# Patient Record
Sex: FEMALE | Race: WHITE | NOT HISPANIC OR LATINO | Employment: STUDENT | URBAN - METROPOLITAN AREA
[De-identification: names, ages, dates, MRNs, and addresses within clinical notes are randomized per-mention and may not be internally consistent; named-entity substitution may affect disease eponyms.]

---

## 2021-08-25 ENCOUNTER — OFFICE VISIT (OUTPATIENT)
Dept: FAMILY MEDICINE | Facility: CLINIC | Age: 26
End: 2021-08-25
Payer: COMMERCIAL

## 2021-08-25 VITALS
BODY MASS INDEX: 24.32 KG/M2 | TEMPERATURE: 98.4 F | OXYGEN SATURATION: 98 % | HEART RATE: 71 BPM | HEIGHT: 65 IN | SYSTOLIC BLOOD PRESSURE: 122 MMHG | DIASTOLIC BLOOD PRESSURE: 83 MMHG | RESPIRATION RATE: 16 BRPM | WEIGHT: 146 LBS

## 2021-08-25 DIAGNOSIS — G47.11 IDIOPATHIC HYPERSOMNIA: Primary | ICD-10-CM

## 2021-08-25 PROCEDURE — 99203 OFFICE O/P NEW LOW 30 MIN: CPT | Performed by: FAMILY MEDICINE

## 2021-08-25 RX ORDER — MODAFINIL 200 MG/1
200 TABLET ORAL DAILY
COMMUNITY
End: 2021-08-25

## 2021-08-25 RX ORDER — MODAFINIL 200 MG/1
200 TABLET ORAL DAILY
Qty: 90 TABLET | Refills: 1 | Status: ON HOLD | OUTPATIENT
Start: 2021-08-25 | End: 2024-02-27

## 2021-08-25 ASSESSMENT — MIFFLIN-ST. JEOR: SCORE: 1396.25

## 2021-08-25 NOTE — PROGRESS NOTES
"    Assessment & Plan     Idiopathic hypersomnia  Refilled modafinil  - modafinil (PROVIGIL) 200 MG tablet; Take 1 tablet (200 mg) by mouth daily    F/u for pap smear and possible IUD removal/reinsertion    Chela Peter DO  [facility] DESMOND Spring is a 25 year old who presents for the following health issues:    HPI     Presenting to [facility]  Maribel IUD in 2018/2019  On modafinil for idiopathic hypersomnia x4 years  Tried and failed ritalin, sleep study completed. Modafinil working effectively.    Review of Systems   Constitutional, HEENT, cardiovascular, pulmonary, gi and gu systems are negative, except as otherwise noted.      Objective    /83   Pulse 71   Temp 98.4  F (36.9  C) (Oral)   Resp 16   Ht 1.64 m (5' 4.57\")   Wt 66.2 kg (146 lb)   LMP 07/29/2021 (Approximate)   SpO2 98%   BMI 24.62 kg/m    Body mass index is 24.62 kg/m .  Physical Exam   GENERAL: healthy, alert and no distress  RESP: lungs clear to auscultation - no rales, rhonchi or wheezes  CV: regular rate and rhythm, normal S1 S2, no S3 or S4, no murmur, click or rub, no peripheral edema and peripheral pulses strong  MS: no gross musculoskeletal defects noted, no edema          "

## 2021-09-16 ENCOUNTER — OFFICE VISIT (OUTPATIENT)
Dept: FAMILY MEDICINE | Facility: CLINIC | Age: 26
End: 2021-09-16
Payer: COMMERCIAL

## 2021-09-16 VITALS
HEART RATE: 82 BPM | SYSTOLIC BLOOD PRESSURE: 124 MMHG | WEIGHT: 145 LBS | HEIGHT: 66 IN | RESPIRATION RATE: 16 BRPM | DIASTOLIC BLOOD PRESSURE: 59 MMHG | TEMPERATURE: 98.1 F | BODY MASS INDEX: 23.3 KG/M2 | OXYGEN SATURATION: 100 %

## 2021-09-16 DIAGNOSIS — Z11.3 ROUTINE SCREENING FOR STI (SEXUALLY TRANSMITTED INFECTION): ICD-10-CM

## 2021-09-16 DIAGNOSIS — Z12.4 CERVICAL CANCER SCREENING: Primary | ICD-10-CM

## 2021-09-16 PROCEDURE — 87491 CHLMYD TRACH DNA AMP PROBE: CPT | Performed by: FAMILY MEDICINE

## 2021-09-16 PROCEDURE — 99213 OFFICE O/P EST LOW 20 MIN: CPT | Performed by: FAMILY MEDICINE

## 2021-09-16 PROCEDURE — G0145 SCR C/V CYTO,THINLAYER,RESCR: HCPCS | Performed by: FAMILY MEDICINE

## 2021-09-16 PROCEDURE — 87591 N.GONORRHOEAE DNA AMP PROB: CPT | Performed by: FAMILY MEDICINE

## 2021-09-16 ASSESSMENT — MIFFLIN-ST. JEOR: SCORE: 1416.72

## 2021-09-16 NOTE — PROGRESS NOTES
"    Assessment & Plan     Cervical cancer screening  - Pap screen reflex to HPV if ASCUS - recommend age 25 - 29  - HPV Hold (Lab Only)    Routine screening for STI (sexually transmitted infection)  - Chlamydia trachomatis/Neisseria gonorrhoeae by PCR - Clinic Collect      No follow-ups on file.    DO ANNIKA Jasso [facility] DESMOND Spring is a 25 year old who presents for the following health issues:    HPI     P/f pap smear  Has kyleena - placed 1/11/2019  LMP ~1mo ago, irregular bleeding on IUD  Last pap 7/2019 and 10/2020 with ASCUS, neg HPV    Review of Systems   Constitutional, HEENT, cardiovascular, pulmonary, gi and gu systems are negative, except as otherwise noted.      Objective    /59   Pulse 82   Temp 98.1  F (36.7  C) (Oral)   Resp 16   Ht 1.68 m (5' 6.14\")   Wt 65.8 kg (145 lb)   SpO2 100%   Breastfeeding No   BMI 23.30 kg/m    Body mass index is 23.3 kg/m .  Physical Exam   GENERAL: healthy, alert and no distress  RESP: lungs clear to auscultation - no rales, rhonchi or wheezes  CV: regular rate and rhythm, normal S1 S2, no S3 or S4, no murmur, click or rub, no peripheral edema and peripheral pulses strong   (female): normal female external genitalia, normal urethral meatus, vaginal mucosa pink, moist, well rugated, and normal cervix/adnexa/uterus without masses or discharge. IUD strings visualized - short, in cervical os  MS: no gross musculoskeletal defects noted, no edema  SKIN: no suspicious lesions or rashes  NEURO: Normal strength and tone, mentation intact and speech normal  PSYCH: mentation appears normal, affect normal/bright          "

## 2021-09-17 LAB
C TRACH DNA SPEC QL PROBE+SIG AMP: NEGATIVE
N GONORRHOEA DNA SPEC QL NAA+PROBE: NEGATIVE

## 2021-09-20 LAB
BKR LAB AP GYN ADEQUACY: NORMAL
BKR LAB AP GYN INTERPRETATION: NORMAL
BKR LAB AP HPV REFLEX: NORMAL
BKR LAB AP PREVIOUS ABNL DX: NORMAL
BKR LAB AP PREVIOUS ABNORMAL: NORMAL
PATH REPORT.COMMENTS IMP SPEC: NORMAL
PATH REPORT.RELEVANT HX SPEC: NORMAL

## 2021-10-17 ENCOUNTER — HEALTH MAINTENANCE LETTER (OUTPATIENT)
Age: 26
End: 2021-10-17

## 2022-10-03 ENCOUNTER — HEALTH MAINTENANCE LETTER (OUTPATIENT)
Age: 27
End: 2022-10-03

## 2022-11-09 ENCOUNTER — LAB REQUISITION (OUTPATIENT)
Dept: LAB | Facility: CLINIC | Age: 27
End: 2022-11-09

## 2022-11-09 DIAGNOSIS — Z11.3 ENCOUNTER FOR SCREENING FOR INFECTIONS WITH A PREDOMINANTLY SEXUAL MODE OF TRANSMISSION: ICD-10-CM

## 2022-11-09 PROCEDURE — 87491 CHLMYD TRACH DNA AMP PROBE: CPT | Performed by: NURSE PRACTITIONER

## 2022-11-09 PROCEDURE — 86803 HEPATITIS C AB TEST: CPT | Performed by: NURSE PRACTITIONER

## 2022-11-09 PROCEDURE — 87389 HIV-1 AG W/HIV-1&-2 AB AG IA: CPT | Performed by: NURSE PRACTITIONER

## 2022-11-09 PROCEDURE — 87591 N.GONORRHOEAE DNA AMP PROB: CPT | Performed by: NURSE PRACTITIONER

## 2022-11-09 PROCEDURE — 86780 TREPONEMA PALLIDUM: CPT | Performed by: NURSE PRACTITIONER

## 2022-11-10 LAB
C TRACH DNA SPEC QL PROBE+SIG AMP: NEGATIVE
HCV AB SERPL QL IA: NONREACTIVE
HIV 1+2 AB+HIV1 P24 AG SERPL QL IA: NONREACTIVE
N GONORRHOEA DNA SPEC QL NAA+PROBE: NEGATIVE
T PALLIDUM AB SER QL: NONREACTIVE

## 2023-02-11 ENCOUNTER — HEALTH MAINTENANCE LETTER (OUTPATIENT)
Age: 28
End: 2023-02-11

## 2023-04-25 ENCOUNTER — LAB REQUISITION (OUTPATIENT)
Dept: LAB | Facility: CLINIC | Age: 28
End: 2023-04-25

## 2023-04-25 DIAGNOSIS — R39.15 URGENCY OF URINATION: ICD-10-CM

## 2023-04-25 PROCEDURE — 87086 URINE CULTURE/COLONY COUNT: CPT | Performed by: NURSE PRACTITIONER

## 2023-04-27 LAB — BACTERIA UR CULT: NO GROWTH

## 2023-05-01 ENCOUNTER — LAB REQUISITION (OUTPATIENT)
Dept: LAB | Facility: CLINIC | Age: 28
End: 2023-05-01

## 2023-05-01 DIAGNOSIS — R30.0 DYSURIA: ICD-10-CM

## 2023-05-01 DIAGNOSIS — Z11.3 ENCOUNTER FOR SCREENING FOR INFECTIONS WITH A PREDOMINANTLY SEXUAL MODE OF TRANSMISSION: ICD-10-CM

## 2023-05-01 PROCEDURE — 87563 M. GENITALIUM AMP PROBE: CPT | Performed by: NURSE PRACTITIONER

## 2023-05-01 PROCEDURE — 87491 CHLMYD TRACH DNA AMP PROBE: CPT | Performed by: NURSE PRACTITIONER

## 2023-05-01 PROCEDURE — 87086 URINE CULTURE/COLONY COUNT: CPT | Performed by: NURSE PRACTITIONER

## 2023-05-02 LAB
C TRACH DNA SPEC QL PROBE+SIG AMP: NEGATIVE
N GONORRHOEA DNA SPEC QL NAA+PROBE: NEGATIVE

## 2023-05-03 LAB — BACTERIA UR CULT: NORMAL

## 2023-05-04 LAB
M GENITALIUM DNA SPEC QL NAA+PROBE: NOT DETECTED
M HOMINIS DNA SPEC QL NAA+PROBE: NOT DETECTED
U PARVUM DNA SPEC QL NAA+PROBE: DETECTED
U UREALYTICUM DNA SPEC QL NAA+PROBE: NOT DETECTED

## 2023-05-25 ENCOUNTER — LAB REQUISITION (OUTPATIENT)
Dept: LAB | Facility: CLINIC | Age: 28
End: 2023-05-25

## 2023-05-25 DIAGNOSIS — R39.15 URGENCY OF URINATION: ICD-10-CM

## 2023-05-25 PROCEDURE — 87086 URINE CULTURE/COLONY COUNT: CPT | Performed by: NURSE PRACTITIONER

## 2023-05-25 PROCEDURE — 87798 DETECT AGENT NOS DNA AMP: CPT | Performed by: NURSE PRACTITIONER

## 2023-05-27 LAB — BACTERIA UR CULT: NO GROWTH

## 2023-05-30 LAB
M GENITALIUM DNA SPEC QL NAA+PROBE: NOT DETECTED
M HOMINIS DNA SPEC QL NAA+PROBE: NOT DETECTED
U PARVUM DNA SPEC QL NAA+PROBE: NOT DETECTED
U UREALYTICUM DNA SPEC QL NAA+PROBE: NOT DETECTED

## 2024-01-29 ENCOUNTER — OFFICE VISIT (OUTPATIENT)
Dept: OTOLARYNGOLOGY | Facility: CLINIC | Age: 29
End: 2024-01-29
Payer: COMMERCIAL

## 2024-01-29 ENCOUNTER — PRE VISIT (OUTPATIENT)
Dept: OTOLARYNGOLOGY | Facility: CLINIC | Age: 29
End: 2024-01-29

## 2024-01-29 VITALS
HEIGHT: 66 IN | OXYGEN SATURATION: 94 % | HEART RATE: 87 BPM | BODY MASS INDEX: 22.45 KG/M2 | WEIGHT: 139.7 LBS | SYSTOLIC BLOOD PRESSURE: 122 MMHG | DIASTOLIC BLOOD PRESSURE: 80 MMHG

## 2024-01-29 DIAGNOSIS — R59.0 CERVICAL LYMPHADENOPATHY: Primary | ICD-10-CM

## 2024-01-29 PROCEDURE — 31575 DIAGNOSTIC LARYNGOSCOPY: CPT | Performed by: STUDENT IN AN ORGANIZED HEALTH CARE EDUCATION/TRAINING PROGRAM

## 2024-01-29 PROCEDURE — 88189 FLOWCYTOMETRY/READ 16 & >: CPT | Performed by: STUDENT IN AN ORGANIZED HEALTH CARE EDUCATION/TRAINING PROGRAM

## 2024-01-29 PROCEDURE — 88172 CYTP DX EVAL FNA 1ST EA SITE: CPT | Mod: 26 | Performed by: STUDENT IN AN ORGANIZED HEALTH CARE EDUCATION/TRAINING PROGRAM

## 2024-01-29 PROCEDURE — 88185 FLOWCYTOMETRY/TC ADD-ON: CPT | Performed by: STUDENT IN AN ORGANIZED HEALTH CARE EDUCATION/TRAINING PROGRAM

## 2024-01-29 PROCEDURE — 87102 FUNGUS ISOLATION CULTURE: CPT | Performed by: STUDENT IN AN ORGANIZED HEALTH CARE EDUCATION/TRAINING PROGRAM

## 2024-01-29 PROCEDURE — 88305 TISSUE EXAM BY PATHOLOGIST: CPT | Mod: TC | Performed by: STUDENT IN AN ORGANIZED HEALTH CARE EDUCATION/TRAINING PROGRAM

## 2024-01-29 PROCEDURE — 88341 IMHCHEM/IMCYTCHM EA ADD ANTB: CPT | Mod: 26 | Performed by: STUDENT IN AN ORGANIZED HEALTH CARE EDUCATION/TRAINING PROGRAM

## 2024-01-29 PROCEDURE — 99203 OFFICE O/P NEW LOW 30 MIN: CPT | Mod: 25 | Performed by: STUDENT IN AN ORGANIZED HEALTH CARE EDUCATION/TRAINING PROGRAM

## 2024-01-29 PROCEDURE — 87116 MYCOBACTERIA CULTURE: CPT | Mod: 90 | Performed by: PATHOLOGY

## 2024-01-29 PROCEDURE — 88173 CYTOPATH EVAL FNA REPORT: CPT | Mod: 26 | Performed by: STUDENT IN AN ORGANIZED HEALTH CARE EDUCATION/TRAINING PROGRAM

## 2024-01-29 PROCEDURE — 88342 IMHCHEM/IMCYTCHM 1ST ANTB: CPT | Mod: 26 | Performed by: STUDENT IN AN ORGANIZED HEALTH CARE EDUCATION/TRAINING PROGRAM

## 2024-01-29 PROCEDURE — 99000 SPECIMEN HANDLING OFFICE-LAB: CPT | Performed by: PATHOLOGY

## 2024-01-29 PROCEDURE — 88305 TISSUE EXAM BY PATHOLOGIST: CPT | Mod: 26 | Performed by: STUDENT IN AN ORGANIZED HEALTH CARE EDUCATION/TRAINING PROGRAM

## 2024-01-29 PROCEDURE — 87206 SMEAR FLUORESCENT/ACID STAI: CPT | Mod: 90 | Performed by: PATHOLOGY

## 2024-01-29 PROCEDURE — 88342 IMHCHEM/IMCYTCHM 1ST ANTB: CPT | Mod: TC,XU | Performed by: STUDENT IN AN ORGANIZED HEALTH CARE EDUCATION/TRAINING PROGRAM

## 2024-01-29 PROCEDURE — 88184 FLOWCYTOMETRY/ TC 1 MARKER: CPT | Performed by: STUDENT IN AN ORGANIZED HEALTH CARE EDUCATION/TRAINING PROGRAM

## 2024-01-29 ASSESSMENT — PAIN SCALES - GENERAL: PAINLEVEL: NO PAIN (0)

## 2024-01-29 NOTE — LETTER
1/29/2024       RE: Clementine Kathleen  [address]  [facility] [address]     Dear Colleague,    Thank you for referring your patient, Clementine Kathleen, to the [facility] [place] [facility]. Please see a copy of my visit note below.    Head and Neck Surgery  1/29/24    I had the pleasure of meeting Ms Kathleen today in clinic.    She is a pleasant 28-year-old female referred for evaluation of bilateral cervical adenopathy.    She first noted this on December 24.  The lymph nodes have not stable in size since then.  She has no constitutional or head and neck symptoms.  She has had no recent travel or animal exposures.    Past medical history:  None    Past surgical history:  None    Medications:  Modafinil    Allergies:  No known drug allergies    Social history:  No tobacco use  Occasional alcohol use    Family history:  None    Review of systems:  12 point review of systems was performed and is negative aside from that in HPI    Physical examination:  Alert and in no acute distress  Palpable left level 3/4 lymphadenopathy that is firm.  The overlying skin is not involved  No lesions seen in the oral cavity or oropharynx  No cutaneous lesions seen    Procedure:  Directed laryngoscopy was performed showing no lesions in the pharynx or larynx    Ultrasound-guided fine-needle aspirates performed of the left level 4 lymph node.  Under ultrasound guidance 1% lidocaine with 1: 100,000 epinephrine was used for local anesthesia.  23-gauge needle was used to obtain multiple specimens for cytology, flow cytometry, acid-fast bacilli stain and culture, fungal culture.    Assessment and plan:  28-year-old woman with persistent cervical adenopathy.  Ultrasound shows bilateral abnormal appearing lymph nodes.     We are able to obtain fine-needle aspirates for microbiology and pathologic evaluation.    I will keep her updated on  workup.    Asher Damon MD    30 minutes spent on the date of the encounter in chart review, patient visit, review of tests, documentation and/or discussion with other providers about the issues documented above aside from time spent doing flexible laryngoscopy and ultrasound-guided fine-needle aspirate.         Again, thank you for allowing me to participate in the care of your patient.      Sincerely,    Asher Damon MD

## 2024-01-29 NOTE — PATIENT INSTRUCTIONS
You were seen in the ENT Clinic today by Dr. Damon. If you have any questions or concerns after your appointment, please contact us (see below)    The following has been recommended for you based upon your appointment today:  In clinic FNA (we will call with results)     How to Contact Us:  Send a [employer] message to your provider. Our team will respond to you via [employer]. Occasionally, we will need to call you to get further information.  For urgent matters (Monday-Friday), call the ENT Clinic: [phone] and speak with a call center team member - they will route your call appropriately.   If you'd like to speak directly with a nurse, please find our contact information below. We do our best to check voicemail frequently throughout the day, and will work to call you back within 1-2 days. For urgent matters, please use the general clinic phone numbers listed above.    Martha HINDS RN, BSN  RN Care Coordinator, ENT Clinic  [facility]  Direct: [phone]

## 2024-01-29 NOTE — TELEPHONE ENCOUNTER
FUTURE VISIT INFORMATION      FUTURE VISIT INFORMATION:  Date: 1/29/24  Time: 12 pm  Location: [facility]  REFERRAL INFORMATION:  Referring provider:    Referring providers clinic:    Reason for visit/diagnosis:  FNA    RECORDS REQUESTED FROM      Clinic name Comments Records Status Imaging Status                 *No records

## 2024-01-30 LAB
PATH REPORT.COMMENTS IMP SPEC: NORMAL
PATH REPORT.FINAL DX SPEC: NORMAL
PATH REPORT.MICROSCOPIC SPEC OTHER STN: NORMAL
PATH REPORT.RELEVANT HX SPEC: NORMAL

## 2024-01-31 NOTE — PROGRESS NOTES
Head and Neck Surgery  1/29/24    I had the pleasure of meeting Ms Kathleen today in clinic.    She is a pleasant 28-year-old female referred for evaluation of bilateral cervical adenopathy.    She first noted this on December 24.  The lymph nodes have not stable in size since then.  She has no constitutional or head and neck symptoms.  She has had no recent travel or animal exposures.    Past medical history:  None    Past surgical history:  None    Medications:  Modafinil    Allergies:  No known drug allergies    Social history:  No tobacco use  Occasional alcohol use    Family history:  None    Review of systems:  12 point review of systems was performed and is negative aside from that in HPI    Physical examination:  Alert and in no acute distress  Palpable left level 3/4 lymphadenopathy that is firm.  The overlying skin is not involved  No lesions seen in the oral cavity or oropharynx  No cutaneous lesions seen    Procedure:  Directed laryngoscopy was performed showing no lesions in the pharynx or larynx    Ultrasound-guided fine-needle aspirates performed of the left level 4 lymph node.  Under ultrasound guidance 1% lidocaine with 1: 100,000 epinephrine was used for local anesthesia.  23-gauge needle was used to obtain multiple specimens for cytology, flow cytometry, acid-fast bacilli stain and culture, fungal culture.    Assessment and plan:  28-year-old woman with persistent cervical adenopathy.  Ultrasound shows bilateral abnormal appearing lymph nodes.     We are able to obtain fine-needle aspirates for microbiology and pathologic evaluation.    I will keep her updated on workup.    Asher Damon MD    30 minutes spent on the date of the encounter in chart review, patient visit, review of tests, documentation and/or discussion with other providers about the issues documented above aside from time spent doing flexible laryngoscopy and ultrasound-guided fine-needle aspirate.

## 2024-02-02 ENCOUNTER — PATIENT OUTREACH (OUTPATIENT)
Dept: OTOLARYNGOLOGY | Facility: CLINIC | Age: 29
End: 2024-02-02
Payer: COMMERCIAL

## 2024-02-02 ENCOUNTER — TELEPHONE (OUTPATIENT)
Dept: OTOLARYNGOLOGY | Facility: CLINIC | Age: 29
End: 2024-02-02
Payer: COMMERCIAL

## 2024-02-02 ENCOUNTER — PREP FOR PROCEDURE (OUTPATIENT)
Dept: OTOLARYNGOLOGY | Facility: CLINIC | Age: 29
End: 2024-02-02
Payer: COMMERCIAL

## 2024-02-02 DIAGNOSIS — R59.0 CERVICAL LYMPHADENOPATHY: Primary | ICD-10-CM

## 2024-02-02 DIAGNOSIS — C85.90 LYMPHOMA (H): Primary | ICD-10-CM

## 2024-02-02 LAB
PATH REPORT.COMMENTS IMP SPEC: ABNORMAL
PATH REPORT.COMMENTS IMP SPEC: YES
PATH REPORT.FINAL DX SPEC: ABNORMAL
PATH REPORT.GROSS SPEC: ABNORMAL
PATH REPORT.MICROSCOPIC SPEC OTHER STN: ABNORMAL
PATH REPORT.RELEVANT HX SPEC: ABNORMAL

## 2024-02-02 NOTE — PROGRESS NOTES
Called and spoke to patient regarding PET CT. Scheduled for 2/7 at 7 am. Also spoke to patient regarding excisional node biopsy tentatively scheduled for Friday at 10:45 am at [place]. Direct call back number given to patient for questions or concerns.     Martha Newman, RN, BSN  RN Care Coordinator, ENT Clinic

## 2024-02-02 NOTE — TELEPHONE ENCOUNTER
Patient is scheduled for surgery with Dr. Damon.     Spoke with: Patient     Date of Surgery: 2/09/24    Location: [facility] OR     Pre op with Provider: NICOLE     H&P: Patient is scheduled for an in clinic visit with PAC on 2/06/2024 at 7:30 AM.     Additional imaging/appointments: Per scheduling instructions within case, no post op appointment is required.     Surgery packet: Per patients preference, will send packet through [employer]. Informed patient arrival time for surgery will not be listed within packet.      Additional comments: Informed patient arrival time for surgery will be given at PAC appointment.         Rafaela Cardenas on 2/2/2024 at 2:19 PM

## 2024-02-05 NOTE — TELEPHONE ENCOUNTER
FUTURE VISIT INFORMATION      SURGERY INFORMATION:  Date: 2/9/24  Location[facility] or  Surgeon:  Asher Damon MD   Anesthesia Type:  general  Procedure: Exciv 1/29/24    RECORDS REQUESTED FROM:       Primary Care Provider: Chela Peter DO

## 2024-02-06 ENCOUNTER — LAB (OUTPATIENT)
Dept: LAB | Facility: CLINIC | Age: 29
End: 2024-02-06
Payer: COMMERCIAL

## 2024-02-06 ENCOUNTER — PATIENT OUTREACH (OUTPATIENT)
Dept: ONCOLOGY | Facility: CLINIC | Age: 29
End: 2024-02-06

## 2024-02-06 ENCOUNTER — OFFICE VISIT (OUTPATIENT)
Dept: SURGERY | Facility: CLINIC | Age: 29
End: 2024-02-06
Payer: COMMERCIAL

## 2024-02-06 ENCOUNTER — PRE VISIT (OUTPATIENT)
Dept: SURGERY | Facility: CLINIC | Age: 29
End: 2024-02-06

## 2024-02-06 ENCOUNTER — ANESTHESIA EVENT (OUTPATIENT)
Dept: SURGERY | Facility: CLINIC | Age: 29
End: 2024-02-06
Payer: COMMERCIAL

## 2024-02-06 VITALS
TEMPERATURE: 97.6 F | BODY MASS INDEX: 21.79 KG/M2 | DIASTOLIC BLOOD PRESSURE: 71 MMHG | SYSTOLIC BLOOD PRESSURE: 104 MMHG | RESPIRATION RATE: 16 BRPM | WEIGHT: 135.6 LBS | HEART RATE: 80 BPM | OXYGEN SATURATION: 98 % | HEIGHT: 66 IN

## 2024-02-06 DIAGNOSIS — Z01.818 PREOP EXAMINATION: Primary | ICD-10-CM

## 2024-02-06 DIAGNOSIS — R59.0 CERVICAL LYMPHADENOPATHY: ICD-10-CM

## 2024-02-06 DIAGNOSIS — Z01.818 PREOP EXAMINATION: ICD-10-CM

## 2024-02-06 LAB
CREAT SERPL-MCNC: 0.69 MG/DL (ref 0.51–0.95)
EGFRCR SERPLBLD CKD-EPI 2021: >90 ML/MIN/1.73M2
ERYTHROCYTE [DISTWIDTH] IN BLOOD BY AUTOMATED COUNT: 13.2 % (ref 10–15)
HCT VFR BLD AUTO: 41.8 % (ref 35–47)
HGB BLD-MCNC: 14 G/DL (ref 11.7–15.7)
MCH RBC QN AUTO: 29 PG (ref 26.5–33)
MCHC RBC AUTO-ENTMCNC: 33.5 G/DL (ref 31.5–36.5)
MCV RBC AUTO: 87 FL (ref 78–100)
PLATELET # BLD AUTO: 250 10E3/UL (ref 150–450)
RBC # BLD AUTO: 4.82 10E6/UL (ref 3.8–5.2)
WBC # BLD AUTO: 5.1 10E3/UL (ref 4–11)

## 2024-02-06 PROCEDURE — 82565 ASSAY OF CREATININE: CPT | Performed by: PATHOLOGY

## 2024-02-06 PROCEDURE — 36415 COLL VENOUS BLD VENIPUNCTURE: CPT | Performed by: PATHOLOGY

## 2024-02-06 PROCEDURE — 85027 COMPLETE CBC AUTOMATED: CPT | Performed by: PATHOLOGY

## 2024-02-06 PROCEDURE — 99203 OFFICE O/P NEW LOW 30 MIN: CPT | Performed by: CLINICAL NURSE SPECIALIST

## 2024-02-06 RX ORDER — NICOTINE POLACRILEX 4 MG/1
20 GUM, CHEWING ORAL EVERY MORNING
Status: ON HOLD | COMMUNITY
End: 2024-02-27

## 2024-02-06 ASSESSMENT — ENCOUNTER SYMPTOMS
SEIZURES: 0
DYSRHYTHMIAS: 0

## 2024-02-06 ASSESSMENT — PAIN SCALES - GENERAL: PAINLEVEL: NO PAIN (0)

## 2024-02-06 ASSESSMENT — LIFESTYLE VARIABLES: TOBACCO_USE: 0

## 2024-02-06 NOTE — PATIENT INSTRUCTIONS
Preparing for Your Surgery      Name:  Clementine Kathleen   MRN:  [id number]   :  1995   Today's Date:  2024       Arriving for surgery:  Surgery date:  24  Arrival time:  8:45 am  Surgery time: 10:45 am    Please come to:     Please come to:      [facility] [address]  [address] [place]  [address]      The [facility] Patient /Visitor Ramp is located at [address]. Patients and visitors who self-park will receive the reduced hospital parking rate. If the Patient /Visitor Ramp is full, please follow the signs to the  parking located at the main hospital entrance.     parking is available ( 24 hours/ 7 days a week)    Discounted parking pass options are available for patients and visitors. They can be purchased at the [employer] desk at the main hospital entrance.    -    Stop at the security desk and they will direct surgery patients to the 3rd floor Surgery Waiting Room. [phone]     -  If you are in need of directions, wheelchair or escort please stop at the Information/security desk in the lobby.       What can I eat or drink?  -  You may eat and drink normally up to 8 hours prior to arrival time. (Until 12:45 am)  -  You may have clear liquids until 2 hours prior to arrival time. (Until 6:45 am)    Examples of clear liquids:  Water  Clear broth  Juices (apple, white grape, white cranberry  and cider) without pulp  Noncarbonated, powder based beverages  (lemonade and Jovany-Aid)  Sodas (Sprite, 7-Up, ginger ale and seltzer)  Coffee or tea (without milk or cream)  Gatorade    -  No Alcohol or cannabis products for at least 24 hours before surgery.     Which medicines can I take?    Hold Aspirin for 7 days before surgery.   Hold Multivitamins for 7 days before surgery.  Hold Supplements for 7 days before surgery.  Hold Ibuprofen (Advil, Motrin) for 1 day(s) before surgery--unless otherwise directed  by surgeon.  Hold Naproxen (Aleve) for 4 days before surgery.    -  DO NOT take these medications the day of surgery:  Not applicable    -  PLEASE TAKE these medications per your usual routine:  Cimetidine (Tagamet)  Omeprazole (Prilosec)    How do I prepare myself?  - Please take 2 showers (one the night prior to surgery and one the morning of surgery) using Scrubcare or Hibiclens soap.    Use this soap only from the neck to your toes.     Leave the soap on your skin for one minute--then rinse thoroughly.      You may use your own shampoo and conditioner. No other hair products.   - Please remove all jewelry and body piercings.  - No lotions, deodorants or fragrance.  - No makeup or fingernail polish.   - Bring your ID and insurance card.    -If you use a CPAP machine, please bring the CPAP machine, tubing, and mask to hospital.    -If you have a Deep Brain Stimulator, Spinal Cord Stimulator, or any Neuro Stimulator device---you must bring the remote control to the hospital.      ALL PATIENTS GOING HOME THE SAME DAY OF SURGERY ARE REQUIRED TO HAVE A RESPONSIBLE ADULT TO DRIVE AND BE IN ATTENDANCE WITH THEM FOR 24 HOURS FOLLOWING SURGERY.    Covid testing policy as of 12/06/2022  Your surgeon will notify and schedule you for a COVID test if one is needed before surgery--please direct any questions or COVID symptoms to your surgeon      Questions or Concerns:    - For any questions regarding the day of surgery or your hospital stay, please contact the Pre Admission Nursing Office at [phone].       - If you have health changes between today and your surgery, please call your surgeon.       - For questions after surgery, please call your surgeons office.           Current Visitor Guidelines    You may have 2 visitors in the pre op area.    Visiting hours: 8 a.m. to 8:30 p.m.    Patients confirmed or suspected to have symptoms of COVID 19 or flu:     No visitors allowed for adult patients.   Children (under age 18)  can have 1 named visitor.     People who are sick or showing symptoms of COVID 19 or flu:    Are not allowed to visit patients--we can only make exceptions in special situations.       Please follow these guidelines for your visit:          Please maintain social distance          Masking is optional--however at times you may be asked to wear a mask for the safety of yourself and others     Clean your hands with alcohol hand . Do this when you arrive at and leave the building and patient room,    And again after you touch your mask or anything in the room.     Go directly to and from the room you are visiting.     Stay in the patient s room during your visit. Limit going to other places in the hospital as much as possible     Leave bags and jackets at home or in the car.     For everyone s health, please don t come and go during your visit. That includes for smoking   during your visit.

## 2024-02-06 NOTE — PROGRESS NOTES
[facility]: Cancer Care                                                                   Hem/Onc  Referral reviewed  Adult Oncology/Hematology  Referral [[id number]]  Electronically signed by: Asher Damon MD on 02/02/24 1356 Status: Active   Mode: Ordering in Verbal with readback mode Communicated by: Martha Newman RN     Referral Details  Referred By  Referred To   Asher Damon MD   [address] [address]   Fax: [phone]    Diagnoses: Lymphoma (H)   Order: Adult Oncology/Hematology  Referral    [facility]   Lymphoma service     Order Questions  Question Answer   My Clinical Question Is: Lymphoma   Reason for Referral: Medical Oncology        ASSESSMENT      Clinical History (per Nurse review of records provided):    28 year old female patient with need for further work up for possibly lymhoma based on FNA of Left neck LN  Excisional bx planned for 2/9/24  PET on 2/7/24  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 Pt confirms they are aware of the referral and ready to schedule  Explained to pt that she will receive a call from me after further dept review for clinic consult date options, possibly on Monday 2/12  We discussed fertility preservation consult as a good idea. She will check with her insurance after I send her a [employer] message with information / resources.  Pt voiced understanding of  above instructions and information and denied further questions , was provided my direct callback number for interval questions/concerns    -[employer] message to pt    PLAN                                                      Hem/Onc consult next week after PET and excisional bx on Friday 2/9/24    Future Appointments   Date Time Provider Department Center   2/7/2024  7:00 AM [address]       See records team's Pre-Visit encounter documentation for additional records retrieval information.    Sonja Christopher, RN, BSN, OCN  Hematology/Oncology New Patient Nurse Navigator   [facility] [place]  [phone]

## 2024-02-06 NOTE — H&P
Pre-Operative H & P     CC:  Preoperative exam to assess for increased cardiopulmonary risk while undergoing surgery and anesthesia.    Date of Encounter: 2/6/2024  Primary Care Physician:  Chela Peter     Reason for visit:   Encounter Diagnoses   Name Primary?    Preop examination Yes    Cervical lymphadenopathy        HPI  Clementine Kathleen is a 28 year old female who presents for pre-operative H & P in preparation for  Procedure Information       Case: [id number] Date/Time: 02/09/24 1045    Procedure: Excisional Node Biopsy Left Neck (Left: Neck)    Anesthesia type: General    Diagnosis: Cervical lymphadenopathy [R59.0]    Pre-op diagnosis: Cervical lymphadenopathy [R59.0]    Location: [facility]  /  OR    Providers: Asher Damon MD          History is obtained from the patient and chart review    Patient who was recently evaluated by Dr. Damon for bilateral cervical adenopathy, first noted on 12/24/23, and stable. She has had no head or neck symptoms. An US showed bilateral abnormal appearing lymph nodes. She has been counseled for above procedure.    Her history is otherwise significant for GERD and interstitial cystitis    Hx of abnormal bleeding or anti-platelet use: Denies    Menstrual history: Patient's last menstrual period was 01/31/2024 (exact date).     Past Medical History  Past Medical History:   Diagnosis Date    Anxiety     Cervical lymphadenopathy     GERD     Interstitial cystitis        Past Surgical History  Past Surgical History:   Procedure Laterality Date    CL AFF SURGICAL PATHOLOGY      Pearl neuroma    CLOSED REDUCTION PROXIMAL FIBULAR FRACTURE Left        Prior to Admission Medications  Current Outpatient Medications   Medication Sig Dispense Refill    cimetidine (TAGAMET) 200 MG tablet Take 200 mg by mouth 2 times daily      Levonorgestrel (KYLEENA IU) 1 Device by Intrauterine route once      omeprazole 20 MG tablet Take 20 mg by mouth every morning      modafinil  (PROVIGIL) 200 MG tablet Take 1 tablet (200 mg) by mouth daily (Patient not taking: Reported on 2/6/2024) 90 tablet 1       Allergies  No Known Allergies    Social History  Social History     Socioeconomic History    Marital status: Single     Spouse name: Not on file    Number of children: Not on file    Years of education: Not on file    Highest education level: Not on file   Occupational History    Not on file   Tobacco Use    Smoking status: Never    Smokeless tobacco: Never   Vaping Use    Vaping Use: Never used   Substance and Sexual Activity    Alcohol use: Not Currently    Drug use: Not Currently    Sexual activity: Not on file   Other Topics Concern    Not on file   Social History Narrative    Not on file     Social Determinants of Health     Financial Resource Strain: Not on file   Food Insecurity: Not on file   Transportation Needs: Not on file   Physical Activity: Not on file   Stress: Not on file   Social Connections: Not on file   Interpersonal Safety: Not on file   Housing Stability: Not on file       Family History  Family History   Problem Relation Age of Onset    Anesthesia Reaction No family hx of     Clotting Disorder No family hx of        Review of Systems  The complete review of systems is negative other than noted in the HPI or here.   Anesthesia Evaluation   Pt has had prior anesthetic. Type: General.    No history of anesthetic complications       ROS/MED HX  ENT/Pulmonary:    (-) tobacco use and recent URI   Neurologic:    (-) no seizures   Cardiovascular:     (+)  - -   -  - -                                 No previous cardiac testing  (-) taking anticoagulants/antiplatelets, JACKSON and arrhythmias   METS/Exercise Tolerance: >4 METS    Hematologic:  - neg hematologic  ROS  (-) history of blood clots and history of blood transfusion   Musculoskeletal:  - neg musculoskeletal ROS     GI/Hepatic:  - neg GI/hepatic ROS   (+) GERD, Asymptomatic on medication,                  Renal/Genitourinary:  "Comment: Interstitial cystitis      Endo:  - neg endo ROS     Psychiatric/Substance Use:     (+) psychiatric history anxiety       Infectious Disease:  - neg infectious disease ROS     Malignancy:  - neg malignancy ROS     Other:  - neg other ROS          /71 (BP Location: Right arm, Patient Position: Sitting, Cuff Size: Adult Regular)   Pulse 80   Temp 97.6  F (36.4  C) (Oral)   Resp 16   Ht 1.676 m (5' 6\")   Wt 61.5 kg (135 lb 9.6 oz)   LMP 01/31/2024 (Exact Date)   SpO2 98%   Breastfeeding No   BMI 21.89 kg/m      Physical Exam   Constitutional: Awake, alert, cooperative, no apparent distress, and appears stated age.  Eyes: Pupils equal, round and reactive to light, extra ocular muscles intact, sclera clear, conjunctiva normal.  HENT: Normocephalic, oral pharynx with moist mucus membranes, good dentition. No goiter appreciated.   Respiratory: Clear to auscultation bilaterally, no crackles or wheezing. No cough or obvious dyspnea.  Cardiovascular: Regular rate and rhythm, normal S1 and S2, and no murmur noted.  Carotids +2, no bruits. No edema. Palpable pulses to radial  DP and PT arteries.   GI: Normal bowel sounds, soft, non-distended, non-tender, no masses palpated, no hepatosplenomegaly.   Genitourinary:  Deferred.   Skin: Warm and dry.  No rashes at anticipated surgical site.   Musculoskeletal: Full ROM of neck. There is no redness, warmth, or swelling of the joints. Gross motor strength is normal.    Neurologic: Awake, alert, oriented to name, place and time. Cranial nerves II-XII are grossly intact. Gait is normal.   Neuropsychiatric: Calm, cooperative. Normal affect.     Prior Labs/Diagnostic Studies   All labs and imaging personally reviewed     EKG: Not indicated    The patient's records and results personally reviewed by this provider.     Outside records reviewed from: Care Everywhere    LAB/DIAGNOSTIC STUDIES TODAY:    Lab Results   Component Value Date    WBC 5.1 02/06/2024     Lab " "Results   Component Value Date    RBC 4.82 02/06/2024     Lab Results   Component Value Date    HGB 14.0 02/06/2024     Lab Results   Component Value Date    HCT 41.8 02/06/2024     Lab Results   Component Value Date    MCV 87 02/06/2024     Lab Results   Component Value Date    MCH 29.0 02/06/2024     Lab Results   Component Value Date    MCHC 33.5 02/06/2024     Lab Results   Component Value Date    RDW 13.2 02/06/2024     Lab Results   Component Value Date     02/06/2024     Creatinine 0.69      Assessment    Clementine Kathleen is a 28 year old female seen as a PAC referral for risk assessment and optimization for anesthesia.    Plan/Recommendations  Pt will be optimized for the proposed procedure.  See below for details on the assessment, risk, and preoperative recommendations    NEUROLOGY  - No history of TIA, CVA or seizure    -Post Op delirium risk factors:  No risk identified    ENT  - No current airway concerns.  Will need to be reassessed day of surgery.  Mallampati: I  TM: > 3  Upper right front tooth filled in at bottom    CARDIAC  No cardiac history, symptoms or meds. Good exercise tolerance.  - METS (Metabolic Equivalents)>4    RCRI: 0.4% risk of serious cardiac events    PULMONARY  Denies asthma, cough or shortness of breath  Low risk for AILYN  - Tobacco History    History   Smoking Status    Never   Smokeless Tobacco    Never       GI: GERD, controlled with omeprazole and will take on DOS.  PONV Medium Risk  Total Score: 2           1 AN PONV: Pt is Female    1 AN PONV: Patient is not a current smoker        /RENAL  - Baseline Creatinine  0.69  - Interstitial cystitis. Will take Tagamet on DOS.    ENDOCRINE    - BMI: Estimated body mass index is 21.89 kg/m  as calculated from the following:    Height as of this encounter: 1.676 m (5' 6\").    Weight as of this encounter: 61.5 kg (135 lb 9.6 oz).  Healthy Weight (BMI 18.5-24.9)  - No history of Diabetes Mellitus  Last A1C  5.2 on 12/1/23 "     HEME  VTE Low Risk 0.26%            Total Score: 0      Denies personal or family history of blood clots  Denies history of blood transfusion     PSYCH  - History of anxiety. No meds    Different anesthesia methods/types have been discussed with the patient, but they are aware that the final plan will be decided by the assigned anesthesia provider on the date of service.    The patient is optimized for their procedure. AVS with information on surgery time/arrival time, meds and NPO status given by nursing staff. No further diagnostic testing indicated.      On the day of service:     Prep time: 10 minutes  Visit time: 14 minutes  Documentation time: 13 minutes  ------------------------------------------  Total time: 37 minutes      CALOS Ellison CNS  Preoperative Assessment Center  [facility]  [facility]  Phone: [phone]  Fax: [phone]

## 2024-02-07 ENCOUNTER — PATIENT OUTREACH (OUTPATIENT)
Dept: ONCOLOGY | Facility: CLINIC | Age: 29
End: 2024-02-07

## 2024-02-07 ENCOUNTER — HOSPITAL ENCOUNTER (OUTPATIENT)
Dept: PET IMAGING | Facility: CLINIC | Age: 29
Discharge: HOME | End: 2024-02-07
Attending: STUDENT IN AN ORGANIZED HEALTH CARE EDUCATION/TRAINING PROGRAM
Payer: COMMERCIAL

## 2024-02-07 DIAGNOSIS — C85.98 LYMPHOMA OF LYMPH NODES OF MULTIPLE REGIONS, UNSPECIFIED LYMPHOMA TYPE (H): Primary | ICD-10-CM

## 2024-02-07 DIAGNOSIS — C85.90 LYMPHOMA (H): ICD-10-CM

## 2024-02-07 DIAGNOSIS — Z11.59 SCREENING FOR VIRAL DISEASE: ICD-10-CM

## 2024-02-07 PROCEDURE — A9552 F18 FDG: HCPCS | Performed by: STUDENT IN AN ORGANIZED HEALTH CARE EDUCATION/TRAINING PROGRAM

## 2024-02-07 PROCEDURE — 250N000011 HC RX IP 250 OP 636: Performed by: STUDENT IN AN ORGANIZED HEALTH CARE EDUCATION/TRAINING PROGRAM

## 2024-02-07 PROCEDURE — 70491 CT SOFT TISSUE NECK W/DYE: CPT

## 2024-02-07 PROCEDURE — 343N000001 HC RX 343: Performed by: STUDENT IN AN ORGANIZED HEALTH CARE EDUCATION/TRAINING PROGRAM

## 2024-02-07 PROCEDURE — 74177 CT ABD & PELVIS W/CONTRAST: CPT | Mod: 26 | Performed by: RADIOLOGY

## 2024-02-07 PROCEDURE — 71260 CT THORAX DX C+: CPT

## 2024-02-07 PROCEDURE — 71260 CT THORAX DX C+: CPT | Mod: 26 | Performed by: RADIOLOGY

## 2024-02-07 PROCEDURE — 70491 CT SOFT TISSUE NECK W/DYE: CPT | Mod: 26 | Performed by: RADIOLOGY

## 2024-02-07 PROCEDURE — 78816 PET IMAGE W/CT FULL BODY: CPT | Mod: 26 | Performed by: RADIOLOGY

## 2024-02-07 PROCEDURE — 78816 PET IMAGE W/CT FULL BODY: CPT | Mod: PI

## 2024-02-07 RX ORDER — IOPAMIDOL 755 MG/ML
20-135 INJECTION, SOLUTION INTRAVASCULAR ONCE
Status: COMPLETED | OUTPATIENT
Start: 2024-02-07 | End: 2024-02-07

## 2024-02-07 RX ADMIN — IOPAMIDOL 82 ML: 755 INJECTION, SOLUTION INTRAVENOUS at 07:40

## 2024-02-07 RX ADMIN — FLUDEOXYGLUCOSE F-18 10.09 MILLICURIE: 500 INJECTION, SOLUTION INTRAVENOUS at 07:38

## 2024-02-07 NOTE — PROGRESS NOTES
Discussed today's PET and plan for consult next week with Dr Vanessa Oakes. Warm-transferred Clementine to schedule.  Discussed [employer] for resources for fertility funding, does not have coverage, saw specialist today with US planned for tomorrow

## 2024-02-07 NOTE — TELEPHONE ENCOUNTER
RECORDS STATUS - ALL OTHER DIAGNOSIS      RECORDS RECEIVED FROM: Epic   DATE RECEIVED:    NOTES STATUS DETAILS   OFFICE NOTE from referring provider Epic 1/29/24: Dr. Asher Damon   OPERATIVE REPORT [facility] 2/9/24: Left Neck Bx  1/29/24: laryngoscopy    MEDICATION LIST [facility]    LABS     PATHOLOGY REPORTS Report in [facility] Surg Path:  2/9/24:     Cytometry:  1/29/24: [id number]     Cytology:  1/29/24: [id number]   ANYTHING RELATED TO DIAGNOSIS Epic Most recent 2/6/24   IMAGING (NEED IMAGES & REPORT)     CT SCANS PACS 2/6/24: CT Soft Tissue Neck  2/6/24: CT Chest Abd Pel   PET PACS 2/6/24: PET Whole Body

## 2024-02-08 ENCOUNTER — LAB (OUTPATIENT)
Dept: LAB | Facility: CLINIC | Age: 29
End: 2024-02-08
Payer: COMMERCIAL

## 2024-02-08 DIAGNOSIS — C85.98 LYMPHOMA OF LYMPH NODES OF MULTIPLE REGIONS, UNSPECIFIED LYMPHOMA TYPE (H): ICD-10-CM

## 2024-02-08 DIAGNOSIS — Z11.59 SCREENING FOR VIRAL DISEASE: ICD-10-CM

## 2024-02-08 LAB
ALBUMIN SERPL BCG-MCNC: 4.2 G/DL (ref 3.5–5.2)
ALP SERPL-CCNC: 101 U/L (ref 40–150)
ALT SERPL W P-5'-P-CCNC: 7 U/L (ref 0–50)
ANION GAP SERPL CALCULATED.3IONS-SCNC: 9 MMOL/L (ref 7–15)
AST SERPL W P-5'-P-CCNC: 15 U/L (ref 0–45)
BASOPHILS # BLD AUTO: 0.1 10E3/UL (ref 0–0.2)
BASOPHILS NFR BLD AUTO: 1 %
BILIRUB SERPL-MCNC: 0.3 MG/DL
BUN SERPL-MCNC: 14.7 MG/DL (ref 6–20)
CALCIUM SERPL-MCNC: 9.4 MG/DL (ref 8.6–10)
CHLORIDE SERPL-SCNC: 102 MMOL/L (ref 98–107)
CREAT SERPL-MCNC: 0.79 MG/DL (ref 0.51–0.95)
DEPRECATED HCO3 PLAS-SCNC: 27 MMOL/L (ref 22–29)
EGFRCR SERPLBLD CKD-EPI 2021: >90 ML/MIN/1.73M2
EOSINOPHIL # BLD AUTO: 0.1 10E3/UL (ref 0–0.7)
EOSINOPHIL NFR BLD AUTO: 2 %
ERYTHROCYTE [DISTWIDTH] IN BLOOD BY AUTOMATED COUNT: 13.1 % (ref 10–15)
ERYTHROCYTE [SEDIMENTATION RATE] IN BLOOD BY WESTERGREN METHOD: 25 MM/HR (ref 0–20)
GLUCOSE SERPL-MCNC: 116 MG/DL (ref 70–99)
HCT VFR BLD AUTO: 40.7 % (ref 35–47)
HCV AB SERPL QL IA: NONREACTIVE
HGB BLD-MCNC: 13.4 G/DL (ref 11.7–15.7)
IMM GRANULOCYTES # BLD: 0 10E3/UL
IMM GRANULOCYTES NFR BLD: 0 %
LDH SERPL L TO P-CCNC: 191 U/L (ref 0–250)
LYMPHOCYTES # BLD AUTO: 1.1 10E3/UL (ref 0.8–5.3)
LYMPHOCYTES NFR BLD AUTO: 19 %
MCH RBC QN AUTO: 28.8 PG (ref 26.5–33)
MCHC RBC AUTO-ENTMCNC: 32.9 G/DL (ref 31.5–36.5)
MCV RBC AUTO: 87 FL (ref 78–100)
MONOCYTES # BLD AUTO: 0.6 10E3/UL (ref 0–1.3)
MONOCYTES NFR BLD AUTO: 9 %
NEUTROPHILS # BLD AUTO: 4.2 10E3/UL (ref 1.6–8.3)
NEUTROPHILS NFR BLD AUTO: 69 %
NRBC # BLD AUTO: 0 10E3/UL
NRBC BLD AUTO-RTO: 0 /100
PHOSPHATE SERPL-MCNC: 3.1 MG/DL (ref 2.5–4.5)
PLATELET # BLD AUTO: 255 10E3/UL (ref 150–450)
POTASSIUM SERPL-SCNC: 3.9 MMOL/L (ref 3.4–5.3)
PROT SERPL-MCNC: 7.8 G/DL (ref 6.4–8.3)
RBC # BLD AUTO: 4.66 10E6/UL (ref 3.8–5.2)
SODIUM SERPL-SCNC: 138 MMOL/L (ref 135–145)
URATE SERPL-MCNC: 2.7 MG/DL (ref 2.4–5.7)
WBC # BLD AUTO: 6.1 10E3/UL (ref 4–11)

## 2024-02-08 PROCEDURE — 86803 HEPATITIS C AB TEST: CPT | Performed by: INTERNAL MEDICINE

## 2024-02-08 PROCEDURE — 85652 RBC SED RATE AUTOMATED: CPT | Performed by: PATHOLOGY

## 2024-02-08 PROCEDURE — 87340 HEPATITIS B SURFACE AG IA: CPT | Performed by: INTERNAL MEDICINE

## 2024-02-08 PROCEDURE — 84550 ASSAY OF BLOOD/URIC ACID: CPT | Performed by: PATHOLOGY

## 2024-02-08 PROCEDURE — 86704 HEP B CORE ANTIBODY TOTAL: CPT | Performed by: INTERNAL MEDICINE

## 2024-02-08 PROCEDURE — 86706 HEP B SURFACE ANTIBODY: CPT | Performed by: INTERNAL MEDICINE

## 2024-02-08 PROCEDURE — 87389 HIV-1 AG W/HIV-1&-2 AB AG IA: CPT | Performed by: INTERNAL MEDICINE

## 2024-02-08 PROCEDURE — 84100 ASSAY OF PHOSPHORUS: CPT | Performed by: PATHOLOGY

## 2024-02-08 PROCEDURE — 85025 COMPLETE CBC W/AUTO DIFF WBC: CPT | Performed by: PATHOLOGY

## 2024-02-08 PROCEDURE — 99000 SPECIMEN HANDLING OFFICE-LAB: CPT | Performed by: PATHOLOGY

## 2024-02-08 PROCEDURE — 83615 LACTATE (LD) (LDH) ENZYME: CPT | Performed by: PATHOLOGY

## 2024-02-08 PROCEDURE — 36415 COLL VENOUS BLD VENIPUNCTURE: CPT | Performed by: PATHOLOGY

## 2024-02-08 PROCEDURE — 80053 COMPREHEN METABOLIC PANEL: CPT | Performed by: PATHOLOGY

## 2024-02-08 NOTE — ANESTHESIA PREPROCEDURE EVALUATION
"Anesthesia Pre-Procedure Evaluation    Patient: Clementine Kathleen   MRN: [id number] : 1995        Procedure : Procedure(s):  Excisional Node Biopsy Left Neck          Past Medical History:   Diagnosis Date     Anxiety      Cervical lymphadenopathy      GERD      Interstitial cystitis       Past Surgical History:   Procedure Laterality Date     CL AFF SURGICAL PATHOLOGY      Pearl neuroma     CLOSED REDUCTION PROXIMAL FIBULAR FRACTURE Left       No Known Allergies   Social History     Tobacco Use     Smoking status: Never     Smokeless tobacco: Never   Substance Use Topics     Alcohol use: Not Currently      Wt Readings from Last 1 Encounters:   24 61.5 kg (135 lb 9.6 oz)        Anesthesia Evaluation   Pt has had prior anesthetic. Type: General.    No history of anesthetic complications       ROS/MED HX  ENT/Pulmonary:       Neurologic:       Cardiovascular:       METS/Exercise Tolerance: >4 METS    Hematologic:       Musculoskeletal:       GI/Hepatic:     (+) GERD, Asymptomatic on medication,                  Renal/Genitourinary:       Endo:       Psychiatric/Substance Use:       Infectious Disease:       Malignancy:       Other:             OUTSIDE LABS:  CBC:   Lab Results   Component Value Date    WBC 5.1 2024    HGB 14.0 2024    HCT 41.8 2024     2024     BMP:   Lab Results   Component Value Date    CR 0.69 2024     COAGS: No results found for: \"PTT\", \"INR\", \"FIBR\"  POC: No results found for: \"BGM\", \"HCG\", \"HCGS\"  HEPATIC: No results found for: \"ALBUMIN\", \"PROTTOTAL\", \"ALT\", \"AST\", \"GGT\", \"ALKPHOS\", \"BILITOTAL\", \"BILIDIRECT\", \"DENISSE\"  OTHER: No results found for: \"PH\", \"LACT\", \"A1C\", \"DEE\", \"PHOS\", \"MAG\", \"LIPASE\", \"AMYLASE\", \"TSH\", \"T4\", \"T3\", \"CRP\", \"SED\"    Anesthesia Plan    ASA Status:  2       Anesthesia Type: General.     - Airway: ETT   Induction: Intravenous.   Maintenance: Balanced.        Consents            Postoperative Care       PONV prophylaxis: " Ondansetron (or other 5HT-3), Dexamethasone or Solumedrol, Background Propofol Infusion     Comments:             Gabriela De La Rosa MD    I have reviewed the pertinent notes and labs in the chart from the past 30 days and (re)examined the patient.  Any updates or changes from those notes are reflected in this note.

## 2024-02-09 ENCOUNTER — ANESTHESIA (OUTPATIENT)
Dept: SURGERY | Facility: CLINIC | Age: 29
End: 2024-02-09
Payer: COMMERCIAL

## 2024-02-09 ENCOUNTER — HOSPITAL ENCOUNTER (OUTPATIENT)
Facility: CLINIC | Age: 29
Discharge: HOME | End: 2024-02-09
Attending: STUDENT IN AN ORGANIZED HEALTH CARE EDUCATION/TRAINING PROGRAM | Admitting: STUDENT IN AN ORGANIZED HEALTH CARE EDUCATION/TRAINING PROGRAM
Payer: COMMERCIAL

## 2024-02-09 VITALS
HEART RATE: 64 BPM | OXYGEN SATURATION: 97 % | WEIGHT: 135.8 LBS | HEIGHT: 66 IN | RESPIRATION RATE: 14 BRPM | SYSTOLIC BLOOD PRESSURE: 110 MMHG | BODY MASS INDEX: 21.83 KG/M2 | TEMPERATURE: 97.5 F | DIASTOLIC BLOOD PRESSURE: 66 MMHG

## 2024-02-09 DIAGNOSIS — R59.1 LYMPHADENOPATHY: Primary | ICD-10-CM

## 2024-02-09 DIAGNOSIS — R59.1 LYMPHADENOPATHY: ICD-10-CM

## 2024-02-09 LAB
GRAM STAIN RESULT: NORMAL
GRAM STAIN RESULT: NORMAL
HBV CORE AB SERPL QL IA: NONREACTIVE
HBV SURFACE AB SERPL IA-ACNC: 26.8 M[IU]/ML
HBV SURFACE AB SERPL IA-ACNC: REACTIVE M[IU]/ML
HBV SURFACE AG SERPL QL IA: NONREACTIVE
HIV 1+2 AB+HIV1 P24 AG SERPL QL IA: NONREACTIVE

## 2024-02-09 PROCEDURE — 88365 INSITU HYBRIDIZATION (FISH): CPT | Mod: 26 | Performed by: PATHOLOGY

## 2024-02-09 PROCEDURE — 88360 TUMOR IMMUNOHISTOCHEM/MANUAL: CPT | Mod: TC | Performed by: STUDENT IN AN ORGANIZED HEALTH CARE EDUCATION/TRAINING PROGRAM

## 2024-02-09 PROCEDURE — 38510 BIOPSY/REMOVAL LYMPH NODES: CPT | Mod: LT | Performed by: STUDENT IN AN ORGANIZED HEALTH CARE EDUCATION/TRAINING PROGRAM

## 2024-02-09 PROCEDURE — 88342 IMHCHEM/IMCYTCHM 1ST ANTB: CPT | Mod: 26 | Performed by: PATHOLOGY

## 2024-02-09 PROCEDURE — 88360 TUMOR IMMUNOHISTOCHEM/MANUAL: CPT | Mod: 26 | Performed by: PATHOLOGY

## 2024-02-09 PROCEDURE — 250N000009 HC RX 250: Performed by: STUDENT IN AN ORGANIZED HEALTH CARE EDUCATION/TRAINING PROGRAM

## 2024-02-09 PROCEDURE — 250N000009 HC RX 250: Performed by: NURSE ANESTHETIST, CERTIFIED REGISTERED

## 2024-02-09 PROCEDURE — 88280 CHROMOSOME KARYOTYPE STUDY: CPT | Performed by: STUDENT IN AN ORGANIZED HEALTH CARE EDUCATION/TRAINING PROGRAM

## 2024-02-09 PROCEDURE — 88271 CYTOGENETICS DNA PROBE: CPT | Mod: XU | Performed by: STUDENT IN AN ORGANIZED HEALTH CARE EDUCATION/TRAINING PROGRAM

## 2024-02-09 PROCEDURE — 87206 SMEAR FLUORESCENT/ACID STAI: CPT | Performed by: STUDENT IN AN ORGANIZED HEALTH CARE EDUCATION/TRAINING PROGRAM

## 2024-02-09 PROCEDURE — 710N000010 HC RECOVERY PHASE 1, LEVEL 2, PER MIN: Performed by: STUDENT IN AN ORGANIZED HEALTH CARE EDUCATION/TRAINING PROGRAM

## 2024-02-09 PROCEDURE — 88189 FLOWCYTOMETRY/READ 16 & >: CPT | Performed by: STUDENT IN AN ORGANIZED HEALTH CARE EDUCATION/TRAINING PROGRAM

## 2024-02-09 PROCEDURE — 87076 CULTURE ANAEROBE IDENT EACH: CPT | Performed by: STUDENT IN AN ORGANIZED HEALTH CARE EDUCATION/TRAINING PROGRAM

## 2024-02-09 PROCEDURE — 258N000003 HC RX IP 258 OP 636: Performed by: NURSE ANESTHETIST, CERTIFIED REGISTERED

## 2024-02-09 PROCEDURE — 88369 M/PHMTRC ALYSISHQUANT/SEMIQ: CPT | Mod: 26 | Performed by: MEDICAL GENETICS

## 2024-02-09 PROCEDURE — 250N000011 HC RX IP 250 OP 636: Performed by: NURSE ANESTHETIST, CERTIFIED REGISTERED

## 2024-02-09 PROCEDURE — 88184 FLOWCYTOMETRY/ TC 1 MARKER: CPT | Performed by: STUDENT IN AN ORGANIZED HEALTH CARE EDUCATION/TRAINING PROGRAM

## 2024-02-09 PROCEDURE — 250N000025 HC SEVOFLURANE, PER MIN: Performed by: STUDENT IN AN ORGANIZED HEALTH CARE EDUCATION/TRAINING PROGRAM

## 2024-02-09 PROCEDURE — 999N000141 HC STATISTIC PRE-PROCEDURE NURSING ASSESSMENT: Performed by: STUDENT IN AN ORGANIZED HEALTH CARE EDUCATION/TRAINING PROGRAM

## 2024-02-09 PROCEDURE — 88264 CHROMOSOME ANALYSIS 20-25: CPT | Performed by: STUDENT IN AN ORGANIZED HEALTH CARE EDUCATION/TRAINING PROGRAM

## 2024-02-09 PROCEDURE — 88368 INSITU HYBRIDIZATION MANUAL: CPT | Mod: 26 | Performed by: MEDICAL GENETICS

## 2024-02-09 PROCEDURE — 272N000001 HC OR GENERAL SUPPLY STERILE: Performed by: STUDENT IN AN ORGANIZED HEALTH CARE EDUCATION/TRAINING PROGRAM

## 2024-02-09 PROCEDURE — 88185 FLOWCYTOMETRY/TC ADD-ON: CPT | Performed by: STUDENT IN AN ORGANIZED HEALTH CARE EDUCATION/TRAINING PROGRAM

## 2024-02-09 PROCEDURE — 88341 IMHCHEM/IMCYTCHM EA ADD ANTB: CPT | Mod: 26 | Performed by: PATHOLOGY

## 2024-02-09 PROCEDURE — 710N000012 HC RECOVERY PHASE 2, PER MINUTE: Performed by: STUDENT IN AN ORGANIZED HEALTH CARE EDUCATION/TRAINING PROGRAM

## 2024-02-09 PROCEDURE — 370N000017 HC ANESTHESIA TECHNICAL FEE, PER MIN: Performed by: STUDENT IN AN ORGANIZED HEALTH CARE EDUCATION/TRAINING PROGRAM

## 2024-02-09 PROCEDURE — 87205 SMEAR GRAM STAIN: CPT | Performed by: STUDENT IN AN ORGANIZED HEALTH CARE EDUCATION/TRAINING PROGRAM

## 2024-02-09 PROCEDURE — 87102 FUNGUS ISOLATION CULTURE: CPT | Performed by: STUDENT IN AN ORGANIZED HEALTH CARE EDUCATION/TRAINING PROGRAM

## 2024-02-09 PROCEDURE — 360N000077 HC SURGERY LEVEL 4, PER MIN: Performed by: STUDENT IN AN ORGANIZED HEALTH CARE EDUCATION/TRAINING PROGRAM

## 2024-02-09 PROCEDURE — 88307 TISSUE EXAM BY PATHOLOGIST: CPT | Mod: 26 | Performed by: PATHOLOGY

## 2024-02-09 PROCEDURE — 87075 CULTR BACTERIA EXCEPT BLOOD: CPT | Performed by: STUDENT IN AN ORGANIZED HEALTH CARE EDUCATION/TRAINING PROGRAM

## 2024-02-09 RX ORDER — PROPOFOL 10 MG/ML
INJECTION, EMULSION INTRAVENOUS PRN
Status: DISCONTINUED | OUTPATIENT
Start: 2024-02-09 | End: 2024-02-09

## 2024-02-09 RX ORDER — OXYCODONE HYDROCHLORIDE 5 MG/1
5 TABLET ORAL
Status: CANCELLED | OUTPATIENT
Start: 2024-02-09

## 2024-02-09 RX ORDER — DEXAMETHASONE 0.5 MG/1
0.5 TABLET ORAL 2 TIMES DAILY WITH MEALS
Status: ON HOLD | COMMUNITY
End: 2024-02-22

## 2024-02-09 RX ORDER — ONDANSETRON 4 MG/1
4 TABLET, ORALLY DISINTEGRATING ORAL
Status: DISCONTINUED | OUTPATIENT
Start: 2024-02-09 | End: 2024-02-14 | Stop reason: HOSPADM

## 2024-02-09 RX ORDER — SODIUM CHLORIDE, SODIUM LACTATE, POTASSIUM CHLORIDE, CALCIUM CHLORIDE 600; 310; 30; 20 MG/100ML; MG/100ML; MG/100ML; MG/100ML
INJECTION, SOLUTION INTRAVENOUS CONTINUOUS PRN
Status: DISCONTINUED | OUTPATIENT
Start: 2024-02-09 | End: 2024-02-09

## 2024-02-09 RX ORDER — PROPOFOL 10 MG/ML
INJECTION, EMULSION INTRAVENOUS CONTINUOUS PRN
Status: DISCONTINUED | OUTPATIENT
Start: 2024-02-09 | End: 2024-02-09

## 2024-02-09 RX ORDER — ONDANSETRON 4 MG/1
4 TABLET, ORALLY DISINTEGRATING ORAL EVERY 30 MIN PRN
Status: CANCELLED | OUTPATIENT
Start: 2024-02-09

## 2024-02-09 RX ORDER — OXYCODONE HYDROCHLORIDE 5 MG/1
5 TABLET ORAL
Status: DISCONTINUED | OUTPATIENT
Start: 2024-02-09 | End: 2024-02-14 | Stop reason: HOSPADM

## 2024-02-09 RX ORDER — ONDANSETRON 2 MG/ML
INJECTION INTRAMUSCULAR; INTRAVENOUS PRN
Status: DISCONTINUED | OUTPATIENT
Start: 2024-02-09 | End: 2024-02-09

## 2024-02-09 RX ORDER — DEXAMETHASONE SODIUM PHOSPHATE 10 MG/ML
10 INJECTION, SOLUTION INTRAMUSCULAR; INTRAVENOUS ONCE
Status: DISCONTINUED | OUTPATIENT
Start: 2024-02-09 | End: 2024-02-09 | Stop reason: HOSPADM

## 2024-02-09 RX ORDER — ONDANSETRON 2 MG/ML
4 INJECTION INTRAMUSCULAR; INTRAVENOUS EVERY 30 MIN PRN
Status: CANCELLED | OUTPATIENT
Start: 2024-02-09

## 2024-02-09 RX ORDER — LIDOCAINE HYDROCHLORIDE AND EPINEPHRINE 10; 10 MG/ML; UG/ML
INJECTION, SOLUTION INFILTRATION; PERINEURAL PRN
Status: DISCONTINUED | OUTPATIENT
Start: 2024-02-09 | End: 2024-02-09 | Stop reason: HOSPADM

## 2024-02-09 RX ORDER — OXYCODONE HYDROCHLORIDE 5 MG/1
5-10 TABLET ORAL EVERY 4 HOURS PRN
Qty: 10 TABLET | Refills: 0 | Status: SHIPPED | OUTPATIENT
Start: 2024-02-09 | End: 2024-02-09

## 2024-02-09 RX ORDER — FENTANYL CITRATE 50 UG/ML
INJECTION, SOLUTION INTRAMUSCULAR; INTRAVENOUS PRN
Status: DISCONTINUED | OUTPATIENT
Start: 2024-02-09 | End: 2024-02-09

## 2024-02-09 RX ORDER — OXYCODONE HYDROCHLORIDE 10 MG/1
10 TABLET ORAL
Status: CANCELLED | OUTPATIENT
Start: 2024-02-09

## 2024-02-09 RX ORDER — DEXAMETHASONE SODIUM PHOSPHATE 4 MG/ML
INJECTION, SOLUTION INTRA-ARTICULAR; INTRALESIONAL; INTRAMUSCULAR; INTRAVENOUS; SOFT TISSUE PRN
Status: DISCONTINUED | OUTPATIENT
Start: 2024-02-09 | End: 2024-02-09

## 2024-02-09 RX ORDER — ACETAMINOPHEN 325 MG/1
650 TABLET ORAL
Status: DISCONTINUED | OUTPATIENT
Start: 2024-02-09 | End: 2024-02-14 | Stop reason: HOSPADM

## 2024-02-09 RX ORDER — OXYCODONE HYDROCHLORIDE 5 MG/1
5 TABLET ORAL EVERY 6 HOURS PRN
Qty: 10 TABLET | Refills: 0 | Status: SHIPPED | OUTPATIENT
Start: 2024-02-09 | End: 2024-02-12

## 2024-02-09 RX ORDER — LIDOCAINE HYDROCHLORIDE 20 MG/ML
INJECTION, SOLUTION INFILTRATION; PERINEURAL PRN
Status: DISCONTINUED | OUTPATIENT
Start: 2024-02-09 | End: 2024-02-09

## 2024-02-09 RX ADMIN — MIDAZOLAM 2 MG: 1 INJECTION INTRAMUSCULAR; INTRAVENOUS at 10:08

## 2024-02-09 RX ADMIN — SODIUM CHLORIDE, POTASSIUM CHLORIDE, SODIUM LACTATE AND CALCIUM CHLORIDE: 600; 310; 30; 20 INJECTION, SOLUTION INTRAVENOUS at 10:12

## 2024-02-09 RX ADMIN — PROPOFOL 200 MG: 10 INJECTION, EMULSION INTRAVENOUS at 10:16

## 2024-02-09 RX ADMIN — DEXMEDETOMIDINE HYDROCHLORIDE 4 MCG: 100 INJECTION, SOLUTION INTRAVENOUS at 10:42

## 2024-02-09 RX ADMIN — LIDOCAINE HYDROCHLORIDE 100 MG: 20 INJECTION, SOLUTION INFILTRATION; PERINEURAL at 10:16

## 2024-02-09 RX ADMIN — PROPOFOL 50 MG: 10 INJECTION, EMULSION INTRAVENOUS at 10:41

## 2024-02-09 RX ADMIN — HYDROMORPHONE HYDROCHLORIDE 0.5 MG: 1 INJECTION, SOLUTION INTRAMUSCULAR; INTRAVENOUS; SUBCUTANEOUS at 11:09

## 2024-02-09 RX ADMIN — ONDANSETRON 4 MG: 2 INJECTION INTRAMUSCULAR; INTRAVENOUS at 11:13

## 2024-02-09 RX ADMIN — PROPOFOL 150 MCG/KG/MIN: 10 INJECTION, EMULSION INTRAVENOUS at 10:18

## 2024-02-09 RX ADMIN — FENTANYL CITRATE 50 MCG: 50 INJECTION INTRAMUSCULAR; INTRAVENOUS at 10:16

## 2024-02-09 RX ADMIN — DEXMEDETOMIDINE HYDROCHLORIDE 4 MCG: 100 INJECTION, SOLUTION INTRAVENOUS at 10:39

## 2024-02-09 RX ADMIN — DEXAMETHASONE SODIUM PHOSPHATE 8 MG: 4 INJECTION, SOLUTION INTRA-ARTICULAR; INTRALESIONAL; INTRAMUSCULAR; INTRAVENOUS; SOFT TISSUE at 10:16

## 2024-02-09 RX ADMIN — DEXMEDETOMIDINE HYDROCHLORIDE 4 MCG: 100 INJECTION, SOLUTION INTRAVENOUS at 10:50

## 2024-02-09 RX ADMIN — PROPOFOL 50 MG: 10 INJECTION, EMULSION INTRAVENOUS at 11:09

## 2024-02-09 RX ADMIN — Medication 30 MG: at 10:17

## 2024-02-09 RX ADMIN — FENTANYL CITRATE 50 MCG: 50 INJECTION INTRAMUSCULAR; INTRAVENOUS at 10:30

## 2024-02-09 RX ADMIN — DEXMEDETOMIDINE HYDROCHLORIDE 4 MCG: 100 INJECTION, SOLUTION INTRAVENOUS at 10:48

## 2024-02-09 ASSESSMENT — ACTIVITIES OF DAILY LIVING (ADL)
ADLS_ACUITY_SCORE: 30
ADLS_ACUITY_SCORE: 29
ADLS_ACUITY_SCORE: 30
ADLS_ACUITY_SCORE: 29
ADLS_ACUITY_SCORE: 29

## 2024-02-09 NOTE — ANESTHESIA PROCEDURE NOTES
Airway       Patient location during procedure: OR       Procedure Start/Stop Times: 2/9/2024 10:20 AM  Staff -        Anesthesiologist:  Gabriela De La Rosa MD       CRNA: Rosa Maria Maynard APRN CRNA       Other Anesthesia Staff: Maria Esther Kuhn       Performed By: CONNIE  Consent for Airway        Urgency: elective  Indications and Patient Condition       Indications for airway management: carroll-procedural       Induction type:intravenous       Mask difficulty assessment: 1 - vent by mask    Final Airway Details       Final airway type: endotracheal airway       Successful airway: ETT - single and Oral  Endotracheal Airway Details        ETT size (mm): 6.5       Cuffed: yes       Successful intubation technique: video laryngoscopy       VL Blade Size: MAC 3       Grade View of Cords: 1       Adjucts: stylet       Position: Right       Measured from: gums/teeth       Secured at (cm): 21       Bite block used: None    Post intubation assessment        Placement verified by: capnometry, equal breath sounds and chest rise        Number of attempts at approach: 1       Number of other approaches attempted: 0       Secured with: tape       Ease of procedure: easy       Dentition: Unchanged    Medication(s) Administered   Medication Administration Time: 2/9/2024 10:20 AM

## 2024-02-09 NOTE — ANESTHESIA POSTPROCEDURE EVALUATION
Patient: Clementine Kathleen    Procedure: Procedure(s):  Excisional Node Biopsy Left Neck       Anesthesia Type:  General    Note:  Disposition: Outpatient   Postop Pain Control: Uneventful            Sign Out: Well controlled pain   PONV: No   Neuro/Psych: Uneventful            Sign Out: Acceptable/Baseline neuro status   Airway/Respiratory: Uneventful            Sign Out: Acceptable/Baseline resp. status   CV/Hemodynamics: Uneventful            Sign Out: Acceptable CV status; No obvious hypovolemia; No obvious fluid overload   Other NRE: NONE   DID A NON-ROUTINE EVENT OCCUR? No       Last vitals:  Vitals Value Taken Time   /63 02/09/24 1200   Temp 36.4  C (97.6  F) 02/09/24 1135   Pulse 70 02/09/24 1214   Resp 11 02/09/24 1214   SpO2 97 % 02/09/24 1214   Vitals shown include unfiled device data.    Electronically Signed By: Gabriela De La Rosa MD  February 9, 2024  12:15 PM

## 2024-02-09 NOTE — OP NOTE
Head and Neck Surgery  February 9, 2024      Surgeon: Asher Damon MD     Assistant: None     Preoperative Diagnosis:   1) Bilateral cervical adenopathy  2) Suspicion for hodgkins lymphoma     Postoperative Diagnosis:  Same     Procedure:  1) Excisional node biopsy left neck     Anesthesia:  General     Estimated blood loss:  5 ml     Specimens:  Left lvl 3 lymph node     Complications:  None     Operative indications:  28 year old woman I met with bilateral cervical adenopathy that she noticed on 12/24/23. After a short period of observation, the nodes had not resolved. I performed a FNA and hakan mika cells were seen concerning for lymphoma. We recommended proceeding with excisional node biopsy     Operative findings:  Two left lvl 3 nodes excised and sent fresh for lymphoma workup and microbiology workup.      Operative course:  Patient was brought to the operating room and placed on the operating table supine. General endotracheal anesthesia was induced. Patient was prepped and draped in the standard fashion. US was used to identify the left 3 nodes and an incision was designed in a natural skin crease allowing exposure of the nodes. 15 blade was used to make the skin incision. Platysma was divided. Standard subplatysmal flaps elevated superiorly and inferiorly. Fascia of the SCM opened and dissection taken to the nodes. Two nodes were removed in a pericapsular plane and sent fresh for lymphoma and microbiology workup.     Wound was irrigated and hemostasis obtained.     Platysma was closed with 3-0 vicryl and skin with 4-0 monocryl. Wound was dressed with steristrips.     Patient tolerated the procedure well. She was subsequently turned over to anesthesia where she was awakened, extubated, and transferred to the recovery room in stable condition.      Asher Damon MD

## 2024-02-09 NOTE — DISCHARGE INSTRUCTIONS
Contacting your Doctor -   To contact a doctor, call Dr Damon's clinic at [phone]   or:  [phone] and ask for the resident on call for ENT-Otolaryngology (answered 24 hours a day)   Emergency Department:  [facility]: [phone]  [facility]: [phone] 911 if you are in need of immediate or emergent help

## 2024-02-10 ASSESSMENT — ACTIVITIES OF DAILY LIVING (ADL)
ADLS_ACUITY_SCORE: 30

## 2024-02-11 ASSESSMENT — ACTIVITIES OF DAILY LIVING (ADL)
ADLS_ACUITY_SCORE: 30

## 2024-02-12 ENCOUNTER — OFFICE VISIT (OUTPATIENT)
Dept: PULMONOLOGY | Facility: CLINIC | Age: 29
End: 2024-02-12
Attending: INTERNAL MEDICINE
Payer: COMMERCIAL

## 2024-02-12 DIAGNOSIS — C85.98 LYMPHOMA OF LYMPH NODES OF MULTIPLE REGIONS, UNSPECIFIED LYMPHOMA TYPE (H): ICD-10-CM

## 2024-02-12 PROCEDURE — 94726 PLETHYSMOGRAPHY LUNG VOLUMES: CPT | Performed by: INTERNAL MEDICINE

## 2024-02-12 PROCEDURE — 94375 RESPIRATORY FLOW VOLUME LOOP: CPT | Performed by: INTERNAL MEDICINE

## 2024-02-12 PROCEDURE — 94729 DIFFUSING CAPACITY: CPT | Performed by: INTERNAL MEDICINE

## 2024-02-12 ASSESSMENT — ACTIVITIES OF DAILY LIVING (ADL)
ADLS_ACUITY_SCORE: 30

## 2024-02-13 LAB
DLCOCOR-%PRED-PRE: 113 %
DLCOCOR-PRE: 25.7 ML/MIN/MMHG
DLCOUNC-%PRED-PRE: 113 %
DLCOUNC-PRE: 25.7 ML/MIN/MMHG
DLCOUNC-PRED: 22.66 ML/MIN/MMHG
ERV-%PRED-PRE: 112 %
ERV-PRE: 1.45 L
ERV-PRED: 1.29 L
EXPTIME-PRE: 5.54 SEC
FEF2575-%PRED-PRE: 149 %
FEF2575-PRE: 5.23 L/SEC
FEF2575-PRED: 3.5 L/SEC
FEFMAX-%PRED-PRE: 112 %
FEFMAX-PRE: 8.05 L/SEC
FEFMAX-PRED: 7.15 L/SEC
FEV1-%PRED-PRE: 133 %
FEV1-PRE: 4.15 L
FEV1FEV6-PRE: 88 %
FEV1FEV6-PRED: 86 %
FEV1FVC-PRE: 88 %
FEV1FVC-PRED: 86 %
FEV1SVC-PRE: 91 %
FEV1SVC-PRED: 82 %
FIFMAX-PRE: 6.94 L/SEC
FRCPLETH-%PRED-PRE: 97 %
FRCPLETH-PRE: 2.67 L
FRCPLETH-PRED: 2.73 L
FVC-%PRED-PRE: 130 %
FVC-PRE: 4.72 L
FVC-PRED: 3.62 L
IC-%PRED-PRE: 121 %
IC-PRE: 3.12 L
IC-PRED: 2.58 L
PATH REPORT.COMMENTS IMP SPEC: ABNORMAL
PATH REPORT.COMMENTS IMP SPEC: YES
PATH REPORT.FINAL DX SPEC: ABNORMAL
PATH REPORT.MICROSCOPIC SPEC OTHER STN: ABNORMAL
PATH REPORT.RELEVANT HX SPEC: ABNORMAL
RVPLETH-%PRED-PRE: 83 %
RVPLETH-PRE: 1.22 L
RVPLETH-PRED: 1.46 L
TLCPLETH-%PRED-PRE: 113 %
TLCPLETH-PRE: 5.79 L
TLCPLETH-PRED: 5.11 L
VA-%PRED-PRE: 108 %
VA-PRE: 5.4 L
VC-%PRED-PRE: 120 %
VC-PRE: 4.56 L
VC-PRED: 3.78 L

## 2024-02-13 ASSESSMENT — ACTIVITIES OF DAILY LIVING (ADL)
ADLS_ACUITY_SCORE: 30

## 2024-02-15 ENCOUNTER — ONCOLOGY VISIT (OUTPATIENT)
Dept: ONCOLOGY | Facility: CLINIC | Age: 29
End: 2024-02-15
Attending: STUDENT IN AN ORGANIZED HEALTH CARE EDUCATION/TRAINING PROGRAM
Payer: COMMERCIAL

## 2024-02-15 ENCOUNTER — PATIENT OUTREACH (OUTPATIENT)
Dept: ONCOLOGY | Facility: CLINIC | Age: 29
End: 2024-02-15
Payer: COMMERCIAL

## 2024-02-15 VITALS
TEMPERATURE: 97.6 F | RESPIRATION RATE: 16 BRPM | WEIGHT: 136.6 LBS | OXYGEN SATURATION: 98 % | HEART RATE: 90 BPM | DIASTOLIC BLOOD PRESSURE: 77 MMHG | BODY MASS INDEX: 22.05 KG/M2 | SYSTOLIC BLOOD PRESSURE: 121 MMHG

## 2024-02-15 DIAGNOSIS — C85.90 LYMPHOMA (H): ICD-10-CM

## 2024-02-15 DIAGNOSIS — Z31.62 ENCOUNTER FOR FERTILITY PRESERVATION COUNSELING: ICD-10-CM

## 2024-02-15 DIAGNOSIS — C83.32 DIFFUSE LARGE B-CELL LYMPHOMA OF INTRATHORACIC LYMPH NODES (H): Primary | ICD-10-CM

## 2024-02-15 DIAGNOSIS — Z76.89 PREVENTION OF CHEMOTHERAPY-INDUCED NEUTROPENIA: ICD-10-CM

## 2024-02-15 PROCEDURE — G2211 COMPLEX E/M VISIT ADD ON: HCPCS | Performed by: INTERNAL MEDICINE

## 2024-02-15 PROCEDURE — 99213 OFFICE O/P EST LOW 20 MIN: CPT | Performed by: INTERNAL MEDICINE

## 2024-02-15 PROCEDURE — 99205 OFFICE O/P NEW HI 60 MIN: CPT | Performed by: INTERNAL MEDICINE

## 2024-02-15 ASSESSMENT — PAIN SCALES - GENERAL: PAINLEVEL: NO PAIN (0)

## 2024-02-15 NOTE — PROGRESS NOTES
[facility]: Cancer Care Initial Note                                    Discussion with Patient:                                                      Met with [place] after office visit/consult with Dr. Oakes. Introduced self as RN Care Coordinator. Went over contact information for [facility]. Discussed roles of RNCC, MD, AMEENA, nurse triage line, and clinic coordinators. Discussed how to get a hold of different team members via [employer] and at [phone]. Authorization to discuss information form signed, gave permission to speak with significant other Tobias, mom Angle and Father Marvin. .   Patient will start [facility] chemotherapy inpatient next week      Assessment:                                                      Initial  Current living arrangement:: I live in a private home with family  Type of residence:: Apartment  Informal Support system:: Family;Friends  Equipment Currently Used at Home: none  Bed or wheelchair confined:: No  Mobility Status: Independent  Transportation means:: Regular car  Medication adherence problem (GOAL):: No  Knowledgeable about how to use meds:: Yes  Medication side effects suspected:: No    Plan of Care Education Review:   Assessment completed with:: Patient;Family    Plan of Care Education   Yearly learning assessment completed?: Yes (see Education tab)  Diagnosis:: primary mediastinal large B-cell lymphoma  Does patient understand diagnosis?: Yes  Tx plan/regimen:: R-EPOCH  Does patient understand treatment plan/regimen?: Yes  Preparing for treatment:: Reviewed treatment preparation information with patient (vascular access, day of chemo, visitor policy, what to bring, etc.)  Vascular access education provided for:: PICC (will plan to have port placed after first cycle)  Side effect education:: Diarrhea/Constipation;Endocrine effects;Fatigue;Hair loss;Infection;Infertility;Lab value monitoring (anemia, neutropenia, thrombocytopenia);Mylosuppression;Mouth  "sores;Nausea/Vomiting;Neuropathy;Urinary  Safety/self care at home reviewed with patient:: Yes  Coping - concerns/fears reviewed with patient:: Yes  Plan of Care:: AMEENA follow-up appointment;Treatment schedule;MD follow-up appointment  When to call provider:: Bleeding;Increased shortness of breath;New/worsening pain;Shaking chills;Temperature >100.4F;Uncontrolled diarrhea/constipation;Uncontrolled nausea/vomiting  Reasons for deferring treatment reviewed with patient:: Yes    Evaluation of Learning  Patient Education Provided: Yes  Readiness:: Acceptance  Method:: Booklet/Handout;Video  Response:: Verbalizes understanding           Intervention/Education provided during outreach:                                                       Met with Clementine to discuss chemotherapy and resources available at the [facility]. Provided Clementine with \"My Cancer Guidebook\" and [facility] printout(s) on [facility]. Reviewed administration, side effects and ongoing symptom management by APPs in clinic.  Highlighted steps to expect when getting treatment (check in, labs, pre-medications, infusion). Reviewed chemotherapy safety guidelines.    Reviewed when to contact triage team and provided info sheet on \"When to Call For Help\", as well as triage contact information. Request Clementine not send symptomatic MyChart messages.     Plan for admission for [facility] next week     Answered all Clementine's questions. Encouraged her to reach out with any follow up questions or concerns.      Patient to follow up as scheduled at next appt  Patient to call/MyChart message with updates  Confirmed patient has clinic and triage numbers    Signature:  Sonja Garibay RN  "

## 2024-02-15 NOTE — LETTER
2/15/2024         RE: Clementine Kathleen  [address]  [facility] [address]        Dear Colleague,    Thank you for referring your patient, Clementine Kathleen, to the [facility]. Please see a copy of my visit note below.                                     [facility]               NEW PATIENT REFERRAL        Feb 15, 2024   Subjective  REFERRAL SOURCE: Asher Damon MD    REASON FOR VISIT: New lymphoma diagnosis    HISTORY OF PRESENT ILLNESS:  Ms. Clementine Kathleen is a 28 year old female who presents for consultation regarding newly diagnosed large B-cell lymphoma.     She first noticed some neck lumps around [place], no pain or erythema. They did not resolve after a month, so she underwent FNA on 1/29/24 which showed atypical lymphoid infiltrate suspicious for lymphoma with rare Manny-Ava cells; flow cytometry was negative. Subsequently underwent left level 3 excisional LN biopsy on 2/9/24, which shows large B-cell lymphoma (ddx PMBCL vs DLBCL NOS) with rare abnormal B-cells on flow cytometry. PET shows bilateral cervical and mediastinal hypermetabolic lymphadenopathy (largest 4.4 cm with SUVmax 27 in a prevascular LN); no disease below diaphragm.    She is here today with her partner Dima and parents who flew in from [place]. She feels well overall and has not noticed significant change in the neck nodes in the last month. She has occasional chest tightness and dry cough but no palpitations or difficulty breathing. Also denies fevers, night sweats, N/V, abd pain, diarrhea, bleeding, or numbness/tingling. 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     PAST MEDICAL HISTORY:  Idiopathic hypersomnia   Interstitial cystitis   GERD    FAMILY HISTORY:  Uncle had leukemia in his 50s.  Aunt had melanoma.     SOCIAL HISTORY:  Never smoker. Denies alcohol use.  Lives in [place], [place]  ([place] min from [facility] with her significant other Dima. Her parents live in [place].   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 Dima is a general surgery resident here as well.      No Known Allergies    Current Outpatient Medications:     cimetidine (TAGAMET) 200 MG tablet, Take 200 mg by mouth 2 times daily, Disp: , Rfl:     dexAMETHasone (DECADRON) 0.5 MG tablet, Take 0.5 mg by mouth 2 times daily (with meals), Disp: , Rfl:     Follitropin Adan 300 UNIT/0.5ML SOPN, Inject 225 Units Subcutaneous daily, Disp: , Rfl:     Levonorgestrel (KYLEENA IU), 1 Device by Intrauterine route once, Disp: , Rfl:     menotropins (MENOPUR) 75 units SC SOLR, Inject 75 Units Subcutaneous daily, Disp: , Rfl:     modafinil (PROVIGIL) 200 MG tablet, Take 1 tablet (200 mg) by mouth daily, Disp: 90 tablet, Rfl: 1    omeprazole 20 MG tablet, Take 20 mg by mouth every morning, Disp: , Rfl:      REVIEW OF SYSTEMS:  12-point ROS reviewed and negative other than that mentioned in HPI.     Objective  VITAL SIGNS:  /77   Pulse 90   Temp 97.6  F (36.4  C) (Oral)   Resp 16   Wt 62 kg (136 lb 9.6 oz)   LMP 01/31/2024 (Exact Date)   SpO2 98%   BMI 22.05 kg/m      ECOG PS: 0     PHYSICAL EXAM:  General: Awake, alert, in no acute distress. Oriented x 3.  HEENT: Normocephalic, atraumatic. No scleral icterus.   Lymph: 1.5 cm left supraclavicular LAD. No axillary LAD appreciated.   CV: Regular rate and rhythm. No murmurs, rubs, or gallops appreciated.  Resp: Good inspiratory effort, lungs clear to auscultation bilaterally.  GI: Abdomen soft, nontender, nondistended. No masses or organomegaly appreciated.   Ext: No peripheral edema bilaterally.  Neuro: CN II-XII grossly intact. No focal deficits.   Skin: No rash, unusual bruising or prominent lesions.  Psych: Pleasant, normal affect.    LABS:  I reviewed the following labs:  Lab Results   Component Value Date    WBC 6.1 02/08/2024    HGB 13.4 02/08/2024    HCT 40.7 02/08/2024    MCV 87  2024     2024     Lab Results   Component Value Date     2024    POTASSIUM 3.9 2024    CR 0.79 2024    BUN 14.7 2024    CHLORIDE 102 2024    DEE 9.4 2024     (H) 2024      Lab Results   Component Value Date    ALT 7 2024    AST 15 2024    ALKPHOS 101 2024    BILITOTAL 0.3 2024      Lab Results   Component Value Date     2024    URIC 2.7 2024     IMAGIN24 PET-CT:  IMPRESSION: In this patient with lymphoma:  1. Deauville 5 bilateral cervical and upper mediastinal hypermetabolic  lymphadenopathy.   2. No splenomegaly, lymphadenopathy below the diaphragm, or bone  marrow involvement demonstrated. Limited disease by Lugano criteria.     PATHOLOGY:  24 Left level 3 excisional biopsy:  Lymph node, left level 3, excisional biopsy:  - Involved by large B cell lymphoma  - See comment    The morphologic and immunophenotypic findings are diagnostic of large B cell lymphoma - the differential diagnosis includes DLBCL, non-germinal center subtype, NOS verus primary mediastinal large B-cell lymphoma (PMBL).     Many of the features suggest PMBL including the patient's age, gender, sites of involvement, dim CD30 expression, and prominent fibrotic background.  However, the neoplastic cells lack CD23.  We are pursuing additional testing as described below to further characterize this LBCL.     Immunostains for PDL-1, , and MAL are pending to further subclassify and will be reported in an addendum. Additionally, we will be sending out unstained slides to the [facility] for the Ewecn4PT Assay, which helps genetically distinguish between DLBCL and PMBL - these results will be reported separately (will be a scanned report into a LAB MISC field).     Other pending ancillary studies include MYC, BCL2, and BCL6 FISH to exclude double/triple hit large-B cell lymphoma given MYC expression observed by IHC.  These ancillary studies are in progress and will be reported separately.     Flow cytometry analysis on concurrent specimen ([id number]) was suspicious for rare abnormal B cells (they were large with bright expression of CD19 and CD20, but lacked CD5, CD10, and surface light chains) and showed no definitive aberrant immunophenotype on T cells.     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 Initial FNA suggestive of Hodgkin lymphoma; however excisional biopsy shows large B-cell lymphoma with differential diagnosis of primary mediastinal large B-cell lymphoma vs DLBCL NOS. PET with cervical and mediastinal lymphadenopathy only. Overall presentation would be quite consistent with PMBCL given her age, gender, sites of involvement, dim CD30 expression, and prominent fibrotic background. However, the neoplastic cells lack CD23 so additional testing is in process (IHC for PDL-1, , MAL and Fmnci0QR gene expression assay) to help distinguish.     Otherwise we reviewed her overall diagnosis and prognosis of large B-cell lymphoma (PMBCL vs stage 2 DLBCL). Since we would like to get started with chemotherapy quickly, we discussed treating as if her disease is PMBCL for Cycle 1 with dose-adjusted R-EPOCH while awaiting the pending additional studies. If studies come back more suggestive of DLBCL NOS later, we can de-escalate therapy to R-CHOP for later cycles. She is going to undergo egg retrieval either 2/18 or 2/19 and TTE 2/19, so will plan for admission late next week. For Cycle 1 will need PICC but will pursue port placement for later cycles (if continuing with R-EPOCH). Also, Cycle 1 will be inpatient but we are in the process of rolling out our outpatient R-EPOCH protocol, so can look into this for Cycle 2 and beyond if she is interested (she wants to think about it).     We reviewed the logistics  and schedule of R-EPOCH (inpatient 5-day admission for chemotherapy every 3 weeks, outpatient biweekly lab checks in between). We reviewed potential side effects including possible Rituximab infusion reaction the first cycle, tumor lysis syndrome, hair loss, cytopenias/infection, fatigue, GI symptoms, neuropathy, anthracycline cardiotoxicity, and very low chance of therapy-related myeloid neoplasm down the road. We will plan for a total of 6 cycles with first restaging PET-CT after 2 cycles. Patient was understanding and in agreement with this plan. In the single-arm phase 2 prospective study of R-EPOCH (without radiation) for PMBCL, 5-year EFS was 93% and OS 97% ([id number]).     #Fertility preservation  Already established with [facility] and currently undergoing fertility injections with plan for egg retrieval on 2/18 or 2/19.     #Ppx  - TLS ppx: baseline uric acid normal. Start allopurinol 300 mg daily. Encouraged good hydration.  - ID ppx: start  mg BID. Levo and fluc when ANC <1.0. Monthly pentamidine for PJP ppx.   - Vaccines: up to date on COVID and flu shots.       PLAN:  - TTE 2/19  - PICC and admit for Cycle 1 R-EPOCH 2/22 or 2/23  - IR port referral     Total of 60 minutes on patient visit, reviewing records, interpreting test results, placing orders, and documentation on the day of service.    The longitudinal plan of care for lymphoma was addressed during this visit. Due to the added complexity in care, I will continue to support Clementine in the subsequent management of this condition(s) and with the ongoing continuity of care of this condition(s).    Vanessa Oakes MD  Attending Physician, [facility]

## 2024-02-15 NOTE — PROGRESS NOTES
[facility]               NEW PATIENT REFERRAL        Feb 15, 2024   Subjective   REFERRAL SOURCE: Asher Damon MD    REASON FOR VISIT: New lymphoma diagnosis    HISTORY OF PRESENT ILLNESS:  Ms. Clementine Kathleen is a 28 year old female who presents for consultation regarding newly diagnosed large B-cell lymphoma.     She first noticed some neck lumps around [place], no pain or erythema. They did not resolve after a month, so she underwent FNA on 1/29/24 which showed atypical lymphoid infiltrate suspicious for lymphoma with rare Manny-Ava cells; flow cytometry was negative. Subsequently underwent left level 3 excisional LN biopsy on 2/9/24, which shows large B-cell lymphoma (ddx PMBCL vs DLBCL NOS) with rare abnormal B-cells on flow cytometry. PET shows bilateral cervical and mediastinal hypermetabolic lymphadenopathy (largest 4.4 cm with SUVmax 27 in a prevascular LN); no disease below diaphragm.    She is here today with her partner Dima and parents who flew in from [place]. She feels well overall and has not noticed significant change in the neck nodes in the last month. She has occasional chest tightness and dry cough but no palpitations or difficulty breathing. Also denies fevers, night sweats, N/V, abd pain, diarrhea, bleeding, or numbness/tingling. 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     PAST MEDICAL HISTORY:  Idiopathic hypersomnia   Interstitial cystitis   GERD    FAMILY HISTORY:  Uncle had leukemia in his 50s.  Aunt had melanoma.     SOCIAL HISTORY:  Never smoker. Denies alcohol use.  Lives in [place], [place] ([place] min from [facility]) with her significant other Dima. Her parents live in [place].   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 Dima is a general surgery resident here as well.      No Known Allergies    Current Outpatient Medications:     cimetidine (TAGAMET) 200  MG tablet, Take 200 mg by mouth 2 times daily, Disp: , Rfl:     dexAMETHasone (DECADRON) 0.5 MG tablet, Take 0.5 mg by mouth 2 times daily (with meals), Disp: , Rfl:     Follitropin Adan 300 UNIT/0.5ML SOPN, Inject 225 Units Subcutaneous daily, Disp: , Rfl:     Levonorgestrel (KYLEENA IU), 1 Device by Intrauterine route once, Disp: , Rfl:     menotropins (MENOPUR) 75 units SC SOLR, Inject 75 Units Subcutaneous daily, Disp: , Rfl:     modafinil (PROVIGIL) 200 MG tablet, Take 1 tablet (200 mg) by mouth daily, Disp: 90 tablet, Rfl: 1    omeprazole 20 MG tablet, Take 20 mg by mouth every morning, Disp: , Rfl:      REVIEW OF SYSTEMS:  12-point ROS reviewed and negative other than that mentioned in HPI.     Objective   VITAL SIGNS:  /77   Pulse 90   Temp 97.6  F (36.4  C) (Oral)   Resp 16   Wt 62 kg (136 lb 9.6 oz)   LMP 01/31/2024 (Exact Date)   SpO2 98%   BMI 22.05 kg/m      ECOG PS: 0     PHYSICAL EXAM:  General: Awake, alert, in no acute distress. Oriented x 3.  HEENT: Normocephalic, atraumatic. No scleral icterus.   Lymph: 1.5 cm left supraclavicular LAD. No axillary LAD appreciated.   CV: Regular rate and rhythm. No murmurs, rubs, or gallops appreciated.  Resp: Good inspiratory effort, lungs clear to auscultation bilaterally.  GI: Abdomen soft, nontender, nondistended. No masses or organomegaly appreciated.   Ext: No peripheral edema bilaterally.  Neuro: CN II-XII grossly intact. No focal deficits.   Skin: No rash, unusual bruising or prominent lesions.  Psych: Pleasant, normal affect.    LABS:  I reviewed the following labs:  Lab Results   Component Value Date    WBC 6.1 02/08/2024    HGB 13.4 02/08/2024    HCT 40.7 02/08/2024    MCV 87 02/08/2024     02/08/2024     Lab Results   Component Value Date     02/08/2024    POTASSIUM 3.9 02/08/2024    CR 0.79 02/08/2024    BUN 14.7 02/08/2024    CHLORIDE 102 02/08/2024    DEE 9.4 02/08/2024     (H) 02/08/2024      Lab Results   Component  Value Date    ALT 7 2024    AST 15 2024    ALKPHOS 101 2024    BILITOTAL 0.3 2024      Lab Results   Component Value Date     2024    URIC 2.7 2024     IMAGIN24 PET-CT:  IMPRESSION: In this patient with lymphoma:  1. Deauville 5 bilateral cervical and upper mediastinal hypermetabolic  lymphadenopathy.   2. No splenomegaly, lymphadenopathy below the diaphragm, or bone  marrow involvement demonstrated. Limited disease by Lugano criteria.     PATHOLOGY:  24 Left level 3 excisional biopsy:  Lymph node, left level 3, excisional biopsy:  - Involved by large B cell lymphoma  - See comment    The morphologic and immunophenotypic findings are diagnostic of large B cell lymphoma - the differential diagnosis includes DLBCL, non-germinal center subtype, NOS verus primary mediastinal large B-cell lymphoma (PMBL).     Many of the features suggest PMBL including the patient's age, gender, sites of involvement, dim CD30 expression, and prominent fibrotic background.  However, the neoplastic cells lack CD23.  We are pursuing additional testing as described below to further characterize this LBCL.     Immunostains for PDL-1, , and MAL are pending to further subclassify and will be reported in an addendum. Additionally, we will be sending out unstained slides to the [facility] for the Zytme7OC Assay, which helps genetically distinguish between DLBCL and PMBL - these results will be reported separately (will be a scanned report into a LAB MISC field).     Other pending ancillary studies include MYC, BCL2, and BCL6 FISH to exclude double/triple hit large-B cell lymphoma given MYC expression observed by IHC. These ancillary studies are in progress and will be reported separately.     Flow cytometry analysis on concurrent specimen ([id number]) was suspicious for rare abnormal B cells (they were large with bright expression of CD19 and CD20, but lacked CD5, CD10, and surface  light chains) and showed no definitive aberrant immunophenotype on T cells.     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 Initial FNA suggestive of possible Hodgkin lymphoma; however excisional biopsy shows large B-cell lymphoma with differential diagnosis of primary mediastinal large B-cell lymphoma vs DLBCL NOS. PET with cervical and mediastinal lymphadenopathy only. Overall presentation would be quite consistent with PMBCL given her age, gender, sites of involvement, dim CD30 expression, and prominent fibrotic background. However, the neoplastic cells lack CD23 so additional testing is in process (IHC for PDL-1, , MAL and Fnrxd4HS gene expression assay) to help distinguish.     Otherwise we reviewed her overall diagnosis and prognosis of large B-cell lymphoma (PMBCL vs stage 2 DLBCL). Since we would like to get started with chemotherapy quickly, we discussed treating as if her disease is PMBCL for Cycle 1 with dose-adjusted R-EPOCH while awaiting the pending additional studies. If studies come back more suggestive of DLBCL NOS later, we can de-escalate therapy to R-CHOP for later cycles. She is going to undergo egg retrieval either 2/18 or 2/19 and TTE 2/19, so will plan for admission late next week. For Cycle 1 will need PICC but will pursue port placement for later cycles (if continuing with R-EPOCH). Also, Cycle 1 will be inpatient but we are in the process of rolling out our outpatient R-EPOCH protocol, so can look into this for Cycle 2 and beyond if she is interested (she wants to think about it).     We reviewed the logistics and schedule of R-EPOCH (inpatient 5-day admission for chemotherapy every 3 weeks, outpatient biweekly lab checks in between). We reviewed potential side effects including possible Rituximab infusion reaction the first cycle, tumor lysis syndrome, hair loss,  cytopenias/infection, fatigue, GI symptoms, neuropathy, anthracycline cardiotoxicity, and very low chance of therapy-related myeloid neoplasm down the road. We will plan for a total of 6 cycles with first restaging PET-CT after 2 cycles. Patient was understanding and in agreement with this plan. In the single-arm phase 2 prospective study of R-EPOCH (without radiation) for PMBCL, 5-year EFS was 93% and OS 97% (10.1056/[id number]).     #Fertility preservation  Already established with [facility] and currently undergoing fertility injections with plan for egg retrieval on 2/18 or 2/19.     #Ppx  - TLS ppx: baseline uric acid normal. Start allopurinol 300 mg daily. Encouraged good hydration.  - ID ppx: start  mg BID. Levo and fluc when ANC <1.0. Monthly pentamidine for PJP ppx.   - Vaccines: up to date on COVID and flu shots.       PLAN:  - TTE 2/19  - PICC and admit for Cycle 1 R-EPOCH 2/22 or 2/23  - IR port referral     Total of 60 minutes on patient visit, reviewing records, interpreting test results, placing orders, and documentation on the day of service.    The longitudinal plan of care for lymphoma was addressed during this visit. Due to the added complexity in care, I will continue to support Clementine in the subsequent management of this condition(s) and with the ongoing continuity of care of this condition(s).    Vanessa Oakes MD  Attending Physician, [facility]

## 2024-02-15 NOTE — NURSING NOTE
"Oncology Rooming Note    February 15, 2024 2:59 PM   Clementine Kathleen is a 28 year old female who presents for:    Chief Complaint   Patient presents with    Lymphoma     Initial Vitals: /77   Pulse 90   Temp 97.6  F (36.4  C) (Oral)   Resp 16   Wt 62 kg (136 lb 9.6 oz)   LMP 01/31/2024 (Exact Date)   SpO2 98%   BMI 22.05 kg/m   Estimated body mass index is 22.05 kg/m  as calculated from the following:    Height as of 2/9/24: 1.676 m (5' 6\").    Weight as of this encounter: 62 kg (136 lb 9.6 oz). Body surface area is 1.7 meters squared.  No Pain (0) Comment: Data Unavailable   Patient's last menstrual period was 01/31/2024 (exact date).  Allergies reviewed: Yes  Medications reviewed: Yes    Medications: Medication refills not needed today.  Pharmacy name entered into [employer]:    [id number] - [place], [place] - [address]  [id number] - [place], [place] - [address] AT [facility] & [facility]    Frailty Screening:   Is the patient here for a new oncology consult visit in cancer care? 1. Yes. Over the past month, have you experienced difficulty or required a caregiver to assist with:   1. Balance, walking or general mobility (including any falls)? NO  2. Completion of self-care tasks such as bathing, dressing, toileting, grooming/hygiene?  NO  3. Concentration or memory that affects your daily life?  NO       Clinical concerns:        Jannette Ji CMA              "

## 2024-02-15 NOTE — Clinical Note
Juan Sterling - it was a pleasure to meet Clementine! We are getting started on chemotherapy next week while awaiting the send-out path tests, I think it will end up being primary mediastinal lymphoma.  Thanks, Vanessa

## 2024-02-16 ENCOUNTER — MYC MEDICAL ADVICE (OUTPATIENT)
Dept: ONCOLOGY | Facility: CLINIC | Age: 29
End: 2024-02-16

## 2024-02-16 ENCOUNTER — PRE VISIT (OUTPATIENT)
Dept: ONCOLOGY | Facility: CLINIC | Age: 29
End: 2024-02-16
Payer: COMMERCIAL

## 2024-02-16 ENCOUNTER — LAB REQUISITION (OUTPATIENT)
Dept: LAB | Facility: CLINIC | Age: 29
End: 2024-02-16
Payer: COMMERCIAL

## 2024-02-16 LAB
BACTERIA TISS BX CULT: ABNORMAL
BACTERIA TISS BX CULT: NORMAL
Lab: NORMAL
PERFORMING LABORATORY: NORMAL
SPECIMEN STATUS: NORMAL
TEST NAME: NORMAL

## 2024-02-16 PROCEDURE — 84999 UNLISTED CHEMISTRY PROCEDURE: CPT | Performed by: PATHOLOGY

## 2024-02-16 PROCEDURE — 0120U ONC B CLL LYMPHM MRNA 58 GEN: CPT | Performed by: PATHOLOGY

## 2024-02-16 NOTE — TELEPHONE ENCOUNTER
[facility]: Cancer Care                                                                                          Writer received call from Clementine. Followed up on my chart information. IUD is sufficient for now, has had IUD for 5 years and has not had any bleeding. She will notify clinic if she develops any bleeding.     Signature:  Sonja Garibay RN

## 2024-02-17 LAB — INTERPRETATION: NORMAL

## 2024-02-18 PROBLEM — Z76.89 PREVENTION OF CHEMOTHERAPY-INDUCED NEUTROPENIA: Status: ACTIVE | Noted: 2024-02-18

## 2024-02-18 PROBLEM — C83.32 DIFFUSE LARGE B-CELL LYMPHOMA OF INTRATHORACIC LYMPH NODES (H): Status: ACTIVE | Noted: 2024-02-18

## 2024-02-19 ENCOUNTER — ANCILLARY PROCEDURE (OUTPATIENT)
Dept: CARDIOLOGY | Facility: CLINIC | Age: 29
End: 2024-02-19
Attending: INTERNAL MEDICINE
Payer: COMMERCIAL

## 2024-02-19 DIAGNOSIS — C85.98 LYMPHOMA OF LYMPH NODES OF MULTIPLE REGIONS, UNSPECIFIED LYMPHOMA TYPE (H): ICD-10-CM

## 2024-02-19 LAB
BI-PLANE LVEF ECHO: NORMAL
LVEF ECHO: NORMAL

## 2024-02-19 PROCEDURE — 93356 MYOCRD STRAIN IMG SPCKL TRCK: CPT | Performed by: STUDENT IN AN ORGANIZED HEALTH CARE EDUCATION/TRAINING PROGRAM

## 2024-02-19 PROCEDURE — 93306 TTE W/DOPPLER COMPLETE: CPT | Performed by: STUDENT IN AN ORGANIZED HEALTH CARE EDUCATION/TRAINING PROGRAM

## 2024-02-21 DIAGNOSIS — Z76.89 PREVENTION OF CHEMOTHERAPY-INDUCED NEUTROPENIA: Primary | ICD-10-CM

## 2024-02-21 DIAGNOSIS — C83.32 DIFFUSE LARGE B-CELL LYMPHOMA OF INTRATHORACIC LYMPH NODES (H): ICD-10-CM

## 2024-02-21 RX ORDER — EPINEPHRINE 1 MG/ML
0.3 INJECTION, SOLUTION INTRAMUSCULAR; SUBCUTANEOUS EVERY 5 MIN PRN
Status: CANCELLED | OUTPATIENT
Start: 2024-02-22

## 2024-02-21 RX ORDER — ALBUTEROL SULFATE 90 UG/1
1-2 AEROSOL, METERED RESPIRATORY (INHALATION)
Status: CANCELLED
Start: 2024-02-22

## 2024-02-21 RX ORDER — AMOXICILLIN 250 MG
2 CAPSULE ORAL 2 TIMES DAILY
Status: CANCELLED | OUTPATIENT
Start: 2024-02-22

## 2024-02-21 RX ORDER — DEXAMETHASONE 4 MG/1
8 TABLET ORAL DAILY
Status: CANCELLED
Start: 2024-02-28

## 2024-02-21 RX ORDER — METHYLPREDNISOLONE SODIUM SUCCINATE 125 MG/2ML
125 INJECTION, POWDER, LYOPHILIZED, FOR SOLUTION INTRAMUSCULAR; INTRAVENOUS
Status: CANCELLED
Start: 2024-02-22

## 2024-02-21 RX ORDER — ONDANSETRON 8 MG/1
16 TABLET, FILM COATED ORAL EVERY 24 HOURS
Status: CANCELLED
Start: 2024-02-23

## 2024-02-21 RX ORDER — DIPHENHYDRAMINE HCL 25 MG
50 CAPSULE ORAL ONCE
Status: CANCELLED
Start: 2024-02-22

## 2024-02-21 RX ORDER — DIPHENHYDRAMINE HYDROCHLORIDE 50 MG/ML
50 INJECTION INTRAMUSCULAR; INTRAVENOUS
Status: CANCELLED
Start: 2024-02-22

## 2024-02-21 RX ORDER — ALLOPURINOL 300 MG/1
300 TABLET ORAL DAILY
Status: CANCELLED | OUTPATIENT
Start: 2024-02-22

## 2024-02-21 RX ORDER — ACETAMINOPHEN 325 MG/1
650 TABLET ORAL ONCE
Status: CANCELLED
Start: 2024-02-22

## 2024-02-21 RX ORDER — LORAZEPAM 2 MG/ML
.5-1 INJECTION INTRAMUSCULAR EVERY 6 HOURS PRN
Status: CANCELLED | OUTPATIENT
Start: 2024-02-22

## 2024-02-21 RX ORDER — PROCHLORPERAZINE MALEATE 10 MG
10 TABLET ORAL EVERY 6 HOURS PRN
Status: CANCELLED
Start: 2024-02-22

## 2024-02-21 RX ORDER — ALBUTEROL SULFATE 0.83 MG/ML
2.5 SOLUTION RESPIRATORY (INHALATION)
Status: CANCELLED | OUTPATIENT
Start: 2024-02-22

## 2024-02-21 RX ORDER — PREDNISONE 50 MG/1
100 TABLET ORAL 2 TIMES DAILY
Status: CANCELLED
Start: 2024-02-23

## 2024-02-21 RX ORDER — LORAZEPAM 0.5 MG/1
.5-1 TABLET ORAL EVERY 6 HOURS PRN
Status: CANCELLED
Start: 2024-02-22

## 2024-02-21 RX ORDER — MEPERIDINE HYDROCHLORIDE 25 MG/ML
25 INJECTION INTRAMUSCULAR; INTRAVENOUS; SUBCUTANEOUS EVERY 30 MIN PRN
Status: CANCELLED | OUTPATIENT
Start: 2024-02-22

## 2024-02-22 ENCOUNTER — HOSPITAL ENCOUNTER (OUTPATIENT)
Dept: VASCULAR ULTRASOUND | Facility: CLINIC | Age: 29
Discharge: HOME | End: 2024-02-22
Attending: INTERNAL MEDICINE
Payer: COMMERCIAL

## 2024-02-22 ENCOUNTER — HOSPITAL ENCOUNTER (INPATIENT)
Facility: CLINIC | Age: 29
LOS: 5 days | Discharge: HOME | End: 2024-02-27
Attending: STUDENT IN AN ORGANIZED HEALTH CARE EDUCATION/TRAINING PROGRAM | Admitting: STUDENT IN AN ORGANIZED HEALTH CARE EDUCATION/TRAINING PROGRAM
Payer: COMMERCIAL

## 2024-02-22 DIAGNOSIS — R11.0 NAUSEA: ICD-10-CM

## 2024-02-22 DIAGNOSIS — G47.11 IDIOPATHIC HYPERSOMNIA: ICD-10-CM

## 2024-02-22 DIAGNOSIS — C83.32 DIFFUSE LARGE B-CELL LYMPHOMA OF INTRATHORACIC LYMPH NODES (H): ICD-10-CM

## 2024-02-22 DIAGNOSIS — Z76.89 PREVENTION OF CHEMOTHERAPY-INDUCED NEUTROPENIA: ICD-10-CM

## 2024-02-22 DIAGNOSIS — C83.32 DIFFUSE LARGE B-CELL LYMPHOMA OF INTRATHORACIC LYMPH NODES (H): Primary | ICD-10-CM

## 2024-02-22 DIAGNOSIS — C83.30 DIFFUSE LARGE B-CELL LYMPHOMA, UNSPECIFIED BODY REGION (H): ICD-10-CM

## 2024-02-22 LAB
ABO/RH(D): NORMAL
ALBUMIN SERPL BCG-MCNC: 3.8 G/DL (ref 3.5–5.2)
ALP SERPL-CCNC: 65 U/L (ref 40–150)
ALT SERPL W P-5'-P-CCNC: 31 U/L (ref 0–50)
ANION GAP SERPL CALCULATED.3IONS-SCNC: 12 MMOL/L (ref 7–15)
ANTIBODY SCREEN: NEGATIVE
AST SERPL W P-5'-P-CCNC: 21 U/L (ref 0–45)
BASOPHILS # BLD AUTO: 0 10E3/UL (ref 0–0.2)
BASOPHILS NFR BLD AUTO: 1 %
BILIRUB SERPL-MCNC: <0.2 MG/DL
BUN SERPL-MCNC: 18.3 MG/DL (ref 6–20)
CALCIUM SERPL-MCNC: 9.2 MG/DL (ref 8.6–10)
CHLORIDE SERPL-SCNC: 106 MMOL/L (ref 98–107)
CREAT SERPL-MCNC: 0.67 MG/DL (ref 0.51–0.95)
DEPRECATED HCO3 PLAS-SCNC: 21 MMOL/L (ref 22–29)
EGFRCR SERPLBLD CKD-EPI 2021: >90 ML/MIN/1.73M2
EOSINOPHIL # BLD AUTO: 0.1 10E3/UL (ref 0–0.7)
EOSINOPHIL NFR BLD AUTO: 1 %
ERYTHROCYTE [DISTWIDTH] IN BLOOD BY AUTOMATED COUNT: 13.7 % (ref 10–15)
FIBRINOGEN PPP-MCNC: 427 MG/DL (ref 170–490)
GLUCOSE SERPL-MCNC: 103 MG/DL (ref 70–99)
HCT VFR BLD AUTO: 37 % (ref 35–47)
HGB BLD-MCNC: 12.7 G/DL (ref 11.7–15.7)
IMM GRANULOCYTES # BLD: 0 10E3/UL
IMM GRANULOCYTES NFR BLD: 1 %
INR PPP: 1.03 (ref 0.85–1.15)
LDH SERPL L TO P-CCNC: 162 U/L (ref 0–250)
LYMPHOCYTES # BLD AUTO: 1.4 10E3/UL (ref 0.8–5.3)
LYMPHOCYTES NFR BLD AUTO: 18 %
MAGNESIUM SERPL-MCNC: 1.7 MG/DL (ref 1.7–2.3)
MAYO MISC RESULT: NORMAL
MCH RBC QN AUTO: 29.9 PG (ref 26.5–33)
MCHC RBC AUTO-ENTMCNC: 34.3 G/DL (ref 31.5–36.5)
MCV RBC AUTO: 87 FL (ref 78–100)
MONOCYTES # BLD AUTO: 0.7 10E3/UL (ref 0–1.3)
MONOCYTES NFR BLD AUTO: 9 %
NEUTROPHILS # BLD AUTO: 5.6 10E3/UL (ref 1.6–8.3)
NEUTROPHILS NFR BLD AUTO: 70 %
NRBC # BLD AUTO: 0 10E3/UL
NRBC BLD AUTO-RTO: 0 /100
PATH REPORT.ADDENDUM SPEC: ABNORMAL
PATH REPORT.ADDENDUM SPEC: ABNORMAL
PATH REPORT.COMMENTS IMP SPEC: ABNORMAL
PATH REPORT.COMMENTS IMP SPEC: YES
PATH REPORT.FINAL DX SPEC: ABNORMAL
PATH REPORT.GROSS SPEC: ABNORMAL
PATH REPORT.MICROSCOPIC SPEC OTHER STN: ABNORMAL
PATH REPORT.RELEVANT HX SPEC: ABNORMAL
PHOSPHATE SERPL-MCNC: 3.8 MG/DL (ref 2.5–4.5)
PHOTO IMAGE: ABNORMAL
PLATELET # BLD AUTO: 252 10E3/UL (ref 150–450)
POTASSIUM SERPL-SCNC: 4.3 MMOL/L (ref 3.4–5.3)
PROT SERPL-MCNC: 6.7 G/DL (ref 6.4–8.3)
RBC # BLD AUTO: 4.25 10E6/UL (ref 3.8–5.2)
SODIUM SERPL-SCNC: 139 MMOL/L (ref 135–145)
SPECIMEN EXPIRATION DATE: NORMAL
URATE SERPL-MCNC: 2.9 MG/DL (ref 2.4–5.7)
WBC # BLD AUTO: 7.8 10E3/UL (ref 4–11)

## 2024-02-22 PROCEDURE — 99223 1ST HOSP IP/OBS HIGH 75: CPT | Mod: AI | Performed by: PHYSICIAN ASSISTANT

## 2024-02-22 PROCEDURE — 250N000009 HC RX 250: Performed by: INTERNAL MEDICINE

## 2024-02-22 PROCEDURE — 120N000002 HC R&B MED SURG/OB UMMC

## 2024-02-22 PROCEDURE — 250N000013 HC RX MED GY IP 250 OP 250 PS 637: Performed by: INTERNAL MEDICINE

## 2024-02-22 PROCEDURE — 85025 COMPLETE CBC W/AUTO DIFF WBC: CPT | Performed by: PHYSICIAN ASSISTANT

## 2024-02-22 PROCEDURE — 84100 ASSAY OF PHOSPHORUS: CPT | Performed by: PHYSICIAN ASSISTANT

## 2024-02-22 PROCEDURE — 85384 FIBRINOGEN ACTIVITY: CPT | Performed by: PHYSICIAN ASSISTANT

## 2024-02-22 PROCEDURE — 86900 BLOOD TYPING SEROLOGIC ABO: CPT | Performed by: PHYSICIAN ASSISTANT

## 2024-02-22 PROCEDURE — 85610 PROTHROMBIN TIME: CPT | Performed by: PHYSICIAN ASSISTANT

## 2024-02-22 PROCEDURE — 250N000011 HC RX IP 250 OP 636: Performed by: PHYSICIAN ASSISTANT

## 2024-02-22 PROCEDURE — 83615 LACTATE (LD) (LDH) ENZYME: CPT | Performed by: PHYSICIAN ASSISTANT

## 2024-02-22 PROCEDURE — 3E04305 INTRODUCTION OF OTHER ANTINEOPLASTIC INTO CENTRAL VEIN, PERCUTANEOUS APPROACH: ICD-10-PCS | Performed by: STUDENT IN AN ORGANIZED HEALTH CARE EDUCATION/TRAINING PROGRAM

## 2024-02-22 PROCEDURE — 83735 ASSAY OF MAGNESIUM: CPT | Performed by: PHYSICIAN ASSISTANT

## 2024-02-22 PROCEDURE — 80053 COMPREHEN METABOLIC PANEL: CPT | Performed by: PHYSICIAN ASSISTANT

## 2024-02-22 PROCEDURE — 3E0430M INTRODUCTION OF MONOCLONAL ANTIBODY INTO CENTRAL VEIN, PERCUTANEOUS APPROACH: ICD-10-PCS | Performed by: STUDENT IN AN ORGANIZED HEALTH CARE EDUCATION/TRAINING PROGRAM

## 2024-02-22 PROCEDURE — 250N000011 HC RX IP 250 OP 636: Performed by: INTERNAL MEDICINE

## 2024-02-22 PROCEDURE — 250N000011 HC RX IP 250 OP 636: Mod: JZ | Performed by: INTERNAL MEDICINE

## 2024-02-22 PROCEDURE — 36569 INSJ PICC 5 YR+ W/O IMAGING: CPT

## 2024-02-22 PROCEDURE — 99207 PR SC NO CHARGE VISIT: CPT | Performed by: STUDENT IN AN ORGANIZED HEALTH CARE EDUCATION/TRAINING PROGRAM

## 2024-02-22 PROCEDURE — 250N000013 HC RX MED GY IP 250 OP 250 PS 637: Performed by: PHYSICIAN ASSISTANT

## 2024-02-22 PROCEDURE — 258N000003 HC RX IP 258 OP 636: Performed by: INTERNAL MEDICINE

## 2024-02-22 PROCEDURE — 250N000011 HC RX IP 250 OP 636: Performed by: STUDENT IN AN ORGANIZED HEALTH CARE EDUCATION/TRAINING PROGRAM

## 2024-02-22 PROCEDURE — 272N000451 HC KIT SHRLOCK 5FR POWER PICC DOUBLE LUMEN

## 2024-02-22 PROCEDURE — 84550 ASSAY OF BLOOD/URIC ACID: CPT | Performed by: PHYSICIAN ASSISTANT

## 2024-02-22 RX ORDER — LIDOCAINE 40 MG/G
CREAM TOPICAL
Status: ACTIVE | OUTPATIENT
Start: 2024-02-22 | End: 2024-02-25

## 2024-02-22 RX ORDER — ALLOPURINOL 300 MG/1
300 TABLET ORAL DAILY
Status: DISCONTINUED | OUTPATIENT
Start: 2024-02-22 | End: 2024-02-27 | Stop reason: HOSPADM

## 2024-02-22 RX ORDER — HEPARIN SODIUM,PORCINE 10 UNIT/ML
5-20 VIAL (ML) INTRAVENOUS EVERY 24 HOURS
Status: DISCONTINUED | OUTPATIENT
Start: 2024-02-22 | End: 2024-02-23 | Stop reason: HOSPADM

## 2024-02-22 RX ORDER — ONDANSETRON 8 MG/1
16 TABLET, FILM COATED ORAL EVERY 24 HOURS
Status: COMPLETED | OUTPATIENT
Start: 2024-02-23 | End: 2024-02-27

## 2024-02-22 RX ORDER — LETROZOLE 2.5 MG/1
5 TABLET, FILM COATED ORAL DAILY
Status: COMPLETED | OUTPATIENT
Start: 2024-02-23 | End: 2024-02-26

## 2024-02-22 RX ORDER — PREDNISONE 50 MG/1
100 TABLET ORAL 2 TIMES DAILY
Qty: 20 TABLET | Refills: 0 | Status: DISCONTINUED | OUTPATIENT
Start: 2024-02-23 | End: 2024-02-27 | Stop reason: HOSPADM

## 2024-02-22 RX ORDER — AMOXICILLIN 250 MG
1 CAPSULE ORAL 2 TIMES DAILY PRN
Status: DISCONTINUED | OUTPATIENT
Start: 2024-02-22 | End: 2024-02-22

## 2024-02-22 RX ORDER — LORAZEPAM 2 MG/ML
.5-1 INJECTION INTRAMUSCULAR EVERY 6 HOURS PRN
Status: DISCONTINUED | OUTPATIENT
Start: 2024-02-22 | End: 2024-02-27 | Stop reason: HOSPADM

## 2024-02-22 RX ORDER — HEPARIN SODIUM,PORCINE 10 UNIT/ML
5-20 VIAL (ML) INTRAVENOUS EVERY 24 HOURS
Status: DISCONTINUED | OUTPATIENT
Start: 2024-02-22 | End: 2024-02-27 | Stop reason: HOSPADM

## 2024-02-22 RX ORDER — PROCHLORPERAZINE MALEATE 5 MG
10 TABLET ORAL EVERY 6 HOURS PRN
Status: DISCONTINUED | OUTPATIENT
Start: 2024-02-22 | End: 2024-02-22

## 2024-02-22 RX ORDER — AMOXICILLIN 250 MG
2 CAPSULE ORAL 2 TIMES DAILY
Status: DISCONTINUED | OUTPATIENT
Start: 2024-02-22 | End: 2024-02-22

## 2024-02-22 RX ORDER — ALLOPURINOL 300 MG/1
300 TABLET ORAL DAILY
Status: DISCONTINUED | OUTPATIENT
Start: 2024-02-22 | End: 2024-02-22

## 2024-02-22 RX ORDER — ACETAMINOPHEN 325 MG/1
650 TABLET ORAL ONCE
Qty: 2 TABLET | Refills: 0 | Status: COMPLETED | OUTPATIENT
Start: 2024-02-22 | End: 2024-02-22

## 2024-02-22 RX ORDER — AMOXICILLIN 250 MG
2 CAPSULE ORAL 2 TIMES DAILY PRN
Status: DISCONTINUED | OUTPATIENT
Start: 2024-02-22 | End: 2024-02-22

## 2024-02-22 RX ORDER — DEXAMETHASONE 4 MG/1
8 TABLET ORAL DAILY
Status: DISCONTINUED | OUTPATIENT
Start: 2024-02-28 | End: 2024-02-27 | Stop reason: HOSPADM

## 2024-02-22 RX ORDER — ACETAMINOPHEN 325 MG/1
650 TABLET ORAL EVERY 4 HOURS PRN
Status: DISCONTINUED | OUTPATIENT
Start: 2024-02-22 | End: 2024-02-27 | Stop reason: HOSPADM

## 2024-02-22 RX ORDER — ONDANSETRON 4 MG/1
4 TABLET, FILM COATED ORAL EVERY 8 HOURS PRN
Status: DISCONTINUED | OUTPATIENT
Start: 2024-02-22 | End: 2024-02-27 | Stop reason: HOSPADM

## 2024-02-22 RX ORDER — CABERGOLINE 0.5 MG/1
0.5 TABLET ORAL DAILY
Qty: 4 TABLET | Refills: 0 | Status: COMPLETED | OUTPATIENT
Start: 2024-02-23 | End: 2024-02-26

## 2024-02-22 RX ORDER — POLYETHYLENE GLYCOL 3350 17 G/17G
17 POWDER, FOR SOLUTION ORAL 2 TIMES DAILY
Status: DISCONTINUED | OUTPATIENT
Start: 2024-02-22 | End: 2024-02-27 | Stop reason: HOSPADM

## 2024-02-22 RX ORDER — AMOXICILLIN 250 MG
1 CAPSULE ORAL 2 TIMES DAILY
Status: DISCONTINUED | OUTPATIENT
Start: 2024-02-22 | End: 2024-02-22

## 2024-02-22 RX ORDER — ALBUTEROL SULFATE 0.83 MG/ML
2.5 SOLUTION RESPIRATORY (INHALATION)
Status: DISCONTINUED | OUTPATIENT
Start: 2024-02-22 | End: 2024-02-27 | Stop reason: HOSPADM

## 2024-02-22 RX ORDER — ALBUTEROL SULFATE 90 UG/1
1-2 AEROSOL, METERED RESPIRATORY (INHALATION)
Status: DISCONTINUED | OUTPATIENT
Start: 2024-02-22 | End: 2024-02-27 | Stop reason: HOSPADM

## 2024-02-22 RX ORDER — LETROZOLE 2.5 MG/1
5 TABLET, FILM COATED ORAL DAILY
Status: ON HOLD | COMMUNITY
End: 2024-02-27

## 2024-02-22 RX ORDER — ONDANSETRON 2 MG/ML
4 INJECTION INTRAMUSCULAR; INTRAVENOUS EVERY 8 HOURS PRN
Status: DISCONTINUED | OUTPATIENT
Start: 2024-02-22 | End: 2024-02-27 | Stop reason: HOSPADM

## 2024-02-22 RX ORDER — AMOXICILLIN 250 MG
2 CAPSULE ORAL 2 TIMES DAILY
Status: DISCONTINUED | OUTPATIENT
Start: 2024-02-22 | End: 2024-02-23

## 2024-02-22 RX ORDER — METHYLPREDNISOLONE SODIUM SUCCINATE 125 MG/2ML
125 INJECTION, POWDER, LYOPHILIZED, FOR SOLUTION INTRAMUSCULAR; INTRAVENOUS
Status: DISCONTINUED | OUTPATIENT
Start: 2024-02-22 | End: 2024-02-27 | Stop reason: HOSPADM

## 2024-02-22 RX ORDER — CABERGOLINE 0.5 MG/1
0.5 TABLET ORAL
Status: ON HOLD | COMMUNITY
End: 2024-02-27

## 2024-02-22 RX ORDER — DIPHENHYDRAMINE HYDROCHLORIDE 50 MG/ML
50 INJECTION INTRAMUSCULAR; INTRAVENOUS
Status: COMPLETED | OUTPATIENT
Start: 2024-02-22 | End: 2024-02-22

## 2024-02-22 RX ORDER — ENOXAPARIN SODIUM 100 MG/ML
40 INJECTION SUBCUTANEOUS EVERY 24 HOURS
Status: DISCONTINUED | OUTPATIENT
Start: 2024-02-22 | End: 2024-02-27 | Stop reason: HOSPADM

## 2024-02-22 RX ORDER — POLYETHYLENE GLYCOL 3350 17 G/17G
17 POWDER, FOR SOLUTION ORAL DAILY PRN
Status: DISCONTINUED | OUTPATIENT
Start: 2024-02-22 | End: 2024-02-22

## 2024-02-22 RX ORDER — HEPARIN SODIUM,PORCINE 10 UNIT/ML
5-20 VIAL (ML) INTRAVENOUS
Status: DISCONTINUED | OUTPATIENT
Start: 2024-02-22 | End: 2024-02-23 | Stop reason: HOSPADM

## 2024-02-22 RX ORDER — EPINEPHRINE 1 MG/ML
0.3 INJECTION, SOLUTION, CONCENTRATE INTRAVENOUS EVERY 5 MIN PRN
Status: DISCONTINUED | OUTPATIENT
Start: 2024-02-22 | End: 2024-02-27 | Stop reason: HOSPADM

## 2024-02-22 RX ORDER — PROCHLORPERAZINE MALEATE 5 MG
5 TABLET ORAL EVERY 6 HOURS PRN
Status: DISCONTINUED | OUTPATIENT
Start: 2024-02-22 | End: 2024-02-22

## 2024-02-22 RX ORDER — PROCHLORPERAZINE MALEATE 5 MG
5-10 TABLET ORAL EVERY 6 HOURS PRN
Status: DISCONTINUED | OUTPATIENT
Start: 2024-02-22 | End: 2024-02-27 | Stop reason: HOSPADM

## 2024-02-22 RX ORDER — MEPERIDINE HYDROCHLORIDE 25 MG/ML
25 INJECTION INTRAMUSCULAR; INTRAVENOUS; SUBCUTANEOUS EVERY 30 MIN PRN
Status: DISCONTINUED | OUTPATIENT
Start: 2024-02-22 | End: 2024-02-27 | Stop reason: HOSPADM

## 2024-02-22 RX ORDER — DIPHENHYDRAMINE HCL 25 MG
50 CAPSULE ORAL ONCE
Qty: 2 CAPSULE | Refills: 0 | Status: COMPLETED | OUTPATIENT
Start: 2024-02-22 | End: 2024-02-22

## 2024-02-22 RX ORDER — DOXYCYCLINE 50 MG/1
100 CAPSULE ORAL 2 TIMES DAILY
Status: COMPLETED | OUTPATIENT
Start: 2024-02-22 | End: 2024-02-24

## 2024-02-22 RX ORDER — HEPARIN SODIUM,PORCINE 10 UNIT/ML
5-20 VIAL (ML) INTRAVENOUS
Status: DISCONTINUED | OUTPATIENT
Start: 2024-02-22 | End: 2024-02-27 | Stop reason: HOSPADM

## 2024-02-22 RX ORDER — ACYCLOVIR 400 MG/1
400 TABLET ORAL 2 TIMES DAILY
Status: DISCONTINUED | OUTPATIENT
Start: 2024-02-22 | End: 2024-02-27 | Stop reason: HOSPADM

## 2024-02-22 RX ORDER — DIPHENHYDRAMINE HYDROCHLORIDE 50 MG/ML
25 INJECTION INTRAMUSCULAR; INTRAVENOUS EVERY 6 HOURS PRN
Status: DISCONTINUED | OUTPATIENT
Start: 2024-02-22 | End: 2024-02-27 | Stop reason: HOSPADM

## 2024-02-22 RX ORDER — LORAZEPAM 0.5 MG/1
.5-1 TABLET ORAL EVERY 6 HOURS PRN
Status: DISCONTINUED | OUTPATIENT
Start: 2024-02-22 | End: 2024-02-27 | Stop reason: HOSPADM

## 2024-02-22 RX ORDER — DOXYCYCLINE 100 MG/1
100 CAPSULE ORAL 2 TIMES DAILY
Status: ON HOLD | COMMUNITY
End: 2024-02-27

## 2024-02-22 RX ORDER — ONDANSETRON 4 MG/1
4 TABLET, ORALLY DISINTEGRATING ORAL EVERY 8 HOURS PRN
Status: DISCONTINUED | OUTPATIENT
Start: 2024-02-22 | End: 2024-02-27 | Stop reason: HOSPADM

## 2024-02-22 RX ORDER — PANTOPRAZOLE SODIUM 40 MG/1
40 TABLET, DELAYED RELEASE ORAL EVERY MORNING
Status: DISCONTINUED | OUTPATIENT
Start: 2024-02-23 | End: 2024-02-27 | Stop reason: HOSPADM

## 2024-02-22 RX ADMIN — POLYETHYLENE GLYCOL 3350 17 G: 17 POWDER, FOR SOLUTION ORAL at 21:08

## 2024-02-22 RX ADMIN — Medication 5 ML: at 13:56

## 2024-02-22 RX ADMIN — MEPERIDINE HYDROCHLORIDE 25 MG: 25 INJECTION INTRAMUSCULAR; INTRAVENOUS; SUBCUTANEOUS at 19:10

## 2024-02-22 RX ADMIN — ACETAMINOPHEN 650 MG: 325 TABLET, FILM COATED ORAL at 17:24

## 2024-02-22 RX ADMIN — ALLOPURINOL 300 MG: 300 TABLET ORAL at 15:31

## 2024-02-22 RX ADMIN — FAMOTIDINE 20 MG: 10 INJECTION, SOLUTION INTRAVENOUS at 18:54

## 2024-02-22 RX ADMIN — ONDANSETRON 4 MG: 2 INJECTION INTRAMUSCULAR; INTRAVENOUS at 19:20

## 2024-02-22 RX ADMIN — RITUXIMAB-ABBS 600 MG: 10 INJECTION, SOLUTION INTRAVENOUS at 17:52

## 2024-02-22 RX ADMIN — DIPHENHYDRAMINE HYDROCHLORIDE 50 MG: 25 CAPSULE ORAL at 17:24

## 2024-02-22 RX ADMIN — ENOXAPARIN SODIUM 40 MG: 40 INJECTION SUBCUTANEOUS at 21:09

## 2024-02-22 RX ADMIN — METHYLPREDNISOLONE SODIUM SUCCINATE 125 MG: 125 INJECTION INTRAMUSCULAR; INTRAVENOUS at 18:58

## 2024-02-22 RX ADMIN — CIMETIDINE 200 MG: 200 TABLET, FILM COATED ORAL at 21:09

## 2024-02-22 RX ADMIN — DOXYCYCLINE 100 MG: 50 CAPSULE ORAL at 21:14

## 2024-02-22 RX ADMIN — DIPHENHYDRAMINE HYDROCHLORIDE 50 MG: 50 INJECTION, SOLUTION INTRAMUSCULAR; INTRAVENOUS at 18:54

## 2024-02-22 RX ADMIN — LIDOCAINE HYDROCHLORIDE 1 ML: 10 INJECTION, SOLUTION EPIDURAL; INFILTRATION; INTRACAUDAL; PERINEURAL at 10:11

## 2024-02-22 RX ADMIN — SENNOSIDES AND DOCUSATE SODIUM 2 TABLET: 8.6; 5 TABLET ORAL at 21:08

## 2024-02-22 RX ADMIN — ACYCLOVIR 400 MG: 400 TABLET ORAL at 21:08

## 2024-02-22 RX ADMIN — Medication 5 ML: at 10:10

## 2024-02-22 ASSESSMENT — ACTIVITIES OF DAILY LIVING (ADL)
ADLS_ACUITY_SCORE: 20
ADLS_ACUITY_SCORE: 21
ADLS_ACUITY_SCORE: 20
ADLS_ACUITY_SCORE: 21
ADLS_ACUITY_SCORE: 20

## 2024-02-22 NOTE — PROVIDER NOTIFICATION
"Provider Notification     Angle Sanchez paged via iconDial at 1221:    \"RENEE I just called MEHRAN Kathleen. She is on her way up to [facility]!\"  "

## 2024-02-22 NOTE — PHARMACY-ADMISSION MEDICATION HISTORY
Pharmacist Admission Medication History    Admission medication history is complete. The information provided in this note is only as accurate as the sources available at the time of the update.    Information Source(s): Patient, Family member, Prescription bottles, and CareEverywhere/SureScripts via in-person    Pertinent Information: patient recently had IVF procedure/egg retrieval procedure and no longer on the stimulating agents, still on some postoperative medications as noted below.     Changes made to PTA medication list:  Added:   doxycycline (postop prophy) to complete after AM dose on 2/24.  Cabergoline - to complete after last dose on 2/26 AM   Letrozole - to complete after last dose on 2/26 AM   Deleted:   menotropins inj (completed)  follitropin (completed)  dexamethasone (completed - last dose was Sunday 2/18/24 AM),   Changed: modafinil dose/sig (uses prn, hasn't used in 2 yrs but wanted on the list d/t issues with prior auth in the past).     Allergies reviewed with patient and updates made in EHR: yes    Medication History Completed By: Choco Prescott [facility] 2/22/2024 3:09 PM  Prior to Admission medications    Medication Sig Last Dose Taking? Auth Provider Long Term End Date   cabergoline (DOSTINEX) 0.5 MG tablet Take 0.5 mg by mouth twice a week to complete after last dose on 2/26 AM 2/22/2024 at AM Yes Unknown, Entered By History Yes 2/26/24   cimetidine (TAGAMET) 200 MG tablet Take 200 mg by mouth 2 times daily 2/22/2024 at am Yes Reported, Patient     doxycycline monohydrate (MONODOX) 100 MG capsule Take 100 mg by mouth 2 times daily For postop prophylaxis (to end 2/24/24 after AM dose). 2/22/2024 at AM Yes Unknown, Entered By History  2/24/24   letrozole (FEMARA) 2.5 MG tablet Take 5 mg by mouth daily to complete after last dose on 2/26 AM 2/22/2024 at AM Yes Unknown, Entered By History Yes 2/26/24   Levonorgestrel (KYLEENA IU) 1 Device by Intrauterine route once 2/22/2024 Yes Reported, Patient  Yes    omeprazole 20 MG tablet Take 20 mg by mouth every morning 2/22/2024 at AM Yes Reported, Patient     modafinil (PROVIGIL) 200 MG tablet Take 1 tablet (200 mg) by mouth daily  Patient taking differently: Take 200 mg by mouth daily as needed ~2 yr ago was last dose  Chela Peter, DO

## 2024-02-22 NOTE — PROCEDURES
[facility]    Double Lumen PICC Placement    Date/Time: 2/22/2024 10:12 AM    Performed by: Bebeto Ferrera RN  Authorized by: Vanessa Oakes MD  Indications: Chemotherapy.      UNIVERSAL PROTOCOL   Site Marked: Yes  Prior Images Obtained and Reviewed:  Yes  Required items: Required blood products, implants, devices and special equipment available    Patient identity confirmed:  Verbally with patient, hospital-assigned identification number, arm band and provided demographic data  Patient was reevaluated immediately before administering moderate or deep sedation or anesthesia  Confirmation Checklist:  Patient's identity using two indicators, procedure was appropriate and matched the consent or emergent situation, correct equipment/implants were available and relevant allergies  Time out: Immediately prior to the procedure a time out was called    Universal Protocol: the Joint Commission Universal Protocol was followed    Preparation: Patient was prepped and draped in usual sterile fashion       ANESTHESIA    Anesthesia:  See MAR for details  Local Anesthetic:  Lidocaine 1% without epinephrine  Anesthetic Total (mL):  1      SEDATION    Patient Sedated: No        Preparation: skin prepped with ChloraPrep  Skin prep agent: skin prep agent completely dried prior to procedure  Sterile barriers: maximum sterile barriers were used: cap, mask, sterile gown, sterile gloves, and large sterile sheet  Hand hygiene: hand hygiene performed prior to central venous catheter insertion  Type of line used: PICC  Catheter type: double lumen  Lumen type: non-valved and power PICC  Lumen Identification: Purple and Red  Catheter size: 5 Fr  Brand: Bard  Lot number: [id number]  Placement method: venipuncture, MST, ultrasound and tip navigation system  Number of attempts: 1  Difficulty threading catheter: no  Successful placement: yes  Orientation: right    Location: brachial vein (medial)  (vein diameter - 0.56 cm)  Arm circumference: adults 10 cm  Extremity circumference: 25  Visible catheter length: 1  Total catheter length: 41  Dressing and securement: alcohol impregnated caps, chlorhexidine disc applied, transparent dressing, tissue adhesive, sterile dressing applied, statlock and site cleansed  Post procedure assessment: blood return through all ports, free fluid flow and placement verified by 3CG technology  PROCEDURE   Patient Tolerance:  Patient tolerated the procedure well with no immediate complicationsDescribe Procedure: PICC tip is in satisfactory location as verified by [employer] 3CG Tip Confirmation System. PICC is OK to use.  Disposal: sharps and needle count correct at the end of procedure, needles and guidewire disposed in sharps container

## 2024-02-22 NOTE — H&P
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 Oakes. Initially presented with cervical lymphadenopathy 12/2023. Initial FNA (1/29/24) suggestive of possible Hodgkin lymphoma; however excisional LN biopsy (2/9/24) shows large B-cell lymphoma with differential diagnosis of primary mediastinal large B-cell lymphoma vs DLBCL NOS. PET with cervical and mediastinal lymphadenopathy only. Overall presentation would be quite consistent with PMBCL given her age, gender, sites of involvement, dim CD30 expression, and prominent fibrotic background. However, the neoplastic cells lack CD23 so additional testing is in process (IHC for PDL-1, , MAL and Avzkv2LM gene expression assay) to help distinguish. Overall diagnosis and prognosis of large B-cell lymphoma (PMBCL vs stage 2 DLBCL) was reviewed with primary oncologist, and ultimately through shared decision making was decided to treat as if her disease is PMBCL for Cycle 1 with dose-adjusted R-EPOCH while awaiting the pending additional studies. If studies come back more suggestive of DLBCL NOS later, we can de-escalate therapy to R-CHOP for later cycles. Plan is for a total of 6 cycles with first restaging PET-CT after 2 cycles. Side effects, treatment course reviewed, and patient was understanding and in agreement with this plan.  - PICC placed prior to admission, anticipate removal at discharge. Scheduled for port placement OP 3/1/24  - Echo 2/19 with LVEF  60%, no significant valvular abnormalities present      Treatment Plan: Dose adjusted R-EPOCH (C1D1=2/22/2024)  -Rituximab 375 mg/m2 (600mg) IV x 1 dose - D0  - Prednisone 100 mg BID x 10 doses - D1-5  - Etoposide 50mg/m2 (85 mg) IV x 4 doses - D1-4  - Vincristine, doxorubicin 0.4 mg/m2 (0.7mg) x 4 doses CIVI- D1-4  - Cyclophosphamide 750 mg/m2 (1275 mg) IV x 1 dose - D5  - Pegfilgrastim x1 - D6  - Pre-meds: tylenol, benadryl, zofran, emend, dexamethasone (D6-7)     # Ppx  - TLS ppx: baseline uric acid normal. Start allopurinol 300 mg daily. Encouraged good hydration.  - ID ppx: start  mg BID. Levo and fluc when ANC <1.0. Monthly pentamidine for PJP ppx.   - Vaccines: up to date on COVID and flu shots.      # Risk for pancytopenia   2/2 chemotherapy and underlying disease.   - Transfuse for Hgb <7, Plt <10K  - Type & Screen MWF    MISC  # Fertility preservation  Already established with [facility], underwent egg retrieval on 2/18 or 2/19.   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 Her boyfriend is a general surgery resident here at [facility]. Parents live in [place], father was present on day of admission.    FEN   - IVF per chemotherapy regimen, IVF bolus prn   - PRN lyte replacement per standing protocol  - Regular diet as tolerated     Lines/Drains: PICC  DVT ppx: Lovenox, hold if Plt <50K   GI ppx: PTA PPI or Pepcid     DISPO: Anticipate 5 day stay for chemotherapy pending completion of chemo and barring any clinical complications.       Code Status   Full Code      Patient and plan of care was discussed with attending physician Dr. Pinto.    >65 minutes spent on the date of the encounter. Over 50% of time was spent counseling the patient and/or  "coordinating care.     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      Clementine is feeling well today.  She notes she has been feeling well throughout time of diagnosis.  She initially noted some cervical lymphadenopathy around the holidays, ultimately found to be consistent with lymphoma.  She expresses a good understanding of pending test to confirm diagnosis and relays conversation with Dr. Oakes regarding treatment plan.  She is anxious to get chemo started as soon as possible to \"beat this thing\" and get back to her normal life.  She expresses good understanding of the chemo regimen including anticipated hospital course, side effects.  She notes she had egg retrieval earlier this week and continues on medications for the next few days for this.  Overall she has been feeling quite well.  She notes some constipation, \"last good bowel movement was Sunday\" and had a small bowel movement yesterday despite taking MiraLAX and senna.  Will continue during this admission and offered additional bowel medications if needed.  She otherwise is feeling well denies headache, dizziness, fevers, chills, night sweat, sore throat, runny nose, cough, chest pain, shortness of breath, abdominal pain, nausea, vomiting, urinary symptoms, bruising or bleeding.  We reviewed plan to start rituximab tonight to which she is agreeable.  All questions addressed at bedside.  Boyfriend and father both present at bedside and supportive.      Past Medical History    I have reviewed this patient's medical history and " updated it with pertinent information if needed.   Past Medical History:   Diagnosis Date    Anxiety     Cervical lymphadenopathy     GERD     Interstitial cystitis        Past Surgical History   I have reviewed this patient's surgical history and updated it with pertinent information if needed.  Past Surgical History:   Procedure Laterality Date    CL AFF SURGICAL PATHOLOGY      Pearl neuroma    CLOSED REDUCTION PROXIMAL FIBULAR FRACTURE Left     EXCISE LESION NECK Left 2024    Procedure: Excisional Node Biopsy Left Neck;  Surgeon: Asher Damon MD;  Location: [facility] OR    PICC INSERTION - DOUBLE LUMEN Right 2024    41-1cm, Medial brachial vein       Prior to Admission Medications   Prior to Admission Medications   Prescriptions Last Dose Informant Patient Reported? Taking?   Levonorgestrel (KYLEENA IU) 2024  Yes Yes   Si Device by Intrauterine route once   cabergoline (DOSTINEX) 0.5 MG tablet 2024 at AM  Yes Yes   Sig: Take 0.5 mg by mouth twice a week to complete after last dose on  AM   cimetidine (TAGAMET) 200 MG tablet 2024 at am  Yes Yes   Sig: Take 200 mg by mouth 2 times daily   doxycycline monohydrate (MONODOX) 100 MG capsule 2024 at AM  Yes Yes   Sig: Take 100 mg by mouth 2 times daily For postop prophylaxis (to end 24 after AM dose).   letrozole (FEMARA) 2.5 MG tablet 2024 at AM  Yes Yes   Sig: Take 5 mg by mouth daily to complete after last dose on  AM   modafinil (PROVIGIL) 200 MG tablet ~2 yr ago was last dose  No No   Sig: Take 1 tablet (200 mg) by mouth daily   Patient taking differently: Take 200 mg by mouth daily as needed   omeprazole 20 MG tablet 2024 at AM  Yes Yes   Sig: Take 20 mg by mouth every morning      Facility-Administered Medications: None     Allergies   No Known Allergies    Social History   I have reviewed this patient's social history and updated it with pertinent information if needed. Clementine Kathleen  reports that  she has never smoked. She has never been exposed to tobacco smoke. She has never used smokeless tobacco. She reports that she does not currently use alcohol. She reports that she does not currently use drugs.    Family History   I have reviewed this patient's family history and updated it with pertinent information if needed.   Family History   Problem Relation Age of Onset    Anesthesia Reaction No family hx of     Clotting Disorder No family hx of        Review of Systems   The 10 point Review of Systems is negative other than noted in the HPI or here.     Physical Exam   Temp: 98.3  F (36.8  C) Temp src: Oral BP: 112/65 Pulse: 81   Resp: 18 SpO2: 97 % O2 Device: None (Room air)    Vital Signs with Ranges  Temp:  [98.1  F (36.7  C)-98.3  F (36.8  C)] 98.3  F (36.8  C)  Pulse:  [70-81] 81  Resp:  [18] 18  BP: (110-112)/(48-65) 112/65  SpO2:  [96 %-97 %] 97 %  139 lbs 0 oz      Constitutional: Awake and conversational. Non- toxic appearing. No acute distress.   HEENT: NCAT. Moist mucus membranes without lesions, thrush, or exudates appreciated  Lymph: Neck supple, no ridigity. No significant adenopathy noted.   Respiratory: Breathing comfortably on room air with no accessory muscle use. Speaking in full sentences, no evidence of respiratory distress. Lungs CTAB without stridor, wheeze, rhonchi, or rales.   Cardiovascular: Regular rate and rhythm. No peripheral edema.    GI: Abdomen with normoactive bowel sounds, soft and non-tender throughout. No rebound, guarding, or peritoneal sign.  Skin: Skin is clean, dry, intact. No jaundice or significant rashes appreciated.   Neurologic: Alert and with normal speech. Grossly nonfocal.  Neuropsychiatric: Calm, affect congruent to situation. Appropriate mood and affect.          Data   Results for orders placed or performed during the hospital encounter of 02/22/24 (from the past 24 hour(s))   CBC with platelets differential    Narrative    The following orders were created  for panel order CBC with platelets differential.  Procedure                               Abnormality         Status                     ---------                               -----------         ------                     CBC with platelets and d...[[id number]]                      Final result                 Please view results for these tests on the individual orders.   Comprehensive metabolic panel   Result Value Ref Range    Sodium 139 135 - 145 mmol/L    Potassium 4.3 3.4 - 5.3 mmol/L    Carbon Dioxide (CO2) 21 (L) 22 - 29 mmol/L    Anion Gap 12 7 - 15 mmol/L    Urea Nitrogen 18.3 6.0 - 20.0 mg/dL    Creatinine 0.67 0.51 - 0.95 mg/dL    GFR Estimate >90 >60 mL/min/1.73m2    Calcium 9.2 8.6 - 10.0 mg/dL    Chloride 106 98 - 107 mmol/L    Glucose 103 (H) 70 - 99 mg/dL    Alkaline Phosphatase 65 40 - 150 U/L    AST 21 0 - 45 U/L    ALT 31 0 - 50 U/L    Protein Total 6.7 6.4 - 8.3 g/dL    Albumin 3.8 3.5 - 5.2 g/dL    Bilirubin Total <0.2 <=1.2 mg/dL   Magnesium   Result Value Ref Range    Magnesium 1.7 1.7 - 2.3 mg/dL   Phosphorus   Result Value Ref Range    Phosphorus 3.8 2.5 - 4.5 mg/dL   ABO/RH Type and Screen     Narrative    The following orders were created for panel order ABO/RH Type and Screen .  Procedure                               Abnormality         Status                     ---------                               -----------         ------                     Adult Type and Screen[[id number]]                            Final result                 Please view results for these tests on the individual orders.   Uric acid   Result Value Ref Range    Uric Acid 2.9 2.4 - 5.7 mg/dL   Lactate Dehydrogenase   Result Value Ref Range    Lactate Dehydrogenase 162 0 - 250 U/L   INR   Result Value Ref Range    INR 1.03 0.85 - 1.15   Fibrinogen activity   Result Value Ref Range    Fibrinogen Activity 427 170 - 490 mg/dL   CBC with platelets and differential   Result Value Ref Range    WBC Count 7.8 4.0 - 11.0  10e3/uL    RBC Count 4.25 3.80 - 5.20 10e6/uL    Hemoglobin 12.7 11.7 - 15.7 g/dL    Hematocrit 37.0 35.0 - 47.0 %    MCV 87 78 - 100 fL    MCH 29.9 26.5 - 33.0 pg    MCHC 34.3 31.5 - 36.5 g/dL    RDW 13.7 10.0 - 15.0 %    Platelet Count 252 150 - 450 10e3/uL    % Neutrophils 70 %    % Lymphocytes 18 %    % Monocytes 9 %    % Eosinophils 1 %    % Basophils 1 %    % Immature Granulocytes 1 %    NRBCs per 100 WBC 0 <1 /100    Absolute Neutrophils 5.6 1.6 - 8.3 10e3/uL    Absolute Lymphocytes 1.4 0.8 - 5.3 10e3/uL    Absolute Monocytes 0.7 0.0 - 1.3 10e3/uL    Absolute Eosinophils 0.1 0.0 - 0.7 10e3/uL    Absolute Basophils 0.0 0.0 - 0.2 10e3/uL    Absolute Immature Granulocytes 0.0 <=0.4 10e3/uL    Absolute NRBCs 0.0 10e3/uL   Adult Type and Screen   Result Value Ref Range    ABO/RH(D) O POS     Antibody Screen Negative Negative    SPECIMEN EXPIRATION DATE [id number]

## 2024-02-22 NOTE — PROVIDER NOTIFICATION
02/22/24 1012   PICC 02/22/24 Double Lumen Right Brachial vein medial Chemotherapy   Placement Date/Time: 02/22/24 (c) 1012   Lumens (Required): Double Lumen  Lumen Identification: Purple;Red  Catheter Brand: [employer]  Catheter Size: 5 fr  Lot #: [id number]  Full barrier precautions done: Yes, hand hygiene, sterile gown, sterile gloves, mas...   Site Assessment WDL   External Cath Length (cm) (S)  1 cm   Extremity Circumference (cm) 25 cm   Dressing Chlorhexidine disk;Transparent;Securement device   Dressing Status clean;dry;intact   Dressing Change Due (S)  02/29/24   Line Necessity Yes, meets criteria   Purple - Status blood return noted;heparin locked   Purple - Cap Change Due 02/26/24   Purple - Intervention Flushed   Red - Status blood return noted;heparin locked   Red - Cap Change Due 02/26/24   Red - Intervention Flushed   Phlebitis Scale 0-->no symptoms   Infiltration? no   PICC Comment (S)  PICC is OK to use

## 2024-02-22 NOTE — PLAN OF CARE
[id number]    A&Ox4. VSS on RA. Denies pain, nausea, and SOB. Voiding with adequate urine output. Pt reports feeling constipated, agreeable to taking bowel meds this evening and reassessing tomorrow. Up independently. Continue with plan of care.       Dose #1 Rituxan started at 1752, tolerated well for approximately one hour. At 1850 pt alerted writer that she was feeling itchy and back of throat/tongue felt numb. Rituxan stopped immediately, IV benadryl and IV pepcid given with effectiveness. Pt then began to experience chills and rigors, IV solumedrol given with some effectiveness, but pt continued to rigor. IV demerol then given x1 with effectiveness. 1 occurrence of emesis, IV zofran given. Pt VSS on RA throughout reaction. Symptoms resolved at approximately 1930. Writer, oncoming RN, and unit chemo circulator all visualized pt at this time.       2 RN skin check still need to be completed. Not done this shift d/t family present at the bedside.

## 2024-02-23 LAB
ALBUMIN SERPL BCG-MCNC: 3.8 G/DL (ref 3.5–5.2)
ALP SERPL-CCNC: 64 U/L (ref 40–150)
ALT SERPL W P-5'-P-CCNC: 31 U/L (ref 0–50)
ANION GAP SERPL CALCULATED.3IONS-SCNC: 10 MMOL/L (ref 7–15)
AST SERPL W P-5'-P-CCNC: 21 U/L (ref 0–45)
BASOPHILS # BLD AUTO: 0 10E3/UL (ref 0–0.2)
BASOPHILS NFR BLD AUTO: 0 %
BILIRUB SERPL-MCNC: 0.3 MG/DL
BUN SERPL-MCNC: 13.6 MG/DL (ref 6–20)
CALCIUM SERPL-MCNC: 9.3 MG/DL (ref 8.6–10)
CHLORIDE SERPL-SCNC: 105 MMOL/L (ref 98–107)
CREAT SERPL-MCNC: 0.63 MG/DL (ref 0.51–0.95)
CULTURE HARVEST COMPLETE DATE: NORMAL
DEPRECATED HCO3 PLAS-SCNC: 23 MMOL/L (ref 22–29)
EGFRCR SERPLBLD CKD-EPI 2021: >90 ML/MIN/1.73M2
EOSINOPHIL # BLD AUTO: 0 10E3/UL (ref 0–0.7)
EOSINOPHIL NFR BLD AUTO: 0 %
ERYTHROCYTE [DISTWIDTH] IN BLOOD BY AUTOMATED COUNT: 13.7 % (ref 10–15)
FIBRINOGEN PPP-MCNC: 443 MG/DL (ref 170–490)
GLUCOSE SERPL-MCNC: 149 MG/DL (ref 70–99)
HCT VFR BLD AUTO: 38.2 % (ref 35–47)
HGB BLD-MCNC: 12.9 G/DL (ref 11.7–15.7)
IMM GRANULOCYTES # BLD: 0 10E3/UL
IMM GRANULOCYTES NFR BLD: 1 %
INR PPP: 1.11 (ref 0.85–1.15)
INTERPRETATION: NORMAL
LYMPHOCYTES # BLD AUTO: 0.3 10E3/UL (ref 0.8–5.3)
LYMPHOCYTES NFR BLD AUTO: 4 %
MAGNESIUM SERPL-MCNC: 2.3 MG/DL (ref 1.7–2.3)
MCH RBC QN AUTO: 29.1 PG (ref 26.5–33)
MCHC RBC AUTO-ENTMCNC: 33.8 G/DL (ref 31.5–36.5)
MCV RBC AUTO: 86 FL (ref 78–100)
MONOCYTES # BLD AUTO: 0.1 10E3/UL (ref 0–1.3)
MONOCYTES NFR BLD AUTO: 1 %
NEUTROPHILS # BLD AUTO: 7.3 10E3/UL (ref 1.6–8.3)
NEUTROPHILS NFR BLD AUTO: 94 %
NRBC # BLD AUTO: 0 10E3/UL
NRBC BLD AUTO-RTO: 0 /100
PHOSPHATE SERPL-MCNC: 3.5 MG/DL (ref 2.5–4.5)
PLATELET # BLD AUTO: 249 10E3/UL (ref 150–450)
POTASSIUM SERPL-SCNC: 4.4 MMOL/L (ref 3.4–5.3)
PROT SERPL-MCNC: 7 G/DL (ref 6.4–8.3)
RBC # BLD AUTO: 4.43 10E6/UL (ref 3.8–5.2)
SODIUM SERPL-SCNC: 138 MMOL/L (ref 135–145)
URATE SERPL-MCNC: 2 MG/DL (ref 2.4–5.7)
WBC # BLD AUTO: 7.7 10E3/UL (ref 4–11)

## 2024-02-23 PROCEDURE — 250N000013 HC RX MED GY IP 250 OP 250 PS 637: Performed by: INTERNAL MEDICINE

## 2024-02-23 PROCEDURE — 99233 SBSQ HOSP IP/OBS HIGH 50: CPT | Performed by: PHYSICIAN ASSISTANT

## 2024-02-23 PROCEDURE — 85384 FIBRINOGEN ACTIVITY: CPT | Performed by: PHYSICIAN ASSISTANT

## 2024-02-23 PROCEDURE — 83735 ASSAY OF MAGNESIUM: CPT | Performed by: PHYSICIAN ASSISTANT

## 2024-02-23 PROCEDURE — 250N000013 HC RX MED GY IP 250 OP 250 PS 637: Performed by: PHYSICIAN ASSISTANT

## 2024-02-23 PROCEDURE — 84100 ASSAY OF PHOSPHORUS: CPT | Performed by: PHYSICIAN ASSISTANT

## 2024-02-23 PROCEDURE — 84550 ASSAY OF BLOOD/URIC ACID: CPT | Performed by: PHYSICIAN ASSISTANT

## 2024-02-23 PROCEDURE — 250N000012 HC RX MED GY IP 250 OP 636 PS 637: Performed by: INTERNAL MEDICINE

## 2024-02-23 PROCEDURE — 250N000011 HC RX IP 250 OP 636: Performed by: STUDENT IN AN ORGANIZED HEALTH CARE EDUCATION/TRAINING PROGRAM

## 2024-02-23 PROCEDURE — 258N000003 HC RX IP 258 OP 636: Performed by: INTERNAL MEDICINE

## 2024-02-23 PROCEDURE — 120N000002 HC R&B MED SURG/OB UMMC

## 2024-02-23 PROCEDURE — 250N000011 HC RX IP 250 OP 636: Performed by: INTERNAL MEDICINE

## 2024-02-23 PROCEDURE — 80053 COMPREHEN METABOLIC PANEL: CPT | Performed by: PHYSICIAN ASSISTANT

## 2024-02-23 PROCEDURE — 250N000011 HC RX IP 250 OP 636: Performed by: PHYSICIAN ASSISTANT

## 2024-02-23 PROCEDURE — 85610 PROTHROMBIN TIME: CPT | Performed by: PHYSICIAN ASSISTANT

## 2024-02-23 PROCEDURE — 85004 AUTOMATED DIFF WBC COUNT: CPT | Performed by: PHYSICIAN ASSISTANT

## 2024-02-23 RX ORDER — AMOXICILLIN 250 MG
3 CAPSULE ORAL 2 TIMES DAILY
Status: DISCONTINUED | OUTPATIENT
Start: 2024-02-23 | End: 2024-02-27 | Stop reason: HOSPADM

## 2024-02-23 RX ADMIN — CIMETIDINE 200 MG: 200 TABLET, FILM COATED ORAL at 08:16

## 2024-02-23 RX ADMIN — POLYETHYLENE GLYCOL 3350 17 G: 17 POWDER, FOR SOLUTION ORAL at 08:17

## 2024-02-23 RX ADMIN — ONDANSETRON HYDROCHLORIDE 4 MG: 4 TABLET, FILM COATED ORAL at 22:26

## 2024-02-23 RX ADMIN — ACYCLOVIR 400 MG: 400 TABLET ORAL at 08:16

## 2024-02-23 RX ADMIN — Medication 5 ML: at 12:50

## 2024-02-23 RX ADMIN — PREDNISONE 100 MG: 50 TABLET ORAL at 16:30

## 2024-02-23 RX ADMIN — SENNOSIDES AND DOCUSATE SODIUM 2 TABLET: 8.6; 5 TABLET ORAL at 08:16

## 2024-02-23 RX ADMIN — CABERGOLINE 0.5 MG: 0.5 TABLET ORAL at 12:50

## 2024-02-23 RX ADMIN — ACYCLOVIR 400 MG: 400 TABLET ORAL at 20:39

## 2024-02-23 RX ADMIN — ALLOPURINOL 300 MG: 300 TABLET ORAL at 08:16

## 2024-02-23 RX ADMIN — SODIUM CHLORIDE 85 MG: 9 INJECTION, SOLUTION INTRAVENOUS at 09:27

## 2024-02-23 RX ADMIN — DOXYCYCLINE 100 MG: 50 CAPSULE ORAL at 08:16

## 2024-02-23 RX ADMIN — POLYETHYLENE GLYCOL 3350 17 G: 17 POWDER, FOR SOLUTION ORAL at 20:36

## 2024-02-23 RX ADMIN — DOXYCYCLINE 100 MG: 50 CAPSULE ORAL at 20:36

## 2024-02-23 RX ADMIN — ONDANSETRON HYDROCHLORIDE 16 MG: 8 TABLET, FILM COATED ORAL at 08:16

## 2024-02-23 RX ADMIN — PANTOPRAZOLE SODIUM 40 MG: 40 TABLET, DELAYED RELEASE ORAL at 08:16

## 2024-02-23 RX ADMIN — PREDNISONE 100 MG: 50 TABLET ORAL at 08:16

## 2024-02-23 RX ADMIN — ENOXAPARIN SODIUM 40 MG: 40 INJECTION SUBCUTANEOUS at 20:36

## 2024-02-23 RX ADMIN — LETROZOLE 5 MG: 2.5 TABLET, FILM COATED ORAL at 12:49

## 2024-02-23 RX ADMIN — CIMETIDINE 200 MG: 200 TABLET, FILM COATED ORAL at 20:35

## 2024-02-23 RX ADMIN — SENNOSIDES AND DOCUSATE SODIUM 3 TABLET: 8.6; 5 TABLET ORAL at 20:35

## 2024-02-23 RX ADMIN — VINCRISTINE SULFATE 0.7 MG: 1 INJECTION, SOLUTION INTRAVENOUS at 09:27

## 2024-02-23 RX ADMIN — Medication 5 ML: at 06:46

## 2024-02-23 ASSESSMENT — ACTIVITIES OF DAILY LIVING (ADL)
ADLS_ACUITY_SCORE: 20

## 2024-02-23 NOTE — PROGRESS NOTES
Patient was on Rituxan drip which had been increased from 50 to 100 mg/hr approximately 20 minutes prior when she developed a mildly itchy throat. This was followed by rigors. Infusion was stopped and patient was treated with oral tylenol, IV Pepcid 20 mg, IV Benadryl 50 mg and IV Solumedrol 125 MG.   Patient also received Demerol for rigors.  Patient reassessed 1 hour post and is completely asymptomatic. Notably patient never had any feeling of lip swelling or throat swelling or throat closing up or chest tightness at any point in time. Patient's vitals stayed stable. She is awake, alert, oriented and responding appropriately. Moving all 4 extremities spontaneously.

## 2024-02-23 NOTE — PROGRESS NOTES
Nursing Focus: Chemotherapy  D: Positive blood return via PICC. Insertion site is clean/dry/intact, dressing intact with no complaints of pain.  Urine output is recorded in intake in Doc Flowsheet.    I: Premedications given per order (see electronic medical administration record). Dose #1 of Vincristine/Doxorubicin started to infuse over 24 hours. Reviewed pt teaching on chemotherapy side effects.  Pt denies need for further teaching. Chemotherapy double checked per protocol by two chemotherapy competent RN's.   A: Tolerating procedure well. Denies nausea and or pain.   P: Continue to monitor urine output and symptoms of nausea. Screen for symptoms of toxicity.    Nursing Focus: Chemotherapy  D: Positive blood return via PICC. Insertion site is clean/dry/intact, dressing intact with no complaints of pain.  Urine output is recorded in intake in Doc Flowsheet.    I: Premedications given per order (see electronic medical administration record). Dose #1 of Etoposide started to infuse over 24 hours. Reviewed pt teaching on chemotherapy side effects.  Pt denies need for further teaching. Chemotherapy double checked per protocol by two chemotherapy competent RN's.   A: Tolerating procedure well. Denies nausea and or pain.   P: Continue to monitor urine output and symptoms of nausea. Screen for symptoms of toxicity.

## 2024-02-23 NOTE — PROVIDER NOTIFICATION
Dr. Nahomi HAZEL paged:  Pt had a mild itchy at her back of throat. No other symptoms.  Rituxan stopped.   RN gave iv pepcis & benadryl.     Provider paged at [phone]  can u put a stat Demerol for pt CHRISTINE Kathleen rm [phone]. pt rigoring   thanks     Continue to monitor...

## 2024-02-23 NOTE — PLAN OF CARE
"0700- 1900    /68   Pulse 73   Temp 97.7  F (36.5  C) (Oral)   Resp 16   Ht 1.676 m (5' 6\")   Wt 61.7 kg (136 lb 0.4 oz)   LMP 01/31/2024 (Exact Date)   SpO2 94%   BMI 21.95 kg/m      VSS on RA, Afebrile. Denies pain, N/V, SOB. D1 of Etoposide & Vincristine/Doxorubicin hung during shift. Tolerating infusions well and good blood return via PICC line. Clemente/Doxo rate adjusted to 44.1 ml/hr to match infusion over 24 hrs. Confirmed rate with pharmacy. Pt ambulated frequently in hallway. Family and visitors at bedside. Small Bms today; pt still feels constipated- senna increased to 3 tabs BID. Good UOP. Good appetite. Continue POC.     "

## 2024-02-23 NOTE — PROGRESS NOTES
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         Today:  - Day 1 of R-EPOCH; continue to monitor for chemo related side effects  - Developed itching throat + rigors with rituximab overnight; stopped, symptoms treated to resolution, and resumed/completed at slower rate.  - Continue senna/miralax BID, consider adding additional meds to bowel regimen pending BM patterns  - Continue to monitor and provide best supportive cares.    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 Oakes. Initially presented with cervical lymphadenopathy 12/2023. Initial FNA (1/29/24) suggestive of possible Hodgkin lymphoma; however excisional LN biopsy (2/9/24) shows large B-cell lymphoma with differential diagnosis of primary mediastinal large B-cell lymphoma vs DLBCL NOS. PET with cervical and mediastinal lymphadenopathy only. Overall presentation would be quite consistent with PMBCL given her age, gender, sites of involvement, dim CD30 expression, and prominent fibrotic background. However, the neoplastic cells lack CD23 so additional testing is in process (IHC for PDL-1, , MAL and Notuo2GO gene expression assay) to help distinguish. Overall diagnosis and prognosis of large B-cell lymphoma (PMBCL vs stage 2 DLBCL) was reviewed with primary oncologist, and ultimately through shared decision making was decided to treat as if her disease is PMBCL for Cycle 1 with dose-adjusted R-EPOCH while awaiting the pending  additional studies. If studies come back more suggestive of DLBCL NOS later, we can de-escalate therapy to R-CHOP for later cycles. Plan is for a total of 6 cycles with first restaging PET-CT after 2 cycles. Side effects, treatment course reviewed, and patient was understanding and in agreement with this plan.  - PICC placed prior to admission, anticipate removal at discharge. Scheduled for port placement OP 3/1/24  - Echo 2/19 with LVEF 60%, no significant valvular abnormalities present      Treatment Plan: Dose adjusted R-EPOCH (C1D1=2/23/2024)  -Rituximab 375 mg/m2 (600mg) IV x 1 dose - D0  - Prednisone 100 mg BID x 10 doses - D1-5  - Etoposide 50mg/m2 (85 mg) IV x 4 doses - D1-4  - Vincristine, doxorubicin 0.4 mg/m2 (0.7mg) x 4 doses CIVI- D1-4  - Cyclophosphamide 750 mg/m2 (1275 mg) IV x 1 dose - D5  - Pegfilgrastim x1 - D6 (scheduled in clinic for 2/28)  - Pre-meds: tylenol, benadryl, zofran, emend, dexamethasone (D6-7)     # Ppx  - TLS ppx: baseline uric acid normal. Continue allopurinol 300 mg daily. Encouraged good hydration.  - ID ppx:  mg BID. Levo and fluc when ANC <1.0. Monthly pentamidine for PJP ppx.   - Vaccines: up to date on COVID and flu shots.      # Risk for pancytopenia   2/2 chemotherapy and underlying disease.   - Transfuse for Hgb <7, Plt <10K  - Type & Screen MWF     MISC  # Fertility preservation  Already established with [facility], underwent egg retrieval on 2/18 or 2/19.   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 Her boyfriend is a general surgery resident here at [facility]. Parents live in [place], father was present on day of admission.       Clinically Significant Risk Factors                                    FEN   - IVF  "per chemotherapy regimen, IVF bolus prn   - PRN lyte replacement per standing protocol  - Regular diet as tolerated      Lines/Drains: PICC  DVT ppx: Lovenox, hold if Plt <50K   GI ppx: PTA PPI or Pepcid      DISPO: Anticipate 5 day stay for chemotherapy pending completion of chemo and barring any clinical complications. SARAH ~2/27    Follow up scheduled - 2/28 labs/neulasta, 3/1 port placement, 3/4 labs + AMEENA follow up      I spent 40 minutes face-to-face and/or coordinating or discussing care plan. Over 50% of our time on the unit was spent counseling the patient and/or coordinating care    Patient is staffed with attending physician Dr. Pinto.     Angle Sanchez PA-C   Hematology/Oncology   Pager: #[phone]    ___________________________________________________________________    Interval History   Chart reviewed, patient developed itching throat + rigors with rituximab overnight; stopped, symptoms treated to resolution, and resumed/completed at slower rate. Clementine is feeling well today. No residual symptoms post-rituximab last night. Ready to start next dose of chemo this morning. She was able to get up and shower this morning which she was happy about! Has not had a bowel movement - last \"good\" movement was Sunday and small one on Wednesday. She wants to continue current bowel regimen today and focus on fluid intake/movement, if no success would like to add another agent tomorrow - no significant abdominal pain, cramping, nausea, or vomiting - this is reasonable. Otherwise feeling well and denies headache, dizziness, sore throat, difficulty swallowing, chest pain, shortness of breath, cough.  We reviewed plan for chemo today to which she is agreeable.  No further questions at this time.    Complete and Comprehensive review of systems review and negative other than noted here or in the HPI.       Physical Exam   Temp: 97.7  F (36.5  C) Temp src: Oral BP: 107/68 Pulse: 73   Resp: 16 SpO2: 94 % O2 Device: None (Room " air)    Vitals:    02/22/24 1249 02/23/24 0735   Weight: 63 kg (139 lb) 61.7 kg (136 lb 0.4 oz)     Vital Signs with Ranges  Temp:  [97.4  F (36.3  C)-98.4  F (36.9  C)] 97.7  F (36.5  C)  Pulse:  [63-88] 73  Resp:  [14-20] 16  BP: (101-118)/(54-73) 107/68  Cuff Mean (mmHg):  [60-74] 74  SpO2:  [94 %-100 %] 94 %  I/O last 3 completed shifts:  In: 730 [P.O.:720; I.V.:10]  Out: -     Constitutional: Awake and conversational. Non- toxic appearing. No acute distress.   HEENT: NCAT. Moist mucus membranes without lesions, thrush, or exudates appreciated  Lymph: Neck supple, no ridigity. No significant adenopathy noted.   Respiratory: Breathing comfortably on room air with no accessory muscle use. Speaking in full sentences, no evidence of respiratory distress. Lungs CTAB without stridor, wheeze, rhonchi, or rales.   Cardiovascular: Regular rate and rhythm. No peripheral edema.    GI: Abdomen with normoactive bowel sounds, soft and non-tender throughout. No rebound, guarding, or peritoneal sign.  Skin: Skin is clean, dry, intact. No jaundice or significant rashes appreciated.   Neurologic: Alert and with normal speech. Grossly nonfocal.  Neuropsychiatric: Calm, affect congruent to situation. Appropriate mood and affect.     Labs & Studies: I personally reviewed the following studies:  Data   Results for orders placed or performed during the hospital encounter of 02/22/24 (from the past 24 hour(s))   CBC with platelets differential    Narrative    The following orders were created for panel order CBC with platelets differential.  Procedure                               Abnormality         Status                     ---------                               -----------         ------                     CBC with platelets and d...[[id number]]  Abnormal            Final result                 Please view results for these tests on the individual orders.   Comprehensive metabolic panel   Result Value Ref Range    Sodium 138 135 -  145 mmol/L    Potassium 4.4 3.4 - 5.3 mmol/L    Carbon Dioxide (CO2) 23 22 - 29 mmol/L    Anion Gap 10 7 - 15 mmol/L    Urea Nitrogen 13.6 6.0 - 20.0 mg/dL    Creatinine 0.63 0.51 - 0.95 mg/dL    GFR Estimate >90 >60 mL/min/1.73m2    Calcium 9.3 8.6 - 10.0 mg/dL    Chloride 105 98 - 107 mmol/L    Glucose 149 (H) 70 - 99 mg/dL    Alkaline Phosphatase 64 40 - 150 U/L    AST 21 0 - 45 U/L    ALT 31 0 - 50 U/L    Protein Total 7.0 6.4 - 8.3 g/dL    Albumin 3.8 3.5 - 5.2 g/dL    Bilirubin Total 0.3 <=1.2 mg/dL   INR   Result Value Ref Range    INR 1.11 0.85 - 1.15   Fibrinogen activity   Result Value Ref Range    Fibrinogen Activity 443 170 - 490 mg/dL   Uric acid   Result Value Ref Range    Uric Acid 2.0 (L) 2.4 - 5.7 mg/dL   Magnesium   Result Value Ref Range    Magnesium 2.3 1.7 - 2.3 mg/dL   Phosphorus   Result Value Ref Range    Phosphorus 3.5 2.5 - 4.5 mg/dL   CBC with platelets and differential   Result Value Ref Range    WBC Count 7.7 4.0 - 11.0 10e3/uL    RBC Count 4.43 3.80 - 5.20 10e6/uL    Hemoglobin 12.9 11.7 - 15.7 g/dL    Hematocrit 38.2 35.0 - 47.0 %    MCV 86 78 - 100 fL    MCH 29.1 26.5 - 33.0 pg    MCHC 33.8 31.5 - 36.5 g/dL    RDW 13.7 10.0 - 15.0 %    Platelet Count 249 150 - 450 10e3/uL    % Neutrophils 94 %    % Lymphocytes 4 %    % Monocytes 1 %    % Eosinophils 0 %    % Basophils 0 %    % Immature Granulocytes 1 %    NRBCs per 100 WBC 0 <1 /100    Absolute Neutrophils 7.3 1.6 - 8.3 10e3/uL    Absolute Lymphocytes 0.3 (L) 0.8 - 5.3 10e3/uL    Absolute Monocytes 0.1 0.0 - 1.3 10e3/uL    Absolute Eosinophils 0.0 0.0 - 0.7 10e3/uL    Absolute Basophils 0.0 0.0 - 0.2 10e3/uL    Absolute Immature Granulocytes 0.0 <=0.4 10e3/uL    Absolute NRBCs 0.0 10e3/uL       Medications list for reference:    Medications    - MEDICATION INSTRUCTIONS -        acyclovir  400 mg Oral BID    allopurinol  300 mg Oral Daily    cabergoline  0.5 mg Oral Daily    Chemotherapy Infusing-Continuous Infusion   Does not apply  Q8H    cimetidine  200 mg Oral BID    [START ON 2/27/2024] cycloPHOSphamide  750 mg/m2 (Order-Specific) Intravenous Once    [START ON 2/28/2024] dexAMETHasone  8 mg Oral Daily    doxycycline monohydrate  100 mg Oral BID    enoxaparin ANTICOAGULANT  40 mg Subcutaneous Q24H    etoposide  50 mg/m2 (Treatment Plan) Intravenous Q24H    [START ON 2/27/2024] fosaprepitant (EMEND) 150 mg in sodium chloride 0.9 % 275 mL intermittent infusion  150 mg Intravenous Once    heparin lock flush  5-20 mL Intracatheter Q24H    letrozole  5 mg Oral Daily    ondansetron  16 mg Oral Q24H    pantoprazole  40 mg Oral QAM    polyethylene glycol  17 g Oral BID    predniSONE  100 mg Oral BID    senna-docusate  3 tablet Oral BID    sodium chloride (PF)  10-40 mL Intracatheter Q7 Days    sodium chloride (PF)  10-40 mL Intracatheter Q8H    vinCRIStine (ONCOVIN) 0.7 mg, DOXOrubicin (ADRIAMYCIN) 17 mg in sodium chloride 0.9 % 1,059.2 mL infusion  0.4 mg/m2 (Treatment Plan) Intravenous Q24H

## 2024-02-23 NOTE — PROGRESS NOTES
Nursing Focus: Admission    D: Patient admitted/transferred from home via self for planned chemotherapy.      I: Upon arrival to the unit patient was oriented to room, unit, and call light. Patient s height, weight, and vital signs were obtained. Allergies reviewed and allergy band applied. MD notified of patient s arrival on the unit. Adult AVS completed. Head to toe assessment completed. Education assessment completed. Care plan initiated.     A: Vital signs stable upon admission. Patient rates pain at 0/10. Two RN skin assessment completed: No, still needs to be done. Pt's family at bedside.    P: Continue to monitor patient and intervene as needed. Continue with plan of care. Notify MD with any concerns or changes in patient status.

## 2024-02-23 NOTE — PLAN OF CARE
"Time 1900-0730    /56 (BP Location: Left arm)   Pulse 63   Temp 97.4  F (36.3  C) (Oral)   Resp 14   Ht 1.676 m (5' 6\")   Wt 63 kg (139 lb)   LMP 01/31/2024 (Exact Date)   SpO2 95%   BMI 22.44 kg/m      Reason for admission: DLBCL - chemo induction  Activity: UAL  Pain: denies  Neuro: WNL  Cardiac: WNL  Respiratory: WNL  GI/: LBM 2/21. Voiding adequately  Diet: Regular  Lines: RUE double-lumen PICC  Wounds: skin intact  Labs/imaging: WBC 7.7, Hgb 12.9, Plt 249. K+ 4.4, Mg 2.3, Phos 3.5      New changes this shift: Pt stable. Tolerated remaining Rituxin w/o issue. Advanced at half normal rate; OK w/ provider.     Plan: Fall prevention. Pain control. Start chemo this AM      Continue to monitor and follow POC    Goal Outcome Evaluation:      Plan of Care Reviewed With: patient    Overall Patient Progress: improvingOverall Patient Progress: improving    Outcome Evaluation: Tolerated remaining Rituxin infusion w/o issue. VSS. Sleeping soundly      "

## 2024-02-23 NOTE — PROGRESS NOTES
Nursing Focus: Chemotherapy  D: Positive blood return via PICC, purple lumen. Insertion site is clean/dry/intact, dressing intact with no complaints of pain. Urine output is recorded in intake in Doc Flowsheet.    I: Premedications given per order (see electronic medical administration record). Dose #1 of Rituxan started to infuse per ramp up protocol. Reviewed pt teaching on chemotherapy side effects. Pt denies need for further teaching. Chemotherapy double checked per protocol by two chemotherapy competent RN's.   A: Tolerating procedure well. Denies nausea and or pain.   P: Continue to monitor urine output and symptoms of nausea. Screen for symptoms of toxicity.

## 2024-02-24 LAB
ALBUMIN SERPL BCG-MCNC: 3.6 G/DL (ref 3.5–5.2)
ALP SERPL-CCNC: 62 U/L (ref 40–150)
ALT SERPL W P-5'-P-CCNC: 29 U/L (ref 0–50)
ANION GAP SERPL CALCULATED.3IONS-SCNC: 10 MMOL/L (ref 7–15)
AST SERPL W P-5'-P-CCNC: 24 U/L (ref 0–45)
BASOPHILS # BLD AUTO: 0 10E3/UL (ref 0–0.2)
BASOPHILS NFR BLD AUTO: 0 %
BILIRUB SERPL-MCNC: 0.2 MG/DL
BUN SERPL-MCNC: 11.6 MG/DL (ref 6–20)
CALCIUM SERPL-MCNC: 8.7 MG/DL (ref 8.6–10)
CHLORIDE SERPL-SCNC: 107 MMOL/L (ref 98–107)
CREAT SERPL-MCNC: 0.56 MG/DL (ref 0.51–0.95)
DEPRECATED HCO3 PLAS-SCNC: 22 MMOL/L (ref 22–29)
EGFRCR SERPLBLD CKD-EPI 2021: >90 ML/MIN/1.73M2
EOSINOPHIL # BLD AUTO: 0 10E3/UL (ref 0–0.7)
EOSINOPHIL NFR BLD AUTO: 0 %
ERYTHROCYTE [DISTWIDTH] IN BLOOD BY AUTOMATED COUNT: 13.8 % (ref 10–15)
FIBRINOGEN PPP-MCNC: 351 MG/DL (ref 170–490)
GLUCOSE SERPL-MCNC: 151 MG/DL (ref 70–99)
HCT VFR BLD AUTO: 34.9 % (ref 35–47)
HGB BLD-MCNC: 11.8 G/DL (ref 11.7–15.7)
IMM GRANULOCYTES # BLD: 0.1 10E3/UL
IMM GRANULOCYTES NFR BLD: 1 %
INR PPP: 1.12 (ref 0.85–1.15)
LYMPHOCYTES # BLD AUTO: 0.5 10E3/UL (ref 0.8–5.3)
LYMPHOCYTES NFR BLD AUTO: 3 %
MAGNESIUM SERPL-MCNC: 1.8 MG/DL (ref 1.7–2.3)
MCH RBC QN AUTO: 29.2 PG (ref 26.5–33)
MCHC RBC AUTO-ENTMCNC: 33.8 G/DL (ref 31.5–36.5)
MCV RBC AUTO: 86 FL (ref 78–100)
MONOCYTES # BLD AUTO: 0.7 10E3/UL (ref 0–1.3)
MONOCYTES NFR BLD AUTO: 4 %
NEUTROPHILS # BLD AUTO: 15.1 10E3/UL (ref 1.6–8.3)
NEUTROPHILS NFR BLD AUTO: 92 %
NRBC # BLD AUTO: 0 10E3/UL
NRBC BLD AUTO-RTO: 0 /100
PHOSPHATE SERPL-MCNC: 3.1 MG/DL (ref 2.5–4.5)
PLATELET # BLD AUTO: 220 10E3/UL (ref 150–450)
POTASSIUM SERPL-SCNC: 4.1 MMOL/L (ref 3.4–5.3)
PROT SERPL-MCNC: 6.4 G/DL (ref 6.4–8.3)
RBC # BLD AUTO: 4.04 10E6/UL (ref 3.8–5.2)
SODIUM SERPL-SCNC: 139 MMOL/L (ref 135–145)
URATE SERPL-MCNC: 1.4 MG/DL (ref 2.4–5.7)
WBC # BLD AUTO: 16.4 10E3/UL (ref 4–11)

## 2024-02-24 PROCEDURE — 120N000002 HC R&B MED SURG/OB UMMC

## 2024-02-24 PROCEDURE — 82040 ASSAY OF SERUM ALBUMIN: CPT | Performed by: PHYSICIAN ASSISTANT

## 2024-02-24 PROCEDURE — 250N000013 HC RX MED GY IP 250 OP 250 PS 637: Performed by: INTERNAL MEDICINE

## 2024-02-24 PROCEDURE — 83735 ASSAY OF MAGNESIUM: CPT | Performed by: PHYSICIAN ASSISTANT

## 2024-02-24 PROCEDURE — 85384 FIBRINOGEN ACTIVITY: CPT | Performed by: PHYSICIAN ASSISTANT

## 2024-02-24 PROCEDURE — 250N000011 HC RX IP 250 OP 636: Performed by: INTERNAL MEDICINE

## 2024-02-24 PROCEDURE — 250N000012 HC RX MED GY IP 250 OP 636 PS 637: Performed by: INTERNAL MEDICINE

## 2024-02-24 PROCEDURE — 84100 ASSAY OF PHOSPHORUS: CPT | Performed by: PHYSICIAN ASSISTANT

## 2024-02-24 PROCEDURE — 99233 SBSQ HOSP IP/OBS HIGH 50: CPT | Mod: GC | Performed by: STUDENT IN AN ORGANIZED HEALTH CARE EDUCATION/TRAINING PROGRAM

## 2024-02-24 PROCEDURE — 258N000003 HC RX IP 258 OP 636: Performed by: INTERNAL MEDICINE

## 2024-02-24 PROCEDURE — 84550 ASSAY OF BLOOD/URIC ACID: CPT | Performed by: PHYSICIAN ASSISTANT

## 2024-02-24 PROCEDURE — 250N000013 HC RX MED GY IP 250 OP 250 PS 637: Performed by: PHYSICIAN ASSISTANT

## 2024-02-24 PROCEDURE — 85025 COMPLETE CBC W/AUTO DIFF WBC: CPT | Performed by: PHYSICIAN ASSISTANT

## 2024-02-24 PROCEDURE — 250N000011 HC RX IP 250 OP 636: Performed by: PHYSICIAN ASSISTANT

## 2024-02-24 PROCEDURE — 85610 PROTHROMBIN TIME: CPT | Performed by: PHYSICIAN ASSISTANT

## 2024-02-24 RX ADMIN — SODIUM CHLORIDE 85 MG: 9 INJECTION, SOLUTION INTRAVENOUS at 08:11

## 2024-02-24 RX ADMIN — ALLOPURINOL 300 MG: 300 TABLET ORAL at 08:00

## 2024-02-24 RX ADMIN — ENOXAPARIN SODIUM 40 MG: 40 INJECTION SUBCUTANEOUS at 20:21

## 2024-02-24 RX ADMIN — CIMETIDINE 200 MG: 200 TABLET, FILM COATED ORAL at 20:21

## 2024-02-24 RX ADMIN — DOXYCYCLINE 100 MG: 50 CAPSULE ORAL at 07:59

## 2024-02-24 RX ADMIN — Medication 1 MG: at 21:39

## 2024-02-24 RX ADMIN — POLYETHYLENE GLYCOL 3350 17 G: 17 POWDER, FOR SOLUTION ORAL at 20:21

## 2024-02-24 RX ADMIN — SENNOSIDES AND DOCUSATE SODIUM 2 TABLET: 8.6; 5 TABLET ORAL at 07:59

## 2024-02-24 RX ADMIN — PREDNISONE 100 MG: 50 TABLET ORAL at 07:59

## 2024-02-24 RX ADMIN — ACYCLOVIR 400 MG: 400 TABLET ORAL at 20:21

## 2024-02-24 RX ADMIN — VINCRISTINE SULFATE 0.7 MG: 1 INJECTION, SOLUTION INTRAVENOUS at 08:49

## 2024-02-24 RX ADMIN — PREDNISONE 100 MG: 50 TABLET ORAL at 15:54

## 2024-02-24 RX ADMIN — CABERGOLINE 0.5 MG: 0.5 TABLET ORAL at 08:00

## 2024-02-24 RX ADMIN — POLYETHYLENE GLYCOL 3350 17 G: 17 POWDER, FOR SOLUTION ORAL at 07:59

## 2024-02-24 RX ADMIN — PANTOPRAZOLE SODIUM 40 MG: 40 TABLET, DELAYED RELEASE ORAL at 08:00

## 2024-02-24 RX ADMIN — ACYCLOVIR 400 MG: 400 TABLET ORAL at 08:00

## 2024-02-24 RX ADMIN — CIMETIDINE 200 MG: 200 TABLET, FILM COATED ORAL at 08:00

## 2024-02-24 RX ADMIN — ONDANSETRON HYDROCHLORIDE 16 MG: 8 TABLET, FILM COATED ORAL at 08:00

## 2024-02-24 RX ADMIN — LETROZOLE 5 MG: 2.5 TABLET, FILM COATED ORAL at 08:00

## 2024-02-24 RX ADMIN — Medication 5 ML: at 04:15

## 2024-02-24 RX ADMIN — Medication 5 ML: at 13:52

## 2024-02-24 ASSESSMENT — ACTIVITIES OF DAILY LIVING (ADL)
ADLS_ACUITY_SCORE: 20
DEPENDENT_IADLS:: INDEPENDENT
ADLS_ACUITY_SCORE: 20

## 2024-02-24 NOTE — PROGRESS NOTES
Nursing Focus: Chemotherapy  D: Positive blood return via PICC. Insertion site is clean/dry/intact, dressing intact with no complaints of pain.  Urine output is recorded in intake in Doc Flowsheet.    I: Premedications given per order (see electronic medical administration record). Dose #2 of Etoposide started to infuse over 24 hours. Reviewed pt teaching on chemotherapy side effects.  Pt denies need for further teaching. Chemotherapy double checked per protocol by two chemotherapy competent RN's.   A: Tolerating procedure well. Denies nausea and or pain.   P: Continue to monitor urine output and symptoms of nausea. Screen for symptoms of toxicity.    Nursing Focus: Chemotherapy  D: Positive blood return via PICC. Insertion site is clean/dry/intact, dressing intact with no complaints of pain.  Urine output is recorded in intake in Doc Flowsheet.    I: Premedications given per order (see electronic medical administration record). Dose #2 of Vincristine/Doxorubicin started to infuse over 24 hours. Reviewed pt teaching on chemotherapy side effects.  Pt denies need for further teaching. Chemotherapy double checked per protocol by two chemotherapy competent RN's.   A: Tolerating procedure well. Denies nausea and or pain.   P: Continue to monitor urine output and symptoms of nausea. Screen for symptoms of toxicity.

## 2024-02-24 NOTE — PROGRESS NOTES
"2467-1244    /70 (BP Location: Left arm)   Pulse 85   Temp 98.2  F (36.8  C) (Oral)   Resp 18   Ht 1.676 m (5' 6\")   Wt 61.7 kg (136 lb 0.4 oz)   LMP 01/31/2024 (Exact Date)   SpO2 95%   BMI 21.95 kg/m        Reason for admission: 2/22/2024 for scheduled chemotherapy with dose adjusted R-EPOCH   Activity: UAL  Pain: Denies  Neuro: AOX4  Cardiac: WDL  Respiratory: RA, denies cough. No accessory muscle use  GI/: Denies nausea, voiding spontaneously. No BM this shift.  Diet: Regular  Lines: DL PICC- purple infusing chemo, red hep locked  Wounds: No new skin issues  Labs/imaging: Reviewed, no replacements given      No acute changes this shift.      Continue to follow POC    "

## 2024-02-24 NOTE — PROGRESS NOTES
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         Today:  - Day 2 of R-EPOCH; continue to monitor for chemo related side effects. She is receiving Etoposide, Vincristine and Prednisone today.  - Continue senna/miralax BID, consider adding additional meds to bowel regimen pending BM patterns  - Continue to monitor and provide best supportive cares.    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 Oakes. Initially presented with cervical lymphadenopathy 12/2023. Initial FNA (1/29/24) suggestive of possible Hodgkin lymphoma; however excisional LN biopsy (2/9/24) shows large B-cell lymphoma with differential diagnosis of primary mediastinal large B-cell lymphoma vs DLBCL NOS. PET with cervical and mediastinal lymphadenopathy only. Overall presentation would be quite consistent with PMBCL given her age, gender, sites of involvement, dim CD30 expression, and prominent fibrotic background. However, the neoplastic cells lack CD23 so additional testing is in process (IHC for PDL-1, , MAL and [id number] gene expression assay) to help distinguish. Overall diagnosis and prognosis of large B-cell lymphoma (PMBCL vs stage 2 DLBCL) was reviewed with primary oncologist, and ultimately through shared decision making was decided to treat as if her disease is PMBCL for Cycle 1 with dose-adjusted R-EPOCH while awaiting the pending additional studies. If studies come back more suggestive of DLBCL NOS later, we can  de-escalate therapy to R-CHOP for later cycles. Plan is for a total of 6 cycles with first restaging PET-CT after 2 cycles. Side effects, treatment course reviewed, and patient was understanding and in agreement with this plan.  - PICC placed prior to admission, anticipate removal at discharge. Scheduled for port placement OP 3/1/24  - Echo 2/19 with LVEF 60%, no significant valvular abnormalities present      Treatment Plan: Dose adjusted R-EPOCH (C1D1=2/23/2024)  - Rituximab 375 mg/m2 (600mg) IV x 1 dose - D0  - Prednisone 100 mg BID x 10 doses - D1-5  - Etoposide 50mg/m2 (85 mg) IV x 4 doses - D1-4  - Vincristine, doxorubicin 0.4 mg/m2 (0.7mg) x 4 doses CIVI- D1-4  - Cyclophosphamide 750 mg/m2 (1275 mg) IV x 1 dose - D5  - Pegfilgrastim x1 - D6 (scheduled in clinic for 2/28)  - Pre-meds: tylenol, benadryl, zofran, emend, dexamethasone (D6-7)     # Ppx  - TLS ppx: baseline uric acid normal. Continue allopurinol 300 mg daily. Encouraged good hydration.  - ID ppx:  mg BID. Levo and fluc when ANC <1.0. Monthly pentamidine for PJP ppx.   - Vaccines: up to date on COVID and flu shots.      # Risk for pancytopenia   2/2 chemotherapy and underlying disease.   - Transfuse for Hgb <7, Plt <10K  - Type & Screen MWF     MISC  # Fertility preservation  Already established with [facility], underwent egg retrieval on 2/18 or 2/19.   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 Her boyfriend is a general surgery resident here at [facility]. Parents live in [place], father was present on day of admission.     FEN   - IVF per chemotherapy regimen, IVF bolus prn   - PRN lyte replacement per standing protocol  - Regular diet as tolerated      Lines/Drains: PICC  DVT ppx:  "Lovenox, hold if Plt <50K   GI ppx: PTA PPI or Pepcid      DISPO: Anticipate 5 day stay for chemotherapy pending completion of chemo and barring any clinical complications. SARAH ~2/27    Follow up scheduled - 2/28 labs/neulasta, 3/1 port placement, 3/4 labs + AMEENA follow up      I spent 40 minutes face-to-face and/or coordinating or discussing care plan. Over 50% of our time on the unit was spent counseling the patient and/or coordinating care    Patient is staffed with attending physician Dr. Pinto.     Evan Dyer MD  Hem-Onc Fellow  Pager: [phone]  Plan was discussed with attending     ___________________________________________________________________    Interval History   Clementine is feeling well today. No residual symptoms post-rituximab . Ready to start next dose of chemo this morning. She was able to get up and shower this morning which she was happy about! Has not had a bowel movement - last \"good\" movement was Sunday and small one on Wednesday. She wants to continue current bowel regimen today and focus on fluid intake/movement, if no success would like to add another agent tomorrow - no significant abdominal pain, cramping, nausea, or vomiting - this is reasonable. Otherwise feeling well and denies headache, dizziness, sore throat, difficulty swallowing, chest pain, shortness of breath, cough.  We reviewed plan for chemo today to which she is agreeable.  No further questions at this time.    Complete and Comprehensive review of systems review and negative other than noted here or in the HPI.       Physical Exam   Temp: 98.7  F (37.1  C) Temp src: Oral BP: 118/69 Pulse: 68   Resp: 16 SpO2: 98 % O2 Device: None (Room air)    Vitals:    02/22/24 1249 02/23/24 0735   Weight: 63 kg (139 lb) 61.7 kg (136 lb 0.4 oz)     Vital Signs with Ranges  Temp:  [98  F (36.7  C)-98.7  F (37.1  C)] 98.7  F (37.1  C)  Pulse:  [66-85] 68  Resp:  [16-18] 16  BP: ()/(44-70) 118/69  SpO2:  [95 %-98 %] 98 %  I/O last 3 " completed shifts:  In: 1272 [P.O.:600; IV Piggyback:672]  Out: -     Constitutional: Awake and conversational. Non- toxic appearing. No acute distress.   HEENT: NCAT. Moist mucus membranes without lesions, thrush, or exudates appreciated  Lymph: Neck supple, no ridigity. No significant adenopathy noted.   Respiratory: Breathing comfortably on room air with no accessory muscle use. Speaking in full sentences, no evidence of respiratory distress. Lungs CTAB without stridor, wheeze, rhonchi, or rales.   Cardiovascular: Regular rate and rhythm. No peripheral edema.    GI: Abdomen with normoactive bowel sounds, soft and non-tender throughout. No rebound, guarding, or peritoneal sign.  Skin: Skin is clean, dry, intact. No jaundice or significant rashes appreciated.   Neurologic: Alert and with normal speech. Grossly nonfocal.  Neuropsychiatric: Calm, affect congruent to situation. Appropriate mood and affect.     Labs & Studies: I personally reviewed the following studies:  Data   Results for orders placed or performed during the hospital encounter of 02/22/24 (from the past 24 hour(s))   CBC with platelets differential    Narrative    The following orders were created for panel order CBC with platelets differential.  Procedure                               Abnormality         Status                     ---------                               -----------         ------                     CBC with platelets and d...[[id number]]  Abnormal            Final result                 Please view results for these tests on the individual orders.   Comprehensive metabolic panel   Result Value Ref Range    Sodium 139 135 - 145 mmol/L    Potassium 4.1 3.4 - 5.3 mmol/L    Carbon Dioxide (CO2) 22 22 - 29 mmol/L    Anion Gap 10 7 - 15 mmol/L    Urea Nitrogen 11.6 6.0 - 20.0 mg/dL    Creatinine 0.56 0.51 - 0.95 mg/dL    GFR Estimate >90 >60 mL/min/1.73m2    Calcium 8.7 8.6 - 10.0 mg/dL    Chloride 107 98 - 107 mmol/L    Glucose 151 (H)  70 - 99 mg/dL    Alkaline Phosphatase 62 40 - 150 U/L    AST 24 0 - 45 U/L    ALT 29 0 - 50 U/L    Protein Total 6.4 6.4 - 8.3 g/dL    Albumin 3.6 3.5 - 5.2 g/dL    Bilirubin Total 0.2 <=1.2 mg/dL   INR   Result Value Ref Range    INR 1.12 0.85 - 1.15   Fibrinogen activity   Result Value Ref Range    Fibrinogen Activity 351 170 - 490 mg/dL   Uric acid   Result Value Ref Range    Uric Acid 1.4 (L) 2.4 - 5.7 mg/dL   Magnesium   Result Value Ref Range    Magnesium 1.8 1.7 - 2.3 mg/dL   Phosphorus   Result Value Ref Range    Phosphorus 3.1 2.5 - 4.5 mg/dL   CBC with platelets and differential   Result Value Ref Range    WBC Count 16.4 (H) 4.0 - 11.0 10e3/uL    RBC Count 4.04 3.80 - 5.20 10e6/uL    Hemoglobin 11.8 11.7 - 15.7 g/dL    Hematocrit 34.9 (L) 35.0 - 47.0 %    MCV 86 78 - 100 fL    MCH 29.2 26.5 - 33.0 pg    MCHC 33.8 31.5 - 36.5 g/dL    RDW 13.8 10.0 - 15.0 %    Platelet Count 220 150 - 450 10e3/uL    % Neutrophils 92 %    % Lymphocytes 3 %    % Monocytes 4 %    % Eosinophils 0 %    % Basophils 0 %    % Immature Granulocytes 1 %    NRBCs per 100 WBC 0 <1 /100    Absolute Neutrophils 15.1 (H) 1.6 - 8.3 10e3/uL    Absolute Lymphocytes 0.5 (L) 0.8 - 5.3 10e3/uL    Absolute Monocytes 0.7 0.0 - 1.3 10e3/uL    Absolute Eosinophils 0.0 0.0 - 0.7 10e3/uL    Absolute Basophils 0.0 0.0 - 0.2 10e3/uL    Absolute Immature Granulocytes 0.1 <=0.4 10e3/uL    Absolute NRBCs 0.0 10e3/uL       Medications list for reference:    Medications    - MEDICATION INSTRUCTIONS -        acyclovir  400 mg Oral BID    allopurinol  300 mg Oral Daily    cabergoline  0.5 mg Oral Daily    Chemotherapy Infusing-Continuous Infusion   Does not apply Q8H    cimetidine  200 mg Oral BID    [START ON 2/27/2024] cycloPHOSphamide  750 mg/m2 (Order-Specific) Intravenous Once    [START ON 2/28/2024] dexAMETHasone  8 mg Oral Daily    enoxaparin ANTICOAGULANT  40 mg Subcutaneous Q24H    etoposide  50 mg/m2 (Treatment Plan) Intravenous Q24H    [START ON  2/27/2024] fosaprepitant (EMEND) 150 mg in sodium chloride 0.9 % 275 mL intermittent infusion  150 mg Intravenous Once    heparin lock flush  5-20 mL Intracatheter Q24H    letrozole  5 mg Oral Daily    ondansetron  16 mg Oral Q24H    pantoprazole  40 mg Oral QAM    polyethylene glycol  17 g Oral BID    predniSONE  100 mg Oral BID    senna-docusate  3 tablet Oral BID    sodium chloride (PF)  10-40 mL Intracatheter Q7 Days    sodium chloride (PF)  10-40 mL Intracatheter Q8H    vinCRIStine (ONCOVIN) 0.7 mg, DOXOrubicin (ADRIAMYCIN) 17 mg in sodium chloride 0.9 % 1,059.2 mL infusion  0.4 mg/m2 (Treatment Plan) Intravenous Q24H

## 2024-02-24 NOTE — CONSULTS
Care Management Initial Consult    General Information  Assessment completed with:  ,    Type of CM/SW Visit: Initial Assessment    Primary Care Provider verified and updated as needed: Yes   Readmission within the last 30 days: planned readmission (admitted for chemo initiation)   Return Category:  (planned chemo)     Advance Care Planning: Advance Care Planning Reviewed: other (see comments) (Full Code)          Communication Assessment  Patient's communication style: spoken language (English or Bilingual)    Hearing Difficulty or Deaf: no   Wear Glasses or Blind: no    Cognitive  Cognitive/Neuro/Behavioral: WDL                      Living Environment:   People in home: significant other     Current living Arrangements: apartment      Able to return to prior arrangements: yes       Family/Social Support:  Care provided by: self, spouse/significant other  Provides care for: no one  Marital Status: Lives with Significant Other  Significant Other, Parent(s)          Description of Support System: Supportive, Involved    Support Assessment: Adequate family and caregiver support    Current Resources:   Patient receiving home care services: No     Community Resources: None  Equipment currently used at home: none  Supplies currently used at home: None    Employment/Financial:  Employment Status: employed full-time        Financial Concerns: none          Does the patient's insurance plan have a 3 day qualifying hospital stay waiver?  No    Lifestyle & Psychosocial Needs:  Social Determinants of Health     Food Insecurity: Not on file   Depression: Not at risk (2/6/2024)    PHQ-2     PHQ-2 Score: 0   Housing Stability: Not on file   Tobacco Use: Low Risk  (2/15/2024)    Patient History     Smoking Tobacco Use: Never     Smokeless Tobacco Use: Never     Passive Exposure: Never   Financial Resource Strain: Not on file   Alcohol Use: Not on file   Transportation Needs: Not on file   Physical Activity: Not on file    Interpersonal Safety: Not on file   Stress: Not on file   Social Connections: Not on file       Functional Status:  Prior to admission patient needed assistance:   Dependent ADLs:: Independent  Dependent IADLs:: Independent       Mental Health Status:  Mental Health Status: Other (see comment) (history of anxiety)       Chemical Dependency Status:  Chemical Dependency Status: No Current Concerns             Values/Beliefs:  Spiritual, Cultural Beliefs, Muslim Practices, Values that affect care: no               Additional Information:  Patient has new diagnosis of large B-cell lymphoma, 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 Currently admitted for initiation of chemotherapy.     No discharge needs identified at this time; [facility] will continue to follow.     Brenda Florian RN   Weekend Care Coordinator    [phone]

## 2024-02-25 LAB
ABO/RH(D): NORMAL
ALBUMIN SERPL BCG-MCNC: 3.7 G/DL (ref 3.5–5.2)
ALP SERPL-CCNC: 61 U/L (ref 40–150)
ALT SERPL W P-5'-P-CCNC: 27 U/L (ref 0–50)
ANION GAP SERPL CALCULATED.3IONS-SCNC: 10 MMOL/L (ref 7–15)
ANTIBODY SCREEN: NEGATIVE
AST SERPL W P-5'-P-CCNC: 15 U/L (ref 0–45)
BASOPHILS # BLD AUTO: 0 10E3/UL (ref 0–0.2)
BASOPHILS NFR BLD AUTO: 0 %
BILIRUB SERPL-MCNC: 0.3 MG/DL
BUN SERPL-MCNC: 14.5 MG/DL (ref 6–20)
CALCIUM SERPL-MCNC: 8.9 MG/DL (ref 8.6–10)
CHLORIDE SERPL-SCNC: 105 MMOL/L (ref 98–107)
CREAT SERPL-MCNC: 0.56 MG/DL (ref 0.51–0.95)
DEPRECATED HCO3 PLAS-SCNC: 23 MMOL/L (ref 22–29)
EGFRCR SERPLBLD CKD-EPI 2021: >90 ML/MIN/1.73M2
EOSINOPHIL # BLD AUTO: 0 10E3/UL (ref 0–0.7)
EOSINOPHIL NFR BLD AUTO: 0 %
ERYTHROCYTE [DISTWIDTH] IN BLOOD BY AUTOMATED COUNT: 13.9 % (ref 10–15)
FIBRINOGEN PPP-MCNC: 299 MG/DL (ref 170–490)
GLUCOSE SERPL-MCNC: 130 MG/DL (ref 70–99)
HCT VFR BLD AUTO: 36.9 % (ref 35–47)
HGB BLD-MCNC: 12.8 G/DL (ref 11.7–15.7)
IMM GRANULOCYTES # BLD: 0 10E3/UL
IMM GRANULOCYTES NFR BLD: 0 %
INR PPP: 1.12 (ref 0.85–1.15)
LYMPHOCYTES # BLD AUTO: 0.5 10E3/UL (ref 0.8–5.3)
LYMPHOCYTES NFR BLD AUTO: 5 %
MAGNESIUM SERPL-MCNC: 1.9 MG/DL (ref 1.7–2.3)
MCH RBC QN AUTO: 30 PG (ref 26.5–33)
MCHC RBC AUTO-ENTMCNC: 34.7 G/DL (ref 31.5–36.5)
MCV RBC AUTO: 87 FL (ref 78–100)
MONOCYTES # BLD AUTO: 0.6 10E3/UL (ref 0–1.3)
MONOCYTES NFR BLD AUTO: 6 %
NEUTROPHILS # BLD AUTO: 8 10E3/UL (ref 1.6–8.3)
NEUTROPHILS NFR BLD AUTO: 89 %
NRBC # BLD AUTO: 0 10E3/UL
NRBC BLD AUTO-RTO: 0 /100
PHOSPHATE SERPL-MCNC: 2.8 MG/DL (ref 2.5–4.5)
PLATELET # BLD AUTO: 226 10E3/UL (ref 150–450)
POTASSIUM SERPL-SCNC: 3.8 MMOL/L (ref 3.4–5.3)
PROT SERPL-MCNC: 6.6 G/DL (ref 6.4–8.3)
RBC # BLD AUTO: 4.26 10E6/UL (ref 3.8–5.2)
SODIUM SERPL-SCNC: 138 MMOL/L (ref 135–145)
SPECIMEN EXPIRATION DATE: NORMAL
URATE SERPL-MCNC: 1.9 MG/DL (ref 2.4–5.7)
WBC # BLD AUTO: 9 10E3/UL (ref 4–11)

## 2024-02-25 PROCEDURE — 83735 ASSAY OF MAGNESIUM: CPT | Performed by: PHYSICIAN ASSISTANT

## 2024-02-25 PROCEDURE — 258N000003 HC RX IP 258 OP 636: Performed by: PHYSICIAN ASSISTANT

## 2024-02-25 PROCEDURE — 250N000011 HC RX IP 250 OP 636: Performed by: INTERNAL MEDICINE

## 2024-02-25 PROCEDURE — 258N000003 HC RX IP 258 OP 636: Performed by: INTERNAL MEDICINE

## 2024-02-25 PROCEDURE — 120N000002 HC R&B MED SURG/OB UMMC

## 2024-02-25 PROCEDURE — 250N000011 HC RX IP 250 OP 636: Performed by: PHYSICIAN ASSISTANT

## 2024-02-25 PROCEDURE — 84100 ASSAY OF PHOSPHORUS: CPT | Performed by: PHYSICIAN ASSISTANT

## 2024-02-25 PROCEDURE — 250N000013 HC RX MED GY IP 250 OP 250 PS 637: Performed by: PHYSICIAN ASSISTANT

## 2024-02-25 PROCEDURE — 250N000013 HC RX MED GY IP 250 OP 250 PS 637: Performed by: STUDENT IN AN ORGANIZED HEALTH CARE EDUCATION/TRAINING PROGRAM

## 2024-02-25 PROCEDURE — 99233 SBSQ HOSP IP/OBS HIGH 50: CPT | Mod: GC | Performed by: STUDENT IN AN ORGANIZED HEALTH CARE EDUCATION/TRAINING PROGRAM

## 2024-02-25 PROCEDURE — 85610 PROTHROMBIN TIME: CPT | Performed by: PHYSICIAN ASSISTANT

## 2024-02-25 PROCEDURE — 85384 FIBRINOGEN ACTIVITY: CPT | Performed by: PHYSICIAN ASSISTANT

## 2024-02-25 PROCEDURE — 86900 BLOOD TYPING SEROLOGIC ABO: CPT | Performed by: PHYSICIAN ASSISTANT

## 2024-02-25 PROCEDURE — 250N000012 HC RX MED GY IP 250 OP 636 PS 637: Performed by: INTERNAL MEDICINE

## 2024-02-25 PROCEDURE — 250N000011 HC RX IP 250 OP 636: Performed by: STUDENT IN AN ORGANIZED HEALTH CARE EDUCATION/TRAINING PROGRAM

## 2024-02-25 PROCEDURE — 82040 ASSAY OF SERUM ALBUMIN: CPT | Performed by: PHYSICIAN ASSISTANT

## 2024-02-25 PROCEDURE — 250N000013 HC RX MED GY IP 250 OP 250 PS 637: Performed by: INTERNAL MEDICINE

## 2024-02-25 PROCEDURE — 85004 AUTOMATED DIFF WBC COUNT: CPT | Performed by: PHYSICIAN ASSISTANT

## 2024-02-25 PROCEDURE — 84550 ASSAY OF BLOOD/URIC ACID: CPT | Performed by: PHYSICIAN ASSISTANT

## 2024-02-25 RX ORDER — DIPHENHYDRAMINE HYDROCHLORIDE 50 MG/ML
50 INJECTION INTRAMUSCULAR; INTRAVENOUS
Status: CANCELLED
Start: 2024-02-28

## 2024-02-25 RX ORDER — HEPARIN SODIUM (PORCINE) LOCK FLUSH IV SOLN 100 UNIT/ML 100 UNIT/ML
5 SOLUTION INTRAVENOUS
Status: CANCELLED | OUTPATIENT
Start: 2024-02-28

## 2024-02-25 RX ORDER — HEPARIN SODIUM,PORCINE 10 UNIT/ML
5-20 VIAL (ML) INTRAVENOUS DAILY PRN
Status: CANCELLED | OUTPATIENT
Start: 2024-02-28

## 2024-02-25 RX ORDER — EPINEPHRINE 1 MG/ML
0.3 INJECTION, SOLUTION, CONCENTRATE INTRAVENOUS EVERY 5 MIN PRN
Status: CANCELLED | OUTPATIENT
Start: 2024-02-28

## 2024-02-25 RX ADMIN — PROCHLORPERAZINE MALEATE 5 MG: 5 TABLET ORAL at 05:44

## 2024-02-25 RX ADMIN — PREDNISONE 100 MG: 50 TABLET ORAL at 15:58

## 2024-02-25 RX ADMIN — ONDANSETRON HYDROCHLORIDE 16 MG: 8 TABLET, FILM COATED ORAL at 07:44

## 2024-02-25 RX ADMIN — ACETAMINOPHEN 650 MG: 325 TABLET, FILM COATED ORAL at 05:57

## 2024-02-25 RX ADMIN — CIMETIDINE 200 MG: 200 TABLET, FILM COATED ORAL at 19:34

## 2024-02-25 RX ADMIN — POLYETHYLENE GLYCOL 3350 17 G: 17 POWDER, FOR SOLUTION ORAL at 09:17

## 2024-02-25 RX ADMIN — LETROZOLE 5 MG: 2.5 TABLET, FILM COATED ORAL at 09:17

## 2024-02-25 RX ADMIN — CIMETIDINE 200 MG: 200 TABLET, FILM COATED ORAL at 09:17

## 2024-02-25 RX ADMIN — PANTOPRAZOLE SODIUM 40 MG: 40 TABLET, DELAYED RELEASE ORAL at 09:18

## 2024-02-25 RX ADMIN — FOSAPREPITANT 150 MG: 150 INJECTION, POWDER, LYOPHILIZED, FOR SOLUTION INTRAVENOUS at 13:37

## 2024-02-25 RX ADMIN — ALLOPURINOL 300 MG: 300 TABLET ORAL at 09:18

## 2024-02-25 RX ADMIN — PROCHLORPERAZINE MALEATE 5 MG: 5 TABLET ORAL at 06:38

## 2024-02-25 RX ADMIN — Medication 5 ML: at 22:06

## 2024-02-25 RX ADMIN — CABERGOLINE 0.5 MG: 0.5 TABLET ORAL at 09:18

## 2024-02-25 RX ADMIN — POLYETHYLENE GLYCOL 3350 17 G: 17 POWDER, FOR SOLUTION ORAL at 19:34

## 2024-02-25 RX ADMIN — PROCHLORPERAZINE EDISYLATE 10 MG: 5 INJECTION INTRAMUSCULAR; INTRAVENOUS at 22:00

## 2024-02-25 RX ADMIN — Medication 5 ML: at 06:31

## 2024-02-25 RX ADMIN — ACYCLOVIR 400 MG: 400 TABLET ORAL at 09:17

## 2024-02-25 RX ADMIN — Medication 5 ML: at 14:54

## 2024-02-25 RX ADMIN — SENNOSIDES AND DOCUSATE SODIUM 1 TABLET: 8.6; 5 TABLET ORAL at 09:17

## 2024-02-25 RX ADMIN — SODIUM CHLORIDE 85 MG: 9 INJECTION, SOLUTION INTRAVENOUS at 07:57

## 2024-02-25 RX ADMIN — VINCRISTINE SULFATE 0.7 MG: 1 INJECTION, SOLUTION INTRAVENOUS at 07:57

## 2024-02-25 RX ADMIN — ACYCLOVIR 400 MG: 400 TABLET ORAL at 19:33

## 2024-02-25 RX ADMIN — ENOXAPARIN SODIUM 40 MG: 40 INJECTION SUBCUTANEOUS at 19:33

## 2024-02-25 RX ADMIN — PREDNISONE 100 MG: 50 TABLET ORAL at 09:17

## 2024-02-25 ASSESSMENT — ACTIVITIES OF DAILY LIVING (ADL)
ADLS_ACUITY_SCORE: 20

## 2024-02-25 NOTE — PLAN OF CARE
Goal Outcome Evaluation:    [id number]  VSS. Alert and oriented. Denies pain. 10 mg PRN compazine given. Otherwise tolerating CIVI chemo well. No acute events, continue with POC.

## 2024-02-25 NOTE — PROGRESS NOTES
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       Today:  - Day 3 of R-EPOCH; continue to monitor for chemo related side effects. Tolerating well.   - Nausea worsening today. Will move up Emend to today (2/25). Could consider redosing in the clinic on 2/28 if still struggling with nausea  - Continue senna/miralax BID, consider adding additional meds to bowel regimen pending BM patterns. If no BM by tomorrow, consider adding lactulose  - Plan for discharge after completion of chemo on 2/27.   - Neulasta scheduled for 2/28.   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 Oakes. Initially presented with cervical lymphadenopathy 12/2023. Initial FNA (1/29/24) suggestive of possible Hodgkin lymphoma; however excisional LN biopsy (2/9/24) shows large B-cell lymphoma with differential diagnosis of primary mediastinal large B-cell lymphoma vs DLBCL NOS. PET with cervical and mediastinal lymphadenopathy only. Overall presentation would be quite consistent with PMBCL given her age, gender, sites of involvement, dim CD30 expression, and prominent fibrotic background. However, the neoplastic cells lack CD23 so additional testing is in process (IHC for PDL-1, , MAL and [id number] gene expression assay) to help distinguish. Overall diagnosis and prognosis of large B-cell lymphoma (PMBCL vs stage 2 DLBCL) was reviewed with primary oncologist,  and ultimately through shared decision making was decided to treat as if her disease is PMBCL for Cycle 1 with dose-adjusted R-EPOCH while awaiting the pending additional studies. If studies come back more suggestive of DLBCL NOS later, we can de-escalate therapy to R-CHOP for later cycles. Plan is for a total of 6 cycles with first restaging PET-CT after 2 cycles. Side effects, treatment course reviewed, and patient was understanding and in agreement with this plan.  - PICC placed prior to admission, anticipate removal at discharge. Scheduled for port placement OP 3/1/24  - Echo 2/19 with LVEF 60%, no significant valvular abnormalities present      Treatment Plan: Dose adjusted R-EPOCH (C1D1=2/23/2024)  - Rituximab 375 mg/m2 (600mg) IV x 1 dose - D0  - Prednisone 100 mg BID x 10 doses - D1-5  - Etoposide 50mg/m2 (85 mg) IV x 4 doses - D1-4  - Vincristine, doxorubicin 0.4 mg/m2 (0.7mg) x 4 doses CIVI- D1-4  - Cyclophosphamide 750 mg/m2 (1275 mg) IV x 1 dose - D5  - Pegfilgrastim x1 - D6 (scheduled in clinic for 2/28)  - Pre-meds: tylenol, benadryl, zofran, emend, dexamethasone (D6-7)     # Ppx  - TLS ppx: baseline uric acid normal. Continue allopurinol 300 mg daily. Encouraged good hydration.  - ID ppx:  mg BID. Levo and fluc when ANC <1.0. Monthly pentamidine for PJP ppx.   - Vaccines: up to date on COVID and flu shots.      # Risk for pancytopenia   2/2 chemotherapy and underlying disease.   - Transfuse for Hgb <7, Plt <10K  - Type & Screen MWF     MISC  # Fertility preservation  Already established with [facility], underwent egg retrieval on 2/18 or 2/19.   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 Her boyfriend is a general surgery resident here at  [facility]. Parents live in [place], father was present on day of admission.     FEN   - IVF per chemotherapy regimen, IVF bolus prn   - PRN lyte replacement per standing protocol  - Regular diet as tolerated      Lines/Drains: PICC  DVT ppx: Lovenox, hold if Plt <50K   GI ppx: PTA PPI or Pepcid      DISPO: Anticipate 5 day stay for chemotherapy pending completion of chemo and barring any clinical complications. SARAH ~2/27    Follow up scheduled - 2/28 labs/neulasta, 3/1 port placement, 3/4 labs + AMEENA follow up      I spent 40 minutes face-to-face and/or coordinating or discussing care plan. Over 50% of our time on the unit was spent counseling the patient and/or coordinating care    Patient is staffed with attending physician Dr. Pinto.     Evan Dyer MD  Hem-Onc Fellow  Pager: [phone]  Plan was discussed with attending     ___________________________________________________________________    Interval History   No acute events overnight.   Clementine is struggling with nausea this am. Otherwise tolerating well. Father bought her a guitar to learn how to play while in the hospital. Still no BM.  Denies fever, chills, mouth sores, SOB, cough, abdominal pain, diarrhea, constipation, nausea, vomiting, dysuria, hematuria, numbness, tingling, swelling  Appetite ok. Energy ok.   All questions answered at bedside    Complete and Comprehensive review of systems review and negative other than noted here or in the HPI.       Physical Exam   Temp: 98.2  F (36.8  C) Temp src: Oral BP: 119/79 Pulse: 65   Resp: 18 SpO2: 97 % O2 Device: None (Room air)    Vitals:    02/23/24 0735 02/24/24 1453 02/25/24 1211   Weight: 61.7 kg (136 lb 0.4 oz) 63 kg (138 lb 14.2 oz) 62.5 kg (137 lb 12.6 oz)     Vital Signs with Ranges  Temp:  [97.6  F (36.4  C)-98.2  F (36.8  C)] 98.2  F (36.8  C)  Pulse:  [52-66] 65  Resp:  [16-18] 18  BP: (106-119)/(59-79) 119/79  SpO2:  [93 %-97 %] 97 %  I/O last 3 completed shifts:  In: 240 [P.O.:240]  Out: -      Constitutional: Awake and conversational. Non- toxic appearing. No acute distress.   HEENT: NCAT. Moist mucus membranes without lesions, thrush, or exudates appreciated  Lymph: Neck supple, no ridigity. No significant adenopathy noted.   Respiratory: Breathing comfortably on room air with no accessory muscle use. Speaking in full sentences, no evidence of respiratory distress. Lungs CTAB without stridor, wheeze, rhonchi, or rales.   Cardiovascular: Regular rate and rhythm. No peripheral edema.    GI: Abdomen with normoactive bowel sounds, soft and non-tender throughout. No rebound, guarding, or peritoneal sign.  Skin: Skin is clean, dry, intact. No jaundice or significant rashes appreciated.   Neurologic: Alert and with normal speech. Grossly nonfocal.  Neuropsychiatric: Calm, affect congruent to situation. Appropriate mood and affect.     Labs & Studies: I personally reviewed the following studies:  Data   Results for orders placed or performed during the hospital encounter of 02/22/24 (from the past 24 hour(s))   CBC with platelets differential    Narrative    The following orders were created for panel order CBC with platelets differential.  Procedure                               Abnormality         Status                     ---------                               -----------         ------                     CBC with platelets and d...[[id number]]  Abnormal            Final result                 Please view results for these tests on the individual orders.   ABO/Rh type and screen    Narrative    The following orders were created for panel order ABO/Rh type and screen.  Procedure                               Abnormality         Status                     ---------                               -----------         ------                     Adult Type and Screen[[id number]]                            Final result                 Please view results for these tests on the individual orders.   Comprehensive  metabolic panel   Result Value Ref Range    Sodium 138 135 - 145 mmol/L    Potassium 3.8 3.4 - 5.3 mmol/L    Carbon Dioxide (CO2) 23 22 - 29 mmol/L    Anion Gap 10 7 - 15 mmol/L    Urea Nitrogen 14.5 6.0 - 20.0 mg/dL    Creatinine 0.56 0.51 - 0.95 mg/dL    GFR Estimate >90 >60 mL/min/1.73m2    Calcium 8.9 8.6 - 10.0 mg/dL    Chloride 105 98 - 107 mmol/L    Glucose 130 (H) 70 - 99 mg/dL    Alkaline Phosphatase 61 40 - 150 U/L    AST 15 0 - 45 U/L    ALT 27 0 - 50 U/L    Protein Total 6.6 6.4 - 8.3 g/dL    Albumin 3.7 3.5 - 5.2 g/dL    Bilirubin Total 0.3 <=1.2 mg/dL   INR   Result Value Ref Range    INR 1.12 0.85 - 1.15   Fibrinogen activity   Result Value Ref Range    Fibrinogen Activity 299 170 - 490 mg/dL   Uric acid   Result Value Ref Range    Uric Acid 1.9 (L) 2.4 - 5.7 mg/dL   Magnesium   Result Value Ref Range    Magnesium 1.9 1.7 - 2.3 mg/dL   Phosphorus   Result Value Ref Range    Phosphorus 2.8 2.5 - 4.5 mg/dL   CBC with platelets and differential   Result Value Ref Range    WBC Count 9.0 4.0 - 11.0 10e3/uL    RBC Count 4.26 3.80 - 5.20 10e6/uL    Hemoglobin 12.8 11.7 - 15.7 g/dL    Hematocrit 36.9 35.0 - 47.0 %    MCV 87 78 - 100 fL    MCH 30.0 26.5 - 33.0 pg    MCHC 34.7 31.5 - 36.5 g/dL    RDW 13.9 10.0 - 15.0 %    Platelet Count 226 150 - 450 10e3/uL    % Neutrophils 89 %    % Lymphocytes 5 %    % Monocytes 6 %    % Eosinophils 0 %    % Basophils 0 %    % Immature Granulocytes 0 %    NRBCs per 100 WBC 0 <1 /100    Absolute Neutrophils 8.0 1.6 - 8.3 10e3/uL    Absolute Lymphocytes 0.5 (L) 0.8 - 5.3 10e3/uL    Absolute Monocytes 0.6 0.0 - 1.3 10e3/uL    Absolute Eosinophils 0.0 0.0 - 0.7 10e3/uL    Absolute Basophils 0.0 0.0 - 0.2 10e3/uL    Absolute Immature Granulocytes 0.0 <=0.4 10e3/uL    Absolute NRBCs 0.0 10e3/uL   Adult Type and Screen   Result Value Ref Range    ABO/RH(D) O POS     Antibody Screen Negative Negative    SPECIMEN EXPIRATION DATE [id number]        Medications list for  reference:    Medications    - MEDICATION INSTRUCTIONS -        acyclovir  400 mg Oral BID    allopurinol  300 mg Oral Daily    cabergoline  0.5 mg Oral Daily    Chemotherapy Infusing-Continuous Infusion   Does not apply Q8H    cimetidine  200 mg Oral BID    [START ON 2/27/2024] cycloPHOSphamide  750 mg/m2 (Order-Specific) Intravenous Once    [START ON 2/28/2024] dexAMETHasone  8 mg Oral Daily    enoxaparin ANTICOAGULANT  40 mg Subcutaneous Q24H    etoposide  50 mg/m2 (Treatment Plan) Intravenous Q24H    heparin lock flush  5-20 mL Intracatheter Q24H    letrozole  5 mg Oral Daily    ondansetron  16 mg Oral Q24H    pantoprazole  40 mg Oral QAM    polyethylene glycol  17 g Oral BID    predniSONE  100 mg Oral BID    senna-docusate  3 tablet Oral BID    sodium chloride (PF)  10-40 mL Intracatheter Q7 Days    sodium chloride (PF)  10-40 mL Intracatheter Q8H    vinCRIStine (ONCOVIN) 0.7 mg, DOXOrubicin (ADRIAMYCIN) 17 mg in sodium chloride 0.9 % 1,059.2 mL infusion  0.4 mg/m2 (Treatment Plan) Intravenous Q24H

## 2024-02-25 NOTE — PLAN OF CARE
"0700- 1900    /66   Pulse 63   Temp 98.2  F (36.8  C) (Oral)   Resp 16   Ht 1.676 m (5' 6\")   Wt 63 kg (138 lb 14.2 oz)   LMP 01/31/2024 (Exact Date)   SpO2 95%   BMI 22.42 kg/m      VSS on RA, Afebrile. Denies pain, SOB. Scheduled zofran prior to breakfast to help with mild nausea. D2 of Etoposide & Vincristine/Doxorubicin hung during shift. Tolerating infusions well and good blood return via PICC line. Pt noted chemo fog brain since yesterday. Clemente/Doxo rate continues at 44.1 ml/hr to match infusion over 24 hrs; okay per pharmacy. Pt ambulated frequently in hallway. Family and visitors at bedside. Constipation resolved; BM today; reports good UOP. Good appetite. Continue POC.      "

## 2024-02-25 NOTE — PLAN OF CARE
"0700- 1500    /62   Pulse 66   Temp 97.6  F (36.4  C) (Oral)   Resp 18   Ht 1.676 m (5' 6\")   Wt 62.5 kg (137 lb 12.6 oz)   LMP 01/31/2024 (Exact Date)   SpO2 95%   BMI 22.24 kg/m      VSS on RA, Afebrile. Denies pain, SOB. D3 R-Epoch hung this morning; tolerating infusions well and good blood return via PICC. Clemente/Doxo rate continues at 44.1 ml/hr to match infusion time over 24 hours; okay per pharmacy. Pt's nausea is becoming more persistent. EMEND will be given today instead of D5 and likely repeated in OP appt on Wednesday. Ambulated frequently in hallway; family support at bedside. BM 2/24; Good UOP. Continue POC.   "

## 2024-02-25 NOTE — PROGRESS NOTES
Nursing Focus: Chemotherapy  D: Positive blood return via PICC. Insertion site is clean/dry/intact, dressing intact with no complaints of pain.  Urine output is recorded in intake in Doc Flowsheet.    I: Premedications given per order (see electronic medical administration record). Dose #3 of Etoposide started to infuse over 24 hours. Reviewed pt teaching on chemotherapy side effects.  Pt denies need for further teaching. Chemotherapy double checked per protocol by two chemotherapy competent RN's.   A: Tolerating procedure well. Denies pain. Nausea intermittent but controlled with anti-emetics.  P: Continue to monitor urine output and symptoms of nausea. Screen for symptoms of toxicity.    Nursing Focus: Chemotherapy  D: Positive blood return via PICC. Insertion site is clean/dry/intact, dressing intact with no complaints of pain.  Urine output is recorded in intake in Doc Flowsheet.    I: Premedications given per order (see electronic medical administration record). Dose #3 of Vincristine/Doxorubicin started to infuse over 24 hours. Reviewed pt teaching on chemotherapy side effects.  Pt denies need for further teaching. Chemotherapy double checked per protocol by two chemotherapy competent RN's.   A: Tolerating procedure well. Denies pain. Nausea intermittent but controlled with anti-emetics.  P: Continue to monitor urine output and symptoms of nausea. Screen for symptoms of toxicity.

## 2024-02-25 NOTE — PROVIDER NOTIFICATION
"Paged provider \"john Nash\" via [place] -     \"Patient is asking for melatonin to be able to get better sleep tonight. Can you please order that! Thank you\"    Awaiting reply    Melatonin ordered  "

## 2024-02-25 NOTE — PLAN OF CARE
Goal Outcome Evaluation:    1900 - 2300    A&O x 4  Afebrile, OVSS on RA  Denies pain, n/v, increased SOB and chest pain.   Patient asked for melatonin, Melatonin given x 1   Voiding adequately w/ good UOP  Passing gas, LBM 2/24, Miralax given but pt refused senna  Tolerating regular diet w/ good appetite   Up independently in the room to the bathroom  DL PICC, 1 lumen is infusing

## 2024-02-25 NOTE — PROVIDER NOTIFICATION
"Pg'd Dr Reedmand    \"Pt's nausea is becoming intolerable. Could we do emend early instead of D5? She has an OP infusion appt on 2/28 for neupogen and we could do another emend dose then also?\"  "

## 2024-02-26 LAB
ALBUMIN SERPL BCG-MCNC: 3.6 G/DL (ref 3.5–5.2)
ALP SERPL-CCNC: 54 U/L (ref 40–150)
ALT SERPL W P-5'-P-CCNC: 18 U/L (ref 0–50)
ANION GAP SERPL CALCULATED.3IONS-SCNC: 8 MMOL/L (ref 7–15)
AST SERPL W P-5'-P-CCNC: 14 U/L (ref 0–45)
BACTERIA FLD CULT: NO GROWTH
BASOPHILS # BLD AUTO: 0 10E3/UL (ref 0–0.2)
BASOPHILS NFR BLD AUTO: 0 %
BILIRUB SERPL-MCNC: 0.7 MG/DL
BUN SERPL-MCNC: 16.1 MG/DL (ref 6–20)
CALCIUM SERPL-MCNC: 8.9 MG/DL (ref 8.6–10)
CHLORIDE SERPL-SCNC: 105 MMOL/L (ref 98–107)
CREAT SERPL-MCNC: 0.58 MG/DL (ref 0.51–0.95)
DEPRECATED HCO3 PLAS-SCNC: 25 MMOL/L (ref 22–29)
EGFRCR SERPLBLD CKD-EPI 2021: >90 ML/MIN/1.73M2
EOSINOPHIL # BLD AUTO: 0 10E3/UL (ref 0–0.7)
EOSINOPHIL NFR BLD AUTO: 0 %
ERYTHROCYTE [DISTWIDTH] IN BLOOD BY AUTOMATED COUNT: 13.5 % (ref 10–15)
FIBRINOGEN PPP-MCNC: 250 MG/DL (ref 170–490)
GLUCOSE SERPL-MCNC: 100 MG/DL (ref 70–99)
HCT VFR BLD AUTO: 36.3 % (ref 35–47)
HGB BLD-MCNC: 12.3 G/DL (ref 11.7–15.7)
IMM GRANULOCYTES # BLD: 0 10E3/UL
IMM GRANULOCYTES NFR BLD: 0 %
INR PPP: 1.15 (ref 0.85–1.15)
LYMPHOCYTES # BLD AUTO: 0.9 10E3/UL (ref 0.8–5.3)
LYMPHOCYTES NFR BLD AUTO: 15 %
MAGNESIUM SERPL-MCNC: 2.1 MG/DL (ref 1.7–2.3)
MCH RBC QN AUTO: 29.3 PG (ref 26.5–33)
MCHC RBC AUTO-ENTMCNC: 33.9 G/DL (ref 31.5–36.5)
MCV RBC AUTO: 86 FL (ref 78–100)
MONOCYTES # BLD AUTO: 0.6 10E3/UL (ref 0–1.3)
MONOCYTES NFR BLD AUTO: 11 %
NEUTROPHILS # BLD AUTO: 4.4 10E3/UL (ref 1.6–8.3)
NEUTROPHILS NFR BLD AUTO: 74 %
NRBC # BLD AUTO: 0 10E3/UL
NRBC BLD AUTO-RTO: 0 /100
PHOSPHATE SERPL-MCNC: 3.3 MG/DL (ref 2.5–4.5)
PLATELET # BLD AUTO: 210 10E3/UL (ref 150–450)
POTASSIUM SERPL-SCNC: 3.7 MMOL/L (ref 3.4–5.3)
PROT SERPL-MCNC: 6.3 G/DL (ref 6.4–8.3)
RBC # BLD AUTO: 4.2 10E6/UL (ref 3.8–5.2)
SODIUM SERPL-SCNC: 138 MMOL/L (ref 135–145)
URATE SERPL-MCNC: 2.1 MG/DL (ref 2.4–5.7)
WBC # BLD AUTO: 5.9 10E3/UL (ref 4–11)

## 2024-02-26 PROCEDURE — 84550 ASSAY OF BLOOD/URIC ACID: CPT | Performed by: PHYSICIAN ASSISTANT

## 2024-02-26 PROCEDURE — 120N000002 HC R&B MED SURG/OB UMMC

## 2024-02-26 PROCEDURE — 85610 PROTHROMBIN TIME: CPT | Performed by: PHYSICIAN ASSISTANT

## 2024-02-26 PROCEDURE — 80053 COMPREHEN METABOLIC PANEL: CPT | Performed by: PHYSICIAN ASSISTANT

## 2024-02-26 PROCEDURE — 250N000012 HC RX MED GY IP 250 OP 636 PS 637: Performed by: INTERNAL MEDICINE

## 2024-02-26 PROCEDURE — 250N000013 HC RX MED GY IP 250 OP 250 PS 637: Performed by: PHYSICIAN ASSISTANT

## 2024-02-26 PROCEDURE — 99232 SBSQ HOSP IP/OBS MODERATE 35: CPT

## 2024-02-26 PROCEDURE — 250N000011 HC RX IP 250 OP 636: Performed by: INTERNAL MEDICINE

## 2024-02-26 PROCEDURE — 85384 FIBRINOGEN ACTIVITY: CPT | Performed by: PHYSICIAN ASSISTANT

## 2024-02-26 PROCEDURE — 93005 ELECTROCARDIOGRAM TRACING: CPT

## 2024-02-26 PROCEDURE — 84100 ASSAY OF PHOSPHORUS: CPT | Performed by: PHYSICIAN ASSISTANT

## 2024-02-26 PROCEDURE — 93010 ELECTROCARDIOGRAM REPORT: CPT | Performed by: INTERNAL MEDICINE

## 2024-02-26 PROCEDURE — 250N000013 HC RX MED GY IP 250 OP 250 PS 637: Performed by: INTERNAL MEDICINE

## 2024-02-26 PROCEDURE — 83735 ASSAY OF MAGNESIUM: CPT | Performed by: PHYSICIAN ASSISTANT

## 2024-02-26 PROCEDURE — 258N000003 HC RX IP 258 OP 636: Performed by: INTERNAL MEDICINE

## 2024-02-26 PROCEDURE — 85025 COMPLETE CBC W/AUTO DIFF WBC: CPT | Performed by: PHYSICIAN ASSISTANT

## 2024-02-26 PROCEDURE — 250N000011 HC RX IP 250 OP 636: Performed by: PHYSICIAN ASSISTANT

## 2024-02-26 RX ORDER — NALOXONE HYDROCHLORIDE 0.4 MG/ML
0.4 INJECTION, SOLUTION INTRAMUSCULAR; INTRAVENOUS; SUBCUTANEOUS
Status: DISCONTINUED | OUTPATIENT
Start: 2024-02-26 | End: 2024-02-27 | Stop reason: HOSPADM

## 2024-02-26 RX ORDER — NALOXONE HYDROCHLORIDE 0.4 MG/ML
0.2 INJECTION, SOLUTION INTRAMUSCULAR; INTRAVENOUS; SUBCUTANEOUS
Status: DISCONTINUED | OUTPATIENT
Start: 2024-02-26 | End: 2024-02-27 | Stop reason: HOSPADM

## 2024-02-26 RX ADMIN — PANTOPRAZOLE SODIUM 40 MG: 40 TABLET, DELAYED RELEASE ORAL at 08:11

## 2024-02-26 RX ADMIN — PREDNISONE 100 MG: 50 TABLET ORAL at 08:11

## 2024-02-26 RX ADMIN — ACYCLOVIR 400 MG: 400 TABLET ORAL at 20:07

## 2024-02-26 RX ADMIN — VINCRISTINE SULFATE 0.7 MG: 1 INJECTION, SOLUTION INTRAVENOUS at 07:40

## 2024-02-26 RX ADMIN — LETROZOLE 5 MG: 2.5 TABLET, FILM COATED ORAL at 08:11

## 2024-02-26 RX ADMIN — Medication 5 ML: at 06:21

## 2024-02-26 RX ADMIN — PREDNISONE 100 MG: 50 TABLET ORAL at 16:00

## 2024-02-26 RX ADMIN — CABERGOLINE 0.5 MG: 0.5 TABLET ORAL at 08:11

## 2024-02-26 RX ADMIN — ACYCLOVIR 400 MG: 400 TABLET ORAL at 08:11

## 2024-02-26 RX ADMIN — SODIUM CHLORIDE 85 MG: 9 INJECTION, SOLUTION INTRAVENOUS at 07:40

## 2024-02-26 RX ADMIN — ONDANSETRON HYDROCHLORIDE 16 MG: 8 TABLET, FILM COATED ORAL at 07:38

## 2024-02-26 RX ADMIN — ALLOPURINOL 300 MG: 300 TABLET ORAL at 08:11

## 2024-02-26 RX ADMIN — CIMETIDINE 200 MG: 200 TABLET, FILM COATED ORAL at 20:07

## 2024-02-26 RX ADMIN — ENOXAPARIN SODIUM 40 MG: 40 INJECTION SUBCUTANEOUS at 20:07

## 2024-02-26 RX ADMIN — CIMETIDINE 200 MG: 200 TABLET, FILM COATED ORAL at 09:28

## 2024-02-26 RX ADMIN — SENNOSIDES AND DOCUSATE SODIUM 1 TABLET: 8.6; 5 TABLET ORAL at 08:12

## 2024-02-26 ASSESSMENT — ACTIVITIES OF DAILY LIVING (ADL)
ADLS_ACUITY_SCORE: 20

## 2024-02-26 NOTE — PLAN OF CARE
Pt is A&Ox4. VSS. On room air. Denies pain. IV compazine given for nausea. Up independently in hallway. Family at bedside this evening. Chemo infusing via purple lumen, red lumen HL. Will continue plan of care.

## 2024-02-26 NOTE — PROGRESS NOTES
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       Today:  - Day 4 of R-EPOCH; continue to monitor for chemo related side effects.   - Nausea improved with re-dosing Emend yesterday (2/25). Could consider redosing in the clinic on 2/28 if still struggling with nausea.  - Obtain EKG to monitor QTc with frequent antiemetic use.   - Plan for discharge tomorrow (2/27) after completion of chemo.   - Neulasta scheduled for 2/28.   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 Oakes. Initially presented with cervical lymphadenopathy 12/2023. Initial FNA (1/29/24) suggestive of possible Hodgkin lymphoma; however excisional LN biopsy (2/9/24) shows large B-cell lymphoma with differential diagnosis of primary mediastinal large B-cell lymphoma vs DLBCL NOS. PET with cervical and mediastinal lymphadenopathy only. Overall presentation would be quite consistent with PMBCL given her age, gender, sites of involvement, dim CD30 expression, and prominent fibrotic background. However, the neoplastic cells lack CD23 so additional testing is in process (IHC for PDL-1, , MAL and [id number] gene expression assay) to help distinguish. Overall diagnosis and prognosis of large B-cell lymphoma (PMBCL vs stage 2 DLBCL) was reviewed with primary oncologist, and ultimately through shared decision making was decided to treat as if her disease is PMBCL for  Cycle 1 with dose-adjusted R-EPOCH while awaiting the pending additional studies. If studies come back more suggestive of DLBCL NOS later, we can de-escalate therapy to R-CHOP for later cycles. Plan is for a total of 6 cycles with first restaging PET-CT after 2 cycles. Side effects, treatment course reviewed, and patient was understanding and in agreement with this plan.  - PICC placed prior to admission, anticipate removal at discharge. Scheduled for port placement OP 3/1/24  - Echo 2/19 with LVEF 60%, no significant valvular abnormalities present      Treatment Plan: Dose adjusted R-EPOCH (C1D1=2/23/2024)  - Rituximab 375 mg/m2 (600mg) IV x 1 dose - D0  - Prednisone 100 mg BID x 10 doses - D1-5  - Etoposide 50mg/m2 (85 mg) IV x 4 doses - D1-4  - Vincristine, doxorubicin 0.4 mg/m2 (0.7mg) x 4 doses CIVI- D1-4  - Cyclophosphamide 750 mg/m2 (1275 mg) IV x 1 dose - D5  - Pegfilgrastim x1 - D6 (scheduled in clinic for 2/28)  - Pre-meds: tylenol, benadryl, zofran, emend, dexamethasone (D6-7)     # Ppx  - TLS ppx: baseline uric acid normal. Continue allopurinol 300 mg daily. Encouraged good hydration.  - ID ppx:  mg BID. Levo and fluc when ANC <1.0. Monthly pentamidine for PJP ppx.   - Vaccines: up to date on COVID and flu shots.      # Risk for pancytopenia   2/2 chemotherapy and underlying disease.   - Transfuse for Hgb <7, Plt <10K  - Type & Screen MWF     MISC  # Fertility preservation  Already established with [facility], underwent egg retrieval on 2/18 or 2/19.   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. Her boyfriend is a general surgery resident here at [facility]. Parents live in [place], father was present on day of admission.     FEN   - IVF per  "chemotherapy regimen, IVF bolus prn   - PRN lyte replacement per standing protocol  - Regular diet as tolerated      Lines/Drains: PICC  DVT ppx: Lovenox, hold if Plt <50K   GI ppx: PTA PPI or Pepcid      Dispo: Anticipate 5 day stay for chemotherapy pending completion of chemo and barring any clinical complications. SARAH ~2/27  Follow up scheduled - 2/28 labs/neulasta, 3/1 port placement, 3/4 labs + AMEENA follow up      I spent 40 minutes in the care of this patient today, which included time necessary for review of interval events, obtaining history and physical exam, ordering medication(s)/test(s) as medically indicated, discussion with interdisciplinary/consult team(s), and documentation time. Over 50% of time was spent face-to-face and/or coordinating care.    Patient is staffed with attending physician, Dr. Pinto.     Rosemarie Gonzalez PA-C  Hematology/Oncology  Pager: #[phone]     Interval History   No acute events overnight. Nursing notes reviewed. Nausea better managed today. Slept well overnight. Denies new or worsening symptoms. Denies headache, vision changes,chest pain, palpitations, shortness of breath, abdominal pain or cramping, dysuria. Had a BM this morning that felt \"normal\". Appetite and energy okay. We discussed plan for discharge tomorrow. All questions and concerns addressed at this time.     Complete and Comprehensive review of systems review and negative other than noted here or in the HPI.       Physical Exam   Temp: 98.1  F (36.7  C) Temp src: Oral BP: 129/77 Pulse: 52   Resp: 18 SpO2: 98 % O2 Device: None (Room air)    Vitals:    02/24/24 1453 02/25/24 1211 02/26/24 0955   Weight: 63 kg (138 lb 14.2 oz) 62.5 kg (137 lb 12.6 oz) 61.6 kg (135 lb 12.8 oz)     Vital Signs with Ranges  Temp:  [97.6  F (36.4  C)-98.2  F (36.8  C)] 98.1  F (36.7  C)  Pulse:  [45-64] 52  Resp:  [16-18] 18  BP: (103-129)/(58-77) 129/77  SpO2:  [93 %-98 %] 98 %  No intake/output data recorded.    Constitutional: " Awake and conversational. Non- toxic appearing. No acute distress.   HEENT: NCAT. Moist mucus membranes without lesions, thrush, or exudates appreciated  Lymph: Neck supple, no ridigity. No significant adenopathy noted.   Respiratory: Breathing comfortably on room air with no accessory muscle use. Speaking in full sentences, no evidence of respiratory distress. Lungs CTAB without stridor, wheeze, rhonchi, or rales.   Cardiovascular: Regular rate and rhythm. No peripheral edema.    GI: Abdomen with normoactive bowel sounds, soft and non-tender throughout. No rebound, guarding, or peritoneal sign.  Skin: Skin is clean, dry, intact. No jaundice or significant rashes appreciated.   Neurologic: Alert and with normal speech. Grossly nonfocal.  Neuropsychiatric: Calm, affect congruent to situation. Appropriate mood and affect.     Labs & Studies: I personally reviewed the following studies:  Data   Results for orders placed or performed during the hospital encounter of 02/22/24 (from the past 24 hour(s))   CBC with platelets differential    Narrative    The following orders were created for panel order CBC with platelets differential.  Procedure                               Abnormality         Status                     ---------                               -----------         ------                     CBC with platelets and d...[[id number]]                      Final result                 Please view results for these tests on the individual orders.   Comprehensive metabolic panel   Result Value Ref Range    Sodium 138 135 - 145 mmol/L    Potassium 3.7 3.4 - 5.3 mmol/L    Carbon Dioxide (CO2) 25 22 - 29 mmol/L    Anion Gap 8 7 - 15 mmol/L    Urea Nitrogen 16.1 6.0 - 20.0 mg/dL    Creatinine 0.58 0.51 - 0.95 mg/dL    GFR Estimate >90 >60 mL/min/1.73m2    Calcium 8.9 8.6 - 10.0 mg/dL    Chloride 105 98 - 107 mmol/L    Glucose 100 (H) 70 - 99 mg/dL    Alkaline Phosphatase 54 40 - 150 U/L    AST 14 0 - 45 U/L    ALT 18 0  - 50 U/L    Protein Total 6.3 (L) 6.4 - 8.3 g/dL    Albumin 3.6 3.5 - 5.2 g/dL    Bilirubin Total 0.7 <=1.2 mg/dL   INR   Result Value Ref Range    INR 1.15 0.85 - 1.15   Fibrinogen activity   Result Value Ref Range    Fibrinogen Activity 250 170 - 490 mg/dL   Uric acid   Result Value Ref Range    Uric Acid 2.1 (L) 2.4 - 5.7 mg/dL   Magnesium   Result Value Ref Range    Magnesium 2.1 1.7 - 2.3 mg/dL   Phosphorus   Result Value Ref Range    Phosphorus 3.3 2.5 - 4.5 mg/dL   CBC with platelets and differential   Result Value Ref Range    WBC Count 5.9 4.0 - 11.0 10e3/uL    RBC Count 4.20 3.80 - 5.20 10e6/uL    Hemoglobin 12.3 11.7 - 15.7 g/dL    Hematocrit 36.3 35.0 - 47.0 %    MCV 86 78 - 100 fL    MCH 29.3 26.5 - 33.0 pg    MCHC 33.9 31.5 - 36.5 g/dL    RDW 13.5 10.0 - 15.0 %    Platelet Count 210 150 - 450 10e3/uL    % Neutrophils 74 %    % Lymphocytes 15 %    % Monocytes 11 %    % Eosinophils 0 %    % Basophils 0 %    % Immature Granulocytes 0 %    NRBCs per 100 WBC 0 <1 /100    Absolute Neutrophils 4.4 1.6 - 8.3 10e3/uL    Absolute Lymphocytes 0.9 0.8 - 5.3 10e3/uL    Absolute Monocytes 0.6 0.0 - 1.3 10e3/uL    Absolute Eosinophils 0.0 0.0 - 0.7 10e3/uL    Absolute Basophils 0.0 0.0 - 0.2 10e3/uL    Absolute Immature Granulocytes 0.0 <=0.4 10e3/uL    Absolute NRBCs 0.0 10e3/uL       Medications list for reference:    Medications    - MEDICATION INSTRUCTIONS -        acyclovir  400 mg Oral BID    allopurinol  300 mg Oral Daily    Chemotherapy Infusing-Continuous Infusion   Does not apply Q8H    cimetidine  200 mg Oral BID    [START ON 2/27/2024] cycloPHOSphamide  750 mg/m2 (Order-Specific) Intravenous Once    [START ON 2/28/2024] dexAMETHasone  8 mg Oral Daily    enoxaparin ANTICOAGULANT  40 mg Subcutaneous Q24H    etoposide  50 mg/m2 (Treatment Plan) Intravenous Q24H    heparin lock flush  5-20 mL Intracatheter Q24H    ondansetron  16 mg Oral Q24H    pantoprazole  40 mg Oral QAM    polyethylene glycol  17 g Oral  BID    predniSONE  100 mg Oral BID    senna-docusate  3 tablet Oral BID    sodium chloride (PF)  10-40 mL Intracatheter Q7 Days    sodium chloride (PF)  10-40 mL Intracatheter Q8H    vinCRIStine (ONCOVIN) 0.7 mg, DOXOrubicin (ADRIAMYCIN) 17 mg in sodium chloride 0.9 % 1,059.2 mL infusion  0.4 mg/m2 (Treatment Plan) Intravenous Q24H

## 2024-02-26 NOTE — PROGRESS NOTES
Vitals are stable on RA, denies pain/nausea/vomiting, alert/oriented, up ad tio, ambulating outside of room with IV pole, tolerating chemo infusion with no complications, good blood return from purple lumen, voiding spontaneously, will discharge tomorrow after Cytoxan completion, pt aware of plans

## 2024-02-26 NOTE — PLAN OF CARE
Goal Outcome Evaluation:    [id number]  VSS. Alert and oriented. Denies pain. Sleeping between RN cares. Continue with POC.         Urinary tract infection with fever Atrial fibrillation, unspecified type

## 2024-02-26 NOTE — PLAN OF CARE
[id number]. Bradycardic in 50's at times, team notified. Other vitals stable on RA. Up ad tio. Denies pain. Minimal nausea this shift, received scheduled Zofran this morning. Tolerating regular diet. PICC with + blood return, Etoposide and Vincristine/Doxorubicin infusing continuously. Plan for Cytoxan tomorrow AM. Possible discharge tomorrow after chemo complete. Continue to monitor and with POC.

## 2024-02-27 ENCOUNTER — DOCUMENTATION ONLY (OUTPATIENT)
Facility: CLINIC | Age: 29
End: 2024-02-27
Payer: COMMERCIAL

## 2024-02-27 VITALS
HEART RATE: 65 BPM | RESPIRATION RATE: 16 BRPM | WEIGHT: 135.8 LBS | OXYGEN SATURATION: 93 % | TEMPERATURE: 97.8 F | SYSTOLIC BLOOD PRESSURE: 129 MMHG | BODY MASS INDEX: 21.83 KG/M2 | DIASTOLIC BLOOD PRESSURE: 64 MMHG | HEIGHT: 66 IN

## 2024-02-27 DIAGNOSIS — C85.28 MEDIASTINAL LARGE B-CELL LYMPHOMA OF LYMPH NODES OF MULTIPLE REGIONS (H): Primary | ICD-10-CM

## 2024-02-27 PROBLEM — R11.0 NAUSEA: Status: ACTIVE | Noted: 2024-02-27

## 2024-02-27 LAB
ALBUMIN SERPL BCG-MCNC: 3.6 G/DL (ref 3.5–5.2)
ALP SERPL-CCNC: 57 U/L (ref 40–150)
ALT SERPL W P-5'-P-CCNC: 16 U/L (ref 0–50)
ANION GAP SERPL CALCULATED.3IONS-SCNC: 9 MMOL/L (ref 7–15)
AST SERPL W P-5'-P-CCNC: 12 U/L (ref 0–45)
BASOPHILS # BLD AUTO: 0 10E3/UL (ref 0–0.2)
BASOPHILS NFR BLD AUTO: 0 %
BILIRUB SERPL-MCNC: 0.5 MG/DL
BUN SERPL-MCNC: 12.5 MG/DL (ref 6–20)
CALCIUM SERPL-MCNC: 8.9 MG/DL (ref 8.6–10)
CHLORIDE SERPL-SCNC: 103 MMOL/L (ref 98–107)
CREAT SERPL-MCNC: 0.56 MG/DL (ref 0.51–0.95)
DEPRECATED HCO3 PLAS-SCNC: 26 MMOL/L (ref 22–29)
EGFRCR SERPLBLD CKD-EPI 2021: >90 ML/MIN/1.73M2
EOSINOPHIL # BLD AUTO: 0 10E3/UL (ref 0–0.7)
EOSINOPHIL NFR BLD AUTO: 0 %
ERYTHROCYTE [DISTWIDTH] IN BLOOD BY AUTOMATED COUNT: 13.2 % (ref 10–15)
FIBRINOGEN PPP-MCNC: 216 MG/DL (ref 170–490)
GLUCOSE SERPL-MCNC: 129 MG/DL (ref 70–99)
HCT VFR BLD AUTO: 36.7 % (ref 35–47)
HGB BLD-MCNC: 12.7 G/DL (ref 11.7–15.7)
IMM GRANULOCYTES # BLD: 0 10E3/UL
IMM GRANULOCYTES NFR BLD: 0 %
INR PPP: 1.18 (ref 0.85–1.15)
LYMPHOCYTES # BLD AUTO: 0.6 10E3/UL (ref 0.8–5.3)
LYMPHOCYTES NFR BLD AUTO: 16 %
MAGNESIUM SERPL-MCNC: 2.1 MG/DL (ref 1.7–2.3)
MCH RBC QN AUTO: 29.1 PG (ref 26.5–33)
MCHC RBC AUTO-ENTMCNC: 34.6 G/DL (ref 31.5–36.5)
MCV RBC AUTO: 84 FL (ref 78–100)
MONOCYTES # BLD AUTO: 0.3 10E3/UL (ref 0–1.3)
MONOCYTES NFR BLD AUTO: 7 %
NEUTROPHILS # BLD AUTO: 2.9 10E3/UL (ref 1.6–8.3)
NEUTROPHILS NFR BLD AUTO: 77 %
NRBC # BLD AUTO: 0 10E3/UL
NRBC BLD AUTO-RTO: 0 /100
PHOSPHATE SERPL-MCNC: 2.7 MG/DL (ref 2.5–4.5)
PLATELET # BLD AUTO: 219 10E3/UL (ref 150–450)
POTASSIUM SERPL-SCNC: 3.4 MMOL/L (ref 3.4–5.3)
PROT SERPL-MCNC: 6.3 G/DL (ref 6.4–8.3)
RBC # BLD AUTO: 4.37 10E6/UL (ref 3.8–5.2)
SODIUM SERPL-SCNC: 138 MMOL/L (ref 135–145)
URATE SERPL-MCNC: 1.5 MG/DL (ref 2.4–5.7)
WBC # BLD AUTO: 3.8 10E3/UL (ref 4–11)

## 2024-02-27 PROCEDURE — 250N000011 HC RX IP 250 OP 636

## 2024-02-27 PROCEDURE — 250N000011 HC RX IP 250 OP 636: Mod: JZ | Performed by: INTERNAL MEDICINE

## 2024-02-27 PROCEDURE — 250N000013 HC RX MED GY IP 250 OP 250 PS 637: Performed by: PHYSICIAN ASSISTANT

## 2024-02-27 PROCEDURE — 250N000012 HC RX MED GY IP 250 OP 636 PS 637: Performed by: INTERNAL MEDICINE

## 2024-02-27 PROCEDURE — 250N000011 HC RX IP 250 OP 636: Performed by: PHYSICIAN ASSISTANT

## 2024-02-27 PROCEDURE — 250N000013 HC RX MED GY IP 250 OP 250 PS 637: Performed by: STUDENT IN AN ORGANIZED HEALTH CARE EDUCATION/TRAINING PROGRAM

## 2024-02-27 PROCEDURE — 85025 COMPLETE CBC W/AUTO DIFF WBC: CPT | Performed by: PHYSICIAN ASSISTANT

## 2024-02-27 PROCEDURE — 85384 FIBRINOGEN ACTIVITY: CPT | Performed by: PHYSICIAN ASSISTANT

## 2024-02-27 PROCEDURE — 258N000003 HC RX IP 258 OP 636: Performed by: INTERNAL MEDICINE

## 2024-02-27 PROCEDURE — 85610 PROTHROMBIN TIME: CPT | Performed by: PHYSICIAN ASSISTANT

## 2024-02-27 PROCEDURE — 99239 HOSP IP/OBS DSCHRG MGMT >30: CPT

## 2024-02-27 PROCEDURE — 84550 ASSAY OF BLOOD/URIC ACID: CPT | Performed by: PHYSICIAN ASSISTANT

## 2024-02-27 PROCEDURE — 83735 ASSAY OF MAGNESIUM: CPT | Performed by: PHYSICIAN ASSISTANT

## 2024-02-27 PROCEDURE — 250N000013 HC RX MED GY IP 250 OP 250 PS 637: Performed by: INTERNAL MEDICINE

## 2024-02-27 PROCEDURE — 84100 ASSAY OF PHOSPHORUS: CPT | Performed by: PHYSICIAN ASSISTANT

## 2024-02-27 PROCEDURE — 80053 COMPREHEN METABOLIC PANEL: CPT | Performed by: PHYSICIAN ASSISTANT

## 2024-02-27 RX ORDER — ACYCLOVIR 400 MG/1
400 TABLET ORAL 2 TIMES DAILY
Qty: 60 TABLET | Refills: 0 | Status: SHIPPED | OUTPATIENT
Start: 2024-02-27 | End: 2024-03-27

## 2024-02-27 RX ORDER — METHYLPREDNISOLONE SODIUM SUCCINATE 125 MG/2ML
125 INJECTION, POWDER, LYOPHILIZED, FOR SOLUTION INTRAMUSCULAR; INTRAVENOUS
Status: CANCELLED
Start: 2024-03-05

## 2024-02-27 RX ORDER — HEPARIN SODIUM,PORCINE 10 UNIT/ML
5-20 VIAL (ML) INTRAVENOUS DAILY PRN
Status: CANCELLED | OUTPATIENT
Start: 2024-02-28

## 2024-02-27 RX ORDER — ONDANSETRON 4 MG/1
4-8 TABLET, FILM COATED ORAL EVERY 8 HOURS PRN
Qty: 30 TABLET | Refills: 0 | Status: ON HOLD | OUTPATIENT
Start: 2024-02-27 | End: 2024-03-19

## 2024-02-27 RX ORDER — ALBUTEROL SULFATE 0.83 MG/ML
2.5 SOLUTION RESPIRATORY (INHALATION) ONCE
Status: CANCELLED
Start: 2024-03-05 | End: 2024-03-05

## 2024-02-27 RX ORDER — EPINEPHRINE 1 MG/ML
0.3 INJECTION, SOLUTION, CONCENTRATE INTRAVENOUS EVERY 5 MIN PRN
Status: CANCELLED | OUTPATIENT
Start: 2024-03-05

## 2024-02-27 RX ORDER — ALBUTEROL SULFATE 90 UG/1
1-2 AEROSOL, METERED RESPIRATORY (INHALATION)
Status: CANCELLED
Start: 2024-02-28

## 2024-02-27 RX ORDER — DEXAMETHASONE 4 MG/1
8 TABLET ORAL DAILY
Qty: 4 TABLET | Refills: 0 | Status: SHIPPED | OUTPATIENT
Start: 2024-02-28 | End: 2024-02-27

## 2024-02-27 RX ORDER — DIPHENHYDRAMINE HYDROCHLORIDE 50 MG/ML
50 INJECTION INTRAMUSCULAR; INTRAVENOUS
Status: CANCELLED
Start: 2024-02-28

## 2024-02-27 RX ORDER — PREDNISONE 50 MG/1
100 TABLET ORAL 2 TIMES DAILY
Qty: 20 TABLET | Refills: 0 | Status: SHIPPED | OUTPATIENT
Start: 2024-02-27 | End: 2024-02-27

## 2024-02-27 RX ORDER — METHYLPREDNISOLONE SODIUM SUCCINATE 125 MG/2ML
125 INJECTION, POWDER, LYOPHILIZED, FOR SOLUTION INTRAMUSCULAR; INTRAVENOUS
Status: CANCELLED
Start: 2024-02-28

## 2024-02-27 RX ORDER — EPINEPHRINE 1 MG/ML
0.3 INJECTION, SOLUTION, CONCENTRATE INTRAVENOUS EVERY 5 MIN PRN
Status: CANCELLED | OUTPATIENT
Start: 2024-02-28

## 2024-02-27 RX ORDER — MEPERIDINE HYDROCHLORIDE 25 MG/ML
25 INJECTION INTRAMUSCULAR; INTRAVENOUS; SUBCUTANEOUS EVERY 30 MIN PRN
Status: CANCELLED | OUTPATIENT
Start: 2024-02-28

## 2024-02-27 RX ORDER — ALBUTEROL SULFATE 90 UG/1
1-2 AEROSOL, METERED RESPIRATORY (INHALATION)
Status: CANCELLED
Start: 2024-03-05

## 2024-02-27 RX ORDER — POLYETHYLENE GLYCOL 3350 17 G/17G
17 POWDER, FOR SOLUTION ORAL DAILY PRN
Qty: 510 G | Refills: 0 | Status: ON HOLD | OUTPATIENT
Start: 2024-02-27 | End: 2024-03-19

## 2024-02-27 RX ORDER — ALLOPURINOL 300 MG/1
300 TABLET ORAL DAILY
Qty: 15 TABLET | Refills: 0 | Status: SHIPPED | OUTPATIENT
Start: 2024-02-27 | End: 2024-03-14

## 2024-02-27 RX ORDER — ALBUTEROL SULFATE 0.83 MG/ML
2.5 SOLUTION RESPIRATORY (INHALATION)
Status: CANCELLED | OUTPATIENT
Start: 2024-02-28

## 2024-02-27 RX ORDER — HEPARIN SODIUM (PORCINE) LOCK FLUSH IV SOLN 100 UNIT/ML 100 UNIT/ML
5 SOLUTION INTRAVENOUS
Status: CANCELLED | OUTPATIENT
Start: 2024-02-28

## 2024-02-27 RX ORDER — DEXAMETHASONE 4 MG/1
8 TABLET ORAL DAILY
Qty: 4 TABLET | Refills: 0 | Status: ON HOLD | OUTPATIENT
Start: 2024-02-28 | End: 2024-03-19

## 2024-02-27 RX ORDER — LEVOFLOXACIN 250 MG/1
250 TABLET, FILM COATED ORAL DAILY
Qty: 30 TABLET | Refills: 0 | Status: ON HOLD | OUTPATIENT
Start: 2024-02-27 | End: 2024-05-20

## 2024-02-27 RX ORDER — FLUCONAZOLE 200 MG/1
200 TABLET ORAL DAILY
Qty: 30 TABLET | Refills: 0 | Status: ON HOLD | OUTPATIENT
Start: 2024-02-27 | End: 2024-05-20

## 2024-02-27 RX ORDER — PREDNISONE 50 MG/1
100 TABLET ORAL ONCE
Qty: 2 TABLET | Refills: 0 | Status: SHIPPED | OUTPATIENT
Start: 2024-02-27 | End: 2024-02-27

## 2024-02-27 RX ORDER — AMOXICILLIN 250 MG
1 CAPSULE ORAL DAILY PRN
Qty: 15 TABLET | Refills: 0 | Status: ON HOLD | OUTPATIENT
Start: 2024-02-27 | End: 2024-03-19

## 2024-02-27 RX ORDER — POTASSIUM CHLORIDE 750 MG/1
40 TABLET, EXTENDED RELEASE ORAL ONCE
Status: COMPLETED | OUTPATIENT
Start: 2024-02-27 | End: 2024-02-27

## 2024-02-27 RX ORDER — PENTAMIDINE ISETHIONATE 300 MG/300MG
300 INHALANT RESPIRATORY (INHALATION)
Status: CANCELLED
Start: 2024-03-05

## 2024-02-27 RX ORDER — MODAFINIL 200 MG/1
200 TABLET ORAL DAILY PRN
COMMUNITY
Start: 2024-02-27

## 2024-02-27 RX ORDER — DIPHENHYDRAMINE HYDROCHLORIDE 50 MG/ML
50 INJECTION INTRAMUSCULAR; INTRAVENOUS
Status: CANCELLED
Start: 2024-03-05

## 2024-02-27 RX ORDER — PANTOPRAZOLE SODIUM 40 MG/1
40 TABLET, DELAYED RELEASE ORAL EVERY MORNING
Qty: 30 TABLET | Refills: 0 | Status: ON HOLD | OUTPATIENT
Start: 2024-02-27 | End: 2024-03-19

## 2024-02-27 RX ORDER — PROCHLORPERAZINE MALEATE 10 MG
10 TABLET ORAL EVERY 6 HOURS PRN
Qty: 30 TABLET | Refills: 0 | Status: ON HOLD | OUTPATIENT
Start: 2024-02-27 | End: 2024-03-19

## 2024-02-27 RX ORDER — MEPERIDINE HYDROCHLORIDE 25 MG/ML
25 INJECTION INTRAMUSCULAR; INTRAVENOUS; SUBCUTANEOUS EVERY 30 MIN PRN
Status: CANCELLED | OUTPATIENT
Start: 2024-03-05

## 2024-02-27 RX ORDER — ALBUTEROL SULFATE 0.83 MG/ML
2.5 SOLUTION RESPIRATORY (INHALATION)
Status: CANCELLED | OUTPATIENT
Start: 2024-03-05

## 2024-02-27 RX ADMIN — CYCLOPHOSPHAMIDE 1275 MG: 2 INJECTION, POWDER, FOR SOLUTION INTRAVENOUS; ORAL at 08:37

## 2024-02-27 RX ADMIN — ONDANSETRON HYDROCHLORIDE 16 MG: 8 TABLET, FILM COATED ORAL at 07:53

## 2024-02-27 RX ADMIN — CIMETIDINE 200 MG: 200 TABLET, FILM COATED ORAL at 08:42

## 2024-02-27 RX ADMIN — PANTOPRAZOLE SODIUM 40 MG: 40 TABLET, DELAYED RELEASE ORAL at 08:42

## 2024-02-27 RX ADMIN — PROCHLORPERAZINE EDISYLATE 10 MG: 5 INJECTION INTRAMUSCULAR; INTRAVENOUS at 08:11

## 2024-02-27 RX ADMIN — POTASSIUM CHLORIDE 40 MEQ: 750 TABLET, EXTENDED RELEASE ORAL at 09:40

## 2024-02-27 RX ADMIN — ALLOPURINOL 300 MG: 300 TABLET ORAL at 08:42

## 2024-02-27 RX ADMIN — Medication 5 ML: at 05:01

## 2024-02-27 RX ADMIN — ACYCLOVIR 400 MG: 400 TABLET ORAL at 08:42

## 2024-02-27 RX ADMIN — PREDNISONE 100 MG: 50 TABLET ORAL at 08:43

## 2024-02-27 ASSESSMENT — ACTIVITIES OF DAILY LIVING (ADL)
ADLS_ACUITY_SCORE: 20

## 2024-02-27 NOTE — PLAN OF CARE
Cytoxan given this morning, pt tolerated well. PICC discontinued after chemo completed per order. Nausea managed with scheduled Zofran/Compazine. Denies pain. K replaced per protocol. Discharge orders placed. AVS reviewed with patient/father. All questions answered. Pt to  meds at discharge pharmacy. Belongings sent home with patient.

## 2024-02-27 NOTE — PROGRESS NOTES
"Care Management Discharge Note    Discharge Date: 02/27/2024       Discharge Disposition: Home with family    Discharge Services:  none    Discharge DME:  none    Discharge Transportation:  friend/family to provide    Private pay costs discussed: Not applicable    Does the patient's insurance plan have a 3 day qualifying hospital stay waiver?  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       Rianna Gaviria RN, BSN  RN Care Coordinator    5A Medical Oncology/5C non-BMT  5A beds 5078-7932   beds [id number] (non-BMT)  [address] [address]  [email or web address]  Gender pronouns: she/her  Pager: [phone]      "

## 2024-02-27 NOTE — PLAN OF CARE
Goal Outcome Evaluation:      Plan of Care Reviewed With: patient    Overall Patient Progress: no changeOverall Patient Progress: no change       Resumed cares from [id number]   Bradycardic in low 50's - notified provider pt asymptomatic at this time OVSS, afebrile. Denies pain, n/v, numbness and tingling   Civi chemo in purple lumen, voiding spontaneously not saving, LBM 2/26   Ambulated frequently between cares   Good appetite today per pt   Plan for discharge after cytoxan completion  No other acute events continue to follow poc.

## 2024-02-27 NOTE — DISCHARGE SUMMARY
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     On day of discharge, Clementine is feeling overall well and is well appearing. Noted some return of nausea this morning, but stable with prns. She asks for emend to be available tomorrow in clinic if needed, order placed. We reviewed medications at discharge, steroid plan, antimicrobial ppx, and neulasta. We reviewed precautions with anticipated neutropenia. We reviewed follow up plan and strict return precautions. Patient and patient's father voice understanding of the above information and are comfortable with the discharge plan. All questions addressed at this time.    Outpatient follow-up issues:  - Follow up on nausea, scheduled one time dose of emend outpatient for 2/28 if continuing to have nausea.   - Will need pentamidine for pjp ppx (requested for clinic within week of discharge).    New discharge medications:  - Acyclovir  - Allopurinol  - Dexamethasone 8 mg x2 doses  - Fluconazole  - Levofloxacin  - Zofran prn  - Protonix  - Miralax prn  - Prednisone 100 mg x1 dose  - Compazine prn  - Senna prn    Next follow-up:  - 2/28: Neulasta +/- Emend (if nausea persistent)  - 3/1: Port placement  - 3/4: AMEENA follow up  - Twice weekly labs  - Inhaled pentamidine for pjp  ppx    Hospital Course   Clementine Kathleen was admitted on 2/22/2024.  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 Oakes. Initially presented with cervical lymphadenopathy 12/2023. Initial FNA (1/29/24) suggestive of possible Hodgkin lymphoma; however excisional LN biopsy (2/9/24) shows large B-cell lymphoma with differential diagnosis of primary mediastinal large B-cell lymphoma vs DLBCL NOS. PET with cervical and mediastinal lymphadenopathy only. Overall presentation would be quite consistent with PMBCL given her age, gender, sites of involvement, dim CD30 expression, and prominent fibrotic background. However, the neoplastic cells lack CD23 so additional testing is in process (IHC for PDL-1, , MAL and Rlnoc2YQ gene expression assay) to help distinguish. Overall diagnosis and prognosis of large B-cell lymphoma (PMBCL vs stage 2 DLBCL) was reviewed with primary oncologist, and ultimately through shared decision making was decided to treat as if her disease is PMBCL for Cycle 1 with dose-adjusted R-EPOCH while awaiting the pending additional studies. If studies come back more suggestive of DLBCL NOS later, we can de-escalate therapy to R-CHOP for later cycles. Plan is for a total of 6 cycles with first restaging PET-CT after 2 cycles. Side effects, treatment course reviewed, and patient was understanding and in agreement with this plan.  - PICC placed prior to admission, remove at discharge. Scheduled for port placement OP 3/1/24  - Echo 2/19 with LVEF 60%, no significant valvular abnormalities present      Treatment Plan: Dose adjusted R-EPOCH (C1D1=2/23/2024)  - Rituximab 375 mg/m2 (600mg) IV x 1 dose - D0  - Prednisone 100 mg BID x 10 doses - D1-5  - Etoposide 50mg/m2 (85 mg) IV x 4 doses - D1-4  - Vincristine, doxorubicin 0.4 mg/m2 (0.7mg) x 4 doses CIVI- D1-4  - Cyclophosphamide 750 mg/m2 (1275  mg) IV x 1 dose - D5  - Pegfilgrastim x1 - D6 (scheduled in clinic for 2/28)  - Pre-meds: tylenol, benadryl, zofran, emend, dexamethasone (D6-7)    # Risk for pancytopenia   2/2 chemotherapy and underlying disease.   - Transfuse for Hgb <7, Plt <10K  - Type & Screen MWF    # CINV  Developed worsening nausea around day 3 of chemo cycle. Emend dose was moved to day 3 with some improvement.   - Plan to re-dose Emend on 2/28 in clinic for ongoing nausea (if needed).   - Continue Zofran and Compazine.   - EKG (2/26/24) QTc 405     # Ppx  - TLS ppx: baseline uric acid normal. Continue allopurinol 300 mg daily. Encouraged good hydration.  - ID ppx:  mg BID. Levo and fluc when ANC <1.0. Monthly pentamidine for PJP ppx.   - Vaccines: up to date on COVID and flu shots.       MISC  # Fertility preservation  Already established with [facility], underwent egg retrieval on 2/18 or 2/19.   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 Her boyfriend is a general surgery resident here at [facility]. Parents live in [place], father was present on day of admission.    Clinically Significant Risk Factors               # Coagulation Defect: INR = 1.18 (Ref range: 0.85 - 1.15) and/or PTT = N/A, will monitor for bleeding                      Patient was seen and plan of care was discussed with attending physician Dr. Pinto.    I spent 50 minutes in the care of this patient today, which included time necessary for review of interval events, obtaining history and physical exam, ordering medications/tests/procedures as medically indicated, review of pertinent medical literature, counseling of the patient, coordination of care, and documentation time. Over 50% of time was spent  counseling the patient and/or coordinating care.    Rosemarie Gonzalez PA-C  Hematology/Oncology  Pager: #[phone]    Code Status   Full Code    Primary Care Physician   Chela Peter    Physical Exam   Vital Signs with Ranges  Temp:  [97.6  F (36.4  C)-98.2  F (36.8  C)] 97.8  F (36.6  C)  Pulse:  [49-65] 65  Resp:  [16-20] 16  BP: (108-131)/(54-77) 129/64  SpO2:  [93 %-98 %] 93 %  135 lbs 12.85 oz    Constitutional: Awake and conversational. Non- toxic appearing. No acute distress.   HEENT: NCAT. Moist mucus membranes without lesions, thrush, or exudates appreciated  Lymph: Neck supple, no ridigity. No significant adenopathy noted.   Respiratory: Breathing comfortably on room air with no accessory muscle use. Speaking in full sentences, no evidence of respiratory distress. Lungs CTAB without stridor, wheeze, rhonchi, or rales.   Cardiovascular: Regular rate and rhythm. No peripheral edema.    GI: Abdomen with normoactive bowel sounds, soft and non-tender throughout. No rebound, guarding, or peritoneal sign.  Skin: Skin is clean, dry, intact. No jaundice or significant rashes appreciated.   Neurologic: Alert and with normal speech. Grossly nonfocal.  Neuropsychiatric: Calm, affect congruent to situation. Appropriate mood and affect.    Discharge Disposition   Discharged to home  Condition at discharge: Stable    Consultations This Hospital Stay   VASCULAR ACCESS FOR PICC PLACEMENT ADULT IP CONSULT  CARE MANAGEMENT / SOCIAL WORK IP CONSULT    Discharge Orders      Reason for your hospital stay    You were admitted for scheduled chemotherapy with R-EPOCH.     Activity    Your activity upon discharge: activity as tolerated     Follow Up and recommended labs and tests    An appointment for hospital follow up was requested for you. If it is not scheduled by the time you discharge you will be contacted with the date and time. You may call clinic to makes changes to this appointment if needed.    Already scheduled  appointments are listed below.     When to contact your care team    Please call the [facility] at [phone] if you develop temperature above 100.4, shortness of breath, chest pain, headaches, vision changes, bleeding, uncontrolled nausea, vomiting, diarrhea, pain, or any other signs or symptoms of concern. If you are concerned that your symptoms are life-threatening, don't hesitate to call 911 or go to the nearest Emergency Room.     Discharge Instructions    - Your counts are expected to drop in the week following discharge. The neulasta injection will help limit the time you are neutropenic. Being neutropenic puts you at risk for infections. You have been prescribed antimicrobials (Acyclovir, Levofloxacin, and fluconazole) to try to prevent infection, but these can't prevent all infections. Make sure to take precautions such as washing your hands, wearing a mask, and limiting your risk of exposure whenever possible. The outpatient team will help guide the duration you will remain on these prophylaxis medications.   - You will complete your high dose steroids (prednisone 100 mg) on day of discharge (2/27, at about 4pm).   - Please start taking Dexamethasone 8 mg on Wednesday, 2/28, daily for two days.     Diet    Follow this diet upon discharge: Regular     Discharge Medications   Current Discharge Medication List        START taking these medications    Details   acyclovir (ZOVIRAX) 400 MG tablet Take 1 tablet (400 mg) by mouth 2 times daily  Qty: 60 tablet, Refills: 0    Associated Diagnoses: 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)      allopurinol (ZYLOPRIM) 300 MG tablet Take 1 tablet (300 mg) by mouth daily  Qty: 15 tablet, Refills: 0    Associated Diagnoses: Prevention of chemotherapy-induced neutropenia; 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)      dexAMETHasone (DECADRON) 4 MG tablet Take 2 tablets (8 mg) by  mouth daily Start taking Wednesday, 2/28, for two days.  Qty: 4 tablet, Refills: 0    Associated Diagnoses: Prevention of chemotherapy-induced neutropenia; 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)      fluconazole (DIFLUCAN) 200 MG tablet Take 1 tablet (200 mg) by mouth daily  Qty: 30 tablet, Refills: 0    Associated Diagnoses: 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)      levofloxacin (LEVAQUIN) 250 MG tablet Take 1 tablet (250 mg) by mouth daily  Qty: 30 tablet, Refills: 0    Associated Diagnoses: 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)      ondansetron (ZOFRAN) 4 MG tablet Take 1-2 tablets (4-8 mg) by mouth every 8 hours as needed for nausea or vomiting  Qty: 30 tablet, Refills: 0    Associated Diagnoses: 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)      pantoprazole (PROTONIX) 40 MG EC tablet Take 1 tablet (40 mg) by mouth every morning  Qty: 30 tablet, Refills: 0    Associated Diagnoses: 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)      polyethylene glycol (MIRALAX) 17 GM/Dose powder Take 17 g by mouth daily as needed for constipation  Qty: 510 g, Refills: 0    Associated Diagnoses: 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)      predniSONE (DELTASONE) 50 MG tablet Take 2 tablets (100 mg) by mouth once for 1 dose Take on 2/27 at 1600.  Qty: 2 tablet, Refills: 0    Associated Diagnoses: Prevention of chemotherapy-induced neutropenia; 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)      prochlorperazine (COMPAZINE) 10 MG tablet Take 1 tablet (10 mg) by mouth every 6 hours as needed for nausea or vomiting  Qty: 30 tablet, Refills: 0    Associated Diagnoses: 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)      senna-docusate (SENOKOT-S/PERICOLACE) 8.6-50 MG tablet Take 1 tablet by mouth daily as needed for constipation  Qty: 15 tablet, Refills: 0    Associated  Diagnoses: 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)           CONTINUE these medications which have CHANGED    Details   modafinil (PROVIGIL) 200 MG tablet Take 1 tablet (200 mg) by mouth daily as needed    Associated Diagnoses: Idiopathic hypersomnia           CONTINUE these medications which have NOT CHANGED    Details   cimetidine (TAGAMET) 200 MG tablet Take 200 mg by mouth 2 times daily      Levonorgestrel (KYLEENA IU) 1 Device by Intrauterine route once           STOP taking these medications       cabergoline (DOSTINEX) 0.5 MG tablet Comments:   Reason for Stopping:         doxycycline monohydrate (MONODOX) 100 MG capsule Comments:   Reason for Stopping:         letrozole (FEMARA) 2.5 MG tablet Comments:   Reason for Stopping:         omeprazole 20 MG tablet Comments:   Reason for Stopping:             Allergies   No Known Allergies    Data   Most Recent 3 CBC's:  Recent Labs   Lab Test 02/27/24  0500 02/26/24  0614 02/25/24  0551   WBC 3.8* 5.9 9.0   HGB 12.7 12.3 12.8   MCV 84 86 87    210 226     Most Recent 3 BMP's:  Recent Labs   Lab Test 02/27/24  0500 02/26/24  0614 02/25/24  0551    138 138   POTASSIUM 3.4 3.7 3.8   CHLORIDE 103 105 105   CO2 26 25 23   BUN 12.5 16.1 14.5   CR 0.56 0.58 0.56   ANIONGAP 9 8 10   DEE 8.9 8.9 8.9   * 100* 130*     Most Recent 2 LFT's:  Recent Labs   Lab Test 02/27/24  0500 02/26/24  0614   AST 12 14   ALT 16 18   ALKPHOS 57 54   BILITOTAL 0.5 0.7

## 2024-02-27 NOTE — PROGRESS NOTES
Prior Authorization Approval    Medication: ONDANSETRON HCL 4 MG PO TABS  PA Initiated: 2/27/2024  PA Type: Quantity Limit  Qty Approved: 180 tabs per 30 days    Insurance: [facility] - Phone [phone] Fax [phone]  [facility] / Reference #: [id number] / [id number]   Authorization Effective Dates: 1/28/2024 - 2/27/2025    Expected CoPay: $ 2.03  CoPay Card Eligible: No    Filling Pharmacy: [facility] - [place], [place] - [address] [place] [address]      Rosa Maria Haas  [facility] Liaison  Ph: [phone]  Fax: [phone]  Available on Vocera (learn more here)

## 2024-02-28 ENCOUNTER — INFUSION THERAPY VISIT (OUTPATIENT)
Dept: ONCOLOGY | Facility: CLINIC | Age: 29
End: 2024-02-28
Attending: INTERNAL MEDICINE
Payer: COMMERCIAL

## 2024-02-28 ENCOUNTER — APPOINTMENT (OUTPATIENT)
Dept: LAB | Facility: CLINIC | Age: 29
End: 2024-02-28
Attending: INTERNAL MEDICINE
Payer: COMMERCIAL

## 2024-02-28 VITALS
WEIGHT: 136.1 LBS | DIASTOLIC BLOOD PRESSURE: 71 MMHG | TEMPERATURE: 97.5 F | BODY MASS INDEX: 21.97 KG/M2 | RESPIRATION RATE: 16 BRPM | SYSTOLIC BLOOD PRESSURE: 111 MMHG | OXYGEN SATURATION: 97 % | HEART RATE: 81 BPM

## 2024-02-28 DIAGNOSIS — Z76.89 PREVENTION OF CHEMOTHERAPY-INDUCED NEUTROPENIA: Primary | ICD-10-CM

## 2024-02-28 DIAGNOSIS — R11.0 NAUSEA: ICD-10-CM

## 2024-02-28 DIAGNOSIS — C83.32 DIFFUSE LARGE B-CELL LYMPHOMA OF INTRATHORACIC LYMPH NODES (H): ICD-10-CM

## 2024-02-28 DIAGNOSIS — C85.28 MEDIASTINAL LARGE B-CELL LYMPHOMA OF LYMPH NODES OF MULTIPLE REGIONS (H): ICD-10-CM

## 2024-02-28 LAB
ALBUMIN SERPL BCG-MCNC: 3.8 G/DL (ref 3.5–5.2)
ALP SERPL-CCNC: 59 U/L (ref 40–150)
ALT SERPL W P-5'-P-CCNC: 15 U/L (ref 0–50)
ANION GAP SERPL CALCULATED.3IONS-SCNC: 11 MMOL/L (ref 7–15)
AST SERPL W P-5'-P-CCNC: 13 U/L (ref 0–45)
ATRIAL RATE - MUSE: 52 BPM
BASOPHILS # BLD AUTO: 0 10E3/UL (ref 0–0.2)
BASOPHILS NFR BLD AUTO: 0 %
BILIRUB SERPL-MCNC: 0.6 MG/DL
BUN SERPL-MCNC: 19.2 MG/DL (ref 6–20)
CALCIUM SERPL-MCNC: 8.9 MG/DL (ref 8.6–10)
CHLORIDE SERPL-SCNC: 102 MMOL/L (ref 98–107)
CREAT SERPL-MCNC: 0.63 MG/DL (ref 0.51–0.95)
DEPRECATED HCO3 PLAS-SCNC: 23 MMOL/L (ref 22–29)
DIASTOLIC BLOOD PRESSURE - MUSE: NORMAL MMHG
EGFRCR SERPLBLD CKD-EPI 2021: >90 ML/MIN/1.73M2
EOSINOPHIL # BLD AUTO: 0 10E3/UL (ref 0–0.7)
EOSINOPHIL NFR BLD AUTO: 1 %
ERYTHROCYTE [DISTWIDTH] IN BLOOD BY AUTOMATED COUNT: 13 % (ref 10–15)
GLUCOSE SERPL-MCNC: 150 MG/DL (ref 70–99)
HCT VFR BLD AUTO: 38.8 % (ref 35–47)
HGB BLD-MCNC: 13.5 G/DL (ref 11.7–15.7)
IMM GRANULOCYTES # BLD: 0 10E3/UL
IMM GRANULOCYTES NFR BLD: 0 %
INTERPRETATION ECG - MUSE: NORMAL
LDH SERPL L TO P-CCNC: 174 U/L (ref 0–250)
LYMPHOCYTES # BLD AUTO: 0.3 10E3/UL (ref 0.8–5.3)
LYMPHOCYTES NFR BLD AUTO: 4 %
MCH RBC QN AUTO: 29.5 PG (ref 26.5–33)
MCHC RBC AUTO-ENTMCNC: 34.8 G/DL (ref 31.5–36.5)
MCV RBC AUTO: 85 FL (ref 78–100)
MONOCYTES # BLD AUTO: 0 10E3/UL (ref 0–1.3)
MONOCYTES NFR BLD AUTO: 0 %
NEUTROPHILS # BLD AUTO: 5.9 10E3/UL (ref 1.6–8.3)
NEUTROPHILS NFR BLD AUTO: 95 %
NRBC # BLD AUTO: 0 10E3/UL
NRBC BLD AUTO-RTO: 0 /100
P AXIS - MUSE: 61 DEGREES
PLATELET # BLD AUTO: 237 10E3/UL (ref 150–450)
POTASSIUM SERPL-SCNC: 3.5 MMOL/L (ref 3.4–5.3)
PR INTERVAL - MUSE: 136 MS
PROT SERPL-MCNC: 6.7 G/DL (ref 6.4–8.3)
QRS DURATION - MUSE: 74 MS
QT - MUSE: 436 MS
QTC - MUSE: 405 MS
R AXIS - MUSE: 86 DEGREES
RBC # BLD AUTO: 4.58 10E6/UL (ref 3.8–5.2)
SODIUM SERPL-SCNC: 136 MMOL/L (ref 135–145)
SYSTOLIC BLOOD PRESSURE - MUSE: NORMAL MMHG
T AXIS - MUSE: 72 DEGREES
URATE SERPL-MCNC: 1.3 MG/DL (ref 2.4–5.7)
VENTRICULAR RATE- MUSE: 52 BPM
WBC # BLD AUTO: 6.2 10E3/UL (ref 4–11)

## 2024-02-28 PROCEDURE — 250N000011 HC RX IP 250 OP 636

## 2024-02-28 PROCEDURE — 85025 COMPLETE CBC W/AUTO DIFF WBC: CPT

## 2024-02-28 PROCEDURE — 84550 ASSAY OF BLOOD/URIC ACID: CPT

## 2024-02-28 PROCEDURE — 96372 THER/PROPH/DIAG INJ SC/IM: CPT | Performed by: INTERNAL MEDICINE

## 2024-02-28 PROCEDURE — 258N000003 HC RX IP 258 OP 636

## 2024-02-28 PROCEDURE — 96365 THER/PROPH/DIAG IV INF INIT: CPT

## 2024-02-28 PROCEDURE — 96372 THER/PROPH/DIAG INJ SC/IM: CPT | Mod: 59 | Performed by: INTERNAL MEDICINE

## 2024-02-28 PROCEDURE — 83615 LACTATE (LD) (LDH) ENZYME: CPT

## 2024-02-28 PROCEDURE — 82374 ASSAY BLOOD CARBON DIOXIDE: CPT

## 2024-02-28 PROCEDURE — 250N000011 HC RX IP 250 OP 636: Mod: JZ | Performed by: INTERNAL MEDICINE

## 2024-02-28 PROCEDURE — 36415 COLL VENOUS BLD VENIPUNCTURE: CPT

## 2024-02-28 RX ORDER — DIPHENHYDRAMINE HYDROCHLORIDE 50 MG/ML
50 INJECTION INTRAMUSCULAR; INTRAVENOUS
Start: 2024-02-28

## 2024-02-28 RX ORDER — METHYLPREDNISOLONE SODIUM SUCCINATE 125 MG/2ML
125 INJECTION, POWDER, LYOPHILIZED, FOR SOLUTION INTRAMUSCULAR; INTRAVENOUS
Start: 2024-02-28

## 2024-02-28 RX ORDER — HEPARIN SODIUM,PORCINE 10 UNIT/ML
5-20 VIAL (ML) INTRAVENOUS DAILY PRN
OUTPATIENT
Start: 2024-02-28

## 2024-02-28 RX ORDER — HEPARIN SODIUM (PORCINE) LOCK FLUSH IV SOLN 100 UNIT/ML 100 UNIT/ML
5 SOLUTION INTRAVENOUS
OUTPATIENT
Start: 2024-02-28

## 2024-02-28 RX ORDER — ALBUTEROL SULFATE 90 UG/1
1-2 AEROSOL, METERED RESPIRATORY (INHALATION)
Start: 2024-02-28

## 2024-02-28 RX ORDER — ALBUTEROL SULFATE 0.83 MG/ML
2.5 SOLUTION RESPIRATORY (INHALATION)
OUTPATIENT
Start: 2024-02-28

## 2024-02-28 RX ORDER — EPINEPHRINE 1 MG/ML
0.3 INJECTION, SOLUTION, CONCENTRATE INTRAVENOUS EVERY 5 MIN PRN
OUTPATIENT
Start: 2024-02-28

## 2024-02-28 RX ORDER — MEPERIDINE HYDROCHLORIDE 25 MG/ML
25 INJECTION INTRAMUSCULAR; INTRAVENOUS; SUBCUTANEOUS EVERY 30 MIN PRN
OUTPATIENT
Start: 2024-02-28

## 2024-02-28 RX ADMIN — PEGFILGRASTIM 6 MG: 6 INJECTION SUBCUTANEOUS at 14:23

## 2024-02-28 RX ADMIN — FOSAPREPITANT 150 MG: 150 INJECTION, POWDER, LYOPHILIZED, FOR SOLUTION INTRAVENOUS at 14:33

## 2024-02-28 ASSESSMENT — PAIN SCALES - GENERAL: PAINLEVEL: NO PAIN (0)

## 2024-02-28 NOTE — NURSING NOTE
Chief Complaint   Patient presents with    Blood Draw     Vitals taken, piv started, labs drawn saline locked, checked into next appt     /71 (BP Location: Left arm, Patient Position: Sitting, Cuff Size: Adult Regular)   Pulse 81   Temp 97.5  F (36.4  C) (Oral)   Resp 16   Wt 61.7 kg (136 lb 1.6 oz)   LMP 01/31/2024 (Exact Date)   SpO2 97%   BMI 21.97 kg/m    Santana Magaña RN on 2/28/2024 at 1:57 PM

## 2024-02-28 NOTE — PROGRESS NOTES
Infusion Nursing Note:  Clementine Sharpelilliam presents today for Emend IV-Neulasta.    Patient seen by provider 2/27 when discharged from [facility]: Yes: Rosemarie Gonzalez, SUMIT    present during visit today: Not Applicable.    Note: Patient denies the following: fevers, body aches, chills, headaches, vision changes, dizziness, chest pain, shortness of breath, vomiting, constipation, abdominal pain, rashes, bruising/bleeding, mouth sores, swelling or pain in the legs.    -some nausea this AM, prns bring relief  -has been eating and drinking well at home  -discharged from hospital yesterday  -completed C1 R-EPOCH inpt, had some nausea on Day 3 of treatment   -here for EMEND IV  -discussed PPX orders per discharge note   -pt verbalizes understanding    Neulasta administered by EMT, see note.      Intravenous Access:  Peripheral IV placed.    Treatment Conditions:   Latest Reference Range & Units 02/28/24 13:53   Sodium 135 - 145 mmol/L 136   Potassium 3.4 - 5.3 mmol/L 3.5   Chloride 98 - 107 mmol/L 102   Carbon Dioxide (CO2) 22 - 29 mmol/L 23   Urea Nitrogen 6.0 - 20.0 mg/dL 19.2   Creatinine 0.51 - 0.95 mg/dL 0.63   GFR Estimate >60 mL/min/1.73m2 >90   Calcium 8.6 - 10.0 mg/dL 8.9   Anion Gap 7 - 15 mmol/L 11   Albumin 3.5 - 5.2 g/dL 3.8   Protein Total 6.4 - 8.3 g/dL 6.7   Alkaline Phosphatase 40 - 150 U/L 59   ALT 0 - 50 U/L 15   AST 0 - 45 U/L 13   Bilirubin Total <=1.2 mg/dL 0.6   Glucose 70 - 99 mg/dL 150 (H)   Lactate Dehydrogenase 0 - 250 U/L 174   Uric Acid 2.4 - 5.7 mg/dL 1.3 (L)   WBC 4.0 - 11.0 10e3/uL 6.2   Hemoglobin 11.7 - 15.7 g/dL 13.5   Hematocrit 35.0 - 47.0 % 38.8   Platelet Count 150 - 450 10e3/uL 237   RBC Count 3.80 - 5.20 10e6/uL 4.58   MCV 78 - 100 fL 85   MCH 26.5 - 33.0 pg 29.5   MCHC 31.5 - 36.5 g/dL 34.8   RDW 10.0 - 15.0 % 13.0   % Neutrophils % 95   % Lymphocytes % 4   % Monocytes % 0   % Eosinophils % 1   % Basophils % 0   Absolute Basophils 0.0 - 0.2 10e3/uL 0.0   Absolute  Eosinophils 0.0 - 0.7 10e3/uL 0.0   Absolute Immature Granulocytes <=0.4 10e3/uL 0.0   Absolute Lymphocytes 0.8 - 5.3 10e3/uL 0.3 (L)   Absolute Monocytes 0.0 - 1.3 10e3/uL 0.0   % Immature Granulocytes % 0   Absolute Neutrophils 1.6 - 8.3 10e3/uL 5.9   Absolute NRBCs 10e3/uL 0.0   NRBCs per 100 WBC <1 /100 0       Post Infusion Assessment:  Patient tolerated infusion without incident.  Blood return noted pre and post infusion.  Site patent and intact, free from redness, edema or discomfort.  No evidence of extravasations.  Access discontinued per protocol.       Discharge Plan:   Patient declined prescription refills.  Discharge instructions reviewed with: Patient.  Patient and/or family verbalized understanding of discharge instructions and all questions answered.  AVS to patient via [employer].  Patient will return 3/1 for PORT placement and PENT NEB.  Labs and ELLIOT Padron on 3/4.   Patient discharged in stable condition accompanied by: self.  Departure Mode: Ambulatory.    Karol Eddy RN

## 2024-02-29 ENCOUNTER — ANESTHESIA EVENT (OUTPATIENT)
Dept: SURGERY | Facility: AMBULATORY SURGERY CENTER | Age: 29
End: 2024-02-29
Payer: COMMERCIAL

## 2024-02-29 ENCOUNTER — PATIENT OUTREACH (OUTPATIENT)
Dept: ONCOLOGY | Facility: CLINIC | Age: 29
End: 2024-02-29
Payer: COMMERCIAL

## 2024-02-29 ASSESSMENT — ENCOUNTER SYMPTOMS
DYSRHYTHMIAS: 0
SEIZURES: 0

## 2024-02-29 ASSESSMENT — LIFESTYLE VARIABLES: TOBACCO_USE: 0

## 2024-02-29 NOTE — ANESTHESIA PREPROCEDURE EVALUATION
Anesthesia Pre-Procedure Evaluation    Patient: Clementine Kathleen   MRN: [id number] : 1995        Procedure : Procedure(s):  Insert port vascular access          Past Medical History:   Diagnosis Date    Anxiety     Cervical lymphadenopathy     GERD     Interstitial cystitis       Past Surgical History:   Procedure Laterality Date    CL AFF SURGICAL PATHOLOGY      Pearl neuroma    CLOSED REDUCTION PROXIMAL FIBULAR FRACTURE Left     EXCISE LESION NECK Left 2024    Procedure: Excisional Node Biopsy Left Neck;  Surgeon: Asher Damon MD;  Location: [facility] OR    PICC INSERTION - DOUBLE LUMEN Right 2024    41-1cm, Medial brachial vein      No Known Allergies   Social History     Tobacco Use    Smoking status: Never     Passive exposure: Never    Smokeless tobacco: Never   Substance Use Topics    Alcohol use: Not Currently      Wt Readings from Last 1 Encounters:   24 61.7 kg (136 lb 1.6 oz)        Anesthesia Evaluation   Pt has had prior anesthetic. Type: General.    No history of anesthetic complications       ROS/MED HX  ENT/Pulmonary:    (-) tobacco use and recent URI   Neurologic:    (-) no seizures   Cardiovascular:     (+)  - -   -  - -                                 No previous cardiac testing  (-) taking anticoagulants/antiplatelets, JACKSON and arrhythmias   METS/Exercise Tolerance: >4 METS    Hematologic:  - neg hematologic  ROS  (-) history of blood clots and history of blood transfusion   Musculoskeletal:  - neg musculoskeletal ROS     GI/Hepatic:  - neg GI/hepatic ROS   (+) GERD, Asymptomatic on medication,                  Renal/Genitourinary: Comment: Interstitial cystitis      Endo:  - neg endo ROS     Psychiatric/Substance Use:     (+) psychiatric history anxiety       Infectious Disease:  - neg infectious disease ROS     Malignancy:  - neg malignancy ROS     Other:  - neg other ROS          Physical Exam    Airway        Mallampati: II   TM distance: > 3 FB   Neck ROM: full  "  Mouth opening: > 3 cm    Respiratory Devices and Support         Dental       (+) Minor Abnormalities - some fillings, tiny chips      Cardiovascular   cardiovascular exam normal          Pulmonary   pulmonary exam normal                OUTSIDE LABS:  CBC:   Lab Results   Component Value Date    WBC 6.2 02/28/2024    WBC 3.8 (L) 02/27/2024    HGB 13.5 02/28/2024    HGB 12.7 02/27/2024    HCT 38.8 02/28/2024    HCT 36.7 02/27/2024     02/28/2024     02/27/2024     BMP:   Lab Results   Component Value Date     02/28/2024     02/27/2024    POTASSIUM 3.5 02/28/2024    POTASSIUM 3.4 02/27/2024    CHLORIDE 102 02/28/2024    CHLORIDE 103 02/27/2024    CO2 23 02/28/2024    CO2 26 02/27/2024    BUN 19.2 02/28/2024    BUN 12.5 02/27/2024    CR 0.63 02/28/2024    CR 0.56 02/27/2024     (H) 02/28/2024     (H) 02/27/2024     COAGS:   Lab Results   Component Value Date    INR 1.18 (H) 02/27/2024    FIBR 216 02/27/2024     POC: No results found for: \"BGM\", \"HCG\", \"HCGS\"  HEPATIC:   Lab Results   Component Value Date    ALBUMIN 3.8 02/28/2024    PROTTOTAL 6.7 02/28/2024    ALT 15 02/28/2024    AST 13 02/28/2024    ALKPHOS 59 02/28/2024    BILITOTAL 0.6 02/28/2024     OTHER:   Lab Results   Component Value Date    DEE 8.9 02/28/2024    PHOS 2.7 02/27/2024    MAG 2.1 02/27/2024    SED 25 (H) 02/08/2024       Anesthesia Plan    ASA Status:  3    NPO Status:  NPO Appropriate    Anesthesia Type: MAC.     - Reason for MAC: straight local not clinically adequate   Induction: Intravenous, Propofol.   Maintenance: TIVA.        Consents    Anesthesia Plan(s) and associated risks, benefits, and realistic alternatives discussed. Questions answered and patient/representative(s) expressed understanding.     - Discussed:     - Discussed with:  Patient      - Extended Intubation/Ventilatory Support Discussed: No.      - Patient is DNR/DNI Status: No     Use of blood products discussed: No . "     Postoperative Care    Pain management: IV analgesics, Oral pain medications, Multi-modal analgesia.   PONV prophylaxis: Dexamethasone or Solumedrol, Ondansetron (or other 5HT-3), Background Propofol Infusion     Comments:               Kenny Mon MD    I have reviewed the pertinent notes and labs in the chart from the past 30 days and (re)examined the patient.  Any updates or changes from those notes are reflected in this note.            # Coagulation Defect: INR = 1.18 (Ref range: 0.85 - 1.15) and/or PTT = N/A, will monitor for bleeding

## 2024-02-29 NOTE — PROGRESS NOTES
"[facility]: Post-Discharge Note  SITUATION                                                      Admission:    Admission Date: 02/22/24   Reason for Admission: REPOCH  Discharge:   Discharge Date: 02/27/24  Discharge Diagnosis: lymphoma    BACKGROUND                                                      Per hospital discharge summary and inpatient provider notes.    ASSESSMENT           Discharge Assessment  How are you doing now that you are home?: \"Doing better, only dealing with some nausea in the mornings which the emend has been helpful for.\"  How are your symptoms? (Red Flag symptoms escalate to triage hotline per guidelines): Improved  Do you feel your condition is stable enough to be safe at home until your provider visit?: Yes  Does the patient have their discharge instructions? : Yes  Does the patient have questions regarding their discharge instructions? : No  Were you started on any new medications or were there changes to any of your previous medications? : Yes  Does the patient have all of their medications?: Yes  Do you have questions regarding any of your medications? : No  Do you have all of your needed medical supplies or equipment (DME)?  (i.e. oxygen tank, CPAP, cane, etc.): Yes  Discharge follow-up appointment scheduled within 14 calendar days? : Yes  Discharge Follow Up Appointment Date: 03/04/24         PLAN                                                      Outpatient Plan:  Talked about possibility of doing JUDITH outpatient for future cycles. Reviewed that we would work with  [facility] infusion and our infusion center to set up a chemotherapy infusion pump, and she would come to the clinic for a daily appointment and have the bag switched out for the duration of treatment instead of needing to be in the hospital for treatment. Clementine stated she wanted to think about this more.     Future Appointments   Date Time Provider Department Center   3/1/2024  7:00 AM  [facility] " DRAW [facility]   3/1/2024  9:00 AM [id number] IMG [facility]   3/1/2024 12:00 PM UC PFL PENT PFT [facility]   3/4/2024  1:45 PM  [facility] DRAW [facility]   3/4/2024  2:30 PM Nereida Thomas, CALOS CNP ONA [facility]   3/7/2024 11:45 AM  [facility] DRAW [facility]   3/11/2024 11:45 AM  [facility] DRAW [facility]   4/1/2024 10:30 AM [address]         For any urgent concerns, please contact our 24 hour clinic line:   [place]: [phone]       Sonja Garibay RN

## 2024-03-01 ENCOUNTER — ANCILLARY PROCEDURE (OUTPATIENT)
Dept: RADIOLOGY | Facility: AMBULATORY SURGERY CENTER | Age: 29
End: 2024-03-01
Attending: INTERNAL MEDICINE
Payer: COMMERCIAL

## 2024-03-01 ENCOUNTER — ANESTHESIA (OUTPATIENT)
Dept: SURGERY | Facility: AMBULATORY SURGERY CENTER | Age: 29
End: 2024-03-01
Payer: COMMERCIAL

## 2024-03-01 ENCOUNTER — HOSPITAL ENCOUNTER (OUTPATIENT)
Facility: AMBULATORY SURGERY CENTER | Age: 29
Discharge: HOME | End: 2024-03-01
Attending: PHYSICIAN ASSISTANT
Payer: COMMERCIAL

## 2024-03-01 ENCOUNTER — LAB (OUTPATIENT)
Dept: LAB | Facility: CLINIC | Age: 29
End: 2024-03-01
Attending: INTERNAL MEDICINE
Payer: COMMERCIAL

## 2024-03-01 VITALS
HEIGHT: 65 IN | DIASTOLIC BLOOD PRESSURE: 77 MMHG | WEIGHT: 135 LBS | OXYGEN SATURATION: 98 % | HEART RATE: 63 BPM | BODY MASS INDEX: 22.49 KG/M2 | TEMPERATURE: 97 F | SYSTOLIC BLOOD PRESSURE: 110 MMHG | RESPIRATION RATE: 18 BRPM

## 2024-03-01 DIAGNOSIS — C85.28 MEDIASTINAL LARGE B-CELL LYMPHOMA OF LYMPH NODES OF MULTIPLE REGIONS (H): ICD-10-CM

## 2024-03-01 DIAGNOSIS — C83.32 DIFFUSE LARGE B-CELL LYMPHOMA OF INTRATHORACIC LYMPH NODES (H): ICD-10-CM

## 2024-03-01 DIAGNOSIS — C83.32 DIFFUSE LARGE B-CELL LYMPHOMA OF INTRATHORACIC LYMPH NODES (H): Primary | ICD-10-CM

## 2024-03-01 LAB
ALBUMIN SERPL BCG-MCNC: 3.6 G/DL (ref 3.5–5.2)
ALP SERPL-CCNC: 67 U/L (ref 40–150)
ALT SERPL W P-5'-P-CCNC: 10 U/L (ref 0–50)
ANION GAP SERPL CALCULATED.3IONS-SCNC: 8 MMOL/L (ref 7–15)
AST SERPL W P-5'-P-CCNC: 11 U/L (ref 0–45)
BASOPHILS # BLD AUTO: ABNORMAL 10*3/UL
BASOPHILS # BLD MANUAL: 0 10E3/UL (ref 0–0.2)
BASOPHILS NFR BLD AUTO: ABNORMAL %
BASOPHILS NFR BLD MANUAL: 0 %
BILIRUB SERPL-MCNC: 1 MG/DL
BUN SERPL-MCNC: 19.9 MG/DL (ref 6–20)
CALCIUM SERPL-MCNC: 8.7 MG/DL (ref 8.6–10)
CHLORIDE SERPL-SCNC: 102 MMOL/L (ref 98–107)
CREAT SERPL-MCNC: 0.68 MG/DL (ref 0.51–0.95)
DEPRECATED HCO3 PLAS-SCNC: 27 MMOL/L (ref 22–29)
EGFRCR SERPLBLD CKD-EPI 2021: >90 ML/MIN/1.73M2
EOSINOPHIL # BLD AUTO: ABNORMAL 10*3/UL
EOSINOPHIL # BLD MANUAL: 0.5 10E3/UL (ref 0–0.7)
EOSINOPHIL NFR BLD AUTO: ABNORMAL %
EOSINOPHIL NFR BLD MANUAL: 1 %
ERYTHROCYTE [DISTWIDTH] IN BLOOD BY AUTOMATED COUNT: 13.2 % (ref 10–15)
GLUCOSE SERPL-MCNC: 95 MG/DL (ref 70–99)
HCG UR QL: NEGATIVE
HCT VFR BLD AUTO: 36.7 % (ref 35–47)
HGB BLD-MCNC: 12.6 G/DL (ref 11.7–15.7)
IMM GRANULOCYTES # BLD: ABNORMAL 10*3/UL
IMM GRANULOCYTES NFR BLD: ABNORMAL %
INTERNAL QC OK POCT: NORMAL
LDH SERPL L TO P-CCNC: 173 U/L (ref 0–250)
LYMPHOCYTES # BLD AUTO: ABNORMAL 10*3/UL
LYMPHOCYTES # BLD MANUAL: 1.8 10E3/UL (ref 0.8–5.3)
LYMPHOCYTES NFR BLD AUTO: ABNORMAL %
LYMPHOCYTES NFR BLD MANUAL: 4 %
MCH RBC QN AUTO: 29.3 PG (ref 26.5–33)
MCHC RBC AUTO-ENTMCNC: 34.3 G/DL (ref 31.5–36.5)
MCV RBC AUTO: 85 FL (ref 78–100)
MONOCYTES # BLD AUTO: ABNORMAL 10*3/UL
MONOCYTES # BLD MANUAL: 0 10E3/UL (ref 0–1.3)
MONOCYTES NFR BLD AUTO: ABNORMAL %
MONOCYTES NFR BLD MANUAL: 0 %
NEUTROPHILS # BLD AUTO: ABNORMAL 10*3/UL
NEUTROPHILS # BLD MANUAL: 43.5 10E3/UL (ref 1.6–8.3)
NEUTROPHILS NFR BLD AUTO: ABNORMAL %
NEUTROPHILS NFR BLD MANUAL: 95 %
NRBC # BLD AUTO: 0 10E3/UL
NRBC BLD AUTO-RTO: 0 /100
PLAT MORPH BLD: ABNORMAL
PLATELET # BLD AUTO: 212 10E3/UL (ref 150–450)
POCT KIT EXPIRATION DATE: NORMAL
POCT KIT LOT NUMBER: NORMAL
POTASSIUM SERPL-SCNC: 4.2 MMOL/L (ref 3.4–5.3)
PROT SERPL-MCNC: 6.1 G/DL (ref 6.4–8.3)
RBC # BLD AUTO: 4.3 10E6/UL (ref 3.8–5.2)
RBC MORPH BLD: ABNORMAL
SODIUM SERPL-SCNC: 137 MMOL/L (ref 135–145)
WBC # BLD AUTO: 45.8 10E3/UL (ref 4–11)

## 2024-03-01 PROCEDURE — 77001 FLUOROGUIDE FOR VEIN DEVICE: CPT | Mod: 26 | Performed by: PHYSICIAN ASSISTANT

## 2024-03-01 PROCEDURE — 85007 BL SMEAR W/DIFF WBC COUNT: CPT

## 2024-03-01 PROCEDURE — 36561 INSERT TUNNELED CV CATH: CPT | Mod: RT | Performed by: PHYSICIAN ASSISTANT

## 2024-03-01 PROCEDURE — 94640 AIRWAY INHALATION TREATMENT: CPT | Performed by: INTERNAL MEDICINE

## 2024-03-01 PROCEDURE — 94642 AEROSOL INHALATION TREATMENT: CPT | Performed by: INTERNAL MEDICINE

## 2024-03-01 PROCEDURE — 36415 COLL VENOUS BLD VENIPUNCTURE: CPT

## 2024-03-01 PROCEDURE — 80053 COMPREHEN METABOLIC PANEL: CPT

## 2024-03-01 PROCEDURE — 81025 URINE PREGNANCY TEST: CPT | Performed by: PATHOLOGY

## 2024-03-01 PROCEDURE — 36561 INSERT TUNNELED CV CATH: CPT | Performed by: STUDENT IN AN ORGANIZED HEALTH CARE EDUCATION/TRAINING PROGRAM

## 2024-03-01 PROCEDURE — 36561 INSERT TUNNELED CV CATH: CPT | Performed by: NURSE ANESTHETIST, CERTIFIED REGISTERED

## 2024-03-01 PROCEDURE — 83615 LACTATE (LD) (LDH) ENZYME: CPT

## 2024-03-01 PROCEDURE — 85027 COMPLETE CBC AUTOMATED: CPT

## 2024-03-01 PROCEDURE — 76937 US GUIDE VASCULAR ACCESS: CPT | Mod: 26 | Performed by: PHYSICIAN ASSISTANT

## 2024-03-01 DEVICE — IMP SMART PORT LOW PROFILE 300 PSI 63CMX6FR H787CT60LPPDVI0: Type: IMPLANTABLE DEVICE | Site: CHEST | Status: FUNCTIONAL

## 2024-03-01 RX ORDER — ALBUTEROL SULFATE 90 UG/1
1-2 AEROSOL, METERED RESPIRATORY (INHALATION)
Status: CANCELLED
Start: 2024-03-29

## 2024-03-01 RX ORDER — METHYLPREDNISOLONE SODIUM SUCCINATE 125 MG/2ML
125 INJECTION, POWDER, LYOPHILIZED, FOR SOLUTION INTRAMUSCULAR; INTRAVENOUS
Status: CANCELLED
Start: 2024-03-29

## 2024-03-01 RX ORDER — ONDANSETRON 4 MG/1
4 TABLET, ORALLY DISINTEGRATING ORAL EVERY 30 MIN PRN
Status: DISCONTINUED | OUTPATIENT
Start: 2024-03-01 | End: 2024-03-02 | Stop reason: HOSPADM

## 2024-03-01 RX ORDER — ALBUTEROL SULFATE 0.83 MG/ML
2.5 SOLUTION RESPIRATORY (INHALATION)
Status: CANCELLED | OUTPATIENT
Start: 2024-03-29

## 2024-03-01 RX ORDER — PENTAMIDINE ISETHIONATE 300 MG/300MG
300 INHALANT RESPIRATORY (INHALATION)
Status: CANCELLED
Start: 2024-03-29

## 2024-03-01 RX ORDER — SODIUM CHLORIDE, SODIUM LACTATE, POTASSIUM CHLORIDE, CALCIUM CHLORIDE 600; 310; 30; 20 MG/100ML; MG/100ML; MG/100ML; MG/100ML
INJECTION, SOLUTION INTRAVENOUS CONTINUOUS
Status: DISCONTINUED | OUTPATIENT
Start: 2024-03-01 | End: 2024-03-02 | Stop reason: HOSPADM

## 2024-03-01 RX ORDER — CEFAZOLIN SODIUM 2 G/50ML
2 SOLUTION INTRAVENOUS
Status: COMPLETED | OUTPATIENT
Start: 2024-03-01 | End: 2024-03-01

## 2024-03-01 RX ORDER — DIPHENHYDRAMINE HYDROCHLORIDE 50 MG/ML
50 INJECTION INTRAMUSCULAR; INTRAVENOUS
Status: CANCELLED
Start: 2024-03-29

## 2024-03-01 RX ORDER — ALBUTEROL SULFATE 0.83 MG/ML
2.5 SOLUTION RESPIRATORY (INHALATION) ONCE
Status: CANCELLED
Start: 2024-03-29 | End: 2024-03-29

## 2024-03-01 RX ORDER — PROPOFOL 10 MG/ML
INJECTION, EMULSION INTRAVENOUS CONTINUOUS PRN
Status: DISCONTINUED | OUTPATIENT
Start: 2024-03-01 | End: 2024-03-01

## 2024-03-01 RX ORDER — OXYCODONE HYDROCHLORIDE 5 MG/1
10 TABLET ORAL
Status: DISCONTINUED | OUTPATIENT
Start: 2024-03-01 | End: 2024-03-02 | Stop reason: HOSPADM

## 2024-03-01 RX ORDER — ACETAMINOPHEN 325 MG/1
975 TABLET ORAL ONCE
Status: COMPLETED | OUTPATIENT
Start: 2024-03-01 | End: 2024-03-01

## 2024-03-01 RX ORDER — MEPERIDINE HYDROCHLORIDE 25 MG/ML
25 INJECTION INTRAMUSCULAR; INTRAVENOUS; SUBCUTANEOUS EVERY 30 MIN PRN
Status: CANCELLED | OUTPATIENT
Start: 2024-03-29

## 2024-03-01 RX ORDER — PENTAMIDINE ISETHIONATE 300 MG/300MG
300 INHALANT RESPIRATORY (INHALATION)
Status: DISCONTINUED | OUTPATIENT
Start: 2024-03-01 | End: 2024-03-01 | Stop reason: HOSPADM

## 2024-03-01 RX ORDER — EPINEPHRINE 1 MG/ML
0.3 INJECTION, SOLUTION, CONCENTRATE INTRAVENOUS EVERY 5 MIN PRN
Status: CANCELLED | OUTPATIENT
Start: 2024-03-29

## 2024-03-01 RX ORDER — LIDOCAINE HYDROCHLORIDE 20 MG/ML
INJECTION, SOLUTION INFILTRATION; PERINEURAL PRN
Status: DISCONTINUED | OUTPATIENT
Start: 2024-03-01 | End: 2024-03-01

## 2024-03-01 RX ORDER — LIDOCAINE 40 MG/G
CREAM TOPICAL
Status: DISCONTINUED | OUTPATIENT
Start: 2024-03-01 | End: 2024-03-02 | Stop reason: HOSPADM

## 2024-03-01 RX ORDER — NALOXONE HYDROCHLORIDE 0.4 MG/ML
0.1 INJECTION, SOLUTION INTRAMUSCULAR; INTRAVENOUS; SUBCUTANEOUS
Status: DISCONTINUED | OUTPATIENT
Start: 2024-03-01 | End: 2024-03-02 | Stop reason: HOSPADM

## 2024-03-01 RX ORDER — PROPOFOL 10 MG/ML
INJECTION, EMULSION INTRAVENOUS PRN
Status: DISCONTINUED | OUTPATIENT
Start: 2024-03-01 | End: 2024-03-01

## 2024-03-01 RX ORDER — OXYCODONE HYDROCHLORIDE 5 MG/1
5 TABLET ORAL
Status: DISCONTINUED | OUTPATIENT
Start: 2024-03-01 | End: 2024-03-02 | Stop reason: HOSPADM

## 2024-03-01 RX ORDER — ONDANSETRON 2 MG/ML
4 INJECTION INTRAMUSCULAR; INTRAVENOUS EVERY 30 MIN PRN
Status: DISCONTINUED | OUTPATIENT
Start: 2024-03-01 | End: 2024-03-02 | Stop reason: HOSPADM

## 2024-03-01 RX ORDER — HEPARIN SODIUM (PORCINE) LOCK FLUSH IV SOLN 100 UNIT/ML 100 UNIT/ML
SOLUTION INTRAVENOUS PRN
Status: DISCONTINUED | OUTPATIENT
Start: 2024-03-01 | End: 2024-03-01 | Stop reason: HOSPADM

## 2024-03-01 RX ADMIN — ACETAMINOPHEN 975 MG: 325 TABLET ORAL at 08:46

## 2024-03-01 RX ADMIN — SODIUM CHLORIDE, SODIUM LACTATE, POTASSIUM CHLORIDE, CALCIUM CHLORIDE: 600; 310; 30; 20 INJECTION, SOLUTION INTRAVENOUS at 09:24

## 2024-03-01 RX ADMIN — PROPOFOL 50 MG: 10 INJECTION, EMULSION INTRAVENOUS at 10:12

## 2024-03-01 RX ADMIN — LIDOCAINE HYDROCHLORIDE 40 MG: 20 INJECTION, SOLUTION INFILTRATION; PERINEURAL at 09:26

## 2024-03-01 RX ADMIN — PROPOFOL 150 MCG/KG/MIN: 10 INJECTION, EMULSION INTRAVENOUS at 09:59

## 2024-03-01 RX ADMIN — PROPOFOL 60 MG: 10 INJECTION, EMULSION INTRAVENOUS at 09:26

## 2024-03-01 RX ADMIN — CEFAZOLIN SODIUM 2 G: 2 SOLUTION INTRAVENOUS at 09:28

## 2024-03-01 RX ADMIN — PENTAMIDINE ISETHIONATE 300 MG: 300 INHALANT RESPIRATORY (INHALATION) at 11:26

## 2024-03-01 RX ADMIN — PROPOFOL 150 MCG/KG/MIN: 10 INJECTION, EMULSION INTRAVENOUS at 09:26

## 2024-03-01 NOTE — ANESTHESIA CARE TRANSFER NOTE
Patient: Clementine Kathleen    Procedure: Procedure(s):  Insert port vascular access       Diagnosis: 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) [C83.32]  Diagnosis Additional Information: No value filed.    Anesthesia Type:   MAC     Note:    Oropharynx: oropharynx clear of all foreign objects and spontaneously breathing  Level of Consciousness: awake  Oxygen Supplementation: room air    Independent Airway: airway patency satisfactory and stable  Dentition: dentition unchanged  Vital Signs Stable: post-procedure vital signs reviewed and stable  Report to RN Given: handoff report given  Patient transferred to: Phase II    Handoff Report: Identifed the Patient, Identified the Reponsible Provider, Reviewed the pertinent medical history, Discussed the surgical course, Reviewed Intra-OP anesthesia mangement and issues during anesthesia, Set expectations for post-procedure period and Allowed opportunity for questions and acknowledgement of understanding      Vitals:  Vitals Value Taken Time   /85 03/01/24 1028   Temp 36.1  C (97  F) 03/01/24 1028   Pulse 70 03/01/24 1028   Resp 18 03/01/24 1028   SpO2 99 % 03/01/24 1028       Electronically Signed By: CALOS Stone CRNA  March 1, 2024  10:30 AM

## 2024-03-01 NOTE — PROGRESS NOTES
Clementine Kathleen was seen today for a Pentamidine nebulizer tx ordered by Joe Gonzalez PA-C.    Patient was first given 4 puffs of albuterol MDI (LOT# [id number], NDC# [id number], EXP 03/2025) with spacer, after which Pentamidine 300 mg (Lot # [id number], NDC# [id number], EXP 04/2025) mixed with 6cc Sterile H20 was administered through a filtered nebulizer.    Pre-treatment: SpO2 = 97% HR = 70 bpm   BBS = clear   Post-treatment: SpO2 = 96%  HR = 86 bpm  BBS = clear    No adverse side effects noted by the patient.    This service today was provided under the supervision of Dr. Mariola Alexadnre, who was available if needed.     Procedure was completed by Isael Ivy RRT.

## 2024-03-01 NOTE — ANESTHESIA POSTPROCEDURE EVALUATION
Patient: Clementine Kathleen    Procedure: Procedure(s):  Insert port vascular access       Anesthesia Type:  MAC    Note:  Disposition: Outpatient   Postop Pain Control: Uneventful            Sign Out: Well controlled pain   PONV: No   Neuro/Psych: Uneventful            Sign Out: Acceptable/Baseline neuro status   Airway/Respiratory: Uneventful            Sign Out: Acceptable/Baseline resp. status   CV/Hemodynamics: Uneventful            Sign Out: Acceptable CV status; No obvious hypovolemia; No obvious fluid overload   Other NRE:    DID A NON-ROUTINE EVENT OCCUR?            Last vitals:  Vitals Value Taken Time   /77 03/01/24 1036   Temp 36.1  C (97  F) 03/01/24 1036   Pulse 63 03/01/24 1036   Resp 18 03/01/24 1036   SpO2 98 % 03/01/24 1036       Electronically Signed By: Kenny Mon MD  March 1, 2024  10:41 AM

## 2024-03-01 NOTE — BRIEF OP NOTE
Interventional Radiology Brief Post Procedure Note    Procedure: Port placement    Proceduralist: David Awan PA-C    Assistant: DASIA Tariq    Time Out: Prior to the start of the procedure and with procedural staff participation, I verbally confirmed the patient s identity using two indicators, relevant allergies, that the procedure was appropriate and matched the consent or emergent situation, and that the correct equipment/implants were available. Immediately prior to starting the procedure I conducted the Time Out with the procedural staff and re-confirmed the patient s name, procedure, and site/side. (The Joint Commission universal protocol was followed.)  Yes        Sedation: Monitored Anesthesia Care (MAC) administered and documented by Anesthesia Care Provider    Findings: Image guided placement of right internal jugular, 6 Fr., 20 cm (transected at the 43 cm parker) single lumen central venous chest port. Port is ready for use.    Estimated Blood Loss: Minimal    Fluoroscopy Time:  Less than 1 minute(s)    SPECIMENS: None    Complications: 1. None     Condition: Stable    Plan: Follow up per primary team. Return to IR for port removal when indicated.    Comments: See dictated procedure note for full details.    Solo Contreras PA-C

## 2024-03-01 NOTE — DISCHARGE INSTRUCTIONS
A collaboration between [facility] and [facility]  Experts in minimally invasive, targeted treatments performed using imaging guidance    Venous Access Device,  Port Catheter or Tunneled or Non-Tunneled Central Line Placement    Today you had a procedure today to install a venous access device; either a tunneled central vein catheter or a subcutaneous port catheter.    After you go home:  Drink plenty of fluids.  Generally 6-8 (8 ounce) glasses a day is recommended.  Resume your regular diet unless otherwise ordered by a medical provider.  Keep any applied tape/gauze dressings clean and dry.  Change tape/gauze dressings if they get wet or soiled.  You may shower the following day after procedure, however cover and protect from moisture any tape/gauze dressings.  You may let water hit and run over dried skin glue, but do not scrub.  Pat the area dry after showering.  Port placement incisions are closed with absorbable suture, meaning they do not need to be removed at a later date, and a topical skin adhesive (skin glue).  This glue will wear off in 7-14 days.  Do not remove before this time.  If 14 days have passed and residual glue is present, you may gently remove it.  Do not apply gels, lotions, or ointments to the glue site for the first 10 days as this may cause the glue to prematurely soften and fail.  Do not perform strenuous activities or lift greater than 10 pounds for the next three days.  If there is bleeding or oozing from the procedure site, apply firm pressure to the area for 5-10 minutes.  If the bleeding continues seek medical advice at the numbers below.  Mild procedure site discomfort can be treated with an ice pack and over-the-counter pain relievers.        For 24 hours after any sedation used:  Relax and take it easy.  No strenuous activities.  Do not drive or operate machines at home or at work.  No alcohol consumption.  Do not make any  important or legal decisions.    Call our Interventional Radiology (IR) service if:  If you start bleeding from the procedure site.  If you do start to bleed from the site, lie down and hold some pressure on the site.  Our radiology provider can help you decide if you need to return to the hospital.  If you have new or worsening pain related to the procedure.  If you have concerning swelling at the procedure site.  If you develop persistent nausea or vomiting.  If you develop hives or a rash or any unexplained itching.  If you have a fever of greater than 100.5  F and chills in the first 5 days after procedure.  Any other concerns related to your procedure.      [facility]  Interventional Radiology (IR)  [address]  [address] [facility] [place] [address]    Contact Number:  [phone]  (IR Nurse Triage)  Monday - Friday 7am - 4pm    After hours for urgent concerns:  [phone]  After 4pm Monday - Friday, Weekends and Holidays.   Ask for Interventional Radiology on-call.  Someone is available 24 hours a day.  [facility] toll free number:  [phone]

## 2024-03-01 NOTE — NURSING NOTE
Chief Complaint   Patient presents with    Blood Draw     Labs drawn via  by RN in lab.      Labs collected from venipuncture by RN.     Sunita Bowden RN

## 2024-03-04 ENCOUNTER — ONCOLOGY VISIT (OUTPATIENT)
Dept: ONCOLOGY | Facility: CLINIC | Age: 29
End: 2024-03-04
Attending: NURSE PRACTITIONER
Payer: COMMERCIAL

## 2024-03-04 ENCOUNTER — APPOINTMENT (OUTPATIENT)
Dept: LAB | Facility: CLINIC | Age: 29
End: 2024-03-04
Attending: INTERNAL MEDICINE
Payer: COMMERCIAL

## 2024-03-04 VITALS
TEMPERATURE: 98 F | OXYGEN SATURATION: 100 % | HEART RATE: 98 BPM | RESPIRATION RATE: 18 BRPM | DIASTOLIC BLOOD PRESSURE: 71 MMHG | WEIGHT: 141.4 LBS | HEIGHT: 66 IN | SYSTOLIC BLOOD PRESSURE: 108 MMHG | BODY MASS INDEX: 22.73 KG/M2

## 2024-03-04 DIAGNOSIS — C83.32 DIFFUSE LARGE B-CELL LYMPHOMA OF INTRATHORACIC LYMPH NODES (H): ICD-10-CM

## 2024-03-04 DIAGNOSIS — K59.01 SLOW TRANSIT CONSTIPATION: Primary | ICD-10-CM

## 2024-03-04 DIAGNOSIS — Z76.89 PREVENTION OF CHEMOTHERAPY-INDUCED NEUTROPENIA: ICD-10-CM

## 2024-03-04 DIAGNOSIS — C85.28 MEDIASTINAL LARGE B-CELL LYMPHOMA OF LYMPH NODES OF MULTIPLE REGIONS (H): ICD-10-CM

## 2024-03-04 LAB
ALBUMIN SERPL BCG-MCNC: 3.3 G/DL (ref 3.5–5.2)
ALP SERPL-CCNC: 64 U/L (ref 40–150)
ALT SERPL W P-5'-P-CCNC: 68 U/L (ref 0–50)
ANION GAP SERPL CALCULATED.3IONS-SCNC: 9 MMOL/L (ref 7–15)
AST SERPL W P-5'-P-CCNC: 38 U/L (ref 0–45)
BASOPHILS # BLD AUTO: NORMAL 10*3/UL
BASOPHILS # BLD MANUAL: 0 10E3/UL (ref 0–0.2)
BASOPHILS NFR BLD AUTO: NORMAL %
BASOPHILS NFR BLD MANUAL: 0 %
BILIRUB SERPL-MCNC: <0.2 MG/DL
BUN SERPL-MCNC: 13.1 MG/DL (ref 6–20)
CALCIUM SERPL-MCNC: 8.3 MG/DL (ref 8.6–10)
CHLORIDE SERPL-SCNC: 101 MMOL/L (ref 98–107)
CREAT SERPL-MCNC: 0.66 MG/DL (ref 0.51–0.95)
DEPRECATED HCO3 PLAS-SCNC: 24 MMOL/L (ref 22–29)
EGFRCR SERPLBLD CKD-EPI 2021: >90 ML/MIN/1.73M2
EOSINOPHIL # BLD AUTO: NORMAL 10*3/UL
EOSINOPHIL # BLD MANUAL: 0.3 10E3/UL (ref 0–0.7)
EOSINOPHIL NFR BLD AUTO: NORMAL %
EOSINOPHIL NFR BLD MANUAL: 5 %
ERYTHROCYTE [DISTWIDTH] IN BLOOD BY AUTOMATED COUNT: 13.2 % (ref 10–15)
GLUCOSE SERPL-MCNC: 115 MG/DL (ref 70–99)
HCT VFR BLD AUTO: 36.2 % (ref 35–47)
HGB BLD-MCNC: 12.1 G/DL (ref 11.7–15.7)
IMM GRANULOCYTES # BLD: NORMAL 10*3/UL
IMM GRANULOCYTES NFR BLD: NORMAL %
LDH SERPL L TO P-CCNC: 160 U/L (ref 0–250)
LYMPHOCYTES # BLD AUTO: NORMAL 10*3/UL
LYMPHOCYTES # BLD MANUAL: 1.4 10E3/UL (ref 0.8–5.3)
LYMPHOCYTES NFR BLD AUTO: NORMAL %
LYMPHOCYTES NFR BLD MANUAL: 23 %
MCH RBC QN AUTO: 29.2 PG (ref 26.5–33)
MCHC RBC AUTO-ENTMCNC: 33.4 G/DL (ref 31.5–36.5)
MCV RBC AUTO: 87 FL (ref 78–100)
METAMYELOCYTES # BLD MANUAL: 0.1 10E3/UL
METAMYELOCYTES NFR BLD MANUAL: 2 %
MONOCYTES # BLD AUTO: NORMAL 10*3/UL
MONOCYTES # BLD MANUAL: 0.5 10E3/UL (ref 0–1.3)
MONOCYTES NFR BLD AUTO: NORMAL %
MONOCYTES NFR BLD MANUAL: 8 %
NEUTROPHILS # BLD AUTO: NORMAL 10*3/UL
NEUTROPHILS # BLD MANUAL: 3.9 10E3/UL (ref 1.6–8.3)
NEUTROPHILS NFR BLD AUTO: NORMAL %
NEUTROPHILS NFR BLD MANUAL: 62 %
NRBC # BLD AUTO: 0 10E3/UL
NRBC # BLD AUTO: 0.1 10E3/UL
NRBC BLD AUTO-RTO: 0 /100
NRBC BLD MANUAL-RTO: 1 %
PHOSPHATE SERPL-MCNC: 4 MG/DL (ref 2.5–4.5)
PLAT MORPH BLD: ABNORMAL
PLATELET # BLD AUTO: 179 10E3/UL (ref 150–450)
POTASSIUM SERPL-SCNC: 4.3 MMOL/L (ref 3.4–5.3)
PROT SERPL-MCNC: 5.5 G/DL (ref 6.4–8.3)
RBC # BLD AUTO: 4.15 10E6/UL (ref 3.8–5.2)
RBC MORPH BLD: ABNORMAL
SODIUM SERPL-SCNC: 134 MMOL/L (ref 135–145)
URATE SERPL-MCNC: 1.7 MG/DL (ref 2.4–5.7)
WBC # BLD AUTO: 6.3 10E3/UL (ref 4–11)

## 2024-03-04 PROCEDURE — 36415 COLL VENOUS BLD VENIPUNCTURE: CPT | Performed by: NURSE PRACTITIONER

## 2024-03-04 PROCEDURE — 83615 LACTATE (LD) (LDH) ENZYME: CPT | Performed by: NURSE PRACTITIONER

## 2024-03-04 PROCEDURE — 85007 BL SMEAR W/DIFF WBC COUNT: CPT | Performed by: NURSE PRACTITIONER

## 2024-03-04 PROCEDURE — 99215 OFFICE O/P EST HI 40 MIN: CPT | Mod: 24 | Performed by: NURSE PRACTITIONER

## 2024-03-04 PROCEDURE — 84075 ASSAY ALKALINE PHOSPHATASE: CPT | Performed by: NURSE PRACTITIONER

## 2024-03-04 PROCEDURE — 36591 DRAW BLOOD OFF VENOUS DEVICE: CPT | Performed by: NURSE PRACTITIONER

## 2024-03-04 PROCEDURE — 84100 ASSAY OF PHOSPHORUS: CPT | Performed by: NURSE PRACTITIONER

## 2024-03-04 PROCEDURE — 99417 PROLNG OP E/M EACH 15 MIN: CPT | Mod: 24 | Performed by: NURSE PRACTITIONER

## 2024-03-04 PROCEDURE — 85027 COMPLETE CBC AUTOMATED: CPT | Performed by: NURSE PRACTITIONER

## 2024-03-04 PROCEDURE — 84550 ASSAY OF BLOOD/URIC ACID: CPT | Performed by: NURSE PRACTITIONER

## 2024-03-04 PROCEDURE — 99213 OFFICE O/P EST LOW 20 MIN: CPT | Performed by: NURSE PRACTITIONER

## 2024-03-04 RX ORDER — METOCLOPRAMIDE 10 MG/1
10 TABLET ORAL 4 TIMES DAILY PRN
Qty: 30 TABLET | Refills: 3 | Status: SHIPPED | OUTPATIENT
Start: 2024-03-04

## 2024-03-04 ASSESSMENT — PAIN SCALES - GENERAL: PAINLEVEL: NO PAIN (0)

## 2024-03-04 NOTE — NURSING NOTE
Chief Complaint   Patient presents with    Blood Draw     Labs drawn via      Labs collected from venipuncture by RN. Vitals taken. Checked in for appointment(s).     Lady TRAN RN PHN BSN  BMT/Oncology Lab

## 2024-03-04 NOTE — LETTER
3/4/2024         RE: Clementine Kathleen  [address]  [facility] [address]        Dear Colleague,    Thank you for referring your patient, Clementine Kathleen, to the [facility]. Please see a copy of my visit note below.    [facility]    Hematology/Oncology Clinic Note    Mar 4, 2024   Subjective  REFERRAL SOURCE: Asher Damon MD    REASON FOR VISIT: Hospital discharge follow up    HISTORY OF PRESENT ILLNESS:  Ms. Clementine Kathleen is a 28 year old female who presents for consultation regarding newly diagnosed large B-cell lymphoma.     She first noticed some neck lumps around Emigdio, no pain or erythema. They did not resolve after a month, so she underwent FNA on 1/29/24 which showed atypical lymphoid infiltrate suspicious for lymphoma with rare Manny-Ava cells; flow cytometry was negative. Subsequently underwent left level 3 excisional LN biopsy on 2/9/24, which shows large B-cell lymphoma (ddx PMBCL vs DLBCL NOS) with rare abnormal B-cells on flow cytometry. PET shows bilateral cervical and mediastinal hypermetabolic lymphadenopathy (largest 4.4 cm with SUVmax 27 in a prevascular LN); no disease below diaphragm.    She is here today with her partner Dima and parents who flew in from [place]. She feels well overall and has not noticed significant change in the neck nodes in the last month. She has occasional chest tightness and dry cough but no palpitations or difficulty breathing. Also denies fevers, night sweats, N/V, abd pain, diarrhea, bleeding, or numbness/tingling. 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       Nausea  Tip top  GERD  Flank       INTERVAL HISTORY     2/28: Neulasta +/- Emend (if nausea persistent)  - 3/1: Port placement  - 3/4: AMEENA follow up  - Twice weekly labs  - Inhaled pentamidine for pjp ppx     PAST MEDICAL  HISTORY:  Idiopathic hypersomnia   Interstitial cystitis   GERD    FAMILY HISTORY:  Uncle had leukemia in his 50s.  Aunt had melanoma.     SOCIAL HISTORY:  Never smoker. Denies alcohol use.  Lives in [place], [place] ([place] min from [facility]) with her significant other Dima. Her parents live in [place].   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 Dima is a general surgery resident here as well.      No Known Allergies    Current Outpatient Medications:     acyclovir (ZOVIRAX) 400 MG tablet, Take 1 tablet (400 mg) by mouth 2 times daily, Disp: 60 tablet, Rfl: 0    allopurinol (ZYLOPRIM) 300 MG tablet, Take 1 tablet (300 mg) by mouth daily, Disp: 15 tablet, Rfl: 0    cimetidine (TAGAMET) 200 MG tablet, Take 200 mg by mouth 2 times daily, Disp: , Rfl:     dexAMETHasone (DECADRON) 4 MG tablet, Take 2 tablets (8 mg) by mouth daily Start taking Wednesday, 2/28, for two days., Disp: 4 tablet, Rfl: 0    fluconazole (DIFLUCAN) 200 MG tablet, Take 1 tablet (200 mg) by mouth daily, Disp: 30 tablet, Rfl: 0    levofloxacin (LEVAQUIN) 250 MG tablet, Take 1 tablet (250 mg) by mouth daily, Disp: 30 tablet, Rfl: 0    Levonorgestrel (KYLEENA IU), 1 Device by Intrauterine route once, Disp: , Rfl:     modafinil (PROVIGIL) 200 MG tablet, Take 1 tablet (200 mg) by mouth daily as needed, Disp: , Rfl:     ondansetron (ZOFRAN) 4 MG tablet, Take 1-2 tablets (4-8 mg) by mouth every 8 hours as needed for nausea or vomiting, Disp: 30 tablet, Rfl: 0    pantoprazole (PROTONIX) 40 MG EC tablet, Take 1 tablet (40 mg) by mouth every morning, Disp: 30 tablet, Rfl: 0    polyethylene glycol (MIRALAX) 17 GM/Dose powder, Take 17 g by mouth daily as needed for constipation, Disp: 510 g, Rfl: 0    prochlorperazine (COMPAZINE) 10 MG tablet, Take 1 tablet (10 mg) by mouth every 6 hours as needed for nausea or vomiting, Disp: 30 tablet, Rfl: 0    senna-docusate (SENOKOT-S/PERICOLACE) 8.6-50 MG tablet, Take 1 tablet by mouth daily as needed for constipation, Disp: 15  tablet, Rfl: 0     REVIEW OF SYSTEMS:  12-point ROS reviewed and negative other than that mentioned in HPI.     Objective  VITAL SIGNS:  LMP 2024 (Exact Date)     ECOG PS: 0     PHYSICAL EXAM:  General: Awake, alert, in no acute distress. Oriented x 3.  HEENT: Normocephalic, atraumatic. No scleral icterus.   Lymph: 1.5 cm left supraclavicular LAD. No axillary LAD appreciated.   CV: Regular rate and rhythm. No murmurs, rubs, or gallops appreciated.  Resp: Good inspiratory effort, lungs clear to auscultation bilaterally.  GI: Abdomen soft, nontender, nondistended. No masses or organomegaly appreciated.   Ext: No peripheral edema bilaterally.  Neuro: CN II-XII grossly intact. No focal deficits.   Skin: No rash, unusual bruising or prominent lesions.  Psych: Pleasant, normal affect.    LABS:  I reviewed the following labs:  Lab Results   Component Value Date    WBC 45.8 (H) 2024    HGB 12.6 2024    HCT 36.7 2024    MCV 85 2024     2024    INR 1.18 (H) 2024     Lab Results   Component Value Date     2024    POTASSIUM 4.2 2024    CR 0.68 2024    BUN 19.9 2024    CHLORIDE 102 2024    DEE 8.7 2024    GLC 95 2024      Lab Results   Component Value Date    ALT 10 2024    AST 11 2024    ALKPHOS 67 2024    BILITOTAL 1.0 2024      Lab Results   Component Value Date     2024    URIC 1.3 (L) 2024     IMAGIN24 PET-CT:  IMPRESSION: In this patient with lymphoma:  1. Deauville 5 bilateral cervical and upper mediastinal hypermetabolic  lymphadenopathy.   2. No splenomegaly, lymphadenopathy below the diaphragm, or bone  marrow involvement demonstrated. Limited disease by Lugano criteria.     PATHOLOGY:  24 Left level 3 excisional biopsy:  Lymph node, left level 3, excisional biopsy:  - Involved by large B cell lymphoma  - See comment    The morphologic and immunophenotypic findings are  diagnostic of large B cell lymphoma - the differential diagnosis includes DLBCL, non-germinal center subtype, NOS verus primary mediastinal large B-cell lymphoma (PMBL).     Many of the features suggest PMBL including the patient's age, gender, sites of involvement, dim CD30 expression, and prominent fibrotic background.  However, the neoplastic cells lack CD23.  We are pursuing additional testing as described below to further characterize this LBCL.     Immunostains for PDL-1, , and MAL are pending to further subclassify and will be reported in an addendum. Additionally, we will be sending out unstained slides to the [facility] for the [id number] Assay, which helps genetically distinguish between DLBCL and PMBL - these results will be reported separately (will be a scanned report into a LAB MISC field).     Other pending ancillary studies include MYC, BCL2, and BCL6 FISH to exclude double/triple hit large-B cell lymphoma given MYC expression observed by IHC. These ancillary studies are in progress and will be reported separately.     Flow cytometry analysis on concurrent specimen ([id number]) was suspicious for rare abnormal B cells (they were large with bright expression of CD19 and CD20, but lacked CD5, CD10, and surface light chains) and showed no definitive aberrant immunophenotype on T cells.     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 Initial FNA suggestive of possible Hodgkin lymphoma; however excisional biopsy shows large B-cell lymphoma with differential diagnosis of primary mediastinal large B-cell lymphoma vs DLBCL NOS. PET with cervical and mediastinal lymphadenopathy only. Overall presentation would be quite consistent with PMBCL given her age, gender, sites of involvement, dim CD30 expression, and prominent fibrotic background. However, the neoplastic cells lack CD23 so additional testing is in process (IHC  for PDL-1, , MAL and [id number] gene expression assay) to help distinguish.     Otherwise we reviewed her overall diagnosis and prognosis of large B-cell lymphoma (PMBCL vs stage 2 DLBCL). Since we would like to get started with chemotherapy quickly, we discussed treating as if her disease is PMBCL for Cycle 1 with dose-adjusted R-EPOCH while awaiting the pending additional studies. If studies come back more suggestive of DLBCL NOS later, we can de-escalate therapy to R-CHOP for later cycles. She is going to undergo egg retrieval either 2/18 or 2/19 and TTE 2/19, so will plan for admission late next week. For Cycle 1 will need PICC but will pursue port placement for later cycles (if continuing with R-EPOCH). Also, Cycle 1 will be inpatient but we are in the process of rolling out our outpatient R-EPOCH protocol, so can look into this for Cycle 2 and beyond if she is interested (she wants to think about it).     We reviewed the logistics and schedule of R-EPOCH (inpatient 5-day admission for chemotherapy every 3 weeks, outpatient biweekly lab checks in between). We reviewed potential side effects including possible Rituximab infusion reaction the first cycle, tumor lysis syndrome, hair loss, cytopenias/infection, fatigue, GI symptoms, neuropathy, anthracycline cardiotoxicity, and very low chance of therapy-related myeloid neoplasm down the road. We will plan for a total of 6 cycles with first restaging PET-CT after 2 cycles. Patient was understanding and in agreement with this plan. In the single-arm phase 2 prospective study of R-EPOCH (without radiation) for PMBCL, 5-year EFS was 93% and OS 97% (10.[facility]6/[id number]).     Fertility preservation  Already established with [facility] and currently undergoing fertility injections with plan for egg retrieval on 2/18 or 2/19.     Ppx  - TLS ppx: baseline uric acid normal. Start allopurinol 300 mg daily. Encouraged good hydration.  - ID ppx: start  mg BID. Levo and  fluc when ANC <1.0. Monthly pentamidine for PJP ppx.   - Vaccines: up to date on COVID and flu shots.     Plan from today's clinical encounter  Add reglan  Does not want home care ofr cycle 2. Prefers hospital admisson      Transition Care Management Services  Admission Date: 02/22/24    Discharge Date: 02/27/24    Discharge Diagnosis: lymphoma    Interactive contact date: 2/29/2024  Face-to-face visit date: 3/4/2024  Medical complexity decision making: High complexity ([id number])        59 minutes spent on the date of the encounter doing chart review, review of test results, interpretation of tests, patient visit, documentation, discussion with other provider(s), and discussion with family     Nereida LIVE, ANP-BC, AOCNP  [facility]  [address] [place] [address] [phone] (pager)

## 2024-03-04 NOTE — NURSING NOTE
"Oncology Rooming Note    March 4, 2024 2:37 PM   Clementine Kathleen is a 28 year old female who presents for:    Chief Complaint   Patient presents with    Blood Draw     Labs drawn via     Oncology Clinic Visit     [facility] RETURN - [facility]     Initial Vitals: /71   Pulse 98   Temp 98  F (36.7  C) (Tympanic)   Resp 18   Ht 1.676 m (5' 5.98\")   Wt 64.1 kg (141 lb 6.4 oz)   LMP 01/31/2024 (Exact Date)   SpO2 100%   BMI 22.83 kg/m   Estimated body mass index is 22.83 kg/m  as calculated from the following:    Height as of this encounter: 1.676 m (5' 5.98\").    Weight as of this encounter: 64.1 kg (141 lb 6.4 oz). Body surface area is 1.73 meters squared.  No Pain (0) Comment: Data Unavailable   Patient's last menstrual period was 01/31/2024 (exact date).  Allergies reviewed: Yes  Medications reviewed: Yes    Medications: Medication refills not needed today.  Pharmacy name entered into [facility]: [id number] - [place], [place] - [address] AT [facility] [address]    Frailty Screening:   Is the patient here for a new oncology consult visit in cancer care? 2. No    Delano Selby LPN              "

## 2024-03-06 ENCOUNTER — MYC MEDICAL ADVICE (OUTPATIENT)
Dept: ONCOLOGY | Facility: CLINIC | Age: 29
End: 2024-03-06
Payer: COMMERCIAL

## 2024-03-07 ENCOUNTER — LAB (OUTPATIENT)
Dept: LAB | Facility: CLINIC | Age: 29
End: 2024-03-07
Attending: INTERNAL MEDICINE
Payer: COMMERCIAL

## 2024-03-07 ENCOUNTER — MYC MEDICAL ADVICE (OUTPATIENT)
Dept: ONCOLOGY | Facility: CLINIC | Age: 29
End: 2024-03-07

## 2024-03-07 DIAGNOSIS — C85.28 MEDIASTINAL LARGE B-CELL LYMPHOMA OF LYMPH NODES OF MULTIPLE REGIONS (H): ICD-10-CM

## 2024-03-07 DIAGNOSIS — L73.9 FOLLICULITIS: Primary | ICD-10-CM

## 2024-03-07 LAB
ACANTHOCYTES BLD QL SMEAR: SLIGHT
ALBUMIN SERPL BCG-MCNC: 3.5 G/DL (ref 3.5–5.2)
ALP SERPL-CCNC: 112 U/L (ref 40–150)
ALT SERPL W P-5'-P-CCNC: 77 U/L (ref 0–50)
ANION GAP SERPL CALCULATED.3IONS-SCNC: 8 MMOL/L (ref 7–15)
AST SERPL W P-5'-P-CCNC: 29 U/L (ref 0–45)
BASOPHILS # BLD AUTO: ABNORMAL 10*3/UL
BASOPHILS # BLD MANUAL: 0.9 10E3/UL (ref 0–0.2)
BASOPHILS NFR BLD AUTO: ABNORMAL %
BASOPHILS NFR BLD MANUAL: 4 %
BILIRUB SERPL-MCNC: <0.2 MG/DL
BUN SERPL-MCNC: 12 MG/DL (ref 6–20)
CALCIUM SERPL-MCNC: 8.8 MG/DL (ref 8.6–10)
CHLORIDE SERPL-SCNC: 106 MMOL/L (ref 98–107)
CREAT SERPL-MCNC: 0.72 MG/DL (ref 0.51–0.95)
DACRYOCYTES BLD QL SMEAR: SLIGHT
DEPRECATED HCO3 PLAS-SCNC: 24 MMOL/L (ref 22–29)
EGFRCR SERPLBLD CKD-EPI 2021: >90 ML/MIN/1.73M2
EOSINOPHIL # BLD AUTO: ABNORMAL 10*3/UL
EOSINOPHIL # BLD MANUAL: 0 10E3/UL (ref 0–0.7)
EOSINOPHIL NFR BLD AUTO: ABNORMAL %
EOSINOPHIL NFR BLD MANUAL: 0 %
ERYTHROCYTE [DISTWIDTH] IN BLOOD BY AUTOMATED COUNT: 14.1 % (ref 10–15)
GLUCOSE SERPL-MCNC: 98 MG/DL (ref 70–99)
HCT VFR BLD AUTO: 34.8 % (ref 35–47)
HGB BLD-MCNC: 11.6 G/DL (ref 11.7–15.7)
IMM GRANULOCYTES # BLD: ABNORMAL 10*3/UL
IMM GRANULOCYTES NFR BLD: ABNORMAL %
LDH SERPL L TO P-CCNC: 301 U/L (ref 0–250)
LYMPHOCYTES # BLD AUTO: ABNORMAL 10*3/UL
LYMPHOCYTES # BLD MANUAL: 2 10E3/UL (ref 0.8–5.3)
LYMPHOCYTES NFR BLD AUTO: ABNORMAL %
LYMPHOCYTES NFR BLD MANUAL: 9 %
MCH RBC QN AUTO: 29.1 PG (ref 26.5–33)
MCHC RBC AUTO-ENTMCNC: 33.3 G/DL (ref 31.5–36.5)
MCV RBC AUTO: 87 FL (ref 78–100)
METAMYELOCYTES # BLD MANUAL: 0.4 10E3/UL
METAMYELOCYTES NFR BLD MANUAL: 2 %
MONOCYTES # BLD AUTO: ABNORMAL 10*3/UL
MONOCYTES # BLD MANUAL: 0.9 10E3/UL (ref 0–1.3)
MONOCYTES NFR BLD AUTO: ABNORMAL %
MONOCYTES NFR BLD MANUAL: 4 %
MYELOCYTES # BLD MANUAL: 1.1 10E3/UL
MYELOCYTES NFR BLD MANUAL: 5 %
NEUTROPHILS # BLD AUTO: ABNORMAL 10*3/UL
NEUTROPHILS # BLD MANUAL: 16.6 10E3/UL (ref 1.6–8.3)
NEUTROPHILS NFR BLD AUTO: ABNORMAL %
NEUTROPHILS NFR BLD MANUAL: 76 %
NRBC # BLD AUTO: 0.2 10E3/UL
NRBC # BLD AUTO: 0.2 10E3/UL
NRBC BLD AUTO-RTO: 1 /100
NRBC BLD MANUAL-RTO: 1 %
PLAT MORPH BLD: ABNORMAL
PLATELET # BLD AUTO: 183 10E3/UL (ref 150–450)
POLYCHROMASIA BLD QL SMEAR: SLIGHT
POTASSIUM SERPL-SCNC: 4.4 MMOL/L (ref 3.4–5.3)
PROT SERPL-MCNC: 5.8 G/DL (ref 6.4–8.3)
RBC # BLD AUTO: 3.99 10E6/UL (ref 3.8–5.2)
RBC MORPH BLD: ABNORMAL
SODIUM SERPL-SCNC: 138 MMOL/L (ref 135–145)
URATE SERPL-MCNC: 2.6 MG/DL (ref 2.4–5.7)
WBC # BLD AUTO: 21.9 10E3/UL (ref 4–11)

## 2024-03-07 PROCEDURE — 80053 COMPREHEN METABOLIC PANEL: CPT

## 2024-03-07 PROCEDURE — 36415 COLL VENOUS BLD VENIPUNCTURE: CPT

## 2024-03-07 PROCEDURE — 83615 LACTATE (LD) (LDH) ENZYME: CPT

## 2024-03-07 PROCEDURE — 85027 COMPLETE CBC AUTOMATED: CPT

## 2024-03-07 PROCEDURE — 85007 BL SMEAR W/DIFF WBC COUNT: CPT

## 2024-03-07 PROCEDURE — 84550 ASSAY OF BLOOD/URIC ACID: CPT

## 2024-03-07 RX ORDER — MUPIROCIN 20 MG/G
OINTMENT TOPICAL
Qty: 15 G | Refills: 0 | Status: ON HOLD | OUTPATIENT
Start: 2024-03-07 | End: 2024-04-07

## 2024-03-07 NOTE — NURSING NOTE
Chief Complaint   Patient presents with    Blood Draw    Labs Only     Labs collected from venipuncture by RN.      Labs collected from venipuncture by RN.     Jessie Eagle RN

## 2024-03-07 NOTE — CONFIDENTIAL NOTE
Continues to have rash, no urgent changes since yesterday.   On neck a little itchy   On shoulder just bumpy not red or itchy.   Has not taken benadryl or used hydrocortisone cream on the rash.   Advised to try benadryl at bedtime and use OTC hydrocortisone cream.   IF worsens or continues to be a rash in these areas or it spreads to let triage know.

## 2024-03-08 LAB — BACTERIA TISS BX CULT: NO GROWTH

## 2024-03-09 ENCOUNTER — HEALTH MAINTENANCE LETTER (OUTPATIENT)
Age: 29
End: 2024-03-09

## 2024-03-11 ENCOUNTER — LAB (OUTPATIENT)
Dept: LAB | Facility: CLINIC | Age: 29
End: 2024-03-11
Attending: INTERNAL MEDICINE
Payer: COMMERCIAL

## 2024-03-11 DIAGNOSIS — C85.28 MEDIASTINAL LARGE B-CELL LYMPHOMA OF LYMPH NODES OF MULTIPLE REGIONS (H): ICD-10-CM

## 2024-03-11 LAB
ALBUMIN SERPL BCG-MCNC: 3.7 G/DL (ref 3.5–5.2)
ALP SERPL-CCNC: 108 U/L (ref 40–150)
ALT SERPL W P-5'-P-CCNC: 65 U/L (ref 0–50)
ANION GAP SERPL CALCULATED.3IONS-SCNC: 7 MMOL/L (ref 7–15)
AST SERPL W P-5'-P-CCNC: 21 U/L (ref 0–45)
BASOPHILS # BLD AUTO: NORMAL 10*3/UL
BASOPHILS # BLD MANUAL: 0.2 10E3/UL (ref 0–0.2)
BASOPHILS NFR BLD AUTO: NORMAL %
BASOPHILS NFR BLD MANUAL: 2 %
BILIRUB SERPL-MCNC: <0.2 MG/DL
BUN SERPL-MCNC: 7 MG/DL (ref 6–20)
CALCIUM SERPL-MCNC: 9.1 MG/DL (ref 8.6–10)
CHLORIDE SERPL-SCNC: 105 MMOL/L (ref 98–107)
CREAT SERPL-MCNC: 0.71 MG/DL (ref 0.51–0.95)
DEPRECATED HCO3 PLAS-SCNC: 26 MMOL/L (ref 22–29)
EGFRCR SERPLBLD CKD-EPI 2021: >90 ML/MIN/1.73M2
EOSINOPHIL # BLD AUTO: NORMAL 10*3/UL
EOSINOPHIL # BLD MANUAL: 0.1 10E3/UL (ref 0–0.7)
EOSINOPHIL NFR BLD AUTO: NORMAL %
EOSINOPHIL NFR BLD MANUAL: 1 %
ERYTHROCYTE [DISTWIDTH] IN BLOOD BY AUTOMATED COUNT: 15 % (ref 10–15)
GLUCOSE SERPL-MCNC: 88 MG/DL (ref 70–99)
HCT VFR BLD AUTO: 37.6 % (ref 35–47)
HGB BLD-MCNC: 12.2 G/DL (ref 11.7–15.7)
IMM GRANULOCYTES # BLD: NORMAL 10*3/UL
IMM GRANULOCYTES NFR BLD: NORMAL %
LDH SERPL L TO P-CCNC: 208 U/L (ref 0–250)
LYMPHOCYTES # BLD AUTO: NORMAL 10*3/UL
LYMPHOCYTES # BLD MANUAL: 1.9 10E3/UL (ref 0.8–5.3)
LYMPHOCYTES NFR BLD AUTO: NORMAL %
LYMPHOCYTES NFR BLD MANUAL: 18 %
MCH RBC QN AUTO: 29.2 PG (ref 26.5–33)
MCHC RBC AUTO-ENTMCNC: 32.4 G/DL (ref 31.5–36.5)
MCV RBC AUTO: 90 FL (ref 78–100)
METAMYELOCYTES # BLD MANUAL: 0.2 10E3/UL
METAMYELOCYTES NFR BLD MANUAL: 2 %
MONOCYTES # BLD AUTO: NORMAL 10*3/UL
MONOCYTES # BLD MANUAL: 0.2 10E3/UL (ref 0–1.3)
MONOCYTES NFR BLD AUTO: NORMAL %
MONOCYTES NFR BLD MANUAL: 2 %
NEUTROPHILS # BLD AUTO: NORMAL 10*3/UL
NEUTROPHILS # BLD MANUAL: 7.9 10E3/UL (ref 1.6–8.3)
NEUTROPHILS NFR BLD AUTO: NORMAL %
NEUTROPHILS NFR BLD MANUAL: 74 %
NRBC # BLD AUTO: 0 10E3/UL
NRBC BLD AUTO-RTO: 0 /100
PLAT MORPH BLD: ABNORMAL
PLATELET # BLD AUTO: 190 10E3/UL (ref 150–450)
POTASSIUM SERPL-SCNC: 4.1 MMOL/L (ref 3.4–5.3)
PROMYELOCYTES # BLD MANUAL: 0.1 10E3/UL
PROMYELOCYTES NFR BLD MANUAL: 1 %
PROT SERPL-MCNC: 6.3 G/DL (ref 6.4–8.3)
RBC # BLD AUTO: 4.18 10E6/UL (ref 3.8–5.2)
RBC MORPH BLD: ABNORMAL
SODIUM SERPL-SCNC: 138 MMOL/L (ref 135–145)
WBC # BLD AUTO: 10.7 10E3/UL (ref 4–11)

## 2024-03-11 PROCEDURE — 83615 LACTATE (LD) (LDH) ENZYME: CPT

## 2024-03-11 PROCEDURE — 85007 BL SMEAR W/DIFF WBC COUNT: CPT

## 2024-03-11 PROCEDURE — 36415 COLL VENOUS BLD VENIPUNCTURE: CPT

## 2024-03-11 PROCEDURE — 80053 COMPREHEN METABOLIC PANEL: CPT

## 2024-03-11 PROCEDURE — 85027 COMPLETE CBC AUTOMATED: CPT

## 2024-03-11 NOTE — NURSING NOTE
Chief Complaint   Patient presents with    Blood Draw     Vpt blood draw by lab RN     Rosa Maria Rose, RN

## 2024-03-13 NOTE — PROGRESS NOTES
[facility]      FOLLOW-UP VISIT NOTE                       Mar 14, 2024  Subjective   REASON FOR VISIT: Follow-up PMBCL    ONCOLOGIC SUMMARY:  Diagnosis:  Primary mediastinal B-cell lymphoma dx 1/2024, presenting with cervical LAD. 1/29/24 FNA indeterminate; 2/9/24 left level 3 excisional LN biopsy showed large B-cell lymphoma with rare abnormal B-cells on flow cytometry. Although CD23 IHC was negative, IHC for , MAL, and PD-L1 were positive in majority of the neoplastic cells suggesting PMBCL over DLBCL NOS. [id number] gene expression profile also consistent with PMBCL. FISH with gain of BCL2 but no MYC, BCL2, or BCL6 rearrangements. PET showed bilateral cervical and mediastinal hypermetabolic lymphadenopathy (largest 4.4 cm with SUVmax 27 in a prevascular LN); no disease below diaphragm.     Intent of treatment: Curative    Treatment history:  2/22/24: Cycle 1 R-EPOCH, dose level 1    INTERVAL HISTORY:  Clementine is here today for follow-up prior to Cycle 2 R-EPOCH. Doing quite well overall.  Had a lot of constipation with Cycle 1 but also may have been coming off fertility meds. Finally improved with Senna, Miralax, and occasional Reglan.  Developed raised bumps on back of neck last week, prescribed Bactroban but it ended up resolving with just hydrocortisone cream.  Some fatigue the second week but this last week felt great/totally normal.  Some numbness in fingertips (L>R), none in feet.   Left neck swelling resolved with first cycle.  Otherwise no fevers, chest pain, N/V, bleeding.     I have reviewed and updated the following:  Past Medical History:   Diagnosis Date    Anxiety     Cervical lymphadenopathy     GERD     Interstitial cystitis       No Known Allergies    Current Outpatient Medications:     acyclovir (ZOVIRAX) 400 MG tablet, Take 1 tablet (400 mg) by mouth 2 times daily, Disp: 60 tablet, Rfl: 0    cimetidine (TAGAMET) 200 MG tablet, Take 200 mg by mouth 2 times daily, Disp: , Rfl:      fluconazole (DIFLUCAN) 200 MG tablet, Take 1 tablet (200 mg) by mouth daily, Disp: 30 tablet, Rfl: 0    levofloxacin (LEVAQUIN) 250 MG tablet, Take 1 tablet (250 mg) by mouth daily, Disp: 30 tablet, Rfl: 0    Levonorgestrel (KYLEENA IU), 1 Device by Intrauterine route once, Disp: , Rfl:     metoclopramide (REGLAN) 10 MG tablet, Take 1 tablet (10 mg) by mouth 4 times daily as needed (constipation), Disp: 30 tablet, Rfl: 3    modafinil (PROVIGIL) 200 MG tablet, Take 1 tablet (200 mg) by mouth daily as needed, Disp: , Rfl:     mupirocin (BACTROBAN) 2 % external ointment, Apply to affected area TID, Disp: 15 g, Rfl: 0    ondansetron (ZOFRAN) 4 MG tablet, Take 1-2 tablets (4-8 mg) by mouth every 8 hours as needed for nausea or vomiting, Disp: 30 tablet, Rfl: 0    pantoprazole (PROTONIX) 40 MG EC tablet, Take 1 tablet (40 mg) by mouth every morning, Disp: 30 tablet, Rfl: 0    polyethylene glycol (MIRALAX) 17 GM/Dose powder, Take 17 g by mouth daily as needed for constipation, Disp: 510 g, Rfl: 0    prochlorperazine (COMPAZINE) 10 MG tablet, Take 1 tablet (10 mg) by mouth every 6 hours as needed for nausea or vomiting, Disp: 30 tablet, Rfl: 0    senna-docusate (SENOKOT-S/PERICOLACE) 8.6-50 MG tablet, Take 1 tablet by mouth daily as needed for constipation, Disp: 15 tablet, Rfl: 0    dexAMETHasone (DECADRON) 4 MG tablet, Take 2 tablets (8 mg) by mouth daily Start taking Wednesday, 2/28, for two days. (Patient not taking: Reported on 3/4/2024), Disp: 4 tablet, Rfl: 0  No current facility-administered medications for this visit.    REVIEW OF SYSTEMS:  12-point ROS reviewed and negative other than that mentioned in HPI.     Objective   VITAL SIGNS:  /75 (BP Location: Right arm, Patient Position: Sitting, Cuff Size: Adult Regular)   Pulse 105   Temp 97.9  F (36.6  C) (Oral)   Resp 16   Wt 63.3 kg (139 lb 8 oz)   LMP 01/31/2024 (Exact Date)   SpO2 95%   BMI 22.53 kg/m      ECOG PS: 0     PHYSICAL EXAM:  General:  Awake, alert, in no acute distress. Oriented x 3.  HEENT: Normocephalic, atraumatic. No scleral icterus.   Lymph: No cervical, supraclavicular, or axillary lymphadenopathy appreciated. Steri-strips over left neck excision.   CV: Regular rate and rhythm. No murmurs, rubs, or gallops appreciated.  Resp: Good inspiratory effort, lungs clear to auscultation bilaterally.  GI: Abdomen soft, nontender, nondistended.   Ext: No peripheral edema bilaterally.  Neuro: CN II-XII grossly intact. No focal deficits.   Skin: No rash, unusual bruising or prominent lesions.  Psych: Pleasant, normal affect.    LABS:  I reviewed the following labs:  Lab Results   Component Value Date    WBC 10.1 03/14/2024    HGB 13.0 03/14/2024    HCT 39.2 03/14/2024    MCV 90 03/14/2024     03/14/2024    INR 1.18 (H) 02/27/2024     Lab Results   Component Value Date     03/14/2024    POTASSIUM 4.2 03/14/2024    CR 0.71 03/14/2024    BUN 14.2 03/14/2024    CHLORIDE 105 03/14/2024    DEE 9.5 03/14/2024     (H) 03/14/2024      Lab Results   Component Value Date    ALT 37 03/14/2024    AST 17 03/14/2024    ALKPHOS 95 03/14/2024    BILITOTAL 0.2 03/14/2024      Lab Results   Component Value Date     03/14/2024    URIC 2.6 03/07/2024        Assessment & Plan   Primary mediastinal B-cell lymphoma  Dx 1/2024, presenting cervical lymphadenopathy. Initial FNA suggestive of possible Hodgkin lymphoma; however excisional biopsy showed large B-cell lymphoma with differential diagnosis of primary mediastinal large B-cell lymphoma vs DLBCL NOS. Additional IHC staining for , MAL, and PD-L1, and [id number] gene expression profile all favor PMBCL diagnosis over DLBCL NOS. PET with cervical and mediastinal lymphadenopathy only. LDH normal.   Previously discussed PMBCL diagnosis and plan for DA-R-EPOCH x 6 cycles. In the single-arm phase 2 prospective study of R-EPOCH (without radiation) for PMBCL, 5-year EFS was 93% and OS 97%  (10.1056/[id number]).   Started Cycle 1 R-EPOCH 2/22/24, tolerating well so far other than constipation.  Admit for Cycle 2 R-EPOCH today. ANC dread >0.5 last cycle so qualifies for 20% increase in doxorubicin, etoposide, and cyclophosphamide this cycle.   Plan for 6 cycles total with restaging PET after Cycle 2.     Constipation  Scheduled Miralax and Senna-S.  PRN Reglan.    Neck/scalp rash, resolved  PRN hydrocortisone and Bactroban.    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 Affecting fingertips only.   Monitor and keep same dose of vincristine for now, may require dose reduction in later cycles.     Fertility preservation  Established with [facility] and underwent egg retrieval on 2/18/24.      Ppx  TLS ppx: okay to stop allopurinol at this time.   ID ppx: continue  mg BID. Levo and fluc when ANC <1.0. Monthly pentamidine for PJP ppx (last given 3/1, next due ~4/1).  Vaccines: up to date on COVID and flu shots.       PLAN:  Admit for Cycle 2 R-EPOCH, qualifies for 20% dose increase  Neulasta 3/19 and biweekly labs  AMEENA follow-up 3/25  PET 4/1 and follow-up with me 4/4 prior to Cycle 3 R-EPOCH    Total of 30 minutes on patient visit, reviewing records, interpreting test results, placing orders, and documentation on the day of service.    The longitudinal plan of care for the diagnosis(es)/condition(s) as documented were addressed during this visit. Due to the added complexity in care, I will continue to support Clementine in the subsequent management and with ongoing continuity of care.     Vanessa Oakes MD  Attending Physician, [facility]

## 2024-03-14 ENCOUNTER — ONCOLOGY VISIT (OUTPATIENT)
Dept: ONCOLOGY | Facility: CLINIC | Age: 29
End: 2024-03-14
Attending: INTERNAL MEDICINE
Payer: COMMERCIAL

## 2024-03-14 ENCOUNTER — APPOINTMENT (OUTPATIENT)
Dept: LAB | Facility: CLINIC | Age: 29
End: 2024-03-14
Attending: INTERNAL MEDICINE
Payer: COMMERCIAL

## 2024-03-14 ENCOUNTER — HOSPITAL ENCOUNTER (INPATIENT)
Facility: CLINIC | Age: 29
LOS: 5 days | Discharge: HOME | End: 2024-03-19
Attending: STUDENT IN AN ORGANIZED HEALTH CARE EDUCATION/TRAINING PROGRAM | Admitting: INTERNAL MEDICINE
Payer: COMMERCIAL

## 2024-03-14 VITALS
HEART RATE: 105 BPM | OXYGEN SATURATION: 95 % | TEMPERATURE: 97.9 F | RESPIRATION RATE: 16 BRPM | BODY MASS INDEX: 22.53 KG/M2 | SYSTOLIC BLOOD PRESSURE: 113 MMHG | DIASTOLIC BLOOD PRESSURE: 75 MMHG | WEIGHT: 139.5 LBS

## 2024-03-14 DIAGNOSIS — G62.0 DRUG-INDUCED POLYNEUROPATHY (H): ICD-10-CM

## 2024-03-14 DIAGNOSIS — C83.32 DIFFUSE LARGE B-CELL LYMPHOMA OF INTRATHORACIC LYMPH NODES (H): ICD-10-CM

## 2024-03-14 DIAGNOSIS — C85.28 MEDIASTINAL LARGE B-CELL LYMPHOMA OF LYMPH NODES OF MULTIPLE REGIONS (H): Primary | ICD-10-CM

## 2024-03-14 DIAGNOSIS — Z76.89 PREVENTION OF CHEMOTHERAPY-INDUCED NEUTROPENIA: Primary | ICD-10-CM

## 2024-03-14 DIAGNOSIS — K59.03 DRUG-INDUCED CONSTIPATION: ICD-10-CM

## 2024-03-14 DIAGNOSIS — R11.0 NAUSEA: ICD-10-CM

## 2024-03-14 DIAGNOSIS — R21 RASH: ICD-10-CM

## 2024-03-14 LAB
ABO/RH(D): NORMAL
ALBUMIN SERPL BCG-MCNC: 3.8 G/DL (ref 3.5–5.2)
ALBUMIN SERPL BCG-MCNC: 3.9 G/DL (ref 3.5–5.2)
ALP SERPL-CCNC: 90 U/L (ref 40–150)
ALP SERPL-CCNC: 95 U/L (ref 40–150)
ALT SERPL W P-5'-P-CCNC: 30 U/L (ref 0–50)
ALT SERPL W P-5'-P-CCNC: 37 U/L (ref 0–50)
ANION GAP SERPL CALCULATED.3IONS-SCNC: 11 MMOL/L (ref 7–15)
ANION GAP SERPL CALCULATED.3IONS-SCNC: 8 MMOL/L (ref 7–15)
ANTIBODY SCREEN: NEGATIVE
AST SERPL W P-5'-P-CCNC: 17 U/L (ref 0–45)
AST SERPL W P-5'-P-CCNC: 23 U/L (ref 0–45)
BASOPHILS # BLD AUTO: 0.1 10E3/UL (ref 0–0.2)
BASOPHILS NFR BLD AUTO: 1 %
BILIRUB SERPL-MCNC: 0.2 MG/DL
BILIRUB SERPL-MCNC: <0.2 MG/DL
BUN SERPL-MCNC: 12.9 MG/DL (ref 6–20)
BUN SERPL-MCNC: 14.2 MG/DL (ref 6–20)
CALCIUM SERPL-MCNC: 9.3 MG/DL (ref 8.6–10)
CALCIUM SERPL-MCNC: 9.5 MG/DL (ref 8.6–10)
CHLORIDE SERPL-SCNC: 105 MMOL/L (ref 98–107)
CHLORIDE SERPL-SCNC: 105 MMOL/L (ref 98–107)
CREAT SERPL-MCNC: 0.69 MG/DL (ref 0.51–0.95)
CREAT SERPL-MCNC: 0.71 MG/DL (ref 0.51–0.95)
DEPRECATED HCO3 PLAS-SCNC: 24 MMOL/L (ref 22–29)
DEPRECATED HCO3 PLAS-SCNC: 25 MMOL/L (ref 22–29)
EGFRCR SERPLBLD CKD-EPI 2021: >90 ML/MIN/1.73M2
EGFRCR SERPLBLD CKD-EPI 2021: >90 ML/MIN/1.73M2
EOSINOPHIL # BLD AUTO: 0 10E3/UL (ref 0–0.7)
EOSINOPHIL NFR BLD AUTO: 0 %
ERYTHROCYTE [DISTWIDTH] IN BLOOD BY AUTOMATED COUNT: 15.5 % (ref 10–15)
GLUCOSE SERPL-MCNC: 114 MG/DL (ref 70–99)
GLUCOSE SERPL-MCNC: 85 MG/DL (ref 70–99)
HCT VFR BLD AUTO: 37.5 % (ref 35–47)
HGB BLD-MCNC: 12.7 G/DL (ref 11.7–15.7)
IMM GRANULOCYTES # BLD: 0.3 10E3/UL
IMM GRANULOCYTES NFR BLD: 3 %
LDH SERPL L TO P-CCNC: 216 U/L (ref 0–250)
LYMPHOCYTES # BLD AUTO: 1.4 10E3/UL (ref 0.8–5.3)
LYMPHOCYTES NFR BLD AUTO: 14 %
MCH RBC QN AUTO: 30.8 PG (ref 26.5–33)
MCHC RBC AUTO-ENTMCNC: 33.9 G/DL (ref 31.5–36.5)
MCV RBC AUTO: 91 FL (ref 78–100)
MONOCYTES # BLD AUTO: 0.8 10E3/UL (ref 0–1.3)
MONOCYTES NFR BLD AUTO: 7 %
NEUTROPHILS # BLD AUTO: 7.7 10E3/UL (ref 1.6–8.3)
NEUTROPHILS NFR BLD AUTO: 75 %
NRBC # BLD AUTO: 0 10E3/UL
NRBC BLD AUTO-RTO: 0 /100
PLATELET # BLD AUTO: 289 10E3/UL (ref 150–450)
POTASSIUM SERPL-SCNC: 4.1 MMOL/L (ref 3.4–5.3)
POTASSIUM SERPL-SCNC: 4.2 MMOL/L (ref 3.4–5.3)
PROT SERPL-MCNC: 6.5 G/DL (ref 6.4–8.3)
PROT SERPL-MCNC: 6.7 G/DL (ref 6.4–8.3)
RBC # BLD AUTO: 4.12 10E6/UL (ref 3.8–5.2)
SODIUM SERPL-SCNC: 138 MMOL/L (ref 135–145)
SODIUM SERPL-SCNC: 140 MMOL/L (ref 135–145)
SPECIMEN EXPIRATION DATE: NORMAL
WBC # BLD AUTO: 10.5 10E3/UL (ref 4–11)

## 2024-03-14 PROCEDURE — 99214 OFFICE O/P EST MOD 30 MIN: CPT | Performed by: INTERNAL MEDICINE

## 2024-03-14 PROCEDURE — 84155 ASSAY OF PROTEIN SERUM: CPT | Performed by: INTERNAL MEDICINE

## 2024-03-14 PROCEDURE — 250N000011 HC RX IP 250 OP 636: Performed by: INTERNAL MEDICINE

## 2024-03-14 PROCEDURE — 250N000013 HC RX MED GY IP 250 OP 250 PS 637: Performed by: PHYSICIAN ASSISTANT

## 2024-03-14 PROCEDURE — G2211 COMPLEX E/M VISIT ADD ON: HCPCS | Performed by: INTERNAL MEDICINE

## 2024-03-14 PROCEDURE — 250N000011 HC RX IP 250 OP 636: Performed by: PHYSICIAN ASSISTANT

## 2024-03-14 PROCEDURE — 82247 BILIRUBIN TOTAL: CPT | Performed by: INTERNAL MEDICINE

## 2024-03-14 PROCEDURE — 250N000012 HC RX MED GY IP 250 OP 636 PS 637: Performed by: INTERNAL MEDICINE

## 2024-03-14 PROCEDURE — 258N000003 HC RX IP 258 OP 636: Performed by: INTERNAL MEDICINE

## 2024-03-14 PROCEDURE — 120N000002 HC R&B MED SURG/OB UMMC

## 2024-03-14 PROCEDURE — 250N000011 HC RX IP 250 OP 636: Mod: JZ | Performed by: INTERNAL MEDICINE

## 2024-03-14 PROCEDURE — 36415 COLL VENOUS BLD VENIPUNCTURE: CPT | Performed by: PHYSICIAN ASSISTANT

## 2024-03-14 PROCEDURE — 85004 AUTOMATED DIFF WBC COUNT: CPT | Performed by: INTERNAL MEDICINE

## 2024-03-14 PROCEDURE — 250N000013 HC RX MED GY IP 250 OP 250 PS 637: Performed by: INTERNAL MEDICINE

## 2024-03-14 PROCEDURE — 86900 BLOOD TYPING SEROLOGIC ABO: CPT | Performed by: PHYSICIAN ASSISTANT

## 2024-03-14 PROCEDURE — 85025 COMPLETE CBC W/AUTO DIFF WBC: CPT | Performed by: INTERNAL MEDICINE

## 2024-03-14 PROCEDURE — 80053 COMPREHEN METABOLIC PANEL: CPT | Performed by: INTERNAL MEDICINE

## 2024-03-14 PROCEDURE — 83615 LACTATE (LD) (LDH) ENZYME: CPT | Performed by: INTERNAL MEDICINE

## 2024-03-14 PROCEDURE — 99213 OFFICE O/P EST LOW 20 MIN: CPT | Performed by: INTERNAL MEDICINE

## 2024-03-14 PROCEDURE — 36415 COLL VENOUS BLD VENIPUNCTURE: CPT | Performed by: INTERNAL MEDICINE

## 2024-03-14 RX ORDER — HEPARIN SODIUM (PORCINE) LOCK FLUSH IV SOLN 100 UNIT/ML 100 UNIT/ML
5-10 SOLUTION INTRAVENOUS
Status: DISCONTINUED | OUTPATIENT
Start: 2024-03-14 | End: 2024-03-19 | Stop reason: HOSPADM

## 2024-03-14 RX ORDER — LORAZEPAM 2 MG/ML
.5-1 INJECTION INTRAMUSCULAR EVERY 6 HOURS PRN
Status: CANCELLED | OUTPATIENT
Start: 2024-03-14

## 2024-03-14 RX ORDER — DIPHENHYDRAMINE HYDROCHLORIDE 50 MG/ML
50 INJECTION INTRAMUSCULAR; INTRAVENOUS
Status: CANCELLED
Start: 2024-03-14

## 2024-03-14 RX ORDER — EPINEPHRINE 1 MG/ML
0.3 INJECTION, SOLUTION, CONCENTRATE INTRAVENOUS EVERY 5 MIN PRN
Status: DISCONTINUED | OUTPATIENT
Start: 2024-03-14 | End: 2024-03-19 | Stop reason: HOSPADM

## 2024-03-14 RX ORDER — DEXAMETHASONE 4 MG/1
8 TABLET ORAL DAILY
Status: CANCELLED
Start: 2024-03-20

## 2024-03-14 RX ORDER — ONDANSETRON 2 MG/ML
8 INJECTION INTRAMUSCULAR; INTRAVENOUS EVERY 8 HOURS PRN
Status: DISCONTINUED | OUTPATIENT
Start: 2024-03-14 | End: 2024-03-19 | Stop reason: HOSPADM

## 2024-03-14 RX ORDER — ACETAMINOPHEN 325 MG/1
650 TABLET ORAL EVERY 4 HOURS PRN
Status: DISCONTINUED | OUTPATIENT
Start: 2024-03-14 | End: 2024-03-19 | Stop reason: HOSPADM

## 2024-03-14 RX ORDER — AMOXICILLIN 250 MG
2 CAPSULE ORAL 2 TIMES DAILY
Status: DISCONTINUED | OUTPATIENT
Start: 2024-03-14 | End: 2024-03-19 | Stop reason: HOSPADM

## 2024-03-14 RX ORDER — DEXAMETHASONE 4 MG/1
8 TABLET ORAL DAILY
Status: DISCONTINUED | OUTPATIENT
Start: 2024-03-20 | End: 2024-03-19 | Stop reason: HOSPADM

## 2024-03-14 RX ORDER — ENOXAPARIN SODIUM 100 MG/ML
40 INJECTION SUBCUTANEOUS EVERY 24 HOURS
Status: DISCONTINUED | OUTPATIENT
Start: 2024-03-14 | End: 2024-03-19 | Stop reason: HOSPADM

## 2024-03-14 RX ORDER — HEPARIN SODIUM,PORCINE 10 UNIT/ML
5-10 VIAL (ML) INTRAVENOUS EVERY 24 HOURS
Status: DISCONTINUED | OUTPATIENT
Start: 2024-03-14 | End: 2024-03-19 | Stop reason: HOSPADM

## 2024-03-14 RX ORDER — METHYLPREDNISOLONE SODIUM SUCCINATE 125 MG/2ML
125 INJECTION, POWDER, LYOPHILIZED, FOR SOLUTION INTRAMUSCULAR; INTRAVENOUS
Status: CANCELLED
Start: 2024-03-14

## 2024-03-14 RX ORDER — EPINEPHRINE 1 MG/ML
0.3 INJECTION, SOLUTION INTRAMUSCULAR; SUBCUTANEOUS EVERY 5 MIN PRN
Status: CANCELLED | OUTPATIENT
Start: 2024-03-14

## 2024-03-14 RX ORDER — LORAZEPAM 0.5 MG/1
.5-1 TABLET ORAL EVERY 6 HOURS PRN
Status: DISCONTINUED | OUTPATIENT
Start: 2024-03-14 | End: 2024-03-19 | Stop reason: HOSPADM

## 2024-03-14 RX ORDER — MEPERIDINE HYDROCHLORIDE 25 MG/ML
25 INJECTION INTRAMUSCULAR; INTRAVENOUS; SUBCUTANEOUS EVERY 30 MIN PRN
Status: DISCONTINUED | OUTPATIENT
Start: 2024-03-14 | End: 2024-03-19 | Stop reason: HOSPADM

## 2024-03-14 RX ORDER — ACETAMINOPHEN 325 MG/1
650 TABLET ORAL ONCE
Qty: 2 TABLET | Refills: 0 | Status: COMPLETED | OUTPATIENT
Start: 2024-03-14 | End: 2024-03-14

## 2024-03-14 RX ORDER — LORAZEPAM 0.5 MG/1
.5-1 TABLET ORAL EVERY 6 HOURS PRN
Status: CANCELLED
Start: 2024-03-14

## 2024-03-14 RX ORDER — METHYLPREDNISOLONE SODIUM SUCCINATE 125 MG/2ML
125 INJECTION, POWDER, LYOPHILIZED, FOR SOLUTION INTRAMUSCULAR; INTRAVENOUS
Status: DISCONTINUED | OUTPATIENT
Start: 2024-03-14 | End: 2024-03-19 | Stop reason: HOSPADM

## 2024-03-14 RX ORDER — DIPHENHYDRAMINE HYDROCHLORIDE 50 MG/ML
50 INJECTION INTRAMUSCULAR; INTRAVENOUS
Status: DISCONTINUED | OUTPATIENT
Start: 2024-03-14 | End: 2024-03-19 | Stop reason: HOSPADM

## 2024-03-14 RX ORDER — ONDANSETRON 8 MG/1
16 TABLET, FILM COATED ORAL EVERY 24 HOURS
Status: COMPLETED | OUTPATIENT
Start: 2024-03-15 | End: 2024-03-19

## 2024-03-14 RX ORDER — LORAZEPAM 2 MG/ML
.5-1 INJECTION INTRAMUSCULAR EVERY 6 HOURS PRN
Status: DISCONTINUED | OUTPATIENT
Start: 2024-03-14 | End: 2024-03-14

## 2024-03-14 RX ORDER — ACETAMINOPHEN 325 MG/1
650 TABLET ORAL ONCE
Status: CANCELLED
Start: 2024-03-14

## 2024-03-14 RX ORDER — PROCHLORPERAZINE MALEATE 5 MG
10 TABLET ORAL EVERY 6 HOURS PRN
Status: DISCONTINUED | OUTPATIENT
Start: 2024-03-14 | End: 2024-03-14

## 2024-03-14 RX ORDER — PROCHLORPERAZINE MALEATE 5 MG
5-10 TABLET ORAL EVERY 6 HOURS PRN
Status: DISCONTINUED | OUTPATIENT
Start: 2024-03-14 | End: 2024-03-19 | Stop reason: HOSPADM

## 2024-03-14 RX ORDER — FAMOTIDINE 20 MG/1
20 TABLET, FILM COATED ORAL 2 TIMES DAILY
Status: CANCELLED | OUTPATIENT
Start: 2024-03-14

## 2024-03-14 RX ORDER — ACYCLOVIR 400 MG/1
400 TABLET ORAL 2 TIMES DAILY
Status: DISCONTINUED | OUTPATIENT
Start: 2024-03-14 | End: 2024-03-19 | Stop reason: HOSPADM

## 2024-03-14 RX ORDER — DIPHENHYDRAMINE HCL 25 MG
50 CAPSULE ORAL ONCE
Qty: 2 CAPSULE | Refills: 0 | Status: COMPLETED | OUTPATIENT
Start: 2024-03-14 | End: 2024-03-14

## 2024-03-14 RX ORDER — AMOXICILLIN 250 MG
2 CAPSULE ORAL 2 TIMES DAILY
Status: CANCELLED | OUTPATIENT
Start: 2024-03-14

## 2024-03-14 RX ORDER — ONDANSETRON 8 MG/1
8 TABLET, FILM COATED ORAL EVERY 8 HOURS PRN
Status: DISCONTINUED | OUTPATIENT
Start: 2024-03-14 | End: 2024-03-19 | Stop reason: HOSPADM

## 2024-03-14 RX ORDER — PROCHLORPERAZINE MALEATE 10 MG
10 TABLET ORAL EVERY 6 HOURS PRN
Status: CANCELLED
Start: 2024-03-14

## 2024-03-14 RX ORDER — MEPERIDINE HYDROCHLORIDE 25 MG/ML
25 INJECTION INTRAMUSCULAR; INTRAVENOUS; SUBCUTANEOUS EVERY 30 MIN PRN
Status: CANCELLED | OUTPATIENT
Start: 2024-03-14

## 2024-03-14 RX ORDER — HEPARIN SODIUM (PORCINE) LOCK FLUSH IV SOLN 100 UNIT/ML 100 UNIT/ML
500 SOLUTION INTRAVENOUS ONCE
Status: COMPLETED | OUTPATIENT
Start: 2024-03-14 | End: 2024-03-14

## 2024-03-14 RX ORDER — ONDANSETRON 4 MG/1
8 TABLET, ORALLY DISINTEGRATING ORAL EVERY 8 HOURS PRN
Status: DISCONTINUED | OUTPATIENT
Start: 2024-03-14 | End: 2024-03-19 | Stop reason: HOSPADM

## 2024-03-14 RX ORDER — ALLOPURINOL 300 MG/1
300 TABLET ORAL DAILY
Status: DISCONTINUED | OUTPATIENT
Start: 2024-03-14 | End: 2024-03-14

## 2024-03-14 RX ORDER — PANTOPRAZOLE SODIUM 40 MG/1
40 TABLET, DELAYED RELEASE ORAL
Status: DISCONTINUED | OUTPATIENT
Start: 2024-03-14 | End: 2024-03-19 | Stop reason: HOSPADM

## 2024-03-14 RX ORDER — PREDNISONE 50 MG/1
100 TABLET ORAL 2 TIMES DAILY
Status: CANCELLED
Start: 2024-03-15

## 2024-03-14 RX ORDER — HEPARIN SODIUM,PORCINE 10 UNIT/ML
5-10 VIAL (ML) INTRAVENOUS
Status: DISCONTINUED | OUTPATIENT
Start: 2024-03-14 | End: 2024-03-19 | Stop reason: HOSPADM

## 2024-03-14 RX ORDER — ALBUTEROL SULFATE 90 UG/1
1-2 AEROSOL, METERED RESPIRATORY (INHALATION)
Status: CANCELLED
Start: 2024-03-14

## 2024-03-14 RX ORDER — POLYETHYLENE GLYCOL 3350 17 G/17G
17 POWDER, FOR SOLUTION ORAL DAILY PRN
Status: DISCONTINUED | OUTPATIENT
Start: 2024-03-14 | End: 2024-03-19 | Stop reason: HOSPADM

## 2024-03-14 RX ORDER — PREDNISONE 50 MG/1
100 TABLET ORAL 2 TIMES DAILY
Qty: 20 TABLET | Refills: 0 | Status: COMPLETED | OUTPATIENT
Start: 2024-03-14 | End: 2024-03-19

## 2024-03-14 RX ORDER — ALBUTEROL SULFATE 0.83 MG/ML
2.5 SOLUTION RESPIRATORY (INHALATION)
Status: DISCONTINUED | OUTPATIENT
Start: 2024-03-14 | End: 2024-03-19 | Stop reason: HOSPADM

## 2024-03-14 RX ORDER — ALBUTEROL SULFATE 90 UG/1
1-2 AEROSOL, METERED RESPIRATORY (INHALATION)
Status: DISCONTINUED | OUTPATIENT
Start: 2024-03-14 | End: 2024-03-19 | Stop reason: HOSPADM

## 2024-03-14 RX ORDER — AMOXICILLIN 250 MG
1 CAPSULE ORAL DAILY PRN
Status: DISCONTINUED | OUTPATIENT
Start: 2024-03-14 | End: 2024-03-19 | Stop reason: HOSPADM

## 2024-03-14 RX ORDER — PANTOPRAZOLE SODIUM 40 MG/1
40 TABLET, DELAYED RELEASE ORAL EVERY MORNING
Status: DISCONTINUED | OUTPATIENT
Start: 2024-03-15 | End: 2024-03-14

## 2024-03-14 RX ORDER — ALBUTEROL SULFATE 0.83 MG/ML
2.5 SOLUTION RESPIRATORY (INHALATION)
Status: CANCELLED | OUTPATIENT
Start: 2024-03-14

## 2024-03-14 RX ORDER — DIPHENHYDRAMINE HCL 25 MG
50 CAPSULE ORAL ONCE
Status: CANCELLED
Start: 2024-03-14

## 2024-03-14 RX ORDER — ONDANSETRON 8 MG/1
16 TABLET, FILM COATED ORAL EVERY 24 HOURS
Status: CANCELLED
Start: 2024-03-15

## 2024-03-14 RX ADMIN — DIPHENHYDRAMINE HYDROCHLORIDE 50 MG: 25 CAPSULE ORAL at 17:42

## 2024-03-14 RX ADMIN — CIMETIDINE 200 MG: 200 TABLET, FILM COATED ORAL at 20:33

## 2024-03-14 RX ADMIN — ENOXAPARIN SODIUM 40 MG: 40 INJECTION SUBCUTANEOUS at 20:34

## 2024-03-14 RX ADMIN — PANTOPRAZOLE SODIUM 40 MG: 40 TABLET, DELAYED RELEASE ORAL at 16:53

## 2024-03-14 RX ADMIN — DOCUSATE SODIUM 50 MG AND SENNOSIDES 8.6 MG 2 TABLET: 8.6; 5 TABLET, FILM COATED ORAL at 20:33

## 2024-03-14 RX ADMIN — RITUXIMAB-ABBS 600 MG: 10 INJECTION, SOLUTION INTRAVENOUS at 18:39

## 2024-03-14 RX ADMIN — PREDNISONE 100 MG: 50 TABLET ORAL at 17:42

## 2024-03-14 RX ADMIN — ACETAMINOPHEN 650 MG: 325 TABLET, FILM COATED ORAL at 17:42

## 2024-03-14 RX ADMIN — ACYCLOVIR 400 MG: 400 TABLET ORAL at 20:33

## 2024-03-14 RX ADMIN — Medication 5 ML: at 22:18

## 2024-03-14 ASSESSMENT — ACTIVITIES OF DAILY LIVING (ADL)
ADLS_ACUITY_SCORE: 20

## 2024-03-14 ASSESSMENT — PAIN SCALES - GENERAL: PAINLEVEL: NO PAIN (0)

## 2024-03-14 NOTE — LETTER
3/14/2024         RE: Clementine Kathleen  [address]  [facility] [address]        Dear Colleague,    Thank you for referring your patient, Clementine Kathleen, to the [facility]. Please see a copy of my visit note below.     [facility]      FOLLOW-UP VISIT NOTE                       Mar 14, 2024  Subjective  REASON FOR VISIT: Follow-up PMBCL    ONCOLOGIC SUMMARY:  Diagnosis:  Primary mediastinal B-cell lymphoma dx 1/2024, presenting with cervical LAD. 1/29/24 FNA indeterminate; 2/9/24 left level 3 excisional LN biopsy showed large B-cell lymphoma with rare abnormal B-cells on flow cytometry. Although CD23 IHC was negative, IHC for , MAL, and PD-L1 were positive in majority of the neoplastic cells suggesting PMBCL over DLBCL NOS. [id number] gene expression profile also consistent with PMBCL. FISH with gain of BCL2 but no MYC, BCL2, or BCL6 rearrangements. PET showed bilateral cervical and mediastinal hypermetabolic lymphadenopathy (largest 4.4 cm with SUVmax 27 in a prevascular LN); no disease below diaphragm.     Intent of treatment: Curative    Treatment history:  2/22/24: Cycle 1 R-EPOCH, dose level 1    INTERVAL HISTORY:  Clementine is here today for follow-up prior to Cycle 2 R-EPOCH. Doing quite well overall.  Had a lot of constipation with Cycle 1 but also may have been coming off fertility meds. Finally improved with Senna, Miralax, and occasional Reglan.  Developed raised bumps on back of neck last week, prescribed Bactroban but it ended up resolving with just hydrocortisone cream.  Some fatigue the second week but this last week felt great/totally normal.  Some numbness in fingertips (L>R), none in feet.   Left neck swelling resolved with first cycle.  Otherwise no fevers, chest pain, N/V, bleeding.     I have reviewed and updated the following:  Past Medical History:   Diagnosis Date    Anxiety     Cervical lymphadenopathy     GERD     Interstitial cystitis        No Known Allergies    Current Outpatient Medications:     acyclovir (ZOVIRAX) 400 MG tablet, Take 1 tablet (400 mg) by mouth 2 times daily, Disp: 60 tablet, Rfl: 0    cimetidine (TAGAMET) 200 MG tablet, Take 200 mg by mouth 2 times daily, Disp: , Rfl:     fluconazole (DIFLUCAN) 200 MG tablet, Take 1 tablet (200 mg) by mouth daily, Disp: 30 tablet, Rfl: 0    levofloxacin (LEVAQUIN) 250 MG tablet, Take 1 tablet (250 mg) by mouth daily, Disp: 30 tablet, Rfl: 0    Levonorgestrel (KYLEENA IU), 1 Device by Intrauterine route once, Disp: , Rfl:     metoclopramide (REGLAN) 10 MG tablet, Take 1 tablet (10 mg) by mouth 4 times daily as needed (constipation), Disp: 30 tablet, Rfl: 3    modafinil (PROVIGIL) 200 MG tablet, Take 1 tablet (200 mg) by mouth daily as needed, Disp: , Rfl:     mupirocin (BACTROBAN) 2 % external ointment, Apply to affected area TID, Disp: 15 g, Rfl: 0    ondansetron (ZOFRAN) 4 MG tablet, Take 1-2 tablets (4-8 mg) by mouth every 8 hours as needed for nausea or vomiting, Disp: 30 tablet, Rfl: 0    pantoprazole (PROTONIX) 40 MG EC tablet, Take 1 tablet (40 mg) by mouth every morning, Disp: 30 tablet, Rfl: 0    polyethylene glycol (MIRALAX) 17 GM/Dose powder, Take 17 g by mouth daily as needed for constipation, Disp: 510 g, Rfl: 0    prochlorperazine (COMPAZINE) 10 MG tablet, Take 1 tablet (10 mg) by mouth every 6 hours as needed for nausea or vomiting, Disp: 30 tablet, Rfl: 0    senna-docusate (SENOKOT-S/PERICOLACE) 8.6-50 MG tablet, Take 1 tablet by mouth daily as needed for constipation, Disp: 15 tablet, Rfl: 0    dexAMETHasone (DECADRON) 4 MG tablet, Take 2 tablets (8 mg) by mouth daily Start taking Wednesday, 2/28, for two days. (Patient not taking: Reported on 3/4/2024), Disp: 4 tablet, Rfl: 0  No current facility-administered medications for this visit.    REVIEW OF SYSTEMS:  12-point ROS reviewed and negative other than that mentioned in HPI.     Objective  VITAL SIGNS:  /75 (BP  Location: Right arm, Patient Position: Sitting, Cuff Size: Adult Regular)   Pulse 105   Temp 97.9  F (36.6  C) (Oral)   Resp 16   Wt 63.3 kg (139 lb 8 oz)   LMP 01/31/2024 (Exact Date)   SpO2 95%   BMI 22.53 kg/m      ECOG PS: 0     PHYSICAL EXAM:  General: Awake, alert, in no acute distress. Oriented x 3.  HEENT: Normocephalic, atraumatic. No scleral icterus.   Lymph: No cervical, supraclavicular, or axillary lymphadenopathy appreciated. Steri-strips over left neck excision.   CV: Regular rate and rhythm. No murmurs, rubs, or gallops appreciated.  Resp: Good inspiratory effort, lungs clear to auscultation bilaterally.  GI: Abdomen soft, nontender, nondistended.   Ext: No peripheral edema bilaterally.  Neuro: CN II-XII grossly intact. No focal deficits.   Skin: No rash, unusual bruising or prominent lesions.  Psych: Pleasant, normal affect.    LABS:  I reviewed the following labs:  Lab Results   Component Value Date    WBC 10.1 03/14/2024    HGB 13.0 03/14/2024    HCT 39.2 03/14/2024    MCV 90 03/14/2024     03/14/2024    INR 1.18 (H) 02/27/2024     Lab Results   Component Value Date     03/14/2024    POTASSIUM 4.2 03/14/2024    CR 0.71 03/14/2024    BUN 14.2 03/14/2024    CHLORIDE 105 03/14/2024    DEE 9.5 03/14/2024     (H) 03/14/2024      Lab Results   Component Value Date    ALT 37 03/14/2024    AST 17 03/14/2024    ALKPHOS 95 03/14/2024    BILITOTAL 0.2 03/14/2024      Lab Results   Component Value Date     03/14/2024    URIC 2.6 03/07/2024        Assessment & Plan  Primary mediastinal B-cell lymphoma  Dx 1/2024, presenting cervical lymphadenopathy. Initial FNA suggestive of possible Hodgkin lymphoma; however excisional biopsy showed large B-cell lymphoma with differential diagnosis of primary mediastinal large B-cell lymphoma vs DLBCL NOS. Additional IHC staining for , MAL, and PD-L1, and [id number] gene expression profile all favor PMBCL diagnosis over DLBCL NOS. PET with  cervical and mediastinal lymphadenopathy only. LDH normal.   Previously discussed PMBCL diagnosis and plan for DA-R-EPOCH x 6 cycles. In the single-arm phase 2 prospective study of R-EPOCH (without radiation) for PMBCL, 5-year EFS was 93% and OS 97% (10.1056/[id number]).   Started Cycle 1 R-EPOCH 2/22/24, tolerating well so far other than constipation.  Admit for Cycle 2 R-EPOCH today. ANC dread >0.5 last cycle so qualifies for 20% increase in doxorubicin, etoposide, and cyclophosphamide this cycle.   Plan for 6 cycles total with restaging PET after Cycle 2.     Constipation  Scheduled Miralax and Senna-S.  PRN Reglan.    Neck/scalp rash, resolved  PRN hydrocortisone and Bactroban.    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 Affecting fingertips only.   Monitor and keep same dose of vincristine for now, may require dose reduction in later cycles.     Fertility preservation  Established with [facility] and underwent egg retrieval on 2/18/24.      Ppx  TLS ppx: okay to stop allopurinol at this time.   ID ppx: continue  mg BID. Levo and fluc when ANC <1.0. Monthly pentamidine for PJP ppx (last given 3/1, next due ~4/1).  Vaccines: up to date on COVID and flu shots.       PLAN:  Admit for Cycle 2 R-EPOCH, qualifies for 20% dose increase  Neulasta 3/19 and biweekly labs  AMEENA follow-up 3/25  PET 4/1 and follow-up with me 4/4 prior to Cycle 3 R-EPOCH    Total of 30 minutes on patient visit, reviewing records, interpreting test results, placing orders, and documentation on the day of service.    The longitudinal plan of care for the diagnosis(es)/condition(s) as documented were addressed during this visit. Due to the added complexity in care, I will continue to support Clementine in the subsequent management and with ongoing continuity of care.     Vanessa Oakes MD  Attending Physician, [facility]

## 2024-03-14 NOTE — H&P
[facility]    History and Physical  Hematology / Oncology     Date of Admission:  03/14/24   Date of Service (when I saw the patient): 03/14/24    Assessment & Plan   Clementine Kathleen is a 28 year old female with PMH of primary mediastinal B-cell lymphoma, admitted 3/14/2024 for scheduled chemotherapy with dose adjusted R-EPOCH (C2D1=3/14/2024).        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 Oakes. Initially presented with cervical lymphadenopathy 12/2023. Initial FNA (1/29/24) suggestive of possible Hodgkin lymphoma; however excisional LN biopsy (2/9/24) shows large B-cell lymphoma with differential diagnosis of primary mediastinal large B-cell lymphoma vs DLBCL NOS. PET with cervical and mediastinal lymphadenopathy only. Overall presentation would be quite consistent with PMBCL given her age, gender, sites of involvement, dim CD30 expression, and prominent fibrotic background.  Although CD23 IHC was negative, IHC for , MAL, and PD-L1 were positive in majority of the neoplastic cells suggesting PMBCL over DLBCL NOS. Hucza6IS gene expression profile also consistent with PMBCL. FISH with gain of BCL2 but no MYC, BCL2, or BCL6 rearrangements.  PET showed bilateral cervical and mediastinal hypermetabolic lymphadenopathy (largest 4.4 cm with SUVmax 27 in a prevascular LN); no disease below diaphragm.   Plan remains to completed a total of 6 cycles with a restaging PET-CT after 2 cycles.   - Echo 2/19 with LVEF 60%, no significant valvular abnormalities present  - Patient qualifies for a 20% dose increase (Level 2) as her ANC dread was still greater than 0.5 with the first cycle.   - Port accessed prior to admission      Treatment Plan: Dose adjusted R-EPOCH (C2D1=3/14/2024)  - Rituximab 375 mg/m2 (600mg) IV x 1 dose - D0  - Prednisone 100 mg BID x 10 doses - D1-5  - Etoposide 60 mg/m2 (105 mg) IV x 4 doses - D1-4  -  Vincristine 0.4 mg/m2 (0.7mg), doxorubicin (21mg) x 4 doses CIVI- D1-4  - Cyclophosphamide 900 mg/m2 (1540 mg) IV x 1 dose - D5  - Pegfilgrastim x1 - D6 (scheduled in clinic for 3/19)  - Pre-meds: tylenol, benadryl, zofran, emend, dexamethasone (D6-7)     # Risk of TLS  - Continue allopurinol 300 mg daily.   - Encourage good PO hydration.    # Grade 1 Neuropathy   Endorsed L>R neuropathy in her fingertips.Feels like right side has improved and left has remained the same.   - Monitor closely     # Risk for pancytopenia   2/2 chemotherapy   - Transfuse for Hgb <7, Plt <10K  - Type & Screen MWF    ID  # ID prophylaxis  -  mg BID.   - Levo and fluc when ANC <1.0.   - Pentamidine for PJP ppx last given 3/1; next due ~3/29     MISC  # Fertility preservation - Already established with [facility], underwent egg retrieval on 2/18 or 2/19.   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 Her boyfriend is a general surgery resident here at [facility]. Parents live in [place].     Fluids/Electrolytes/Nutrition   - IVF per chemotherapy regimen  - PRN lyte replacement per standing protocol  - Regular diet as tolerated     Lines: Port    PPX  VTE: Lovenox 40mg SubQ  GI: PPI  Bowels: Senna and Miralax PRN  Activity: Up as tolerated    Code  Full    Dispo: Plan for ~5 day stay for scheduled chemotherapy.     Follow up:   Currently scheduled for:   - Neulasta 3/19 and biweekly labs  - AMEENA follow-up ~3/25  - PET 4/1 and follow-up with Dr Oakes 4/4 prior to Cycle 3 R-EPOCH    I spent >45 minutes face-to-face and/or coordinating or discussing care plan. Over 50% of our time on the unit was spent counseling the patient and/or coordinating care    Patient was staffed with Dr Sade Pack (Fei) PAMICHAEL  Hematology/Oncology  Pager #[phone]    Code Status   Full Code    Primary Care Physician   Chela Peter    Chief Complaint   Scheduled  Chemotherapy    History is obtained from the patient    History of Present Illness   Clementine Kathleen is a 28 year old female with PMH of primary mediastinal B-cell lymphoma, admitted 3/14/2024 for scheduled chemotherapy with dose adjusted R-EPOCH (C2D1=3/14/2024).       On admission, Clementine reports feeling well. She reported some increased GERD with cycle 1 but feels like increasing her PPI dosing to BID has helped. She also struggled with constipation from the last cycle of chemotherapy but feels like she has a good regimen currently. Reviewed that she will have a 20% dose increase with this cycle based on her counts. She understood. She endorses some neuropathy L>R in her fingertips. Nothing in her toes. All questions answered at this time.       Past Medical History    I have reviewed this patient's medical history and updated it with pertinent information if needed.   Past Medical History:   Diagnosis Date    Anxiety     Cervical lymphadenopathy     GERD     Interstitial cystitis        Past Surgical History   I have reviewed this patient's surgical history and updated it with pertinent information if needed.  Past Surgical History:   Procedure Laterality Date    CL AFF SURGICAL PATHOLOGY      Pearl neuroma    CLOSED REDUCTION PROXIMAL FIBULAR FRACTURE Left     EXCISE LESION NECK Left 2024    Procedure: Excisional Node Biopsy Left Neck;  Surgeon: Asher Damon MD;  Location: [facility] OR    INSERT PORT VASCULAR ACCESS Right 3/1/2024    Procedure: Insert port vascular access;  Surgeon: Solo Contreras PA-C;  Location: [facility] OR    IR CHEST PORT PLACEMENT > 5 YRS OF AGE  3/1/2024    PICC INSERTION - DOUBLE LUMEN Right 2024    41-1cm, Medial brachial vein       Prior to Admission Medications   Prior to Admission Medications   Prescriptions Last Dose Informant Patient Reported? Taking?   Levonorgestrel (KYLEENA IU)   Yes No   Si Device by Intrauterine route once   acyclovir (ZOVIRAX) 400 MG  tablet 3/14/2024  No Yes   Sig: Take 1 tablet (400 mg) by mouth 2 times daily   cimetidine (TAGAMET) 200 MG tablet 3/14/2024  Yes Yes   Sig: Take 200 mg by mouth 2 times daily   dexAMETHasone (DECADRON) 4 MG tablet   No No   Sig: Take 2 tablets (8 mg) by mouth daily Start taking Wednesday, 2/28, for two days.   Patient not taking: Reported on 3/4/2024   fluconazole (DIFLUCAN) 200 MG tablet Unknown  No Yes   Sig: Take 1 tablet (200 mg) by mouth daily   levofloxacin (LEVAQUIN) 250 MG tablet Unknown  No Yes   Sig: Take 1 tablet (250 mg) by mouth daily   metoclopramide (REGLAN) 10 MG tablet Unknown  No Yes   Sig: Take 1 tablet (10 mg) by mouth 4 times daily as needed (constipation)   modafinil (PROVIGIL) 200 MG tablet   Yes No   Sig: Take 1 tablet (200 mg) by mouth daily as needed   mupirocin (BACTROBAN) 2 % external ointment Unknown  No Yes   Sig: Apply to affected area TID   ondansetron (ZOFRAN) 4 MG tablet Unknown  No Yes   Sig: Take 1-2 tablets (4-8 mg) by mouth every 8 hours as needed for nausea or vomiting   pantoprazole (PROTONIX) 40 MG EC tablet 3/14/2024  No Yes   Sig: Take 1 tablet (40 mg) by mouth every morning   polyethylene glycol (MIRALAX) 17 GM/Dose powder 3/14/2024  No Yes   Sig: Take 17 g by mouth daily as needed for constipation   prochlorperazine (COMPAZINE) 10 MG tablet Unknown  No Yes   Sig: Take 1 tablet (10 mg) by mouth every 6 hours as needed for nausea or vomiting   senna-docusate (SENOKOT-S/PERICOLACE) 8.6-50 MG tablet 3/14/2024  No Yes   Sig: Take 1 tablet by mouth daily as needed for constipation      Facility-Administered Medications: None     Allergies   No Known Allergies    Social History   I have reviewed this patient's social history and updated it with pertinent information if needed. Clementine ROBLERO Hever  reports that she has never smoked. She has never been exposed to tobacco smoke. She has never used smokeless tobacco. She reports that she does not currently use alcohol. She reports that  she does not currently use drugs.    Family History   Family history reviewed with patient and is noncontributory.    Review of Systems   Complete and Comprehensive review of systems review and negative other than noted here or in the HPI.     Physical Exam   Temp: 97.8  F (36.6  C) Temp src: Oral BP: 112/69 Pulse: 86   Resp: 16 SpO2: 97 % O2 Device: None (Room air)    Vital Signs with Ranges  Temp:  [97.8  F (36.6  C)-97.9  F (36.6  C)] 97.8  F (36.6  C)  Pulse:  [] 86  Resp:  [16] 16  BP: (112-113)/(69-75) 112/69  SpO2:  [95 %-97 %] 97 %  134 lbs 1.6 oz    Constitutional: Pleasant young female seen resting comfortably in bed in NAD. Alert and interactive.   HEENT: NCAT. PERRL, EOMI, anicteric sclera. Oral mucosa pink and moist with no lesions or thrush.   Respiratory: Non-labored breathing, good air exchange, lungs clear to auscultation bilaterally. No cough or wheeze noted.   Cardiovascular: Regular rate and rhythm. No murmur or rub.   GI: Normoactive bowel sounds. Abdomen soft, non-distended, and non-tender. No palpable masses or organomegaly.  Skin: Warm and dry. No concerning lesions or rash on exposed surfaces.  Musculoskeletal: Extremities grossly normal, non-tender, no edema. Good strength and ROM in bed.   Neuropsychiatric: Mentation and affect appear normal/appropriate.    Data   Results for orders placed or performed in visit on 03/14/24 (from the past 24 hour(s))   Lactate Dehydrogenase   Result Value Ref Range    Lactate Dehydrogenase 216 0 - 250 U/L   Comprehensive metabolic panel   Result Value Ref Range    Sodium 140 135 - 145 mmol/L    Potassium 4.2 3.4 - 5.3 mmol/L    Carbon Dioxide (CO2) 24 22 - 29 mmol/L    Anion Gap 11 7 - 15 mmol/L    Urea Nitrogen 14.2 6.0 - 20.0 mg/dL    Creatinine 0.71 0.51 - 0.95 mg/dL    GFR Estimate >90 >60 mL/min/1.73m2    Calcium 9.5 8.6 - 10.0 mg/dL    Chloride 105 98 - 107 mmol/L    Glucose 114 (H) 70 - 99 mg/dL    Alkaline Phosphatase 95 40 - 150 U/L    AST 17  0 - 45 U/L    ALT 37 0 - 50 U/L    Protein Total 6.7 6.4 - 8.3 g/dL    Albumin 3.9 3.5 - 5.2 g/dL    Bilirubin Total 0.2 <=1.2 mg/dL

## 2024-03-14 NOTE — NURSING NOTE
"Oncology Rooming Note    March 14, 2024 11:28 AM   Clementine Kathleen is a 28 year old female who presents for:    Chief Complaint   Patient presents with    Blood Draw     Labs drawn via venipuncture by RN in lab    Oncology Clinic Visit     Diffuse large B-cell lymphoma of intrathoracic lymph nodes       Initial Vitals: /75 (BP Location: Right arm, Patient Position: Sitting, Cuff Size: Adult Regular)   Pulse 105   Temp 97.9  F (36.6  C) (Oral)   Resp 16   Wt 63.3 kg (139 lb 8 oz)   LMP 01/31/2024 (Exact Date)   SpO2 95%   BMI 22.53 kg/m   Estimated body mass index is 22.53 kg/m  as calculated from the following:    Height as of 3/4/24: 1.676 m (5' 5.98\").    Weight as of this encounter: 63.3 kg (139 lb 8 oz). Body surface area is 1.72 meters squared.  No Pain (0) Comment: Data Unavailable   Patient's last menstrual period was 01/31/2024 (exact date).  Allergies reviewed: Yes  Medications reviewed: Yes    Medications: Medication refills not needed today.  Pharmacy name entered into [place]: [id number] - [place], [place] - [address] AT [facility]    Frailty Screening:   Is the patient here for a new oncology consult visit in cancer care? 2. No      Clinical concerns: none      Ana Jiménez, EMT  3/14/2024              "

## 2024-03-14 NOTE — PROGRESS NOTES
Nursing Focus: Admission    D: Patient admitted/transferred from clinic via self with her parent for C2 R-EPOCH.      I: Upon arrival to the unit patient was oriented to room, unit, and call light. Patient s height, weight, and vital signs were obtained. Allergies reviewed and allergy band applied. MD notified of patient s arrival on the unit. Adult AVS completed. Head to toe assessment completed. Education assessment completed. Care plan initiated.     A: Vital signs stable upon admission. Patient rates pain at 0. Two RN skin assessment completed Yes. Second RN was Roselia P. Significant Skin intact with no complaints. [facility] Nurse Consult Ordered No. Bed Algorithm can be found in PCS flow sheets (Support Surface Algorithm) and on IP [facility] NURSE RESOURCE TAB, was this used during this assessment?  Yes.    P: Continue to monitor patient s poc and intervene as needed. Continue with plan of care. Notify MD with any concerns or changes in patient status.

## 2024-03-14 NOTE — NURSING NOTE
Chief Complaint   Patient presents with    Blood Draw     Labs drawn via venipuncture by RN in lab     Labs collected from venipuncture per patient preference by RN. Vitals taken. Checked in for appointment(s).     Kira Ashraf RN

## 2024-03-14 NOTE — PROGRESS NOTES
"1500 - 1930:   /69 (BP Location: Right arm)   Pulse 86   Temp 97.8  F (36.6  C) (Oral)   Resp 16   Ht 1.676 m (5' 6\")   Wt 60.8 kg (134 lb 1.6 oz)   LMP 01/31/2024 (Exact Date)   SpO2 97%   BMI 21.64 kg/m          Pt admitted this evening from clinic for C2 R-EPOCH.  Port accessed when admitted.   A&O x 4, AVSS, denies pain/nausea/sob on RA.   Day#0 Rituximab infusing via port, gave pre-med's benadryl 50 mg, tylenol & prednisone 100mg.  Up independently, voiding spontaneously, had a bm this morning.  Continue with poc....    "

## 2024-03-15 LAB
ALBUMIN SERPL BCG-MCNC: 3.8 G/DL (ref 3.5–5.2)
ALP SERPL-CCNC: 88 U/L (ref 40–150)
ALT SERPL W P-5'-P-CCNC: 31 U/L (ref 0–50)
ANION GAP SERPL CALCULATED.3IONS-SCNC: 8 MMOL/L (ref 7–15)
AST SERPL W P-5'-P-CCNC: 13 U/L (ref 0–45)
BASOPHILS # BLD AUTO: 0 10E3/UL (ref 0–0.2)
BASOPHILS NFR BLD AUTO: 0 %
BILIRUB SERPL-MCNC: <0.2 MG/DL
BUN SERPL-MCNC: 8.3 MG/DL (ref 6–20)
CALCIUM SERPL-MCNC: 9.1 MG/DL (ref 8.6–10)
CHLORIDE SERPL-SCNC: 105 MMOL/L (ref 98–107)
CREAT SERPL-MCNC: 0.58 MG/DL (ref 0.51–0.95)
DEPRECATED HCO3 PLAS-SCNC: 25 MMOL/L (ref 22–29)
EGFRCR SERPLBLD CKD-EPI 2021: >90 ML/MIN/1.73M2
EOSINOPHIL # BLD AUTO: 0 10E3/UL (ref 0–0.7)
EOSINOPHIL NFR BLD AUTO: 0 %
ERYTHROCYTE [DISTWIDTH] IN BLOOD BY AUTOMATED COUNT: 15.3 % (ref 10–15)
FIBRINOGEN PPP-MCNC: 453 MG/DL (ref 170–490)
GLUCOSE SERPL-MCNC: 124 MG/DL (ref 70–99)
HCT VFR BLD AUTO: 34.6 % (ref 35–47)
HGB BLD-MCNC: 11.7 G/DL (ref 11.7–15.7)
IMM GRANULOCYTES # BLD: 0.2 10E3/UL
IMM GRANULOCYTES NFR BLD: 2 %
INR PPP: 1.06 (ref 0.85–1.15)
LYMPHOCYTES # BLD AUTO: 1.2 10E3/UL (ref 0.8–5.3)
LYMPHOCYTES NFR BLD AUTO: 8 %
MAGNESIUM SERPL-MCNC: 1.7 MG/DL (ref 1.7–2.3)
MCH RBC QN AUTO: 30.4 PG (ref 26.5–33)
MCHC RBC AUTO-ENTMCNC: 33.8 G/DL (ref 31.5–36.5)
MCV RBC AUTO: 90 FL (ref 78–100)
MONOCYTES # BLD AUTO: 0.8 10E3/UL (ref 0–1.3)
MONOCYTES NFR BLD AUTO: 6 %
NEUTROPHILS # BLD AUTO: 11.8 10E3/UL (ref 1.6–8.3)
NEUTROPHILS NFR BLD AUTO: 84 %
NRBC # BLD AUTO: 0 10E3/UL
NRBC BLD AUTO-RTO: 0 /100
PHOSPHATE SERPL-MCNC: 3.6 MG/DL (ref 2.5–4.5)
PLATELET # BLD AUTO: 297 10E3/UL (ref 150–450)
POTASSIUM SERPL-SCNC: 4 MMOL/L (ref 3.4–5.3)
PROT SERPL-MCNC: 6.2 G/DL (ref 6.4–8.3)
RBC # BLD AUTO: 3.85 10E6/UL (ref 3.8–5.2)
SODIUM SERPL-SCNC: 138 MMOL/L (ref 135–145)
URATE SERPL-MCNC: 1.9 MG/DL (ref 2.4–5.7)
WBC # BLD AUTO: 14 10E3/UL (ref 4–11)

## 2024-03-15 PROCEDURE — 250N000011 HC RX IP 250 OP 636: Performed by: INTERNAL MEDICINE

## 2024-03-15 PROCEDURE — 250N000013 HC RX MED GY IP 250 OP 250 PS 637: Performed by: INTERNAL MEDICINE

## 2024-03-15 PROCEDURE — 258N000003 HC RX IP 258 OP 636: Performed by: INTERNAL MEDICINE

## 2024-03-15 PROCEDURE — 85384 FIBRINOGEN ACTIVITY: CPT | Performed by: PHYSICIAN ASSISTANT

## 2024-03-15 PROCEDURE — 84100 ASSAY OF PHOSPHORUS: CPT | Performed by: PHYSICIAN ASSISTANT

## 2024-03-15 PROCEDURE — 99223 1ST HOSP IP/OBS HIGH 75: CPT | Mod: AI | Performed by: PHYSICIAN ASSISTANT

## 2024-03-15 PROCEDURE — 120N000002 HC R&B MED SURG/OB UMMC

## 2024-03-15 PROCEDURE — 250N000013 HC RX MED GY IP 250 OP 250 PS 637: Performed by: PHYSICIAN ASSISTANT

## 2024-03-15 PROCEDURE — 80053 COMPREHEN METABOLIC PANEL: CPT | Performed by: PHYSICIAN ASSISTANT

## 2024-03-15 PROCEDURE — 85004 AUTOMATED DIFF WBC COUNT: CPT | Performed by: PHYSICIAN ASSISTANT

## 2024-03-15 PROCEDURE — 83735 ASSAY OF MAGNESIUM: CPT | Performed by: PHYSICIAN ASSISTANT

## 2024-03-15 PROCEDURE — 84550 ASSAY OF BLOOD/URIC ACID: CPT | Performed by: PHYSICIAN ASSISTANT

## 2024-03-15 PROCEDURE — 250N000012 HC RX MED GY IP 250 OP 636 PS 637: Performed by: INTERNAL MEDICINE

## 2024-03-15 PROCEDURE — 85610 PROTHROMBIN TIME: CPT | Performed by: PHYSICIAN ASSISTANT

## 2024-03-15 PROCEDURE — 3E04305 INTRODUCTION OF OTHER ANTINEOPLASTIC INTO CENTRAL VEIN, PERCUTANEOUS APPROACH: ICD-10-PCS | Performed by: INTERNAL MEDICINE

## 2024-03-15 PROCEDURE — 250N000011 HC RX IP 250 OP 636: Performed by: PHYSICIAN ASSISTANT

## 2024-03-15 PROCEDURE — 99207 PR SC NO CHARGE VISIT: CPT | Performed by: INTERNAL MEDICINE

## 2024-03-15 RX ORDER — POLYETHYLENE GLYCOL 3350 17 G/17G
17 POWDER, FOR SOLUTION ORAL 2 TIMES DAILY
Status: DISCONTINUED | OUTPATIENT
Start: 2024-03-15 | End: 2024-03-19 | Stop reason: HOSPADM

## 2024-03-15 RX ADMIN — PREDNISONE 100 MG: 50 TABLET ORAL at 08:09

## 2024-03-15 RX ADMIN — POLYETHYLENE GLYCOL 3350 17 G: 17 POWDER, FOR SOLUTION ORAL at 20:14

## 2024-03-15 RX ADMIN — PREDNISONE 100 MG: 50 TABLET ORAL at 16:15

## 2024-03-15 RX ADMIN — POLYETHYLENE GLYCOL 3350 17 G: 17 POWDER, FOR SOLUTION ORAL at 08:11

## 2024-03-15 RX ADMIN — CIMETIDINE 200 MG: 200 TABLET, FILM COATED ORAL at 08:13

## 2024-03-15 RX ADMIN — CIMETIDINE 200 MG: 200 TABLET, FILM COATED ORAL at 20:15

## 2024-03-15 RX ADMIN — ACYCLOVIR 400 MG: 400 TABLET ORAL at 20:14

## 2024-03-15 RX ADMIN — ACYCLOVIR 400 MG: 400 TABLET ORAL at 08:12

## 2024-03-15 RX ADMIN — Medication 5 ML: at 05:12

## 2024-03-15 RX ADMIN — DOCUSATE SODIUM 50 MG AND SENNOSIDES 8.6 MG 2 TABLET: 8.6; 5 TABLET, FILM COATED ORAL at 08:11

## 2024-03-15 RX ADMIN — PROCHLORPERAZINE MALEATE 5 MG: 5 TABLET ORAL at 23:38

## 2024-03-15 RX ADMIN — DOCUSATE SODIUM 50 MG AND SENNOSIDES 8.6 MG 2 TABLET: 8.6; 5 TABLET, FILM COATED ORAL at 20:14

## 2024-03-15 RX ADMIN — PANTOPRAZOLE SODIUM 40 MG: 40 TABLET, DELAYED RELEASE ORAL at 08:09

## 2024-03-15 RX ADMIN — PANTOPRAZOLE SODIUM 40 MG: 40 TABLET, DELAYED RELEASE ORAL at 16:15

## 2024-03-15 RX ADMIN — ENOXAPARIN SODIUM 40 MG: 40 INJECTION SUBCUTANEOUS at 20:14

## 2024-03-15 RX ADMIN — ONDANSETRON HYDROCHLORIDE 16 MG: 8 TABLET, FILM COATED ORAL at 08:09

## 2024-03-15 RX ADMIN — VINCRISTINE SULFATE 0.7 MG: 1 INJECTION, SOLUTION INTRAVENOUS at 09:00

## 2024-03-15 RX ADMIN — SODIUM CHLORIDE 105 MG: 9 INJECTION, SOLUTION INTRAVENOUS at 08:59

## 2024-03-15 ASSESSMENT — ACTIVITIES OF DAILY LIVING (ADL)
ADLS_ACUITY_SCORE: 20

## 2024-03-15 NOTE — PROGRESS NOTES
[facility]    Hematology / Oncology Progress Note    Date of Admission: 3/14/2024  Hospital Day #: 1   Date of Service (when I saw the patient): 03/15/2024     Assessment & Plan   Clementine Kathleen is a 28 year old female with PMH of primary mediastinal B-cell lymphoma, admitted 3/14/2024 for scheduled chemotherapy with dose adjusted R-EPOCH (C2D1=3/14/2024).       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 Oakes. Initially presented with cervical lymphadenopathy 12/2023. Initial FNA (1/29/24) suggestive of possible Hodgkin lymphoma; however excisional LN biopsy (2/9/24) shows large B-cell lymphoma with differential diagnosis of primary mediastinal large B-cell lymphoma vs DLBCL NOS. PET with cervical and mediastinal lymphadenopathy only. Overall presentation would be quite consistent with PMBCL given her age, gender, sites of involvement, dim CD30 expression, and prominent fibrotic background.  Although CD23 IHC was negative, IHC for , MAL, and PD-L1 were positive in majority of the neoplastic cells suggesting PMBCL over DLBCL NOS. Irkdi7EQ gene expression profile also consistent with PMBCL. FISH with gain of BCL2 but no MYC, BCL2, or BCL6 rearrangements.  PET showed bilateral cervical and mediastinal hypermetabolic lymphadenopathy (largest 4.4 cm with SUVmax 27 in a prevascular LN); no disease below diaphragm.   Plan remains to completed a total of 6 cycles with a restaging PET-CT after 2 cycles.   - Echo 2/19 with LVEF 60%, no significant valvular abnormalities present  - Patient qualifies for a 20% dose increase (Level 2) as her ANC dread was still greater than 0.5 with the first cycle.   - Port accessed  prior to admission      Treatment Plan: Dose adjusted R-EPOCH (C2D1=3/14/2024)  - Rituximab 375 mg/m2 (600mg) IV x 1 dose - D0  - Prednisone 100 mg BID x 10 doses - D1-5  - Etoposide 60 mg/m2 (105 mg) IV x 4 doses - D1-4  - Vincristine 0.4 mg/m2 (0.7mg), doxorubicin (21mg) x 4 doses CIVI- D1-4  - Cyclophosphamide 900 mg/m2 (1540 mg) IV x 1 dose - D5  - Pegfilgrastim x1 - D6 (scheduled in clinic for 3/19)  - Pre-meds: tylenol, benadryl, zofran, emend, dexamethasone (D6-7)     # Risk of TLS  - Encourage good PO hydration.     # Grade 1 Neuropathy   Endorsed L>R neuropathy in her fingertips.Feels like right side has improved and left has remained the same.   - Monitor closely      # Risk for pancytopenia   2/2 chemotherapy   - Transfuse for Hgb <7, Plt <10K  - Type & Screen MWF     ID  # ID prophylaxis  -  mg BID.   - Levo and fluc when ANC <1.0.   - Pentamidine for PJP ppx last given 3/1; next due ~3/29     MISC  # Fertility preservation - Already established with [facility], underwent egg retrieval on 2/18 or 2/19.   # GERD - Continue PTA PPI BID   # Interstitial cystitis - Continue PTA Cimetidine  # Social  Patient is a 3rd year ENT resident here at [facility]. Her boyfriend is a general surgery resident here at [facility]. Parents live in [place].       FEN:  -IVF per chemo protocol   -PRN lyte replacement  -RDAT    Prophy/Misc:  -VTE: Ppx Lovenox   -GI/PUD: PPI BID   -Bowels: Senna and Miralax BID     Clinically Significant Risk Factors Present on Admission                                    Disposition: Pending completion of chemotherapy; anticipate discharge 3/19  Follow Up: APPT 18 placed to move Neulasta to 3/20 due to timing of chemotherapy completion     This patient was discussed with Dr. Sade PHAM spent >30 minutes face-to-face or coordinating care of Clementine Kathleen. Over 50% of our time on the unit was spent counseling the patient and/or coordinating care.    Fariha Pack (Johnson), PA-C   Hematology/ Oncology    Pager [phone]    Interval History   Nursing notes reviewed. Clementine is overall doing well. She tolerated the Rituxan infusion well this time without any transfusion reactions. Discussed that she could do a rapid infusion for Cycle 3 since she did not react this time. She does endorse some mild nausea. Will closely monitor and use PRN antiemitcs if necessary. All questions answered at this time.     Physical Exam   Temp: 98.7  F (37.1  C) Temp src: Oral BP: 102/56 Pulse: 74   Resp: 20 SpO2: 97 % O2 Device: None (Room air)    Vitals:    03/14/24 1544   Weight: 60.8 kg (134 lb 1.6 oz)     Vital Signs with Ranges  Temp:  [97.8  F (36.6  C)-98.7  F (37.1  C)] 98.7  F (37.1  C)  Pulse:  [] 74  Resp:  [16-20] 20  BP: (102-113)/(56-75) 102/56  SpO2:  [95 %-98 %] 97 %  No intake/output data recorded.    Constitutional: Pleasant young female seen resting comfortably in bed in NAD. Alert and interactive.   HEENT: NCAT. PERRL, EOMI, anicteric sclera. Oral mucosa pink and moist with no lesions or thrush.  Respiratory: Non-labored breathing, good air exchange, lungs clear to auscultation bilaterally. No cough or wheeze noted.   Cardiovascular: Regular rate and rhythm. No murmur or rub.   GI: Normoactive bowel sounds. Abdomen soft, non-distended, and non-tender. No palpable masses or organomegaly.  Skin: Warm and dry. No concerning lesions or rash on exposed surfaces.  Musculoskeletal: Extremities grossly normal, non-tender, no edema.  Good strength and ROM in bed.   Neuropsychiatric: Mentation and affect appear normal/appropriate.  Vascular Access: Port is CDI without erythema, swelling, or discharge.     Medications   Current Facility-Administered Medications   Medication    acetaminophen (TYLENOL) tablet 650 mg    acyclovir (ZOVIRAX) tablet 400 mg    albuterol (PROVENTIL HFA/VENTOLIN HFA) inhaler    albuterol (PROVENTIL) neb solution 2.5 mg    Chemotherapy Infusing-Continuous Infusion    cimetidine (TAGAMET) tablet  200 mg    [START ON 3/19/2024] cycloPHOSphamide (CYTOXAN) 1,540 mg in sodium chloride 0.9 % 627 mL infusion    [START ON 3/20/2024] dexAMETHasone (DECADRON) tablet 8 mg    diphenhydrAMINE (BENADRYL) injection 50 mg    enoxaparin ANTICOAGULANT (LOVENOX) injection 40 mg    EPINEPHrine PF (ADRENALIN) injection 0.3 mg    etoposide (TOPOSAR) 105 mg in sodium chloride 0.9 % 555.25 mL infusion    famotidine (PEPCID) injection 20 mg    [START ON 3/19/2024] fosaprepitant (EMEND) 150 mg in sodium chloride 0.9 % 275 mL intermittent infusion    heparin 100 unit/mL injection 5-10 mL    heparin lock flush 10 UNIT/ML injection 5-10 mL    heparin lock flush 10 UNIT/ML injection 5-10 mL    LORazepam (ATIVAN) tablet 0.5-1 mg    meperidine (DEMEROL) injection 25 mg    methylPREDNISolone sodium succinate (solu-MEDROL) injection 125 mg    No rectal suppositories if WBC less than 1000/microliters or platelets less than 50,000/ L    ondansetron (ZOFRAN) injection 8 mg    Or    ondansetron (ZOFRAN ODT) ODT tab 8 mg    Or    ondansetron (ZOFRAN) tablet 8 mg    ondansetron (ZOFRAN) tablet 16 mg    pantoprazole (PROTONIX) EC tablet 40 mg    polyethylene glycol (MIRALAX) Packet 17 g    polyethylene glycol (MIRALAX) Packet 17 g    predniSONE (DELTASONE) tablet 100 mg    prochlorperazine (COMPAZINE) tablet 5-10 mg    Or    prochlorperazine (COMPAZINE) injection 5-10 mg    senna-docusate (SENOKOT-S/PERICOLACE) 8.6-50 MG per tablet 1 tablet    senna-docusate (SENOKOT-S/PERICOLACE) 8.6-50 MG per tablet 2 tablet    sodium chloride (PF) 0.9% PF flush 10-20 mL    sodium chloride (PF) 0.9% PF flush 10-20 mL    sodium chloride (PF) 0.9% PF flush 10-20 mL    sodium chloride 0.9% BOLUS 500 mL    vinCRIStine (ONCOVIN) 0.7 mg, DOXOrubicin (ADRIAMYCIN) 21 mg in sodium chloride 0.9 % 1,061.2 mL infusion       Data   CBC  Recent Labs   Lab 03/15/24  0512 03/14/24  1607 03/14/24  1113 03/11/24  1144   WBC 14.0* 10.5 10.1 10.7   RBC 3.85 4.12 4.38 4.18   HGB  11.7 12.7 13.0 12.2   HCT 34.6* 37.5 39.2 37.6   MCV 90 91 90 90   MCH 30.4 30.8 29.7 29.2   MCHC 33.8 33.9 33.2 32.4   RDW 15.3* 15.5* 15.3* 15.0    289 273 190     CMP  Recent Labs   Lab 03/15/24  0512 03/14/24  1607 03/14/24  1113 03/11/24  1144    138 140 138   POTASSIUM 4.0 4.1 4.2 4.1   CHLORIDE 105 105 105 105   CO2 25 25 24 26   ANIONGAP 8 8 11 7   * 85 114* 88   BUN 8.3 12.9 14.2 7.0   CR 0.58 0.69 0.71 0.71   GFRESTIMATED >90 >90 >90 >90   DEE 9.1 9.3 9.5 9.1   MAG 1.7  --   --   --    PHOS 3.6  --   --   --    PROTTOTAL 6.2* 6.5 6.7 6.3*   ALBUMIN 3.8 3.8 3.9 3.7   BILITOTAL <0.2 <0.2 0.2 <0.2   ALKPHOS 88 90 95 108   AST 13 23 17 21   ALT 31 30 37 65*     INR  Recent Labs   Lab 03/15/24  0512   INR 1.06       Results for orders placed or performed in visit on 03/01/24   IR Chest Port Placement > 5 Yrs of Age    Narrative    Procedure date: 3/1/2024    History: Patient with a history of lymphoma here for placement of  single chamber, central venous chest port for chemotherapy.    Procedure: Placement of right IJ,6 Bruneian, 20 cm, single chamber,  central venous chest port.    Preop diagnosis:Lymphoma.    Postop diagnosis: Same.    : Antolin Contreras PA-C    Assist: DASIA Tariq.    Medications: IV sedation per ASC anesthesia staff, 1% lidocaine 10 ml  local anesthesia, heparin 5 ml 100units/ml.    Nursing: Throughout the procedure the patient's vital signs and oxygen  saturation were continuously monitored by ASC anesthesia staff under  the supervision of the attending physician, and remained stable.    Face to face sedation time: Per ASC anesthesia staff.    Fluoroscopy time: 0.6 minutes.    IV contrast: None.    Consent: Informed written and verbal consent was obtained.    Procedure/Findings: The patient was placed in the supine position on  the fluoroscopy table. Ultrasound evaluation revealed a patent right  internal jugular vein, an image was saved, and the patient's  "right  neck and upper chest were prepped and draped in the usual sterile  fashion. The neck venotomy site was anesthetized with 1 ml 1%  lidocaine, and a small incision was made at the venotomy site with a #  11 scalpel.  Under ultrasound guidance, a 0.018 micropuncture needle  was used to access the right internal jugular vein. Under fluoroscopic  guidance, a 0.018 soft tipped wire was advanced into the right atrium,  and micropuncture needle was exchanged over wire for a 3-5 Malawian  dilator. The wire was used to measure from the right atrium to the  skin. Wire and a 3 Malawian dilator were removed, and under fluoroscopic  guidance, a .035 Bentson wire was advanced into the IVC and secured.  The  port site was selected over the right anterior chest,  approximately at the midclavicular line, and this area was  anesthetized with 1% lidocaine. A 3.0 cm incision was made with a #15  scalpel and a pocket was created using blunt dissection. The pocket  was liberally irrigated with saline solution, gauze packed, and  pressure held to provide hemostasis. A tract from the pocket to the  dermatotomy site was anesthetized with 1% lidocaine.  A 6 Malawian,  single chamber central venous chest port was selected and prepared. A  tunneling device was connected to the port catheter, passed from the  pocket to the dermatotomy site, and the catheter was pulled through.  The wire measurement was used to cut the catheter to a length of 20  cm. (The catheter was transected at the 43 cm catheter Johnie, though  the true length is 20 cm) The 6 Malawian, 20 cm, single chamber, \"Power\"  port was flushed with normal saline and positioned within the pocket.   The 5 Malawian dilator was removed, and a 6 Malawian dilator/peel-away  sheath was advanced over the wire into the the superior vena cava. The  wire and dilator were removed, the catheter was advanced into the  sheath, and the sheath was removed. The catheter tip was found to lie  within the right " atrium. The pocket was then closed with 3, 3-0 Vicryl   subcutaneous sutures, and one running 4-0 Monocryl subcuticular  suture. The port aspirated and flushed freely, and was flushed with  10ml of normal saline and 5 ml heparin, 100 units per ml. The chest  and neck sites were cleansed, the skin edges were approximated and  glued. Images were saved throughout the procedure. The procedure was  well tolerated, with no immediate complications.    Estimated blood loss: 1 cc.    Specimens: None.      Impression    Impression: Placement of right IJ, 1 Iraqi, 20 cm, single chamber,  central venous chest port. Port ready for immediate use.    Plan: Keep glued sites clean, dry, and uncovered for 30 minutes. The  site may then get wet but should not be submerged or soaked in water.  No strenuous activity for 3 days. Patient transported to post care  unit in stable condition for post procedure monitoring.     Attestation: The physician assistant (PA) who performed this procedure  and signed the above report is licensed to practice in the [facility] pursuant to [place] Statute 147A.09.  This includes meeting the  Statute and [place] Board of Medical [employer] requirement of an  active Delegation Agreement, which documents delegation of services by  primary and alternate supervising physicians. All services rendered  are performed under a collaborative agreement with Dr. Zachary Caballero, Director of Interventional Radiology, [facility].    DONI FLANAGAN PA-C         SYSTEM ID:  [id number]

## 2024-03-15 NOTE — PROGRESS NOTES
Rituximab  D: Baseline vital signs and temperature were WNL. Blood return was positive per port.    I: Rituximab infusion started at 50 ml/hr. Rate adjusted according to protocol and patient tolerance. Pre-medications included tylenol, benadryl 50 mg & prednisone 100 mg.  A: Maximum rate of rituximab infusion tolerated was 350 ml/hr since bag completed.  P: pt tolerate C2 Rituxan well with no complaints. Continue to monitor per poc..

## 2024-03-15 NOTE — PLAN OF CARE
"Goal Outcome Evaluation:      Plan of Care Reviewed With: patient    Overall Patient Progress: no changeOverall Patient Progress: no change     0700 - 1530:   /59 (BP Location: Right arm)   Pulse 80   Temp 98.2  F (36.8  C) (Oral)   Resp 18   Ht 1.676 m (5' 6\")   Wt 61.7 kg (136 lb)   LMP 01/31/2024 (Exact Date)   SpO2 94%   BMI 21.95 kg/m      A&O x 4, AVSS, denies pain/nausea/sob on RA.   Day#1 Etoposide & Vincristine/Doxorubicin is infusing via port with good blood return.   Up independently, showered today, voiding spontaneously, last bm 3/14.  Continue with poc...      "

## 2024-03-15 NOTE — PLAN OF CARE
"Goal Outcome Evaluation:    Time    /67 (BP Location: Right arm)   Pulse 88   Temp 97.9  F (36.6  C) (Oral)   Resp 20   Ht 1.676 m (5' 6\")   Wt 60.8 kg (134 lb 1.6 oz)   LMP 01/31/2024 (Exact Date)   SpO2 97%   BMI 21.64 kg/m      Reason for admission: R-EPOCH   Activity: UAL  Pain: denies  Neuro: alert and oriented  Cardiac: wnl  Respiratory:denies SOB, no distress observed, on RA    GI/: no BM reported, voids spontaneously   Diet: regular  Lines: port  Wounds:   Labs/imaging: please review lab in the chart      New changes this shift:     Plan:       Continue to monitor and follow POC    "

## 2024-03-15 NOTE — PROGRESS NOTES
Nursing Focus: Chemotherapy  D: Positive brisk bright red blood return via Port. Insertion site is clean/dry/intact, dressing intact with no complaints of pain.  Urine output is recorded in intake Doc Flowsheet.    I: Gave premedications given per order (see electronic medical administration record). Day #1 of Vincristine/doxorubicin & Day#1 Etoposide started to infuse over 24 hours. Reviewed pt teaching on chemotherapy side effects.  Pt denies need for further teaching. Chemotherapy double checked per protocol by two chemotherapy competent RN's.   A: Tolerating chemo well. Denies nausea.   P: Continue to monitor urine output and symptoms of nausea. Screen for symptoms of toxicity.

## 2024-03-15 NOTE — PHARMACY-ADMISSION MEDICATION HISTORY
Pharmacy Intern Admission Medication History    Admission medication history is complete. The information provided in this note is only as accurate as the sources available at the time of the update.    Information Source(s): Patient and CareEverywhere/SureScripts via in-person    Pertinent Information: Patient reported that she competed the treatment of dexamethasone and she only took fluconazole and levofloxacin once on 2/27/2024. Per patient, she is currently not taking mupirocin ointment and modafinil. Patient requested to keep modafinil in the list as the prior authorization was difficult to obtain, and even though she had not taken modafinil for almost three years.     Changes made to PTA medication list:  Added: None  Deleted: mupirocin ointment, and dexamethasone.   Changed: None    Allergies reviewed with patient and updates made in EHR: yes    Medication History Completed By: Jose Cruz Tinsley, PharmD student, 3/15/2024 11:46 AM    PTA Med List   Medication Sig Last Dose    acyclovir (ZOVIRAX) 400 MG tablet Take 1 tablet (400 mg) by mouth 2 times daily 3/14/2024    cimetidine (TAGAMET) 200 MG tablet Take 200 mg by mouth 2 times daily 3/14/2024    fluconazole (DIFLUCAN) 200 MG tablet Take 1 tablet (200 mg) by mouth daily 2/27/2024    levofloxacin (LEVAQUIN) 250 MG tablet Take 1 tablet (250 mg) by mouth daily 2/27/2024    Levonorgestrel (KYLEENA IU) 1 Device by Intrauterine route once 3/15/2024    metoclopramide (REGLAN) 10 MG tablet Take 1 tablet (10 mg) by mouth 4 times daily as needed (constipation) 3/13/2024    modafinil (PROVIGIL) 200 MG tablet Take 1 tablet (200 mg) by mouth daily as needed More than a month    ondansetron (ZOFRAN) 4 MG tablet Take 1-2 tablets (4-8 mg) by mouth every 8 hours as needed for nausea or vomiting 3/5/2024    pantoprazole (PROTONIX) 40 MG EC tablet Take 1 tablet (40 mg) by mouth every morning 3/14/2024    polyethylene glycol (MIRALAX) 17 GM/Dose powder Take 17 g by mouth daily as  needed for constipation 3/14/2024    prochlorperazine (COMPAZINE) 10 MG tablet Take 1 tablet (10 mg) by mouth every 6 hours as needed for nausea or vomiting 3/5/2024    senna-docusate (SENOKOT-S/PERICOLACE) 8.6-50 MG tablet Take 1 tablet by mouth daily as needed for constipation 3/14/2024

## 2024-03-16 LAB
ALBUMIN SERPL BCG-MCNC: 3.5 G/DL (ref 3.5–5.2)
ALP SERPL-CCNC: 83 U/L (ref 40–150)
ALT SERPL W P-5'-P-CCNC: 23 U/L (ref 0–50)
ANION GAP SERPL CALCULATED.3IONS-SCNC: 8 MMOL/L (ref 7–15)
AST SERPL W P-5'-P-CCNC: 16 U/L (ref 0–45)
BASOPHILS # BLD AUTO: 0 10E3/UL (ref 0–0.2)
BASOPHILS NFR BLD AUTO: 0 %
BILIRUB SERPL-MCNC: 0.2 MG/DL
BUN SERPL-MCNC: 9 MG/DL (ref 6–20)
CALCIUM SERPL-MCNC: 9.1 MG/DL (ref 8.6–10)
CHLORIDE SERPL-SCNC: 107 MMOL/L (ref 98–107)
CREAT SERPL-MCNC: 0.57 MG/DL (ref 0.51–0.95)
DEPRECATED HCO3 PLAS-SCNC: 23 MMOL/L (ref 22–29)
EGFRCR SERPLBLD CKD-EPI 2021: >90 ML/MIN/1.73M2
EOSINOPHIL # BLD AUTO: 0 10E3/UL (ref 0–0.7)
EOSINOPHIL NFR BLD AUTO: 0 %
ERYTHROCYTE [DISTWIDTH] IN BLOOD BY AUTOMATED COUNT: 15.7 % (ref 10–15)
FIBRINOGEN PPP-MCNC: 399 MG/DL (ref 170–490)
GLUCOSE SERPL-MCNC: 118 MG/DL (ref 70–99)
HCT VFR BLD AUTO: 34.9 % (ref 35–47)
HGB BLD-MCNC: 11.4 G/DL (ref 11.7–15.7)
IMM GRANULOCYTES # BLD: 0.5 10E3/UL
IMM GRANULOCYTES NFR BLD: 2 %
INR PPP: 1.1 (ref 0.85–1.15)
LYMPHOCYTES # BLD AUTO: 1 10E3/UL (ref 0.8–5.3)
LYMPHOCYTES NFR BLD AUTO: 4 %
MAGNESIUM SERPL-MCNC: 2.7 MG/DL (ref 1.7–2.3)
MCH RBC QN AUTO: 29.3 PG (ref 26.5–33)
MCHC RBC AUTO-ENTMCNC: 32.7 G/DL (ref 31.5–36.5)
MCV RBC AUTO: 90 FL (ref 78–100)
MONOCYTES # BLD AUTO: 1 10E3/UL (ref 0–1.3)
MONOCYTES NFR BLD AUTO: 4 %
NEUTROPHILS # BLD AUTO: 21.4 10E3/UL (ref 1.6–8.3)
NEUTROPHILS NFR BLD AUTO: 90 %
NRBC # BLD AUTO: 0 10E3/UL
NRBC BLD AUTO-RTO: 0 /100
PHOSPHATE SERPL-MCNC: 4.2 MG/DL (ref 2.5–4.5)
PLATELET # BLD AUTO: 329 10E3/UL (ref 150–450)
POTASSIUM SERPL-SCNC: 4 MMOL/L (ref 3.4–5.3)
PROT SERPL-MCNC: 6.2 G/DL (ref 6.4–8.3)
RBC # BLD AUTO: 3.89 10E6/UL (ref 3.8–5.2)
SODIUM SERPL-SCNC: 138 MMOL/L (ref 135–145)
URATE SERPL-MCNC: 1.5 MG/DL (ref 2.4–5.7)
WBC # BLD AUTO: 23.9 10E3/UL (ref 4–11)

## 2024-03-16 PROCEDURE — 250N000011 HC RX IP 250 OP 636: Performed by: PHYSICIAN ASSISTANT

## 2024-03-16 PROCEDURE — 120N000002 HC R&B MED SURG/OB UMMC

## 2024-03-16 PROCEDURE — 250N000013 HC RX MED GY IP 250 OP 250 PS 637: Performed by: PHYSICIAN ASSISTANT

## 2024-03-16 PROCEDURE — 85610 PROTHROMBIN TIME: CPT | Performed by: PHYSICIAN ASSISTANT

## 2024-03-16 PROCEDURE — 80053 COMPREHEN METABOLIC PANEL: CPT | Performed by: PHYSICIAN ASSISTANT

## 2024-03-16 PROCEDURE — 258N000003 HC RX IP 258 OP 636: Performed by: INTERNAL MEDICINE

## 2024-03-16 PROCEDURE — 250N000011 HC RX IP 250 OP 636: Performed by: INTERNAL MEDICINE

## 2024-03-16 PROCEDURE — 84100 ASSAY OF PHOSPHORUS: CPT | Performed by: PHYSICIAN ASSISTANT

## 2024-03-16 PROCEDURE — 250N000013 HC RX MED GY IP 250 OP 250 PS 637: Performed by: INTERNAL MEDICINE

## 2024-03-16 PROCEDURE — 85384 FIBRINOGEN ACTIVITY: CPT | Performed by: PHYSICIAN ASSISTANT

## 2024-03-16 PROCEDURE — 85025 COMPLETE CBC W/AUTO DIFF WBC: CPT | Performed by: PHYSICIAN ASSISTANT

## 2024-03-16 PROCEDURE — 83735 ASSAY OF MAGNESIUM: CPT | Performed by: PHYSICIAN ASSISTANT

## 2024-03-16 PROCEDURE — 250N000012 HC RX MED GY IP 250 OP 636 PS 637: Performed by: INTERNAL MEDICINE

## 2024-03-16 PROCEDURE — 84550 ASSAY OF BLOOD/URIC ACID: CPT | Performed by: PHYSICIAN ASSISTANT

## 2024-03-16 PROCEDURE — 99233 SBSQ HOSP IP/OBS HIGH 50: CPT | Performed by: PHYSICIAN ASSISTANT

## 2024-03-16 RX ORDER — OLANZAPINE 5 MG/1
5 TABLET, ORALLY DISINTEGRATING ORAL AT BEDTIME
Status: DISCONTINUED | OUTPATIENT
Start: 2024-03-16 | End: 2024-03-19 | Stop reason: HOSPADM

## 2024-03-16 RX ORDER — LANOLIN ALCOHOL/MO/W.PET/CERES
3-6 CREAM (GRAM) TOPICAL
Status: DISCONTINUED | OUTPATIENT
Start: 2024-03-16 | End: 2024-03-19 | Stop reason: HOSPADM

## 2024-03-16 RX ADMIN — VINCRISTINE SULFATE 0.7 MG: 1 INJECTION, SOLUTION INTRAVENOUS at 08:39

## 2024-03-16 RX ADMIN — PREDNISONE 100 MG: 50 TABLET ORAL at 08:53

## 2024-03-16 RX ADMIN — PANTOPRAZOLE SODIUM 40 MG: 40 TABLET, DELAYED RELEASE ORAL at 08:53

## 2024-03-16 RX ADMIN — ACYCLOVIR 400 MG: 400 TABLET ORAL at 21:33

## 2024-03-16 RX ADMIN — CIMETIDINE 200 MG: 200 TABLET, FILM COATED ORAL at 21:33

## 2024-03-16 RX ADMIN — SODIUM CHLORIDE 105 MG: 9 INJECTION, SOLUTION INTRAVENOUS at 08:47

## 2024-03-16 RX ADMIN — POLYETHYLENE GLYCOL 3350 17 G: 17 POWDER, FOR SOLUTION ORAL at 07:43

## 2024-03-16 RX ADMIN — PREDNISONE 100 MG: 50 TABLET ORAL at 16:49

## 2024-03-16 RX ADMIN — PANTOPRAZOLE SODIUM 40 MG: 40 TABLET, DELAYED RELEASE ORAL at 16:49

## 2024-03-16 RX ADMIN — OLANZAPINE 5 MG: 5 TABLET, ORALLY DISINTEGRATING ORAL at 21:33

## 2024-03-16 RX ADMIN — ONDANSETRON HYDROCHLORIDE 16 MG: 8 TABLET, FILM COATED ORAL at 07:38

## 2024-03-16 RX ADMIN — PROCHLORPERAZINE MALEATE 10 MG: 5 TABLET ORAL at 16:49

## 2024-03-16 RX ADMIN — DOCUSATE SODIUM 50 MG AND SENNOSIDES 8.6 MG 1 TABLET: 8.6; 5 TABLET, FILM COATED ORAL at 21:34

## 2024-03-16 RX ADMIN — ACYCLOVIR 400 MG: 400 TABLET ORAL at 08:53

## 2024-03-16 RX ADMIN — ENOXAPARIN SODIUM 40 MG: 40 INJECTION SUBCUTANEOUS at 21:33

## 2024-03-16 RX ADMIN — CIMETIDINE 200 MG: 200 TABLET, FILM COATED ORAL at 08:53

## 2024-03-16 RX ADMIN — POLYETHYLENE GLYCOL 3350 17 G: 17 POWDER, FOR SOLUTION ORAL at 21:33

## 2024-03-16 ASSESSMENT — ACTIVITIES OF DAILY LIVING (ADL)
ADLS_ACUITY_SCORE: 20

## 2024-03-16 NOTE — PROGRESS NOTES
[facility]    Hematology / Oncology Progress Note    Date of Admission: 3/14/2024  Hospital Day #: 2   Date of Service (when I saw the patient): 03/16/2024     Assessment & Plan   Clementine Kathleen is a 28 year old female with PMH of primary mediastinal B-cell lymphoma, admitted 3/14/2024 for scheduled chemotherapy with dose adjusted R-EPOCH (C2D1=3/14/2024).       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 Oakes. Initially presented with cervical lymphadenopathy 12/2023. Initial FNA (1/29/24) suggestive of possible Hodgkin lymphoma; however excisional LN biopsy (2/9/24) shows large B-cell lymphoma with differential diagnosis of primary mediastinal large B-cell lymphoma vs DLBCL NOS. PET with cervical and mediastinal lymphadenopathy only. Overall presentation would be quite consistent with PMBCL given her age, gender, sites of involvement, dim CD30 expression, and prominent fibrotic background.  Although CD23 IHC was negative, IHC for , MAL, and PD-L1 were positive in majority of the neoplastic cells suggesting PMBCL over DLBCL NOS. [id number] gene expression profile also consistent with PMBCL. FISH with gain of BCL2 but no MYC, BCL2, or BCL6 rearrangements.  PET showed bilateral cervical and mediastinal hypermetabolic lymphadenopathy (largest 4.4 cm with SUVmax 27 in a prevascular LN); no disease below diaphragm.   Plan remains to completed a total of 6 cycles with a restaging PET-CT after 2 cycles.   - Echo 2/19 with LVEF 60%, no significant valvular abnormalities present  - Patient qualifies for a 20% dose increase (Level 2) as her ANC dread was still  greater than 0.5 with the first cycle.   - Port accessed prior to admission      Treatment Plan: Dose adjusted R-EPOCH (C2D1=3/14/2024)  - Rituximab 375 mg/m2 (600mg) IV x 1 dose - D0  - Prednisone 100 mg BID x 10 doses - D1-5  - Etoposide 60 mg/m2 (105 mg) IV x 4 doses - D1-4  - Vincristine 0.4 mg/m2 (0.7mg), doxorubicin (21mg) x 4 doses CIVI- D1-4  - Cyclophosphamide 900 mg/m2 (1540 mg) IV x 1 dose - D5  - Pegfilgrastim x1 - D6 (scheduled in clinic for 3/19)  - Pre-meds: tylenol, benadryl, zofran, emend, dexamethasone (D6-7)     # Risk of TLS  - Encourage good PO hydration.    # CINV   - Trial of Zyprexa at bedtime (x3/16)      # Grade 1 Neuropathy   Endorsed L>R neuropathy in her fingertips.Feels like right side has improved and left has remained the same.   - Monitor closely      # Risk for pancytopenia   2/2 chemotherapy   - Transfuse for Hgb <7, Plt <10K  - Type & Screen MWF     ID  # ID prophylaxis  -  mg BID.   - Levo and fluc when ANC <1.0.   - Pentamidine for PJP ppx last given 3/1; next due ~3/29     MISC  # Fertility preservation - Already established with [facility], underwent egg retrieval on 2/18 or 2/19.   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 Her boyfriend is a general surgery resident here at [facility]. Parents live in [place].       FEN:  -IVF per chemo protocol   -PRN lyte replacement  -RDAT    Prophy/Misc:  -VTE: Ppx Lovenox   -GI/PUD: PPI BID   -Bowels: Senna and Miralax BID     Clinically Significant Risk Factors                                      Disposition: Pending completion of chemotherapy; anticipate discharge 3/19  Follow Up: APPT 18 placed to move Neulasta to 3/20 due to timing of chemotherapy completion     This patient was discussed with Dr. Sade PHAM spent >30 minutes face-to-face or coordinating care of Clementine Kathleen. Over 50% of our time on the unit was spent counseling the  patient and/or coordinating care.    Fariha Pack PA-C ([facility])   Hematology/ Oncology   Pager [phone]    Interval History   Nursing notes reviewed. Clementine is overall doing well. She had some nausea that started around 10pm. Discussed trying Zyprexa at bedtime to see if this can help with her nausea. She is agreeable with this. She otherwise is moving her bowel without issue and may hold off on senna today. Reviewed plan to continue chemotherapy per plan. All questions answered at this time.     Physical Exam   Temp: 98  F (36.7  C) Temp src: Oral BP: 109/65 Pulse: 74   Resp: 18 SpO2: 93 % O2 Device: None (Room air)    Vitals:    03/14/24 1544 03/15/24 0818 03/16/24 0700   Weight: 60.8 kg (134 lb 1.6 oz) 61.7 kg (136 lb) 62.2 kg (137 lb 3.2 oz)     Vital Signs with Ranges  Temp:  [97.9  F (36.6  C)-98.7  F (37.1  C)] 98  F (36.7  C)  Pulse:  [72-92] 74  Resp:  [16-18] 18  BP: (104-126)/(60-72) 109/65  SpO2:  [93 %-98 %] 93 %  I/O last 3 completed shifts:  In: 826.8 [IV Piggyback:826.8]  Out: -     Constitutional: Pleasant young female seen resting comfortably in bed in NAD. Alert and interactive.   HEENT: NCAT. PERRL, EOMI, anicteric sclera. Oral mucosa pink and moist with no lesions or thrush.  Respiratory: Non-labored breathing, good air exchange, lungs clear to auscultation bilaterally. No cough or wheeze noted.   Cardiovascular: Regular rate and rhythm. No murmur or rub.   GI: Normoactive bowel sounds. Abdomen soft, non-distended, and non-tender. No palpable masses or organomegaly.  Skin: Warm and dry. No concerning lesions or rash on exposed surfaces.  Musculoskeletal: Extremities grossly normal, non-tender, no edema.  Good strength and ROM in bed.   Neuropsychiatric: Mentation and affect appear normal/appropriate.  Vascular Access: Port is CDI without erythema, swelling, or discharge.     Medications   Current Facility-Administered Medications   Medication    acetaminophen (TYLENOL) tablet 650 mg     acyclovir (ZOVIRAX) tablet 400 mg    albuterol (PROVENTIL HFA/VENTOLIN HFA) inhaler    albuterol (PROVENTIL) neb solution 2.5 mg    Chemotherapy Infusing-Continuous Infusion    cimetidine (TAGAMET) tablet 200 mg    [START ON 3/19/2024] cycloPHOSphamide (CYTOXAN) 1,540 mg in sodium chloride 0.9 % 627 mL infusion    [START ON 3/20/2024] dexAMETHasone (DECADRON) tablet 8 mg    diphenhydrAMINE (BENADRYL) injection 50 mg    enoxaparin ANTICOAGULANT (LOVENOX) injection 40 mg    EPINEPHrine PF (ADRENALIN) injection 0.3 mg    etoposide (TOPOSAR) 105 mg in sodium chloride 0.9 % 555.25 mL infusion    famotidine (PEPCID) injection 20 mg    [START ON 3/19/2024] fosaprepitant (EMEND) 150 mg in sodium chloride 0.9 % 275 mL intermittent infusion    heparin 100 unit/mL injection 5-10 mL    heparin lock flush 10 UNIT/ML injection 5-10 mL    heparin lock flush 10 UNIT/ML injection 5-10 mL    LORazepam (ATIVAN) tablet 0.5-1 mg    melatonin tablet 3-6 mg    meperidine (DEMEROL) injection 25 mg    methylPREDNISolone sodium succinate (solu-MEDROL) injection 125 mg    No rectal suppositories if WBC less than 1000/microliters or platelets less than 50,000/ L    OLANZapine zydis (zyPREXA) ODT tab 5 mg    ondansetron (ZOFRAN) injection 8 mg    Or    ondansetron (ZOFRAN ODT) ODT tab 8 mg    Or    ondansetron (ZOFRAN) tablet 8 mg    ondansetron (ZOFRAN) tablet 16 mg    pantoprazole (PROTONIX) EC tablet 40 mg    polyethylene glycol (MIRALAX) Packet 17 g    polyethylene glycol (MIRALAX) Packet 17 g    predniSONE (DELTASONE) tablet 100 mg    prochlorperazine (COMPAZINE) tablet 5-10 mg    Or    prochlorperazine (COMPAZINE) injection 5-10 mg    senna-docusate (SENOKOT-S/PERICOLACE) 8.6-50 MG per tablet 1 tablet    senna-docusate (SENOKOT-S/PERICOLACE) 8.6-50 MG per tablet 2 tablet    sodium chloride (PF) 0.9% PF flush 10-20 mL    sodium chloride (PF) 0.9% PF flush 10-20 mL    sodium chloride (PF) 0.9% PF flush 10-20 mL    sodium chloride 0.9% BOLUS  500 mL    vinCRIStine (ONCOVIN) 0.7 mg, DOXOrubicin (ADRIAMYCIN) 21 mg in sodium chloride 0.9 % 1,061.2 mL infusion       Data   CBC  Recent Labs   Lab 03/16/24  0620 03/15/24  0512 03/14/24  1607 03/14/24  1113   WBC 23.9* 14.0* 10.5 10.1   RBC 3.89 3.85 4.12 4.38   HGB 11.4* 11.7 12.7 13.0   HCT 34.9* 34.6* 37.5 39.2   MCV 90 90 91 90   MCH 29.3 30.4 30.8 29.7   MCHC 32.7 33.8 33.9 33.2   RDW 15.7* 15.3* 15.5* 15.3*    297 289 273     CMP  Recent Labs   Lab 03/16/24  0620 03/15/24  0512 03/14/24  1607 03/14/24  1113    138 138 140   POTASSIUM 4.0 4.0 4.1 4.2   CHLORIDE 107 105 105 105   CO2 23 25 25 24   ANIONGAP 8 8 8 11   * 124* 85 114*   BUN 9.0 8.3 12.9 14.2   CR 0.57 0.58 0.69 0.71   GFRESTIMATED >90 >90 >90 >90   DEE 9.1 9.1 9.3 9.5   MAG 2.7* 1.7  --   --    PHOS 4.2 3.6  --   --    PROTTOTAL 6.2* 6.2* 6.5 6.7   ALBUMIN 3.5 3.8 3.8 3.9   BILITOTAL 0.2 <0.2 <0.2 0.2   ALKPHOS 83 88 90 95   AST 16 13 23 17   ALT 23 31 30 37     INR  Recent Labs   Lab 03/16/24  0620 03/15/24  0512   INR 1.10 1.06       Results for orders placed or performed in visit on 03/01/24   IR Chest Port Placement > 5 Yrs of Age    Narrative    Procedure date: 3/1/2024    History: Patient with a history of lymphoma here for placement of  single chamber, central venous chest port for chemotherapy.    Procedure: Placement of right IJ,6 Sinhala, 20 cm, single chamber,  central venous chest port.    Preop diagnosis:Lymphoma.    Postop diagnosis: Same.    : Antolin Contreras PA-C    Assist: DASIA Tariq.    Medications: IV sedation per ASC anesthesia staff, 1% lidocaine 10 ml  local anesthesia, heparin 5 ml 100units/ml.    Nursing: Throughout the procedure the patient's vital signs and oxygen  saturation were continuously monitored by ASC anesthesia staff under  the supervision of the attending physician, and remained stable.    Face to face sedation time: Per ASC anesthesia staff.    Fluoroscopy time: 0.6  "minutes.    IV contrast: None.    Consent: Informed written and verbal consent was obtained.    Procedure/Findings: The patient was placed in the supine position on  the fluoroscopy table. Ultrasound evaluation revealed a patent right  internal jugular vein, an image was saved, and the patient's right  neck and upper chest were prepped and draped in the usual sterile  fashion. The neck venotomy site was anesthetized with 1 ml 1%  lidocaine, and a small incision was made at the venotomy site with a #  11 scalpel.  Under ultrasound guidance, a 0.018 micropuncture needle  was used to access the right internal jugular vein. Under fluoroscopic  guidance, a 0.018 soft tipped wire was advanced into the right atrium,  and micropuncture needle was exchanged over wire for a 3-5 Citizen of [place]  dilator. The wire was used to measure from the right atrium to the  skin. Wire and a 3 Citizen of [place] dilator were removed, and under fluoroscopic  guidance, a .035 Bentson wire was advanced into the IVC and secured.  The  port site was selected over the right anterior chest,  approximately at the midclavicular line, and this area was  anesthetized with 1% lidocaine. A 3.0 cm incision was made with a #15  scalpel and a pocket was created using blunt dissection. The pocket  was liberally irrigated with saline solution, gauze packed, and  pressure held to provide hemostasis. A tract from the pocket to the  dermatotomy site was anesthetized with 1% lidocaine.  A 6 Citizen of [place],  single chamber central venous chest port was selected and prepared. A  tunneling device was connected to the port catheter, passed from the  pocket to the dermatotomy site, and the catheter was pulled through.  The wire measurement was used to cut the catheter to a length of 20  cm. (The catheter was transected at the 43 cm catheter Johnie, though  the true length is 20 cm) The 6 Citizen of [place], 20 cm, single chamber, \"Power\"  port was flushed with normal saline and positioned within the pocket. "   The 5 Cypriot dilator was removed, and a 6 Cypriot dilator/peel-away  sheath was advanced over the wire into the the superior vena cava. The  wire and dilator were removed, the catheter was advanced into the  sheath, and the sheath was removed. The catheter tip was found to lie  within the right atrium. The pocket was then closed with 3, 3-0 Vicryl   subcutaneous sutures, and one running 4-0 Monocryl subcuticular  suture. The port aspirated and flushed freely, and was flushed with  10ml of normal saline and 5 ml heparin, 100 units per ml. The chest  and neck sites were cleansed, the skin edges were approximated and  glued. Images were saved throughout the procedure. The procedure was  well tolerated, with no immediate complications.    Estimated blood loss: 1 cc.    Specimens: None.      Impression    Impression: Placement of right IJ, 1 Cypriot, 20 cm, single chamber,  central venous chest port. Port ready for immediate use.    Plan: Keep glued sites clean, dry, and uncovered for 30 minutes. The  site may then get wet but should not be submerged or soaked in water.  No strenuous activity for 3 days. Patient transported to post care  unit in stable condition for post procedure monitoring.     Attestation: The physician assistant (PA) who performed this procedure  and signed the above report is licensed to practice in the state [facility] pursuant to [place] Statute 147A.09.  This includes meeting the  Statute and [place] Board of Medical [employer] requirement of an  active Delegation Agreement, which documents delegation of services by  primary and alternate supervising physicians. All services rendered  are performed under a collaborative agreement with Dr. Zachary Caballero, Director of Interventional Radiology, [facility].    DONI FLANAGAN PA-C         SYSTEM ID:  [id number]

## 2024-03-16 NOTE — PROGRESS NOTES
Nursing Focus: Chemotherapy  D: Positive blood return via PICC. Insertion site is clean/dry/intact, dressing intact with no complaints of pain.  Urine output is recorded in intake in Doc Flowsheet.    I: Premedications given per order (see electronic medical administration record). Dose #2 of Etoposide started to infuse over 24 hours. Reviewed pt teaching on chemotherapy side effects.  Pt denies need for further teaching. Chemotherapy double checked per protocol by two chemotherapy competent RN's.   A: Tolerating procedure well. Denies nausea and or pain.   P: Continue to monitor urine output and symptoms of nausea. Screen for symptoms of toxicity.      Nursing Focus: Chemotherapy  D: Positive blood return via PICC. Insertion site is clean/dry/intact, dressing intact with no complaints of pain.  Urine output is recorded in intake in Doc Flowsheet.    I: Premedications given per order (see electronic medical administration record). Dose #2 of Clemente/Doxorubicin started to infuse over 24 hours. Reviewed pt teaching on chemotherapy side effects.  Pt denies need for further teaching. Chemotherapy double checked per protocol by two chemotherapy competent RN's.   A: Tolerating procedure well. Denies nausea and or pain.   P: Continue to monitor urine output and symptoms of nausea. Screen for symptoms of toxicity.

## 2024-03-16 NOTE — PLAN OF CARE
"0700- 1900    /66 (BP Location: Right arm)   Pulse 77   Temp 98.7  F (37.1  C) (Oral)   Resp 18   Ht 1.676 m (5' 6\")   Wt 62.2 kg (137 lb 3.2 oz)   LMP 01/31/2024 (Exact Date)   SpO2 95%   BMI 22.14 kg/m      VSS on RA, Afebrile. Denies pain, SOB. PRN Compazine 10 mg x1 and scheduled zofran for intermittent nausea. Pt starting Zyprexa 5 mg tonight. D2 Clemente/Doxo & Etoposide running, tolerating infusions well and good blood return via port. Pt would like to start melatonin at night for better sleep. Continue POC.     "

## 2024-03-16 NOTE — PLAN OF CARE
[id number]  Goal Outcome Evaluation:    Plan of Care Reviewed With: patient. Overall Patient Progress: no change. Outcome Evaluation: Tolerating chemo, mild nausea.    Vitals stable. Denies pain/sob. Mild nausea, compazine given x1 w/ relief. Ind. BM 3/15. CIVI chemo infusing.

## 2024-03-17 LAB
ABO/RH(D): NORMAL
ALBUMIN SERPL BCG-MCNC: 3.6 G/DL (ref 3.5–5.2)
ALP SERPL-CCNC: 79 U/L (ref 40–150)
ALT SERPL W P-5'-P-CCNC: 19 U/L (ref 0–50)
ANION GAP SERPL CALCULATED.3IONS-SCNC: 9 MMOL/L (ref 7–15)
ANTIBODY SCREEN: NEGATIVE
AST SERPL W P-5'-P-CCNC: 15 U/L (ref 0–45)
BASOPHILS # BLD AUTO: 0 10E3/UL (ref 0–0.2)
BASOPHILS NFR BLD AUTO: 0 %
BILIRUB SERPL-MCNC: 0.3 MG/DL
BUN SERPL-MCNC: 9.4 MG/DL (ref 6–20)
CALCIUM SERPL-MCNC: 9.3 MG/DL (ref 8.6–10)
CHLORIDE SERPL-SCNC: 106 MMOL/L (ref 98–107)
CREAT SERPL-MCNC: 0.58 MG/DL (ref 0.51–0.95)
DEPRECATED HCO3 PLAS-SCNC: 26 MMOL/L (ref 22–29)
EGFRCR SERPLBLD CKD-EPI 2021: >90 ML/MIN/1.73M2
EOSINOPHIL # BLD AUTO: 0 10E3/UL (ref 0–0.7)
EOSINOPHIL NFR BLD AUTO: 0 %
ERYTHROCYTE [DISTWIDTH] IN BLOOD BY AUTOMATED COUNT: 15.7 % (ref 10–15)
FIBRINOGEN PPP-MCNC: 341 MG/DL (ref 170–490)
GLUCOSE SERPL-MCNC: 117 MG/DL (ref 70–99)
HCT VFR BLD AUTO: 33.4 % (ref 35–47)
HGB BLD-MCNC: 11.4 G/DL (ref 11.7–15.7)
IMM GRANULOCYTES # BLD: 0.2 10E3/UL
IMM GRANULOCYTES NFR BLD: 1 %
INR PPP: 1.08 (ref 0.85–1.15)
LYMPHOCYTES # BLD AUTO: 0.9 10E3/UL (ref 0.8–5.3)
LYMPHOCYTES NFR BLD AUTO: 6 %
MAGNESIUM SERPL-MCNC: 1.9 MG/DL (ref 1.7–2.3)
MCH RBC QN AUTO: 30.6 PG (ref 26.5–33)
MCHC RBC AUTO-ENTMCNC: 34.1 G/DL (ref 31.5–36.5)
MCV RBC AUTO: 90 FL (ref 78–100)
MONOCYTES # BLD AUTO: 1 10E3/UL (ref 0–1.3)
MONOCYTES NFR BLD AUTO: 7 %
NEUTROPHILS # BLD AUTO: 12.7 10E3/UL (ref 1.6–8.3)
NEUTROPHILS NFR BLD AUTO: 86 %
NRBC # BLD AUTO: 0 10E3/UL
NRBC BLD AUTO-RTO: 0 /100
PHOSPHATE SERPL-MCNC: 4.4 MG/DL (ref 2.5–4.5)
PLATELET # BLD AUTO: 324 10E3/UL (ref 150–450)
POTASSIUM SERPL-SCNC: 3.8 MMOL/L (ref 3.4–5.3)
PROT SERPL-MCNC: 5.9 G/DL (ref 6.4–8.3)
RBC # BLD AUTO: 3.73 10E6/UL (ref 3.8–5.2)
SODIUM SERPL-SCNC: 141 MMOL/L (ref 135–145)
SPECIMEN EXPIRATION DATE: NORMAL
URATE SERPL-MCNC: 2 MG/DL (ref 2.4–5.7)
WBC # BLD AUTO: 14.7 10E3/UL (ref 4–11)

## 2024-03-17 PROCEDURE — 84100 ASSAY OF PHOSPHORUS: CPT | Performed by: PHYSICIAN ASSISTANT

## 2024-03-17 PROCEDURE — 250N000011 HC RX IP 250 OP 636: Performed by: PHYSICIAN ASSISTANT

## 2024-03-17 PROCEDURE — 250N000013 HC RX MED GY IP 250 OP 250 PS 637: Performed by: PHYSICIAN ASSISTANT

## 2024-03-17 PROCEDURE — 84075 ASSAY ALKALINE PHOSPHATASE: CPT | Performed by: PHYSICIAN ASSISTANT

## 2024-03-17 PROCEDURE — 250N000013 HC RX MED GY IP 250 OP 250 PS 637: Performed by: INTERNAL MEDICINE

## 2024-03-17 PROCEDURE — 85384 FIBRINOGEN ACTIVITY: CPT | Performed by: PHYSICIAN ASSISTANT

## 2024-03-17 PROCEDURE — 258N000003 HC RX IP 258 OP 636: Performed by: INTERNAL MEDICINE

## 2024-03-17 PROCEDURE — 84550 ASSAY OF BLOOD/URIC ACID: CPT | Performed by: PHYSICIAN ASSISTANT

## 2024-03-17 PROCEDURE — 250N000011 HC RX IP 250 OP 636: Mod: JZ | Performed by: INTERNAL MEDICINE

## 2024-03-17 PROCEDURE — 83735 ASSAY OF MAGNESIUM: CPT | Performed by: PHYSICIAN ASSISTANT

## 2024-03-17 PROCEDURE — 250N000012 HC RX MED GY IP 250 OP 636 PS 637: Performed by: INTERNAL MEDICINE

## 2024-03-17 PROCEDURE — 85610 PROTHROMBIN TIME: CPT | Performed by: PHYSICIAN ASSISTANT

## 2024-03-17 PROCEDURE — 85004 AUTOMATED DIFF WBC COUNT: CPT | Performed by: PHYSICIAN ASSISTANT

## 2024-03-17 PROCEDURE — 86900 BLOOD TYPING SEROLOGIC ABO: CPT | Performed by: PHYSICIAN ASSISTANT

## 2024-03-17 PROCEDURE — 120N000002 HC R&B MED SURG/OB UMMC

## 2024-03-17 RX ADMIN — CIMETIDINE 200 MG: 200 TABLET, FILM COATED ORAL at 20:06

## 2024-03-17 RX ADMIN — PREDNISONE 100 MG: 50 TABLET ORAL at 16:45

## 2024-03-17 RX ADMIN — CIMETIDINE 200 MG: 200 TABLET, FILM COATED ORAL at 08:35

## 2024-03-17 RX ADMIN — VINCRISTINE SULFATE 0.7 MG: 1 INJECTION, SOLUTION INTRAVENOUS at 09:02

## 2024-03-17 RX ADMIN — ACYCLOVIR 400 MG: 400 TABLET ORAL at 08:35

## 2024-03-17 RX ADMIN — PROCHLORPERAZINE MALEATE 5 MG: 5 TABLET ORAL at 16:45

## 2024-03-17 RX ADMIN — LORAZEPAM 0.5 MG: 0.5 TABLET ORAL at 17:32

## 2024-03-17 RX ADMIN — PANTOPRAZOLE SODIUM 40 MG: 40 TABLET, DELAYED RELEASE ORAL at 16:45

## 2024-03-17 RX ADMIN — OLANZAPINE 5 MG: 5 TABLET, ORALLY DISINTEGRATING ORAL at 21:44

## 2024-03-17 RX ADMIN — ACYCLOVIR 400 MG: 400 TABLET ORAL at 20:06

## 2024-03-17 RX ADMIN — ONDANSETRON HYDROCHLORIDE 16 MG: 8 TABLET, FILM COATED ORAL at 08:35

## 2024-03-17 RX ADMIN — PREDNISONE 100 MG: 50 TABLET ORAL at 08:35

## 2024-03-17 RX ADMIN — PANTOPRAZOLE SODIUM 40 MG: 40 TABLET, DELAYED RELEASE ORAL at 08:36

## 2024-03-17 RX ADMIN — POLYETHYLENE GLYCOL 3350 17 G: 17 POWDER, FOR SOLUTION ORAL at 08:36

## 2024-03-17 RX ADMIN — ENOXAPARIN SODIUM 40 MG: 40 INJECTION SUBCUTANEOUS at 20:06

## 2024-03-17 RX ADMIN — DOCUSATE SODIUM 50 MG AND SENNOSIDES 8.6 MG 2 TABLET: 8.6; 5 TABLET, FILM COATED ORAL at 20:06

## 2024-03-17 RX ADMIN — SODIUM CHLORIDE 105 MG: 9 INJECTION, SOLUTION INTRAVENOUS at 07:11

## 2024-03-17 RX ADMIN — POLYETHYLENE GLYCOL 3350 17 G: 17 POWDER, FOR SOLUTION ORAL at 20:05

## 2024-03-17 ASSESSMENT — ACTIVITIES OF DAILY LIVING (ADL)
ADLS_ACUITY_SCORE: 20

## 2024-03-17 NOTE — PROGRESS NOTES
Nursing Focus: Chemotherapy  D: Positive brisk bright red blood return via Port. Insertion site is clean/dry/intact, dressing intact with no complaints of pain.  Urine output is recorded in intake Doc Flowsheet.    I: Premedications given per order (see electronic medical administration record). CIVI Etoposide started to infuse over 24 hours. Reviewed pt teaching on chemotherapy side effects.  Pt denies need for further teaching. Chemotherapy double checked per protocol by two chemotherapy competent RN's.   A: Tolerating chemo well. Denies nausea.   P: Continue to monitor urine output and symptoms of nausea. Screen for symptoms of toxicity.

## 2024-03-17 NOTE — PROGRESS NOTES
Ifosfamide Toxicity Assessment      Patient is Alert & Oriented x4. There are signs of lethargy, confusion or hallucinations  No    Patient is having new incontinence of bowel or bladder No.       Pincer Grasp intact Yes    Tremors present No     Will continue to monitor for s/sx of ifosfamide toxicity: hallucinations, AMS, emotional lability, somnolence, myoclonic jerks, tremors, coordination difficulties. If these occur, please call the APPs/Attending on days and fellow on-call for evening and nights for recommendations and further instruction.     New facial flushing noted

## 2024-03-17 NOTE — PLAN OF CARE
[id number]  Goal Outcome Evaluation:    Reviewed with patient. Overall Patient Progress: improving. Outcome Evaluation: Nausea improved, tolerating chemo.    Vitals stable. Denies pain/sob. Nausea better overnight, zyprexa given at bedtime for first time. Slept well. Declined need for further compazine/ melatonin. Ind. X2 loose BM's 3/16 so miralax held at bedtime.

## 2024-03-17 NOTE — PROGRESS NOTES
Nursing Focus: Chemotherapy  D: Positive blood return via port. Insertion site is clean/dry/intact, dressing intact with no complaints of pain.  Urine output is recorded in intake in Doc Flowsheet.    I: Premedications given per order (see electronic medical administration record). Dose #3 of Clemente/Doxorubicin started to infuse over 24 hour. Reviewed pt teaching on chemotherapy side effects.  Pt denies need for further teaching. Chemotherapy double checked per protocol by two chemotherapy competent RN's.   A: Tolerating procedure well. Denies nausea and or pain.   P: Continue to monitor urine output and symptoms of nausea. Screen for symptoms of toxicity.

## 2024-03-17 NOTE — PROGRESS NOTES
[facility]    Hematology / Oncology Progress Note    Date of Admission: 3/14/2024  Hospital Day #: 3   Date of Service (when I saw the patient): 03/17/2024     Assessment & Plan   Clementine Kathleen is a 28 year old female with PMH of primary mediastinal B-cell lymphoma, admitted 3/14/2024 for scheduled chemotherapy with dose adjusted R-EPOCH (C2D1=3/14/2024).  Overall tolerated well with some mild CINV.     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 Oakes. Initially presented with cervical lymphadenopathy 12/2023. Initial FNA (1/29/24) suggestive of possible Hodgkin lymphoma; however excisional LN biopsy (2/9/24) shows large B-cell lymphoma with differential diagnosis of primary mediastinal large B-cell lymphoma vs DLBCL NOS. PET with cervical and mediastinal lymphadenopathy only. Overall presentation would be quite consistent with PMBCL given her age, gender, sites of involvement, dim CD30 expression, and prominent fibrotic background.  Although CD23 IHC was negative, IHC for , MAL, and PD-L1 were positive in majority of the neoplastic cells suggesting PMBCL over DLBCL NOS. [id number] gene expression profile also consistent with PMBCL. FISH with gain of BCL2 but no MYC, BCL2, or BCL6 rearrangements.  PET showed bilateral cervical and mediastinal hypermetabolic lymphadenopathy (largest 4.4 cm with SUVmax 27 in a prevascular LN); no disease below diaphragm.   Plan remains to completed a total of 6 cycles with a restaging PET-CT after 2 cycles.   - Echo 2/19 with LVEF 60%, no significant valvular abnormalities present  - Patient qualifies for a 20% dose increase (Level 2) as her ANC  dread was still greater than 0.5 with the first cycle.   - Port accessed prior to admission      Treatment Plan: Dose adjusted R-EPOCH (C2D1=3/14/2024)  - Rituximab 375 mg/m2 (600mg) IV x 1 dose - D0  - Prednisone 100 mg BID x 10 doses - D1-5  - Etoposide 60 mg/m2 (105 mg) IV x 4 doses - D1-4  - Vincristine 0.4 mg/m2 (0.7mg), Doxorubicin (21mg) x 4 doses CIVI- D1-4  - Cyclophosphamide 900 mg/m2 (1540 mg) IV x 1 dose - D5  - Pegfilgrastim x1 - D6 (scheduled in clinic for 3/19)  - Pre-meds: tylenol, benadryl, zofran, emend, dexamethasone (D6-7)     # Risk of TLS  - Encourage good PO hydration.    # CINV   - Trial of Zyprexa at bedtime (x3/16)      # Grade 1 Neuropathy   Endorsed L>R neuropathy in her fingertips.Feels like right side has improved and left has remained the same.   - Monitor closely      # Risk for pancytopenia   2/2 chemotherapy   - Transfuse for Hgb <7, Plt <10K  - Type & Screen MWF     ID  # ID prophylaxis  -  mg BID.   - Levo and fluc when ANC <1.0.   - Pentamidine for PJP ppx last given 3/1; next due ~3/29     MISC  # Fertility preservation - Already established with [facility], underwent egg retrieval on 2/18 or 2/19.   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 Her boyfriend is a general surgery resident here at [facility]. Parents live in [place].       FEN:  -IVF per chemo protocol   -PRN lyte replacement  -RDAT    Prophy/Misc:  -VTE: Ppx Lovenox   -GI/PUD: PPI BID   -Bowels: Senna and Miralax BID     Clinically Significant Risk Factors                                      Disposition: Pending completion of chemotherapy; anticipate discharge 3/19  Follow Up: APPT 18 placed to move Neulasta to 3/20 due to timing of chemotherapy completion     This patient was discussed with Dr. Iqbal    I spent >30 minutes face-to-face or coordinating care of Clementine Kathleen. Over 50% of our time on the unit was spent  counseling the patient and/or coordinating care.    Fariha Pack PA-C ([facility])   Hematology/ Oncology   Pager [phone]    Interval History   Nursing notes reviewed. Clementine is overall doing well. She slept really well last night. She did not have any nausea overnight but was feeling nauseous this morning and took some Zofran. We talked about doing Emend today, but after discussions with pharmacy there is interaction with Doxorubicin so will plan to give tomorrow. She has not noticed any other side effects. She is moving her bowels well and will hold off on senna tonight. All questions answered at this time.     Physical Exam   Temp: 97.6  F (36.4  C) Temp src: Oral BP: 108/61 Pulse: 56   Resp: 18 SpO2: 99 % O2 Device: None (Room air)    Vitals:    03/15/24 0818 03/16/24 0700 03/17/24 0844   Weight: 61.7 kg (136 lb) 62.2 kg (137 lb 3.2 oz) 61.6 kg (135 lb 14.4 oz)     Vital Signs with Ranges  Temp:  [97.6  F (36.4  C)-98.7  F (37.1  C)] 97.6  F (36.4  C)  Pulse:  [55-77] 56  Resp:  [16-18] 18  BP: (102-118)/(58-66) 108/61  SpO2:  [93 %-99 %] 99 %  I/O last 3 completed shifts:  In: 1777.2 [P.O.:960; IV Piggyback:817.2]  Out: -     Constitutional: Pleasant young female seen resting comfortably in bed in NAD. Alert and interactive.   HEENT: NCAT. PERRL, EOMI, anicteric sclera. Oral mucosa pink and moist with no lesions or thrush.  Respiratory: Non-labored breathing, good air exchange, lungs clear to auscultation bilaterally. No cough or wheeze noted.   Cardiovascular: Regular rate and rhythm. No murmur or rub.   GI: Normoactive bowel sounds. Abdomen soft, non-distended, and non-tender. No palpable masses or organomegaly.  Skin: Warm and dry. No concerning lesions or rash on exposed surfaces.  Musculoskeletal: Extremities grossly normal, non-tender, no edema.  Good strength and ROM in bed.   Neuropsychiatric: Mentation and affect appear normal/appropriate.  Vascular Access: Port is CDI without erythema, swelling,  or discharge.     Medications   Current Facility-Administered Medications   Medication    acetaminophen (TYLENOL) tablet 650 mg    acyclovir (ZOVIRAX) tablet 400 mg    albuterol (PROVENTIL HFA/VENTOLIN HFA) inhaler    albuterol (PROVENTIL) neb solution 2.5 mg    Chemotherapy Infusing-Continuous Infusion    cimetidine (TAGAMET) tablet 200 mg    [START ON 3/19/2024] cycloPHOSphamide (CYTOXAN) 1,540 mg in sodium chloride 0.9 % 627 mL infusion    [START ON 3/20/2024] dexAMETHasone (DECADRON) tablet 8 mg    diphenhydrAMINE (BENADRYL) injection 50 mg    enoxaparin ANTICOAGULANT (LOVENOX) injection 40 mg    EPINEPHrine PF (ADRENALIN) injection 0.3 mg    etoposide (TOPOSAR) 105 mg in sodium chloride 0.9 % 555.25 mL infusion    famotidine (PEPCID) injection 20 mg    [START ON 3/19/2024] fosaprepitant (EMEND) 150 mg in sodium chloride 0.9 % 275 mL intermittent infusion    heparin 100 unit/mL injection 5-10 mL    heparin lock flush 10 UNIT/ML injection 5-10 mL    heparin lock flush 10 UNIT/ML injection 5-10 mL    LORazepam (ATIVAN) tablet 0.5-1 mg    melatonin tablet 3-6 mg    meperidine (DEMEROL) injection 25 mg    methylPREDNISolone sodium succinate (solu-MEDROL) injection 125 mg    No rectal suppositories if WBC less than 1000/microliters or platelets less than 50,000/ L    OLANZapine zydis (zyPREXA) ODT tab 5 mg    ondansetron (ZOFRAN) injection 8 mg    Or    ondansetron (ZOFRAN ODT) ODT tab 8 mg    Or    ondansetron (ZOFRAN) tablet 8 mg    ondansetron (ZOFRAN) tablet 16 mg    pantoprazole (PROTONIX) EC tablet 40 mg    polyethylene glycol (MIRALAX) Packet 17 g    polyethylene glycol (MIRALAX) Packet 17 g    predniSONE (DELTASONE) tablet 100 mg    prochlorperazine (COMPAZINE) tablet 5-10 mg    Or    prochlorperazine (COMPAZINE) injection 5-10 mg    senna-docusate (SENOKOT-S/PERICOLACE) 8.6-50 MG per tablet 1 tablet    senna-docusate (SENOKOT-S/PERICOLACE) 8.6-50 MG per tablet 2 tablet    sodium chloride (PF) 0.9% PF flush  10-20 mL    sodium chloride (PF) 0.9% PF flush 10-20 mL    sodium chloride (PF) 0.9% PF flush 10-20 mL    sodium chloride 0.9% BOLUS 500 mL    vinCRIStine (ONCOVIN) 0.7 mg, DOXOrubicin (ADRIAMYCIN) 21 mg in sodium chloride 0.9 % 1,061.2 mL infusion       Data   CBC  Recent Labs   Lab 03/17/24  0454 03/16/24  0620 03/15/24  0512 03/14/24  1607   WBC 14.7* 23.9* 14.0* 10.5   RBC 3.73* 3.89 3.85 4.12   HGB 11.4* 11.4* 11.7 12.7   HCT 33.4* 34.9* 34.6* 37.5   MCV 90 90 90 91   MCH 30.6 29.3 30.4 30.8   MCHC 34.1 32.7 33.8 33.9   RDW 15.7* 15.7* 15.3* 15.5*    329 297 289     CMP  Recent Labs   Lab 03/17/24  0454 03/16/24  0620 03/15/24  0512 03/14/24  1607    138 138 138   POTASSIUM 3.8 4.0 4.0 4.1   CHLORIDE 106 107 105 105   CO2 26 23 25 25   ANIONGAP 9 8 8 8   * 118* 124* 85   BUN 9.4 9.0 8.3 12.9   CR 0.58 0.57 0.58 0.69   GFRESTIMATED >90 >90 >90 >90   DEE 9.3 9.1 9.1 9.3   MAG 1.9 2.7* 1.7  --    PHOS 4.4 4.2 3.6  --    PROTTOTAL 5.9* 6.2* 6.2* 6.5   ALBUMIN 3.6 3.5 3.8 3.8   BILITOTAL 0.3 0.2 <0.2 <0.2   ALKPHOS 79 83 88 90   AST 15 16 13 23   ALT 19 23 31 30     INR  Recent Labs   Lab 03/17/24  0454 03/16/24  0620 03/15/24  0512   INR 1.08 1.10 1.06       Results for orders placed or performed in visit on 03/01/24   IR Chest Port Placement > 5 Yrs of Age    Narrative    Procedure date: 3/1/2024    History: Patient with a history of lymphoma here for placement of  single chamber, central venous chest port for chemotherapy.    Procedure: Placement of right IJ,6 Ghanaian, 20 cm, single chamber,  central venous chest port.    Preop diagnosis:Lymphoma.    Postop diagnosis: Same.    : Antolin Contreras PA-C    Assist: DASIA Tariq.    Medications: IV sedation per [facility] anesthesia staff, 1% lidocaine 10 ml  local anesthesia, heparin 5 ml 100units/ml.    Nursing: Throughout the procedure the patient's vital signs and oxygen  saturation were continuously monitored by [facility] anesthesia staff  under  the supervision of the attending physician, and remained stable.    Face to face sedation time: Per [facility] anesthesia staff.    Fluoroscopy time: 0.6 minutes.    IV contrast: None.    Consent: Informed written and verbal consent was obtained.    Procedure/Findings: The patient was placed in the supine position on  the fluoroscopy table. Ultrasound evaluation revealed a patent right  internal jugular vein, an image was saved, and the patient's right  neck and upper chest were prepped and draped in the usual sterile  fashion. The neck venotomy site was anesthetized with 1 ml 1%  lidocaine, and a small incision was made at the venotomy site with a #  11 scalpel.  Under ultrasound guidance, a 0.018 micropuncture needle  was used to access the right internal jugular vein. Under fluoroscopic  guidance, a 0.018 soft tipped wire was advanced into the right atrium,  and micropuncture needle was exchanged over wire for a 3-5 [place]an  dilator. The wire was used to measure from the right atrium to the  skin. Wire and a 3 [place] dilator were removed, and under fluoroscopic  guidance, a .035 Bentson wire was advanced into the IVC and secured.  The  port site was selected over the right anterior chest,  approximately at the midclavicular line, and this area was  anesthetized with 1% lidocaine. A 3.0 cm incision was made with a #15  scalpel and a pocket was created using blunt dissection. The pocket  was liberally irrigated with saline solution, gauze packed, and  pressure held to provide hemostasis. A tract from the pocket to the  dermatotomy site was anesthetized with 1% lidocaine.  A 6 Equ[place]inean,  single chamber central venous chest port was selected and prepared. A  tunneling device was connected to the port catheter, passed from the  pocket to the dermatotomy site, and the catheter was pulled through.  The wire measurement was used to cut the catheter to a length of 20  cm. (The catheter was transected at the 43 cm catheter  "Johnie, though  the true length is 20 cm) The 6 Faroese, 20 cm, single chamber, \"Power\"  port was flushed with normal saline and positioned within the pocket.   The 5 Faroese dilator was removed, and a 6 Faroese dilator/peel-away  sheath was advanced over the wire into the the superior vena cava. The  wire and dilator were removed, the catheter was advanced into the  sheath, and the sheath was removed. The catheter tip was found to lie  within the right atrium. The pocket was then closed with 3, 3-0 Vicryl   subcutaneous sutures, and one running 4-0 Monocryl subcuticular  suture. The port aspirated and flushed freely, and was flushed with  10ml of normal saline and 5 ml heparin, 100 units per ml. The chest  and neck sites were cleansed, the skin edges were approximated and  glued. Images were saved throughout the procedure. The procedure was  well tolerated, with no immediate complications.    Estimated blood loss: 1 cc.    Specimens: None.      Impression    Impression: Placement of right IJ, 1 Faroese, 20 cm, single chamber,  central venous chest port. Port ready for immediate use.    Plan: Keep glued sites clean, dry, and uncovered for 30 minutes. The  site may then get wet but should not be submerged or soaked in water.  No strenuous activity for 3 days. Patient transported to post care  unit in stable condition for post procedure monitoring.     Attestation: The physician assistant (PA) who performed this procedure  and signed the above report is licensed to practice in the state [facility] pursuant to [place] Statute 147A.09.  This includes meeting the  Statute and [place] Board of Medical [employer] requirement of an  active Delegation Agreement, which documents delegation of services by  primary and alternate supervising physicians. All services rendered  are performed under a collaborative agreement with Dr. Zachary Caballero, Director of Interventional Radiology, [facility] PhysiciansAidan MARION" SUMIT FLANAGAN         SYSTEM ID:  [id number]

## 2024-03-18 LAB
ALBUMIN SERPL BCG-MCNC: 3.6 G/DL (ref 3.5–5.2)
ALP SERPL-CCNC: 69 U/L (ref 40–150)
ALT SERPL W P-5'-P-CCNC: 16 U/L (ref 0–50)
ANION GAP SERPL CALCULATED.3IONS-SCNC: 9 MMOL/L (ref 7–15)
AST SERPL W P-5'-P-CCNC: 12 U/L (ref 0–45)
BASOPHILS # BLD AUTO: 0 10E3/UL (ref 0–0.2)
BASOPHILS NFR BLD AUTO: 0 %
BILIRUB SERPL-MCNC: 0.4 MG/DL
BUN SERPL-MCNC: 11.5 MG/DL (ref 6–20)
CALCIUM SERPL-MCNC: 9.2 MG/DL (ref 8.6–10)
CHLORIDE SERPL-SCNC: 104 MMOL/L (ref 98–107)
CREAT SERPL-MCNC: 0.68 MG/DL (ref 0.51–0.95)
DEPRECATED HCO3 PLAS-SCNC: 27 MMOL/L (ref 22–29)
EGFRCR SERPLBLD CKD-EPI 2021: >90 ML/MIN/1.73M2
EOSINOPHIL # BLD AUTO: 0 10E3/UL (ref 0–0.7)
EOSINOPHIL NFR BLD AUTO: 0 %
ERYTHROCYTE [DISTWIDTH] IN BLOOD BY AUTOMATED COUNT: 15.3 % (ref 10–15)
FIBRINOGEN PPP-MCNC: 308 MG/DL (ref 170–490)
GLUCOSE SERPL-MCNC: 113 MG/DL (ref 70–99)
HCT VFR BLD AUTO: 33.8 % (ref 35–47)
HGB BLD-MCNC: 11.5 G/DL (ref 11.7–15.7)
IMM GRANULOCYTES # BLD: 0.1 10E3/UL
IMM GRANULOCYTES NFR BLD: 1 %
INR PPP: 1.05 (ref 0.85–1.15)
LYMPHOCYTES # BLD AUTO: 0.5 10E3/UL (ref 0.8–5.3)
LYMPHOCYTES NFR BLD AUTO: 8 %
MAGNESIUM SERPL-MCNC: 1.8 MG/DL (ref 1.7–2.3)
MCH RBC QN AUTO: 30.3 PG (ref 26.5–33)
MCHC RBC AUTO-ENTMCNC: 34 G/DL (ref 31.5–36.5)
MCV RBC AUTO: 89 FL (ref 78–100)
MONOCYTES # BLD AUTO: 0.6 10E3/UL (ref 0–1.3)
MONOCYTES NFR BLD AUTO: 9 %
NEUTROPHILS # BLD AUTO: 5 10E3/UL (ref 1.6–8.3)
NEUTROPHILS NFR BLD AUTO: 82 %
NRBC # BLD AUTO: 0 10E3/UL
NRBC BLD AUTO-RTO: 0 /100
PHOSPHATE SERPL-MCNC: 4.1 MG/DL (ref 2.5–4.5)
PLATELET # BLD AUTO: 338 10E3/UL (ref 150–450)
POTASSIUM SERPL-SCNC: 3.6 MMOL/L (ref 3.4–5.3)
PROT SERPL-MCNC: 5.8 G/DL (ref 6.4–8.3)
RBC # BLD AUTO: 3.8 10E6/UL (ref 3.8–5.2)
SODIUM SERPL-SCNC: 140 MMOL/L (ref 135–145)
URATE SERPL-MCNC: 2.2 MG/DL (ref 2.4–5.7)
WBC # BLD AUTO: 6.1 10E3/UL (ref 4–11)

## 2024-03-18 PROCEDURE — 85025 COMPLETE CBC W/AUTO DIFF WBC: CPT | Performed by: PHYSICIAN ASSISTANT

## 2024-03-18 PROCEDURE — 120N000002 HC R&B MED SURG/OB UMMC

## 2024-03-18 PROCEDURE — 250N000011 HC RX IP 250 OP 636: Performed by: PHYSICIAN ASSISTANT

## 2024-03-18 PROCEDURE — 250N000013 HC RX MED GY IP 250 OP 250 PS 637: Performed by: PHYSICIAN ASSISTANT

## 2024-03-18 PROCEDURE — 99233 SBSQ HOSP IP/OBS HIGH 50: CPT

## 2024-03-18 PROCEDURE — 250N000013 HC RX MED GY IP 250 OP 250 PS 637: Performed by: INTERNAL MEDICINE

## 2024-03-18 PROCEDURE — 85384 FIBRINOGEN ACTIVITY: CPT | Performed by: PHYSICIAN ASSISTANT

## 2024-03-18 PROCEDURE — 83735 ASSAY OF MAGNESIUM: CPT | Performed by: PHYSICIAN ASSISTANT

## 2024-03-18 PROCEDURE — 250N000011 HC RX IP 250 OP 636: Performed by: INTERNAL MEDICINE

## 2024-03-18 PROCEDURE — 250N000012 HC RX MED GY IP 250 OP 636 PS 637: Performed by: INTERNAL MEDICINE

## 2024-03-18 PROCEDURE — 80053 COMPREHEN METABOLIC PANEL: CPT | Performed by: PHYSICIAN ASSISTANT

## 2024-03-18 PROCEDURE — 84550 ASSAY OF BLOOD/URIC ACID: CPT | Performed by: PHYSICIAN ASSISTANT

## 2024-03-18 PROCEDURE — 84100 ASSAY OF PHOSPHORUS: CPT | Performed by: PHYSICIAN ASSISTANT

## 2024-03-18 PROCEDURE — 85610 PROTHROMBIN TIME: CPT | Performed by: PHYSICIAN ASSISTANT

## 2024-03-18 PROCEDURE — 258N000003 HC RX IP 258 OP 636: Performed by: INTERNAL MEDICINE

## 2024-03-18 RX ORDER — NALOXONE HYDROCHLORIDE 0.4 MG/ML
0.4 INJECTION, SOLUTION INTRAMUSCULAR; INTRAVENOUS; SUBCUTANEOUS
Status: DISCONTINUED | OUTPATIENT
Start: 2024-03-18 | End: 2024-03-19 | Stop reason: HOSPADM

## 2024-03-18 RX ORDER — NALOXONE HYDROCHLORIDE 0.4 MG/ML
0.2 INJECTION, SOLUTION INTRAMUSCULAR; INTRAVENOUS; SUBCUTANEOUS
Status: DISCONTINUED | OUTPATIENT
Start: 2024-03-18 | End: 2024-03-19 | Stop reason: HOSPADM

## 2024-03-18 RX ADMIN — ACYCLOVIR 400 MG: 400 TABLET ORAL at 09:00

## 2024-03-18 RX ADMIN — DOCUSATE SODIUM 50 MG AND SENNOSIDES 8.6 MG 1 TABLET: 8.6; 5 TABLET, FILM COATED ORAL at 20:34

## 2024-03-18 RX ADMIN — ENOXAPARIN SODIUM 40 MG: 40 INJECTION SUBCUTANEOUS at 20:34

## 2024-03-18 RX ADMIN — PANTOPRAZOLE SODIUM 40 MG: 40 TABLET, DELAYED RELEASE ORAL at 09:00

## 2024-03-18 RX ADMIN — PREDNISONE 100 MG: 50 TABLET ORAL at 16:18

## 2024-03-18 RX ADMIN — PREDNISONE 100 MG: 50 TABLET ORAL at 09:42

## 2024-03-18 RX ADMIN — PROCHLORPERAZINE MALEATE 10 MG: 5 TABLET ORAL at 04:19

## 2024-03-18 RX ADMIN — SODIUM CHLORIDE 105 MG: 9 INJECTION, SOLUTION INTRAVENOUS at 05:16

## 2024-03-18 RX ADMIN — ACETAMINOPHEN 650 MG: 325 TABLET, FILM COATED ORAL at 09:42

## 2024-03-18 RX ADMIN — ONDANSETRON 8 MG: 2 INJECTION INTRAMUSCULAR; INTRAVENOUS at 18:32

## 2024-03-18 RX ADMIN — POLYETHYLENE GLYCOL 3350 17 G: 17 POWDER, FOR SOLUTION ORAL at 09:01

## 2024-03-18 RX ADMIN — VINCRISTINE SULFATE 0.7 MG: 1 INJECTION, SOLUTION INTRAVENOUS at 09:04

## 2024-03-18 RX ADMIN — CIMETIDINE 200 MG: 200 TABLET, FILM COATED ORAL at 20:34

## 2024-03-18 RX ADMIN — PANTOPRAZOLE SODIUM 40 MG: 40 TABLET, DELAYED RELEASE ORAL at 16:18

## 2024-03-18 RX ADMIN — DOCUSATE SODIUM 50 MG AND SENNOSIDES 8.6 MG 2 TABLET: 8.6; 5 TABLET, FILM COATED ORAL at 09:00

## 2024-03-18 RX ADMIN — CIMETIDINE 200 MG: 200 TABLET, FILM COATED ORAL at 09:42

## 2024-03-18 RX ADMIN — ACYCLOVIR 400 MG: 400 TABLET ORAL at 20:34

## 2024-03-18 RX ADMIN — ONDANSETRON HYDROCHLORIDE 16 MG: 8 TABLET, FILM COATED ORAL at 08:55

## 2024-03-18 RX ADMIN — OLANZAPINE 5 MG: 5 TABLET, ORALLY DISINTEGRATING ORAL at 22:03

## 2024-03-18 ASSESSMENT — ACTIVITIES OF DAILY LIVING (ADL)
ADLS_ACUITY_SCORE: 20
ADLS_ACUITY_SCORE: 22
ADLS_ACUITY_SCORE: 20
ADLS_ACUITY_SCORE: 20
ADLS_ACUITY_SCORE: 22
ADLS_ACUITY_SCORE: 20
ADLS_ACUITY_SCORE: 22
ADLS_ACUITY_SCORE: 20
ADLS_ACUITY_SCORE: 22
ADLS_ACUITY_SCORE: 20
ADLS_ACUITY_SCORE: 22
ADLS_ACUITY_SCORE: 20
ADLS_ACUITY_SCORE: 20
ADLS_ACUITY_SCORE: 22
ADLS_ACUITY_SCORE: 20
ADLS_ACUITY_SCORE: 22
ADLS_ACUITY_SCORE: 20
ADLS_ACUITY_SCORE: 22
ADLS_ACUITY_SCORE: 20

## 2024-03-18 NOTE — PLAN OF CARE
Goal Outcome Evaluation:  C2D4  A&Ox4. RA. Intermittently bradycardic. UAL. Reg diet. Nausea tolerated w/ Zofran. Head ache pain controlled w/ tylenol. Passing gas. BM x 1. Voiding spontaneously. Port - infusing vincristine/ doxorubicin and Etoposide. Plan to start cyclophosphamide tomorrow at 10 AM. Amend infusion tomorrow.     Plan of Care Reviewed With: patient    Overall Patient Progress: no changeOverall Patient Progress: no change

## 2024-03-18 NOTE — PROGRESS NOTES
Nursing Focus: Chemotherapy    D: Positive blood return via Port . Insertion site is clean/dry/intact, dressing intact with no complaints of pain.  Pre infusion assessment documented in Flowsheet (if applicable).    I: Premedications given per order (see electronic medical administration record). Dose #4 of etop started to infuse over 24 hours. Reviewed pt teaching on chemotherapy side effects.  Pt denies need for further teaching. Chemotherapy double checked per protocol by two chemotherapy competent RN's.   A: Tolerating infusion well. Denies nausea and or pain.   P: Continue to monitor urine output and symptoms of nausea. Screen for symptoms of toxicity.

## 2024-03-18 NOTE — PROGRESS NOTES
[facility]    Hematology / Oncology Progress Note    Date of Admission: 3/14/2024  Hospital Day #: 4   Date of Service (when I saw the patient): 03/18/2024     Assessment & Plan   Clementine Kathleen is a 28 year old female with PMH of primary mediastinal B-cell lymphoma, admitted 3/14/2024 for scheduled chemotherapy with dose adjusted R-EPOCH (C2D1=3/14/2024).  Overall tolerated well with some mild CINV.     TODAY:  - C2D5 R-EPOCH. Last day of continuous infusions. Tolerating well thus far. Planning to discharge tomorrow after cyclophosphamide pending clinical course.    -Neulasta scheduled for 3/20.   - Nausea manageable today; will plan for emend tomorrow prior to cyclophosphamide.   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 Oakes. Initially presented with cervical lymphadenopathy 12/2023. Initial FNA (1/29/24) suggestive of possible Hodgkin lymphoma; however excisional LN biopsy (2/9/24) shows large B-cell lymphoma with differential diagnosis of primary mediastinal large B-cell lymphoma vs DLBCL NOS. PET with cervical and mediastinal lymphadenopathy only. Overall presentation would be quite consistent with PMBCL given her age, gender, sites of involvement, dim CD30 expression, and prominent fibrotic background.  Although CD23 IHC was negative, IHC for , MAL, and PD-L1 were positive in majority of the neoplastic cells suggesting PMBCL over DLBCL NOS. [id number] gene expression profile also consistent with PMBCL. FISH with gain of BCL2 but no MYC, BCL2, or BCL6 rearrangements.  PET showed bilateral cervical and mediastinal hypermetabolic lymphadenopathy (largest 4.4 cm with SUVmax 27 in a prevascular LN); no disease below diaphragm.   - Plan remains to completed a total of 6 cycles with a restaging PET-CT after 2 cycles.  Scheduled for 4/1.   - Echo 2/19 with LVEF 60%, no significant valvular abnormalities present  - Patient qualifies for a 20% dose increase (Level 2) as her ANC dread was still greater than 0.5 with the first cycle.   - Port accessed prior to admission      Treatment Plan: Dose adjusted R-EPOCH (C2D1=3/14/2024)  - Rituximab 375 mg/m2 (600mg) IV x 1 dose - D0  - Prednisone 100 mg BID x 10 doses - D1-5  - Etoposide 60 mg/m2 (105 mg) IV x 4 doses - D1-4  - Vincristine 0.4 mg/m2 (0.7mg), Doxorubicin (21mg) x 4 doses CIVI- D1-4  - Cyclophosphamide 900 mg/m2 (1540 mg) IV x 1 dose - D5  - Pegfilgrastim x1 - D6 (scheduled in clinic for 3/19)  - Pre-meds: tylenol, benadryl, zofran, emend, dexamethasone (D6-7)     # Risk of TLS  - Encourage good PO hydration.    # CINV   Chemotherapy induced nausea and vomiting during last cycle. Noted worse with this cycle compared to previous.   - Trial of Zyprexa at bedtime (x3/16).   - Patient noted increased somnolence but unsure if due to zyprexa or general increasing fatigue. Will continue to monitor and can consider decrease zyprexa dose from 5 mg to 2.5 mg.    - Continue PRN zofran and compazine as indicated.   - Emend planned for 3/19 prior to cyclophosphamide.      # Grade 1 Neuropathy   Endorsed L>R neuropathy in her fingertips. Feels like right side has improved and left has remained the same.   - Monitor closely      # Risk for pancytopenia   2/2 chemotherapy   - Transfuse for Hgb <7, Plt <10K  - Type & Screen MWF     ID  # ID prophylaxis  -  mg BID.   - Levo and fluc when ANC <1.0.   - Pentamidine for PJP ppx last given 3/1; next due ~3/29     MISC  # Fertility preservation - Already established with [facility], underwent egg retrieval on 2/18 or 2/19.   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 Her boyfriend is a general surgery resident here at [facility]. Parents live in [place].      FEN:  -IVF per chemo protocol   -PRN lyte replacement  -RDAT    Prophy/Misc:  -VTE: Ppx Lovenox   -GI/PUD: PPI BID   -Bowels: Senna and Miralax BID     Clinically Significant Risk Factors                                    Disposition: Pending completion of chemotherapy; anticipate discharge 3/19  Follow Up: APPT 18 placed to move Neulasta to 3/20 due to timing of chemotherapy completion. Scheduled   - Twice weekly labs   - 3/25: AMEENA follow up   - 4/1: PET/CT    This patient was discussed with Dr. Stuart    I spent >45 minutes face-to-face or coordinating care of Clementine Kathleen. Over 50% of our time on the unit was spent counseling the patient and/or coordinating care.    Angela Blank PA-C  Hematology/Oncology  Pager #[phone]    Interval History   Nursing notes reviewed. No acute events overnight. Clementine is overall doing well. Resting in bed this morning. We discussed CINV; endorses worse during this cycle compared to last. Nausea manageable today; will plan for emend tomorrow prior to cyclophosphamide. Continue PRNs and zyprexa. Denies fever, chills, mouth sores, SOB, cough, abdominal pain, diarrhea, constipation, nausea, vomiting, dysuria, hematuria, numbness, tingling, swelling. Continues to move bowels. Labs and imaging reviewed. All questions and concerns addressed at bedside.     Physical Exam   Temp: 98.2  F (36.8  C) Temp src: Oral BP: 118/67 Pulse: 53   Resp: 16 SpO2: 97 % O2 Device: None (Room air)    Vitals:    03/16/24 0700 03/17/24 0844 03/18/24 0817   Weight: 62.2 kg (137 lb 3.2 oz) 61.6 kg (135 lb 14.4 oz) 62.4 kg (137 lb 8 oz)     Vital Signs with Ranges  Temp:  [98  F (36.7  C)-98.2  F (36.8  C)] 98.2  F (36.8  C)  Pulse:  [53-67] 53  Resp:  [16-20] 16  BP: (107-118)/(56-67) 118/67  SpO2:  [96 %-98 %] 97 %  I/O last 3 completed shifts:  In: 817.2 [IV Piggyback:817.2]  Out: -     Constitutional: Pleasant young female seen resting comfortably in bed in NAD. Alert and interactive.   HEENT:  NCAT. PERRL, EOMI, anicteric sclera. Oral mucosa pink and moist with no lesions or thrush.  Respiratory: Non-labored breathing, good air exchange, lungs clear to auscultation bilaterally. No cough or wheeze noted.   Cardiovascular: Regular rate and rhythm. No murmur or rub.   GI: Normoactive bowel sounds. Abdomen soft, non-distended, and non-tender. No palpable masses or organomegaly.  Skin: Warm and dry. No concerning lesions or rash on exposed surfaces.  Musculoskeletal: Extremities grossly normal, non-tender, no edema.  Good strength and ROM in bed.   Neuropsychiatric: Mentation and affect appear normal/appropriate.  Vascular Access: Port is CDI without erythema, swelling, or discharge.     Medications   Current Facility-Administered Medications   Medication    acetaminophen (TYLENOL) tablet 650 mg    acyclovir (ZOVIRAX) tablet 400 mg    albuterol (PROVENTIL HFA/VENTOLIN HFA) inhaler    albuterol (PROVENTIL) neb solution 2.5 mg    Chemotherapy Infusing-Continuous Infusion    cimetidine (TAGAMET) tablet 200 mg    [START ON 3/19/2024] cycloPHOSphamide (CYTOXAN) 1,540 mg in sodium chloride 0.9 % 627 mL infusion    [START ON 3/20/2024] dexAMETHasone (DECADRON) tablet 8 mg    diphenhydrAMINE (BENADRYL) injection 50 mg    enoxaparin ANTICOAGULANT (LOVENOX) injection 40 mg    EPINEPHrine PF (ADRENALIN) injection 0.3 mg    etoposide (TOPOSAR) 105 mg in sodium chloride 0.9 % 555.25 mL infusion    famotidine (PEPCID) injection 20 mg    fosaprepitant (EMEND) 150 mg in sodium chloride 0.9 % 275 mL intermittent infusion    [Held by provider] fosaprepitant (EMEND) 150 mg in sodium chloride 0.9 % 275 mL intermittent infusion    heparin 100 unit/mL injection 5-10 mL    heparin lock flush 10 UNIT/ML injection 5-10 mL    heparin lock flush 10 UNIT/ML injection 5-10 mL    LORazepam (ATIVAN) tablet 0.5-1 mg    melatonin tablet 3-6 mg    meperidine (DEMEROL) injection 25 mg    methylPREDNISolone sodium succinate (solu-MEDROL)  injection 125 mg    No rectal suppositories if WBC less than 1000/microliters or platelets less than 50,000/ L    OLANZapine zydis (zyPREXA) ODT tab 5 mg    ondansetron (ZOFRAN) injection 8 mg    Or    ondansetron (ZOFRAN ODT) ODT tab 8 mg    Or    ondansetron (ZOFRAN) tablet 8 mg    ondansetron (ZOFRAN) tablet 16 mg    pantoprazole (PROTONIX) EC tablet 40 mg    polyethylene glycol (MIRALAX) Packet 17 g    polyethylene glycol (MIRALAX) Packet 17 g    predniSONE (DELTASONE) tablet 100 mg    prochlorperazine (COMPAZINE) tablet 5-10 mg    Or    prochlorperazine (COMPAZINE) injection 5-10 mg    senna-docusate (SENOKOT-S/PERICOLACE) 8.6-50 MG per tablet 1 tablet    senna-docusate (SENOKOT-S/PERICOLACE) 8.6-50 MG per tablet 2 tablet    sodium chloride (PF) 0.9% PF flush 10-20 mL    sodium chloride (PF) 0.9% PF flush 10-20 mL    sodium chloride (PF) 0.9% PF flush 10-20 mL    sodium chloride 0.9% BOLUS 500 mL    vinCRIStine (ONCOVIN) 0.7 mg, DOXOrubicin (ADRIAMYCIN) 21 mg in sodium chloride 0.9 % 1,061.2 mL infusion       Data   CBC  Recent Labs   Lab 03/18/24 0414 03/17/24  0454 03/16/24  0620 03/15/24  0512   WBC 6.1 14.7* 23.9* 14.0*   RBC 3.80 3.73* 3.89 3.85   HGB 11.5* 11.4* 11.4* 11.7   HCT 33.8* 33.4* 34.9* 34.6*   MCV 89 90 90 90   MCH 30.3 30.6 29.3 30.4   MCHC 34.0 34.1 32.7 33.8   RDW 15.3* 15.7* 15.7* 15.3*    324 329 297     CMP  Recent Labs   Lab 03/18/24  0414 03/17/24  0454 03/16/24  0620 03/15/24  0512    141 138 138   POTASSIUM 3.6 3.8 4.0 4.0   CHLORIDE 104 106 107 105   CO2 27 26 23 25   ANIONGAP 9 9 8 8   * 117* 118* 124*   BUN 11.5 9.4 9.0 8.3   CR 0.68 0.58 0.57 0.58   GFRESTIMATED >90 >90 >90 >90   DEE 9.2 9.3 9.1 9.1   MAG 1.8 1.9 2.7* 1.7   PHOS 4.1 4.4 4.2 3.6   PROTTOTAL 5.8* 5.9* 6.2* 6.2*   ALBUMIN 3.6 3.6 3.5 3.8   BILITOTAL 0.4 0.3 0.2 <0.2   ALKPHOS 69 79 83 88   AST 12 15 16 13   ALT 16 19 23 31     INR  Recent Labs   Lab 03/18/24  0414 03/17/24  0454 03/16/24  0620  03/15/24  0512   INR 1.05 1.08 1.10 1.06       Results for orders placed or performed in visit on 03/01/24   IR Chest Port Placement > 5 Yrs of Age    Narrative    Procedure date: 3/1/2024    History: Patient with a history of lymphoma here for placement of  single chamber, central venous chest port for chemotherapy.    Procedure: Placement of right IJ,6 Setswana, 20 cm, single chamber,  central venous chest port.    Preop diagnosis:Lymphoma.    Postop diagnosis: Same.    : Antolin Contreras PA-C    Assist: DASIA Tariq.    Medications: IV sedation per ASC anesthesia staff, 1% lidocaine 10 ml  local anesthesia, heparin 5 ml 100units/ml.    Nursing: Throughout the procedure the patient's vital signs and oxygen  saturation were continuously monitored by ASC anesthesia staff under  the supervision of the attending physician, and remained stable.    Face to face sedation time: Per ASC anesthesia staff.    Fluoroscopy time: 0.6 minutes.    IV contrast: None.    Consent: Informed written and verbal consent was obtained.    Procedure/Findings: The patient was placed in the supine position on  the fluoroscopy table. Ultrasound evaluation revealed a patent right  internal jugular vein, an image was saved, and the patient's right  neck and upper chest were prepped and draped in the usual sterile  fashion. The neck venotomy site was anesthetized with 1 ml 1%  lidocaine, and a small incision was made at the venotomy site with a #  11 scalpel.  Under ultrasound guidance, a 0.018 micropuncture needle  was used to access the right internal jugular vein. Under fluoroscopic  guidance, a 0.018 soft tipped wire was advanced into the right atrium,  and micropuncture needle was exchanged over wire for a 3-5 Korean  dilator. The wire was used to measure from the right atrium to the  skin. Wire and a 3 Korean dilator were removed, and under fluoroscopic  guidance, a .035 Bentson wire was advanced into the IVC and secured.  The   "port site was selected over the right anterior chest,  approximately at the midclavicular line, and this area was  anesthetized with 1% lidocaine. A 3.0 cm incision was made with a #15  scalpel and a pocket was created using blunt dissection. The pocket  was liberally irrigated with saline solution, gauze packed, and  pressure held to provide hemostasis. A tract from the pocket to the  dermatotomy site was anesthetized with 1% lidocaine.  A 6 Kyrgyz,  single chamber central venous chest port was selected and prepared. A  tunneling device was connected to the port catheter, passed from the  pocket to the dermatotomy site, and the catheter was pulled through.  The wire measurement was used to cut the catheter to a length of 20  cm. (The catheter was transected at the 43 cm catheter Johnie, though  the true length is 20 cm) The 6 Kyrgyz, 20 cm, single chamber, \"Power\"  port was flushed with normal saline and positioned within the pocket.   The 5 Kyrgyz dilator was removed, and a 6 Kyrgyz dilator/peel-away  sheath was advanced over the wire into the the superior vena cava. The  wire and dilator were removed, the catheter was advanced into the  sheath, and the sheath was removed. The catheter tip was found to lie  within the right atrium. The pocket was then closed with 3, 3-0 Vicryl   subcutaneous sutures, and one running 4-0 Monocryl subcuticular  suture. The port aspirated and flushed freely, and was flushed with  10ml of normal saline and 5 ml heparin, 100 units per ml. The chest  and neck sites were cleansed, the skin edges were approximated and  glued. Images were saved throughout the procedure. The procedure was  well tolerated, with no immediate complications.    Estimated blood loss: 1 cc.    Specimens: None.      Impression    Impression: Placement of right IJ, 1 Kyrgyz, 20 cm, single chamber,  central venous chest port. Port ready for immediate use.    Plan: Keep glued sites clean, dry, and uncovered for 30 " minutes. The  site may then get wet but should not be submerged or soaked in water.  No strenuous activity for 3 days. Patient transported to post care  unit in stable condition for post procedure monitoring.     Attestation: The physician assistant (PA) who performed this procedure  and signed the above report is licensed to practice in the [facility] pursuant to [place] Statute 147A.09.  This includes meeting the  Statute and [place] Board of Medical [employer] requirement of an  active Delegation Agreement, which documents delegation of services by  primary and alternate supervising physicians. All services rendered  are performed under a collaborative agreement with Dr. Zachary Caballero, Director of Interventional Radiology, [facility].    DONI FLANAGAN PA-C         SYSTEM ID:  [id number]

## 2024-03-18 NOTE — PLAN OF CARE
[id number]   Goal Outcome Evaluation:    Plan of Care Reviewed With: patient. Overall Patient Progress: no change. Outcome Evaluation: nausea controlled overnight.    Vitals stable. Compazine given in morning for nausea, pt otherwise able to sleep through night. Denies pain/sob. Ind. Last BM 3/16, senna and miralax given. Plan for last bag of CIVI chemo today. Etop hung this morning. Anticipated discharge 3/19 after D5 cytoxan infusion scheduled for 1000.

## 2024-03-18 NOTE — PLAN OF CARE
"[id number]    /56 (BP Location: Right arm)   Pulse 67   Temp 98.1  F (36.7  C) (Oral)   Resp 18   Ht 1.676 m (5' 6\")   Wt 61.6 kg (135 lb 14.4 oz)   LMP 01/31/2024 (Exact Date)   SpO2 97%   BMI 21.93 kg/m      VSS on RA, Afebrile. Denies pain, SOB. PRN Compazine 5 mg x1, Ativan 0.5 mg x1, and scheduled zofran for intermittent nausea. Pt continuing Zyprexa 5 mg tonight. Noted feeling more fatigued today. D3 Clemente/Doxo & Etoposide running, tolerating infusions well and good blood return via port. Last BM 3/16; Good UOP. Continue POC.       Pt continues to report lack of appetite. Marinol may be a good option?  " ----- Message from Tee Saucedo sent at 7/20/2021  9:05 AM EDT -----  Subject: Message to Provider    QUESTIONS  Information for Provider? patient called back to let pcp know how he was   doing. Patient still has the rash and has woken up in the middle night of   the last two nights due to itching and to reapply creams. ---------------------------------------------------------------------------  --------------  Lilliam ROBERT  What is the best way for the office to contact you? OK to leave message on   voicemail  Preferred Call Back Phone Number? [phone]  ---------------------------------------------------------------------------  --------------  SCRIPT ANSWERS  Relationship to Patient?  Self

## 2024-03-19 VITALS
WEIGHT: 136.1 LBS | BODY MASS INDEX: 21.87 KG/M2 | OXYGEN SATURATION: 98 % | TEMPERATURE: 98.2 F | HEART RATE: 63 BPM | RESPIRATION RATE: 16 BRPM | SYSTOLIC BLOOD PRESSURE: 113 MMHG | HEIGHT: 66 IN | DIASTOLIC BLOOD PRESSURE: 72 MMHG

## 2024-03-19 LAB
ALBUMIN SERPL BCG-MCNC: 3.6 G/DL (ref 3.5–5.2)
ALP SERPL-CCNC: 66 U/L (ref 40–150)
ALT SERPL W P-5'-P-CCNC: 16 U/L (ref 0–50)
ANION GAP SERPL CALCULATED.3IONS-SCNC: 8 MMOL/L (ref 7–15)
AST SERPL W P-5'-P-CCNC: 12 U/L (ref 0–45)
BASOPHILS # BLD AUTO: 0 10E3/UL (ref 0–0.2)
BASOPHILS NFR BLD AUTO: 0 %
BILIRUB SERPL-MCNC: 0.5 MG/DL
BUN SERPL-MCNC: 10.2 MG/DL (ref 6–20)
CALCIUM SERPL-MCNC: 9.1 MG/DL (ref 8.6–10)
CHLORIDE SERPL-SCNC: 105 MMOL/L (ref 98–107)
CREAT SERPL-MCNC: 0.6 MG/DL (ref 0.51–0.95)
DEPRECATED HCO3 PLAS-SCNC: 28 MMOL/L (ref 22–29)
EGFRCR SERPLBLD CKD-EPI 2021: >90 ML/MIN/1.73M2
EOSINOPHIL # BLD AUTO: 0 10E3/UL (ref 0–0.7)
EOSINOPHIL NFR BLD AUTO: 0 %
ERYTHROCYTE [DISTWIDTH] IN BLOOD BY AUTOMATED COUNT: 14.9 % (ref 10–15)
FIBRINOGEN PPP-MCNC: 268 MG/DL (ref 170–490)
GLUCOSE SERPL-MCNC: 119 MG/DL (ref 70–99)
HCT VFR BLD AUTO: 33.2 % (ref 35–47)
HGB BLD-MCNC: 11.2 G/DL (ref 11.7–15.7)
IMM GRANULOCYTES # BLD: 0 10E3/UL
IMM GRANULOCYTES NFR BLD: 1 %
INR PPP: 1.08 (ref 0.85–1.15)
LYMPHOCYTES # BLD AUTO: 0.5 10E3/UL (ref 0.8–5.3)
LYMPHOCYTES NFR BLD AUTO: 11 %
MAGNESIUM SERPL-MCNC: 2 MG/DL (ref 1.7–2.3)
MCH RBC QN AUTO: 29 PG (ref 26.5–33)
MCHC RBC AUTO-ENTMCNC: 33.7 G/DL (ref 31.5–36.5)
MCV RBC AUTO: 86 FL (ref 78–100)
MONOCYTES # BLD AUTO: 0.3 10E3/UL (ref 0–1.3)
MONOCYTES NFR BLD AUTO: 8 %
NEUTROPHILS # BLD AUTO: 3.2 10E3/UL (ref 1.6–8.3)
NEUTROPHILS NFR BLD AUTO: 80 %
NRBC # BLD AUTO: 0 10E3/UL
NRBC BLD AUTO-RTO: 0 /100
PHOSPHATE SERPL-MCNC: 3.9 MG/DL (ref 2.5–4.5)
PLATELET # BLD AUTO: 344 10E3/UL (ref 150–450)
POTASSIUM SERPL-SCNC: 3.5 MMOL/L (ref 3.4–5.3)
PROT SERPL-MCNC: 5.8 G/DL (ref 6.4–8.3)
RBC # BLD AUTO: 3.86 10E6/UL (ref 3.8–5.2)
SODIUM SERPL-SCNC: 141 MMOL/L (ref 135–145)
URATE SERPL-MCNC: 1.9 MG/DL (ref 2.4–5.7)
WBC # BLD AUTO: 4 10E3/UL (ref 4–11)

## 2024-03-19 PROCEDURE — 258N000003 HC RX IP 258 OP 636: Performed by: INTERNAL MEDICINE

## 2024-03-19 PROCEDURE — 83735 ASSAY OF MAGNESIUM: CPT | Performed by: PHYSICIAN ASSISTANT

## 2024-03-19 PROCEDURE — 85025 COMPLETE CBC W/AUTO DIFF WBC: CPT | Performed by: PHYSICIAN ASSISTANT

## 2024-03-19 PROCEDURE — 84100 ASSAY OF PHOSPHORUS: CPT | Performed by: PHYSICIAN ASSISTANT

## 2024-03-19 PROCEDURE — 85610 PROTHROMBIN TIME: CPT | Performed by: PHYSICIAN ASSISTANT

## 2024-03-19 PROCEDURE — 250N000011 HC RX IP 250 OP 636

## 2024-03-19 PROCEDURE — 250N000012 HC RX MED GY IP 250 OP 636 PS 637: Performed by: INTERNAL MEDICINE

## 2024-03-19 PROCEDURE — 80053 COMPREHEN METABOLIC PANEL: CPT | Performed by: PHYSICIAN ASSISTANT

## 2024-03-19 PROCEDURE — 250N000011 HC RX IP 250 OP 636: Performed by: PHYSICIAN ASSISTANT

## 2024-03-19 PROCEDURE — 250N000013 HC RX MED GY IP 250 OP 250 PS 637: Performed by: PHYSICIAN ASSISTANT

## 2024-03-19 PROCEDURE — 99238 HOSP IP/OBS DSCHRG MGMT 30/<: CPT

## 2024-03-19 PROCEDURE — 250N000011 HC RX IP 250 OP 636: Mod: JZ | Performed by: INTERNAL MEDICINE

## 2024-03-19 PROCEDURE — 258N000003 HC RX IP 258 OP 636

## 2024-03-19 PROCEDURE — 84550 ASSAY OF BLOOD/URIC ACID: CPT | Performed by: PHYSICIAN ASSISTANT

## 2024-03-19 PROCEDURE — 85384 FIBRINOGEN ACTIVITY: CPT | Performed by: PHYSICIAN ASSISTANT

## 2024-03-19 RX ORDER — DEXAMETHASONE 4 MG/1
8 TABLET ORAL DAILY
Qty: 4 TABLET | Refills: 0 | Status: CANCELLED | OUTPATIENT
Start: 2024-03-20

## 2024-03-19 RX ORDER — OLANZAPINE 5 MG/1
5 TABLET, ORALLY DISINTEGRATING ORAL AT BEDTIME
Qty: 10 TABLET | Refills: 0 | Status: ON HOLD | OUTPATIENT
Start: 2024-03-19 | End: 2024-05-20

## 2024-03-19 RX ORDER — PANTOPRAZOLE SODIUM 40 MG/1
40 TABLET, DELAYED RELEASE ORAL 2 TIMES DAILY
Qty: 60 TABLET | Refills: 0 | Status: ON HOLD | OUTPATIENT
Start: 2024-03-19 | End: 2024-04-07

## 2024-03-19 RX ORDER — TRIAMCINOLONE ACETONIDE 1 MG/G
CREAM TOPICAL 2 TIMES DAILY PRN
Qty: 15 G | Refills: 0 | Status: ON HOLD | OUTPATIENT
Start: 2024-03-19 | End: 2024-04-25

## 2024-03-19 RX ORDER — PROCHLORPERAZINE MALEATE 10 MG
10 TABLET ORAL EVERY 6 HOURS PRN
Qty: 30 TABLET | Refills: 0 | Status: SHIPPED | OUTPATIENT
Start: 2024-03-19

## 2024-03-19 RX ORDER — POLYETHYLENE GLYCOL 3350 17 G/17G
17 POWDER, FOR SOLUTION ORAL DAILY PRN
Qty: 510 G | Refills: 0 | Status: ON HOLD | OUTPATIENT
Start: 2024-03-19 | End: 2024-04-28

## 2024-03-19 RX ORDER — AMOXICILLIN 250 MG
1 CAPSULE ORAL DAILY PRN
Qty: 15 TABLET | Refills: 0 | Status: SHIPPED | OUTPATIENT
Start: 2024-03-19 | End: 2024-03-27

## 2024-03-19 RX ORDER — DEXAMETHASONE 4 MG/1
8 TABLET ORAL DAILY
Qty: 4 TABLET | Refills: 0 | Status: ON HOLD | OUTPATIENT
Start: 2024-03-19 | End: 2024-04-07

## 2024-03-19 RX ORDER — ONDANSETRON 4 MG/1
4-8 TABLET, FILM COATED ORAL EVERY 8 HOURS PRN
Qty: 30 TABLET | Refills: 0 | Status: SHIPPED | OUTPATIENT
Start: 2024-03-19

## 2024-03-19 RX ADMIN — POLYETHYLENE GLYCOL 3350 17 G: 17 POWDER, FOR SOLUTION ORAL at 09:26

## 2024-03-19 RX ADMIN — DOCUSATE SODIUM 50 MG AND SENNOSIDES 8.6 MG 1 TABLET: 8.6; 5 TABLET, FILM COATED ORAL at 09:26

## 2024-03-19 RX ADMIN — Medication 5 ML: at 12:30

## 2024-03-19 RX ADMIN — PREDNISONE 100 MG: 50 TABLET ORAL at 09:25

## 2024-03-19 RX ADMIN — PANTOPRAZOLE SODIUM 40 MG: 40 TABLET, DELAYED RELEASE ORAL at 09:26

## 2024-03-19 RX ADMIN — FOSAPREPITANT 150 MG: 150 INJECTION, POWDER, LYOPHILIZED, FOR SOLUTION INTRAVENOUS at 09:32

## 2024-03-19 RX ADMIN — ONDANSETRON HYDROCHLORIDE 16 MG: 8 TABLET, FILM COATED ORAL at 09:25

## 2024-03-19 RX ADMIN — CYCLOPHOSPHAMIDE 1540 MG: 2 INJECTION, POWDER, FOR SOLUTION INTRAVENOUS; ORAL at 10:56

## 2024-03-19 RX ADMIN — ACYCLOVIR 400 MG: 400 TABLET ORAL at 09:25

## 2024-03-19 RX ADMIN — CIMETIDINE 200 MG: 200 TABLET, FILM COATED ORAL at 11:51

## 2024-03-19 ASSESSMENT — ACTIVITIES OF DAILY LIVING (ADL)
ADLS_ACUITY_SCORE: 22

## 2024-03-19 NOTE — DISCHARGE SUMMARY
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  Overall tolerated well with some mild CINV.     Clementine is feeling well this morning. Eager to discharge later this afternoon when chemo finishes. Persistent chemotherapy related nausea this morning, no episodes of vomiting. Improvement s/p emend today prior to cyclophosphamide. Discussed possibly adding ativan in outpatient if nausea remains persistent. Nursing noted rash at base of skull this morning. Maculopapular rash is light pink, scattered, non-pruritic, ~1\" x 2 \". Patient reported similar episode occurred after last cycle and was more diffuse covering neck, right arm; managed at home with OTC cortisone cream; discussed discharging home with some triamcinolone and patient is agreeable to.  Otherwise denies fever, chills, mouth sores, SOB, cough, abdominal pain, diarrhea, constipation, dysuria, hematuria, numbness, tingling, swelling. All questions and concerns by patient and father were addressed at bedside prior to discharge.     Prior to discharge, I reviewed with Clementine Kathleen the plan of care, including upcoming follow-up appointments and new medications. Appropriate prescriptions were sent to the discharge pharmacy, as needed. We reviewed strict discharge precautions, including reasons to call " clinic triage or present to the ED, and she voiced understanding. She was provided with the clinic triage number, as well as written discharge instructions, in her discharge paperwork. Patient had an opportunity to ask questions, all of which were answered to their stated satisfaction. On the day of discharge, Clementine Kathleen was overall well-appearing, hemodynamically stable, and felt safe and comfortable with the plans for discharge to home with follow-up as described. She was discharged home with her father.     Outpatient follow-up issues:  - Patient instructed to start ppx levaquin and fluconazole when ANC < 1.0. ANC 3.2 on day of discharge. Please follow and ensure she starts when indicated.   - Persistent nausea during admission. Emend given x3/19. If persistent could consider Aloxi or PRN ativan at night.   - Next pentamidine requested for 3/29. Please follow for scheduling.   - Rash on base of skull noted by RN on day of discharge. Patient noted similar episode with last chemotherapy. Triamcinolone cream prescribed at discharge. Please follow for improvement.     New discharge medications:  - Dexamethasone 8 mg x 2 days  - Zyprexa PRN at bedtime  - Protonix increased every day to BID  - Triamcinolone cream  - Refills: zofran, compazine, miralax, senna    Next follow-up:  - Twice weekly labs  - 3/20: Neulasta  - 3/25: AMEENA follow up   - 4/1: PET scan  - MD follow up requested; pending scheduling   - Requested pentamidine for 3/29.     Hospital Course   Clementine Kathleen was admitted on 3/14/2024.  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 Oakes. Initially presented with cervical lymphadenopathy 12/2023. Initial FNA (1/29/24) suggestive of possible Hodgkin lymphoma; however excisional LN biopsy (2/9/24) shows large B-cell lymphoma with differential diagnosis of primary mediastinal large B-cell  lymphoma vs DLBCL NOS. PET with cervical and mediastinal lymphadenopathy only. Overall presentation would be quite consistent with PMBCL given her age, gender, sites of involvement, dim CD30 expression, and prominent fibrotic background.  Although CD23 IHC was negative, IHC for , MAL, and PD-L1 were positive in majority of the neoplastic cells suggesting PMBCL over DLBCL NOS. Vnbak2WB gene expression profile also consistent with PMBCL. FISH with gain of BCL2 but no MYC, BCL2, or BCL6 rearrangements.  PET showed bilateral cervical and mediastinal hypermetabolic lymphadenopathy (largest 4.4 cm with SUVmax 27 in a prevascular LN); no disease below diaphragm.   - Plan remains to completed a total of 6 cycles with a restaging PET-CT after 2 cycles. Scheduled for 4/1.   - Echo 2/19 with LVEF 60%, no significant valvular abnormalities present  - Patient qualifies for a 20% dose increase (Level 2) as her ANC dread was still greater than 0.5 with the first cycle.   - Port accessed prior to admission, deaccessed on discharge.       Treatment Plan: Dose adjusted R-EPOCH (C2D1=3/14/2024)  - Rituximab 375 mg/m2 (600mg) IV x 1 dose - D0  - Prednisone 100 mg BID x 10 doses - D1-5  - Etoposide 60 mg/m2 (105 mg) IV x 4 doses - D1-4  - Vincristine 0.4 mg/m2 (0.7mg), Doxorubicin (21mg) x 4 doses CIVI- D1-4  - Cyclophosphamide 900 mg/m2 (1540 mg) IV x 1 dose - D5  - Pegfilgrastim x1 - D6 (scheduled in clinic for 3/19)  - Pre-meds: tylenol, benadryl, zofran, emend, dexamethasone (D6-7)     # Risk of TLS  - Encourage good PO hydration.     # CINV   Chemotherapy induced nausea and vomiting during last cycle. Noted worse with this cycle compared to previous.   - Trial of Zyprexa at bedtime (x3/16) with some relief.   - Some relief s/p trial of zyprexa. Considered increasing dose but patient noted increased somnolence.   - Continue PRN zofran and compazine as indicated.   - Emend planned for 3/19 prior to cyclophosphamide.   - If CINV  "is persistent can consider Aloxi or PRN ativan.      # Grade 1 Neuropathy   Endorsed L>R neuropathy in her fingertips. Feels like right side has improved and left has remained the same.   - Monitor closely     #Chemotherapy related rash  Rash noted on 3/19 by RN at base of skull. Maculopapular rash is light pink, scattered, non-pruritic, ~1\" x 2 \".. Patient endorsed a similar episode with last cycle with rash to right side of neck and right arm. Was managed with at home cortisone cream without issue. Discussed sending home with triamcinolone cream with close follow up with outpatient clinic.     ID  # ID prophylaxis  -  mg BID.   - Levo and fluc when ANC <1.0. Patient aware to start when ANC < 1.0. Outpatient team to follow after discharge.   - Pentamidine for PJP ppx last given 3/1; next due ~3/29     MISC  # Fertility preservation - Already established with [facility], underwent egg retrieval on 2/18 or 2/19.   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 Her boyfriend is a general surgery resident here at [facility]. Parents live in [place].    Staffed with Dr. Stuart.    I spent 45 minutes in the care of this patient today, which included time necessary for review of interval events, obtaining history and physical exam, ordering medication(s)/test(s) as medically indicated, discussion with interdisciplinary/consult team(s), and documentation time. Over 50% of time was spent face-to-face and/or coordinating care.    Angela Levy PA-C  Hematology/Oncology  Pager: #[phone]    Code Status   Full Code    Primary Care Physician   Chela Peter    Physical Exam   Vital Signs with Ranges  Temp:  [98  F (36.7  C)-98.3  F (36.8  C)] 98.2  F (36.8  C)  Pulse:  [52-77] 63  Resp:  [16] 16  BP: (113-122)/(56-72) 113/72  SpO2:  [95 %-98 %] 98 %  136 lbs 1.6 oz    Constitutional: Pleasant and cooperative female. Awake, " "alert, no apparent distress.  HEENT: NC/AT, EOMI, sclera clear, conjunctiva normal, oral pharynx with moist mucus membranes  Respiratory: No increased work of breathing, CTAB, no crackles or wheezing  Cardiovascular: RRR, normal S1 and S2, no murmur noted and no edema  GI: Normal bowel sounds, soft, non-distended and non-tender  MSK: No joint effusions or gross deformities.  Skin: No bruising, bleeding, or lesions. Maculopapular rash at base of skull is light pink, scattered, non-pruritic, ~1\" x 2 \".  Neurologic:  Answers questions appropriately. Moves all extremities spontaneously.  Psych: Calm, appropriate affect  Vascular Access: Port on right chest wall. Clean, dry, intact.     Discharge Disposition   Discharged to home  Condition at discharge: Stable    Consultations This Hospital Stay   None    Discharge Orders      Reason for your hospital stay    You were admitted for scheduled chemotherapy. You tolerated this well.     Activity    Your activity upon discharge: activity as tolerated     When to contact your care team    Please call the [facility] at [phone] if you develop temperature above 100.4, shortness of breath, chest pain, headaches, vision changes, bleeding, uncontrolled nausea, vomiting, diarrhea, pain, or any other signs or symptoms of concern. If you are concerned that your symptoms are life-threatening, don't hesitate to call 911 or go to the nearest Emergency Room.     Adult [facility] [[email or web address] Follow-up and recommended labs and tests    Please see scheduled appointments in discharge summary and/or MyChart for upcoming scheduled appointments.     Appointments on [place] and/or [facility] (with [facility] [email or web address]] or [facility] provider or service). Call [phone] if you haven't heard regarding these appointments within 7 days of discharge.     Diet    Follow this diet upon discharge: Regular     Check Out Appointment Request    Anticipated discharge 3/19  Please move " neulasta from 3/19 to afternoon 3/20     MODIFIED Check Out Appointment Request    Please schedule pentamidine treatment. Due on 3/29. Thank you.     Discharge Medications   Discharge Medication List as of 3/19/2024 12:29 PM        START taking these medications    Details   OLANZapine zydis (ZYPREXA) 5 MG ODT Take 1 tablet (5 mg) by mouth at bedtime, Disp-10 tablet, R-0, E-Prescribe           CONTINUE these medications which have CHANGED    Details   dexAMETHasone (DECADRON) 4 MG tablet Take 2 tablets (8 mg) by mouth daily Start taking Wednesday, 3/20, for two days. (3/20 and 3/21), Disp-4 tablet, R-0, E-Prescribe      ondansetron (ZOFRAN) 4 MG tablet Take 1-2 tablets (4-8 mg) by mouth every 8 hours as needed for nausea or vomiting, Disp-30 tablet, R-0, E-Prescribe      pantoprazole (PROTONIX) 40 MG EC tablet Take 1 tablet (40 mg) by mouth 2 times daily, Disp-60 tablet, R-0, E-Prescribe      polyethylene glycol (MIRALAX) 17 GM/Dose powder Take 17 g by mouth daily as needed for constipation, Disp-510 g, R-0, E-Prescribe      prochlorperazine (COMPAZINE) 10 MG tablet Take 1 tablet (10 mg) by mouth every 6 hours as needed for nausea or vomiting, Disp-30 tablet, R-0, E-Prescribe      senna-docusate (SENOKOT-S/PERICOLACE) 8.6-50 MG tablet Take 1 tablet by mouth daily as needed for constipation, Disp-15 tablet, R-0, E-Prescribe           CONTINUE these medications which have NOT CHANGED    Details   acyclovir (ZOVIRAX) 400 MG tablet Take 1 tablet (400 mg) by mouth 2 times daily, Disp-60 tablet, R-0, E-Prescribe      cimetidine (TAGAMET) 200 MG tablet Take 200 mg by mouth 2 times daily, Historical      fluconazole (DIFLUCAN) 200 MG tablet Take 1 tablet (200 mg) by mouth daily, Disp-30 tablet, R-0, E-Prescribe      levofloxacin (LEVAQUIN) 250 MG tablet Take 1 tablet (250 mg) by mouth daily, Disp-30 tablet, R-0, E-Prescribe      Levonorgestrel (KYLEENA IU) 1 Device by Intrauterine route once, Historical      metoclopramide  (REGLAN) 10 MG tablet Take 1 tablet (10 mg) by mouth 4 times daily as needed (constipation), Disp-30 tablet, R-3, E-Prescribe      modafinil (PROVIGIL) 200 MG tablet Take 1 tablet (200 mg) by mouth daily as needed, Historical      mupirocin (BACTROBAN) 2 % external ointment Apply to affected area TIDDisp-15 g, [id number]           Allergies   No Known Allergies    Data   Most Recent 3 CBC's:  Recent Labs   Lab Test 03/19/24  0401 03/18/24 0414 03/17/24 0454   WBC 4.0 6.1 14.7*   HGB 11.2* 11.5* 11.4*   MCV 86 89 90    338 324      Most Recent 3 BMP's:  Recent Labs   Lab Test 03/19/24 0401 03/18/24 0414 03/17/24 0454    140 141   POTASSIUM 3.5 3.6 3.8   CHLORIDE 105 104 106   CO2 28 27 26   BUN 10.2 11.5 9.4   CR 0.60 0.68 0.58   ANIONGAP 8 9 9   DEE 9.1 9.2 9.3   * 113* 117*     Most Recent 2 LFT's:  Recent Labs   Lab Test 03/19/24 0401 03/18/24 0414   AST 12 12   ALT 16 16   ALKPHOS 66 69   BILITOTAL 0.5 0.4     Most Recent INR's and Anticoagulation Dosing History:  Anticoagulation Dose History  More data exists         Latest Ref Rng & Units 2/26/2024 2/27/2024 3/15/2024 3/16/2024 3/17/2024 3/18/2024 3/19/2024   Recent Dosing and Labs   INR 0.85 - 1.15 1.15  1.18  1.06  1.10  1.08  1.05  1.08      Most Recent 3 Troponin's:No lab results found.  Most Recent Cholesterol Panel:No lab results found.  Most Recent 6 Bacteria Isolates From Any Culture (See EPIC Reports for Culture Details):No lab results found.  Most Recent TSH, T4 and A1c Labs:No lab results found.  Results for orders placed or performed in visit on 03/01/24   IR Chest Port Placement > 5 Yrs of Age    Narrative    Procedure date: 3/1/2024    History: Patient with a history of lymphoma here for placement of  single chamber, central venous chest port for chemotherapy.    Procedure: Placement of right IJ,6 french, 20 cm, single chamber,  central venous chest port.    Preop diagnosis:Lymphoma.    Postop diagnosis:  Same.    : Antolin Contreras PA-C    Assist: DASIA Tariq.    Medications: IV sedation per ASC anesthesia staff, 1% lidocaine 10 ml  local anesthesia, heparin 5 ml 100units/ml.    Nursing: Throughout the procedure the patient's vital signs and oxygen  saturation were continuously monitored by [facility] anesthesia staff under  the supervision of the attending physician, and remained stable.    Face to face sedation time: Per ASC anesthesia staff.    Fluoroscopy time: 0.6 minutes.    IV contrast: None.    Consent: Informed written and verbal consent was obtained.    Procedure/Findings: The patient was placed in the supine position on  the fluoroscopy table. Ultrasound evaluation revealed a patent right  internal jugular vein, an image was saved, and the patient's right  neck and upper chest were prepped and draped in the usual sterile  fashion. The neck venotomy site was anesthetized with 1 ml 1%  lidocaine, and a small incision was made at the venotomy site with a #  11 scalpel.  Under ultrasound guidance, a 0.018 micropuncture needle  was used to access the right internal jugular vein. Under fluoroscopic  guidance, a 0.018 soft tipped wire was advanced into the right atrium,  and micropuncture needle was exchanged over wire for a 3-5 Uruguayan  dilator. The wire was used to measure from the right atrium to the  skin. Wire and a 3 Uruguayan dilator were removed, and under fluoroscopic  guidance, a .035 Bentson wire was advanced into the IVC and secured.  The  port site was selected over the right anterior chest,  approximately at the midclavicular line, and this area was  anesthetized with 1% lidocaine. A 3.0 cm incision was made with a #15  scalpel and a pocket was created using blunt dissection. The pocket  was liberally irrigated with saline solution, gauze packed, and  pressure held to provide hemostasis. A tract from the pocket to the  dermatotomy site was anesthetized with 1% lidocaine.  A 6 Uruguayan,  single  "chamber central venous chest port was selected and prepared. A  tunneling device was connected to the port catheter, passed from the  pocket to the dermatotomy site, and the catheter was pulled through.  The wire measurement was used to cut the catheter to a length of 20  cm. (The catheter was transected at the 43 cm catheter Johnie, though  the true length is 20 cm) The 6 Mongolian, 20 cm, single chamber, \"Power\"  port was flushed with normal saline and positioned within the pocket.   The 5 Mongolian dilator was removed, and a 6 Mongolian dilator/peel-away  sheath was advanced over the wire into the the superior vena cava. The  wire and dilator were removed, the catheter was advanced into the  sheath, and the sheath was removed. The catheter tip was found to lie  within the right atrium. The pocket was then closed with 3, 3-0 Vicryl   subcutaneous sutures, and one running 4-0 Monocryl subcuticular  suture. The port aspirated and flushed freely, and was flushed with  10ml of normal saline and 5 ml heparin, 100 units per ml. The chest  and neck sites were cleansed, the skin edges were approximated and  glued. Images were saved throughout the procedure. The procedure was  well tolerated, with no immediate complications.    Estimated blood loss: 1 cc.    Specimens: None.      Impression    Impression: Placement of right IJ, 1 Mongolian, 20 cm, single chamber,  central venous chest port. Port ready for immediate use.    Plan: Keep glued sites clean, dry, and uncovered for 30 minutes. The  site may then get wet but should not be submerged or soaked in water.  No strenuous activity for 3 days. Patient transported to post care  unit in stable condition for post procedure monitoring.     Attestation: The physician assistant (PA) who performed this procedure  and signed the above report is licensed to practice in the state of  [place] pursuant to [place] Statute 147[place].09.  This includes meeting the  Statute and [place] Board of Medical " Practice requirement of an  active Delegation Agreement, which documents delegation of services by  primary and alternate supervising physicians. All services rendered  are performed under a collaborative agreement with Dr. Zachary Caballero, Director of Interventional Radiology, [facility].    DONI FLANAGAN PA-C         SYSTEM ID:  [id number]

## 2024-03-19 NOTE — PLAN OF CARE
1930-2330: VSS on RA. Denies pain and SOB. Denied need for PRN antiemetics. CIVI chemo infusing. Ate 100% of dinner brought in by family. Had 2 soft Bms today so only took 1 senna. Has a new rash on the backside of her scalp. States that she had a rash with C1 of chemo and cortisone cream helped. Cross cover wanting to defer ordering to days.

## 2024-03-19 NOTE — PROGRESS NOTES
[id number]:  Pt sleeping overnight. VSS, on RA. Pt denies pain this shift. Mild nausea reported by pt but tolerable, zyprexa given at bedtime. Port w/ CIVI chemo, pt tolerating, good blood return noted. Pt up independently, tolerating. No acute events overnight.     Aurelia Feng RN on 3/19/2024 at 6:47 AM

## 2024-03-19 NOTE — PROGRESS NOTES
Nursing Focus: Chemotherapy  D: Positive blood return via port a cath. Insertion site is clean/dry/intact, dressing intact with no complaints of pain.  Urine output is recorded in intake in Doc Flowsheet.      I: Premedications given per order (see electronic medical administration record). Dose #1 of cyclophosphamide (CYTOXAN) started to infuse over 1 hour. Reviewed pt teaching on chemotherapy side effects.  Pt denies need for further teaching. Chemotherapy double checked per protocol by two chemotherapy competent RN's.     A: Tolerating infusion well thus far. Denies nausea and or pain.     P: Continue to monitor urine output and symptoms of nausea. Screen for symptoms of toxicity.

## 2024-03-19 NOTE — PLAN OF CARE
Goal Outcome Evaluation:      Plan of Care Reviewed With: patient    Overall Patient Progress: no changeOverall Patient Progress: no change     1500 -1930: A&Ox4. VSS. C2D5 R-EPOCH. Denies pain and SOB. Nausea intermittently resolved with 8mg IV Zofran given x1. Plan for discharge after 1 hr Cytoxan infusion ~1000 tomorrow. Continue with POC.

## 2024-03-19 NOTE — PROVIDER NOTIFICATION
Dr. Franklin notified via [employer]    2045: Pt has new rash at base of skull. Red, not itchy. She said she had a rash with previous chemo cycle that improved with cortisone cream. Do you want to order?      Plan: If not urgently worsening, defer to days.

## 2024-03-19 NOTE — PLAN OF CARE
Goal Outcome Evaluation:      Plan of Care Reviewed With: patient, parent    Overall Patient Progress: improvingOverall Patient Progress: improving    0800-12:50:  A&O x4.  Afebrile.  OVSS on room air.  Denies pain, nausea, vomiting, chest pain and SOB.  Fair PO intake.  MCGILL.  LBM 3/18.  Dose #4 CIVI vincristine/doxorubicin infusion completed and received cyclophosphamide (CYTOXAN) without incident (see chemo note).  Up ad tio, refused CHG wipes/shower, will shower at home.  Port a cath needle removed per protocol, positive blood return noted.    Discharge  D: Orders for discharge and outpatient medications written.    I: Home medications and return to clinic schedule reviewed with patient. Discharge instructions and parameters for calling Health Care Provider reviewed. Patient left at 1241 PM accompanied by her father.     A: Patient/family verbalized understanding and was ready for discharge.     P: Patient instructed to  medications in Pharmacy. Follow up as scheduled March 20, 2024 at 09:00 AM in the [facility].

## 2024-03-20 ENCOUNTER — PATIENT OUTREACH (OUTPATIENT)
Dept: CARE COORDINATION | Facility: CLINIC | Age: 29
End: 2024-03-20

## 2024-03-20 ENCOUNTER — INFUSION THERAPY VISIT (OUTPATIENT)
Dept: ONCOLOGY | Facility: CLINIC | Age: 29
End: 2024-03-20
Attending: INTERNAL MEDICINE
Payer: COMMERCIAL

## 2024-03-20 VITALS
OXYGEN SATURATION: 97 % | HEART RATE: 66 BPM | SYSTOLIC BLOOD PRESSURE: 121 MMHG | TEMPERATURE: 97.9 F | RESPIRATION RATE: 16 BRPM | DIASTOLIC BLOOD PRESSURE: 81 MMHG

## 2024-03-20 DIAGNOSIS — Z76.89 PREVENTION OF CHEMOTHERAPY-INDUCED NEUTROPENIA: Primary | ICD-10-CM

## 2024-03-20 DIAGNOSIS — C83.32 DIFFUSE LARGE B-CELL LYMPHOMA OF INTRATHORACIC LYMPH NODES (H): ICD-10-CM

## 2024-03-20 PROCEDURE — 96372 THER/PROPH/DIAG INJ SC/IM: CPT | Performed by: NURSE PRACTITIONER

## 2024-03-20 PROCEDURE — 250N000011 HC RX IP 250 OP 636: Mod: JZ | Performed by: NURSE PRACTITIONER

## 2024-03-20 RX ADMIN — PEGFILGRASTIM 6 MG: 6 INJECTION SUBCUTANEOUS at 09:13

## 2024-03-20 ASSESSMENT — PAIN SCALES - GENERAL: PAINLEVEL: NO PAIN (0)

## 2024-03-20 NOTE — PROGRESS NOTES
Infusion Injection Note:  Clementine ANNIKA Kathleen presents today for Neulasta injection.    Patient declined to speak with an RN today.      Treatment Conditions:  Not Applicable.    Pre and Post Injection:  Neulasta injection given to Left Arm without incident.   Patient tolerated procedure well.    Discharge Plan:  Patient verbalized understanding of discharge instructions and all questions answered.  AVS to patient via DJZ[employer].    Patient discharged in stable condition accompanied by: self.  Departure Mode: Ambulatory.  Patient will return 3/25/2024 for next appointment.    Monisha MAURICE on 3/20/2024 at 9:52 AM

## 2024-03-20 NOTE — PROGRESS NOTES
[facility]: [facility]: Post-Discharge Note  SITUATION                                                      Admission:    Admission Date: 03/14/24   Reason for Admission: You were admitted for scheduled chemotherapy. You  tolerated this well.  Discharge:   Discharge Date: 03/19/24  Discharge Diagnosis: You were admitted for scheduled chemotherapy. You  tolerated this well.    BACKGROUND                                                      Per hospital discharge summary and inpatient provider notes:    Clementine Kathleen is a 28 year old female with PMH of primary mediastinal B-cell lymphoma, admitted 3/14/2024 for scheduled chemotherapy with dose adjusted R-EPOCH (C2D1=3/14/2024).        On admission, Clementine reports feeling well. She reported some increased GERD with cycle 1 but feels like increasing her PPI dosing to BID has helped. She also struggled with constipation from the last cycle of chemotherapy but feels like she has a good regimen currently. Reviewed that she will have a 20% dose increase with this cycle based on her counts. She understood. She endorses some neuropathy L>R in her fingertips. Nothing in her toes. All questions answered at this time.     ASSESSMENT           Discharge Assessment  How are you doing now that you are home?: I am doing good.  How are your symptoms? (Red Flag symptoms escalate to triage hotline per guidelines): Improved  Do you feel your condition is stable enough to be safe at home until your provider visit?: Yes  Does the patient have their discharge instructions? : Yes  Does the patient have questions regarding their discharge instructions? : No  Were you started on any new medications or were there changes to any of your previous medications? : Yes  Does the patient have all of their medications?: Yes  Do you have questions regarding any of your medications? : No  Discharge follow-up appointment scheduled within 14 calendar days? : Yes  Discharge  Follow Up Appointment Date: 03/25/24  Discharge Follow Up Appointment Scheduled with?: Specialty Care Provider                  PLAN                                                      Outpatient Plan: Check Out Appointment Request  Anticipated discharge 3/19  Please move neulasta from 3/19 to afternoon 3/20  MODIFIED Check Out Appointment Request  Please schedule pentamidine treatment. Due on 3/29. Thank you.    Future Appointments   Date Time Provider Department Center   3/21/2024 11:45 AM  [facility]   3/25/2024 11:15 AM  [facility]   3/25/2024 12:00 PM Nereida Thomas APRN CNP [facility]   3/28/2024 10:30 AM  [facility]   4/1/2024  9:15 AM  [facility]   4/1/2024 10:30 AM [address]         For any urgent concerns, please contact our 24 hour nurse triage line: [phone])         JEAN Black  [phone]  [facility]

## 2024-03-21 ENCOUNTER — LAB (OUTPATIENT)
Dept: LAB | Facility: CLINIC | Age: 29
End: 2024-03-21
Attending: INTERNAL MEDICINE
Payer: COMMERCIAL

## 2024-03-21 ENCOUNTER — NURSE TRIAGE (OUTPATIENT)
Dept: ONCOLOGY | Facility: CLINIC | Age: 29
End: 2024-03-21

## 2024-03-21 DIAGNOSIS — C85.28 MEDIASTINAL LARGE B-CELL LYMPHOMA OF LYMPH NODES OF MULTIPLE REGIONS (H): ICD-10-CM

## 2024-03-21 LAB
ACANTHOCYTES BLD QL SMEAR: SLIGHT
ALBUMIN SERPL BCG-MCNC: 3.7 G/DL (ref 3.5–5.2)
ALP SERPL-CCNC: 82 U/L (ref 40–150)
ALT SERPL W P-5'-P-CCNC: 17 U/L (ref 0–50)
ANION GAP SERPL CALCULATED.3IONS-SCNC: 11 MMOL/L (ref 7–15)
AST SERPL W P-5'-P-CCNC: 12 U/L (ref 0–45)
BASOPHILS # BLD AUTO: ABNORMAL 10*3/UL
BASOPHILS # BLD MANUAL: 0 10E3/UL (ref 0–0.2)
BASOPHILS NFR BLD AUTO: ABNORMAL %
BASOPHILS NFR BLD MANUAL: 0 %
BILIRUB SERPL-MCNC: 0.2 MG/DL
BUN SERPL-MCNC: 16.3 MG/DL (ref 6–20)
CALCIUM SERPL-MCNC: 8.4 MG/DL (ref 8.6–10)
CHLORIDE SERPL-SCNC: 102 MMOL/L (ref 98–107)
CREAT SERPL-MCNC: 0.57 MG/DL (ref 0.51–0.95)
DACRYOCYTES BLD QL SMEAR: SLIGHT
DEPRECATED HCO3 PLAS-SCNC: 25 MMOL/L (ref 22–29)
EGFRCR SERPLBLD CKD-EPI 2021: >90 ML/MIN/1.73M2
EOSINOPHIL # BLD AUTO: ABNORMAL 10*3/UL
EOSINOPHIL # BLD MANUAL: 0 10E3/UL (ref 0–0.7)
EOSINOPHIL NFR BLD AUTO: ABNORMAL %
EOSINOPHIL NFR BLD MANUAL: 0 %
ERYTHROCYTE [DISTWIDTH] IN BLOOD BY AUTOMATED COUNT: 15.2 % (ref 10–15)
GLUCOSE SERPL-MCNC: 126 MG/DL (ref 70–99)
HCT VFR BLD AUTO: 32.8 % (ref 35–47)
HGB BLD-MCNC: 11.4 G/DL (ref 11.7–15.7)
IMM GRANULOCYTES # BLD: ABNORMAL 10*3/UL
IMM GRANULOCYTES NFR BLD: ABNORMAL %
LDH SERPL L TO P-CCNC: 211 U/L (ref 0–250)
LYMPHOCYTES # BLD AUTO: ABNORMAL 10*3/UL
LYMPHOCYTES # BLD MANUAL: 0 10E3/UL (ref 0.8–5.3)
LYMPHOCYTES NFR BLD AUTO: ABNORMAL %
LYMPHOCYTES NFR BLD MANUAL: 0 %
MCH RBC QN AUTO: 30.3 PG (ref 26.5–33)
MCHC RBC AUTO-ENTMCNC: 34.8 G/DL (ref 31.5–36.5)
MCV RBC AUTO: 87 FL (ref 78–100)
MONOCYTES # BLD AUTO: ABNORMAL 10*3/UL
MONOCYTES # BLD MANUAL: 0 10E3/UL (ref 0–1.3)
MONOCYTES NFR BLD AUTO: ABNORMAL %
MONOCYTES NFR BLD MANUAL: 0 %
NEUTROPHILS # BLD AUTO: ABNORMAL 10*3/UL
NEUTROPHILS # BLD MANUAL: 79.2 10E3/UL (ref 1.6–8.3)
NEUTROPHILS NFR BLD AUTO: ABNORMAL %
NEUTROPHILS NFR BLD MANUAL: 100 %
NRBC # BLD AUTO: 0 10E3/UL
NRBC BLD AUTO-RTO: 0 /100
PLAT MORPH BLD: ABNORMAL
PLATELET # BLD AUTO: 310 10E3/UL (ref 150–450)
POTASSIUM SERPL-SCNC: 3.1 MMOL/L (ref 3.4–5.3)
PROT SERPL-MCNC: 5.8 G/DL (ref 6.4–8.3)
RBC # BLD AUTO: 3.76 10E6/UL (ref 3.8–5.2)
RBC MORPH BLD: ABNORMAL
SODIUM SERPL-SCNC: 138 MMOL/L (ref 135–145)
URATE SERPL-MCNC: 1.9 MG/DL (ref 2.4–5.7)
WBC # BLD AUTO: 79.2 10E3/UL (ref 4–11)

## 2024-03-21 PROCEDURE — 85007 BL SMEAR W/DIFF WBC COUNT: CPT

## 2024-03-21 PROCEDURE — 36591 DRAW BLOOD OFF VENOUS DEVICE: CPT

## 2024-03-21 PROCEDURE — 85027 COMPLETE CBC AUTOMATED: CPT

## 2024-03-21 PROCEDURE — 80053 COMPREHEN METABOLIC PANEL: CPT

## 2024-03-21 PROCEDURE — 84550 ASSAY OF BLOOD/URIC ACID: CPT

## 2024-03-21 PROCEDURE — 83615 LACTATE (LD) (LDH) ENZYME: CPT

## 2024-03-21 NOTE — TELEPHONE ENCOUNTER
DATE/TIME OF CALL RECEIVED FROM LAB:  03/21/24 at 12:36 PM   LAB TEST:  WBC 79.2   Other values: hgb 11.4, plt 310, preliminary ANC 67.6- ran twice   Previous Labs from 3/19/24: WBC 4.0, ANC 3.2, hgb 11.2, plt 344  PROVIDER NOTIFIED?: Yes  PROVIDER NAME: Nereida Thomas  TIME LAB VALUE REPORTED TO PROVIDER: [id number]  MECHANISM OF PROVIDER NOTIFICATION: Page  PROVIDER RESPONSE:  [id number] return call from Nereida, confirmed pt did have Neulasta and steroids post treatment. Nereida will follow up to be sure diff comes through. No additional follow up required by triage at this time.

## 2024-03-25 ENCOUNTER — APPOINTMENT (OUTPATIENT)
Dept: LAB | Facility: CLINIC | Age: 29
End: 2024-03-25
Attending: INTERNAL MEDICINE
Payer: COMMERCIAL

## 2024-03-25 ENCOUNTER — ONCOLOGY VISIT (OUTPATIENT)
Dept: ONCOLOGY | Facility: CLINIC | Age: 29
End: 2024-03-25
Attending: NURSE PRACTITIONER
Payer: COMMERCIAL

## 2024-03-25 VITALS
OXYGEN SATURATION: 96 % | DIASTOLIC BLOOD PRESSURE: 79 MMHG | HEART RATE: 109 BPM | WEIGHT: 142.5 LBS | RESPIRATION RATE: 16 BRPM | BODY MASS INDEX: 23 KG/M2 | TEMPERATURE: 97.6 F | SYSTOLIC BLOOD PRESSURE: 119 MMHG

## 2024-03-25 DIAGNOSIS — R06.02 SOB (SHORTNESS OF BREATH): ICD-10-CM

## 2024-03-25 DIAGNOSIS — C85.28 MEDIASTINAL LARGE B-CELL LYMPHOMA OF LYMPH NODES OF MULTIPLE REGIONS (H): Primary | ICD-10-CM

## 2024-03-25 DIAGNOSIS — C83.32 DIFFUSE LARGE B-CELL LYMPHOMA OF INTRATHORACIC LYMPH NODES (H): ICD-10-CM

## 2024-03-25 DIAGNOSIS — R00.0 TACHYCARDIA: ICD-10-CM

## 2024-03-25 LAB
ACID FAST STAIN (ARUP): NORMAL
ALBUMIN SERPL BCG-MCNC: 3.9 G/DL (ref 3.5–5.2)
ALP SERPL-CCNC: 88 U/L (ref 40–150)
ALT SERPL W P-5'-P-CCNC: 29 U/L (ref 0–50)
ANION GAP SERPL CALCULATED.3IONS-SCNC: 9 MMOL/L (ref 7–15)
AST SERPL W P-5'-P-CCNC: 18 U/L (ref 0–45)
BASOPHILS # BLD AUTO: ABNORMAL 10*3/UL
BASOPHILS # BLD MANUAL: 0.2 10E3/UL (ref 0–0.2)
BASOPHILS NFR BLD AUTO: ABNORMAL %
BASOPHILS NFR BLD MANUAL: 6 %
BILIRUB SERPL-MCNC: <0.2 MG/DL
BUN SERPL-MCNC: 12.9 MG/DL (ref 6–20)
BURR CELLS BLD QL SMEAR: SLIGHT
CALCIUM SERPL-MCNC: 9 MG/DL (ref 8.6–10)
CHLORIDE SERPL-SCNC: 101 MMOL/L (ref 98–107)
CREAT SERPL-MCNC: 0.59 MG/DL (ref 0.51–0.95)
DACRYOCYTES BLD QL SMEAR: SLIGHT
DEPRECATED HCO3 PLAS-SCNC: 25 MMOL/L (ref 22–29)
EGFRCR SERPLBLD CKD-EPI 2021: >90 ML/MIN/1.73M2
EOSINOPHIL # BLD AUTO: ABNORMAL 10*3/UL
EOSINOPHIL # BLD MANUAL: 0.2 10E3/UL (ref 0–0.7)
EOSINOPHIL NFR BLD AUTO: ABNORMAL %
EOSINOPHIL NFR BLD MANUAL: 7 %
ERYTHROCYTE [DISTWIDTH] IN BLOOD BY AUTOMATED COUNT: 15.3 % (ref 10–15)
GLUCOSE SERPL-MCNC: 105 MG/DL (ref 70–99)
HCT VFR BLD AUTO: 34.7 % (ref 35–47)
HGB BLD-MCNC: 11.7 G/DL (ref 11.7–15.7)
IMM GRANULOCYTES # BLD: ABNORMAL 10*3/UL
IMM GRANULOCYTES NFR BLD: ABNORMAL %
LDH SERPL L TO P-CCNC: 149 U/L (ref 0–250)
LYMPHOCYTES # BLD AUTO: ABNORMAL 10*3/UL
LYMPHOCYTES # BLD MANUAL: 1.1 10E3/UL (ref 0.8–5.3)
LYMPHOCYTES NFR BLD AUTO: ABNORMAL %
LYMPHOCYTES NFR BLD MANUAL: 35 %
MCH RBC QN AUTO: 29.9 PG (ref 26.5–33)
MCHC RBC AUTO-ENTMCNC: 33.7 G/DL (ref 31.5–36.5)
MCV RBC AUTO: 89 FL (ref 78–100)
MONOCYTES # BLD AUTO: ABNORMAL 10*3/UL
MONOCYTES # BLD MANUAL: 0.5 10E3/UL (ref 0–1.3)
MONOCYTES NFR BLD AUTO: ABNORMAL %
MONOCYTES NFR BLD MANUAL: 17 %
NEUTROPHILS # BLD AUTO: ABNORMAL 10*3/UL
NEUTROPHILS # BLD MANUAL: 1.1 10E3/UL (ref 1.6–8.3)
NEUTROPHILS NFR BLD AUTO: ABNORMAL %
NEUTROPHILS NFR BLD MANUAL: 35 %
NRBC # BLD AUTO: 0 10E3/UL
NRBC BLD AUTO-RTO: 0 /100
PLAT MORPH BLD: ABNORMAL
PLATELET # BLD AUTO: 177 10E3/UL (ref 150–450)
POTASSIUM SERPL-SCNC: 4.6 MMOL/L (ref 3.4–5.3)
PROT SERPL-MCNC: 6.1 G/DL (ref 6.4–8.3)
RBC # BLD AUTO: 3.91 10E6/UL (ref 3.8–5.2)
RBC MORPH BLD: ABNORMAL
SODIUM SERPL-SCNC: 135 MMOL/L (ref 135–145)
URATE SERPL-MCNC: 2.9 MG/DL (ref 2.4–5.7)
WBC # BLD AUTO: 3 10E3/UL (ref 4–11)

## 2024-03-25 PROCEDURE — 85007 BL SMEAR W/DIFF WBC COUNT: CPT | Performed by: NURSE PRACTITIONER

## 2024-03-25 PROCEDURE — 80053 COMPREHEN METABOLIC PANEL: CPT | Performed by: NURSE PRACTITIONER

## 2024-03-25 PROCEDURE — 85027 COMPLETE CBC AUTOMATED: CPT | Performed by: NURSE PRACTITIONER

## 2024-03-25 PROCEDURE — 36591 DRAW BLOOD OFF VENOUS DEVICE: CPT | Performed by: NURSE PRACTITIONER

## 2024-03-25 PROCEDURE — 83615 LACTATE (LD) (LDH) ENZYME: CPT | Performed by: NURSE PRACTITIONER

## 2024-03-25 PROCEDURE — 83880 ASSAY OF NATRIURETIC PEPTIDE: CPT | Performed by: NURSE PRACTITIONER

## 2024-03-25 PROCEDURE — 93005 ELECTROCARDIOGRAM TRACING: CPT | Mod: RTG

## 2024-03-25 PROCEDURE — 99215 OFFICE O/P EST HI 40 MIN: CPT | Performed by: NURSE PRACTITIONER

## 2024-03-25 PROCEDURE — 93010 ELECTROCARDIOGRAM REPORT: CPT | Performed by: INTERNAL MEDICINE

## 2024-03-25 PROCEDURE — 84550 ASSAY OF BLOOD/URIC ACID: CPT | Performed by: NURSE PRACTITIONER

## 2024-03-25 PROCEDURE — 99213 OFFICE O/P EST LOW 20 MIN: CPT | Performed by: NURSE PRACTITIONER

## 2024-03-25 PROCEDURE — G2211 COMPLEX E/M VISIT ADD ON: HCPCS | Performed by: NURSE PRACTITIONER

## 2024-03-25 ASSESSMENT — PAIN SCALES - GENERAL: PAINLEVEL: NO PAIN (0)

## 2024-03-25 NOTE — PROGRESS NOTES
[facility]      FOLLOW-UP VISIT NOTE                         Mar 25, 2024  Subjective   REASON FOR VISIT: Post hospital discharge follow up, s/p cycle 2 R-EPOCH for PMBCL    ONCOLOGIC SUMMARY:  Diagnosis:  Primary mediastinal B-cell lymphoma dx 1/2024, presenting with cervical LAD. 1/29/24 FNA indeterminate; 2/9/24 left level 3 excisional LN biopsy showed large B-cell lymphoma with rare abnormal B-cells on flow cytometry. Although CD23 IHC was negative, IHC for , MAL, and PD-L1 were positive in majority of the neoplastic cells suggesting PMBCL over DLBCL NOS. [id number] gene expression profile also consistent with PMBCL. FISH with gain of BCL2 but no MYC, BCL2, or BCL6 rearrangements. PET showed bilateral cervical and mediastinal hypermetabolic lymphadenopathy (largest 4.4 cm with SUVmax 27 in a prevascular LN); no disease below diaphragm.     Intent of treatment: Curative    Treatment history:  2/22/24: Cycle 1 R-EPOCH, dose level 1  3/14/24: Cycle 2 R-EPOCH     INTERVAL HISTORY:  Clementine is here today day 12 s/p Cycle 2 R-EPOCH.  Feels more tachy and SOB. Takes longer to normalize her heart rate  Nausea  Constipation-  Senna and Miralax twice  Myalgias/Arthalgias: flank, hips associated with Neulasta  Reflux- smaller meals.  Some fatigue the second week but this last week felt great/totally normal.  Some numbness in fingertips/first 1/3  (L>R). Left hand persistent, right hand improved. Feels like both feet have fallen asleep.    I have reviewed and updated the following:  Past Medical History:   Diagnosis Date    Anxiety     Cervical lymphadenopathy     GERD     Interstitial cystitis       No Known Allergies    Current Outpatient Medications:     acyclovir (ZOVIRAX) 400 MG tablet, Take 1 tablet (400 mg) by mouth 2 times daily, Disp: 60 tablet, Rfl: 0    cimetidine (TAGAMET) 200 MG tablet, Take 200 mg by mouth 2 times daily, Disp: , Rfl:     dexAMETHasone (DECADRON) 4 MG tablet, Take 2 tablets (8 mg)  by mouth daily Start taking Wednesday, 3/20, for two days. (3/20 and 3/21), Disp: 4 tablet, Rfl: 0    fluconazole (DIFLUCAN) 200 MG tablet, Take 1 tablet (200 mg) by mouth daily, Disp: 30 tablet, Rfl: 0    levofloxacin (LEVAQUIN) 250 MG tablet, Take 1 tablet (250 mg) by mouth daily, Disp: 30 tablet, Rfl: 0    Levonorgestrel (KYLEENA IU), 1 Device by Intrauterine route once, Disp: , Rfl:     metoclopramide (REGLAN) 10 MG tablet, Take 1 tablet (10 mg) by mouth 4 times daily as needed (constipation), Disp: 30 tablet, Rfl: 3    modafinil (PROVIGIL) 200 MG tablet, Take 1 tablet (200 mg) by mouth daily as needed, Disp: , Rfl:     mupirocin (BACTROBAN) 2 % external ointment, Apply to affected area TID, Disp: 15 g, Rfl: 0    OLANZapine zydis (ZYPREXA) 5 MG ODT, Take 1 tablet (5 mg) by mouth at bedtime, Disp: 10 tablet, Rfl: 0    ondansetron (ZOFRAN) 4 MG tablet, Take 1-2 tablets (4-8 mg) by mouth every 8 hours as needed for nausea or vomiting, Disp: 30 tablet, Rfl: 0    pantoprazole (PROTONIX) 40 MG EC tablet, Take 1 tablet (40 mg) by mouth 2 times daily, Disp: 60 tablet, Rfl: 0    polyethylene glycol (MIRALAX) 17 GM/Dose powder, Take 17 g by mouth daily as needed for constipation, Disp: 510 g, Rfl: 0    prochlorperazine (COMPAZINE) 10 MG tablet, Take 1 tablet (10 mg) by mouth every 6 hours as needed for nausea or vomiting, Disp: 30 tablet, Rfl: 0    senna-docusate (SENOKOT-S/PERICOLACE) 8.6-50 MG tablet, Take 1 tablet by mouth daily as needed for constipation, Disp: 15 tablet, Rfl: 0    triamcinolone (KENALOG) 0.1 % external cream, Apply topically 2 times daily as needed for irritation, Disp: 15 g, Rfl: 0    REVIEW OF SYSTEMS:  12-point ROS reviewed and negative other than that mentioned in HPI.     Objective   VITAL SIGNS    /79 (BP Location: Left arm, Patient Position: Sitting, Cuff Size: Adult Regular)   Pulse 109   Temp 97.6  F (36.4  C) (Oral)   Resp 16   Wt 64.6 kg (142 lb 8 oz)   LMP 01/31/2024 (Exact  Date)   SpO2 96%   BMI 23.00 kg/m      LMP 01/31/2024 (Exact Date)     ECOG PS: 0     PHYSICAL EXAM:  General: Awake, alert, in no acute distress. Oriented x 3.  HEENT: Chemo induced alopecia. Normocephalic, atraumatic. No scleral icterus.   Lymph: No cervical, supraclavicular, or axillary lymphadenopathy appreciated. Steri-strips over left neck excision.   CV: Regular rate and rhythm. No murmurs, rubs, or gallops appreciated.  Resp: Good inspiratory effort, lungs clear to auscultation bilaterally.  GI: Abdomen soft, nontender, nondistended.   Ext: No peripheral edema bilaterally.  Neuro: CN II-XII grossly intact. No focal deficits.   Skin: No rash, unusual bruising or prominent lesions.  Psych: Pleasant, normal affect.    LABS:  I reviewed the following labs:  Lab Results   Component Value Date    WBC 79.2 (HH) 03/21/2024    HGB 11.4 (L) 03/21/2024    HCT 32.8 (L) 03/21/2024    MCV 87 03/21/2024     03/21/2024    INR 1.08 03/19/2024     Lab Results   Component Value Date     03/21/2024    POTASSIUM 3.1 (L) 03/21/2024    CR 0.57 03/21/2024    BUN 16.3 03/21/2024    CHLORIDE 102 03/21/2024    DEE 8.4 (L) 03/21/2024     (H) 03/21/2024      Lab Results   Component Value Date    ALT 17 03/21/2024    AST 12 03/21/2024    ALKPHOS 82 03/21/2024    BILITOTAL 0.2 03/21/2024      Lab Results   Component Value Date     03/21/2024    URIC 1.9 (L) 03/21/2024        Assessment & Plan   Primary mediastinal B-cell lymphoma  Dx 1/2024, presenting cervical lymphadenopathy. Initial FNA suggestive of possible Hodgkin lymphoma; however excisional biopsy showed large B-cell lymphoma with differential diagnosis of primary mediastinal large B-cell lymphoma vs DLBCL NOS. Additional IHC staining for , MAL, and PD-L1, and [id number] gene expression profile all favor PMBCL diagnosis over DLBCL NOS. PET with cervical and mediastinal lymphadenopathy only. LDH normal.   Previously discussed PMBCL diagnosis and plan  for DA-R-EPOCH x 6 cycles. In the single-arm phase 2 prospective study of R-EPOCH (without radiation) for PMBCL, 5-year EFS was 93% and OS 97% (10.1056/[id number]).   Cycle 1 R-EPOCH 2/22/24, tolerating well so far other than constipation.  Cycle 2 R-EPOCH 3/14/24 ANC dread >0.5 last cycle so qualifies for 20% increase in doxorubicin, etoposide, and cyclophosphamide this cycle.   Plan for 6 cycles total with restaging PET after Cycle 2.     Constipation  Scheduled Miralax and Senna-S.  PRN Reglan.      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 Affecting fingertips only.   Monitor and keep same dose of vincristine for now, may require dose reduction in later cycles.     Fertility preservation  Established with [facility] and underwent egg retrieval on 2/18/24.      Ppx  TLS ppx: okay to stop allopurinol at this time.   ID ppx: continue  mg BID. Levo and fluc when ANC <1.0.   Monthly pentamidine for PJP ppx (last given 3/1/24, next due 4/1/24).  Vaccines: up to date on COVID and flu shots.       Plan from today's clinical encounter  Day 12 Cycle 2 R-EPOCH, qualifies for 20% dose increase  EKG-> NSR  HR 89  Short term disability paper to be completed and copy to patient, fax number and chart copy.  PET 4/1 and follow-up with Dr. Oakes 4/4 prior to Cycle 3 R-EPOCH admission    Transition Care Management Services  Admission Date: 03/14/24    Discharge Date: 03/19/24    Discharge Diagnosis: You were admitted for scheduled chemotherapy. You  tolerated this well.    Interactive contact date: 3/20/2024  Face-to-face visit date: 3/25/2024    Medical complexity decision making: High complexity ([id number])    52  minutes spent on the date of the encounter doing chart review, review of test results, interpretation of tests, patient visit, documentation, and discussion with family    The longitudinal plan of care for the diagnosis(es)/condition(s) as documented were addressed during this visit. Due to the added  complexity in care, I will continue to support Clementine in the subsequent management and with ongoing continuity of care.      Nereida LIVE, ANP-BC, AOCNP  [facility] [id number] [place] [address] [phone] (pager)

## 2024-03-25 NOTE — LETTER
3/25/2024         RE: Clementine Kathleen  [address]  [place] [facility] [address]        Dear Colleague,    Thank you for referring your patient, Clementine Kathleen, to the [facility]. Please see a copy of my visit note below.     [facility]      FOLLOW-UP VISIT NOTE                         Mar 25, 2024  Subjective  REASON FOR VISIT: Follow-up PMBCL    ONCOLOGIC SUMMARY:  Diagnosis:  Primary mediastinal B-cell lymphoma dx 1/2024, presenting with cervical LAD. 1/29/24 FNA indeterminate; 2/9/24 left level 3 excisional LN biopsy showed large B-cell lymphoma with rare abnormal B-cells on flow cytometry. Although CD23 IHC was negative, IHC for , MAL, and PD-L1 were positive in majority of the neoplastic cells suggesting PMBCL over DLBCL NOS. [id number] gene expression profile also consistent with PMBCL. FISH with gain of BCL2 but no MYC, BCL2, or BCL6 rearrangements. PET showed bilateral cervical and mediastinal hypermetabolic lymphadenopathy (largest 4.4 cm with SUVmax 27 in a prevascular LN); no disease below diaphragm.     Intent of treatment: Curative    Treatment history:  2/22/24: Cycle 1 R-EPOCH, dose level 1    INTERVAL HISTORY:  Clementine is here today for follow-up prior to Cycle 2 R-EPOCH. Doing quite well overall.  Feels mote tachy and SOB. Takes longer to normalize  Nausea  Constipation-  Senna and Miralax twice  Myalgias/Arthalgias: flank, hips associated with Neulasta  Reflux- smaller meals.  Developed raised bumps on back of neck last week, prescribed Bactroban but it ended up resolving with just hydrocortisone cream.  Some fatigue the second week but this last week felt great/totally normal.  Some numbness in fingertips/first 1/3  (L>R). Left hand persistent, right hand improved. Feels like feet have fallen asleep, both.   Left neck swelling resolved with first cycle.  Otherwise no fevers, chest pain, N/V, bleeding.     I have reviewed and updated the  following:  Past Medical History:   Diagnosis Date    Anxiety     Cervical lymphadenopathy     GERD     Interstitial cystitis       No Known Allergies    Current Outpatient Medications:     acyclovir (ZOVIRAX) 400 MG tablet, Take 1 tablet (400 mg) by mouth 2 times daily, Disp: 60 tablet, Rfl: 0    cimetidine (TAGAMET) 200 MG tablet, Take 200 mg by mouth 2 times daily, Disp: , Rfl:     dexAMETHasone (DECADRON) 4 MG tablet, Take 2 tablets (8 mg) by mouth daily Start taking Wednesday, 3/20, for two days. (3/20 and 3/21), Disp: 4 tablet, Rfl: 0    fluconazole (DIFLUCAN) 200 MG tablet, Take 1 tablet (200 mg) by mouth daily, Disp: 30 tablet, Rfl: 0    levofloxacin (LEVAQUIN) 250 MG tablet, Take 1 tablet (250 mg) by mouth daily, Disp: 30 tablet, Rfl: 0    Levonorgestrel (KYLEENA IU), 1 Device by Intrauterine route once, Disp: , Rfl:     metoclopramide (REGLAN) 10 MG tablet, Take 1 tablet (10 mg) by mouth 4 times daily as needed (constipation), Disp: 30 tablet, Rfl: 3    modafinil (PROVIGIL) 200 MG tablet, Take 1 tablet (200 mg) by mouth daily as needed, Disp: , Rfl:     mupirocin (BACTROBAN) 2 % external ointment, Apply to affected area TID, Disp: 15 g, Rfl: 0    OLANZapine zydis (ZYPREXA) 5 MG ODT, Take 1 tablet (5 mg) by mouth at bedtime, Disp: 10 tablet, Rfl: 0    ondansetron (ZOFRAN) 4 MG tablet, Take 1-2 tablets (4-8 mg) by mouth every 8 hours as needed for nausea or vomiting, Disp: 30 tablet, Rfl: 0    pantoprazole (PROTONIX) 40 MG EC tablet, Take 1 tablet (40 mg) by mouth 2 times daily, Disp: 60 tablet, Rfl: 0    polyethylene glycol (MIRALAX) 17 GM/Dose powder, Take 17 g by mouth daily as needed for constipation, Disp: 510 g, Rfl: 0    prochlorperazine (COMPAZINE) 10 MG tablet, Take 1 tablet (10 mg) by mouth every 6 hours as needed for nausea or vomiting, Disp: 30 tablet, Rfl: 0    senna-docusate (SENOKOT-S/PERICOLACE) 8.6-50 MG tablet, Take 1 tablet by mouth daily as needed for constipation, Disp: 15 tablet, Rfl:  0    triamcinolone (KENALOG) 0.1 % external cream, Apply topically 2 times daily as needed for irritation, Disp: 15 g, Rfl: 0    REVIEW OF SYSTEMS:  12-point ROS reviewed and negative other than that mentioned in HPI.     Objective  VITAL SIGNS    /79 (BP Location: Left arm, Patient Position: Sitting, Cuff Size: Adult Regular)   Pulse 109   Temp 97.6  F (36.4  C) (Oral)   Resp 16   Wt 64.6 kg (142 lb 8 oz)   LMP 01/31/2024 (Exact Date)   SpO2 96%   BMI 23.00 kg/m      LMP 01/31/2024 (Exact Date)     ECOG PS: 0     PHYSICAL EXAM:  General: Awake, alert, in no acute distress. Oriented x 3.  HEENT: Normocephalic, atraumatic. No scleral icterus.   Lymph: No cervical, supraclavicular, or axillary lymphadenopathy appreciated. Steri-strips over left neck excision.   CV: Regular rate and rhythm. No murmurs, rubs, or gallops appreciated.  Resp: Good inspiratory effort, lungs clear to auscultation bilaterally.  GI: Abdomen soft, nontender, nondistended.   Ext: No peripheral edema bilaterally.  Neuro: CN II-XII grossly intact. No focal deficits.   Skin: No rash, unusual bruising or prominent lesions.  Psych: Pleasant, normal affect.    LABS:  I reviewed the following labs:  Lab Results   Component Value Date    WBC 79.2 (HH) 03/21/2024    HGB 11.4 (L) 03/21/2024    HCT 32.8 (L) 03/21/2024    MCV 87 03/21/2024     03/21/2024    INR 1.08 03/19/2024     Lab Results   Component Value Date     03/21/2024    POTASSIUM 3.1 (L) 03/21/2024    CR 0.57 03/21/2024    BUN 16.3 03/21/2024    CHLORIDE 102 03/21/2024    DEE 8.4 (L) 03/21/2024     (H) 03/21/2024      Lab Results   Component Value Date    ALT 17 03/21/2024    AST 12 03/21/2024    ALKPHOS 82 03/21/2024    BILITOTAL 0.2 03/21/2024      Lab Results   Component Value Date     03/21/2024    URIC 1.9 (L) 03/21/2024        Assessment & Plan  Primary mediastinal B-cell lymphoma  Dx 1/2024, presenting cervical lymphadenopathy. Initial FNA  suggestive of possible Hodgkin lymphoma; however excisional biopsy showed large B-cell lymphoma with differential diagnosis of primary mediastinal large B-cell lymphoma vs DLBCL NOS. Additional IHC staining for , MAL, and PD-L1, and Dcrzc5KE gene expression profile all favor PMBCL diagnosis over DLBCL NOS. PET with cervical and mediastinal lymphadenopathy only. LDH normal.   Previously discussed PMBCL diagnosis and plan for DA-R-EPOCH x 6 cycles. In the single-arm phase 2 prospective study of R-EPOCH (without radiation) for PMBCL, 5-year EFS was 93% and OS 97% (10.1056/[id number]).   Started Cycle 1 R-EPOCH 2/22/24, tolerating well so far other than constipation.  Admit for Cycle 2 R-EPOCH today. ANC dread >0.5 last cycle so qualifies for 20% increase in doxorubicin, etoposide, and cyclophosphamide this cycle.   Plan for 6 cycles total with restaging PET after Cycle 2.     Constipation  Scheduled Miralax and Senna-S.  PRN Reglan.    Neck/scalp rash, resolved  PRN hydrocortisone and Bactroban.    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 Affecting fingertips only.   Monitor and keep same dose of vincristine for now, may require dose reduction in later cycles.     Fertility preservation  Established with [facility] and underwent egg retrieval on 2/18/24.      Ppx  TLS ppx: okay to stop allopurinol at this time.   ID ppx: continue  mg BID. Levo and fluc when ANC <1.0. Monthly pentamidine for PJP ppx (last given 3/1, next due ~4/1).  Vaccines: up to date on COVID and flu shots.       Plan from today's clinical encounter  Admit for Cycle 2 R-EPOCH, qualifies for 20% dose increase  EKG_> NSR  HR 89  Short term disability paper  PET 4/1 and follow-up with me 4/4 prior to Cycle 3 R-EPOCH    Transition Care Management Services  Admission Date: 03/14/24    Discharge Date: 03/19/24    Discharge Diagnosis: You were admitted for scheduled chemotherapy. You  tolerated this well.    Interactive contact  date: 3/20/2024  Face-to-face visit date: 3/25/2024    Medical complexity decision making: High complexity ([id number])    52  minutes spent on the date of the encounter doing chart review, review of test results, interpretation of tests, patient visit, documentation, and discussion with family    The longitudinal plan of care for the diagnosis(es)/condition(s) as documented were addressed during this visit. Due to the added complexity in care, I will continue to support Clementine in the subsequent management and with ongoing continuity of care.      Nereida LIVE, ANP-BC, AOCNP  [facility]  [address] [place] [id number] [phone] (pager)

## 2024-03-25 NOTE — NURSING NOTE
"EKG was performed today.  Order written by ELSI Parry was completed today. Name and  verified with patient.    Oncology Rooming Note    2024 11:45 AM   Clementine Kathleen is a 28 year old female who presents for:    Chief Complaint   Patient presents with    Blood Draw     Vitals taken, venipuncture labs drawn, checked into next appt    Oncology Clinic Visit     Mediastinal large B-cell lymphoma of lymph nodes of multiple regions     Initial Vitals: /79 (BP Location: Left arm, Patient Position: Sitting, Cuff Size: Adult Regular)   Pulse 109   Temp 97.6  F (36.4  C) (Oral)   Resp 16   Wt 64.6 kg (142 lb 8 oz)   LMP 2024 (Exact Date)   SpO2 96%   BMI 23.00 kg/m   Estimated body mass index is 23 kg/m  as calculated from the following:    Height as of 3/14/24: 1.676 m (5' 6\").    Weight as of this encounter: 64.6 kg (142 lb 8 oz). Body surface area is 1.73 meters squared.  No Pain (0) Comment: Data Unavailable   Patient's last menstrual period was 2024 (exact date).  Allergies reviewed: Yes  Medications reviewed: Yes    Medications: Medication refills not needed today.  Pharmacy name entered into [employer]: [id number] - [place], [place] - [address] AT [facility] [address]    Frailty Screening:   Is the patient here for a new oncology consult visit in cancer care? 2. No      Clinical concerns: None       Payal Perry CMA            "

## 2024-03-25 NOTE — NURSING NOTE
Chief Complaint   Patient presents with    Blood Draw     Vitals taken, venipuncture labs drawn, checked into next appt     /79 (BP Location: Left arm, Patient Position: Sitting, Cuff Size: Adult Regular)   Pulse 109   Temp 97.6  F (36.4  C) (Oral)   Resp 16   Wt 64.6 kg (142 lb 8 oz)   LMP 01/31/2024 (Exact Date)   SpO2 96%   BMI 23.00 kg/m    Santana Magaña RN on 3/25/2024 at 11:40 AM

## 2024-03-26 LAB
ATRIAL RATE - MUSE: 89 BPM
DIASTOLIC BLOOD PRESSURE - MUSE: NORMAL MMHG
INTERPRETATION ECG - MUSE: NORMAL
P AXIS - MUSE: 65 DEGREES
PR INTERVAL - MUSE: 140 MS
QRS DURATION - MUSE: 72 MS
QT - MUSE: 358 MS
QTC - MUSE: 435 MS
R AXIS - MUSE: 74 DEGREES
SYSTOLIC BLOOD PRESSURE - MUSE: NORMAL MMHG
T AXIS - MUSE: 36 DEGREES
VENTRICULAR RATE- MUSE: 89 BPM

## 2024-03-27 DIAGNOSIS — R06.02 SOB (SHORTNESS OF BREATH): ICD-10-CM

## 2024-03-27 DIAGNOSIS — C83.32 DIFFUSE LARGE B-CELL LYMPHOMA OF INTRATHORACIC LYMPH NODES (H): Primary | ICD-10-CM

## 2024-03-27 LAB — NT-PROBNP SERPL-MCNC: <36 PG/ML (ref 0–450)

## 2024-03-27 RX ORDER — AMOXICILLIN 250 MG
1 CAPSULE ORAL DAILY PRN
Qty: 15 TABLET | Refills: 4 | Status: ON HOLD | OUTPATIENT
Start: 2024-03-27 | End: 2024-04-07

## 2024-03-27 RX ORDER — ACYCLOVIR 400 MG/1
400 TABLET ORAL 2 TIMES DAILY
Qty: 60 TABLET | Refills: 4 | Status: SHIPPED | OUTPATIENT
Start: 2024-03-27 | End: 2024-05-08

## 2024-03-28 ENCOUNTER — LAB (OUTPATIENT)
Dept: LAB | Facility: CLINIC | Age: 29
End: 2024-03-28
Attending: INTERNAL MEDICINE
Payer: COMMERCIAL

## 2024-03-28 DIAGNOSIS — C85.28 MEDIASTINAL LARGE B-CELL LYMPHOMA OF LYMPH NODES OF MULTIPLE REGIONS (H): ICD-10-CM

## 2024-03-28 DIAGNOSIS — Z29.89 NEED FOR PNEUMOCYSTIS PROPHYLAXIS: Primary | ICD-10-CM

## 2024-03-28 LAB
ALBUMIN SERPL BCG-MCNC: 3.7 G/DL (ref 3.5–5.2)
ALP SERPL-CCNC: 97 U/L (ref 40–150)
ALT SERPL W P-5'-P-CCNC: 50 U/L (ref 0–50)
ANION GAP SERPL CALCULATED.3IONS-SCNC: 9 MMOL/L (ref 7–15)
AST SERPL W P-5'-P-CCNC: 27 U/L (ref 0–45)
BASOPHILS # BLD AUTO: ABNORMAL 10*3/UL
BASOPHILS # BLD MANUAL: 0.4 10E3/UL (ref 0–0.2)
BASOPHILS NFR BLD AUTO: ABNORMAL %
BASOPHILS NFR BLD MANUAL: 2 %
BILIRUB SERPL-MCNC: <0.2 MG/DL
BUN SERPL-MCNC: 12.2 MG/DL (ref 6–20)
CALCIUM SERPL-MCNC: 8.6 MG/DL (ref 8.6–10)
CHLORIDE SERPL-SCNC: 107 MMOL/L (ref 98–107)
CREAT SERPL-MCNC: 0.63 MG/DL (ref 0.51–0.95)
DEPRECATED HCO3 PLAS-SCNC: 22 MMOL/L (ref 22–29)
EGFRCR SERPLBLD CKD-EPI 2021: >90 ML/MIN/1.73M2
EOSINOPHIL # BLD AUTO: ABNORMAL 10*3/UL
EOSINOPHIL # BLD MANUAL: 0.2 10E3/UL (ref 0–0.7)
EOSINOPHIL NFR BLD AUTO: ABNORMAL %
EOSINOPHIL NFR BLD MANUAL: 1 %
ERYTHROCYTE [DISTWIDTH] IN BLOOD BY AUTOMATED COUNT: 16.9 % (ref 10–15)
GLUCOSE SERPL-MCNC: 99 MG/DL (ref 70–99)
HCT VFR BLD AUTO: 33.5 % (ref 35–47)
HGB BLD-MCNC: 11.1 G/DL (ref 11.7–15.7)
IMM GRANULOCYTES # BLD: ABNORMAL 10*3/UL
IMM GRANULOCYTES NFR BLD: ABNORMAL %
LDH SERPL L TO P-CCNC: 322 U/L (ref 0–250)
LYMPHOCYTES # BLD AUTO: ABNORMAL 10*3/UL
LYMPHOCYTES # BLD MANUAL: 1.6 10E3/UL (ref 0.8–5.3)
LYMPHOCYTES NFR BLD AUTO: ABNORMAL %
LYMPHOCYTES NFR BLD MANUAL: 8 %
MCH RBC QN AUTO: 30 PG (ref 26.5–33)
MCHC RBC AUTO-ENTMCNC: 33.1 G/DL (ref 31.5–36.5)
MCV RBC AUTO: 91 FL (ref 78–100)
METAMYELOCYTES # BLD MANUAL: 0.8 10E3/UL
METAMYELOCYTES NFR BLD MANUAL: 4 %
MONOCYTES # BLD AUTO: ABNORMAL 10*3/UL
MONOCYTES # BLD MANUAL: 0.4 10E3/UL (ref 0–1.3)
MONOCYTES NFR BLD AUTO: ABNORMAL %
MONOCYTES NFR BLD MANUAL: 2 %
MYELOCYTES # BLD MANUAL: 1 10E3/UL
MYELOCYTES NFR BLD MANUAL: 5 %
NEUTROPHILS # BLD AUTO: ABNORMAL 10*3/UL
NEUTROPHILS # BLD MANUAL: 14.8 10E3/UL (ref 1.6–8.3)
NEUTROPHILS NFR BLD AUTO: ABNORMAL %
NEUTROPHILS NFR BLD MANUAL: 75 %
NRBC # BLD AUTO: 0.2 10E3/UL
NRBC # BLD AUTO: 0.4 10E3/UL
NRBC BLD AUTO-RTO: 2 /100
NRBC BLD MANUAL-RTO: 1 %
PLAT MORPH BLD: ABNORMAL
PLATELET # BLD AUTO: 177 10E3/UL (ref 150–450)
POTASSIUM SERPL-SCNC: 4.3 MMOL/L (ref 3.4–5.3)
PROMYELOCYTES # BLD MANUAL: 0.6 10E3/UL
PROMYELOCYTES NFR BLD MANUAL: 3 %
PROT SERPL-MCNC: 6 G/DL (ref 6.4–8.3)
RBC # BLD AUTO: 3.7 10E6/UL (ref 3.8–5.2)
RBC MORPH BLD: ABNORMAL
SODIUM SERPL-SCNC: 138 MMOL/L (ref 135–145)
WBC # BLD AUTO: 19.7 10E3/UL (ref 4–11)

## 2024-03-28 PROCEDURE — 83615 LACTATE (LD) (LDH) ENZYME: CPT

## 2024-03-28 PROCEDURE — 85007 BL SMEAR W/DIFF WBC COUNT: CPT

## 2024-03-28 PROCEDURE — 36415 COLL VENOUS BLD VENIPUNCTURE: CPT

## 2024-03-28 PROCEDURE — 80053 COMPREHEN METABOLIC PANEL: CPT

## 2024-03-28 PROCEDURE — 85027 COMPLETE CBC AUTOMATED: CPT

## 2024-03-28 NOTE — NURSING NOTE
Chief Complaint   Patient presents with    Blood Draw     Vpt blood draw by lab RN.   Rosa Maria Rose RN

## 2024-04-01 ENCOUNTER — HOSPITAL ENCOUNTER (OUTPATIENT)
Dept: PET IMAGING | Facility: CLINIC | Age: 29
Discharge: HOME | End: 2024-04-01
Attending: INTERNAL MEDICINE
Payer: COMMERCIAL

## 2024-04-01 ENCOUNTER — LAB (OUTPATIENT)
Dept: LAB | Facility: CLINIC | Age: 29
End: 2024-04-01
Attending: INTERNAL MEDICINE
Payer: COMMERCIAL

## 2024-04-01 DIAGNOSIS — C83.32 DIFFUSE LARGE B-CELL LYMPHOMA OF INTRATHORACIC LYMPH NODES (H): ICD-10-CM

## 2024-04-01 DIAGNOSIS — C83.32 DIFFUSE LARGE B-CELL LYMPHOMA OF INTRATHORACIC LYMPH NODES (H): Primary | ICD-10-CM

## 2024-04-01 DIAGNOSIS — Z29.89 NEED FOR PNEUMOCYSTIS PROPHYLAXIS: ICD-10-CM

## 2024-04-01 DIAGNOSIS — C85.28 MEDIASTINAL LARGE B-CELL LYMPHOMA OF LYMPH NODES OF MULTIPLE REGIONS (H): ICD-10-CM

## 2024-04-01 LAB
ALBUMIN SERPL BCG-MCNC: 4 G/DL (ref 3.5–5.2)
ALP SERPL-CCNC: 93 U/L (ref 40–150)
ALT SERPL W P-5'-P-CCNC: 27 U/L (ref 0–50)
ANION GAP SERPL CALCULATED.3IONS-SCNC: 9 MMOL/L (ref 7–15)
AST SERPL W P-5'-P-CCNC: 17 U/L (ref 0–45)
BASOPHILS # BLD AUTO: ABNORMAL 10*3/UL
BASOPHILS # BLD MANUAL: 0.4 10E3/UL (ref 0–0.2)
BASOPHILS NFR BLD AUTO: ABNORMAL %
BASOPHILS NFR BLD MANUAL: 4 %
BILIRUB SERPL-MCNC: 0.2 MG/DL
BUN SERPL-MCNC: 12.7 MG/DL (ref 6–20)
CALCIUM SERPL-MCNC: 9 MG/DL (ref 8.6–10)
CHLORIDE SERPL-SCNC: 106 MMOL/L (ref 98–107)
CREAT SERPL-MCNC: 0.71 MG/DL (ref 0.51–0.95)
DEPRECATED HCO3 PLAS-SCNC: 24 MMOL/L (ref 22–29)
EGFRCR SERPLBLD CKD-EPI 2021: >90 ML/MIN/1.73M2
EOSINOPHIL # BLD AUTO: ABNORMAL 10*3/UL
EOSINOPHIL # BLD MANUAL: 0 10E3/UL (ref 0–0.7)
EOSINOPHIL NFR BLD AUTO: ABNORMAL %
EOSINOPHIL NFR BLD MANUAL: 0 %
ERYTHROCYTE [DISTWIDTH] IN BLOOD BY AUTOMATED COUNT: 17.7 % (ref 10–15)
GLUCOSE SERPL-MCNC: 115 MG/DL (ref 70–99)
HCT VFR BLD AUTO: 37.2 % (ref 35–47)
HGB BLD-MCNC: 12.1 G/DL (ref 11.7–15.7)
IMM GRANULOCYTES # BLD: ABNORMAL 10*3/UL
IMM GRANULOCYTES NFR BLD: ABNORMAL %
LDH SERPL L TO P-CCNC: 222 U/L (ref 0–250)
LYMPHOCYTES # BLD AUTO: ABNORMAL 10*3/UL
LYMPHOCYTES # BLD MANUAL: 1.3 10E3/UL (ref 0.8–5.3)
LYMPHOCYTES NFR BLD AUTO: ABNORMAL %
LYMPHOCYTES NFR BLD MANUAL: 15 %
MCH RBC QN AUTO: 29.7 PG (ref 26.5–33)
MCHC RBC AUTO-ENTMCNC: 32.5 G/DL (ref 31.5–36.5)
MCV RBC AUTO: 91 FL (ref 78–100)
METAMYELOCYTES # BLD MANUAL: 0.1 10E3/UL
METAMYELOCYTES NFR BLD MANUAL: 1 %
MONOCYTES # BLD AUTO: ABNORMAL 10*3/UL
MONOCYTES # BLD MANUAL: 0.4 10E3/UL (ref 0–1.3)
MONOCYTES NFR BLD AUTO: ABNORMAL %
MONOCYTES NFR BLD MANUAL: 5 %
MYELOCYTES # BLD MANUAL: 0.2 10E3/UL
MYELOCYTES NFR BLD MANUAL: 2 %
NEUTROPHILS # BLD AUTO: ABNORMAL 10*3/UL
NEUTROPHILS # BLD MANUAL: 6.5 10E3/UL (ref 1.6–8.3)
NEUTROPHILS NFR BLD AUTO: ABNORMAL %
NEUTROPHILS NFR BLD MANUAL: 73 %
NRBC # BLD AUTO: 0 10E3/UL
NRBC # BLD AUTO: 0.2 10E3/UL
NRBC BLD AUTO-RTO: 0 /100
NRBC BLD MANUAL-RTO: 2 %
PLAT MORPH BLD: ABNORMAL
PLATELET # BLD AUTO: 222 10E3/UL (ref 150–450)
POTASSIUM SERPL-SCNC: 4 MMOL/L (ref 3.4–5.3)
PROT SERPL-MCNC: 6.6 G/DL (ref 6.4–8.3)
RBC # BLD AUTO: 4.07 10E6/UL (ref 3.8–5.2)
RBC MORPH BLD: ABNORMAL
SODIUM SERPL-SCNC: 139 MMOL/L (ref 135–145)
WBC # BLD AUTO: 8.9 10E3/UL (ref 4–11)

## 2024-04-01 PROCEDURE — 94642 AEROSOL INHALATION TREATMENT: CPT | Performed by: INTERNAL MEDICINE

## 2024-04-01 PROCEDURE — 94640 AIRWAY INHALATION TREATMENT: CPT | Performed by: INTERNAL MEDICINE

## 2024-04-01 PROCEDURE — 36591 DRAW BLOOD OFF VENOUS DEVICE: CPT

## 2024-04-01 PROCEDURE — 84155 ASSAY OF PROTEIN SERUM: CPT

## 2024-04-01 PROCEDURE — 85007 BL SMEAR W/DIFF WBC COUNT: CPT

## 2024-04-01 PROCEDURE — 250N000011 HC RX IP 250 OP 636: Performed by: INTERNAL MEDICINE

## 2024-04-01 PROCEDURE — 85027 COMPLETE CBC AUTOMATED: CPT

## 2024-04-01 PROCEDURE — 343N000001 HC RX 343: Performed by: INTERNAL MEDICINE

## 2024-04-01 PROCEDURE — 78816 PET IMAGE W/CT FULL BODY: CPT | Mod: PS

## 2024-04-01 PROCEDURE — 78816 PET IMAGE W/CT FULL BODY: CPT | Mod: 26 | Performed by: RADIOLOGY

## 2024-04-01 PROCEDURE — A9552 F18 FDG: HCPCS | Performed by: INTERNAL MEDICINE

## 2024-04-01 PROCEDURE — 83615 LACTATE (LD) (LDH) ENZYME: CPT

## 2024-04-01 RX ORDER — HEPARIN SODIUM (PORCINE) LOCK FLUSH IV SOLN 100 UNIT/ML 100 UNIT/ML
500 SOLUTION INTRAVENOUS ONCE
Status: COMPLETED | OUTPATIENT
Start: 2024-04-01 | End: 2024-04-01

## 2024-04-01 RX ORDER — ALBUTEROL SULFATE 90 UG/1
1-2 AEROSOL, METERED RESPIRATORY (INHALATION)
Start: 2024-04-01

## 2024-04-01 RX ORDER — DIPHENHYDRAMINE HYDROCHLORIDE 50 MG/ML
50 INJECTION INTRAMUSCULAR; INTRAVENOUS
Status: CANCELLED
Start: 2024-04-26

## 2024-04-01 RX ORDER — HEPARIN SODIUM,PORCINE 10 UNIT/ML
5-20 VIAL (ML) INTRAVENOUS DAILY PRN
OUTPATIENT
Start: 2024-04-01

## 2024-04-01 RX ORDER — HEPARIN SODIUM (PORCINE) LOCK FLUSH IV SOLN 100 UNIT/ML 100 UNIT/ML
5 SOLUTION INTRAVENOUS
OUTPATIENT
Start: 2024-04-01

## 2024-04-01 RX ORDER — MEPERIDINE HYDROCHLORIDE 25 MG/ML
25 INJECTION INTRAMUSCULAR; INTRAVENOUS; SUBCUTANEOUS EVERY 30 MIN PRN
OUTPATIENT
Start: 2024-04-01

## 2024-04-01 RX ORDER — ALBUTEROL SULFATE 0.83 MG/ML
2.5 SOLUTION RESPIRATORY (INHALATION) ONCE
Status: CANCELLED
Start: 2024-04-26 | End: 2024-04-26

## 2024-04-01 RX ORDER — EPINEPHRINE 1 MG/ML
0.3 INJECTION, SOLUTION, CONCENTRATE INTRAVENOUS EVERY 5 MIN PRN
Status: CANCELLED | OUTPATIENT
Start: 2024-04-26

## 2024-04-01 RX ORDER — DIPHENHYDRAMINE HYDROCHLORIDE 50 MG/ML
50 INJECTION INTRAMUSCULAR; INTRAVENOUS
Start: 2024-04-01

## 2024-04-01 RX ORDER — ALBUTEROL SULFATE 90 UG/1
1-2 AEROSOL, METERED RESPIRATORY (INHALATION)
Status: CANCELLED
Start: 2024-04-26

## 2024-04-01 RX ORDER — PENTAMIDINE ISETHIONATE 300 MG/300MG
300 INHALANT RESPIRATORY (INHALATION)
Status: CANCELLED
Start: 2024-04-26

## 2024-04-01 RX ORDER — PENTAMIDINE ISETHIONATE 300 MG/300MG
300 INHALANT RESPIRATORY (INHALATION)
Status: DISCONTINUED | OUTPATIENT
Start: 2024-04-01 | End: 2024-04-01 | Stop reason: HOSPADM

## 2024-04-01 RX ORDER — ALBUTEROL SULFATE 0.83 MG/ML
2.5 SOLUTION RESPIRATORY (INHALATION)
OUTPATIENT
Start: 2024-04-01

## 2024-04-01 RX ORDER — ALBUTEROL SULFATE 0.83 MG/ML
2.5 SOLUTION RESPIRATORY (INHALATION)
Status: CANCELLED | OUTPATIENT
Start: 2024-04-26

## 2024-04-01 RX ORDER — EPINEPHRINE 1 MG/ML
0.3 INJECTION, SOLUTION, CONCENTRATE INTRAVENOUS EVERY 5 MIN PRN
OUTPATIENT
Start: 2024-04-01

## 2024-04-01 RX ORDER — METHYLPREDNISOLONE SODIUM SUCCINATE 125 MG/2ML
125 INJECTION, POWDER, LYOPHILIZED, FOR SOLUTION INTRAMUSCULAR; INTRAVENOUS
Start: 2024-04-01

## 2024-04-01 RX ORDER — MEPERIDINE HYDROCHLORIDE 25 MG/ML
25 INJECTION INTRAMUSCULAR; INTRAVENOUS; SUBCUTANEOUS EVERY 30 MIN PRN
Status: CANCELLED | OUTPATIENT
Start: 2024-04-26

## 2024-04-01 RX ORDER — METHYLPREDNISOLONE SODIUM SUCCINATE 125 MG/2ML
125 INJECTION, POWDER, LYOPHILIZED, FOR SOLUTION INTRAMUSCULAR; INTRAVENOUS
Status: CANCELLED
Start: 2024-04-26

## 2024-04-01 RX ADMIN — FLUDEOXYGLUCOSE F-18 10.19 MILLICURIE: 500 INJECTION, SOLUTION INTRAVENOUS at 10:26

## 2024-04-01 RX ADMIN — Medication 500 UNITS: at 14:04

## 2024-04-01 RX ADMIN — PENTAMIDINE ISETHIONATE 300 MG: 300 INHALANT RESPIRATORY (INHALATION) at 14:25

## 2024-04-01 RX ADMIN — Medication 500 UNITS: at 10:26

## 2024-04-01 NOTE — NURSING NOTE
Chief Complaint   Patient presents with    Port Draw     Labs drawn via port by RN in lab     Patient arrived with port already accessed at a previous appointment today. Labs collected via port, line flushed with saline and heparin. Port de-accessed.    Kira Ashraf RN

## 2024-04-01 NOTE — PROGRESS NOTES
Clementine Sharpelilliam was seen today for a Pentamidine nebulizer tx ordered by Rosemarie Milligan PA-C.    Patient was first given 4 puffs of albuterol MDI, after which Pentamidine 300 mg (Lot # [id number]) mixed with 6cc Sterile H20 was administered through a filtered nebulizer.    Pre-treatment: SpO2 = 97%   HR = 74   BBS = Clear   Post-treatment: SpO2 = 99%  HR = 109  BBS = Clear    No adverse side effects noted by the patient.    This service today was provided under Dr. May, who was available if needed.     Procedure was completed by Chris Cifuentes.

## 2024-04-04 ENCOUNTER — HOSPITAL ENCOUNTER (INPATIENT)
Facility: CLINIC | Age: 29
LOS: 4 days | Discharge: HOME | End: 2024-04-08
Attending: INTERNAL MEDICINE | Admitting: INTERNAL MEDICINE
Payer: COMMERCIAL

## 2024-04-04 ENCOUNTER — ONCOLOGY VISIT (OUTPATIENT)
Dept: ONCOLOGY | Facility: CLINIC | Age: 29
End: 2024-04-04
Attending: INTERNAL MEDICINE
Payer: COMMERCIAL

## 2024-04-04 ENCOUNTER — LAB (OUTPATIENT)
Dept: LAB | Facility: CLINIC | Age: 29
End: 2024-04-04
Attending: INTERNAL MEDICINE
Payer: COMMERCIAL

## 2024-04-04 VITALS
HEART RATE: 91 BPM | OXYGEN SATURATION: 97 % | HEIGHT: 66 IN | DIASTOLIC BLOOD PRESSURE: 74 MMHG | RESPIRATION RATE: 16 BRPM | SYSTOLIC BLOOD PRESSURE: 116 MMHG | BODY MASS INDEX: 23.16 KG/M2 | WEIGHT: 144.1 LBS | TEMPERATURE: 97.8 F

## 2024-04-04 DIAGNOSIS — C83.32 DIFFUSE LARGE B-CELL LYMPHOMA OF INTRATHORACIC LYMPH NODES (H): ICD-10-CM

## 2024-04-04 DIAGNOSIS — Z76.89 PREVENTION OF CHEMOTHERAPY-INDUCED NEUTROPENIA: Primary | ICD-10-CM

## 2024-04-04 DIAGNOSIS — C85.28 MEDIASTINAL LARGE B-CELL LYMPHOMA OF LYMPH NODES OF MULTIPLE REGIONS (H): Primary | ICD-10-CM

## 2024-04-04 DIAGNOSIS — Z76.89 PREVENTION OF CHEMOTHERAPY-INDUCED NEUTROPENIA: ICD-10-CM

## 2024-04-04 LAB
ABO/RH(D): NORMAL
ALBUMIN SERPL BCG-MCNC: 4 G/DL (ref 3.5–5.2)
ALP SERPL-CCNC: 82 U/L (ref 40–150)
ALT SERPL W P-5'-P-CCNC: 19 U/L (ref 0–50)
ANION GAP SERPL CALCULATED.3IONS-SCNC: 10 MMOL/L (ref 7–15)
ANTIBODY SCREEN: NEGATIVE
AST SERPL W P-5'-P-CCNC: 16 U/L (ref 0–45)
BASOPHILS # BLD AUTO: 0.1 10E3/UL (ref 0–0.2)
BASOPHILS NFR BLD AUTO: 2 %
BILIRUB SERPL-MCNC: <0.2 MG/DL
BUN SERPL-MCNC: 13.7 MG/DL (ref 6–20)
CALCIUM SERPL-MCNC: 9 MG/DL (ref 8.6–10)
CHLORIDE SERPL-SCNC: 107 MMOL/L (ref 98–107)
CREAT SERPL-MCNC: 0.73 MG/DL (ref 0.51–0.95)
DEPRECATED HCO3 PLAS-SCNC: 23 MMOL/L (ref 22–29)
EGFRCR SERPLBLD CKD-EPI 2021: >90 ML/MIN/1.73M2
EOSINOPHIL # BLD AUTO: 0.1 10E3/UL (ref 0–0.7)
EOSINOPHIL NFR BLD AUTO: 1 %
ERYTHROCYTE [DISTWIDTH] IN BLOOD BY AUTOMATED COUNT: 17.9 % (ref 10–15)
FIBRINOGEN PPP-MCNC: 301 MG/DL (ref 170–490)
GLUCOSE SERPL-MCNC: 87 MG/DL (ref 70–99)
HCT VFR BLD AUTO: 36 % (ref 35–47)
HGB BLD-MCNC: 11.9 G/DL (ref 11.7–15.7)
IMM GRANULOCYTES # BLD: 0.2 10E3/UL
IMM GRANULOCYTES NFR BLD: 4 %
INR PPP: 0.98 (ref 0.85–1.15)
LDH SERPL L TO P-CCNC: 202 U/L (ref 0–250)
LYMPHOCYTES # BLD AUTO: 1 10E3/UL (ref 0.8–5.3)
LYMPHOCYTES NFR BLD AUTO: 18 %
MAGNESIUM SERPL-MCNC: 2 MG/DL (ref 1.7–2.3)
MCH RBC QN AUTO: 30 PG (ref 26.5–33)
MCHC RBC AUTO-ENTMCNC: 33.1 G/DL (ref 31.5–36.5)
MCV RBC AUTO: 91 FL (ref 78–100)
MONOCYTES # BLD AUTO: 0.6 10E3/UL (ref 0–1.3)
MONOCYTES NFR BLD AUTO: 12 %
NEUTROPHILS # BLD AUTO: 3.5 10E3/UL (ref 1.6–8.3)
NEUTROPHILS NFR BLD AUTO: 63 %
NRBC # BLD AUTO: 0 10E3/UL
NRBC BLD AUTO-RTO: 0 /100
PHOSPHATE SERPL-MCNC: 4 MG/DL (ref 2.5–4.5)
PLATELET # BLD AUTO: 309 10E3/UL (ref 150–450)
POTASSIUM SERPL-SCNC: 4.3 MMOL/L (ref 3.4–5.3)
PROT SERPL-MCNC: 6.7 G/DL (ref 6.4–8.3)
RBC # BLD AUTO: 3.97 10E6/UL (ref 3.8–5.2)
SODIUM SERPL-SCNC: 140 MMOL/L (ref 135–145)
SPECIMEN EXPIRATION DATE: NORMAL
URATE SERPL-MCNC: 3.3 MG/DL (ref 2.4–5.7)
WBC # BLD AUTO: 5.4 10E3/UL (ref 4–11)

## 2024-04-04 PROCEDURE — 250N000012 HC RX MED GY IP 250 OP 636 PS 637: Performed by: INTERNAL MEDICINE

## 2024-04-04 PROCEDURE — 85610 PROTHROMBIN TIME: CPT | Performed by: PHYSICIAN ASSISTANT

## 2024-04-04 PROCEDURE — 250N000011 HC RX IP 250 OP 636: Performed by: INTERNAL MEDICINE

## 2024-04-04 PROCEDURE — 80053 COMPREHEN METABOLIC PANEL: CPT | Performed by: INTERNAL MEDICINE

## 2024-04-04 PROCEDURE — 258N000003 HC RX IP 258 OP 636: Performed by: INTERNAL MEDICINE

## 2024-04-04 PROCEDURE — 250N000013 HC RX MED GY IP 250 OP 250 PS 637: Performed by: INTERNAL MEDICINE

## 2024-04-04 PROCEDURE — 99207 PR CHGS TRANSFERRED TO HOSPITAL: CPT | Performed by: INTERNAL MEDICINE

## 2024-04-04 PROCEDURE — 120N000002 HC R&B MED SURG/OB UMMC

## 2024-04-04 PROCEDURE — 250N000013 HC RX MED GY IP 250 OP 250 PS 637: Performed by: PHYSICIAN ASSISTANT

## 2024-04-04 PROCEDURE — 36591 DRAW BLOOD OFF VENOUS DEVICE: CPT | Performed by: INTERNAL MEDICINE

## 2024-04-04 PROCEDURE — 85384 FIBRINOGEN ACTIVITY: CPT | Performed by: PHYSICIAN ASSISTANT

## 2024-04-04 PROCEDURE — 99213 OFFICE O/P EST LOW 20 MIN: CPT | Performed by: INTERNAL MEDICINE

## 2024-04-04 PROCEDURE — 84100 ASSAY OF PHOSPHORUS: CPT | Performed by: PHYSICIAN ASSISTANT

## 2024-04-04 PROCEDURE — 85025 COMPLETE CBC W/AUTO DIFF WBC: CPT | Performed by: INTERNAL MEDICINE

## 2024-04-04 PROCEDURE — 83615 LACTATE (LD) (LDH) ENZYME: CPT | Performed by: INTERNAL MEDICINE

## 2024-04-04 PROCEDURE — 86900 BLOOD TYPING SEROLOGIC ABO: CPT | Performed by: PHYSICIAN ASSISTANT

## 2024-04-04 PROCEDURE — 3E04305 INTRODUCTION OF OTHER ANTINEOPLASTIC INTO CENTRAL VEIN, PERCUTANEOUS APPROACH: ICD-10-PCS | Performed by: INTERNAL MEDICINE

## 2024-04-04 PROCEDURE — 83735 ASSAY OF MAGNESIUM: CPT | Performed by: PHYSICIAN ASSISTANT

## 2024-04-04 PROCEDURE — 99222 1ST HOSP IP/OBS MODERATE 55: CPT | Performed by: PHYSICIAN ASSISTANT

## 2024-04-04 PROCEDURE — 84550 ASSAY OF BLOOD/URIC ACID: CPT | Performed by: INTERNAL MEDICINE

## 2024-04-04 PROCEDURE — 250N000011 HC RX IP 250 OP 636: Performed by: PHYSICIAN ASSISTANT

## 2024-04-04 RX ORDER — ALBUTEROL SULFATE 0.83 MG/ML
2.5 SOLUTION RESPIRATORY (INHALATION)
Status: CANCELLED | OUTPATIENT
Start: 2024-04-04

## 2024-04-04 RX ORDER — ONDANSETRON 4 MG/1
4 TABLET, ORALLY DISINTEGRATING ORAL EVERY 8 HOURS PRN
Status: DISCONTINUED | OUTPATIENT
Start: 2024-04-04 | End: 2024-04-08 | Stop reason: HOSPADM

## 2024-04-04 RX ORDER — ALBUTEROL SULFATE 90 UG/1
1-2 AEROSOL, METERED RESPIRATORY (INHALATION)
Status: CANCELLED
Start: 2024-04-04

## 2024-04-04 RX ORDER — ONDANSETRON 2 MG/ML
4 INJECTION INTRAMUSCULAR; INTRAVENOUS EVERY 8 HOURS PRN
Status: DISCONTINUED | OUTPATIENT
Start: 2024-04-04 | End: 2024-04-08 | Stop reason: HOSPADM

## 2024-04-04 RX ORDER — PREDNISONE 50 MG/1
100 TABLET ORAL 2 TIMES DAILY
Status: CANCELLED
Start: 2024-04-04

## 2024-04-04 RX ORDER — HEPARIN SODIUM (PORCINE) LOCK FLUSH IV SOLN 100 UNIT/ML 100 UNIT/ML
5-10 SOLUTION INTRAVENOUS
Status: DISCONTINUED | OUTPATIENT
Start: 2024-04-04 | End: 2024-04-08 | Stop reason: HOSPADM

## 2024-04-04 RX ORDER — ONDANSETRON 8 MG/1
16 TABLET, FILM COATED ORAL
Qty: 10 TABLET | Refills: 0 | Status: COMPLETED | OUTPATIENT
Start: 2024-04-04 | End: 2024-04-08

## 2024-04-04 RX ORDER — ALBUTEROL SULFATE 90 UG/1
1-2 AEROSOL, METERED RESPIRATORY (INHALATION)
Status: DISCONTINUED | OUTPATIENT
Start: 2024-04-04 | End: 2024-04-08 | Stop reason: HOSPADM

## 2024-04-04 RX ORDER — AMOXICILLIN 250 MG
2 CAPSULE ORAL 2 TIMES DAILY
Status: DISCONTINUED | OUTPATIENT
Start: 2024-04-04 | End: 2024-04-04

## 2024-04-04 RX ORDER — MEPERIDINE HYDROCHLORIDE 25 MG/ML
25 INJECTION INTRAMUSCULAR; INTRAVENOUS; SUBCUTANEOUS EVERY 30 MIN PRN
Status: CANCELLED | OUTPATIENT
Start: 2024-04-04

## 2024-04-04 RX ORDER — PROCHLORPERAZINE MALEATE 10 MG
10 TABLET ORAL EVERY 6 HOURS PRN
Status: CANCELLED
Start: 2024-04-04

## 2024-04-04 RX ORDER — MEPERIDINE HYDROCHLORIDE 25 MG/ML
25 INJECTION INTRAMUSCULAR; INTRAVENOUS; SUBCUTANEOUS EVERY 30 MIN PRN
Status: DISCONTINUED | OUTPATIENT
Start: 2024-04-04 | End: 2024-04-08 | Stop reason: HOSPADM

## 2024-04-04 RX ORDER — DIPHENHYDRAMINE HYDROCHLORIDE 50 MG/ML
50 INJECTION INTRAMUSCULAR; INTRAVENOUS
Status: DISCONTINUED | OUTPATIENT
Start: 2024-04-04 | End: 2024-04-08 | Stop reason: HOSPADM

## 2024-04-04 RX ORDER — AMOXICILLIN 250 MG
1 CAPSULE ORAL 2 TIMES DAILY PRN
Status: DISCONTINUED | OUTPATIENT
Start: 2024-04-04 | End: 2024-04-08 | Stop reason: HOSPADM

## 2024-04-04 RX ORDER — METHYLPREDNISOLONE SODIUM SUCCINATE 125 MG/2ML
125 INJECTION, POWDER, LYOPHILIZED, FOR SOLUTION INTRAMUSCULAR; INTRAVENOUS
Status: CANCELLED
Start: 2024-04-04

## 2024-04-04 RX ORDER — ACYCLOVIR 400 MG/1
400 TABLET ORAL 2 TIMES DAILY
Status: DISCONTINUED | OUTPATIENT
Start: 2024-04-04 | End: 2024-04-08 | Stop reason: HOSPADM

## 2024-04-04 RX ORDER — HEPARIN SODIUM,PORCINE 10 UNIT/ML
5-10 VIAL (ML) INTRAVENOUS
Status: DISCONTINUED | OUTPATIENT
Start: 2024-04-04 | End: 2024-04-08 | Stop reason: HOSPADM

## 2024-04-04 RX ORDER — DIPHENHYDRAMINE HCL 25 MG
50 CAPSULE ORAL ONCE
Qty: 2 CAPSULE | Refills: 0 | Status: COMPLETED | OUTPATIENT
Start: 2024-04-04 | End: 2024-04-04

## 2024-04-04 RX ORDER — AMOXICILLIN 250 MG
2 CAPSULE ORAL 2 TIMES DAILY
Status: CANCELLED | OUTPATIENT
Start: 2024-04-04

## 2024-04-04 RX ORDER — ONDANSETRON 8 MG/1
16 TABLET, FILM COATED ORAL
Status: CANCELLED
Start: 2024-04-04

## 2024-04-04 RX ORDER — ACETAMINOPHEN 325 MG/1
650 TABLET ORAL EVERY 4 HOURS PRN
Status: DISCONTINUED | OUTPATIENT
Start: 2024-04-04 | End: 2024-04-08 | Stop reason: HOSPADM

## 2024-04-04 RX ORDER — ACETAMINOPHEN 325 MG/1
650 TABLET ORAL ONCE
Qty: 2 TABLET | Refills: 0 | Status: COMPLETED | OUTPATIENT
Start: 2024-04-04 | End: 2024-04-04

## 2024-04-04 RX ORDER — DIPHENHYDRAMINE HCL 25 MG
50 CAPSULE ORAL ONCE
Status: CANCELLED
Start: 2024-04-04

## 2024-04-04 RX ORDER — LORAZEPAM 2 MG/ML
.5-1 INJECTION INTRAMUSCULAR EVERY 6 HOURS PRN
Status: DISCONTINUED | OUTPATIENT
Start: 2024-04-04 | End: 2024-04-08 | Stop reason: HOSPADM

## 2024-04-04 RX ORDER — POLYETHYLENE GLYCOL 3350 17 G/17G
17 POWDER, FOR SOLUTION ORAL DAILY PRN
Status: DISCONTINUED | OUTPATIENT
Start: 2024-04-04 | End: 2024-04-08 | Stop reason: HOSPADM

## 2024-04-04 RX ORDER — EPINEPHRINE 1 MG/ML
0.3 INJECTION, SOLUTION INTRAMUSCULAR; SUBCUTANEOUS EVERY 5 MIN PRN
Status: CANCELLED | OUTPATIENT
Start: 2024-04-04

## 2024-04-04 RX ORDER — FLUCONAZOLE 200 MG/1
200 TABLET ORAL DAILY
Status: DISCONTINUED | OUTPATIENT
Start: 2024-04-04 | End: 2024-04-08 | Stop reason: HOSPADM

## 2024-04-04 RX ORDER — PROCHLORPERAZINE MALEATE 5 MG
10 TABLET ORAL EVERY 6 HOURS PRN
Status: DISCONTINUED | OUTPATIENT
Start: 2024-04-04 | End: 2024-04-04

## 2024-04-04 RX ORDER — POLYETHYLENE GLYCOL 3350 17 G/17G
17 POWDER, FOR SOLUTION ORAL DAILY
Status: DISCONTINUED | OUTPATIENT
Start: 2024-04-04 | End: 2024-04-05

## 2024-04-04 RX ORDER — ACETAMINOPHEN 325 MG/1
650 TABLET ORAL ONCE
Status: CANCELLED
Start: 2024-04-04

## 2024-04-04 RX ORDER — DEXAMETHASONE 4 MG/1
8 TABLET ORAL DAILY
Status: DISCONTINUED | OUTPATIENT
Start: 2024-04-10 | End: 2024-04-08 | Stop reason: HOSPADM

## 2024-04-04 RX ORDER — LORAZEPAM 0.5 MG/1
.5-1 TABLET ORAL EVERY 6 HOURS PRN
Status: CANCELLED
Start: 2024-04-04

## 2024-04-04 RX ORDER — LEVOFLOXACIN 250 MG/1
250 TABLET, FILM COATED ORAL DAILY
Status: DISCONTINUED | OUTPATIENT
Start: 2024-04-04 | End: 2024-04-08 | Stop reason: HOSPADM

## 2024-04-04 RX ORDER — METOCLOPRAMIDE 10 MG/1
10 TABLET ORAL 4 TIMES DAILY PRN
Status: DISCONTINUED | OUTPATIENT
Start: 2024-04-04 | End: 2024-04-08 | Stop reason: HOSPADM

## 2024-04-04 RX ORDER — AMOXICILLIN 250 MG
1 CAPSULE ORAL 2 TIMES DAILY
Status: DISCONTINUED | OUTPATIENT
Start: 2024-04-04 | End: 2024-04-07

## 2024-04-04 RX ORDER — PROCHLORPERAZINE MALEATE 5 MG
5 TABLET ORAL EVERY 6 HOURS PRN
Status: DISCONTINUED | OUTPATIENT
Start: 2024-04-04 | End: 2024-04-08 | Stop reason: HOSPADM

## 2024-04-04 RX ORDER — HEPARIN SODIUM (PORCINE) LOCK FLUSH IV SOLN 100 UNIT/ML 100 UNIT/ML
5 SOLUTION INTRAVENOUS
Status: COMPLETED | OUTPATIENT
Start: 2024-04-04 | End: 2024-04-04

## 2024-04-04 RX ORDER — METHYLPREDNISOLONE SODIUM SUCCINATE 125 MG/2ML
125 INJECTION, POWDER, LYOPHILIZED, FOR SOLUTION INTRAMUSCULAR; INTRAVENOUS
Status: DISCONTINUED | OUTPATIENT
Start: 2024-04-04 | End: 2024-04-08 | Stop reason: HOSPADM

## 2024-04-04 RX ORDER — DEXAMETHASONE 4 MG/1
8 TABLET ORAL DAILY
Status: CANCELLED
Start: 2024-04-10

## 2024-04-04 RX ORDER — DIPHENHYDRAMINE HYDROCHLORIDE 50 MG/ML
50 INJECTION INTRAMUSCULAR; INTRAVENOUS
Status: CANCELLED
Start: 2024-04-04

## 2024-04-04 RX ORDER — ONDANSETRON 4 MG/1
4 TABLET, FILM COATED ORAL EVERY 8 HOURS PRN
Status: DISCONTINUED | OUTPATIENT
Start: 2024-04-04 | End: 2024-04-08 | Stop reason: HOSPADM

## 2024-04-04 RX ORDER — ALBUTEROL SULFATE 0.83 MG/ML
2.5 SOLUTION RESPIRATORY (INHALATION)
Status: DISCONTINUED | OUTPATIENT
Start: 2024-04-04 | End: 2024-04-08 | Stop reason: HOSPADM

## 2024-04-04 RX ORDER — PANTOPRAZOLE SODIUM 40 MG/1
40 TABLET, DELAYED RELEASE ORAL 2 TIMES DAILY
Status: DISCONTINUED | OUTPATIENT
Start: 2024-04-04 | End: 2024-04-08 | Stop reason: HOSPADM

## 2024-04-04 RX ORDER — EPINEPHRINE 1 MG/ML
0.3 INJECTION, SOLUTION, CONCENTRATE INTRAVENOUS EVERY 5 MIN PRN
Status: DISCONTINUED | OUTPATIENT
Start: 2024-04-04 | End: 2024-04-08 | Stop reason: HOSPADM

## 2024-04-04 RX ORDER — PREDNISONE 50 MG/1
100 TABLET ORAL 2 TIMES DAILY
Status: DISCONTINUED | OUTPATIENT
Start: 2024-04-04 | End: 2024-04-08 | Stop reason: HOSPADM

## 2024-04-04 RX ORDER — LORAZEPAM 0.5 MG/1
.5-1 TABLET ORAL EVERY 6 HOURS PRN
Status: DISCONTINUED | OUTPATIENT
Start: 2024-04-04 | End: 2024-04-08 | Stop reason: HOSPADM

## 2024-04-04 RX ORDER — HEPARIN SODIUM,PORCINE 10 UNIT/ML
5-10 VIAL (ML) INTRAVENOUS EVERY 24 HOURS
Status: DISCONTINUED | OUTPATIENT
Start: 2024-04-04 | End: 2024-04-08 | Stop reason: HOSPADM

## 2024-04-04 RX ORDER — AMOXICILLIN 250 MG
2 CAPSULE ORAL 2 TIMES DAILY PRN
Status: DISCONTINUED | OUTPATIENT
Start: 2024-04-04 | End: 2024-04-08 | Stop reason: HOSPADM

## 2024-04-04 RX ORDER — LORAZEPAM 2 MG/ML
.5-1 INJECTION INTRAMUSCULAR EVERY 6 HOURS PRN
Status: CANCELLED | OUTPATIENT
Start: 2024-04-04

## 2024-04-04 RX ORDER — ENOXAPARIN SODIUM 100 MG/ML
40 INJECTION SUBCUTANEOUS EVERY 24 HOURS
Status: DISCONTINUED | OUTPATIENT
Start: 2024-04-04 | End: 2024-04-08 | Stop reason: HOSPADM

## 2024-04-04 RX ADMIN — CIMETIDINE 200 MG: 200 TABLET, FILM COATED ORAL at 19:31

## 2024-04-04 RX ADMIN — DOCUSATE SODIUM 50 MG AND SENNOSIDES 8.6 MG 1 TABLET: 8.6; 5 TABLET, FILM COATED ORAL at 19:31

## 2024-04-04 RX ADMIN — ONDANSETRON HYDROCHLORIDE 16 MG: 8 TABLET, FILM COATED ORAL at 17:55

## 2024-04-04 RX ADMIN — ACYCLOVIR 400 MG: 400 TABLET ORAL at 19:31

## 2024-04-04 RX ADMIN — PREDNISONE 100 MG: 50 TABLET ORAL at 16:31

## 2024-04-04 RX ADMIN — RITUXIMAB-ABBS 600 MG: 10 INJECTION, SOLUTION INTRAVENOUS at 16:59

## 2024-04-04 RX ADMIN — Medication 5 ML: at 10:35

## 2024-04-04 RX ADMIN — VINCRISTINE SULFATE 0.5 MG: 1 INJECTION, SOLUTION INTRAVENOUS at 18:46

## 2024-04-04 RX ADMIN — ETOPOSIDE 125 MG: 20 INJECTION, SOLUTION INTRAVENOUS at 18:45

## 2024-04-04 RX ADMIN — PANTOPRAZOLE SODIUM 40 MG: 40 TABLET, DELAYED RELEASE ORAL at 17:55

## 2024-04-04 RX ADMIN — ENOXAPARIN SODIUM 40 MG: 40 INJECTION SUBCUTANEOUS at 14:33

## 2024-04-04 RX ADMIN — DIPHENHYDRAMINE HYDROCHLORIDE 50 MG: 25 CAPSULE ORAL at 16:31

## 2024-04-04 RX ADMIN — POLYETHYLENE GLYCOL 3350 17 G: 17 POWDER, FOR SOLUTION ORAL at 14:32

## 2024-04-04 RX ADMIN — ACETAMINOPHEN 650 MG: 325 TABLET, FILM COATED ORAL at 16:31

## 2024-04-04 ASSESSMENT — ACTIVITIES OF DAILY LIVING (ADL)
ADLS_ACUITY_SCORE: 18
ADLS_ACUITY_SCORE: 33
ADLS_ACUITY_SCORE: 18

## 2024-04-04 ASSESSMENT — PAIN SCALES - GENERAL: PAINLEVEL: NO PAIN (0)

## 2024-04-04 NOTE — NURSING NOTE
"Chief Complaint   Patient presents with    Port Draw     Labs drawn from port by rn.  VS taken.     Port accessed with 20 gauge 3/4\" Power needle and labs drawn by rn.  Port flushed with NS and heparin.  Pt tolerated well.  VS taken.  Pt checked in for next appt.    Africa Sandhu RN      "

## 2024-04-04 NOTE — H&P
[facility]    History and Physical  Hematology / Oncology     Date of Admission:  4/4/2024   Date of Service (when I saw the patient): 04/04/24    Assessment & Plan   Clementine Kathleen is a 28 year old female with a past medical history including primary mediastinal B-cell lymphoma who is admitted 4/4/2024 for scheduled chemotherapy with dose adjusted R-EPOCH (C3D1=4/4/2024).     HEME  # Primary mediastinal B-cell lymphoma  Patient of Dr. Oakes. Initially presented with cervical lymphadenopathy 12/2023. Initial FNA (1/29/24) suggestive of possible Hodgkin lymphoma; however excisional LN biopsy (2/9/24) shows large B-cell lymphoma with differential diagnosis of primary mediastinal large B-cell lymphoma vs DLBCL NOS. PET with cervical and mediastinal lymphadenopathy only. Overall presentation would be quite consistent with PMBCL given her age, gender, sites of involvement, dim CD30 expression, and prominent fibrotic background.  Although CD23 IHC was negative, IHC for , MAL, and PD-L1 were positive in majority of the neoplastic cells suggesting PMBCL over DLBCL NOS. [id number] gene expression profile also consistent with PMBCL. FISH with gain of BCL2 but no MYC, BCL2, or BCL6 rearrangements.  PET showed bilateral cervical and mediastinal hypermetabolic lymphadenopathy (largest 4.4 cm with SUVmax 27 in a prevascular LN); no disease below diaphragm. Most recent PET 4/1/24 with evidence of complete response. Seen by Dr. Oakes in clinic on 4/4/2024 and plan to proceed with admission for C3 at dose level 3 with vincristine cap at 2 mg due to neuropathy, with a planned total of 6 cycles.  - Port accessed on admission  - Most recent echo 2/19 with LVEF 60%, no significant valvular abnormalities present  - Port accessed prior to admission, deaccessed on discharge.       Treatment Plan: Dose adjusted R-EPOCH (C3D1=4/4/2024)  - Rituximab 375 mg/m2 (600mg) IV x 1 dose - D1  - Prednisone  "100 mg BID x 10 doses - D1-5  - Etoposide 72 mg/m2 (125 mg) IV x 4 doses - D1-4  - Vincristine 0.5 mg, Doxorubicin (24mg) x 4 doses CIVI- D1-4  - Cyclophosphamide 1080 mg/m2 (1845 mg) IV x 1 dose - D5  - Pegfilgrastim x1 - D6 (to be scheduled in clinic ~4/9)  - Pre-meds: tylenol, benadryl, zofran, emend, dexamethasone (D6-7)    # Risk of TLS  - Encourage good PO hydration. Allopurinol stopped by outpatient team.      # Grade 1 Neuropathy   Endorsed L>R neuropathy in her fingertips. Secondary to chemotherapy.  - Monitor closely  - Cap vincristine at 2 mg, may need further decrease in future cycles.      # ID prophylaxis  -  mg BID  - Levofloxacin 250 mg daily and fluconazole 200 mg daily when ANC <1.0  - Pentamidine for PJP ppx last given 4/1/24 and next due ~5/1/24     # History of CINV   Chemotherapy induced nausea and vomiting during previous cycles, managed with prn zofran, compazine, scheduled emend, and zyprexa at bedtime. Symptoms improved within a few days of hospital discharge from cycle 2.  - Antiemetics per chemo regimen: ondansetron 16 mg daily (D1-5), fosaprepitant D5  - Continue prn zofran and compazine as indicated  - Consider zyprexa at bedtime if additional antiemetics need  - If CINV is persistent can consider Aloxi or PRN ativan.      # History of chemotherapy related rash  Maculopapular light pink, scattered, non-pruritic rash noted during cycle 2 at base of skull. Similar episode with C1 with rash to right side of neck and right arm. Managed with home cortisone cream.   - Monitor for rashes with chemotherapy    # Palpitations/tachycardia  Noted as heart \"racing/pounding\" after cycle 2 of chemo. No associated chest pain, shortness of breath, lower extremity pain/swelling, abdominal pain/nausea, dizziness. Echo from 2/2024 unremarkable. 3/25/24 NT proBNP normal at <36. No symptoms at time of admission  - No current inpatient needs, continue to monitor for symptoms     # Constipation  Regular " on bowel regimen miralax and senna BID. Continue while inpatient. Consider PRN Reglan if additional bowel medication is needed    MISC  # Fertility preservation - Established with [facility], underwent egg retrieval on 2/18.   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 Her boyfriend is a general surgery resident here at [facility]. Parents live in [place].      FEN   - IVF per chemotherapy regimen, IVF bolus prn   - PRN lyte replacement per standing protocol  - Regular diet as tolerated       DVT ppx: Lovenox, hold if Plt <50K   GI ppx: PTA PPI    DISPO: Anticipate 5 day stay for scheduled chemotherapy. Tentative discharge 4/8-4/9 pending completion of chemo and barring any clinical complications.   Follow-up/Referrals:  Will need appointment for neulasta (D6, ~4/9), labs/transfusions, and AMEENA follow up within 1 week of discharge    Code Status   Full Code    Patient and plan of care was discussed with attending physician Dr. Veloz.    >60 minutes spent on the date of the encounter. Over 50% of time was spent counseling the patient and/or coordinating care.     Angle Sanchez PA-C  Hematology/Oncology  Pager: [phone]    Primary Care Physician   Chela Peter    Chief Complaint   Admission for scheduled chemotherapy    History is obtained from the patient    History of Present Illness   Clementine Kathleen is a 28 year old female with a past medical history including primary mediastinal B-cell lymphoma who is admitted 4/4/2024 for scheduled chemotherapy with dose adjusted R-EPOCH (C3D1=4/4/2024).     Clementine is feeling well today. She presents from clinic for next cycle of chemotherapy. She is very happy to report that her recent PET/CT shows complete response!  She has been feeling quite well with the exception of some palpitation/tachycardia after last cycle, which is now improved.  She is staying active and walking about 2 miles every  day. She notes her bowels are regular on current bowel regimen and the nausea associated with last cycle of chemotherapy subsided within 1 to 2 days of discharge.  She denies headache, dizziness, URI symptoms, chest pain, shortness of breath, abdominal pain, nausea, vomiting.  She is eager to start chemotherapy today and continue her treatment.  No further questions at this time.  Her boyfriend is present at bedside and supportive.    Past Medical History    I have reviewed this patient's medical history and updated it with pertinent information if needed.   Past Medical History:   Diagnosis Date    Anxiety     Cervical lymphadenopathy     GERD     Interstitial cystitis        Past Surgical History   I have reviewed this patient's surgical history and updated it with pertinent information if needed.  Past Surgical History:   Procedure Laterality Date    CL AFF SURGICAL PATHOLOGY      Pearl neuroma    CLOSED REDUCTION PROXIMAL FIBULAR FRACTURE Left     EXCISE LESION NECK Left 2024    Procedure: Excisional Node Biopsy Left Neck;  Surgeon: Asher Damon MD;  Location: [facility] OR    INSERT PORT VASCULAR ACCESS Right 3/1/2024    Procedure: Insert port vascular access;  Surgeon: Solo Contreras PA-C;  Location: [facility] OR    IR CHEST PORT PLACEMENT > 5 YRS OF AGE  3/1/2024    PICC INSERTION - DOUBLE LUMEN Right 2024    41-1cm, Medial brachial vein       Prior to Admission Medications   Prior to Admission Medications   Prescriptions Last Dose Informant Patient Reported? Taking?   Levonorgestrel (KYLEENA IU) 2024  Yes Yes   Si Device by Intrauterine route once   OLANZapine zydis (ZYPREXA) 5 MG ODT Past Month  No Yes   Sig: Take 1 tablet (5 mg) by mouth at bedtime   acyclovir (ZOVIRAX) 400 MG tablet 2024  No Yes   Sig: Take 1 tablet (400 mg) by mouth 2 times daily   cimetidine (TAGAMET) 200 MG tablet 2024  Yes Yes   Sig: Take 200 mg by mouth 2 times daily   dexAMETHasone (DECADRON) 4 MG  tablet   No No   Sig: Take 2 tablets (8 mg) by mouth daily Start taking Wednesday, 3/20, for two days. (3/20 and 3/21)   fluconazole (DIFLUCAN) 200 MG tablet   No No   Sig: Take 1 tablet (200 mg) by mouth daily   levofloxacin (LEVAQUIN) 250 MG tablet   No No   Sig: Take 1 tablet (250 mg) by mouth daily   metoclopramide (REGLAN) 10 MG tablet Past Week  No Yes   Sig: Take 1 tablet (10 mg) by mouth 4 times daily as needed (constipation)   modafinil (PROVIGIL) 200 MG tablet More than a month  Yes Yes   Sig: Take 1 tablet (200 mg) by mouth daily as needed   mupirocin (BACTROBAN) 2 % external ointment   No No   Sig: Apply to affected area TID   Patient not taking: Reported on 4/4/2024   ondansetron (ZOFRAN) 4 MG tablet Past Month  No Yes   Sig: Take 1-2 tablets (4-8 mg) by mouth every 8 hours as needed for nausea or vomiting   pantoprazole (PROTONIX) 40 MG EC tablet 4/4/2024  No Yes   Sig: Take 1 tablet (40 mg) by mouth 2 times daily   polyethylene glycol (MIRALAX) 17 GM/Dose powder 4/4/2024  No Yes   Sig: Take 17 g by mouth daily as needed for constipation   prochlorperazine (COMPAZINE) 10 MG tablet Past Month  No Yes   Sig: Take 1 tablet (10 mg) by mouth every 6 hours as needed for nausea or vomiting   senna-docusate (SENOKOT-S/PERICOLACE) 8.6-50 MG tablet 4/4/2024  No Yes   Sig: Take 1 tablet by mouth daily as needed for constipation   triamcinolone (KENALOG) 0.1 % external cream More than a month  No Yes   Sig: Apply topically 2 times daily as needed for irritation      Facility-Administered Medications: None     Allergies   No Known Allergies    Social History   I have reviewed this patient's social history and updated it with pertinent information if needed. Clementine ROBLERO Hever  reports that she has never smoked. She has never been exposed to tobacco smoke. She has never used smokeless tobacco. She reports that she does not currently use alcohol. She reports that she does not currently use drugs.    Family History   I  have reviewed this patient's family history and updated it with pertinent information if needed.   Family History   Problem Relation Age of Onset    Anesthesia Reaction No family hx of     Clotting Disorder No family hx of        Review of Systems   The 10 point Review of Systems is negative other than noted in the HPI or here.     Physical Exam   Temp: 97.8  F (36.6  C) Temp src: Tympanic   Pulse: 87   Resp: 20   O2 Device: None (Room air)    Vital Signs with Ranges  Temp:  [97.8  F (36.6  C)] 97.8  F (36.6  C)  Pulse:  [87-91] 87  Resp:  [16-20] 20  BP: (116)/(74) 116/74  SpO2:  [97 %] 97 %  142 lbs 3.2 oz    Constitutional: Awake and conversational. Non- toxic appearing. No acute distress.   HEENT: NCAT. Moist mucus membranes without lesions, thrush, or exudates appreciated  Lymph: Neck supple, no ridigity. No significant adenopathy noted.   Respiratory: Breathing comfortably on room air, no evidence of respiratory distress. Lungs CTAB without stridor, wheeze, rhonchi, or rales.   Cardiovascular: Regular rate and rhythm. No peripheral edema.    GI: Abdomen with normoactive bowel sounds, soft and non-tender throughout.   Skin: Skin is clean, dry, intact. No jaundice or significant rashes appreciated.   Neurologic: Alert and with normal speech. Grossly nonfocal. Moves extremities equally and spontaneously.  Neuropsychiatric: Calm, affect congruent to situation. Appropriate mood and affect.       Data   Results for orders placed or performed during the hospital encounter of 04/04/24 (from the past 24 hour(s))   ABO/RH Type and Screen     Narrative    The following orders were created for panel order ABO/RH Type and Screen .  Procedure                               Abnormality         Status                     ---------                               -----------         ------                     Adult Type and Screen[[id number]]                                                         Please view results for these  tests on the individual orders.

## 2024-04-04 NOTE — PLAN OF CARE
Goal Outcome Evaluation:    AVSS, pt UAL. Chemo will start this evening.    Nursing Focus: Admission  D: Arrived at 1250 from clinic via self transport. Patient accompanied by boyfriend Dima. Admitted for chemotherapy. No Complaints.      I: Admission process began.  Patient oriented to room, enviroment, call light.  MD notified of patient's arrival on unit.     A: Vital signs stable, afebrile.  Patient stable at this time.     P: Implement plan of care when available. Continue to monitor patient. Nursing interventions as appropriate. Notify MD with changes in pt status.

## 2024-04-04 NOTE — PHARMACY-ADMISSION MEDICATION HISTORY
Pharmacy Intern Admission Medication History    Admission medication history is complete. The information provided in this note is only as accurate as the sources available at the time of the update.    Information Source(s): Patient and CareEverywhere/SureScripts via in-person    Pertinent Information: Per patient, she currently not taking dexamethasone(3/20-3/21), fluconazole, levofloxacin, modanafil(had not taken for 2 years but requested kept in the list due to difficulty obtaining prior authorization), mupirocin ointment, olanzapine, ondansteron, prochlorperazine, and triamcinolone(had not taken).     Changes made to PTA medication list:  Added: None  Deleted: None  Changed: None    Allergies reviewed with patient and updates made in EHR: yes    Medication History Completed By: Jose Cruz Tinsley, PharmD student, 4/4/2024 1:48 PM    PTA Med List   Medication Sig Last Dose    acyclovir (ZOVIRAX) 400 MG tablet Take 1 tablet (400 mg) by mouth 2 times daily 4/4/2024    cimetidine (TAGAMET) 200 MG tablet Take 200 mg by mouth 2 times daily 4/4/2024    Levonorgestrel (KYLEENA IU) 1 Device by Intrauterine route once 4/4/2024    metoclopramide (REGLAN) 10 MG tablet Take 1 tablet (10 mg) by mouth 4 times daily as needed (constipation) Past Week    modafinil (PROVIGIL) 200 MG tablet Take 1 tablet (200 mg) by mouth daily as needed More than a month    OLANZapine zydis (ZYPREXA) 5 MG ODT Take 1 tablet (5 mg) by mouth at bedtime Past Month    ondansetron (ZOFRAN) 4 MG tablet Take 1-2 tablets (4-8 mg) by mouth every 8 hours as needed for nausea or vomiting Past Month    pantoprazole (PROTONIX) 40 MG EC tablet Take 1 tablet (40 mg) by mouth 2 times daily 4/4/2024    polyethylene glycol (MIRALAX) 17 GM/Dose powder Take 17 g by mouth daily as needed for constipation 4/4/2024    prochlorperazine (COMPAZINE) 10 MG tablet Take 1 tablet (10 mg) by mouth every 6 hours as needed for nausea or vomiting Past Month    senna-docusate  (SENOKOT-S/PERICOLACE) 8.6-50 MG tablet Take 1 tablet by mouth daily as needed for constipation 4/4/2024    triamcinolone (KENALOG) 0.1 % external cream Apply topically 2 times daily as needed for irritation More than a month

## 2024-04-04 NOTE — LETTER
4/4/2024         RE: Clementine Kathleen  [address]  [facility] [address]        Dear Colleague,    Thank you for referring your patient, Clementine Kathleen, to the [facility]. Please see a copy of my visit note below.     [facility]      FOLLOW-UP VISIT NOTE                       Apr 4, 2024    Subjective  REASON FOR VISIT: Follow up PMBCL, pre Cycle 3 R-EPOCH    ONCOLOGIC SUMMARY:  Diagnosis:  Primary mediastinal B-cell lymphoma dx 1/2024, presenting with cervical LAD. 1/29/24 FNA indeterminate; 2/9/24 left level 3 excisional LN biopsy showed large B-cell lymphoma with rare abnormal B-cells on flow cytometry. Although CD23 IHC was negative, IHC for , MAL, and PD-L1 were positive in majority of the neoplastic cells suggesting PMBCL over DLBCL NOS. [id number] gene expression profile also consistent with PMBCL. FISH with gain of BCL2 but no MYC, BCL2, or BCL6 rearrangements. PET showed bilateral cervical and mediastinal hypermetabolic lymphadenopathy (largest 4.4 cm with SUVmax 27 in a prevascular LN); no disease below diaphragm.     Intent of treatment: Curative    Treatment history:  2/22/24: Cycle 1 R-EPOCH, dose level 1  3/14/24: Cycle 2 R-EPOCH, dose level 2. PET after 2 cycles shows CR!    INTERVAL HISTORY:  Clementine is here today for follow up with Dima prior to Cycle 3 R-EPOCH.  Had some palpitations/tachycardia ~90s (baseline HR 50-60) the second week of chemo but now improved. No SOB or chest pain.  Numbness in fingertips is slightly worse, none in feet still.  Constipation is much better this cycle, using Miralax/Senna BID with Reglan the second week.  No fevers, nausea, abd pain, bleeding.   Walking 2 miles daily.    I have reviewed and updated the following:  Past Medical History:   Diagnosis Date    Anxiety     Cervical lymphadenopathy     GERD     Interstitial cystitis       No Known Allergies    Current Outpatient Medications:     acyclovir (ZOVIRAX)  400 MG tablet, Take 1 tablet (400 mg) by mouth 2 times daily, Disp: 60 tablet, Rfl: 4    cimetidine (TAGAMET) 200 MG tablet, Take 200 mg by mouth 2 times daily, Disp: , Rfl:     dexAMETHasone (DECADRON) 4 MG tablet, Take 2 tablets (8 mg) by mouth daily Start taking Wednesday, 3/20, for two days. (3/20 and 3/21), Disp: 4 tablet, Rfl: 0    fluconazole (DIFLUCAN) 200 MG tablet, Take 1 tablet (200 mg) by mouth daily, Disp: 30 tablet, Rfl: 0    levofloxacin (LEVAQUIN) 250 MG tablet, Take 1 tablet (250 mg) by mouth daily, Disp: 30 tablet, Rfl: 0    Levonorgestrel (KYLEENA IU), 1 Device by Intrauterine route once, Disp: , Rfl:     modafinil (PROVIGIL) 200 MG tablet, Take 1 tablet (200 mg) by mouth daily as needed, Disp: , Rfl:     OLANZapine zydis (ZYPREXA) 5 MG ODT, Take 1 tablet (5 mg) by mouth at bedtime, Disp: 10 tablet, Rfl: 0    pantoprazole (PROTONIX) 40 MG EC tablet, Take 1 tablet (40 mg) by mouth 2 times daily, Disp: 60 tablet, Rfl: 0    polyethylene glycol (MIRALAX) 17 GM/Dose powder, Take 17 g by mouth daily as needed for constipation, Disp: 510 g, Rfl: 0    senna-docusate (SENOKOT-S/PERICOLACE) 8.6-50 MG tablet, Take 1 tablet by mouth daily as needed for constipation, Disp: 15 tablet, Rfl: 4    triamcinolone (KENALOG) 0.1 % external cream, Apply topically 2 times daily as needed for irritation, Disp: 15 g, Rfl: 0    metoclopramide (REGLAN) 10 MG tablet, Take 1 tablet (10 mg) by mouth 4 times daily as needed (constipation) (Patient not taking: Reported on 4/4/2024), Disp: 30 tablet, Rfl: 3    mupirocin (BACTROBAN) 2 % external ointment, Apply to affected area TID (Patient not taking: Reported on 4/4/2024), Disp: 15 g, Rfl: 0    ondansetron (ZOFRAN) 4 MG tablet, Take 1-2 tablets (4-8 mg) by mouth every 8 hours as needed for nausea or vomiting (Patient not taking: Reported on 4/4/2024), Disp: 30 tablet, Rfl: 0    prochlorperazine (COMPAZINE) 10 MG tablet, Take 1 tablet (10 mg) by mouth every 6 hours as needed for  "nausea or vomiting (Patient not taking: Reported on 4/4/2024), Disp: 30 tablet, Rfl: 0  No current facility-administered medications for this visit.    REVIEW OF SYSTEMS:  10-point ROS reviewed and negative other than that mentioned in HPI.     Objective  VITAL SIGNS  /74 (BP Location: Right arm, Patient Position: Sitting, Cuff Size: Adult Regular)   Pulse 91   Temp 97.8  F (36.6  C) (Oral)   Resp 16   Ht 1.676 m (5' 5.98\")   Wt 65.4 kg (144 lb 1.6 oz)   LMP 01/31/2024 (Exact Date)   SpO2 97%   BMI 23.27 kg/m      ECOG PS: 0     PHYSICAL EXAM:  General: Awake, alert, in no acute distress. Oriented x 3.  HEENT: Chemo induced alopecia. Normocephalic, atraumatic. No scleral icterus.   Lymph: No cervical LAD appreciate. Strip over left neck excision.   CV: Regular rate and rhythm. No murmurs, rubs, or gallops appreciated.  Resp: Good inspiratory effort, lungs clear to auscultation bilaterally.  GI: Abdomen soft, nontender, nondistended.   Ext: No peripheral edema bilaterally.  Neuro: CN II-XII grossly intact. No focal deficits.   Skin: No rash, unusual bruising or prominent lesions.  Psych: Pleasant, normal affect.    LABS:  I reviewed the following labs:  Lab Results   Component Value Date    WBC 5.4 04/04/2024    HGB 11.9 04/04/2024    HCT 36.0 04/04/2024    MCV 91 04/04/2024     04/04/2024    INR 1.08 03/19/2024     Lab Results   Component Value Date     04/01/2024    POTASSIUM 4.0 04/01/2024    CR 0.71 04/01/2024    BUN 12.7 04/01/2024    CHLORIDE 106 04/01/2024    DEE 9.0 04/01/2024     (H) 04/01/2024      Lab Results   Component Value Date    ALT 27 04/01/2024    AST 17 04/01/2024    ALKPHOS 93 04/01/2024    BILITOTAL 0.2 04/01/2024      Lab Results   Component Value Date     04/01/2024    URIC 2.9 03/25/2024 4/1/24 PET:  IMPRESSION: In this patient with primary mediastinal B-cell lymphoma  following 2 cycles of chemotherapy:   1. Complete metabolic response by Jesus " criteria  2. Resolution of cervical and mediastinal hypermetabolic  lymphadenopathy.    Assessment & Plan  Primary mediastinal B-cell lymphoma  Dx 1/2024, presenting cervical lymphadenopathy. Initial FNA suggestive of possible Hodgkin lymphoma; however excisional biopsy showed large B-cell lymphoma with differential diagnosis of primary mediastinal large B-cell lymphoma vs DLBCL NOS. Additional IHC staining for , MAL, and PD-L1, and [id number] gene expression profile all favor PMBCL diagnosis over DLBCL NOS. PET with cervical and mediastinal lymphadenopathy only. LDH normal.   Previously discussed PMBCL diagnosis and plan for DA-R-EPOCH x 6 cycles. In the single-arm phase 2 prospective study of R-EPOCH (without radiation) for PMBCL, 5-year EFS was 93% and OS 97% ([id number]).   Cycle 1 R-EPOCH 2/22/24, tolerating well so far other than constipation.  Cycle 2 R-EPOCH 3/14/24. ANC dread >0.5 last cycle so qualifies for 20% increase in doxorubicin, etoposide, and cyclophosphamide (dose level 2).   PET after 2 cycles shows complete response! Shared the good news and images today.  Proceed with Cycle 3 R-EPOCH today. ANC dread >0.5 last cycle so qualifies for 20% increase in doxorubicin, etoposide, and cyclophosphamide again (dose level 3); however will cap vincristine at 2 mg due to neuropathy.   Next PET at [facility] given CR already.     Constipation  Scheduled Miralax and Senna-S.  PRN Reglan.    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 Affecting fingertips only.   Cap vincristine at 2 mg starting Cycle 3, may need further decrease in future cycles.     Palpitations/tachycardia  3/25/24 NT proBNP normal at <36.   Consider CT PE if worsening tachycardia, SOB, or chest pain.      Fertility preservation  Established with [facility] and underwent egg retrieval on 2/18/24.      Ppx  TLS ppx: now done with allopurinol.  ID ppx: continue  mg BID. Levo and fluc when ANC <1.0. Monthly pentamidine  for PJP ppx (last given 4/1/24, next due ~5/1/24).  Vaccines: up to date on COVID and flu shots.       PLAN:  Admit for Cycle 3 R-EPOCH today (increase to dose level 3, vincristine capped at 2 mg)    Total of 30 minutes on patient visit, reviewing records, interpreting test results, placing orders, and documentation on the day of service.    The longitudinal plan of care for the diagnosis(es)/condition(s) as documented were addressed during this visit. Due to the added complexity in care, I will continue to support Clementine in the subsequent management and with ongoing continuity of care.     Vanessa Oakes MD  Attending Physician, [facility]

## 2024-04-05 LAB
ACID FAST STAIN (ARUP): NORMAL
ALBUMIN SERPL BCG-MCNC: 3.8 G/DL (ref 3.5–5.2)
ALP SERPL-CCNC: 75 U/L (ref 40–150)
ALT SERPL W P-5'-P-CCNC: 17 U/L (ref 0–50)
ANION GAP SERPL CALCULATED.3IONS-SCNC: 9 MMOL/L (ref 7–15)
AST SERPL W P-5'-P-CCNC: 14 U/L (ref 0–45)
BASOPHILS # BLD AUTO: 0 10E3/UL (ref 0–0.2)
BASOPHILS NFR BLD AUTO: 0 %
BILIRUB SERPL-MCNC: <0.2 MG/DL
BUN SERPL-MCNC: 10.6 MG/DL (ref 6–20)
CALCIUM SERPL-MCNC: 9 MG/DL (ref 8.6–10)
CHLORIDE SERPL-SCNC: 106 MMOL/L (ref 98–107)
CREAT SERPL-MCNC: 0.62 MG/DL (ref 0.51–0.95)
DEPRECATED HCO3 PLAS-SCNC: 23 MMOL/L (ref 22–29)
EGFRCR SERPLBLD CKD-EPI 2021: >90 ML/MIN/1.73M2
EOSINOPHIL # BLD AUTO: 0 10E3/UL (ref 0–0.7)
EOSINOPHIL NFR BLD AUTO: 0 %
ERYTHROCYTE [DISTWIDTH] IN BLOOD BY AUTOMATED COUNT: 17.9 % (ref 10–15)
FIBRINOGEN PPP-MCNC: 286 MG/DL (ref 170–490)
GLUCOSE SERPL-MCNC: 142 MG/DL (ref 70–99)
HCT VFR BLD AUTO: 33.4 % (ref 35–47)
HGB BLD-MCNC: 11.2 G/DL (ref 11.7–15.7)
IMM GRANULOCYTES # BLD: 0.1 10E3/UL
IMM GRANULOCYTES NFR BLD: 1 %
INR PPP: 1.04 (ref 0.85–1.15)
LYMPHOCYTES # BLD AUTO: 0.8 10E3/UL (ref 0.8–5.3)
LYMPHOCYTES NFR BLD AUTO: 7 %
MAGNESIUM SERPL-MCNC: 1.8 MG/DL (ref 1.7–2.3)
MCH RBC QN AUTO: 30.4 PG (ref 26.5–33)
MCHC RBC AUTO-ENTMCNC: 33.5 G/DL (ref 31.5–36.5)
MCV RBC AUTO: 91 FL (ref 78–100)
MONOCYTES # BLD AUTO: 0.4 10E3/UL (ref 0–1.3)
MONOCYTES NFR BLD AUTO: 4 %
NEUTROPHILS # BLD AUTO: 9.6 10E3/UL (ref 1.6–8.3)
NEUTROPHILS NFR BLD AUTO: 88 %
NRBC # BLD AUTO: 0 10E3/UL
NRBC BLD AUTO-RTO: 0 /100
PHOSPHATE SERPL-MCNC: 3.6 MG/DL (ref 2.5–4.5)
PLATELET # BLD AUTO: 310 10E3/UL (ref 150–450)
POTASSIUM SERPL-SCNC: 4.1 MMOL/L (ref 3.4–5.3)
PROT SERPL-MCNC: 6.1 G/DL (ref 6.4–8.3)
RBC # BLD AUTO: 3.68 10E6/UL (ref 3.8–5.2)
SODIUM SERPL-SCNC: 138 MMOL/L (ref 135–145)
URATE SERPL-MCNC: 2.7 MG/DL (ref 2.4–5.7)
WBC # BLD AUTO: 10.9 10E3/UL (ref 4–11)

## 2024-04-05 PROCEDURE — 250N000011 HC RX IP 250 OP 636: Performed by: PHYSICIAN ASSISTANT

## 2024-04-05 PROCEDURE — 85384 FIBRINOGEN ACTIVITY: CPT | Performed by: PHYSICIAN ASSISTANT

## 2024-04-05 PROCEDURE — 250N000012 HC RX MED GY IP 250 OP 636 PS 637: Performed by: INTERNAL MEDICINE

## 2024-04-05 PROCEDURE — 250N000013 HC RX MED GY IP 250 OP 250 PS 637: Performed by: PHYSICIAN ASSISTANT

## 2024-04-05 PROCEDURE — 85025 COMPLETE CBC W/AUTO DIFF WBC: CPT | Performed by: PHYSICIAN ASSISTANT

## 2024-04-05 PROCEDURE — 120N000002 HC R&B MED SURG/OB UMMC

## 2024-04-05 PROCEDURE — 85610 PROTHROMBIN TIME: CPT | Performed by: PHYSICIAN ASSISTANT

## 2024-04-05 PROCEDURE — 80053 COMPREHEN METABOLIC PANEL: CPT | Performed by: PHYSICIAN ASSISTANT

## 2024-04-05 PROCEDURE — 84550 ASSAY OF BLOOD/URIC ACID: CPT | Performed by: PHYSICIAN ASSISTANT

## 2024-04-05 PROCEDURE — 83735 ASSAY OF MAGNESIUM: CPT | Performed by: PHYSICIAN ASSISTANT

## 2024-04-05 PROCEDURE — 258N000003 HC RX IP 258 OP 636: Performed by: INTERNAL MEDICINE

## 2024-04-05 PROCEDURE — 250N000011 HC RX IP 250 OP 636: Mod: JZ | Performed by: INTERNAL MEDICINE

## 2024-04-05 PROCEDURE — 84100 ASSAY OF PHOSPHORUS: CPT | Performed by: PHYSICIAN ASSISTANT

## 2024-04-05 RX ORDER — POLYETHYLENE GLYCOL 3350 17 G/17G
17 POWDER, FOR SOLUTION ORAL 2 TIMES DAILY
Status: DISCONTINUED | OUTPATIENT
Start: 2024-04-05 | End: 2024-04-08 | Stop reason: HOSPADM

## 2024-04-05 RX ADMIN — VINCRISTINE SULFATE 0.5 MG: 1 INJECTION, SOLUTION INTRAVENOUS at 16:40

## 2024-04-05 RX ADMIN — ETOPOSIDE 125 MG: 20 INJECTION, SOLUTION INTRAVENOUS at 16:30

## 2024-04-05 RX ADMIN — PREDNISONE 100 MG: 50 TABLET ORAL at 16:29

## 2024-04-05 RX ADMIN — PANTOPRAZOLE SODIUM 40 MG: 40 TABLET, DELAYED RELEASE ORAL at 08:29

## 2024-04-05 RX ADMIN — CIMETIDINE 200 MG: 200 TABLET, FILM COATED ORAL at 08:28

## 2024-04-05 RX ADMIN — PREDNISONE 100 MG: 50 TABLET ORAL at 08:29

## 2024-04-05 RX ADMIN — ACYCLOVIR 400 MG: 400 TABLET ORAL at 08:29

## 2024-04-05 RX ADMIN — PANTOPRAZOLE SODIUM 40 MG: 40 TABLET, DELAYED RELEASE ORAL at 20:15

## 2024-04-05 RX ADMIN — POLYETHYLENE GLYCOL 3350 17 G: 17 POWDER, FOR SOLUTION ORAL at 08:28

## 2024-04-05 RX ADMIN — ENOXAPARIN SODIUM 40 MG: 40 INJECTION SUBCUTANEOUS at 14:26

## 2024-04-05 RX ADMIN — ONDANSETRON HYDROCHLORIDE 16 MG: 8 TABLET, FILM COATED ORAL at 16:29

## 2024-04-05 RX ADMIN — ONDANSETRON 4 MG: 4 TABLET, ORALLY DISINTEGRATING ORAL at 08:29

## 2024-04-05 RX ADMIN — CIMETIDINE 200 MG: 200 TABLET, FILM COATED ORAL at 20:15

## 2024-04-05 RX ADMIN — POLYETHYLENE GLYCOL 3350 17 G: 17 POWDER, FOR SOLUTION ORAL at 20:15

## 2024-04-05 RX ADMIN — DOCUSATE SODIUM 50 MG AND SENNOSIDES 8.6 MG 1 TABLET: 8.6; 5 TABLET, FILM COATED ORAL at 20:15

## 2024-04-05 RX ADMIN — ACYCLOVIR 400 MG: 400 TABLET ORAL at 20:14

## 2024-04-05 RX ADMIN — DOCUSATE SODIUM 50 MG AND SENNOSIDES 8.6 MG 1 TABLET: 8.6; 5 TABLET, FILM COATED ORAL at 08:28

## 2024-04-05 ASSESSMENT — ACTIVITIES OF DAILY LIVING (ADL)
ADLS_ACUITY_SCORE: 18
DEPENDENT_IADLS:: INDEPENDENT
ADLS_ACUITY_SCORE: 18

## 2024-04-05 NOTE — PROGRESS NOTES
[facility]    Hematology / Oncology Progress Note    Patient: Clementine Kathleen  MRN: [id number]  Admission Date: 4/4/2024  Hospital Day # 1    Date of Service (when I saw the patient): 04/05/2024     Assessment & Plan   Clementine Kathleen is a 28 year old female with a past medical history including primary mediastinal B-cell lymphoma who is admitted 4/4/2024 for scheduled chemotherapy with dose adjusted R-EPOCH (C3D1=4/4/2024).     Today:  - C3D2 of DA-R-EPOCH; tolerating well thus far. Continue to monitor for chemo related side effects.  - Nausea well controlled on current regimen; no adjustments needed at this time  - Continue to monitor and provide best supportive cares.     HEME  # Primary mediastinal B-cell lymphoma  Patient of Dr. Oakes. Initially presented with cervical lymphadenopathy 12/2023. Initial FNA (1/29/24) suggestive of possible Hodgkin lymphoma; however excisional LN biopsy (2/9/24) shows large B-cell lymphoma with differential diagnosis of primary mediastinal large B-cell lymphoma vs DLBCL NOS. PET with cervical and mediastinal lymphadenopathy only. Overall presentation would be quite consistent with PMBCL given her age, gender, sites of involvement, dim CD30 expression, and prominent fibrotic background.  Although CD23 IHC was negative, IHC for , MAL, and PD-L1 were positive in majority of the neoplastic cells suggesting PMBCL over DLBCL NOS. Lidnl3GT gene expression profile also consistent with PMBCL. FISH with gain of BCL2 but no MYC, BCL2, or BCL6 rearrangements.  PET showed bilateral cervical and mediastinal hypermetabolic lymphadenopathy (largest 4.4 cm with SUVmax 27 in a prevascular LN); no disease below diaphragm. Most recent PET 4/1/24 with evidence of complete response. Seen by Dr. Oakes in clinic on 4/4/2024 and plan to proceed with admission for C3 at dose level 3 with vincristine cap at 2 mg due to neuropathy, with a planned total of 6  "cycles.  - Port accessed on admission  - Most recent echo 2/19 with LVEF 60%, no significant valvular abnormalities present      Treatment Plan: Dose adjusted R-EPOCH (C3D1=4/4/2024)  - Rituximab 375 mg/m2 (600mg) IV x 1 dose - D1  - Prednisone 100 mg BID x 10 doses - D1-5  - Etoposide 72 mg/m2 (125 mg) IV x 4 doses - D1-4  - Vincristine 0.5 mg, Doxorubicin (24mg) x 4 doses CIVI- D1-4  - Cyclophosphamide 1080 mg/m2 (1845 mg) IV x 1 dose - D5  - Pegfilgrastim x1 - D6 (to be scheduled in clinic ~4/9)  - Pre-meds: tylenol, benadryl, zofran, emend, dexamethasone (D6-7)     # Risk of TLS  - Encourage good PO hydration. Allopurinol no longer indicated as pt in CR.      # Grade 1 Neuropathy   Endorsed L>R neuropathy in her fingertips. Secondary to chemotherapy.  - Monitor closely  - Cap vincristine at 2 mg, may need further decrease in future cycles.      # ID prophylaxis  -  mg BID  - Levofloxacin 250 mg daily and fluconazole 200 mg daily when ANC <1.0  - Pentamidine for PJP ppx last given 4/1/24 and next due ~5/1/24     # History of CINV   Chemotherapy induced nausea and vomiting during previous cycles, managed with prn zofran, compazine, scheduled emend, and zyprexa at bedtime. Symptoms improved within a few days of hospital discharge from cycle 2.  - Antiemetics per chemo regimen: ondansetron 16 mg daily (D1-5), fosaprepitant D5  - Continue prn zofran and compazine as indicated  - Consider zyprexa at bedtime if additional antiemetics need  - If CINV is persistent can consider Aloxi or PRN ativan.      # History of chemotherapy related rash  Maculopapular light pink, scattered, non-pruritic rash noted during cycle 2 at base of skull. Similar episode with C1 with rash to right side of neck and right arm. Managed with home cortisone cream.   - Monitor for rashes with chemotherapy     # Palpitations/tachycardia  Noted as heart \"racing/pounding\" after cycle 2 of chemo. No associated chest pain, shortness of breath, " lower extremity pain/swelling, abdominal pain/nausea, dizziness. Echo from 2/2024 unremarkable. 3/25/24 NT proBNP normal at <36. No symptoms at time of admission  - No current inpatient needs, continue to monitor for symptoms   - Per outpatient notes, consider CT PE if worsening tachycardia, SOB, or chest pain.      # Constipation  Regular on bowel regimen miralax and senna BID. Continue while inpatient. Consider PRN Reglan if additional bowel medication is needed     MISC  # Fertility preservation - Established with [facility], underwent egg retrieval on 2/18.   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 Her boyfriend is a general surgery resident here at [facility]. Parents live in [place].     Clinically Significant Risk Factors                             # Financial/Environmental Concerns: none           FEN   - IVF per chemotherapy regimen, IVF bolus prn   - PRN lyte replacement per standing protocol  - Regular diet as tolerated         DVT ppx: Lovenox, hold if Plt <50K   GI ppx: PTA PPI     DISPO: Anticipate 5 day stay for scheduled chemotherapy. Tentative discharge 4/8 pending completion of chemo and barring any clinical complications.   Follow-up/Referrals:  APPT 18 requested for labs/neulasta 4/9, labs 2x weekly, and AMEENA follow up week of 4/15    I spent >35 minutes face-to-face and/or coordinating or discussing care plan. Over 50% of our time on the unit was spent counseling the patient and/or coordinating care    Patient is staffed with attending physician Dr. Veloz.       Angle Sanchez PA-C   Hematology/Oncology   Pager: [phone]    ___________________________________________________________________    Interval History   Chart reviewed, no acute events noted overnight. Clementine is feeling well today. Chemo started last night and going well thus far without symptoms or complications.  She is tired this morning due to  interruptions overnight and looking forward to sleeping in.  She denies fevers, chills, headache, dizziness, chest pain/palpitations, shortness of breath, abdominal pain, nausea, vomiting, diarrhea.  Having regular bowel movements.  She does note mild nausea earlier this morning but well-controlled with Zofran at present.  We reviewed plan for continued chemotherapy per protocol and if all goes well, possible discharge Monday.  Will arrange for outpatient follow-up appointments for next week.  She is appreciative of this, expresses understanding and is in agreement with this plan.  No further questions at this time.  Boyfriend Dima present at bedside and supportive.    Complete and Comprehensive review of systems review and negative other than noted here or in the HPI.       Physical Exam   Temp: 98  F (36.7  C) Temp src: Oral BP: 114/67 Pulse: 77   Resp: 18 SpO2: 97 % O2 Device: None (Room air)    Vitals:    04/04/24 1300   Weight: 64.5 kg (142 lb 3.2 oz)     Vital Signs with Ranges  Temp:  [97  F (36.1  C)-98.4  F (36.9  C)] 98  F (36.7  C)  Pulse:  [64-81] 77  Resp:  [16-20] 18  BP: (104-117)/(55-78) 114/67  SpO2:  [95 %-98 %] 97 %  I/O last 3 completed shifts:  In: 3571.5 [P.O.:2170; I.V.:500; IV Piggyback:901.5]  Out: -     Constitutional: Awake and conversational. Non- toxic appearing. No acute distress.   HEENT: NCAT. Moist mucus membranes without lesions, thrush, or exudates appreciated  Lymph: Neck supple, no ridigity. No significant adenopathy noted.   Respiratory: Breathing comfortably on room air, no evidence of respiratory distress. Lungs CTAB without stridor, wheeze, rhonchi, or rales.   Cardiovascular: Regular rate and rhythm. No peripheral edema.    GI: Abdomen with normoactive bowel sounds, soft and non-tender throughout.   Skin: Skin is clean, dry, intact. No jaundice or significant rashes appreciated.   Neurologic: Alert and with normal speech. Grossly nonfocal. Moves extremities equally and  spontaneously.  Neuropsychiatric: Calm, affect congruent to situation. Appropriate mood and affect.       Labs & Studies: I personally reviewed the following studies:  Data   Results for orders placed or performed during the hospital encounter of 04/04/24 (from the past 24 hour(s))   CBC with platelets differential    Narrative    The following orders were created for panel order CBC with platelets differential.  Procedure                               Abnormality         Status                     ---------                               -----------         ------                     CBC with platelets and d...[[id number]]  Abnormal            Final result                 Please view results for these tests on the individual orders.   Comprehensive metabolic panel   Result Value Ref Range    Sodium 138 135 - 145 mmol/L    Potassium 4.1 3.4 - 5.3 mmol/L    Carbon Dioxide (CO2) 23 22 - 29 mmol/L    Anion Gap 9 7 - 15 mmol/L    Urea Nitrogen 10.6 6.0 - 20.0 mg/dL    Creatinine 0.62 0.51 - 0.95 mg/dL    GFR Estimate >90 >60 mL/min/1.73m2    Calcium 9.0 8.6 - 10.0 mg/dL    Chloride 106 98 - 107 mmol/L    Glucose 142 (H) 70 - 99 mg/dL    Alkaline Phosphatase 75 40 - 150 U/L    AST 14 0 - 45 U/L    ALT 17 0 - 50 U/L    Protein Total 6.1 (L) 6.4 - 8.3 g/dL    Albumin 3.8 3.5 - 5.2 g/dL    Bilirubin Total <0.2 <=1.2 mg/dL   INR   Result Value Ref Range    INR 1.04 0.85 - 1.15   Fibrinogen activity   Result Value Ref Range    Fibrinogen Activity 286 170 - 490 mg/dL   Uric acid   Result Value Ref Range    Uric Acid 2.7 2.4 - 5.7 mg/dL   Magnesium   Result Value Ref Range    Magnesium 1.8 1.7 - 2.3 mg/dL   Phosphorus   Result Value Ref Range    Phosphorus 3.6 2.5 - 4.5 mg/dL   CBC with platelets and differential   Result Value Ref Range    WBC Count 10.9 4.0 - 11.0 10e3/uL    RBC Count 3.68 (L) 3.80 - 5.20 10e6/uL    Hemoglobin 11.2 (L) 11.7 - 15.7 g/dL    Hematocrit 33.4 (L) 35.0 - 47.0 %    MCV 91 78 - 100 fL    MCH 30.4 26.5  - 33.0 pg    MCHC 33.5 31.5 - 36.5 g/dL    RDW 17.9 (H) 10.0 - 15.0 %    Platelet Count 310 150 - 450 10e3/uL    % Neutrophils 88 %    % Lymphocytes 7 %    % Monocytes 4 %    % Eosinophils 0 %    % Basophils 0 %    % Immature Granulocytes 1 %    NRBCs per 100 WBC 0 <1 /100    Absolute Neutrophils 9.6 (H) 1.6 - 8.3 10e3/uL    Absolute Lymphocytes 0.8 0.8 - 5.3 10e3/uL    Absolute Monocytes 0.4 0.0 - 1.3 10e3/uL    Absolute Eosinophils 0.0 0.0 - 0.7 10e3/uL    Absolute Basophils 0.0 0.0 - 0.2 10e3/uL    Absolute Immature Granulocytes 0.1 <=0.4 10e3/uL    Absolute NRBCs 0.0 10e3/uL           Medications list for reference:    Medications   Current Facility-Administered Medications   Medication Dose Route Frequency Provider Last Rate Last Admin    No rectal suppositories if WBC less than 1000/microliters or platelets less than 50,000/ L   Does not apply Continuous PRN Angle Sanchez PA-C         Current Facility-Administered Medications   Medication Dose Route Frequency Provider Last Rate Last Admin    acyclovir (ZOVIRAX) tablet 400 mg  400 mg Oral BID Angle Sanchez PA-C   400 mg at 04/05/24 0829    Chemotherapy Infusing-Continuous Infusion   Does not apply Q8H Vanessa Oakes MD   Given at 04/05/24 1123    cimetidine (TAGAMET) tablet 200 mg  200 mg Oral BID Angle Sanchez PA-C   200 mg at 04/05/24 0828    [START ON 4/8/2024] cycloPHOSphamide (CYTOXAN) 1,845 mg in sodium chloride 0.9 % 642.25 mL infusion  1,080 mg/m2 (Treatment Plan Recorded) Intravenous Once Vanessa Oakes MD        [START ON 4/10/2024] dexAMETHasone (DECADRON) tablet 8 mg  8 mg Oral Daily Vanessa Oakes MD        enoxaparin ANTICOAGULANT (LOVENOX) injection 40 mg  40 mg Subcutaneous Q24H Angle Sanchez PA-C   40 mg at 04/05/24 1426    etoposide (TOPOSAR) 125 mg in sodium chloride 0.9 % 556.25 mL infusion  72 mg/m2 (Treatment Plan Recorded) Intravenous Q22H Vanessa Oakes MD 25.3 mL/hr at 04/05/24 1630 125 mg at 04/05/24 1630    [Held by provider] fluconazole  (DIFLUCAN) tablet 200 mg  200 mg Oral Daily Angle Sanchez PA-C        [START ON 4/8/2024] fosaprepitant (EMEND) 150 mg in sodium chloride 0.9 % 275 mL intermittent infusion  150 mg Intravenous Once Vanessa Oakes MD        heparin 100 unit/mL injection 5-10 mL  5-10 mL Intracatheter Q28 Days Angle Sanchez PA-C        heparin lock flush 10 UNIT/ML injection 5-10 mL  5-10 mL Intracatheter Q24H Angle Sanchez PA-C        [Held by provider] levofloxacin (LEVAQUIN) tablet 250 mg  250 mg Oral Daily Angle Sanchez PA-C        ondansetron (ZOFRAN) tablet 16 mg  16 mg Oral Q22H Vanessa Oakes MD   16 mg at 04/05/24 1629    pantoprazole (PROTONIX) EC tablet 40 mg  40 mg Oral BID Angle Sanchez PA-C   40 mg at 04/05/24 0829    polyethylene glycol (MIRALAX) Packet 17 g  17 g Oral BID Angle Sanchez PA-C        predniSONE (DELTASONE) tablet 100 mg  100 mg Oral BID Vanessa Oakes MD   100 mg at 04/05/24 1629    senna-docusate (SENOKOT-S/PERICOLACE) 8.6-50 MG per tablet 1 tablet  1 tablet Oral BID Angle Sanchez PA-C   1 tablet at 04/05/24 0828    sodium chloride (PF) 0.9% PF flush 10-20 mL  10-20 mL Intracatheter Q28 Days Angle Sanchez PA-C        vinCRIStine (ONCOVIN) 0.5 mg, DOXOrubicin (ADRIAMYCIN) 24 mg in sodium chloride 0.9 % 1,062.5 mL infusion  0.5 mg Intravenous Q22H Vanessa Oakes MD 48.3 mL/hr at 04/05/24 1640 0.5 mg at 04/05/24 1640

## 2024-04-05 NOTE — PLAN OF CARE
"Goal Outcome Evaluation:      Plan of Care Reviewed With: patient    Overall Patient Progress: no changeOverall Patient Progress: no change         ./55 (BP Location: Left arm)   Pulse 70   Temp 97.9  F (36.6  C) (Oral)   Resp 20   Ht 1.676 m (5' 6\")   Wt 64.5 kg (142 lb 3.2 oz)   LMP 01/31/2024 (Exact Date)   SpO2 95%   BMI 22.95 kg/m      Pt alert and oriented. Avss on room air. Denies pain or nausea. On continuous chemo-Etoposide at 25.3ml/hr and Vincristine/Doxorubicin at 48.3ml/hr, good blood return noted. Cont with poc.      Problem: Adult Inpatient Plan of Care  Goal: Plan of Care Review  Description: The Plan of Care Review/Shift note should be completed every shift.  The Outcome Evaluation is a brief statement about your assessment that the patient is improving, declining, or no change.  This information will be displayed automatically on your shift  note.  Outcome: Progressing  Flowsheets (Taken 4/5/2024 0618)  Plan of Care Reviewed With: patient  Overall Patient Progress: no change      "

## 2024-04-05 NOTE — CONSULTS
Care Management Initial Consult    General Information  Assessment completed with: Patient,    Type of CM/SW Visit: Initial Assessment    Primary Care Provider verified and updated as needed: Yes   Readmission within the last 30 days: planned readmission      Reason for Consult: discharge planning  Advance Care Planning: Advance Care Planning Reviewed: questions answered  RNCC provided blank HCD     Communication Assessment  Patient's communication style: spoken language (English or Bilingual)    Hearing Difficulty or Deaf: no   Wear Glasses or Blind: no    Cognitive  Cognitive/Neuro/Behavioral: WDL                      Living Environment:   People in home: significant other (Tobias)     Current living Arrangements: apartment      Able to return to prior arrangements: yes     Family/Social Support:  Care provided by: self, spouse/significant other  Provides care for: no one  Marital Status: Lives with Significant Other  Significant Other, Parent(s), Other (specify) (Peers, friends)       Tobias  Description of Support System: Supportive, Involved    Support Assessment: Adequate family and caregiver support, Adequate social supports    Current Resources:   Patient receiving home care services: No     Community Resources: None  Equipment currently used at home: none  Supplies currently used at home: None    Employment/Financial:  Employment Status: employed full-time     Employment/ Comments: ENT MD Resident at [employer]  Financial Concerns: none   Referral to Financial Worker: No     Does the patient's insurance plan have a 3 day qualifying hospital stay waiver?  No    Lifestyle & Psychosocial Needs:  Social Determinants of Health     Food Insecurity: Not on file   Depression: Not at risk (2/6/2024)    PHQ-2     PHQ-2 Score: 0   Housing Stability: Not on file   Tobacco Use: Low Risk  (4/4/2024)    Patient History     Smoking Tobacco Use: Never     Smokeless Tobacco Use: Never     Passive Exposure: Never    Financial Resource Strain: Not on file   Alcohol Use: Not on file   Transportation Needs: Not on file   Physical Activity: Not on file   Interpersonal Safety: Not on file   Stress: Not on file   Social Connections: Not on file     Functional Status:  Prior to admission patient needed assistance:   Dependent ADLs:: Independent  Dependent IADLs:: Independent  Assesssment of Functional Status: At functional baseline    Mental Health Status:  Mental Health Status: No Current Concerns       Chemical Dependency Status:  Chemical Dependency Status: No Current Concerns         Values/Beliefs:  Spiritual, Cultural Beliefs, Nondenominational Practices, Values that affect care: no          Values/Beliefs Comment: none    Additional Information:  From H&P: Clementine Kathleen is a 28 year old female with a past medical history including primary mediastinal B-cell lymphoma who is admitted 4/4/2024 for scheduled chemotherapy with dose adjusted R-EPOCH (C3D1=4/4/2024).     RNCC completed CM assessment due to elevated risk score and need for discharge planning. RNCC met with pt at bedside and introduced RNCC role. 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 Pt stated she currently lives in an apt with her SO Tobias. Patient stated she has lots of support from peers, friends, family. No current services or supplies. No DME. No MH/CD concerns. Pt stated she has OP follow-up appts at [facility].   RNCC confirmed PCP. Patient stated that she does not anticipate any discharge needs.  Patient does not have HCD. Writer provided blanks of short and long forms.     NEXT: RNCC will continue to follow and assist with discharge planning as needed.    Jagruti Pepe RNCC float  Nurse Coordinator    Covering for 5A  Phone [phone]  Pager [phone]  Social Work and Care Management Department   SEARCHABLE in [facility] - search CARE COORDINATOR     [place] ([phone]) Saturday & Sunday; [phone]) [facility] Recognized Holidays      Units: 5A Onc 5201 thru 5219 RNCC, 5A Onc 5220 thru 5240 RNCC, 5C OFFSERVICE [phone] RNCC & 5C OFF SERVICE [phone] RNCC Pager: [phone]    Units: 6B Vocera, 6C Card 6401 thru 6420 RNCC, 6C Card 6502 thru 6514 RNCC, & 6C Card 6515 thru 6519 RNCC  Pager: [phone]    Units: 7A SOT RNCC Vocera, 7B Med Surg Vocera, 7C Med Surg 7401 thru 7418 RNCC & 7C Med Surg 7502 thru 7539 RNCC Pager: [phone]    Units: 6A Vocera & 4A CVICU Vocera, 4C MICU Vocera, and 4E SICU Vocera   Pager: [phone]    Units: 5 Ortho Vocera & 5 Med Surg Vocera  Pager: [phone]    Units: 6 Med Surg Vocera & 8 Med Surg Vocera  Pager [phone]

## 2024-04-05 NOTE — PROGRESS NOTES
Nursing Focus: Chemotherapy  D: Positive blood return via port. Insertion site is clean/dry/intact, dressing intact with no complaints of pain.  Urine output is recorded in intake in Doc Flowsheet.    I: Premedications given per order (see electronic medical administration record). Dose #2 of Etoposide started to infuse over 22 hours. Reviewed pt teaching on chemotherapy side effects.  Pt denies need for further teaching. Chemotherapy double checked per protocol by two chemotherapy competent RN's.   A: Tolerating procedure well. Denies nausea and or pain.   P: Continue to monitor urine output and symptoms of nausea. Screen for symptoms of toxicity.    Nursing Focus: Chemotherapy  D: Positive blood return via port. Insertion site is clean/dry/intact, dressing intact with no complaints of pain.  Urine output is recorded in intake in Doc Flowsheet.    I: Premedications given per order (see electronic medical administration record). Dose #2 of Clemente/Doxo started to infuse over 22 hours. Reviewed pt teaching on chemotherapy side effects.  Pt denies need for further teaching. Chemotherapy double checked per protocol by two chemotherapy competent RN's.   A: Tolerating procedure well. Denies nausea and or pain.   P: Continue to monitor urine output and symptoms of nausea. Screen for symptoms of toxicity.

## 2024-04-05 NOTE — PLAN OF CARE
[id number]. Afebrile. VSS on RA. A&Ox4. Up ad tio. Denies pain/nausea. Chemo infusing into port, and patient is tolerating well. Port has good blood return. Continue to monitor and with POC.

## 2024-04-05 NOTE — PLAN OF CARE
"0700- 1900    /67 (BP Location: Left arm)   Pulse 77   Temp 98  F (36.7  C) (Oral)   Resp 18   Ht 1.676 m (5' 6\")   Wt 64.5 kg (142 lb 3.2 oz)   LMP 01/31/2024 (Exact Date)   SpO2 97%   BMI 22.95 kg/m      VSS on RA, Afebrile. Denies pain, SOB. PRN Zofran x1 for mild morning nausea. D2 Etop and Clemente/Doxo running; good blood return via port; tolerating infusions well. Family and friends at bedside. Pt ambulated frequently through shift. Neuropathy unchanged from previous cycles. BM 4/4; Good uop. Continue POC.   "

## 2024-04-05 NOTE — PLAN OF CARE
Nursing Focus: Chemotherapy  D: Positive blood return via Port. Insertion site is clean/dry/intact, dressing intact with no complaints of pain.  Pre infusion assessment documented in Flowsheet (if applicable).    I: Premedications given per order (see electronic medical administration record). Dose #1 of Rituximab started to infuse over 90 minutes. Reviewed pt teaching on chemotherapy side effects.  Pt denies need for further teaching. Chemotherapy double checked per protocol by two chemotherapy competent RN's.   A: Tolerating infusion well. Denies nausea and or pain.   P: Continue to monitor urine output and symptoms of nausea. Screen for symptoms of toxicity.

## 2024-04-05 NOTE — PLAN OF CARE
Nursing Focus: Chemotherapy  D: Positive blood return via Port. Insertion site is clean/dry/intact, dressing intact with no complaints of pain.  Pre infusion assessment documented in Flowsheet (if applicable).    I: Premedications given per order (see electronic medical administration record). Dose #1 of Etoposide started to infuse over 22 hours and dose # 1 of Vincristine, Doxorubicin started to infuse over 22 hours. Reviewed pt teaching on chemotherapy side effects.  Pt denies need for further teaching. Chemotherapy double checked per protocol by two chemotherapy competent RN's.  A: Tolerating infusion well. Denies nausea and or pain.   P: Continue to monitor urine output and symptoms of nausea. Screen for symptoms of toxicity.

## 2024-04-06 LAB
ALBUMIN SERPL BCG-MCNC: 3.7 G/DL (ref 3.5–5.2)
ALP SERPL-CCNC: 66 U/L (ref 40–150)
ALT SERPL W P-5'-P-CCNC: 15 U/L (ref 0–50)
ANION GAP SERPL CALCULATED.3IONS-SCNC: 10 MMOL/L (ref 7–15)
AST SERPL W P-5'-P-CCNC: 15 U/L (ref 0–45)
BASOPHILS # BLD AUTO: 0 10E3/UL (ref 0–0.2)
BASOPHILS NFR BLD AUTO: 0 %
BILIRUB SERPL-MCNC: 0.2 MG/DL
BUN SERPL-MCNC: 11 MG/DL (ref 6–20)
CALCIUM SERPL-MCNC: 9.1 MG/DL (ref 8.6–10)
CHLORIDE SERPL-SCNC: 106 MMOL/L (ref 98–107)
CREAT SERPL-MCNC: 0.58 MG/DL (ref 0.51–0.95)
DEPRECATED HCO3 PLAS-SCNC: 23 MMOL/L (ref 22–29)
EGFRCR SERPLBLD CKD-EPI 2021: >90 ML/MIN/1.73M2
EOSINOPHIL # BLD AUTO: 0 10E3/UL (ref 0–0.7)
EOSINOPHIL NFR BLD AUTO: 0 %
ERYTHROCYTE [DISTWIDTH] IN BLOOD BY AUTOMATED COUNT: 18.4 % (ref 10–15)
FIBRINOGEN PPP-MCNC: 247 MG/DL (ref 170–490)
GLUCOSE SERPL-MCNC: 110 MG/DL (ref 70–99)
HCT VFR BLD AUTO: 31.9 % (ref 35–47)
HGB BLD-MCNC: 10.9 G/DL (ref 11.7–15.7)
IMM GRANULOCYTES # BLD: 0.2 10E3/UL
IMM GRANULOCYTES NFR BLD: 1 %
INR PPP: 1.1 (ref 0.85–1.15)
LYMPHOCYTES # BLD AUTO: 0.9 10E3/UL (ref 0.8–5.3)
LYMPHOCYTES NFR BLD AUTO: 7 %
MAGNESIUM SERPL-MCNC: 1.8 MG/DL (ref 1.7–2.3)
MCH RBC QN AUTO: 31 PG (ref 26.5–33)
MCHC RBC AUTO-ENTMCNC: 34.2 G/DL (ref 31.5–36.5)
MCV RBC AUTO: 91 FL (ref 78–100)
MONOCYTES # BLD AUTO: 1 10E3/UL (ref 0–1.3)
MONOCYTES NFR BLD AUTO: 8 %
NEUTROPHILS # BLD AUTO: 11.4 10E3/UL (ref 1.6–8.3)
NEUTROPHILS NFR BLD AUTO: 84 %
NRBC # BLD AUTO: 0 10E3/UL
NRBC BLD AUTO-RTO: 0 /100
PHOSPHATE SERPL-MCNC: 3.9 MG/DL (ref 2.5–4.5)
PLATELET # BLD AUTO: 334 10E3/UL (ref 150–450)
POTASSIUM SERPL-SCNC: 4 MMOL/L (ref 3.4–5.3)
PROT SERPL-MCNC: 5.8 G/DL (ref 6.4–8.3)
RBC # BLD AUTO: 3.52 10E6/UL (ref 3.8–5.2)
SODIUM SERPL-SCNC: 139 MMOL/L (ref 135–145)
URATE SERPL-MCNC: 2.2 MG/DL (ref 2.4–5.7)
WBC # BLD AUTO: 13.5 10E3/UL (ref 4–11)

## 2024-04-06 PROCEDURE — 83735 ASSAY OF MAGNESIUM: CPT | Performed by: PHYSICIAN ASSISTANT

## 2024-04-06 PROCEDURE — 250N000011 HC RX IP 250 OP 636: Mod: JZ | Performed by: INTERNAL MEDICINE

## 2024-04-06 PROCEDURE — 250N000011 HC RX IP 250 OP 636: Performed by: PHYSICIAN ASSISTANT

## 2024-04-06 PROCEDURE — 99207 PR SC NO CHARGE VISIT: CPT | Performed by: STUDENT IN AN ORGANIZED HEALTH CARE EDUCATION/TRAINING PROGRAM

## 2024-04-06 PROCEDURE — 258N000003 HC RX IP 258 OP 636: Performed by: INTERNAL MEDICINE

## 2024-04-06 PROCEDURE — 85610 PROTHROMBIN TIME: CPT | Performed by: PHYSICIAN ASSISTANT

## 2024-04-06 PROCEDURE — 85004 AUTOMATED DIFF WBC COUNT: CPT | Performed by: PHYSICIAN ASSISTANT

## 2024-04-06 PROCEDURE — 250N000012 HC RX MED GY IP 250 OP 636 PS 637: Performed by: INTERNAL MEDICINE

## 2024-04-06 PROCEDURE — 80053 COMPREHEN METABOLIC PANEL: CPT | Performed by: PHYSICIAN ASSISTANT

## 2024-04-06 PROCEDURE — 99233 SBSQ HOSP IP/OBS HIGH 50: CPT

## 2024-04-06 PROCEDURE — 250N000013 HC RX MED GY IP 250 OP 250 PS 637: Performed by: PHYSICIAN ASSISTANT

## 2024-04-06 PROCEDURE — 84550 ASSAY OF BLOOD/URIC ACID: CPT | Performed by: PHYSICIAN ASSISTANT

## 2024-04-06 PROCEDURE — 85384 FIBRINOGEN ACTIVITY: CPT | Performed by: PHYSICIAN ASSISTANT

## 2024-04-06 PROCEDURE — 84100 ASSAY OF PHOSPHORUS: CPT | Performed by: PHYSICIAN ASSISTANT

## 2024-04-06 PROCEDURE — 120N000002 HC R&B MED SURG/OB UMMC

## 2024-04-06 RX ADMIN — ENOXAPARIN SODIUM 40 MG: 40 INJECTION SUBCUTANEOUS at 14:23

## 2024-04-06 RX ADMIN — DOCUSATE SODIUM 50 MG AND SENNOSIDES 8.6 MG 1 TABLET: 8.6; 5 TABLET, FILM COATED ORAL at 09:46

## 2024-04-06 RX ADMIN — CIMETIDINE 200 MG: 200 TABLET, FILM COATED ORAL at 20:10

## 2024-04-06 RX ADMIN — ONDANSETRON HYDROCHLORIDE 16 MG: 8 TABLET, FILM COATED ORAL at 14:23

## 2024-04-06 RX ADMIN — POLYETHYLENE GLYCOL 3350 17 G: 17 POWDER, FOR SOLUTION ORAL at 20:11

## 2024-04-06 RX ADMIN — PREDNISONE 100 MG: 50 TABLET ORAL at 09:46

## 2024-04-06 RX ADMIN — CIMETIDINE 200 MG: 200 TABLET, FILM COATED ORAL at 09:46

## 2024-04-06 RX ADMIN — ONDANSETRON 4 MG: 4 TABLET, ORALLY DISINTEGRATING ORAL at 09:46

## 2024-04-06 RX ADMIN — ACYCLOVIR 400 MG: 400 TABLET ORAL at 20:10

## 2024-04-06 RX ADMIN — PREDNISONE 100 MG: 50 TABLET ORAL at 16:31

## 2024-04-06 RX ADMIN — DOCUSATE SODIUM 50 MG AND SENNOSIDES 8.6 MG 1 TABLET: 8.6; 5 TABLET, FILM COATED ORAL at 20:10

## 2024-04-06 RX ADMIN — PANTOPRAZOLE SODIUM 40 MG: 40 TABLET, DELAYED RELEASE ORAL at 09:46

## 2024-04-06 RX ADMIN — POLYETHYLENE GLYCOL 3350 17 G: 17 POWDER, FOR SOLUTION ORAL at 09:45

## 2024-04-06 RX ADMIN — ETOPOSIDE 125 MG: 20 INJECTION, SOLUTION INTRAVENOUS at 14:29

## 2024-04-06 RX ADMIN — PANTOPRAZOLE SODIUM 40 MG: 40 TABLET, DELAYED RELEASE ORAL at 16:40

## 2024-04-06 RX ADMIN — VINCRISTINE SULFATE 0.5 MG: 1 INJECTION, SOLUTION INTRAVENOUS at 14:29

## 2024-04-06 RX ADMIN — ACYCLOVIR 400 MG: 400 TABLET ORAL at 09:46

## 2024-04-06 ASSESSMENT — ACTIVITIES OF DAILY LIVING (ADL)
ADLS_ACUITY_SCORE: 18

## 2024-04-06 NOTE — PROGRESS NOTES
[facility]    Hematology / Oncology Progress Note    Patient: Clementine Kathleen  MRN: [id number]  Admission Date: 4/4/2024  Hospital Day # 2    Date of Service (when I saw the patient): 04/06/2024     Assessment & Plan   Clementine Kathleen is a 28 year old female with a past medical history including primary mediastinal B-cell lymphoma who is admitted 4/4/2024 for scheduled chemotherapy with dose adjusted R-EPOCH (C3D1=4/4/2024).     Today:  - C3D3 of DA-R-EPOCH. Continue to monitor for chemo related side effects.  - Noting some nausea this morning, overall controlled on current regimen. Continue to monitor closely.   - Continue to monitor and provide best supportive cares.     HEME  # Primary mediastinal B-cell lymphoma  Patient of Dr. Oakes. Initially presented with cervical lymphadenopathy 12/2023. Initial FNA (1/29/24) suggestive of possible Hodgkin lymphoma; however excisional LN biopsy (2/9/24) shows large B-cell lymphoma with differential diagnosis of primary mediastinal large B-cell lymphoma vs DLBCL NOS. PET with cervical and mediastinal lymphadenopathy only. Overall presentation would be quite consistent with PMBCL given her age, gender, sites of involvement, dim CD30 expression, and prominent fibrotic background.  Although CD23 IHC was negative, IHC for , MAL, and PD-L1 were positive in majority of the neoplastic cells suggesting PMBCL over DLBCL NOS. [id number] gene expression profile also consistent with PMBCL. FISH with gain of BCL2 but no MYC, BCL2, or BCL6 rearrangements.  PET showed bilateral cervical and mediastinal hypermetabolic lymphadenopathy (largest 4.4 cm with SUVmax 27 in a prevascular LN); no disease below diaphragm. Most recent PET 4/1/24 with evidence of complete response. Seen by Dr. Oakes in clinic on 4/4/2024 and plan to proceed with admission for C3 at dose level 3 with vincristine cap at 2 mg due to neuropathy, with a planned total of 6  "cycles.  - Port accessed on admission  - Most recent echo 2/19 with LVEF 60%, no significant valvular abnormalities present      Treatment Plan: Dose adjusted R-EPOCH (C3D1=4/4/2024)  - Rituximab 375 mg/m2 (600mg) IV x 1 dose - D1  - Prednisone 100 mg BID x 10 doses - D1-5  - Etoposide 72 mg/m2 (125 mg) IV x 4 doses - D1-4  - Vincristine 0.5 mg, Doxorubicin (24mg) x 4 doses CIVI- D1-4  - Cyclophosphamide 1080 mg/m2 (1845 mg) IV x 1 dose - D5  - Pegfilgrastim x1 - D6 (to be scheduled in clinic ~4/9)  - Pre-meds: tylenol, benadryl, zofran, emend, dexamethasone (D6-7)     # Risk of TLS  - Encourage good PO hydration. Allopurinol no longer indicated as pt in CR.      # Grade 1 Neuropathy   Endorsed L>R neuropathy in her fingertips. Secondary to chemotherapy.  - Monitor closely  - Cap vincristine at 2 mg, may need further decrease in future cycles.      # ID prophylaxis  -  mg BID  - Levofloxacin 250 mg daily and fluconazole 200 mg daily when ANC <1.0  - Pentamidine for PJP ppx last given 4/1/24 and next due ~5/1/24     # History of CINV   Chemotherapy induced nausea and vomiting during previous cycles, managed with prn zofran, compazine, scheduled emend, and zyprexa at bedtime. Symptoms improved within a few days of hospital discharge from cycle 2.  - Antiemetics per chemo regimen: ondansetron 16 mg daily (D1-5), fosaprepitant D5  - Continue prn zofran and compazine as indicated  - Consider zyprexa at bedtime if additional antiemetics need  - If CINV is persistent can consider Aloxi or PRN ativan.      # History of chemotherapy related rash  Maculopapular light pink, scattered, non-pruritic rash noted during cycle 2 at base of skull. Similar episode with C1 with rash to right side of neck and right arm. Managed with home cortisone cream.   - Monitor for rashes with chemotherapy     # Palpitations/tachycardia  Noted as heart \"racing/pounding\" after cycle 2 of chemo. No associated chest pain, shortness of breath, " lower extremity pain/swelling, abdominal pain/nausea, dizziness. Echo from 2/2024 unremarkable. 3/25/24 NT proBNP normal at <36. No symptoms at time of admission  - No current inpatient needs, continue to monitor for symptoms   - Per outpatient notes, consider CT PE if worsening tachycardia, SOB, or chest pain.      # Constipation  Regular on bowel regimen miralax and senna BID. Continue while inpatient. Consider PRN Reglan if additional bowel medication is needed     MISC  # Fertility preservation - Established with [facility], underwent egg retrieval on 2/18.   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 Her boyfriend is a general surgery resident here at [facility]. Parents live in [place].     Clinically Significant Risk Factors                             # Financial/Environmental Concerns: none           FEN   - IVF per chemotherapy regimen, IVF bolus prn   - PRN lyte replacement per standing protocol  - Regular diet as tolerated      DVT ppx: Lovenox, hold if Plt <50K   GI ppx: PTA PPI     DISPO: Anticipate 5 day stay for scheduled chemotherapy. Tentative discharge 4/8 pending completion of chemo and barring any clinical complications.   Follow-up/Referrals:  APPT 18 requested for labs/neulasta 4/9, labs 2x weekly, and AMEENA follow up week of 4/15    I spent >35 minutes face-to-face and/or coordinating or discussing care plan. Over 50% of our time on the unit was spent counseling the patient and/or coordinating care    Patient is staffed with attending physician Dr. Ha.     Rosemarie Gonzalez PA-C  Hematology/Oncology  Pager: #[phone]     Interval History   Chart reviewed, no acute events noted overnight. Clementine reports some nausea this morning, took some zofran. No vomiting. otherwise feeling well. Denies headache, vision changes, chest pain, palpitations, shortness of breath, abdominal pain or cramping, dysuria. Last BM  Thursday. Reviewed plans for today and anticipated discharge timeline. All questions addressed at this time. Boyfriend Dima present at bedside and supportive.    Complete and Comprehensive review of systems review and negative other than noted here or in the HPI.       Physical Exam   Temp: 98.2  F (36.8  C) Temp src: Oral BP: 113/70 Pulse: 75   Resp: 16 SpO2: 97 % O2 Device: None (Room air)    Vitals:    04/04/24 1300 04/06/24 0810   Weight: 64.5 kg (142 lb 3.2 oz) 64.7 kg (142 lb 11.2 oz)     Vital Signs with Ranges  Temp:  [97  F (36.1  C)-98.2  F (36.8  C)] 98.2  F (36.8  C)  Pulse:  [72-77] 75  Resp:  [16-18] 16  BP: (113-117)/(63-78) 113/70  SpO2:  [95 %-100 %] 97 %  I/O last 3 completed shifts:  In: 3560 [P.O.:1720; IV Piggyback:1840]  Out: -     Constitutional: Awake and conversational. Non-toxic appearing. No acute distress. Well developed and nourished.  HEENT: Normocephalic, atraumatic. Sclerae anicteric. Moist mucus membranes without lesion or abscess.    Respiratory: Breathing comfortably on room air with no accessory muscle use. Speaking in full sentences, no evidence of respiratory distress. Lungs CTAB, no wheeze or rales.   Cardiovascular: Regular rate and rhythm. S1, S2. No murmurs appreciated.  Circulatory: Pulses strong and equal. No peripheral edema.    GI: Abdomen with normoactive bowel sounds, soft, non-distended, and non-tender throughout.   Skin: Skin is clean, dry, intact.   Neurologic: Alert and oriented with normal speech. Grossly nonfocal exam. Moves all extremities equally.   Neuropsychiatric: Calm, appropriate mood and affect congruent to situation. Good judgment and insight.     Labs & Studies: I personally reviewed the following studies:  Data   Results for orders placed or performed during the hospital encounter of 04/04/24 (from the past 24 hour(s))   CBC with platelets differential    Narrative    The following orders were created for panel order CBC with platelets  differential.  Procedure                               Abnormality         Status                     ---------                               -----------         ------                     CBC with platelets and d...[[id number]]  Abnormal            Final result                 Please view results for these tests on the individual orders.   Comprehensive metabolic panel   Result Value Ref Range    Sodium 139 135 - 145 mmol/L    Potassium 4.0 3.4 - 5.3 mmol/L    Carbon Dioxide (CO2) 23 22 - 29 mmol/L    Anion Gap 10 7 - 15 mmol/L    Urea Nitrogen 11.0 6.0 - 20.0 mg/dL    Creatinine 0.58 0.51 - 0.95 mg/dL    GFR Estimate >90 >60 mL/min/1.73m2    Calcium 9.1 8.6 - 10.0 mg/dL    Chloride 106 98 - 107 mmol/L    Glucose 110 (H) 70 - 99 mg/dL    Alkaline Phosphatase 66 40 - 150 U/L    AST 15 0 - 45 U/L    ALT 15 0 - 50 U/L    Protein Total 5.8 (L) 6.4 - 8.3 g/dL    Albumin 3.7 3.5 - 5.2 g/dL    Bilirubin Total 0.2 <=1.2 mg/dL   INR   Result Value Ref Range    INR 1.10 0.85 - 1.15   Fibrinogen activity   Result Value Ref Range    Fibrinogen Activity 247 170 - 490 mg/dL   Uric acid   Result Value Ref Range    Uric Acid 2.2 (L) 2.4 - 5.7 mg/dL   Magnesium   Result Value Ref Range    Magnesium 1.8 1.7 - 2.3 mg/dL   Phosphorus   Result Value Ref Range    Phosphorus 3.9 2.5 - 4.5 mg/dL   CBC with platelets and differential   Result Value Ref Range    WBC Count 13.5 (H) 4.0 - 11.0 10e3/uL    RBC Count 3.52 (L) 3.80 - 5.20 10e6/uL    Hemoglobin 10.9 (L) 11.7 - 15.7 g/dL    Hematocrit 31.9 (L) 35.0 - 47.0 %    MCV 91 78 - 100 fL    MCH 31.0 26.5 - 33.0 pg    MCHC 34.2 31.5 - 36.5 g/dL    RDW 18.4 (H) 10.0 - 15.0 %    Platelet Count 334 150 - 450 10e3/uL    % Neutrophils 84 %    % Lymphocytes 7 %    % Monocytes 8 %    % Eosinophils 0 %    % Basophils 0 %    % Immature Granulocytes 1 %    NRBCs per 100 WBC 0 <1 /100    Absolute Neutrophils 11.4 (H) 1.6 - 8.3 10e3/uL    Absolute Lymphocytes 0.9 0.8 - 5.3 10e3/uL    Absolute Monocytes  1.0 0.0 - 1.3 10e3/uL    Absolute Eosinophils 0.0 0.0 - 0.7 10e3/uL    Absolute Basophils 0.0 0.0 - 0.2 10e3/uL    Absolute Immature Granulocytes 0.2 <=0.4 10e3/uL    Absolute NRBCs 0.0 10e3/uL           Medications list for reference:    Medications   Current Facility-Administered Medications   Medication Dose Route Frequency Provider Last Rate Last Admin    No rectal suppositories if WBC less than 1000/microliters or platelets less than 50,000/ L   Does not apply Continuous PRN Angle Sanchez PA-C         Current Facility-Administered Medications   Medication Dose Route Frequency Provider Last Rate Last Admin    acyclovir (ZOVIRAX) tablet 400 mg  400 mg Oral BID Angle Sanchez PA-C   400 mg at 04/06/24 0946    Chemotherapy Infusing-Continuous Infusion   Does not apply Q8H aVnessa Oakes MD   Given at 04/06/24 0946    cimetidine (TAGAMET) tablet 200 mg  200 mg Oral BID Angle Sanchez PA-C   200 mg at 04/06/24 0946    [START ON 4/8/2024] cycloPHOSphamide (CYTOXAN) 1,845 mg in sodium chloride 0.9 % 642.25 mL infusion  1,080 mg/m2 (Treatment Plan Recorded) Intravenous Once Vanessa Oakes MD        [START ON 4/10/2024] dexAMETHasone (DECADRON) tablet 8 mg  8 mg Oral Daily Vanessa Oakes MD        enoxaparin ANTICOAGULANT (LOVENOX) injection 40 mg  40 mg Subcutaneous Q24H Angle Sanchez PA-C   40 mg at 04/05/24 1426    etoposide (TOPOSAR) 125 mg in sodium chloride 0.9 % 556.25 mL infusion  72 mg/m2 (Treatment Plan Recorded) Intravenous Q22H Vanessa Oakes MD 25.3 mL/hr at 04/05/24 1630 125 mg at 04/05/24 1630    [Held by provider] fluconazole (DIFLUCAN) tablet 200 mg  200 mg Oral Daily Angle Sanchez PA-C        [START ON 4/8/2024] fosaprepitant (EMEND) 150 mg in sodium chloride 0.9 % 275 mL intermittent infusion  150 mg Intravenous Once Vanessa Oakes MD        heparin 100 unit/mL injection 5-10 mL  5-10 mL Intracatheter Q28 Days Angle Sanchez PA-C        heparin lock flush 10 UNIT/ML injection 5-10 mL  5-10 mL Intracatheter Q24H Laura  Angle ROBLERO PA-C        [Held by provider] levofloxacin (LEVAQUIN) tablet 250 mg  250 mg Oral Daily Angle Sanchez PA-C        ondansetron (ZOFRAN) tablet 16 mg  16 mg Oral Q22H Vanessa Oakes MD   16 mg at 04/05/24 1629    pantoprazole (PROTONIX) EC tablet 40 mg  40 mg Oral BID Angle Sanchez PA-C   40 mg at 04/06/24 0946    polyethylene glycol (MIRALAX) Packet 17 g  17 g Oral BID Angle Sanchez PA-C   17 g at 04/06/24 0945    predniSONE (DELTASONE) tablet 100 mg  100 mg Oral BID Vanessa Oakes MD   100 mg at 04/06/24 0946    senna-docusate (SENOKOT-S/PERICOLACE) 8.6-50 MG per tablet 1 tablet  1 tablet Oral BID Angle Sanchez PA-C   1 tablet at 04/06/24 0946    sodium chloride (PF) 0.9% PF flush 10-20 mL  10-20 mL Intracatheter Q28 Days Angle Sanchez PA-C        vinCRIStine (ONCOVIN) 0.5 mg, DOXOrubicin (ADRIAMYCIN) 24 mg in sodium chloride 0.9 % 1,062.5 mL infusion  0.5 mg Intravenous Q22H Vanessa Oakes MD 48.3 mL/hr at 04/05/24 1640 0.5 mg at 04/05/24 1640

## 2024-04-06 NOTE — PLAN OF CARE
[id number]  Goal Outcome Evaluation:    Reviewed w/ patient. Overall Patient Progress: no change. Outcome Evaluation: Tolerating chemo, no complaints of nausea.    Vitals stable, denies pain/nausea/sob. Ind. Neuropathy at baseline. Cares clustered overnight, CIVI chemo infusing.

## 2024-04-06 NOTE — PLAN OF CARE
"0700- 1900    /78 (BP Location: Left arm)   Pulse 73   Temp 97.6  F (36.4  C) (Oral)   Resp 16   Ht 1.676 m (5' 6\")   Wt 64.7 kg (142 lb 11.2 oz)   LMP 01/31/2024 (Exact Date)   SpO2 100%   BMI 23.03 kg/m      VSS on RA, Afebrile. Denies pain, SOB. PRN Zofran x1 for nausea. D3 Etop and Clemente/Doxo running to port; tolerating infusions well and good blood returns. Continue POC.     "

## 2024-04-06 NOTE — PLAN OF CARE
Goal Outcome Evaluation:  [id number]  Pt is admitted 4/4/2024 for scheduled chemotherapy with dose adjusted R-EPOCH (C3D1=4/4/2024).   AVSS, alert and oriented x 4, denies pain.nausea/sob. On CIVI chemo Vincristine/Doxorubicin and Etoposide is infusing via Port, tolerating god with positive blood return.Up independent, voiding adequately, LBM 4/4  Continue to monitor care.

## 2024-04-07 LAB
ABO/RH(D): NORMAL
ALBUMIN SERPL BCG-MCNC: 3.9 G/DL (ref 3.5–5.2)
ALP SERPL-CCNC: 66 U/L (ref 40–150)
ALT SERPL W P-5'-P-CCNC: 14 U/L (ref 0–50)
ANION GAP SERPL CALCULATED.3IONS-SCNC: 10 MMOL/L (ref 7–15)
ANTIBODY SCREEN: NEGATIVE
AST SERPL W P-5'-P-CCNC: 13 U/L (ref 0–45)
BASOPHILS # BLD AUTO: 0 10E3/UL (ref 0–0.2)
BASOPHILS NFR BLD AUTO: 0 %
BILIRUB SERPL-MCNC: 0.5 MG/DL
BUN SERPL-MCNC: 10.4 MG/DL (ref 6–20)
CALCIUM SERPL-MCNC: 9.2 MG/DL (ref 8.6–10)
CHLORIDE SERPL-SCNC: 105 MMOL/L (ref 98–107)
CREAT SERPL-MCNC: 0.64 MG/DL (ref 0.51–0.95)
DEPRECATED HCO3 PLAS-SCNC: 25 MMOL/L (ref 22–29)
EGFRCR SERPLBLD CKD-EPI 2021: >90 ML/MIN/1.73M2
EOSINOPHIL # BLD AUTO: 0 10E3/UL (ref 0–0.7)
EOSINOPHIL NFR BLD AUTO: 0 %
ERYTHROCYTE [DISTWIDTH] IN BLOOD BY AUTOMATED COUNT: 18.1 % (ref 10–15)
FIBRINOGEN PPP-MCNC: 227 MG/DL (ref 170–490)
GLUCOSE SERPL-MCNC: 113 MG/DL (ref 70–99)
HCT VFR BLD AUTO: 32.3 % (ref 35–47)
HGB BLD-MCNC: 10.8 G/DL (ref 11.7–15.7)
IMM GRANULOCYTES # BLD: 0.1 10E3/UL
IMM GRANULOCYTES NFR BLD: 1 %
INR PPP: 1.23 (ref 0.85–1.15)
LYMPHOCYTES # BLD AUTO: 0.7 10E3/UL (ref 0.8–5.3)
LYMPHOCYTES NFR BLD AUTO: 10 %
MAGNESIUM SERPL-MCNC: 1.9 MG/DL (ref 1.7–2.3)
MCH RBC QN AUTO: 29.8 PG (ref 26.5–33)
MCHC RBC AUTO-ENTMCNC: 33.4 G/DL (ref 31.5–36.5)
MCV RBC AUTO: 89 FL (ref 78–100)
MONOCYTES # BLD AUTO: 0.8 10E3/UL (ref 0–1.3)
MONOCYTES NFR BLD AUTO: 12 %
NEUTROPHILS # BLD AUTO: 5 10E3/UL (ref 1.6–8.3)
NEUTROPHILS NFR BLD AUTO: 77 %
NRBC # BLD AUTO: 0 10E3/UL
NRBC BLD AUTO-RTO: 0 /100
PHOSPHATE SERPL-MCNC: 4.4 MG/DL (ref 2.5–4.5)
PLATELET # BLD AUTO: 342 10E3/UL (ref 150–450)
POTASSIUM SERPL-SCNC: 3.8 MMOL/L (ref 3.4–5.3)
PROT SERPL-MCNC: 6.1 G/DL (ref 6.4–8.3)
RBC # BLD AUTO: 3.63 10E6/UL (ref 3.8–5.2)
SODIUM SERPL-SCNC: 140 MMOL/L (ref 135–145)
SPECIMEN EXPIRATION DATE: NORMAL
URATE SERPL-MCNC: 2.4 MG/DL (ref 2.4–5.7)
WBC # BLD AUTO: 6.6 10E3/UL (ref 4–11)

## 2024-04-07 PROCEDURE — 120N000002 HC R&B MED SURG/OB UMMC

## 2024-04-07 PROCEDURE — 85610 PROTHROMBIN TIME: CPT | Performed by: PHYSICIAN ASSISTANT

## 2024-04-07 PROCEDURE — 86900 BLOOD TYPING SEROLOGIC ABO: CPT | Performed by: PHYSICIAN ASSISTANT

## 2024-04-07 PROCEDURE — 250N000011 HC RX IP 250 OP 636: Performed by: INTERNAL MEDICINE

## 2024-04-07 PROCEDURE — 85025 COMPLETE CBC W/AUTO DIFF WBC: CPT | Performed by: PHYSICIAN ASSISTANT

## 2024-04-07 PROCEDURE — 250N000012 HC RX MED GY IP 250 OP 636 PS 637: Performed by: INTERNAL MEDICINE

## 2024-04-07 PROCEDURE — 99233 SBSQ HOSP IP/OBS HIGH 50: CPT

## 2024-04-07 PROCEDURE — 84550 ASSAY OF BLOOD/URIC ACID: CPT | Performed by: PHYSICIAN ASSISTANT

## 2024-04-07 PROCEDURE — 250N000011 HC RX IP 250 OP 636: Performed by: PHYSICIAN ASSISTANT

## 2024-04-07 PROCEDURE — 82040 ASSAY OF SERUM ALBUMIN: CPT | Performed by: PHYSICIAN ASSISTANT

## 2024-04-07 PROCEDURE — 85384 FIBRINOGEN ACTIVITY: CPT | Performed by: PHYSICIAN ASSISTANT

## 2024-04-07 PROCEDURE — 84100 ASSAY OF PHOSPHORUS: CPT | Performed by: PHYSICIAN ASSISTANT

## 2024-04-07 PROCEDURE — 93005 ELECTROCARDIOGRAM TRACING: CPT

## 2024-04-07 PROCEDURE — 93010 ELECTROCARDIOGRAM REPORT: CPT | Performed by: INTERNAL MEDICINE

## 2024-04-07 PROCEDURE — 83735 ASSAY OF MAGNESIUM: CPT | Performed by: PHYSICIAN ASSISTANT

## 2024-04-07 PROCEDURE — 250N000013 HC RX MED GY IP 250 OP 250 PS 637: Performed by: PHYSICIAN ASSISTANT

## 2024-04-07 PROCEDURE — 250N000013 HC RX MED GY IP 250 OP 250 PS 637

## 2024-04-07 PROCEDURE — 258N000003 HC RX IP 258 OP 636: Performed by: INTERNAL MEDICINE

## 2024-04-07 RX ORDER — PANTOPRAZOLE SODIUM 40 MG/1
40 TABLET, DELAYED RELEASE ORAL 2 TIMES DAILY
Qty: 60 TABLET | Refills: 0 | Status: SHIPPED | OUTPATIENT
Start: 2024-04-07 | End: 2024-04-15

## 2024-04-07 RX ORDER — AMOXICILLIN 250 MG
2 CAPSULE ORAL 2 TIMES DAILY
Status: DISCONTINUED | OUTPATIENT
Start: 2024-04-07 | End: 2024-04-08 | Stop reason: HOSPADM

## 2024-04-07 RX ORDER — PREDNISONE 50 MG/1
100 TABLET ORAL 2 TIMES DAILY
Qty: 4 TABLET | Refills: 0 | Status: ON HOLD | OUTPATIENT
Start: 2024-04-07 | End: 2024-04-25

## 2024-04-07 RX ORDER — DEXAMETHASONE 4 MG/1
8 TABLET ORAL DAILY
Qty: 4 TABLET | Refills: 0 | Status: ON HOLD | OUTPATIENT
Start: 2024-04-10 | End: 2024-04-25

## 2024-04-07 RX ORDER — AMOXICILLIN 250 MG
1 CAPSULE ORAL DAILY PRN
Qty: 30 TABLET | Refills: 0 | Status: SHIPPED | OUTPATIENT
Start: 2024-04-07 | End: 2024-04-15

## 2024-04-07 RX ADMIN — ACYCLOVIR 400 MG: 400 TABLET ORAL at 20:01

## 2024-04-07 RX ADMIN — ACYCLOVIR 400 MG: 400 TABLET ORAL at 08:56

## 2024-04-07 RX ADMIN — PREDNISONE 100 MG: 50 TABLET ORAL at 08:56

## 2024-04-07 RX ADMIN — ENOXAPARIN SODIUM 40 MG: 40 INJECTION SUBCUTANEOUS at 12:30

## 2024-04-07 RX ADMIN — ONDANSETRON 4 MG: 4 TABLET, ORALLY DISINTEGRATING ORAL at 21:24

## 2024-04-07 RX ADMIN — CIMETIDINE 200 MG: 200 TABLET, FILM COATED ORAL at 08:56

## 2024-04-07 RX ADMIN — PANTOPRAZOLE SODIUM 40 MG: 40 TABLET, DELAYED RELEASE ORAL at 17:35

## 2024-04-07 RX ADMIN — ETOPOSIDE 125 MG: 20 INJECTION, SOLUTION INTRAVENOUS at 12:20

## 2024-04-07 RX ADMIN — POLYETHYLENE GLYCOL 3350 17 G: 17 POWDER, FOR SOLUTION ORAL at 08:06

## 2024-04-07 RX ADMIN — VINCRISTINE SULFATE 0.5 MG: 1 INJECTION, SOLUTION INTRAVENOUS at 12:20

## 2024-04-07 RX ADMIN — PROCHLORPERAZINE MALEATE 5 MG: 5 TABLET ORAL at 15:43

## 2024-04-07 RX ADMIN — ONDANSETRON 4 MG: 4 TABLET, ORALLY DISINTEGRATING ORAL at 08:06

## 2024-04-07 RX ADMIN — PROCHLORPERAZINE MALEATE 5 MG: 5 TABLET ORAL at 03:41

## 2024-04-07 RX ADMIN — DOCUSATE SODIUM 50 MG AND SENNOSIDES 8.6 MG 2 TABLET: 8.6; 5 TABLET, FILM COATED ORAL at 20:01

## 2024-04-07 RX ADMIN — PREDNISONE 100 MG: 50 TABLET ORAL at 17:35

## 2024-04-07 RX ADMIN — PROCHLORPERAZINE EDISYLATE 5 MG: 5 INJECTION INTRAMUSCULAR; INTRAVENOUS at 23:53

## 2024-04-07 RX ADMIN — DOCUSATE SODIUM 50 MG AND SENNOSIDES 8.6 MG 1 TABLET: 8.6; 5 TABLET, FILM COATED ORAL at 08:56

## 2024-04-07 RX ADMIN — ONDANSETRON HYDROCHLORIDE 16 MG: 8 TABLET, FILM COATED ORAL at 12:15

## 2024-04-07 RX ADMIN — POLYETHYLENE GLYCOL 3350 17 G: 17 POWDER, FOR SOLUTION ORAL at 20:01

## 2024-04-07 RX ADMIN — CIMETIDINE 200 MG: 200 TABLET, FILM COATED ORAL at 20:01

## 2024-04-07 RX ADMIN — PANTOPRAZOLE SODIUM 40 MG: 40 TABLET, DELAYED RELEASE ORAL at 08:56

## 2024-04-07 ASSESSMENT — ACTIVITIES OF DAILY LIVING (ADL)
ADLS_ACUITY_SCORE: 18

## 2024-04-07 NOTE — PLAN OF CARE
"[id number]    /67 (BP Location: Left arm)   Pulse 62   Temp 98.4  F (36.9  C) (Oral)   Resp 16   Ht 1.676 m (5' 6\")   Wt 63.8 kg (140 lb 9.6 oz)   SpO2 100%   BMI 22.69 kg/m      VSS on RA, Afebrile. Denies pain, SOB. PRN Zofran x1 and Compazine x1 along with scheduled zofran. D4 Etoposide and Clemente/Doxo infusing to port; good blood return and tolerating infusions well. BM today; good UOP. New facial flushing noted by overnight RN- very mild during shift. Continue POC.   "

## 2024-04-07 NOTE — PLAN OF CARE
Goal Outcome Evaluation:  [id number]  Pt is admitted 4/4/2024 for scheduled chemotherapy with dose adjusted R-EPOCH   AVSS, alert and oriented x 4, denies pain/nausea/sob.D 3 Etoposide and Clemente/Doxo running to port, tolerating good ad positive blood return.Up independent, voiding adequately, LBM 4/6  Continue to monitor care.

## 2024-04-07 NOTE — PLAN OF CARE
[id number]  Goal Outcome Evaluation:    Plan of Care Reviewed With: patient. Overall Patient Progress: no change. Outcome Evaluation: Mild nausea from chemo.      Vitals stable. Denies pain/sob. Mild nausea, compazine given x1 w/ relief. Ind. Last BM 4/6. Noted to have some facial flushing overnight, denied itching and was afebrile. Pt reports steroid induced flushing in the past.

## 2024-04-07 NOTE — PROGRESS NOTES
[facility]    Hematology / Oncology Progress Note    Patient: Clementine Kathleen  MRN: [id number]  Admission Date: 4/4/2024  Hospital Day # 3    Date of Service (when I saw the patient): 04/07/2024     Assessment & Plan   Clementine Kathleen is a 28 year old female with a past medical history including primary mediastinal B-cell lymphoma who is admitted 4/4/2024 for scheduled chemotherapy with dose adjusted R-EPOCH (C3D1=4/4/2024).     Today:  - C3D4 of DA-R-EPOCH. Continue to monitor for chemo related side effects.  - Continue BID Miralax and increase to Senna 2 tabs BID. Last BM Thursday prior to admission. No abdominal pain, cramping, or bloating. Is passing gas. Can consider mag citrate if no output.  - Intermittent nausea, overall controlled on current regimen with prns. Continue to monitor closely. Query if constipation also contributing to intermittent nausea in addition to chemotherapy.  - Reported feelings of palpitations this morning, no tachycardia, auscultated RRR without murmur. Denies chest pain, shortness of breath, headache, or dizziness. VSS. Will check EKG, continue to monitor.  - Continue to monitor and provide best supportive cares.     HEME  # Primary mediastinal B-cell lymphoma  Patient of Dr. Oakes. Initially presented with cervical lymphadenopathy 12/2023. Initial FNA (1/29/24) suggestive of possible Hodgkin lymphoma; however excisional LN biopsy (2/9/24) shows large B-cell lymphoma with differential diagnosis of primary mediastinal large B-cell lymphoma vs DLBCL NOS. PET with cervical and mediastinal lymphadenopathy only. Overall presentation would be quite consistent with PMBCL given her age, gender, sites of involvement, dim CD30 expression, and prominent fibrotic background.  Although CD23 IHC was negative, IHC for , MAL, and PD-L1 were positive in majority of the neoplastic cells suggesting PMBCL over DLBCL NOS. Lcvju5EC gene expression profile  also consistent with PMBCL. FISH with gain of BCL2 but no MYC, BCL2, or BCL6 rearrangements.  PET showed bilateral cervical and mediastinal hypermetabolic lymphadenopathy (largest 4.4 cm with SUVmax 27 in a prevascular LN); no disease below diaphragm. Most recent PET 4/1/24 with evidence of complete response. Seen by Dr. Oakes in clinic on 4/4/2024 and plan to proceed with admission for C3 at dose level 3 with vincristine cap at 2 mg due to neuropathy, with a planned total of 6 cycles.  - Port accessed on admission  - Most recent echo 2/19 with LVEF 60%, no significant valvular abnormalities present      Treatment Plan: Dose adjusted R-EPOCH (C3D1=4/4/2024)  - Rituximab 375 mg/m2 (600mg) IV x 1 dose - D1  - Prednisone 100 mg BID x 10 doses - D1-5  - Etoposide 72 mg/m2 (125 mg) IV x 4 doses - D1-4  - Vincristine 0.5 mg, Doxorubicin (24mg) x 4 doses CIVI- D1-4  - Cyclophosphamide 1080 mg/m2 (1845 mg) IV x 1 dose - D5  - Pegfilgrastim x1 - D6 (to be scheduled in clinic ~4/9)  - Pre-meds: tylenol, benadryl, zofran, emend, dexamethasone (D6-7)     # Risk of TLS  - Encourage good PO hydration. Allopurinol no longer indicated as pt in CR.      # Grade 1 Neuropathy   Endorsed L>R neuropathy in her fingertips. Secondary to chemotherapy.  - Monitor closely  - Cap vincristine at 2 mg, may need further decrease in future cycles.   - 4/7: reports numbness sensation extending slightly further down fingertips, previously noting just at the very fingertips, now extending nearly to the DIP joints. No neuropathy in the feet. No motor changes.      # ID prophylaxis  -  mg BID  - Levofloxacin 250 mg daily and fluconazole 200 mg daily when ANC <1.0  - Pentamidine for PJP ppx last given 4/1/24 and next due ~5/1/24     # History of CINV   Chemotherapy induced nausea and vomiting during previous cycles, managed with prn zofran, compazine, scheduled emend, and zyprexa at bedtime. Symptoms improved within a few days of hospital discharge  "from cycle 2.  - Antiemetics per chemo regimen: ondansetron 16 mg daily (D1-5), fosaprepitant D5  - Continue prn zofran and compazine as indicated  - Consider zyprexa at bedtime if additional antiemetics need  - If CINV is persistent can consider Aloxi or PRN ativan.      # History of chemotherapy related rash  Maculopapular light pink, scattered, non-pruritic rash noted during cycle 2 at base of skull. Similar episode with C1 with rash to right side of neck and right arm. Managed with home cortisone cream.   - Monitor for rashes with chemotherapy     # Palpitations/tachycardia  Noted as heart \"racing/pounding\" after cycle 2 of chemo. No associated chest pain, shortness of breath, lower extremity pain/swelling, abdominal pain/nausea, dizziness. Echo from 2/2024 unremarkable. 3/25/24 NT proBNP normal at <36. No symptoms at time of admission  - No current inpatient needs, continue to monitor for symptoms   - 4/7: patient reported she could \"feel\" her heartbeat this morning like palpitations. No tachycardia, shortness of breath, chest pain, headache, or dizziness. On exam, RRR without murmur.    - Check EKG and continue to monitor.  - Per outpatient notes, consider CT PE if worsening tachycardia, SOB, or chest pain.      # Constipation  Regular on bowel regimen miralax and senna BID. Continue while inpatient. Consider PRN Reglan if additional bowel medication is needed     MISC  # Fertility preservation - Established with [facility], underwent egg retrieval on 2/18.   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 Her boyfriend is a general surgery resident here at [facility]. Parents live in [place].     Clinically Significant Risk Factors               # Coagulation Defect: INR = 1.23 (Ref range: 0.85 - 1.15) and/or PTT = N/A, will monitor for bleeding               # Financial/Environmental Concerns: none           FEN   - IVF per " "chemotherapy regimen, IVF bolus prn   - PRN lyte replacement per standing protocol  - Regular diet as tolerated      DVT ppx: Lovenox, hold if Plt <50K   GI ppx: PTA PPI     DISPO: Anticipate 5 day stay for scheduled chemotherapy. Tentative discharge 4/8 pending completion of chemo and barring any clinical complications.   Follow-up/Referrals:  APPT 18 requested for labs/neulasta 4/9, labs 2x weekly, and AMEENA follow up week of 4/15    I spent >40 minutes face-to-face and/or coordinating or discussing care plan. Over 50% of our time on the unit was spent counseling the patient and/or coordinating care    Patient is staffed with attending physician Dr. Ha.     Rosemarie Gonzalez PA-C  Hematology/Oncology  Pager: #[phone]     Interval History   Chart reviewed, no acute events noted overnight. Ongoing intermittent nausea that is overall managed with prns. Denies nausea at present this morning. She does note numbness sensation extending slightly further down fingertips, previously noting just at the very fingertips, now extending nearly to the DIP joints. No neuropathy in the feet. No motor changes. Also reports feeling of mild palpitations, like she can \"feel\" her heartbeat this morning. No tachycardia, shortness of breath, chest pain, headache, or dizziness.  No BM since Thursday, increasing Senna today, taking Miralax BID. Discussed mag citrate but will hold for now. Reviewed labs, plan for today, and anticipated discharge tomorrow. All questions addressed at this time. Parents supportive at bedside.     Complete and Comprehensive review of systems review and negative other than noted here or in the HPI.     Physical Exam   Temp: 98.1  F (36.7  C) Temp src: Oral BP: 106/64 Pulse: 76   Resp: 16 SpO2: 98 % O2 Device: None (Room air)    Vitals:    04/04/24 1300 04/06/24 0810 04/07/24 0757   Weight: 64.5 kg (142 lb 3.2 oz) 64.7 kg (142 lb 11.2 oz) 63.8 kg (140 lb 9.6 oz)     Vital Signs with Ranges  Temp:  [97.6  F (36.4 "  C)-98.4  F (36.9  C)] 98.1  F (36.7  C)  Pulse:  [56-76] 76  Resp:  [16] 16  BP: (104-116)/(64-78) 106/64  SpO2:  [95 %-98 %] 98 %  I/O last 3 completed shifts:  In: 1177 [IV Piggyback:1177]  Out: -     Constitutional: Awake and conversational. Non-toxic appearing. No acute distress. Well developed and nourished.  HEENT: Normocephalic, atraumatic. Sclerae anicteric. Moist mucus membranes without lesion or abscess.    Respiratory: Breathing comfortably on room air with no accessory muscle use. Speaking in full sentences, no evidence of respiratory distress. Lungs CTAB, no wheeze or rales.   Cardiovascular: Regular rate and rhythm. S1, S2. No murmurs appreciated.  Circulatory: Pulses strong and equal. No peripheral edema.    GI: Abdomen with normoactive bowel sounds, soft, non-distended, and non-tender throughout.   Skin: Skin is clean, dry, intact.   Neurologic: Alert and oriented with normal speech. Grossly nonfocal exam. Moves all extremities equally.   Neuropsychiatric: Calm, appropriate mood and affect congruent to situation. Good judgment and insight.     Labs & Studies: I personally reviewed the following studies:  Data   Results for orders placed or performed during the hospital encounter of 04/04/24 (from the past 24 hour(s))   ABO/Rh type and screen    Narrative    The following orders were created for panel order ABO/Rh type and screen.  Procedure                               Abnormality         Status                     ---------                               -----------         ------                     Adult Type and Screen[[id number]]                            Final result                 Please view results for these tests on the individual orders.   Comprehensive metabolic panel   Result Value Ref Range    Sodium 140 135 - 145 mmol/L    Potassium 3.8 3.4 - 5.3 mmol/L    Carbon Dioxide (CO2) 25 22 - 29 mmol/L    Anion Gap 10 7 - 15 mmol/L    Urea Nitrogen 10.4 6.0 - 20.0 mg/dL    Creatinine 0.64  0.51 - 0.95 mg/dL    GFR Estimate >90 >60 mL/min/1.73m2    Calcium 9.2 8.6 - 10.0 mg/dL    Chloride 105 98 - 107 mmol/L    Glucose 113 (H) 70 - 99 mg/dL    Alkaline Phosphatase 66 40 - 150 U/L    AST 13 0 - 45 U/L    ALT 14 0 - 50 U/L    Protein Total 6.1 (L) 6.4 - 8.3 g/dL    Albumin 3.9 3.5 - 5.2 g/dL    Bilirubin Total 0.5 <=1.2 mg/dL   INR   Result Value Ref Range    INR 1.23 (H) 0.85 - 1.15   Fibrinogen activity   Result Value Ref Range    Fibrinogen Activity 227 170 - 490 mg/dL   Uric acid   Result Value Ref Range    Uric Acid 2.4 2.4 - 5.7 mg/dL   Magnesium   Result Value Ref Range    Magnesium 1.9 1.7 - 2.3 mg/dL   Phosphorus   Result Value Ref Range    Phosphorus 4.4 2.5 - 4.5 mg/dL   Adult Type and Screen   Result Value Ref Range    ABO/RH(D) O POS     Antibody Screen Negative Negative    SPECIMEN EXPIRATION DATE [id number]    CBC with platelets differential    Narrative    The following orders were created for panel order CBC with platelets differential.  Procedure                               Abnormality         Status                     ---------                               -----------         ------                     CBC with platelets and d...[[id number]]  Abnormal            Final result                 Please view results for these tests on the individual orders.   CBC with platelets and differential   Result Value Ref Range    WBC Count 6.6 4.0 - 11.0 10e3/uL    RBC Count 3.63 (L) 3.80 - 5.20 10e6/uL    Hemoglobin 10.8 (L) 11.7 - 15.7 g/dL    Hematocrit 32.3 (L) 35.0 - 47.0 %    MCV 89 78 - 100 fL    MCH 29.8 26.5 - 33.0 pg    MCHC 33.4 31.5 - 36.5 g/dL    RDW 18.1 (H) 10.0 - 15.0 %    Platelet Count 342 150 - 450 10e3/uL    % Neutrophils 77 %    % Lymphocytes 10 %    % Monocytes 12 %    % Eosinophils 0 %    % Basophils 0 %    % Immature Granulocytes 1 %    NRBCs per 100 WBC 0 <1 /100    Absolute Neutrophils 5.0 1.6 - 8.3 10e3/uL    Absolute Lymphocytes 0.7 (L) 0.8 - 5.3 10e3/uL     Absolute Monocytes 0.8 0.0 - 1.3 10e3/uL    Absolute Eosinophils 0.0 0.0 - 0.7 10e3/uL    Absolute Basophils 0.0 0.0 - 0.2 10e3/uL    Absolute Immature Granulocytes 0.1 <=0.4 10e3/uL    Absolute NRBCs 0.0 10e3/uL           Medications list for reference:    Medications   Current Facility-Administered Medications   Medication Dose Route Frequency Provider Last Rate Last Admin    No rectal suppositories if WBC less than 1000/microliters or platelets less than 50,000/ L   Does not apply Continuous PRN Angle Sanchez PA-C         Current Facility-Administered Medications   Medication Dose Route Frequency Provider Last Rate Last Admin    acyclovir (ZOVIRAX) tablet 400 mg  400 mg Oral BID Angle Sanchez PA-C   400 mg at 04/07/24 0856    Chemotherapy Infusing-Continuous Infusion   Does not apply Q8H Vanessa Oakes MD   Given at 04/07/24 1215    cimetidine (TAGAMET) tablet 200 mg  200 mg Oral BID Anlge Sanchez PA-C   200 mg at 04/07/24 0856    [START ON 4/8/2024] cycloPHOSphamide (CYTOXAN) 1,845 mg in sodium chloride 0.9 % 642.25 mL infusion  1,080 mg/m2 (Treatment Plan Recorded) Intravenous Once Vanessa Oakes MD        [START ON 4/10/2024] dexAMETHasone (DECADRON) tablet 8 mg  8 mg Oral Daily Vanessa Oakes MD        enoxaparin ANTICOAGULANT (LOVENOX) injection 40 mg  40 mg Subcutaneous Q24H Angle Sanchez PA-C   40 mg at 04/07/24 1230    etoposide (TOPOSAR) 125 mg in sodium chloride 0.9 % 556.25 mL infusion  72 mg/m2 (Treatment Plan Recorded) Intravenous Q22H Vanessa Oakes MD 25.3 mL/hr at 04/07/24 1220 125 mg at 04/07/24 1220    [Held by provider] fluconazole (DIFLUCAN) tablet 200 mg  200 mg Oral Daily Angle Sanchez PA-C        [START ON 4/8/2024] fosaprepitant (EMEND) 150 mg in sodium chloride 0.9 % 275 mL intermittent infusion  150 mg Intravenous Once Vanessa Oakes MD        heparin 100 unit/mL injection 5-10 mL  5-10 mL Intracatheter Q28 Days Angle Sanchez, PA-C        heparin lock flush 10 UNIT/ML injection 5-10 mL  5-10 mL  Intracatheter Q24H Angle Sanchez PA-C        [Held by provider] levofloxacin (LEVAQUIN) tablet 250 mg  250 mg Oral Daily Angle Sanchez PA-C        ondansetron (ZOFRAN) tablet 16 mg  16 mg Oral Q22H Vanessa Oakes MD   16 mg at 04/07/24 1215    pantoprazole (PROTONIX) EC tablet 40 mg  40 mg Oral BID Angle Sanchez PA-C   40 mg at 04/07/24 0856    polyethylene glycol (MIRALAX) Packet 17 g  17 g Oral BID Angle Sanchez PA-C   17 g at 04/07/24 0806    predniSONE (DELTASONE) tablet 100 mg  100 mg Oral BID Vanessa Oakes MD   100 mg at 04/07/24 0856    senna-docusate (SENOKOT-S/PERICOLACE) 8.6-50 MG per tablet 2 tablet  2 tablet Oral BID Rosemarie Gonzalez PA-C        sodium chloride (PF) 0.9% PF flush 10-20 mL  10-20 mL Intracatheter Q28 Days Angle Sanchez PA-C        vinCRIStine (ONCOVIN) 0.5 mg, DOXOrubicin (ADRIAMYCIN) 24 mg in sodium chloride 0.9 % 1,062.5 mL infusion  0.5 mg Intravenous Q22H Vanessa Oakes MD 48.3 mL/hr at 04/06/24 1429 0.5 mg at 04/07/24 1220

## 2024-04-08 VITALS
HEART RATE: 55 BPM | RESPIRATION RATE: 17 BRPM | TEMPERATURE: 98.1 F | SYSTOLIC BLOOD PRESSURE: 105 MMHG | HEIGHT: 66 IN | BODY MASS INDEX: 22.6 KG/M2 | OXYGEN SATURATION: 96 % | WEIGHT: 140.6 LBS | DIASTOLIC BLOOD PRESSURE: 68 MMHG

## 2024-04-08 LAB
ALBUMIN SERPL BCG-MCNC: 3.9 G/DL (ref 3.5–5.2)
ALP SERPL-CCNC: 60 U/L (ref 40–150)
ALT SERPL W P-5'-P-CCNC: 13 U/L (ref 0–50)
ANION GAP SERPL CALCULATED.3IONS-SCNC: 8 MMOL/L (ref 7–15)
AST SERPL W P-5'-P-CCNC: 13 U/L (ref 0–45)
ATRIAL RATE - MUSE: 72 BPM
BASOPHILS # BLD AUTO: 0 10E3/UL (ref 0–0.2)
BASOPHILS NFR BLD AUTO: 0 %
BILIRUB SERPL-MCNC: 0.7 MG/DL
BUN SERPL-MCNC: 9.4 MG/DL (ref 6–20)
CALCIUM SERPL-MCNC: 9.5 MG/DL (ref 8.6–10)
CHLORIDE SERPL-SCNC: 105 MMOL/L (ref 98–107)
CREAT SERPL-MCNC: 0.59 MG/DL (ref 0.51–0.95)
DEPRECATED HCO3 PLAS-SCNC: 26 MMOL/L (ref 22–29)
DIASTOLIC BLOOD PRESSURE - MUSE: NORMAL MMHG
EGFRCR SERPLBLD CKD-EPI 2021: >90 ML/MIN/1.73M2
EOSINOPHIL # BLD AUTO: 0 10E3/UL (ref 0–0.7)
EOSINOPHIL NFR BLD AUTO: 0 %
ERYTHROCYTE [DISTWIDTH] IN BLOOD BY AUTOMATED COUNT: 17.5 % (ref 10–15)
FIBRINOGEN PPP-MCNC: 215 MG/DL (ref 170–490)
GLUCOSE SERPL-MCNC: 125 MG/DL (ref 70–99)
HCT VFR BLD AUTO: 32.7 % (ref 35–47)
HGB BLD-MCNC: 11.1 G/DL (ref 11.7–15.7)
IMM GRANULOCYTES # BLD: 0 10E3/UL
IMM GRANULOCYTES NFR BLD: 1 %
INR PPP: 1.09 (ref 0.85–1.15)
INTERPRETATION ECG - MUSE: NORMAL
LYMPHOCYTES # BLD AUTO: 0.3 10E3/UL (ref 0.8–5.3)
LYMPHOCYTES NFR BLD AUTO: 10 %
MAGNESIUM SERPL-MCNC: 2 MG/DL (ref 1.7–2.3)
MCH RBC QN AUTO: 30.4 PG (ref 26.5–33)
MCHC RBC AUTO-ENTMCNC: 33.9 G/DL (ref 31.5–36.5)
MCV RBC AUTO: 90 FL (ref 78–100)
MONOCYTES # BLD AUTO: 0.2 10E3/UL (ref 0–1.3)
MONOCYTES NFR BLD AUTO: 6 %
NEUTROPHILS # BLD AUTO: 2.5 10E3/UL (ref 1.6–8.3)
NEUTROPHILS NFR BLD AUTO: 83 %
NRBC # BLD AUTO: 0 10E3/UL
NRBC BLD AUTO-RTO: 0 /100
P AXIS - MUSE: 41 DEGREES
PHOSPHATE SERPL-MCNC: 4.7 MG/DL (ref 2.5–4.5)
PLATELET # BLD AUTO: 344 10E3/UL (ref 150–450)
POTASSIUM SERPL-SCNC: 3.7 MMOL/L (ref 3.4–5.3)
PR INTERVAL - MUSE: 144 MS
PROT SERPL-MCNC: 6 G/DL (ref 6.4–8.3)
QRS DURATION - MUSE: 78 MS
QT - MUSE: 404 MS
QTC - MUSE: 442 MS
R AXIS - MUSE: 64 DEGREES
RBC # BLD AUTO: 3.65 10E6/UL (ref 3.8–5.2)
SODIUM SERPL-SCNC: 139 MMOL/L (ref 135–145)
SYSTOLIC BLOOD PRESSURE - MUSE: NORMAL MMHG
T AXIS - MUSE: 41 DEGREES
URATE SERPL-MCNC: 2.3 MG/DL (ref 2.4–5.7)
VENTRICULAR RATE- MUSE: 72 BPM
WBC # BLD AUTO: 3 10E3/UL (ref 4–11)

## 2024-04-08 PROCEDURE — 250N000011 HC RX IP 250 OP 636: Mod: JZ | Performed by: INTERNAL MEDICINE

## 2024-04-08 PROCEDURE — 80053 COMPREHEN METABOLIC PANEL: CPT | Performed by: PHYSICIAN ASSISTANT

## 2024-04-08 PROCEDURE — 85610 PROTHROMBIN TIME: CPT | Performed by: PHYSICIAN ASSISTANT

## 2024-04-08 PROCEDURE — 250N000013 HC RX MED GY IP 250 OP 250 PS 637

## 2024-04-08 PROCEDURE — 84100 ASSAY OF PHOSPHORUS: CPT | Performed by: PHYSICIAN ASSISTANT

## 2024-04-08 PROCEDURE — 84550 ASSAY OF BLOOD/URIC ACID: CPT | Performed by: PHYSICIAN ASSISTANT

## 2024-04-08 PROCEDURE — 250N000013 HC RX MED GY IP 250 OP 250 PS 637: Performed by: INTERNAL MEDICINE

## 2024-04-08 PROCEDURE — 250N000012 HC RX MED GY IP 250 OP 636 PS 637: Performed by: INTERNAL MEDICINE

## 2024-04-08 PROCEDURE — 250N000011 HC RX IP 250 OP 636: Performed by: PHYSICIAN ASSISTANT

## 2024-04-08 PROCEDURE — 250N000013 HC RX MED GY IP 250 OP 250 PS 637: Performed by: PHYSICIAN ASSISTANT

## 2024-04-08 PROCEDURE — 99239 HOSP IP/OBS DSCHRG MGMT >30: CPT

## 2024-04-08 PROCEDURE — 85025 COMPLETE CBC W/AUTO DIFF WBC: CPT | Performed by: PHYSICIAN ASSISTANT

## 2024-04-08 PROCEDURE — 258N000003 HC RX IP 258 OP 636: Performed by: INTERNAL MEDICINE

## 2024-04-08 PROCEDURE — 85384 FIBRINOGEN ACTIVITY: CPT | Performed by: PHYSICIAN ASSISTANT

## 2024-04-08 PROCEDURE — 83735 ASSAY OF MAGNESIUM: CPT | Performed by: PHYSICIAN ASSISTANT

## 2024-04-08 RX ORDER — OLANZAPINE 5 MG/1
5 TABLET, ORALLY DISINTEGRATING ORAL AT BEDTIME
Status: DISCONTINUED | OUTPATIENT
Start: 2024-04-08 | End: 2024-04-08 | Stop reason: HOSPADM

## 2024-04-08 RX ADMIN — CYCLOPHOSPHAMIDE 1845 MG: 2 INJECTION, POWDER, FOR SOLUTION INTRAVENOUS; ORAL at 11:14

## 2024-04-08 RX ADMIN — DOCUSATE SODIUM 50 MG AND SENNOSIDES 8.6 MG 2 TABLET: 8.6; 5 TABLET, FILM COATED ORAL at 08:12

## 2024-04-08 RX ADMIN — PANTOPRAZOLE SODIUM 40 MG: 40 TABLET, DELAYED RELEASE ORAL at 08:12

## 2024-04-08 RX ADMIN — PREDNISONE 100 MG: 50 TABLET ORAL at 08:12

## 2024-04-08 RX ADMIN — Medication 5 ML: at 12:58

## 2024-04-08 RX ADMIN — FOSAPREPITANT 150 MG: 150 INJECTION, POWDER, LYOPHILIZED, FOR SOLUTION INTRAVENOUS at 10:38

## 2024-04-08 RX ADMIN — ACYCLOVIR 400 MG: 400 TABLET ORAL at 08:12

## 2024-04-08 RX ADMIN — POLYETHYLENE GLYCOL 3350 17 G: 17 POWDER, FOR SOLUTION ORAL at 08:12

## 2024-04-08 RX ADMIN — CIMETIDINE 200 MG: 200 TABLET, FILM COATED ORAL at 08:20

## 2024-04-08 RX ADMIN — OLANZAPINE 5 MG: 5 TABLET, ORALLY DISINTEGRATING ORAL at 00:55

## 2024-04-08 RX ADMIN — ONDANSETRON HYDROCHLORIDE 16 MG: 8 TABLET, FILM COATED ORAL at 10:37

## 2024-04-08 ASSESSMENT — ACTIVITIES OF DAILY LIVING (ADL)
ADLS_ACUITY_SCORE: 18

## 2024-04-08 NOTE — DISCHARGE SUMMARY
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 She was able to tolerate cycle 3 well without significant complications.     Today, Clementine is feeling well and eager to discharge home. Completed Cytoxan this afternoon without issue; received Emend prior for management of N/V. Nausea has been managed with PRNs. Constipation during start of admission; LBM 4/7 s/p Miralax BID and 2 tabs senna BID.  Reviewed steroid regimen (prednisone x2 days followed by dex x 2 days) prior to discharge. Denies fever, chills, mouth sores, SOB, cough, abdominal pain, diarrhea, constipation, nausea, vomiting, dysuria, hematuria, numbness, tingling, swelling.     Prior to discharge, I reviewed with Clementine Kathleen the plan of care, including upcoming follow-up appointments and new medications. Appropriate prescriptions were sent to the discharge pharmacy, as needed. We reviewed strict discharge precautions, including reasons to call clinic triage or present to the ED, and she voiced understanding. She was provided with the clinic triage number, as well as written discharge instructions, in her discharge paperwork. Patient had an opportunity to ask questions, all of which were answered to their stated satisfaction.  On the day of discharge, Clementine Kathleen was overall well-appearing, hemodynamically stable, and felt safe and comfortable with the plans for discharge to home with follow-up as described.    New discharge medications:  - Prednisone 50 mg BID starting PM 4/8 - 4/9  - Dexamethasone 8 mg daily 4/10 -4/11  - Refilled: Senna, Protonix    Next follow-up:  - Twice weekly labs  - AMEENA follow up; spoke with outpatient RNCC, will be scheduled 4/15 or 4/16.   - Next cycle planned for 4/25.     Hospital Course   Clementine Kathleen was admitted on 4/4/2024.  The following problems were addressed during her hospitalization:    HEME  # Primary mediastinal B-cell lymphoma  Patient of Dr. Oakes. Initially presented with cervical lymphadenopathy 12/2023. Initial FNA (1/29/24) suggestive of possible Hodgkin lymphoma; however excisional LN biopsy (2/9/24) shows large B-cell lymphoma with differential diagnosis of primary mediastinal large B-cell lymphoma vs DLBCL NOS. PET with cervical and mediastinal lymphadenopathy only. Overall presentation would be quite consistent with PMBCL given her age, gender, sites of involvement, dim CD30 expression, and prominent fibrotic background.  Although CD23 IHC was negative, IHC for , MAL, and PD-L1 were positive in majority of the neoplastic cells suggesting PMBCL over DLBCL NOS. [id number] gene expression profile also consistent with PMBCL. FISH with gain of BCL2 but no MYC, BCL2, or BCL6 rearrangements.  PET showed bilateral cervical and mediastinal hypermetabolic lymphadenopathy (largest 4.4 cm with SUVmax 27 in a prevascular LN); no disease below diaphragm. Most recent PET 4/1/24 with evidence of complete response. Seen by Dr. Oakes in clinic on 4/4/2024 and plan to proceed with admission for C3 at dose level 3 with vincristine cap at 2 mg due to neuropathy, with a planned total of 6 cycles.  - Port accessed on admission and deaccessed prior to discharge.   - Most recent echo 2/19 with LVEF 60%, no  significant valvular abnormalities present      Treatment Plan: Dose adjusted R-EPOCH (C3D1=4/4/2024)  - Rituximab 375 mg/m2 (600mg) IV x 1 dose - D1  - Prednisone 100 mg BID x 10 doses - D1-5  - Etoposide 72 mg/m2 (125 mg) IV x 4 doses - D1-4  - Vincristine 0.5 mg, Doxorubicin (24mg) x 4 doses CIVI- D1-4  - Cyclophosphamide 1080 mg/m2 (1845 mg) IV x 1 dose - D5  - Pegfilgrastim x1 - D6 (to be scheduled in clinic ~4/9)  - Pre-meds: tylenol, benadryl, zofran, emend, dexamethasone (D6-7)     # Risk of TLS  - Encourage good PO hydration. Allopurinol no longer indicated as pt in CR.      # Grade 1 Neuropathy   Endorsed L>R neuropathy in her fingertips. Secondary to chemotherapy. Respectfully declined need for medication management at this time. Will continue to monitor.   - Monitor closely  - Cap vincristine at 2 mg, may need further decrease in future cycles.   - 4/7: reports numbness sensation extending slightly further down fingertips, previously noting just at the very fingertips, now extending nearly to the DIP joints. No neuropathy in the feet. No motor changes.      # ID prophylaxis  -  mg BID  - Levofloxacin 250 mg daily and fluconazole 200 mg daily when ANC <1.0  - Pentamidine for PJP ppx last given 4/1/24 and next due ~5/1/24     # History of CINV   Chemotherapy induced nausea and vomiting during previous cycles, managed with prn zofran, compazine, scheduled emend, and zyprexa at bedtime. Symptoms improved within a few days of hospital discharge from cycle 2.  - Antiemetics per chemo regimen: ondansetron 16 mg daily (D1-5), fosaprepitant D5  - Continue prn zofran and compazine as indicated and Zyprexa at bedtime.  - If CINV is persistent can consider Aloxi or PRN ativan.      # History of chemotherapy related rash  Maculopapular light pink, scattered, non-pruritic rash noted during cycle 2 at base of skull. Similar episode with C1 with rash to right side of neck and right arm. Managed with home  "cortisone cream.   - Monitor for rashes with chemotherapy     # Palpitations/tachycardia  Noted as heart \"racing/pounding\" after cycle 2 of chemo. No associated chest pain, shortness of breath, lower extremity pain/swelling, abdominal pain/nausea, dizziness. Echo from 2/2024 unremarkable. 3/25/24 NT proBNP normal at <36. No symptoms at time of admission  - No current inpatient needs, continue to monitor for symptoms   - 4/7: patient reported she could \"feel\" her heartbeat this morning like palpitations. No tachycardia, shortness of breath, chest pain, headache, or dizziness. On exam, RRR without murmur. EKG showed NSR with nonspecific T wave abnormality. No subsequent episodes since.   - Per outpatient notes, consider CT PE if worsening tachycardia, SOB, or chest pain.      # Constipation  Regular on bowel regimen miralax and senna BID. Continue while inpatient. Consider PRN Reglan if additional bowel medication is needed     MISC  # Fertility preservation - Established with [facility], underwent egg retrieval on 2/18.   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 Her boyfriend is a general surgery resident here at [facility]. Parents live in [place].      Staffed with Dr. Ha.    I spent 45 minutes in the care of this patient today, which included time necessary for review of interval events, obtaining history and physical exam, ordering medication(s)/test(s) as medically indicated, discussion with interdisciplinary/consult team(s), and documentation time. Over 50% of time was spent face-to-face and/or coordinating care.    Angela Levy PA-C  Hematology/Oncology  Pager: #[phone]    Code Status   Full Code    Primary Care Physician   Chela Peter    Physical Exam   Vital Signs with Ranges  Temp:  [98  F (36.7  C)-98.4  F (36.9  C)] 98.1  F (36.7  C)  Pulse:  [55-76] 55  Resp:  [16-18] 17  BP: (104-121)/(60-77) " 105/68  SpO2:  [94 %-100 %] 96 %  140 lbs 9.6 oz    Constitutional: Pleasant and cooperative female. Awake, alert, no apparent distress.  HEENT: NC/AT, EOMI, sclera clear, conjunctiva normal, oral pharynx with moist mucus membranes  Respiratory: No increased work of breathing, CTAB, no crackles or wheezing  Cardiovascular: RRR, normal S1 and S2, no murmur noted and no edema  GI: Normal bowel sounds, soft, non-distended and non-tender  MSK: No joint effusions or gross deformities.  Skin: No bruising, bleeding, rashes, or lesions   Neurologic:  Answers questions appropriately. Moves all extremities spontaneously.  Psych: Calm, appropriate affect  Vascular Access: Port on right chest wall. Clean, dry, intact.     Discharge Disposition   Discharged to home  Condition at discharge: Stable    Consultations This Hospital Stay   CARE MANAGEMENT / SOCIAL WORK IP CONSULT    Discharge Orders      Check Out Appointment Request    Please schedule  - labs + neulasta on 4/6  - labs/possible transfusions 2x weekly starting 4/6  - AMEENA follow up ~4/15     Discharge Medications   Current Discharge Medication List        START taking these medications    Details   predniSONE (DELTASONE) 50 MG tablet Take 2 tablets (100 mg) by mouth 2 times daily Starting 4/8 at around 4pm and in the morning of 4/9  Qty: 4 tablet, Refills: 0    Associated Diagnoses: Prevention of chemotherapy-induced neutropenia; 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)           CONTINUE these medications which have CHANGED    Details   dexAMETHasone (DECADRON) 4 MG tablet Take 2 tablets (8 mg) by mouth daily Starting 4/10 for two days (4/10 and 4/11)  Qty: 4 tablet, Refills: 0    Associated Diagnoses: Prevention of chemotherapy-induced neutropenia; 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)      pantoprazole (PROTONIX) 40 MG EC tablet Take 1 tablet (40 mg) by mouth 2 times daily  Qty: 60 tablet, Refills: 0    Associated  Diagnoses: 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)      senna-docusate (SENOKOT-S/PERICOLACE) 8.6-50 MG tablet Take 1 tablet by mouth daily as needed for constipation  Qty: 30 tablet, Refills: 0    Associated Diagnoses: 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)           CONTINUE these medications which have NOT CHANGED    Details   acyclovir (ZOVIRAX) 400 MG tablet Take 1 tablet (400 mg) by mouth 2 times daily  Qty: 60 tablet, Refills: 4    Associated Diagnoses: 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)      cimetidine (TAGAMET) 200 MG tablet Take 200 mg by mouth 2 times daily      Levonorgestrel (KYLEENA IU) 1 Device by Intrauterine route once      metoclopramide (REGLAN) 10 MG tablet Take 1 tablet (10 mg) by mouth 4 times daily as needed (constipation)  Qty: 30 tablet, Refills: 3    Associated Diagnoses: Slow transit constipation      modafinil (PROVIGIL) 200 MG tablet Take 1 tablet (200 mg) by mouth daily as needed    Associated Diagnoses: Idiopathic hypersomnia      OLANZapine zydis (ZYPREXA) 5 MG ODT Take 1 tablet (5 mg) by mouth at bedtime  Qty: 10 tablet, Refills: 0    Associated Diagnoses: Nausea      ondansetron (ZOFRAN) 4 MG tablet Take 1-2 tablets (4-8 mg) by mouth every 8 hours as needed for nausea or vomiting  Qty: 30 tablet, Refills: 0    Associated Diagnoses: 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)      polyethylene glycol (MIRALAX) 17 GM/Dose powder Take 17 g by mouth daily as needed for constipation  Qty: 510 g, Refills: 0    Associated Diagnoses: 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)      prochlorperazine (COMPAZINE) 10 MG tablet Take 1 tablet (10 mg) by mouth every 6 hours as needed for nausea or vomiting  Qty: 30 tablet, Refills: 0    Associated Diagnoses: 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)      triamcinolone (KENALOG) 0.1 % external cream Apply topically 2  times daily as needed for irritation  Qty: 15 g, Refills: 0    Associated Diagnoses: Rash      fluconazole (DIFLUCAN) 200 MG tablet Take 1 tablet (200 mg) by mouth daily  Qty: 30 tablet, Refills: 0    Associated Diagnoses: 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)      levofloxacin (LEVAQUIN) 250 MG tablet Take 1 tablet (250 mg) by mouth daily  Qty: 30 tablet, Refills: 0    Associated Diagnoses: 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)           STOP taking these medications       mupirocin (BACTROBAN) 2 % external ointment Comments:   Reason for Stopping:             Allergies   No Known Allergies    Data   Most Recent 3 CBC's:  Recent Labs   Lab Test 04/08/24  0516 04/07/24  0415 04/06/24  0454   WBC 3.0* 6.6 13.5*   HGB 11.1* 10.8* 10.9*   MCV 90 89 91    342 334      Most Recent 3 BMP's:  Recent Labs   Lab Test 04/08/24  0516 04/07/24  0349 04/06/24  0454    140 139   POTASSIUM 3.7 3.8 4.0   CHLORIDE 105 105 106   CO2 26 25 23   BUN 9.4 10.4 11.0   CR 0.59 0.64 0.58   ANIONGAP 8 10 10   DEE 9.5 9.2 9.1   * 113* 110*     Most Recent 2 LFT's:  Recent Labs   Lab Test 04/08/24  0516 04/07/24  0349   AST 13 13   ALT 13 14   ALKPHOS 60 66   BILITOTAL 0.7 0.5     Most Recent INR's and Anticoagulation Dosing History:  Anticoagulation Dose History  More data exists         Latest Ref Rng & Units 3/18/2024 3/19/2024 4/4/2024 4/5/2024 4/6/2024 4/7/2024 4/8/2024   Recent Dosing and Labs   INR 0.85 - 1.15 1.05  1.08  0.98  1.04  1.10  1.23  1.09      Most Recent 3 Troponin's:No lab results found.  Most Recent Cholesterol Panel:No lab results found.  Most Recent 6 Bacteria Isolates From Any Culture (See EPIC Reports for Culture Details):No lab results found.  Most Recent TSH, T4 and A1c Labs:No lab results found.  Results for orders placed or performed during the hospital encounter of 04/01/24   PET Oncology Whole Body    Narrative    Combined Report of: PET and  CT on  4/1/2024 11:38 AM:    1. PET of the neck, chest, abdomen, and pelvis.  2. PET CT Fusion for Attenuation Correction and Anatomical  Localization:    3. 3D MIP and PET-CT fused images were processed on an independent  workstation and archived to PACS and reviewed by a radiologist.    Technique:    1. PET: The patient received 10.19 mCi of F-18-FDG; the serum glucose  was 90 mg/dL prior to administration, body weight was 64 kg. Images  were evaluated in the axial, sagittal, and coronal planes as well as  the rotational whole body MIP. Images were acquired from the Vertex to  the Feet.    UPTAKE WAS MEASURED AT 60 MINUTES.     2. CT: CT only obtained for attenuation correction and not diagnostic  purposes.    INDICATION: Large B-cell lymphoma of cervical/mediastinal LN,  restaging after 2 cycles of chemo; Diffuse large B-cell lymphoma of  intrathoracic lymph nodes (H)    ADDITIONAL INFORMATION OBTAINED FROM EMR: Excisional biopsy of left  level 3 lymph node suggesting primary mediastinal B-cell lymphoma on  2/13/2024. Completed 2 cycles of chemotherapy (second cycle on  3/25/2024). Plan for 6 cycles.    COMPARISON: PET CT 2/7/2024.    FINDINGS:   BACKGROUND: Liver SUV max = 2.08, Aorta Blood SUV max = 1.57.     HEAD/NECK:  No abnormal uptake.     CHEST:  No abnormal uptake.     ABDOMEN AND PELVIS:  No abnormal uptake.     LOWER EXTREMITIES:   No abnormal uptake.     BONES AND SOFT TISSUES:   No abnormal uptake.       Impression    IMPRESSION: In this patient with primary mediastinal B-cell lymphoma  following 2 cycles of chemotherapy:   1. Complete metabolic response by Lugano criteria  2. Resolution of cervical and mediastinal hypermetabolic  lymphadenopathy.    Lugano Criteria for Lymphoma response to therapy methodology  Reported as: ex.  Lugano Criteria: Complete Response    PET response  Used for FDG avid Lymphomas (Hodgkins & Diffuse Large B-Cell)  Complete                     <=Liver  (Rosalinda  1-3)  Partial                          > Liver & decreased from baseline   (Deauville 4-5)  Stable/No response     > Liver & no change from baseline  (Deauville  4-5)  Progressive                 > Liver & Increased or new FDG avid  lesion (Deauville 4-5)    CT response  Used for variable uptake Lymphomas (Follicular, Marginal zone, CLL,  Mantle Cell)  Complete                     all nodes <1.5 cm  (longest diameter)  Partial                          Shrank > 50%        (SPD*)  Stable/No response      Shrank <50%         (SPD*)  Progressive disease      New or Increased size of lesions    *SPD = (sum of up to six lesions (longest x shortest diameter)    Source: Fei et al.  Imaging for Staging and Response Assessment in  Lymphoma.  Radiology August 2015.    _____________________________________________    The Deauville 5-point scoring system is an internationally accepted  and utilized five-point scoring system for the fluorodeoxyglucose  (FDG) avidity of a Hodgkin lymphoma or Non-Hodgkin lymphoma tumor mass  as seen on FDG positron emission tomography:[1]    Score 1: No uptake above the background  Score 2: Uptake ? mediastinum  Score 3: Uptake > mediastinum but ? liver  Score 4: Uptake moderately increased compared to the liver at any site  Score 5: Uptake markedly increased compared to the liver at any site  Score X: New areas of uptake unlikely to be related to lymphoma    FOLLOW-UP APPOINTMENT: 4/4/2024    I have personally reviewed the examination and initial interpretation  and I agree with the findings.    SUZY LOPES MD         SYSTEM ID:  [id number]

## 2024-04-08 NOTE — PROGRESS NOTES
Tristan low 60s. Denies pain, sob. Scheduled zofran given. Per pt neuropathy sensation in fingers continued   D5 cytoxan completed. Positive blood retun noted. Port flushed and HL  No electrolytes needed   Lbm 4/7   Plan for discharge     Nursing Focus: Discharge    D: Patient discharged to home at 1300. Patient VSS and accompanied by dad.    I: Discharge prescriptions sent to discharge pharmacy to be filled. All discharge medications and instructions reviewed with Clementine. Patient instructed to call clinic triage nurse if she experiences a fever >100.4, uncontrolled nausea, vomiting, diarrhea, or pain; or experiences any signs or symptoms of bleeding. Other phone numbers to call with questions or concerns after discharge reviewed. Port HL and de-accessed. Education completed.    A: She verbalized understanding of discharge medications and instructions. Patient will  medications at discharge pharmacy.     P: Patient to follow-up in clinic

## 2024-04-08 NOTE — PROGRESS NOTES
Care Management Discharge Note    Discharge Date: 04/08/2024       Discharge Disposition: Home, Outpatient Infusion Services    Discharge Services: None    Discharge DME: None    Discharge Transportation: car, drives self, family or friend will provide    Private pay costs discussed: Not applicable    Does the patient's insurance plan have a 3 day qualifying hospital stay waiver?  Yes     Which insurance plan 3 day waiver is available? No      PAS Confirmation Code:    Patient/family educated on Medicare website which has current facility and service quality ratings:      Education Provided on the Discharge Plan:    Persons Notified of Discharge Plans: per MD and bedside nurse  Patient/Family in Agreement with the Plan: yes    Handoff Referral Completed: Yes    Additional Information:    Clementine discharged home today with ride from SO.   No discharge needs were identified during Care Management Assessment on 4/5.    Ninfa Leo RN    4/8/2024  Nurse Coordinator      Social Work and Care Management Department       SEARCHABLE in [facility] - search CARE COORDINATOR       [employer] ([phone]) Saturday & Sunday; ([phone]) [facility] Recognized Holidays     Units: 5A Onc 5201 thru 5219 RNCC, 5A Onc 5220 thru 5240 RNCC, 5C OFFSERVICE [phone] RNCC & 5C OFF SERVICE [phone] RNCC Pager: [phone]    Units: 6B Vocera, 6C Card 6401 thru 6420 RNCC, 6C Card 6502 thru 6514 RNCC, & 6C Card 6515 thru 6519 RNCC  Pager: [phone]    Units: 7A SOT RNCC Vocera, 7B Med Surg Vocera, 7C Med Surg 7401 thru 7418 RNCC & 7C Med Surg 7502 thru 7521 RNCC Pager: [phone]    Units: 6A Vocera & 4A CVICU Vocera, 4C MICU Vocera, and 4E SICU Vocera   Pager: [phone]    Units: 5 Ortho Vocera & 5 Med Surg Vocera  Pager: [phone]    Units: 6 Med Surg Vocera & 8 Med Surg Vocera  Pager [phone]

## 2024-04-08 NOTE — PROGRESS NOTES
Nursing Focus: Chemotherapy    D:  Patient received Day 5 and last dose of Cytoxan    I:   Chemotherapy double checked per protocol by two chemotherapy competent RN's prior to administration.  Premedications administered per orders (see electronic medical administration record).  Brisk blood return observed from port prior to administration.  Insertion site is clean/dry/intact, dressing intact with no complaints of pain. Cytoxan programmed to infuse over 60 minutes per protocol. Reviewed pt teaching on chemotherapy side effects.      A:  Patient tolerating chemo well thus far. Denies nausea.  Patient denies any further need for teaching at this time.     P: Continue to monitor patient for chemo side effects and intervene as needed. Continue to screen for symptoms of toxicity.  Notify MD with any concerns or changes in patient status.

## 2024-04-08 NOTE — PROVIDER NOTIFICATION
Provider Kameron notified via [employer] [id number].     Pt requesting Zyprexa to help sleep, are you able to put in a PRN?     Antonio NEWTON     Plan: Scheduled zyprexa ordered at bedtime

## 2024-04-08 NOTE — PLAN OF CARE
Goal Outcome Evaluation:  [id number]  Pt is admitted 4/4/2024 for scheduled chemotherapy with dose adjusted R-EPOCH (C3D1=4/4/2024). AVSS, alert and oriented x 4, denies pain/sob. Nausea intermittently, given PRN Zofran x 1. D 4 Etoposide and Clemente/Doxo infusing via Port, tolerating good and good blood return. No complain of facial flush. Up independent, voiding adequately, not saving urine, LBM 4/7  Discharge tomorrow in the morning after getting Cytoxan.  Continue to monitor care.

## 2024-04-08 NOTE — PLAN OF CARE
"[id number]  Goal Outcome Evaluation:    Plan of Care Reviewed With: patient. Overall Patient Progress: no change. Outcome Evaluation: Continued nausea, ant discharge today.    Tristan low of 56. Denies pain/sob. Noted to have increase in neuropathy sensation in fingertips and beginning to spread. May benefit from gabapentin. EKG resulted sinus rhythm w/ nonspecific T wave abnormality. Anticipated discharge today at noon after chemo is complete, D5 cytoxan scheduled at 1030.     Continued nausea overnight, IV compazine given w/ relief. Pt called soon after stating feeling like she was wide awake and \"bouncing off the walls.\" Pt thought to be associated w/ steroids however had never had this sensation before and asked for something to help sleep. After 30 min feeling began to wear off, Zyprexa was given and pt able to sleep overnight.   "

## 2024-04-10 ENCOUNTER — PATIENT OUTREACH (OUTPATIENT)
Dept: ONCOLOGY | Facility: CLINIC | Age: 29
End: 2024-04-10

## 2024-04-10 ENCOUNTER — INFUSION THERAPY VISIT (OUTPATIENT)
Dept: ONCOLOGY | Facility: CLINIC | Age: 29
End: 2024-04-10
Attending: INTERNAL MEDICINE
Payer: COMMERCIAL

## 2024-04-10 ENCOUNTER — APPOINTMENT (OUTPATIENT)
Dept: LAB | Facility: CLINIC | Age: 29
End: 2024-04-10
Attending: INTERNAL MEDICINE
Payer: COMMERCIAL

## 2024-04-10 VITALS
HEART RATE: 86 BPM | OXYGEN SATURATION: 98 % | WEIGHT: 141 LBS | BODY MASS INDEX: 22.76 KG/M2 | SYSTOLIC BLOOD PRESSURE: 106 MMHG | DIASTOLIC BLOOD PRESSURE: 68 MMHG | TEMPERATURE: 97.3 F | RESPIRATION RATE: 16 BRPM

## 2024-04-10 DIAGNOSIS — C83.32 DIFFUSE LARGE B-CELL LYMPHOMA OF INTRATHORACIC LYMPH NODES (H): ICD-10-CM

## 2024-04-10 DIAGNOSIS — Z76.89 PREVENTION OF CHEMOTHERAPY-INDUCED NEUTROPENIA: Primary | ICD-10-CM

## 2024-04-10 DIAGNOSIS — E87.6 HYPOKALEMIA: Primary | ICD-10-CM

## 2024-04-10 DIAGNOSIS — C85.28 MEDIASTINAL LARGE B-CELL LYMPHOMA OF LYMPH NODES OF MULTIPLE REGIONS (H): ICD-10-CM

## 2024-04-10 LAB
ALBUMIN SERPL BCG-MCNC: 3.8 G/DL (ref 3.5–5.2)
ALP SERPL-CCNC: 54 U/L (ref 40–150)
ALT SERPL W P-5'-P-CCNC: 7 U/L (ref 0–50)
ANION GAP SERPL CALCULATED.3IONS-SCNC: 12 MMOL/L (ref 7–15)
AST SERPL W P-5'-P-CCNC: 10 U/L (ref 0–45)
BASOPHILS # BLD AUTO: 0 10E3/UL (ref 0–0.2)
BASOPHILS NFR BLD AUTO: 1 %
BILIRUB SERPL-MCNC: 0.6 MG/DL
BUN SERPL-MCNC: 16.4 MG/DL (ref 6–20)
CALCIUM SERPL-MCNC: 8.7 MG/DL (ref 8.6–10)
CHLORIDE SERPL-SCNC: 105 MMOL/L (ref 98–107)
CREAT SERPL-MCNC: 0.58 MG/DL (ref 0.51–0.95)
DEPRECATED HCO3 PLAS-SCNC: 23 MMOL/L (ref 22–29)
EGFRCR SERPLBLD CKD-EPI 2021: >90 ML/MIN/1.73M2
EOSINOPHIL # BLD AUTO: 0 10E3/UL (ref 0–0.7)
EOSINOPHIL NFR BLD AUTO: 1 %
ERYTHROCYTE [DISTWIDTH] IN BLOOD BY AUTOMATED COUNT: 17.3 % (ref 10–15)
GLUCOSE SERPL-MCNC: 112 MG/DL (ref 70–99)
HCT VFR BLD AUTO: 32.7 % (ref 35–47)
HGB BLD-MCNC: 11.1 G/DL (ref 11.7–15.7)
IMM GRANULOCYTES # BLD: 0 10E3/UL
IMM GRANULOCYTES NFR BLD: 1 %
LDH SERPL L TO P-CCNC: 160 U/L (ref 0–250)
LYMPHOCYTES # BLD AUTO: 0.9 10E3/UL (ref 0.8–5.3)
LYMPHOCYTES NFR BLD AUTO: 49 %
MCH RBC QN AUTO: 30.1 PG (ref 26.5–33)
MCHC RBC AUTO-ENTMCNC: 33.9 G/DL (ref 31.5–36.5)
MCV RBC AUTO: 89 FL (ref 78–100)
MONOCYTES # BLD AUTO: 0 10E3/UL (ref 0–1.3)
MONOCYTES NFR BLD AUTO: 1 %
NEUTROPHILS # BLD AUTO: 0.8 10E3/UL (ref 1.6–8.3)
NEUTROPHILS NFR BLD AUTO: 47 %
NRBC # BLD AUTO: 0 10E3/UL
NRBC BLD AUTO-RTO: 0 /100
PLATELET # BLD AUTO: 361 10E3/UL (ref 150–450)
POTASSIUM SERPL-SCNC: 3.1 MMOL/L (ref 3.4–5.3)
PROT SERPL-MCNC: 6.1 G/DL (ref 6.4–8.3)
RBC # BLD AUTO: 3.69 10E6/UL (ref 3.8–5.2)
SODIUM SERPL-SCNC: 140 MMOL/L (ref 135–145)
URATE SERPL-MCNC: 2 MG/DL (ref 2.4–5.7)
WBC # BLD AUTO: 1.8 10E3/UL (ref 4–11)

## 2024-04-10 PROCEDURE — 82374 ASSAY BLOOD CARBON DIOXIDE: CPT

## 2024-04-10 PROCEDURE — 85025 COMPLETE CBC W/AUTO DIFF WBC: CPT

## 2024-04-10 PROCEDURE — 96372 THER/PROPH/DIAG INJ SC/IM: CPT | Performed by: INTERNAL MEDICINE

## 2024-04-10 PROCEDURE — 84550 ASSAY OF BLOOD/URIC ACID: CPT

## 2024-04-10 PROCEDURE — 83615 LACTATE (LD) (LDH) ENZYME: CPT

## 2024-04-10 PROCEDURE — 36415 COLL VENOUS BLD VENIPUNCTURE: CPT

## 2024-04-10 PROCEDURE — 84295 ASSAY OF SERUM SODIUM: CPT

## 2024-04-10 PROCEDURE — 250N000011 HC RX IP 250 OP 636: Mod: JZ | Performed by: INTERNAL MEDICINE

## 2024-04-10 PROCEDURE — 36591 DRAW BLOOD OFF VENOUS DEVICE: CPT

## 2024-04-10 RX ORDER — POTASSIUM CHLORIDE 1500 MG/1
20 TABLET, EXTENDED RELEASE ORAL 2 TIMES DAILY
Qty: 4 TABLET | Refills: 0 | Status: ON HOLD | OUTPATIENT
Start: 2024-04-10 | End: 2024-04-25

## 2024-04-10 RX ADMIN — PEGFILGRASTIM 6 MG: 6 INJECTION SUBCUTANEOUS at 14:11

## 2024-04-10 ASSESSMENT — PAIN SCALES - GENERAL: PAINLEVEL: NO PAIN (0)

## 2024-04-10 NOTE — PROGRESS NOTES
[facility]: Post-Discharge Note  SITUATION                                                      Admission:    Admission Date: 04/04/24   Reason for Admission: REPOCH  Discharge:   Discharge Date: 04/08/24  Discharge Diagnosis: lymphoma    BACKGROUND                                                      Per hospital discharge summary and inpatient provider notes.    ASSESSMENT        Discharge Assessment  How are you doing now that you are home?: Doing fine  How are your symptoms? (Red Flag symptoms escalate to triage hotline per guidelines): Unchanged  Do you feel your condition is stable enough to be safe at home until your provider visit?: Yes  Does the patient have their discharge instructions? : Yes  Does the patient have questions regarding their discharge instructions? : No  Were you started on any new medications or were there changes to any of your previous medications? : No  Does the patient have all of their medications?: Yes  Do you have questions regarding any of your medications? : No  Do you have all of your needed medical supplies or equipment (DME)?  (i.e. oxygen tank, CPAP, cane, etc.): Yes  Discharge follow-up appointment scheduled within 14 calendar days? : Yes  Discharge Follow Up Appointment Date: 04/15/24  Discharge Follow Up Appointment Scheduled with?: Specialty Care Provider    Reviewed lab results from today 4/10 - Reviewed ANC with Clementine, she will start her levaquin and fluconazole today.     Reviewed decreased potassium level, suggested potassium rich foods or a potassium supplement. Clementine stated she would eat potassium rich foods and didn't want a supplement at this time.    Reviewed some potassium rich food options with Clementine over the phone. Clementine refused additional information over [employer].     PLAN                                                      Outpatient Plan:      Future Appointments   Date Time Provider Department Center   4/12/2024 11:15 AM AMIN[facility] [place]  [facility]   4/15/2024  1:00 PM  [facility]   4/15/2024  1:30 PM Charisse Weinberg APRN CNP [facility]   4/18/2024 10:15 AM  [facility]   4/22/2024 12:15 PM  [facility]         For any urgent concerns, please contact our 24 hour clinic line:   [place]: [phone]       Sonja Garibay RN

## 2024-04-10 NOTE — PROGRESS NOTES
Infusion Injection Note:  Clementine M Hever presents today for Neulasta injection.    Patient declined to speak with an RN today.     Treatment Conditions:  Not Applicable.    Pre and Post Injection:  Neulasta injection given to Right Arm  without incident.   Patient tolerated procedure well.    Discharge Plan:  Patient and/or family verbalized understanding of discharge instructions and all questions answered.  AVS to patient via [place] [facility].    Patient discharged in stable condition accompanied by: self.  Departure Mode: Ambulatory.  Patient will return 4/15/2024 for next appointment.    Monisha MAURICE on 4/10/2024 at 3:00 PM

## 2024-04-10 NOTE — NURSING NOTE
Chief Complaint   Patient presents with    Blood Draw     Labs collected from venipuncture by RN. Vitals taken. Checked in for appointment(s).      Labs collected from venipuncture by RN. Vitals taken. Checked in for appointment(s).    Jessie Eagle RN

## 2024-04-11 DIAGNOSIS — E87.6 HYPOKALEMIA: ICD-10-CM

## 2024-04-12 ENCOUNTER — LAB (OUTPATIENT)
Dept: LAB | Facility: CLINIC | Age: 29
End: 2024-04-12
Attending: INTERNAL MEDICINE
Payer: COMMERCIAL

## 2024-04-12 DIAGNOSIS — C85.28 MEDIASTINAL LARGE B-CELL LYMPHOMA OF LYMPH NODES OF MULTIPLE REGIONS (H): ICD-10-CM

## 2024-04-12 LAB
ALBUMIN SERPL BCG-MCNC: 4.1 G/DL (ref 3.5–5.2)
ALP SERPL-CCNC: 78 U/L (ref 40–150)
ALT SERPL W P-5'-P-CCNC: 8 U/L (ref 0–50)
ANION GAP SERPL CALCULATED.3IONS-SCNC: 13 MMOL/L (ref 7–15)
AST SERPL W P-5'-P-CCNC: 11 U/L (ref 0–45)
BASOPHILS # BLD AUTO: ABNORMAL 10*3/UL
BASOPHILS # BLD MANUAL: 0 10E3/UL (ref 0–0.2)
BASOPHILS NFR BLD AUTO: ABNORMAL %
BASOPHILS NFR BLD MANUAL: 0 %
BILIRUB SERPL-MCNC: 0.7 MG/DL
BUN SERPL-MCNC: 15.5 MG/DL (ref 6–20)
CALCIUM SERPL-MCNC: 9.3 MG/DL (ref 8.6–10)
CHLORIDE SERPL-SCNC: 102 MMOL/L (ref 98–107)
CREAT SERPL-MCNC: 0.67 MG/DL (ref 0.51–0.95)
DACRYOCYTES BLD QL SMEAR: SLIGHT
DEPRECATED HCO3 PLAS-SCNC: 23 MMOL/L (ref 22–29)
EGFRCR SERPLBLD CKD-EPI 2021: >90 ML/MIN/1.73M2
EOSINOPHIL # BLD AUTO: ABNORMAL 10*3/UL
EOSINOPHIL # BLD MANUAL: 0 10E3/UL (ref 0–0.7)
EOSINOPHIL NFR BLD AUTO: ABNORMAL %
EOSINOPHIL NFR BLD MANUAL: 0 %
ERYTHROCYTE [DISTWIDTH] IN BLOOD BY AUTOMATED COUNT: 18 % (ref 10–15)
GLUCOSE SERPL-MCNC: 86 MG/DL (ref 70–99)
HCT VFR BLD AUTO: 32.9 % (ref 35–47)
HGB BLD-MCNC: 11.3 G/DL (ref 11.7–15.7)
IMM GRANULOCYTES # BLD: ABNORMAL 10*3/UL
IMM GRANULOCYTES NFR BLD: ABNORMAL %
LDH SERPL L TO P-CCNC: 202 U/L (ref 0–250)
LYMPHOCYTES # BLD AUTO: ABNORMAL 10*3/UL
LYMPHOCYTES # BLD MANUAL: 0 10E3/UL (ref 0.8–5.3)
LYMPHOCYTES NFR BLD AUTO: ABNORMAL %
LYMPHOCYTES NFR BLD MANUAL: 0 %
MCH RBC QN AUTO: 31 PG (ref 26.5–33)
MCHC RBC AUTO-ENTMCNC: 34.3 G/DL (ref 31.5–36.5)
MCV RBC AUTO: 90 FL (ref 78–100)
MONOCYTES # BLD AUTO: ABNORMAL 10*3/UL
MONOCYTES # BLD MANUAL: 0 10E3/UL (ref 0–1.3)
MONOCYTES NFR BLD AUTO: ABNORMAL %
MONOCYTES NFR BLD MANUAL: 0 %
NEUTROPHILS # BLD AUTO: ABNORMAL 10*3/UL
NEUTROPHILS # BLD MANUAL: 36 10E3/UL (ref 1.6–8.3)
NEUTROPHILS NFR BLD AUTO: ABNORMAL %
NEUTROPHILS NFR BLD MANUAL: 100 %
NRBC # BLD AUTO: 0 10E3/UL
NRBC BLD AUTO-RTO: 0 /100
PLAT MORPH BLD: ABNORMAL
PLATELET # BLD AUTO: 234 10E3/UL (ref 150–450)
POTASSIUM SERPL-SCNC: 4.5 MMOL/L (ref 3.4–5.3)
PROT SERPL-MCNC: 6.5 G/DL (ref 6.4–8.3)
RBC # BLD AUTO: 3.65 10E6/UL (ref 3.8–5.2)
RBC MORPH BLD: ABNORMAL
SODIUM SERPL-SCNC: 138 MMOL/L (ref 135–145)
WBC # BLD AUTO: 36 10E3/UL (ref 4–11)

## 2024-04-12 PROCEDURE — 83615 LACTATE (LD) (LDH) ENZYME: CPT

## 2024-04-12 PROCEDURE — 36591 DRAW BLOOD OFF VENOUS DEVICE: CPT

## 2024-04-12 PROCEDURE — 85014 HEMATOCRIT: CPT

## 2024-04-12 PROCEDURE — 84450 TRANSFERASE (AST) (SGOT): CPT

## 2024-04-12 PROCEDURE — 84155 ASSAY OF PROTEIN SERUM: CPT

## 2024-04-12 PROCEDURE — 85007 BL SMEAR W/DIFF WBC COUNT: CPT

## 2024-04-12 NOTE — NURSING NOTE
Chief Complaint   Patient presents with    Lab Only     Labs drawn via  by RN.      Demetra Melendez, RN

## 2024-04-15 ENCOUNTER — APPOINTMENT (OUTPATIENT)
Dept: LAB | Facility: CLINIC | Age: 29
End: 2024-04-15
Attending: INTERNAL MEDICINE
Payer: COMMERCIAL

## 2024-04-15 ENCOUNTER — ANCILLARY PROCEDURE (OUTPATIENT)
Dept: CT IMAGING | Facility: CLINIC | Age: 29
End: 2024-04-15
Payer: COMMERCIAL

## 2024-04-15 ENCOUNTER — ONCOLOGY VISIT (OUTPATIENT)
Dept: ONCOLOGY | Facility: CLINIC | Age: 29
End: 2024-04-15
Payer: COMMERCIAL

## 2024-04-15 VITALS
BODY MASS INDEX: 22.74 KG/M2 | RESPIRATION RATE: 16 BRPM | TEMPERATURE: 98.2 F | OXYGEN SATURATION: 97 % | HEART RATE: 104 BPM | SYSTOLIC BLOOD PRESSURE: 111 MMHG | WEIGHT: 140.9 LBS | DIASTOLIC BLOOD PRESSURE: 77 MMHG

## 2024-04-15 DIAGNOSIS — R06.02 SHORTNESS OF BREATH: ICD-10-CM

## 2024-04-15 DIAGNOSIS — G62.0 DRUG-INDUCED POLYNEUROPATHY (H): ICD-10-CM

## 2024-04-15 DIAGNOSIS — C83.32 DIFFUSE LARGE B-CELL LYMPHOMA OF INTRATHORACIC LYMPH NODES (H): Primary | ICD-10-CM

## 2024-04-15 DIAGNOSIS — R00.0 TACHYCARDIA: ICD-10-CM

## 2024-04-15 DIAGNOSIS — C83.32 DIFFUSE LARGE B-CELL LYMPHOMA OF INTRATHORACIC LYMPH NODES (H): ICD-10-CM

## 2024-04-15 DIAGNOSIS — K59.03 DRUG-INDUCED CONSTIPATION: ICD-10-CM

## 2024-04-15 DIAGNOSIS — R06.02 SOB (SHORTNESS OF BREATH): ICD-10-CM

## 2024-04-15 DIAGNOSIS — C85.28 MEDIASTINAL LARGE B-CELL LYMPHOMA OF LYMPH NODES OF MULTIPLE REGIONS (H): ICD-10-CM

## 2024-04-15 LAB
ALBUMIN SERPL BCG-MCNC: 4.2 G/DL (ref 3.5–5.2)
ALP SERPL-CCNC: 78 U/L (ref 40–150)
ALT SERPL W P-5'-P-CCNC: 8 U/L (ref 0–50)
ANION GAP SERPL CALCULATED.3IONS-SCNC: 11 MMOL/L (ref 7–15)
AST SERPL W P-5'-P-CCNC: 10 U/L (ref 0–45)
BASOPHILS # BLD AUTO: ABNORMAL 10*3/UL
BASOPHILS # BLD MANUAL: 0.3 10E3/UL (ref 0–0.2)
BASOPHILS NFR BLD AUTO: ABNORMAL %
BASOPHILS NFR BLD MANUAL: 10 %
BILIRUB SERPL-MCNC: 0.3 MG/DL
BUN SERPL-MCNC: 11.8 MG/DL (ref 6–20)
CALCIUM SERPL-MCNC: 9.4 MG/DL (ref 8.6–10)
CHLORIDE SERPL-SCNC: 101 MMOL/L (ref 98–107)
CREAT SERPL-MCNC: 0.7 MG/DL (ref 0.51–0.95)
DACRYOCYTES BLD QL SMEAR: SLIGHT
DEPRECATED HCO3 PLAS-SCNC: 23 MMOL/L (ref 22–29)
EGFRCR SERPLBLD CKD-EPI 2021: >90 ML/MIN/1.73M2
EOSINOPHIL # BLD AUTO: ABNORMAL 10*3/UL
EOSINOPHIL # BLD MANUAL: 0 10E3/UL (ref 0–0.7)
EOSINOPHIL NFR BLD AUTO: ABNORMAL %
EOSINOPHIL NFR BLD MANUAL: 0 %
ERYTHROCYTE [DISTWIDTH] IN BLOOD BY AUTOMATED COUNT: 17.2 % (ref 10–15)
GLUCOSE SERPL-MCNC: 105 MG/DL (ref 70–99)
HCT VFR BLD AUTO: 33.7 % (ref 35–47)
HGB BLD-MCNC: 11.5 G/DL (ref 11.7–15.7)
IMM GRANULOCYTES # BLD: ABNORMAL 10*3/UL
IMM GRANULOCYTES NFR BLD: ABNORMAL %
LDH SERPL L TO P-CCNC: 138 U/L (ref 0–250)
LYMPHOCYTES # BLD AUTO: ABNORMAL 10*3/UL
LYMPHOCYTES # BLD MANUAL: 0.6 10E3/UL (ref 0.8–5.3)
LYMPHOCYTES NFR BLD AUTO: ABNORMAL %
LYMPHOCYTES NFR BLD MANUAL: 24 %
MCH RBC QN AUTO: 30.4 PG (ref 26.5–33)
MCHC RBC AUTO-ENTMCNC: 34.1 G/DL (ref 31.5–36.5)
MCV RBC AUTO: 89 FL (ref 78–100)
METAMYELOCYTES # BLD MANUAL: 0 10E3/UL
METAMYELOCYTES NFR BLD MANUAL: 1 %
MONOCYTES # BLD AUTO: ABNORMAL 10*3/UL
MONOCYTES # BLD MANUAL: 0.3 10E3/UL (ref 0–1.3)
MONOCYTES NFR BLD AUTO: ABNORMAL %
MONOCYTES NFR BLD MANUAL: 10 %
NEUTROPHILS # BLD AUTO: ABNORMAL 10*3/UL
NEUTROPHILS # BLD MANUAL: 1.5 10E3/UL (ref 1.6–8.3)
NEUTROPHILS NFR BLD AUTO: ABNORMAL %
NEUTROPHILS NFR BLD MANUAL: 55 %
NRBC # BLD AUTO: 0 10E3/UL
NRBC BLD AUTO-RTO: 0 /100
PLAT MORPH BLD: ABNORMAL
PLATELET # BLD AUTO: 94 10E3/UL (ref 150–450)
POTASSIUM SERPL-SCNC: 4.5 MMOL/L (ref 3.4–5.3)
PROT SERPL-MCNC: 6.8 G/DL (ref 6.4–8.3)
RBC # BLD AUTO: 3.78 10E6/UL (ref 3.8–5.2)
RBC MORPH BLD: ABNORMAL
SODIUM SERPL-SCNC: 135 MMOL/L (ref 135–145)
URATE SERPL-MCNC: 4 MG/DL (ref 2.4–5.7)
WBC # BLD AUTO: 2.7 10E3/UL (ref 4–11)

## 2024-04-15 PROCEDURE — 99213 OFFICE O/P EST LOW 20 MIN: CPT

## 2024-04-15 PROCEDURE — G2211 COMPLEX E/M VISIT ADD ON: HCPCS

## 2024-04-15 PROCEDURE — 84550 ASSAY OF BLOOD/URIC ACID: CPT

## 2024-04-15 PROCEDURE — 71275 CT ANGIOGRAPHY CHEST: CPT | Mod: GC | Performed by: RADIOLOGY

## 2024-04-15 PROCEDURE — 80053 COMPREHEN METABOLIC PANEL: CPT

## 2024-04-15 PROCEDURE — 36415 COLL VENOUS BLD VENIPUNCTURE: CPT

## 2024-04-15 PROCEDURE — 99214 OFFICE O/P EST MOD 30 MIN: CPT

## 2024-04-15 PROCEDURE — 85007 BL SMEAR W/DIFF WBC COUNT: CPT

## 2024-04-15 PROCEDURE — 85014 HEMATOCRIT: CPT

## 2024-04-15 PROCEDURE — 83615 LACTATE (LD) (LDH) ENZYME: CPT

## 2024-04-15 RX ORDER — AMOXICILLIN 250 MG
1 CAPSULE ORAL DAILY PRN
Qty: 60 TABLET | Refills: 0 | Status: ON HOLD | OUTPATIENT
Start: 2024-04-15 | End: 2024-04-28

## 2024-04-15 RX ORDER — IOPAMIDOL 755 MG/ML
69 INJECTION, SOLUTION INTRAVASCULAR ONCE
Status: COMPLETED | OUTPATIENT
Start: 2024-04-15 | End: 2024-04-15

## 2024-04-15 RX ORDER — PANTOPRAZOLE SODIUM 40 MG/1
40 TABLET, DELAYED RELEASE ORAL 2 TIMES DAILY
Qty: 60 TABLET | Refills: 0 | Status: ON HOLD | OUTPATIENT
Start: 2024-04-15 | End: 2024-06-08

## 2024-04-15 RX ADMIN — IOPAMIDOL 69 ML: 755 INJECTION, SOLUTION INTRAVASCULAR at 14:07

## 2024-04-15 ASSESSMENT — PAIN SCALES - GENERAL: PAINLEVEL: NO PAIN (0)

## 2024-04-15 NOTE — PROGRESS NOTES
[facility]      FOLLOW-UP VISIT NOTE                       Apr 15, 2024    Subjective   REASON FOR VISIT: Follow up PMBCL, pre Cycle 3 R-EPOCH    ONCOLOGIC SUMMARY:  Diagnosis:  Primary mediastinal B-cell lymphoma dx 1/2024, presenting with cervical LAD. 1/29/24 FNA indeterminate; 2/9/24 left level 3 excisional LN biopsy showed large B-cell lymphoma with rare abnormal B-cells on flow cytometry. Although CD23 IHC was negative, IHC for , MAL, and PD-L1 were positive in majority of the neoplastic cells suggesting PMBCL over DLBCL NOS. [id number] gene expression profile also consistent with PMBCL. FISH with gain of BCL2 but no MYC, BCL2, or BCL6 rearrangements. PET showed bilateral cervical and mediastinal hypermetabolic lymphadenopathy (largest 4.4 cm with SUVmax 27 in a prevascular LN); no disease below diaphragm.     Intent of treatment: Curative    Treatment history:  2/22/24: Cycle 1 R-EPOCH, dose level 1  3/14/24: Cycle 2 R-EPOCH, dose level 2. PET after 2 cycles shows CR!  4/4/24: Cycle 3 R-EPOCH, dose level 3     INTERVAL HISTORY:  Clementine is here for post-hospitalization follow-up.  Her energy levels are decent but has more SOB with exertion.  She went for a walk on Saturday and became very short of breath.  She also continues with tachycardia that is intermittent.  Her appetite is good and is eating and drinking well.   She is having worsening neuropathy in bilateral hands and feet.  She is still able to button shirts and use phone but a little hard and feet does not impact balance or walking.  Her bowels have been more regular this past cycle  Denies fevers/chills, night sweats, N/V, bleeding issues, rashes/sores, chest pain/pressure, new pains, new lumps/bumps.    I have reviewed and updated the following:  Past Medical History:   Diagnosis Date    Anxiety     Cervical lymphadenopathy     GERD     Interstitial cystitis       No Known Allergies    Current Outpatient Medications:     pantoprazole  (PROTONIX) 40 MG EC tablet, Take 1 tablet (40 mg) by mouth 2 times daily, Disp: 60 tablet, Rfl: 0    senna-docusate (SENOKOT-S/PERICOLACE) 8.6-50 MG tablet, Take 1 tablet by mouth daily as needed for constipation, Disp: 60 tablet, Rfl: 0    acyclovir (ZOVIRAX) 400 MG tablet, Take 1 tablet (400 mg) by mouth 2 times daily, Disp: 60 tablet, Rfl: 4    cimetidine (TAGAMET) 200 MG tablet, Take 200 mg by mouth 2 times daily, Disp: , Rfl:     dexAMETHasone (DECADRON) 4 MG tablet, Take 2 tablets (8 mg) by mouth daily Starting 4/10 for two days (4/10 and 4/11), Disp: 4 tablet, Rfl: 0    fluconazole (DIFLUCAN) 200 MG tablet, Take 1 tablet (200 mg) by mouth daily, Disp: 30 tablet, Rfl: 0    levofloxacin (LEVAQUIN) 250 MG tablet, Take 1 tablet (250 mg) by mouth daily, Disp: 30 tablet, Rfl: 0    Levonorgestrel (KYLEENA IU), 1 Device by Intrauterine route once, Disp: , Rfl:     metoclopramide (REGLAN) 10 MG tablet, Take 1 tablet (10 mg) by mouth 4 times daily as needed (constipation), Disp: 30 tablet, Rfl: 3    modafinil (PROVIGIL) 200 MG tablet, Take 1 tablet (200 mg) by mouth daily as needed, Disp: , Rfl:     OLANZapine zydis (ZYPREXA) 5 MG ODT, Take 1 tablet (5 mg) by mouth at bedtime, Disp: 10 tablet, Rfl: 0    ondansetron (ZOFRAN) 4 MG tablet, Take 1-2 tablets (4-8 mg) by mouth every 8 hours as needed for nausea or vomiting, Disp: 30 tablet, Rfl: 0    polyethylene glycol (MIRALAX) 17 GM/Dose powder, Take 17 g by mouth daily as needed for constipation, Disp: 510 g, Rfl: 0    potassium chloride ER (K-TAB) 20 MEQ CR tablet, Take 1 tablet (20 mEq) by mouth 2 times daily, Disp: 4 tablet, Rfl: 0    predniSONE (DELTASONE) 50 MG tablet, Take 2 tablets (100 mg) by mouth 2 times daily Starting 4/8 at around 4pm and in the morning of 4/9, Disp: 4 tablet, Rfl: 0    prochlorperazine (COMPAZINE) 10 MG tablet, Take 1 tablet (10 mg) by mouth every 6 hours as needed for nausea or vomiting, Disp: 30 tablet, Rfl: 0    triamcinolone (KENALOG)  0.1 % external cream, Apply topically 2 times daily as needed for irritation, Disp: 15 g, Rfl: 0    REVIEW OF SYSTEMS:  10-point ROS reviewed and negative other than that mentioned in HPI.     Objective   VITAL SIGNS  /77 (BP Location: Right arm, Patient Position: Sitting, Cuff Size: Adult Regular)   Pulse 104   Temp 98.2  F (36.8  C) (Oral)   Resp 16   Wt 63.9 kg (140 lb 14.4 oz)   SpO2 97%   BMI 22.74 kg/m      ECOG PS: 0     PHYSICAL EXAM:  General: Awake, alert, in no acute distress. Oriented x 3.  HEENT: Chemo induced alopecia. Normocephalic, atraumatic. No scleral icterus. Oral mucosa pink and moist with no signs of thrush or  mucositis  Lymph: No cervical, supraclavicular, and axillary LAD appreciate.   CV: Tachycardic and regular rhythm. No murmurs, rubs, or gallops appreciated.  Resp: Good inspiratory effort, lungs clear to auscultation bilaterally.  Ext: No peripheral edema bilaterally.  Neuro: CN II-XII grossly intact. No focal deficits.   Skin: No rash, unusual bruising or prominent lesions.  Psych: Pleasant, normal affect.    LABS:  I reviewed the following labs:  Lab Results   Component Value Date    WBC 2.7 (L) 04/15/2024    HGB 11.5 (L) 04/15/2024    HCT 33.7 (L) 04/15/2024    MCV 89 04/15/2024    PLT 94 (L) 04/15/2024    INR 1.09 04/08/2024     Lab Results   Component Value Date     04/15/2024    POTASSIUM 4.5 04/15/2024    CR 0.70 04/15/2024    BUN 11.8 04/15/2024    CHLORIDE 101 04/15/2024    DEE 9.4 04/15/2024     (H) 04/15/2024      Lab Results   Component Value Date    ALT 8 04/15/2024    AST 10 04/15/2024    ALKPHOS 78 04/15/2024    BILITOTAL 0.3 04/15/2024      Lab Results   Component Value Date     04/15/2024    URIC 4.0 04/15/2024      4/1/24 PET:  IMPRESSION: In this patient with primary mediastinal B-cell lymphoma  following 2 cycles of chemotherapy:   1. Complete metabolic response by Lugano criteria  2. Resolution of cervical and mediastinal  hypermetabolic  lymphadenopathy.    4/15 ADDENDUM  Imaging:  CT Chest Pulmonary Embolism w Contrast  Narrative: EXAMINATION: CTA pulmonary angiogram, 4/15/2024 2:25 PM     CLINICAL HISTORY: Shortness of breast        Female sex; Not pregnant; No prior imaging in the last 24 hours;  Pulmonary Embolism Rule-Out Criteria (PERC) score > 0; Revised Stanley  Score (RGS) not >= 11; No D-dimer result available; D-dimer not  ordered        COMPARISON: PET CT 4/1/2024    TECHNIQUE: Volumetric helical acquisition of CT images of the chest  from the lung apices to the kidneys were acquired after the  administration of 64 cc Isovue-370..  Post-processed multiplanar  and/or MIP reformations were obtained, archived to PACS and used in  interpretation of this study.     FINDINGS:    Pulmonary arteries: Contrast bolus is adequate. Exam is negative for  acute pulmonary embolism. Normal size of the main pulmonary artery  measuring 2.2 cm.    Lungs: No consolidation, pleural effusion, or pneumothorax. No new or  enlarging pulmonary nodule.  Airways: Central tracheobronchial tree is clear.  Vessels: Main pulmonary artery and aorta are normal in caliber.Normal  three-vessel arch   The right chest port catheter terminates in the high right atrium.  Lymph nodes: No mediastinal or hilar lymphadenopathy.  Thyroid: Imaged thyroid within normal limits.  Esophagus: Within normal limits.    Upper abdomen: Evaluation of the upper abdomen is limited.    Bones and soft tissues: No suspicious axillary lymphadenopathy or soft  tissue mass. No suspicious osseous lesion.  Impression: IMPRESSION:   1. Exam is negative for acute pulmonary embolism.    2. No acute finding in the chest.    In the event of a positive result for acute pulmonary embolism  resulting in right heart strain, consider calling the   [facility] patient placement ([phone]) for PERT (Pulmonary Embolism  Response Team) Activation?    PERT -- Pulmonary Embolism Response Team  (Multidisciplinary team  including cardiology, interventional radiology, critical care,  hematology)    I have personally reviewed the examination and initial interpretation  and I agree with the findings.    ALEXIA GILLESPIE MD         SYSTEM ID:  [id number]    I reviewed the above imaging today.      Assessment & Plan   Primary mediastinal B-cell lymphoma  Dx 1/2024, presenting cervical lymphadenopathy. Initial FNA suggestive of possible Hodgkin lymphoma; however excisional biopsy showed large B-cell lymphoma with differential diagnosis of primary mediastinal large B-cell lymphoma vs DLBCL NOS. Additional IHC staining for , MAL, and PD-L1, and [id number] gene expression profile all favor PMBCL diagnosis over DLBCL NOS. PET with cervical and mediastinal lymphadenopathy only. LDH normal.   Previously discussed PMBCL diagnosis and plan for DA-R-EPOCH x 6 cycles. In the single-arm phase 2 prospective study of R-EPOCH (without radiation) for PMBCL, 5-year EFS was 93% and OS 97% ([id number]).   Cycle 1 R-EPOCH 2/22/24, tolerating well so far other than constipation.  Cycle 2 R-EPOCH 3/14/24. ANC dread >0.5 last cycle so qualifies for 20% increase in doxorubicin, etoposide, and cyclophosphamide (dose level 2).   PET after 2 cycles shows complete response!   Cycle 3 R-EPOCH 4/4/24- qualified for 20% increase in doxorubicin, etoposide, and cyclophosphamide again (dose level 3); however capped vincristine at 2 mg due to neuropathy.   Next PET at [facility] given CR already.   S/p C3 and tolerating treatment well but is experiencing fatigue, SOB, and tachycardia.  Due to tachycardia alongside SOB will proceed with CT PE protocol to rule out clot.  Will also order Ziopatch to be applied for 7 days to monitor for palpitations or arrhythmias.     Constipation- improved  Scheduled Miralax and Senna-S.  PRN Reglan.    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 Initially affecting fingertips only.   Cap  vincristine at 2 mg starting Cycle 3, may need further decrease in future cycles.   S/p C3 and neuropathy in fingertips have worsened with some difficulty using her phone or buttoning shirts and now neuropathy in bilateral feet that does not impact balance of walking.    Palpitations/tachycardia  SOB  3/25/24 NT proBNP normal at <36.   Consider CT PE if worsening tachycardia, SOB, or chest pain.   Due to tachycardia accompanied by SOB will proceed with CT PE protocol today (4/15) and place an order for a Ziopatch.     Fertility preservation  Established with [facility] and underwent egg retrieval on 2/18/24.      Ppx  TLS ppx: now done with allopurinol.  ID ppx: continue  mg BID. Levo and fluc when ANC <1.0. Monthly pentamidine for PJP ppx (last given 4/1/24, next due ~5/1/24).  Vaccines: up to date on COVID and flu shots.     PLAN:  [place] [facility]  [place]  Labs, Dr. Oakes, and admission for Cycle 4 R-EPOCH ~4/25    Total of 29 minutes on patient visit, reviewing records, interpreting test results, placing orders, and documentation on the day of service.    The longitudinal plan of care for the diagnosis(es)/condition(s) as documented were addressed during this visit. Due to the added complexity in care, I will continue to support Clementine in the subsequent management and with ongoing continuity of care.     CALOS Martinez CNP

## 2024-04-15 NOTE — DISCHARGE INSTRUCTIONS

## 2024-04-15 NOTE — NURSING NOTE
"Oncology Rooming Note    April 15, 2024 1:34 PM   Clementine Kathleen is a 28 year old female who presents for:    Chief Complaint   Patient presents with    Labs Only     Labs drawn via VPT by RN, MADELEINE done    Oncology Clinic Visit     Diffuse Large B-Cell Lymphoma     Initial Vitals: /77 (BP Location: Right arm, Patient Position: Sitting, Cuff Size: Adult Regular)   Pulse 104   Temp 98.2  F (36.8  C) (Oral)   Resp 16   Wt 63.9 kg (140 lb 14.4 oz)   SpO2 97%   BMI 22.74 kg/m   Estimated body mass index is 22.74 kg/m  as calculated from the following:    Height as of 4/4/24: 1.676 m (5' 6\").    Weight as of this encounter: 63.9 kg (140 lb 14.4 oz). Body surface area is 1.72 meters squared.  No Pain (0) Comment: Data Unavailable   No LMP recorded. (Menstrual status: IUD).  Allergies reviewed: Yes  Medications reviewed: Yes    Medications: Pt is requesting refills of Senna and Protonix.  Provider was notified.  Pharmacy name entered into [facility]:    [id number] - [place], [place] - [address] AT [facility]  [id number] [address] AT [facility]    Frailty Screening:   Is the patient here for a new oncology consult visit in cancer care? 2. No      Clinical concerns: None       Cyndi Kincaid LPN  4/15/2024              "

## 2024-04-15 NOTE — LETTER
4/15/2024         RE: Clementine Kathleen  [address]  [facility] [address]        Dear Colleague,    Thank you for referring your patient, Clementine Kathleen, to the [facility]. Please see a copy of my visit note below.     [facility]      FOLLOW-UP VISIT NOTE                       Apr 15, 2024    Subjective  REASON FOR VISIT: Follow up PMBCL, pre Cycle 3 R-EPOCH    ONCOLOGIC SUMMARY:  Diagnosis:  Primary mediastinal B-cell lymphoma dx 1/2024, presenting with cervical LAD. 1/29/24 FNA indeterminate; 2/9/24 left level 3 excisional LN biopsy showed large B-cell lymphoma with rare abnormal B-cells on flow cytometry. Although CD23 IHC was negative, IHC for , MAL, and PD-L1 were positive in majority of the neoplastic cells suggesting PMBCL over DLBCL NOS. [id number] gene expression profile also consistent with PMBCL. FISH with gain of BCL2 but no MYC, BCL2, or BCL6 rearrangements. PET showed bilateral cervical and mediastinal hypermetabolic lymphadenopathy (largest 4.4 cm with SUVmax 27 in a prevascular LN); no disease below diaphragm.     Intent of treatment: Curative    Treatment history:  2/22/24: Cycle 1 R-EPOCH, dose level 1  3/14/24: Cycle 2 R-EPOCH, dose level 2. PET after 2 cycles shows CR!  4/4/24: Cycle 3 R-EPOCH, dose level 3     INTERVAL HISTORY:  Clementine is here for post-hospitalization follow-up.  Her energy levels are decent but has more SOB with exertion.  She went for a walk on Saturday and became very short of breath.  She also continues with tachycardia that is intermittent.  Her appetite is good and is eating and drinking well.   She is having worsening neuropathy in bilateral hands and feet.  She is still able to button shirts and use phone but a little hard and feet does not impact balance or walking.  Her bowels have been more regular this past cycle  Denies fevers/chills, night sweats, N/V, bleeding issues, rashes/sores, chest pain/pressure, new  pains, new lumps/bumps.    I have reviewed and updated the following:  Past Medical History:   Diagnosis Date    Anxiety     Cervical lymphadenopathy     GERD     Interstitial cystitis       No Known Allergies    Current Outpatient Medications:     pantoprazole (PROTONIX) 40 MG EC tablet, Take 1 tablet (40 mg) by mouth 2 times daily, Disp: 60 tablet, Rfl: 0    senna-docusate (SENOKOT-S/PERICOLACE) 8.6-50 MG tablet, Take 1 tablet by mouth daily as needed for constipation, Disp: 60 tablet, Rfl: 0    acyclovir (ZOVIRAX) 400 MG tablet, Take 1 tablet (400 mg) by mouth 2 times daily, Disp: 60 tablet, Rfl: 4    cimetidine (TAGAMET) 200 MG tablet, Take 200 mg by mouth 2 times daily, Disp: , Rfl:     dexAMETHasone (DECADRON) 4 MG tablet, Take 2 tablets (8 mg) by mouth daily Starting 4/10 for two days (4/10 and 4/11), Disp: 4 tablet, Rfl: 0    fluconazole (DIFLUCAN) 200 MG tablet, Take 1 tablet (200 mg) by mouth daily, Disp: 30 tablet, Rfl: 0    levofloxacin (LEVAQUIN) 250 MG tablet, Take 1 tablet (250 mg) by mouth daily, Disp: 30 tablet, Rfl: 0    Levonorgestrel (KYLEENA IU), 1 Device by Intrauterine route once, Disp: , Rfl:     metoclopramide (REGLAN) 10 MG tablet, Take 1 tablet (10 mg) by mouth 4 times daily as needed (constipation), Disp: 30 tablet, Rfl: 3    modafinil (PROVIGIL) 200 MG tablet, Take 1 tablet (200 mg) by mouth daily as needed, Disp: , Rfl:     OLANZapine zydis (ZYPREXA) 5 MG ODT, Take 1 tablet (5 mg) by mouth at bedtime, Disp: 10 tablet, Rfl: 0    ondansetron (ZOFRAN) 4 MG tablet, Take 1-2 tablets (4-8 mg) by mouth every 8 hours as needed for nausea or vomiting, Disp: 30 tablet, Rfl: 0    polyethylene glycol (MIRALAX) 17 GM/Dose powder, Take 17 g by mouth daily as needed for constipation, Disp: 510 g, Rfl: 0    potassium chloride ER (K-TAB) 20 MEQ CR tablet, Take 1 tablet (20 mEq) by mouth 2 times daily, Disp: 4 tablet, Rfl: 0    predniSONE (DELTASONE) 50 MG tablet, Take 2 tablets (100 mg) by mouth 2 times  daily Starting 4/8 at around 4pm and in the morning of 4/9, Disp: 4 tablet, Rfl: 0    prochlorperazine (COMPAZINE) 10 MG tablet, Take 1 tablet (10 mg) by mouth every 6 hours as needed for nausea or vomiting, Disp: 30 tablet, Rfl: 0    triamcinolone (KENALOG) 0.1 % external cream, Apply topically 2 times daily as needed for irritation, Disp: 15 g, Rfl: 0    REVIEW OF SYSTEMS:  10-point ROS reviewed and negative other than that mentioned in HPI.     Objective  VITAL SIGNS  /77 (BP Location: Right arm, Patient Position: Sitting, Cuff Size: Adult Regular)   Pulse 104   Temp 98.2  F (36.8  C) (Oral)   Resp 16   Wt 63.9 kg (140 lb 14.4 oz)   SpO2 97%   BMI 22.74 kg/m      ECOG PS: 0     PHYSICAL EXAM:  General: Awake, alert, in no acute distress. Oriented x 3.  HEENT: Chemo induced alopecia. Normocephalic, atraumatic. No scleral icterus. Oral mucosa pink and moist with no signs of thrush or  mucositis  Lymph: No cervical, supraclavicular, and axillary LAD appreciate.   CV: Tachycardic and regular rhythm. No murmurs, rubs, or gallops appreciated.  Resp: Good inspiratory effort, lungs clear to auscultation bilaterally.  Ext: No peripheral edema bilaterally.  Neuro: CN II-XII grossly intact. No focal deficits.   Skin: No rash, unusual bruising or prominent lesions.  Psych: Pleasant, normal affect.    LABS:  I reviewed the following labs:  Lab Results   Component Value Date    WBC 2.7 (L) 04/15/2024    HGB 11.5 (L) 04/15/2024    HCT 33.7 (L) 04/15/2024    MCV 89 04/15/2024    PLT 94 (L) 04/15/2024    INR 1.09 04/08/2024     Lab Results   Component Value Date     04/15/2024    POTASSIUM 4.5 04/15/2024    CR 0.70 04/15/2024    BUN 11.8 04/15/2024    CHLORIDE 101 04/15/2024    DEE 9.4 04/15/2024     (H) 04/15/2024      Lab Results   Component Value Date    ALT 8 04/15/2024    AST 10 04/15/2024    ALKPHOS 78 04/15/2024    BILITOTAL 0.3 04/15/2024      Lab Results   Component Value Date      04/15/2024    URIC 4.0 04/15/2024      4/1/24 PET:  IMPRESSION: In this patient with primary mediastinal B-cell lymphoma  following 2 cycles of chemotherapy:   1. Complete metabolic response by Lugano criteria  2. Resolution of cervical and mediastinal hypermetabolic  lymphadenopathy.    Assessment & Plan  Primary mediastinal B-cell lymphoma  Dx 1/2024, presenting cervical lymphadenopathy. Initial FNA suggestive of possible Hodgkin lymphoma; however excisional biopsy showed large B-cell lymphoma with differential diagnosis of primary mediastinal large B-cell lymphoma vs DLBCL NOS. Additional IHC staining for , MAL, and PD-L1, and [id number] gene expression profile all favor PMBCL diagnosis over DLBCL NOS. PET with cervical and mediastinal lymphadenopathy only. LDH normal.   Previously discussed PMBCL diagnosis and plan for DA-R-EPOCH x 6 cycles. In the single-arm phase 2 prospective study of R-EPOCH (without radiation) for PMBCL, 5-year EFS was 93% and OS 97% ([id number]).   Cycle 1 R-EPOCH 2/22/24, tolerating well so far other than constipation.  Cycle 2 R-EPOCH 3/14/24. ANC dread >0.5 last cycle so qualifies for 20% increase in doxorubicin, etoposide, and cyclophosphamide (dose level 2).   PET after 2 cycles shows complete response!   Cycle 3 R-EPOCH 4/4/24- qualified for 20% increase in doxorubicin, etoposide, and cyclophosphamide again (dose level 3); however capped vincristine at 2 mg due to neuropathy.   Next PET at EOT given CR already.   S/p C3 and tolerating treatment well but is experiencing fatigue, SOB, and tachycardia.  Due to tachycardia alongside SOB will proceed with CT PE protocol to rule out clot.  Will also order Ziopatch to be applied for 7 days to monitor for palpitations or arrhythmias.     Constipation- improved  Scheduled Miralax and Senna-S.  PRN Reglan.    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 Initially affecting fingertips only.   Cap vincristine at 2 mg  starting Cycle 3, may need further decrease in future cycles.   S/p C3 and neuropathy in fingertips have worsened with some difficulty using her phone or buttoning shirts and now neuropathy in bilateral feet that does not impact balance of walking.    Palpitations/tachycardia  SOB  3/25/24 NT proBNP normal at <36.   Consider CT PE if worsening tachycardia, SOB, or chest pain.   Due to tachycardia accompanied by SOB will proceed with CT PE protocol today (4/15) and place an order for a Ziopatch.     Fertility preservation  Established with [facility] and underwent egg retrieval on 2/18/24.      Ppx  TLS ppx: now done with allopurinol.  ID ppx: continue  mg BID. Levo and fluc when ANC <1.0. Monthly pentamidine for PJP ppx (last given 4/1/24, next due ~5/1/24).  Vaccines: up to date on COVID and flu shots.     PLAN:  Labs at [facility] [place] [place]  4/18, 4/21  Labs, Dr. Oakes, and admission for Cycle 4 R-EPOCH ~4/25    Total of 29 minutes on patient visit, reviewing records, interpreting test results, placing orders, and documentation on the day of service.    The longitudinal plan of care for the diagnosis(es)/condition(s) as documented were addressed during this visit. Due to the added complexity in care, I will continue to support Clementine in the subsequent management and with ongoing continuity of care.     CALOS Martinez CNP

## 2024-04-18 ENCOUNTER — LAB (OUTPATIENT)
Dept: LAB | Facility: CLINIC | Age: 29
End: 2024-04-18
Attending: INTERNAL MEDICINE
Payer: COMMERCIAL

## 2024-04-18 DIAGNOSIS — C85.28 MEDIASTINAL LARGE B-CELL LYMPHOMA OF LYMPH NODES OF MULTIPLE REGIONS (H): ICD-10-CM

## 2024-04-18 LAB
ALBUMIN SERPL BCG-MCNC: 3.9 G/DL (ref 3.5–5.2)
ALP SERPL-CCNC: 106 U/L (ref 40–150)
ALT SERPL W P-5'-P-CCNC: 13 U/L (ref 0–50)
ANION GAP SERPL CALCULATED.3IONS-SCNC: 9 MMOL/L (ref 7–15)
AST SERPL W P-5'-P-CCNC: 21 U/L (ref 0–45)
BASOPHILS # BLD AUTO: ABNORMAL 10*3/UL
BASOPHILS # BLD MANUAL: 0 10E3/UL (ref 0–0.2)
BASOPHILS NFR BLD AUTO: ABNORMAL %
BASOPHILS NFR BLD MANUAL: 0 %
BILIRUB SERPL-MCNC: <0.2 MG/DL
BUN SERPL-MCNC: 5.7 MG/DL (ref 6–20)
CALCIUM SERPL-MCNC: 8.9 MG/DL (ref 8.6–10)
CHLORIDE SERPL-SCNC: 107 MMOL/L (ref 98–107)
CREAT SERPL-MCNC: 0.82 MG/DL (ref 0.51–0.95)
DACRYOCYTES BLD QL SMEAR: SLIGHT
DEPRECATED HCO3 PLAS-SCNC: 24 MMOL/L (ref 22–29)
EGFRCR SERPLBLD CKD-EPI 2021: >90 ML/MIN/1.73M2
EOSINOPHIL # BLD AUTO: ABNORMAL 10*3/UL
EOSINOPHIL # BLD MANUAL: 0 10E3/UL (ref 0–0.7)
EOSINOPHIL NFR BLD AUTO: ABNORMAL %
EOSINOPHIL NFR BLD MANUAL: 0 %
ERYTHROCYTE [DISTWIDTH] IN BLOOD BY AUTOMATED COUNT: 19.3 % (ref 10–15)
GLUCOSE SERPL-MCNC: 100 MG/DL (ref 70–99)
HCT VFR BLD AUTO: 31.9 % (ref 35–47)
HGB BLD-MCNC: 10.4 G/DL (ref 11.7–15.7)
IMM GRANULOCYTES # BLD: ABNORMAL 10*3/UL
IMM GRANULOCYTES NFR BLD: ABNORMAL %
LDH SERPL L TO P-CCNC: 346 U/L (ref 0–250)
LYMPHOCYTES # BLD AUTO: ABNORMAL 10*3/UL
LYMPHOCYTES # BLD MANUAL: 2 10E3/UL (ref 0.8–5.3)
LYMPHOCYTES NFR BLD AUTO: ABNORMAL %
LYMPHOCYTES NFR BLD MANUAL: 10 %
MCH RBC QN AUTO: 30.1 PG (ref 26.5–33)
MCHC RBC AUTO-ENTMCNC: 32.6 G/DL (ref 31.5–36.5)
MCV RBC AUTO: 93 FL (ref 78–100)
METAMYELOCYTES # BLD MANUAL: 1.6 10E3/UL
METAMYELOCYTES NFR BLD MANUAL: 8 %
MONOCYTES # BLD AUTO: ABNORMAL 10*3/UL
MONOCYTES # BLD MANUAL: 1 10E3/UL (ref 0–1.3)
MONOCYTES NFR BLD AUTO: ABNORMAL %
MONOCYTES NFR BLD MANUAL: 5 %
MYELOCYTES # BLD MANUAL: 0.6 10E3/UL
MYELOCYTES NFR BLD MANUAL: 3 %
NEUTROPHILS # BLD AUTO: ABNORMAL 10*3/UL
NEUTROPHILS # BLD MANUAL: 14.4 10E3/UL (ref 1.6–8.3)
NEUTROPHILS NFR BLD AUTO: ABNORMAL %
NEUTROPHILS NFR BLD MANUAL: 71 %
NRBC # BLD AUTO: 0.2 10E3/UL
NRBC # BLD AUTO: 0.3 10E3/UL
NRBC BLD AUTO-RTO: 1 /100
NRBC BLD MANUAL-RTO: 1 %
PLAT MORPH BLD: ABNORMAL
PLATELET # BLD AUTO: 137 10E3/UL (ref 150–450)
POLYCHROMASIA BLD QL SMEAR: SLIGHT
POTASSIUM SERPL-SCNC: 3.9 MMOL/L (ref 3.4–5.3)
PROMYELOCYTES # BLD MANUAL: 0.6 10E3/UL
PROMYELOCYTES NFR BLD MANUAL: 3 %
PROT SERPL-MCNC: 6 G/DL (ref 6.4–8.3)
RBC # BLD AUTO: 3.45 10E6/UL (ref 3.8–5.2)
RBC MORPH BLD: ABNORMAL
SODIUM SERPL-SCNC: 140 MMOL/L (ref 135–145)
WBC # BLD AUTO: 20.3 10E3/UL (ref 4–11)

## 2024-04-18 PROCEDURE — 83615 LACTATE (LD) (LDH) ENZYME: CPT

## 2024-04-18 PROCEDURE — 85007 BL SMEAR W/DIFF WBC COUNT: CPT

## 2024-04-18 PROCEDURE — 85027 COMPLETE CBC AUTOMATED: CPT

## 2024-04-18 PROCEDURE — 82374 ASSAY BLOOD CARBON DIOXIDE: CPT

## 2024-04-18 PROCEDURE — 36415 COLL VENOUS BLD VENIPUNCTURE: CPT

## 2024-04-18 NOTE — NURSING NOTE
Chief Complaint   Patient presents with    Labs Only     Labs drawn via  by RN in lab.      Labs collected from venipuncture by RN. No other appointments today.    Ida Gutierres RN

## 2024-04-22 ENCOUNTER — LAB (OUTPATIENT)
Dept: LAB | Facility: CLINIC | Age: 29
End: 2024-04-22
Attending: INTERNAL MEDICINE
Payer: COMMERCIAL

## 2024-04-22 DIAGNOSIS — C85.28 MEDIASTINAL LARGE B-CELL LYMPHOMA OF LYMPH NODES OF MULTIPLE REGIONS (H): ICD-10-CM

## 2024-04-22 LAB
ALBUMIN SERPL BCG-MCNC: 4.1 G/DL (ref 3.5–5.2)
ALP SERPL-CCNC: 88 U/L (ref 40–150)
ALT SERPL W P-5'-P-CCNC: 11 U/L (ref 0–50)
ANION GAP SERPL CALCULATED.3IONS-SCNC: 9 MMOL/L (ref 7–15)
AST SERPL W P-5'-P-CCNC: 17 U/L (ref 0–45)
BASOPHILS # BLD AUTO: ABNORMAL 10*3/UL
BASOPHILS # BLD MANUAL: 0.1 10E3/UL (ref 0–0.2)
BASOPHILS NFR BLD AUTO: ABNORMAL %
BASOPHILS NFR BLD MANUAL: 1 %
BILIRUB SERPL-MCNC: 0.2 MG/DL
BUN SERPL-MCNC: 11.9 MG/DL (ref 6–20)
CALCIUM SERPL-MCNC: 9.3 MG/DL (ref 8.6–10)
CHLORIDE SERPL-SCNC: 108 MMOL/L (ref 98–107)
CREAT SERPL-MCNC: 0.81 MG/DL (ref 0.51–0.95)
DEPRECATED HCO3 PLAS-SCNC: 23 MMOL/L (ref 22–29)
EGFRCR SERPLBLD CKD-EPI 2021: >90 ML/MIN/1.73M2
EOSINOPHIL # BLD AUTO: ABNORMAL 10*3/UL
EOSINOPHIL # BLD MANUAL: 0.1 10E3/UL (ref 0–0.7)
EOSINOPHIL NFR BLD AUTO: ABNORMAL %
EOSINOPHIL NFR BLD MANUAL: 1 %
ERYTHROCYTE [DISTWIDTH] IN BLOOD BY AUTOMATED COUNT: 19.7 % (ref 10–15)
GLUCOSE SERPL-MCNC: 99 MG/DL (ref 70–99)
HCT VFR BLD AUTO: 34.8 % (ref 35–47)
HGB BLD-MCNC: 11.6 G/DL (ref 11.7–15.7)
IMM GRANULOCYTES # BLD: ABNORMAL 10*3/UL
IMM GRANULOCYTES NFR BLD: ABNORMAL %
LDH SERPL L TO P-CCNC: 273 U/L (ref 0–250)
LYMPHOCYTES # BLD AUTO: ABNORMAL 10*3/UL
LYMPHOCYTES # BLD MANUAL: 0.8 10E3/UL (ref 0.8–5.3)
LYMPHOCYTES NFR BLD AUTO: ABNORMAL %
LYMPHOCYTES NFR BLD MANUAL: 6 %
MCH RBC QN AUTO: 30.8 PG (ref 26.5–33)
MCHC RBC AUTO-ENTMCNC: 33.3 G/DL (ref 31.5–36.5)
MCV RBC AUTO: 92 FL (ref 78–100)
MONOCYTES # BLD AUTO: ABNORMAL 10*3/UL
MONOCYTES # BLD MANUAL: 1.4 10E3/UL (ref 0–1.3)
MONOCYTES NFR BLD AUTO: ABNORMAL %
MONOCYTES NFR BLD MANUAL: 10 %
NEUTROPHILS # BLD AUTO: ABNORMAL 10*3/UL
NEUTROPHILS # BLD MANUAL: 11.1 10E3/UL (ref 1.6–8.3)
NEUTROPHILS NFR BLD AUTO: ABNORMAL %
NEUTROPHILS NFR BLD MANUAL: 82 %
NRBC # BLD AUTO: 0 10E3/UL
NRBC BLD AUTO-RTO: 0 /100
PLAT MORPH BLD: ABNORMAL
PLATELET # BLD AUTO: 240 10E3/UL (ref 150–450)
POTASSIUM SERPL-SCNC: 4.5 MMOL/L (ref 3.4–5.3)
PROT SERPL-MCNC: 6.4 G/DL (ref 6.4–8.3)
RBC # BLD AUTO: 3.77 10E6/UL (ref 3.8–5.2)
RBC MORPH BLD: ABNORMAL
SODIUM SERPL-SCNC: 140 MMOL/L (ref 135–145)
URATE SERPL-MCNC: 4.3 MG/DL (ref 2.4–5.7)
WBC # BLD AUTO: 13.5 10E3/UL (ref 4–11)

## 2024-04-22 PROCEDURE — 85007 BL SMEAR W/DIFF WBC COUNT: CPT

## 2024-04-22 PROCEDURE — 36591 DRAW BLOOD OFF VENOUS DEVICE: CPT

## 2024-04-22 PROCEDURE — 85027 COMPLETE CBC AUTOMATED: CPT

## 2024-04-22 PROCEDURE — 80053 COMPREHEN METABOLIC PANEL: CPT

## 2024-04-22 PROCEDURE — 84550 ASSAY OF BLOOD/URIC ACID: CPT

## 2024-04-22 PROCEDURE — 83615 LACTATE (LD) (LDH) ENZYME: CPT

## 2024-04-22 NOTE — NURSING NOTE
Chief Complaint   Patient presents with    Blood Draw     Labs drawn via  by RN in lab.      Gita Olvera RN

## 2024-04-25 ENCOUNTER — ONCOLOGY VISIT (OUTPATIENT)
Dept: ONCOLOGY | Facility: CLINIC | Age: 29
End: 2024-04-25
Attending: INTERNAL MEDICINE
Payer: COMMERCIAL

## 2024-04-25 ENCOUNTER — APPOINTMENT (OUTPATIENT)
Dept: LAB | Facility: CLINIC | Age: 29
End: 2024-04-25
Attending: INTERNAL MEDICINE
Payer: COMMERCIAL

## 2024-04-25 ENCOUNTER — HOSPITAL ENCOUNTER (INPATIENT)
Facility: CLINIC | Age: 29
LOS: 4 days | Discharge: HOME | End: 2024-04-29
Attending: INTERNAL MEDICINE | Admitting: INTERNAL MEDICINE
Payer: COMMERCIAL

## 2024-04-25 VITALS
WEIGHT: 142 LBS | DIASTOLIC BLOOD PRESSURE: 53 MMHG | RESPIRATION RATE: 16 BRPM | SYSTOLIC BLOOD PRESSURE: 111 MMHG | TEMPERATURE: 97.9 F | HEART RATE: 83 BPM | OXYGEN SATURATION: 99 % | BODY MASS INDEX: 22.92 KG/M2

## 2024-04-25 DIAGNOSIS — R11.0 NAUSEA: ICD-10-CM

## 2024-04-25 DIAGNOSIS — Z76.89 PREVENTION OF CHEMOTHERAPY-INDUCED NEUTROPENIA: Primary | ICD-10-CM

## 2024-04-25 DIAGNOSIS — C83.32 DIFFUSE LARGE B-CELL LYMPHOMA OF INTRATHORACIC LYMPH NODES (H): ICD-10-CM

## 2024-04-25 DIAGNOSIS — C85.28 MEDIASTINAL LARGE B-CELL LYMPHOMA OF LYMPH NODES OF MULTIPLE REGIONS (H): ICD-10-CM

## 2024-04-25 DIAGNOSIS — R00.2 PALPITATIONS: ICD-10-CM

## 2024-04-25 LAB
ABO/RH(D): NORMAL
ALBUMIN SERPL BCG-MCNC: 4 G/DL (ref 3.5–5.2)
ALP SERPL-CCNC: 60 U/L (ref 40–150)
ALT SERPL W P-5'-P-CCNC: 12 U/L (ref 0–50)
ANION GAP SERPL CALCULATED.3IONS-SCNC: 8 MMOL/L (ref 7–15)
ANTIBODY SCREEN: NEGATIVE
AST SERPL W P-5'-P-CCNC: 15 U/L (ref 0–45)
BASOPHILS # BLD AUTO: 0.1 10E3/UL (ref 0–0.2)
BASOPHILS NFR BLD AUTO: 1 %
BILIRUB SERPL-MCNC: <0.2 MG/DL
BUN SERPL-MCNC: 14.5 MG/DL (ref 6–20)
CALCIUM SERPL-MCNC: 8.9 MG/DL (ref 8.6–10)
CHLORIDE SERPL-SCNC: 105 MMOL/L (ref 98–107)
CREAT SERPL-MCNC: 0.71 MG/DL (ref 0.51–0.95)
DEPRECATED HCO3 PLAS-SCNC: 25 MMOL/L (ref 22–29)
EGFRCR SERPLBLD CKD-EPI 2021: >90 ML/MIN/1.73M2
EOSINOPHIL # BLD AUTO: 0 10E3/UL (ref 0–0.7)
EOSINOPHIL NFR BLD AUTO: 0 %
ERYTHROCYTE [DISTWIDTH] IN BLOOD BY AUTOMATED COUNT: 19.8 % (ref 10–15)
GLUCOSE SERPL-MCNC: 126 MG/DL (ref 70–99)
HCT VFR BLD AUTO: 31.8 % (ref 35–47)
HGB BLD-MCNC: 10.7 G/DL (ref 11.7–15.7)
IMM GRANULOCYTES # BLD: 0.1 10E3/UL
IMM GRANULOCYTES NFR BLD: 2 %
LDH SERPL L TO P-CCNC: 195 U/L (ref 0–250)
LYMPHOCYTES # BLD AUTO: 0.9 10E3/UL (ref 0.8–5.3)
LYMPHOCYTES NFR BLD AUTO: 12 %
MCH RBC QN AUTO: 31.6 PG (ref 26.5–33)
MCHC RBC AUTO-ENTMCNC: 33.6 G/DL (ref 31.5–36.5)
MCV RBC AUTO: 94 FL (ref 78–100)
MONOCYTES # BLD AUTO: 0.7 10E3/UL (ref 0–1.3)
MONOCYTES NFR BLD AUTO: 10 %
NEUTROPHILS # BLD AUTO: 5.2 10E3/UL (ref 1.6–8.3)
NEUTROPHILS NFR BLD AUTO: 75 %
NRBC # BLD AUTO: 0 10E3/UL
NRBC BLD AUTO-RTO: 0 /100
PLATELET # BLD AUTO: 303 10E3/UL (ref 150–450)
POTASSIUM SERPL-SCNC: 4 MMOL/L (ref 3.4–5.3)
PROT SERPL-MCNC: 6 G/DL (ref 6.4–8.3)
RBC # BLD AUTO: 3.39 10E6/UL (ref 3.8–5.2)
SODIUM SERPL-SCNC: 138 MMOL/L (ref 135–145)
SPECIMEN EXPIRATION DATE: NORMAL
URATE SERPL-MCNC: 3.4 MG/DL (ref 2.4–5.7)
WBC # BLD AUTO: 7 10E3/UL (ref 4–11)

## 2024-04-25 PROCEDURE — 36591 DRAW BLOOD OFF VENOUS DEVICE: CPT | Performed by: INTERNAL MEDICINE

## 2024-04-25 PROCEDURE — 99213 OFFICE O/P EST LOW 20 MIN: CPT | Performed by: INTERNAL MEDICINE

## 2024-04-25 PROCEDURE — 250N000011 HC RX IP 250 OP 636: Mod: JZ | Performed by: INTERNAL MEDICINE

## 2024-04-25 PROCEDURE — 86900 BLOOD TYPING SEROLOGIC ABO: CPT

## 2024-04-25 PROCEDURE — 83615 LACTATE (LD) (LDH) ENZYME: CPT | Performed by: INTERNAL MEDICINE

## 2024-04-25 PROCEDURE — 250N000011 HC RX IP 250 OP 636: Performed by: INTERNAL MEDICINE

## 2024-04-25 PROCEDURE — 120N000002 HC R&B MED SURG/OB UMMC

## 2024-04-25 PROCEDURE — 258N000003 HC RX IP 258 OP 636: Performed by: INTERNAL MEDICINE

## 2024-04-25 PROCEDURE — 99223 1ST HOSP IP/OBS HIGH 75: CPT | Mod: AI | Performed by: PHYSICIAN ASSISTANT

## 2024-04-25 PROCEDURE — 84450 TRANSFERASE (AST) (SGOT): CPT | Performed by: INTERNAL MEDICINE

## 2024-04-25 PROCEDURE — 99207 PR SC NO CHARGE VISIT: CPT | Performed by: INTERNAL MEDICINE

## 2024-04-25 PROCEDURE — 84550 ASSAY OF BLOOD/URIC ACID: CPT | Performed by: INTERNAL MEDICINE

## 2024-04-25 PROCEDURE — 250N000012 HC RX MED GY IP 250 OP 636 PS 637: Performed by: INTERNAL MEDICINE

## 2024-04-25 PROCEDURE — 85025 COMPLETE CBC W/AUTO DIFF WBC: CPT | Performed by: INTERNAL MEDICINE

## 2024-04-25 PROCEDURE — 99207 PR CHGS TRANSFERRED TO HOSPITAL: CPT | Performed by: INTERNAL MEDICINE

## 2024-04-25 PROCEDURE — 250N000013 HC RX MED GY IP 250 OP 250 PS 637

## 2024-04-25 PROCEDURE — 250N000013 HC RX MED GY IP 250 OP 250 PS 637: Performed by: INTERNAL MEDICINE

## 2024-04-25 PROCEDURE — 3E04305 INTRODUCTION OF OTHER ANTINEOPLASTIC INTO CENTRAL VEIN, PERCUTANEOUS APPROACH: ICD-10-PCS | Performed by: INTERNAL MEDICINE

## 2024-04-25 PROCEDURE — 250N000011 HC RX IP 250 OP 636

## 2024-04-25 RX ORDER — LORAZEPAM 0.5 MG/1
.5-1 TABLET ORAL EVERY 6 HOURS PRN
Status: CANCELLED
Start: 2024-04-25

## 2024-04-25 RX ORDER — PANTOPRAZOLE SODIUM 40 MG/1
40 TABLET, DELAYED RELEASE ORAL 2 TIMES DAILY
Status: DISCONTINUED | OUTPATIENT
Start: 2024-04-25 | End: 2024-04-29 | Stop reason: HOSPADM

## 2024-04-25 RX ORDER — EPINEPHRINE 1 MG/ML
0.3 INJECTION, SOLUTION INTRAMUSCULAR; SUBCUTANEOUS EVERY 5 MIN PRN
Status: CANCELLED | OUTPATIENT
Start: 2024-04-25

## 2024-04-25 RX ORDER — ALBUTEROL SULFATE 0.83 MG/ML
2.5 SOLUTION RESPIRATORY (INHALATION)
Status: CANCELLED | OUTPATIENT
Start: 2024-04-25

## 2024-04-25 RX ORDER — OLANZAPINE 5 MG/1
5 TABLET, ORALLY DISINTEGRATING ORAL AT BEDTIME
Status: DISCONTINUED | OUTPATIENT
Start: 2024-04-25 | End: 2024-04-29 | Stop reason: HOSPADM

## 2024-04-25 RX ORDER — ACETAMINOPHEN 325 MG/1
650 TABLET ORAL ONCE
Status: COMPLETED | OUTPATIENT
Start: 2024-04-25 | End: 2024-04-25

## 2024-04-25 RX ORDER — DIPHENHYDRAMINE HYDROCHLORIDE 50 MG/ML
50 INJECTION INTRAMUSCULAR; INTRAVENOUS
Status: DISCONTINUED | OUTPATIENT
Start: 2024-04-25 | End: 2024-04-29 | Stop reason: HOSPADM

## 2024-04-25 RX ORDER — METOCLOPRAMIDE 10 MG/1
10 TABLET ORAL 4 TIMES DAILY PRN
Status: DISCONTINUED | OUTPATIENT
Start: 2024-04-25 | End: 2024-04-29 | Stop reason: HOSPADM

## 2024-04-25 RX ORDER — ENOXAPARIN SODIUM 100 MG/ML
40 INJECTION SUBCUTANEOUS EVERY 24 HOURS
Status: DISCONTINUED | OUTPATIENT
Start: 2024-04-25 | End: 2024-04-29 | Stop reason: HOSPADM

## 2024-04-25 RX ORDER — HEPARIN SODIUM (PORCINE) LOCK FLUSH IV SOLN 100 UNIT/ML 100 UNIT/ML
500 SOLUTION INTRAVENOUS ONCE
Status: COMPLETED | OUTPATIENT
Start: 2024-04-25 | End: 2024-04-25

## 2024-04-25 RX ORDER — DIPHENHYDRAMINE HCL 25 MG
50 CAPSULE ORAL ONCE
Status: CANCELLED
Start: 2024-04-25

## 2024-04-25 RX ORDER — METHYLPREDNISOLONE SODIUM SUCCINATE 125 MG/2ML
125 INJECTION, POWDER, LYOPHILIZED, FOR SOLUTION INTRAMUSCULAR; INTRAVENOUS
Status: DISCONTINUED | OUTPATIENT
Start: 2024-04-25 | End: 2024-04-29 | Stop reason: HOSPADM

## 2024-04-25 RX ORDER — POLYETHYLENE GLYCOL 3350 17 G/17G
17 POWDER, FOR SOLUTION ORAL 2 TIMES DAILY PRN
Status: DISCONTINUED | OUTPATIENT
Start: 2024-04-25 | End: 2024-04-26

## 2024-04-25 RX ORDER — ACETAMINOPHEN 325 MG/1
650 TABLET ORAL ONCE
Status: CANCELLED
Start: 2024-04-25

## 2024-04-25 RX ORDER — ALBUTEROL SULFATE 90 UG/1
1-2 AEROSOL, METERED RESPIRATORY (INHALATION)
Status: CANCELLED
Start: 2024-04-25

## 2024-04-25 RX ORDER — ONDANSETRON 8 MG/1
16 TABLET, FILM COATED ORAL
Status: CANCELLED
Start: 2024-04-25

## 2024-04-25 RX ORDER — ONDANSETRON 4 MG/1
4-8 TABLET, FILM COATED ORAL EVERY 8 HOURS PRN
Status: DISCONTINUED | OUTPATIENT
Start: 2024-04-25 | End: 2024-04-25

## 2024-04-25 RX ORDER — HEPARIN SODIUM (PORCINE) LOCK FLUSH IV SOLN 100 UNIT/ML 100 UNIT/ML
5-10 SOLUTION INTRAVENOUS
Status: DISCONTINUED | OUTPATIENT
Start: 2024-04-25 | End: 2024-04-29 | Stop reason: HOSPADM

## 2024-04-25 RX ORDER — DIPHENHYDRAMINE HCL 25 MG
50 CAPSULE ORAL ONCE
Status: COMPLETED | OUTPATIENT
Start: 2024-04-25 | End: 2024-04-25

## 2024-04-25 RX ORDER — DIPHENHYDRAMINE HYDROCHLORIDE 50 MG/ML
50 INJECTION INTRAMUSCULAR; INTRAVENOUS
Status: CANCELLED
Start: 2024-04-25

## 2024-04-25 RX ORDER — PREDNISONE 50 MG/1
100 TABLET ORAL 2 TIMES DAILY
Status: CANCELLED
Start: 2024-04-25

## 2024-04-25 RX ORDER — DEXAMETHASONE 4 MG/1
8 TABLET ORAL DAILY
Status: CANCELLED
Start: 2024-05-01

## 2024-04-25 RX ORDER — LORAZEPAM 2 MG/ML
.5-1 INJECTION INTRAMUSCULAR EVERY 6 HOURS PRN
Status: DISCONTINUED | OUTPATIENT
Start: 2024-04-25 | End: 2024-04-29 | Stop reason: HOSPADM

## 2024-04-25 RX ORDER — MEPERIDINE HYDROCHLORIDE 25 MG/ML
25 INJECTION INTRAMUSCULAR; INTRAVENOUS; SUBCUTANEOUS EVERY 30 MIN PRN
Status: CANCELLED | OUTPATIENT
Start: 2024-04-25

## 2024-04-25 RX ORDER — MEPERIDINE HYDROCHLORIDE 25 MG/ML
25 INJECTION INTRAMUSCULAR; INTRAVENOUS; SUBCUTANEOUS EVERY 30 MIN PRN
Status: DISCONTINUED | OUTPATIENT
Start: 2024-04-25 | End: 2024-04-29 | Stop reason: HOSPADM

## 2024-04-25 RX ORDER — EPINEPHRINE 1 MG/ML
0.3 INJECTION, SOLUTION, CONCENTRATE INTRAVENOUS EVERY 5 MIN PRN
Status: DISCONTINUED | OUTPATIENT
Start: 2024-04-25 | End: 2024-04-29 | Stop reason: HOSPADM

## 2024-04-25 RX ORDER — LORAZEPAM 2 MG/ML
.5-1 INJECTION INTRAMUSCULAR EVERY 6 HOURS PRN
Status: CANCELLED | OUTPATIENT
Start: 2024-04-25

## 2024-04-25 RX ORDER — FAMOTIDINE 20 MG/1
20 TABLET, FILM COATED ORAL 2 TIMES DAILY
Status: DISCONTINUED | OUTPATIENT
Start: 2024-04-25 | End: 2024-04-26

## 2024-04-25 RX ORDER — PROCHLORPERAZINE MALEATE 5 MG
10 TABLET ORAL EVERY 6 HOURS PRN
Status: DISCONTINUED | OUTPATIENT
Start: 2024-04-25 | End: 2024-04-25

## 2024-04-25 RX ORDER — HEPARIN SODIUM,PORCINE 10 UNIT/ML
5-10 VIAL (ML) INTRAVENOUS
Status: DISCONTINUED | OUTPATIENT
Start: 2024-04-25 | End: 2024-04-29 | Stop reason: HOSPADM

## 2024-04-25 RX ORDER — ACYCLOVIR 400 MG/1
400 TABLET ORAL 2 TIMES DAILY
Status: DISCONTINUED | OUTPATIENT
Start: 2024-04-25 | End: 2024-04-29 | Stop reason: HOSPADM

## 2024-04-25 RX ORDER — PREDNISONE 50 MG/1
100 TABLET ORAL 2 TIMES DAILY
Qty: 20 TABLET | Refills: 0 | Status: DISCONTINUED | OUTPATIENT
Start: 2024-04-25 | End: 2024-04-29 | Stop reason: HOSPADM

## 2024-04-25 RX ORDER — ONDANSETRON 8 MG/1
16 TABLET, FILM COATED ORAL
Qty: 10 TABLET | Refills: 0 | Status: COMPLETED | OUTPATIENT
Start: 2024-04-25 | End: 2024-04-29

## 2024-04-25 RX ORDER — AMOXICILLIN 250 MG
1 CAPSULE ORAL 2 TIMES DAILY PRN
Status: DISCONTINUED | OUTPATIENT
Start: 2024-04-25 | End: 2024-04-25

## 2024-04-25 RX ORDER — AMOXICILLIN 250 MG
2 CAPSULE ORAL 2 TIMES DAILY
Status: CANCELLED | OUTPATIENT
Start: 2024-04-25

## 2024-04-25 RX ORDER — DEXAMETHASONE 4 MG/1
8 TABLET ORAL DAILY
Qty: 4 TABLET | Refills: 0 | Status: DISCONTINUED | OUTPATIENT
Start: 2024-04-30 | End: 2024-04-29 | Stop reason: HOSPADM

## 2024-04-25 RX ORDER — PROCHLORPERAZINE MALEATE 5 MG
10 TABLET ORAL EVERY 6 HOURS PRN
Status: DISCONTINUED | OUTPATIENT
Start: 2024-04-25 | End: 2024-04-29 | Stop reason: HOSPADM

## 2024-04-25 RX ORDER — METHYLPREDNISOLONE SODIUM SUCCINATE 125 MG/2ML
125 INJECTION, POWDER, LYOPHILIZED, FOR SOLUTION INTRAMUSCULAR; INTRAVENOUS
Status: CANCELLED
Start: 2024-04-25

## 2024-04-25 RX ORDER — MODAFINIL 200 MG/1
200 TABLET ORAL DAILY PRN
Status: DISCONTINUED | OUTPATIENT
Start: 2024-04-25 | End: 2024-04-29 | Stop reason: HOSPADM

## 2024-04-25 RX ORDER — PROCHLORPERAZINE MALEATE 10 MG
10 TABLET ORAL EVERY 6 HOURS PRN
Status: CANCELLED
Start: 2024-04-25

## 2024-04-25 RX ORDER — AMOXICILLIN 250 MG
2 CAPSULE ORAL 2 TIMES DAILY
Status: DISCONTINUED | OUTPATIENT
Start: 2024-04-25 | End: 2024-04-29 | Stop reason: HOSPADM

## 2024-04-25 RX ORDER — LORAZEPAM 0.5 MG/1
.5-1 TABLET ORAL EVERY 6 HOURS PRN
Status: DISCONTINUED | OUTPATIENT
Start: 2024-04-25 | End: 2024-04-29 | Stop reason: HOSPADM

## 2024-04-25 RX ORDER — ALBUTEROL SULFATE 0.83 MG/ML
2.5 SOLUTION RESPIRATORY (INHALATION)
Status: DISCONTINUED | OUTPATIENT
Start: 2024-04-25 | End: 2024-04-29 | Stop reason: HOSPADM

## 2024-04-25 RX ORDER — HEPARIN SODIUM,PORCINE 10 UNIT/ML
5-10 VIAL (ML) INTRAVENOUS EVERY 24 HOURS
Status: DISCONTINUED | OUTPATIENT
Start: 2024-04-25 | End: 2024-04-29 | Stop reason: HOSPADM

## 2024-04-25 RX ORDER — ALBUTEROL SULFATE 90 UG/1
1-2 AEROSOL, METERED RESPIRATORY (INHALATION)
Status: DISCONTINUED | OUTPATIENT
Start: 2024-04-25 | End: 2024-04-29 | Stop reason: HOSPADM

## 2024-04-25 RX ADMIN — DIPHENHYDRAMINE HYDROCHLORIDE 50 MG: 25 CAPSULE ORAL at 19:08

## 2024-04-25 RX ADMIN — ETOPOSIDE 145 MG: 20 INJECTION, SOLUTION INTRAVENOUS at 22:36

## 2024-04-25 RX ADMIN — ONDANSETRON HYDROCHLORIDE 16 MG: 8 TABLET, FILM COATED ORAL at 22:02

## 2024-04-25 RX ADMIN — DOCUSATE SODIUM 50 MG AND SENNOSIDES 8.6 MG 2 TABLET: 8.6; 5 TABLET, FILM COATED ORAL at 22:02

## 2024-04-25 RX ADMIN — ACETAMINOPHEN 650 MG: 325 TABLET, FILM COATED ORAL at 19:07

## 2024-04-25 RX ADMIN — Medication 500 UNITS: at 14:29

## 2024-04-25 RX ADMIN — ENOXAPARIN SODIUM 40 MG: 40 INJECTION SUBCUTANEOUS at 22:05

## 2024-04-25 RX ADMIN — ACYCLOVIR 400 MG: 400 TABLET ORAL at 22:02

## 2024-04-25 RX ADMIN — PREDNISONE 100 MG: 50 TABLET ORAL at 19:08

## 2024-04-25 RX ADMIN — RITUXIMAB-ABBS 600 MG: 10 INJECTION, SOLUTION INTRAVENOUS at 20:03

## 2024-04-25 RX ADMIN — SODIUM CHLORIDE: 9 INJECTION, SOLUTION INTRAVENOUS at 22:36

## 2024-04-25 ASSESSMENT — PAIN SCALES - GENERAL: PAINLEVEL: NO PAIN (0)

## 2024-04-25 ASSESSMENT — ACTIVITIES OF DAILY LIVING (ADL)
ADLS_ACUITY_SCORE: 35
ADLS_ACUITY_SCORE: 18
ADLS_ACUITY_SCORE: 18
ADLS_ACUITY_SCORE: 35
ADLS_ACUITY_SCORE: 18

## 2024-04-25 NOTE — H&P
[facility]    History and Physical  Hematology / Oncology     Date of Admission: 04/25/24  Date of Service (when I saw the patient): 04/25/24    Assessment & Plan   Clementine Kathleen is a 28 year old female with a past medical history including primary mediastinal B-cell lymphoma who is admitted 4/25/2024 for scheduled chemotherapy with dose adjusted R-EPOCH (C4D1= 4/25/2024).    # Primary mediastinal B-cell lymphoma  Patient of Dr. Oakes. Initially presented with cervical lymphadenopathy 12/2023. Initial FNA (1/29/24) suggestive of possible Hodgkin lymphoma; however excisional LN biopsy (2/9/24) shows large B-cell lymphoma with differential diagnosis of primary mediastinal large B-cell lymphoma vs DLBCL NOS. PET with cervical and mediastinal lymphadenopathy only. Overall presentation would be quite consistent with PMBCL given her age, gender, sites of involvement, dim CD30 expression, and prominent fibrotic background.  Although CD23 IHC was negative, IHC for , MAL, and PD-L1 were positive in majority of the neoplastic cells suggesting PMBCL over DLBCL NOS. [id number] gene expression profile also consistent with PMBCL. FISH with gain of BCL2 but no MYC, BCL2, or BCL6 rearrangements.  PET showed bilateral cervical and mediastinal hypermetabolic lymphadenopathy (largest 4.4 cm with SUVmax 27 in a prevascular LN); no disease below diaphragm. Most recent PET 4/1/24 with evidence of complete response. Seen by Dr. Oakes in clinic on 4/25/2024 and plan to proceed with admission for C4 at dose level 4 and hold vincristine due to neuropathy (see more below), with a planned total of 6 cycles. Next PET at EOT given CR already.   - Port accessed on admission   - Most recent echo 2/19 with LVEF 60%, no significant valvular abnormalities present    Treatment Plan: Dose adjusted R-EPOCH (C4D1=4/25/2024)   - Rituximab 375 mg/m2 (600mg) IV x 1 dose - D1  - Prednisone 100 mg BID x 10 doses -  "D1-5  - Etoposide 86mg/m2 (125 mg) IV x 4 doses - D1-4  - Doxorubicin (29 mg/m2) x 4 doses CIVI- D1-4  - Cyclophosphamide 1296 mg/m2 (2215 mg) IV x 1 dose - D5  - Pegfilgrastim x1 - D6 (to be scheduled in clinic)  - Pre-meds: tylenol, benadryl, zofran, emend, dexamethasone (D6-7)    # Risk of TLS  - Encourage good PO hydration. Allopurinol no longer indicated as pt in CR.      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 Secondary to chemotherapy. Respectfully declined need for medication management at this time. Will continue to monitor.   - Monitor closely  - Cap vincristine at 2 mg for C3  - S/p C3 and neuropathy in fingertips have worsened with some difficulty using her phone or buttoning shirts and now neuropathy in bilateral feet that does not impact balance of walking.     # ID PPx  - Acyclovir 400 mg BID  - Levofloxacin 250 mg daily and fluconazole 200 mg daily when ANC <1  - Pentamidine monthly for PJP ppx, due 5/1/24    # History of CINV   Chemotherapy induced nausea and vomiting during previous cycles, managed with prn zofran, compazine, scheduled emend, and zyprexa at bedtime. Symptoms improved within a few days of hospital discharge from cycle 2.  - Antiemetics per chemo regimen: ondansetron 16 mg daily (D1-5), fosaprepitant D5  - Continue prn zofran and compazine as indicated and Zyprexa at bedtime.  - If CINV is persistent can consider Aloxi or PRN ativan.      # History of chemotherapy related rash  Maculopapular light pink, scattered, non-pruritic rash noted during cycle 2 at base of skull. Similar episode with C1 with rash to right side of neck and right arm. Managed with home cortisone cream.   - Monitor for rashes with chemotherapy     # Palpitations/tachycardia  Noted as heart \"racing/pounding\" beginning after cycle 2 of chemo. No associated chest pain, shortness of breath, lower extremity pain/swelling, abdominal pain/nausea, dizziness. Echo from 2/2024 unremarkable. " 3/25/24 NT proBNP normal at <36. Ongoing symptoms following C3, with more exertional SOB. CT PE 4/15 unremarkable; ziopatch ordered 4/15 however not yet placed.  - No palpitations after cycle 3  - Will consider ziopatch at discharge    # Constipation  Regular on bowel regimen miralax and senna BID. Continue while inpatient. Consider PRN Reglan if additional bowel medication is needed     MISC  # Fertility preservation - Established with [facility], underwent egg retrieval on 2/18.   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 Her boyfriend is a general surgery resident here at [facility]. Parents live in [place].        FEN:  - IVF: Per chemotherapy protocol  - Standard lyte replacement   - Regular diet as tolerated    Prophy/Misc:  - GI: PPI  - DVT: Enoxaparin ppx      FEN   - IVF per chemotherapy regimen, IVF bolus prn   - PRN lyte replacement per standing protocol  - Regular diet as tolerated     Lines/Drains:   DVT ppx: Lovenox, hold if Plt <50K   GI ppx: PTA PPI or Pepcid  Consults: TBD    DISPO: Anticipate 5 day stay for scheduled chemotherapy. Tentative discharge 4/29-4/30 pending completion of chemo and barring any clinical complications.     Medically Ready for Discharge: Anticipated in 5+ Days       Code Status   Full Code      Patient and plan of care was discussed with attending physician Dr. Vera.    >60 minutes spent on the date of the encounter. Over 50% of time was spent counseling the patient and/or coordinating care.     Marylou Quiroz (Melia, PA-C  Hematology/Oncology  Pager: #[phone]    Primary Care Physician   Chela Peter    Chief Complaint   Admission for scheduled chemotherapy    History is obtained from the patient    History of Present Illness   Clementine Kathleen is a 28 year old female with a past medical history including primary mediastinal B-cell lymphoma who is admitted 4/25/2024 for scheduled  chemotherapy with dose adjusted R-EPOCH (C4D1= 2024). She has tolerated previous cycles well without complications.     Clementine is feeling well today. Worsening neuropathy after cycle 3. Will omit vincristine with this cycle. Otherwise has no notable symptoms or side effects. States she tolerated this last cycle of chemo well. Patient is hopeful to start chemotherapy quickly so that she can discharge as soon as possible  Appetite intact. Energy level good.   Denies fever, chills, mouth sores, SOB, cough, abdominal pain, diarrhea, constipation, nausea, vomiting, dysuria, hematuria, numbness, tingling, swelling  Answered all questions at bedside.    Past Medical History    I have reviewed this patient's medical history and updated it with pertinent information if needed.   Past Medical History:   Diagnosis Date    Anxiety     Cervical lymphadenopathy     GERD     Interstitial cystitis        Past Surgical History   I have reviewed this patient's surgical history and updated it with pertinent information if needed.  Past Surgical History:   Procedure Laterality Date    CL AFF SURGICAL PATHOLOGY      Pearl neuroma    CLOSED REDUCTION PROXIMAL FIBULAR FRACTURE Left     EXCISE LESION NECK Left 2024    Procedure: Excisional Node Biopsy Left Neck;  Surgeon: Asher Damon MD;  Location: [facility] OR    INSERT PORT VASCULAR ACCESS Right 3/1/2024    Procedure: Insert port vascular access;  Surgeon: Solo Contreras PA-C;  Location: [facility] OR    IR CHEST PORT PLACEMENT > 5 YRS OF AGE  3/1/2024    PICC INSERTION - DOUBLE LUMEN Right 2024    41-1cm, Medial brachial vein       Prior to Admission Medications   Prior to Admission Medications   Prescriptions Last Dose Informant Patient Reported? Taking?   Levonorgestrel (KYLEENA IU)   Yes No   Si Device by Intrauterine route once   OLANZapine zydis (ZYPREXA) 5 MG ODT   No No   Sig: Take 1 tablet (5 mg) by mouth at bedtime   acyclovir (ZOVIRAX) 400 MG tablet    No No   Sig: Take 1 tablet (400 mg) by mouth 2 times daily   cimetidine (TAGAMET) 200 MG tablet   Yes No   Sig: Take 200 mg by mouth 2 times daily   dexAMETHasone (DECADRON) 4 MG tablet   No No   Sig: Take 2 tablets (8 mg) by mouth daily Starting 4/10 for two days (4/10 and 4/11)   fluconazole (DIFLUCAN) 200 MG tablet   No No   Sig: Take 1 tablet (200 mg) by mouth daily   levofloxacin (LEVAQUIN) 250 MG tablet   No No   Sig: Take 1 tablet (250 mg) by mouth daily   metoclopramide (REGLAN) 10 MG tablet   No No   Sig: Take 1 tablet (10 mg) by mouth 4 times daily as needed (constipation)   modafinil (PROVIGIL) 200 MG tablet   Yes No   Sig: Take 1 tablet (200 mg) by mouth daily as needed   ondansetron (ZOFRAN) 4 MG tablet   No No   Sig: Take 1-2 tablets (4-8 mg) by mouth every 8 hours as needed for nausea or vomiting   pantoprazole (PROTONIX) 40 MG EC tablet   No No   Sig: Take 1 tablet (40 mg) by mouth 2 times daily   polyethylene glycol (MIRALAX) 17 GM/Dose powder   No No   Sig: Take 17 g by mouth daily as needed for constipation   potassium chloride ER (K-TAB) 20 MEQ CR tablet   No No   Sig: Take 1 tablet (20 mEq) by mouth 2 times daily   predniSONE (DELTASONE) 50 MG tablet   No No   Sig: Take 2 tablets (100 mg) by mouth 2 times daily Starting 4/8 at around 4pm and in the morning of 4/9   prochlorperazine (COMPAZINE) 10 MG tablet   No No   Sig: Take 1 tablet (10 mg) by mouth every 6 hours as needed for nausea or vomiting   senna-docusate (SENOKOT-S/PERICOLACE) 8.6-50 MG tablet   No No   Sig: Take 1 tablet by mouth daily as needed for constipation   triamcinolone (KENALOG) 0.1 % external cream   No No   Sig: Apply topically 2 times daily as needed for irritation      Facility-Administered Medications: None     Allergies   No Known Allergies    Social History   I have reviewed this patient's social history and updated it with pertinent information if needed. Clementine Kathleen  reports that she has never smoked. She has  never been exposed to tobacco smoke. She has never used smokeless tobacco. She reports that she does not currently use alcohol. She reports that she does not currently use drugs.    Family History   I have reviewed this patient's family history and updated it with pertinent information if needed.   Family History   Problem Relation Age of Onset    Anesthesia Reaction No family hx of     Clotting Disorder No family hx of        Review of Systems   The 10 point Review of Systems is negative other than noted in the HPI or here.     Physical Exam   Temp: 97.7  F (36.5  C) Temp src: Oral BP: 108/66 Pulse: 85   Resp: 18 SpO2: 98 % O2 Device: None (Room air)    Vital Signs with Ranges  Temp:  [97.7  F (36.5  C)-97.9  F (36.6  C)] 97.7  F (36.5  C)  Pulse:  [83-85] 85  Resp:  [16-18] 18  BP: (108-111)/(53-66) 108/66  SpO2:  [98 %-99 %] 98 %  0 lbs 0 oz    Constitutional: Pleasant female sitting up in bed. Awake and conversational. Non- toxic appearing. No acute distress. Well developed, hydrated, and nourished. Appears stated age.   HEENT: Normocephalic, atraumatic without tenderness or palpable masses/depressions. Sclerae anicteric. PERRLA. EOM intact. Moist mucus membranes without lesions, thrush, or exudates appreciated  Lymph: Neck supple. No significant adenopathy noted.   Respiratory: Breathing comfortable with no increased work on room air. Good air exchange. No signs of respiratory distress or accessory muscle use. Lungs clear to auscultation bilaterally, no crackles or wheezing noted.  Cardiovascular: Regular rate and rhythm. Normal S1 and S2. No murmurs, rubs, or gallops. No peripheral edema.    GI: Abdomen is soft, non-distended, non-tender and without distension. Bowel sounds present and normoactive. No masses or hepatosplenomegaly appreciated.  Skin: Skin is clean, dry, intact. No jaundice appreciated.   Musculoskeletal/ Extremities: No redness, warmth, or swelling of the joints appreciated. Distal pulses  palpable. Nailbeds pink and without cyanosis or clubbing.   Neurologic: Alert and oriented. Speech normal. Grossly nonfocal. Memory and thought process preserved. Motor function normal with 5/5 muscle strength bilaterally to UE and LE. Sensation intact bilaterally. CN II-XII intact.   Neuropsychiatric: Calm, affect congruent to situation. Appropriate mood and affect. Good judgment and insight. No visual/auditory hallucinations.  Vascular Access: Port C/D/I.    Data   Results for orders placed or performed in visit on 04/25/24 (from the past 24 hour(s))   Uric acid   Result Value Ref Range    Uric Acid 3.4 2.4 - 5.7 mg/dL   Lactate Dehydrogenase   Result Value Ref Range    Lactate Dehydrogenase 195 0 - 250 U/L   Comprehensive metabolic panel   Result Value Ref Range    Sodium 138 135 - 145 mmol/L    Potassium 4.0 3.4 - 5.3 mmol/L    Carbon Dioxide (CO2) 25 22 - 29 mmol/L    Anion Gap 8 7 - 15 mmol/L    Urea Nitrogen 14.5 6.0 - 20.0 mg/dL    Creatinine 0.71 0.51 - 0.95 mg/dL    GFR Estimate >90 >60 mL/min/1.73m2    Calcium 8.9 8.6 - 10.0 mg/dL    Chloride 105 98 - 107 mmol/L    Glucose 126 (H) 70 - 99 mg/dL    Alkaline Phosphatase 60 40 - 150 U/L    AST 15 0 - 45 U/L    ALT 12 0 - 50 U/L    Protein Total 6.0 (L) 6.4 - 8.3 g/dL    Albumin 4.0 3.5 - 5.2 g/dL    Bilirubin Total <0.2 <=1.2 mg/dL   CBC with Platelets & Differential    Narrative    The following orders were created for panel order CBC with Platelets & Differential.  Procedure                               Abnormality         Status                     ---------                               -----------         ------                     CBC with platelets and d...[[id number]]  Abnormal            Final result                 Please view results for these tests on the individual orders.   CBC with platelets and differential   Result Value Ref Range    WBC Count 7.0 4.0 - 11.0 10e3/uL    RBC Count 3.39 (L) 3.80 - 5.20 10e6/uL    Hemoglobin 10.7 (L) 11.7 - 15.7  g/dL    Hematocrit 31.8 (L) 35.0 - 47.0 %    MCV 94 78 - 100 fL    MCH 31.6 26.5 - 33.0 pg    MCHC 33.6 31.5 - 36.5 g/dL    RDW 19.8 (H) 10.0 - 15.0 %    Platelet Count 303 150 - 450 10e3/uL    % Neutrophils 75 %    % Lymphocytes 12 %    % Monocytes 10 %    % Eosinophils 0 %    % Basophils 1 %    % Immature Granulocytes 2 %    NRBCs per 100 WBC 0 <1 /100    Absolute Neutrophils 5.2 1.6 - 8.3 10e3/uL    Absolute Lymphocytes 0.9 0.8 - 5.3 10e3/uL    Absolute Monocytes 0.7 0.0 - 1.3 10e3/uL    Absolute Eosinophils 0.0 0.0 - 0.7 10e3/uL    Absolute Basophils 0.1 0.0 - 0.2 10e3/uL    Absolute Immature Granulocytes 0.1 <=0.4 10e3/uL    Absolute NRBCs 0.0 10e3/uL

## 2024-04-25 NOTE — PHARMACY-ADMISSION MEDICATION HISTORY
Pharmacist Admission Medication History    Admission medication history is complete. The information provided in this note is only as accurate as the sources available at the time of the update.    Information Source(s): Patient via phone    Pertinent Information: Has been taking bowel regimen meds (senna-s and Miralax) twice daily, not as needed    Changes made to PTA medication list:  Added: None  Deleted: Dexamethasone and Prednisone (will get these on discharge though), triamcinolone cream, potassium  Changed: Miralax and senna-s to twice daily scheduled    Allergies reviewed with patient and updates made in EHR: yes    Medication History Completed By: Andy J. Kurtzweil, [facility] 4/25/2024 6:02 PM    PTA Med List   Medication Sig Last Dose    acyclovir (ZOVIRAX) 400 MG tablet Take 1 tablet (400 mg) by mouth 2 times daily 4/25/2024    cimetidine (TAGAMET) 200 MG tablet Take 200 mg by mouth 2 times daily 4/25/2024    fluconazole (DIFLUCAN) 200 MG tablet Take 1 tablet (200 mg) by mouth daily Past Month    levofloxacin (LEVAQUIN) 250 MG tablet Take 1 tablet (250 mg) by mouth daily Past Month    Levonorgestrel (KYLEENA IU) 1 Device by Intrauterine route once 4/25/2024    metoclopramide (REGLAN) 10 MG tablet Take 1 tablet (10 mg) by mouth 4 times daily as needed (constipation) More than a month    modafinil (PROVIGIL) 200 MG tablet Take 200 mg by mouth daily as needed (hypersomnia) More than a month    OLANZapine zydis (ZYPREXA) 5 MG ODT Take 1 tablet (5 mg) by mouth at bedtime 4/3/2024    ondansetron (ZOFRAN) 4 MG tablet Take 1-2 tablets (4-8 mg) by mouth every 8 hours as needed for nausea or vomiting 4/4/2024    pantoprazole (PROTONIX) 40 MG EC tablet Take 1 tablet (40 mg) by mouth 2 times daily 4/25/2024    polyethylene glycol (MIRALAX) 17 GM/Dose powder Take 17 g by mouth daily as needed for constipation (Patient taking differently: Take 17 g by mouth 2 times daily) 4/25/2024    prochlorperazine (COMPAZINE) 10 MG  tablet Take 1 tablet (10 mg) by mouth every 6 hours as needed for nausea or vomiting     senna-docusate (SENOKOT-S/PERICOLACE) 8.6-50 MG tablet Take 1 tablet by mouth daily as needed for constipation (Patient taking differently: Take 1 tablet by mouth 2 times daily) 4/25/2024

## 2024-04-25 NOTE — PROGRESS NOTES
[facility]      FOLLOW-UP VISIT NOTE                       Apr 25, 2024  Subjective   REASON FOR VISIT: Follow up PMBCL, pre Cycle 4 R-EPOCH    ONCOLOGIC SUMMARY:  Diagnosis:  Primary mediastinal B-cell lymphoma dx 1/2024, presenting with cervical LAD. 1/29/24 FNA indeterminate; 2/9/24 left level 3 excisional LN biopsy showed large B-cell lymphoma with rare abnormal B-cells on flow cytometry. Although CD23 IHC was negative, IHC for , MAL, and PD-L1 were positive in majority of the neoplastic cells suggesting PMBCL over DLBCL NOS. Gnaly1BI gene expression profile also consistent with PMBCL. FISH with gain of BCL2 but no MYC, BCL2, or BCL6 rearrangements. PET showed bilateral cervical and mediastinal hypermetabolic lymphadenopathy (largest 4.4 cm with SUVmax 27 in a prevascular LN); no disease below diaphragm.     Intent of treatment: Curative    Treatment history:  2/22/24: Cycle 1 R-EPOCH, dose level 1  3/14/24: Cycle 2 R-EPOCH, dose level 2. PET after 2 cycles shows CR!  4/4/24: Cycle 3 R-EPOCH, dose level 3 but vincristine capped at 2 mg d/t neuropathy    INTERVAL HISTORY:  Clementine is here with Dima prior to Cycle 4 R-EPOCH. Doing fairly well overall.  - Neuropathy was worse during Cycle 3, particularly when she was in the hospital. More pain when previously it was more tingling. Now improving but still present in fingertips and a little in feet.  - Had some SOB with exertion and palpitations the second week. Ziopatch was ordered but she never heard from Cardiology.  - Some nausea in hospital but now improved. Bowels are much more regulated.   - Otherwise no fevers, night sweats, chest pain, abd pain, or bleeding.  - Still walking daily.     I have reviewed and updated the following:  Past Medical History:   Diagnosis Date    Anxiety     Cervical lymphadenopathy     GERD     Interstitial cystitis       No Known Allergies    Current Outpatient Medications:     acyclovir (ZOVIRAX) 400 MG tablet, Take  1 tablet (400 mg) by mouth 2 times daily, Disp: 60 tablet, Rfl: 4    cimetidine (TAGAMET) 200 MG tablet, Take 200 mg by mouth 2 times daily, Disp: , Rfl:     dexAMETHasone (DECADRON) 4 MG tablet, Take 2 tablets (8 mg) by mouth daily Starting 4/10 for two days (4/10 and 4/11), Disp: 4 tablet, Rfl: 0    fluconazole (DIFLUCAN) 200 MG tablet, Take 1 tablet (200 mg) by mouth daily, Disp: 30 tablet, Rfl: 0    levofloxacin (LEVAQUIN) 250 MG tablet, Take 1 tablet (250 mg) by mouth daily, Disp: 30 tablet, Rfl: 0    Levonorgestrel (KYLEENA IU), 1 Device by Intrauterine route once, Disp: , Rfl:     metoclopramide (REGLAN) 10 MG tablet, Take 1 tablet (10 mg) by mouth 4 times daily as needed (constipation), Disp: 30 tablet, Rfl: 3    modafinil (PROVIGIL) 200 MG tablet, Take 1 tablet (200 mg) by mouth daily as needed, Disp: , Rfl:     OLANZapine zydis (ZYPREXA) 5 MG ODT, Take 1 tablet (5 mg) by mouth at bedtime, Disp: 10 tablet, Rfl: 0    ondansetron (ZOFRAN) 4 MG tablet, Take 1-2 tablets (4-8 mg) by mouth every 8 hours as needed for nausea or vomiting, Disp: 30 tablet, Rfl: 0    pantoprazole (PROTONIX) 40 MG EC tablet, Take 1 tablet (40 mg) by mouth 2 times daily, Disp: 60 tablet, Rfl: 0    polyethylene glycol (MIRALAX) 17 GM/Dose powder, Take 17 g by mouth daily as needed for constipation, Disp: 510 g, Rfl: 0    potassium chloride ER (K-TAB) 20 MEQ CR tablet, Take 1 tablet (20 mEq) by mouth 2 times daily, Disp: 4 tablet, Rfl: 0    predniSONE (DELTASONE) 50 MG tablet, Take 2 tablets (100 mg) by mouth 2 times daily Starting 4/8 at around 4pm and in the morning of 4/9, Disp: 4 tablet, Rfl: 0    prochlorperazine (COMPAZINE) 10 MG tablet, Take 1 tablet (10 mg) by mouth every 6 hours as needed for nausea or vomiting, Disp: 30 tablet, Rfl: 0    senna-docusate (SENOKOT-S/PERICOLACE) 8.6-50 MG tablet, Take 1 tablet by mouth daily as needed for constipation, Disp: 60 tablet, Rfl: 0    triamcinolone (KENALOG) 0.1 % external cream, Apply  topically 2 times daily as needed for irritation, Disp: 15 g, Rfl: 0  No current facility-administered medications for this visit.    REVIEW OF SYSTEMS:  10-point ROS reviewed and negative other than that mentioned in HPI.     Objective   VITAL SIGNS  /53   Pulse 83   Temp 97.9  F (36.6  C) (Oral)   Resp 16   Wt 64.4 kg (142 lb)   SpO2 99%   BMI 22.92 kg/m      ECOG PS: 0     PHYSICAL EXAM:  General: Awake, alert, in no acute distress. Oriented x 3.  HEENT: Chemo induced alopecia. Normocephalic, atraumatic. No scleral icterus. Oral mucosa pink and moist with no signs of thrush or  mucositis  Lymph: No cervical, supraclavicular, and axillary LAD appreciated.   CV: Tachycardic and regular rhythm. No murmurs, rubs, or gallops appreciated.  Resp: Good inspiratory effort, lungs clear to auscultation bilaterally.  Ext: No peripheral edema bilaterally.  Neuro: CN II-XII grossly intact. No focal deficits.   Skin: No rash, unusual bruising or prominent lesions.  Psych: Pleasant, normal affect.    LABS:  I reviewed the following labs:  Lab Results   Component Value Date    WBC 7.0 04/25/2024    HGB 10.7 (L) 04/25/2024    HCT 31.8 (L) 04/25/2024    MCV 94 04/25/2024     04/25/2024    INR 1.09 04/08/2024     Lab Results   Component Value Date     04/25/2024    POTASSIUM 4.0 04/25/2024    CR 0.71 04/25/2024    BUN 14.5 04/25/2024    CHLORIDE 105 04/25/2024    DEE 8.9 04/25/2024     (H) 04/25/2024      Lab Results   Component Value Date    ALT 12 04/25/2024    AST 15 04/25/2024    ALKPHOS 60 04/25/2024    BILITOTAL <0.2 04/25/2024      Lab Results   Component Value Date     04/25/2024    URIC 3.4 04/25/2024 4/1/24 PET:  IMPRESSION: In this patient with primary mediastinal B-cell lymphoma  following 2 cycles of chemotherapy:   1. Complete metabolic response by Lugano criteria  2. Resolution of cervical and mediastinal hypermetabolic  lymphadenopathy.    4/15/24 CTA chest:  IMPRESSION:    1. Exam is negative for acute pulmonary embolism.  2. No acute finding in the chest.    Assessment & Plan   Primary mediastinal B-cell lymphoma  Dx 1/2024, presenting cervical lymphadenopathy. Initial FNA suggestive of possible Hodgkin lymphoma; however excisional biopsy showed large B-cell lymphoma with differential diagnosis of primary mediastinal large B-cell lymphoma vs DLBCL NOS. Additional IHC staining for , MAL, and PD-L1, and Pkxcj1FM gene expression profile all favor PMBCL diagnosis over DLBCL NOS. PET with cervical and mediastinal lymphadenopathy only. LDH normal.   Previously discussed PMBCL diagnosis and plan for DA-R-EPOCH x 6 cycles. In the single-arm phase 2 prospective study of R-EPOCH (without radiation) for PMBCL, 5-year EFS was 93% and OS 97% (10.1056/[id number]).   Cycle 1 R-EPOCH 2/22/24, tolerating well so far other than constipation.  Cycle 2 R-EPOCH 3/14/24. ANC dread >0.5 last cycle so qualifies for 20% increase in doxorubicin, etoposide, and cyclophosphamide (dose level 2).   PET after 2 cycles shows complete response!   Cycle 3 R-EPOCH 4/4/24- qualified for 20% increase in doxorubicin, etoposide, and cyclophosphamide again (dose level 3); however capped vincristine at 2 mg due to neuropathy.   Proceed with Cycle 4 R-EPOCH today. Qualifies for 20% dose increase again (dose level 4) but will omit vincristine this cycle to allow some recovery of neuropathy and consider reintroducing at 50% dose for Cycle 5/6.   Next PET at EOT given CR already.     Constipation- improved  Scheduled Miralax and Senna-S.  PRN Reglan.    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 Initially affecting fingertips only.   Cap vincristine at 2 mg starting Cycle 3.  S/p C3 and neuropathy in fingertips have worsened with some difficulty using her phone or buttoning shirts and now neuropathy in bilateral feet that does not impact balance of walking.   Hold vincristine Cycle 4 to allow some  recovery of neuropathy and consider reintroducing at 50% dose for Cycle 5/6.     Palpitations/tachycardia  SOB  3/25/24 NT proBNP normal at <36.   4/15/24 CTA chest negative for PE. Ziopatch requested but did not hear from Cardiology - will ask inpatient team if we can place prior to discharge.      Fertility preservation  Established with [facility] and underwent egg retrieval on 2/18/24.      Ppx  TLS ppx: now done with allopurinol.  ID ppx: continue  mg BID. Levo and fluc when ANC <1.0. Monthly pentamidine for PJP ppx (last given 4/1/24, next due ~5/1/24).  Vaccines: up to date on COVID and flu shots.       PLAN:  Admit for Cycle 4 R-EPOCH, increase to dose level 4 but hold vincristine    Total of 30 minutes on patient visit, reviewing records, interpreting test results, placing orders, and documentation on the day of service.    The longitudinal plan of care for the diagnosis(es)/condition(s) as documented were addressed during this visit. Due to the added complexity in care, I will continue to support Clementine in the subsequent management and with ongoing continuity of care.     Vanessa Oakes MD  Attending Physician, [facility]

## 2024-04-25 NOTE — NURSING NOTE
"Oncology Rooming Note    April 25, 2024 2:54 PM   Clementine Kathleen is a 28 year old female who presents for:    Chief Complaint   Patient presents with    Port Draw     Labs drawn via port by RN in lab.  VS taken    Oncology Clinic Visit     Mediastinal large B-cell lymphoma of lymph nodes of multiple regions      Initial Vitals: /53   Pulse 83   Temp 97.9  F (36.6  C) (Oral)   Resp 16   Wt 64.4 kg (142 lb)   SpO2 99%   BMI 22.92 kg/m   Estimated body mass index is 22.92 kg/m  as calculated from the following:    Height as of 4/4/24: 1.676 m (5' 6\").    Weight as of this encounter: 64.4 kg (142 lb). Body surface area is 1.73 meters squared.  No Pain (0) Comment: Data Unavailable   No LMP recorded. (Menstrual status: IUD).  Allergies reviewed: Yes  Medications reviewed: Yes    Medications: Medication refills not needed today.  Pharmacy name entered into [facility]:    [id number] - [place], [place] - [address] [place] [address] [address] AT [facility] & [id number] [address] AT [facility]    Frailty Screening:   Is the patient here for a new oncology consult visit in cancer care? 2. No      Clinical concerns: None       Payal Perry CMA              "

## 2024-04-25 NOTE — NURSING NOTE
"Chief Complaint   Patient presents with    Port Draw     Labs drawn via port by RN in lab.  VS taken       Port accessed with 20 gauge 3/4\" Power needle by RN, labs collected, line flushed with saline and heparin.  Vitals taken. Pt checked in for appointment(s).    Patricia Mclean RN    "

## 2024-04-25 NOTE — LETTER
4/25/2024         RE: Clementine Kathleen  [address]  [place] [address]        Dear Colleague,    Thank you for referring your patient, Clementine Kathleen, to the [facility]. Please see a copy of my visit note below.     [facility]      FOLLOW-UP VISIT NOTE                       Apr 25, 2024  Subjective  REASON FOR VISIT: Follow up PMBCL, pre Cycle 3 R-EPOCH    ONCOLOGIC SUMMARY:  Diagnosis:  Primary mediastinal B-cell lymphoma dx 1/2024, presenting with cervical LAD. 1/29/24 FNA indeterminate; 2/9/24 left level 3 excisional LN biopsy showed large B-cell lymphoma with rare abnormal B-cells on flow cytometry. Although CD23 IHC was negative, IHC for , MAL, and PD-L1 were positive in majority of the neoplastic cells suggesting PMBCL over DLBCL NOS. Vpnsy3BI gene expression profile also consistent with PMBCL. FISH with gain of BCL2 but no MYC, BCL2, or BCL6 rearrangements. PET showed bilateral cervical and mediastinal hypermetabolic lymphadenopathy (largest 4.4 cm with SUVmax 27 in a prevascular LN); no disease below diaphragm.     Intent of treatment: Curative    Treatment history:  2/22/24: Cycle 1 R-EPOCH, dose level 1  3/14/24: Cycle 2 R-EPOCH, dose level 2. PET after 2 cycles shows CR!  4/4/24: Cycle 3 R-EPOCH, dose level 3 but vincristine capped at 2 mg d/t neuropathy    INTERVAL HISTORY:  Clementine is here with Dima prior to Cycle 4 R-EPOCH. Doing fairly well overall.  - Neuropathy was worse during Cycle 3, particularly when she was in the hospital. More pain when previously it was more tingling. Now improving but still present in fingertips and a little in feet.  - Had some SOB with exertion and palpitations the second week. Ziopatch was ordered but she never heard from Cardiology.  - Some nausea in hospital but now improved. Bowels are much more regulated.   - Otherwise no fevers, night sweats, chest pain, abd pain, or bleeding.  - Still walking daily.     I  have reviewed and updated the following:  Past Medical History:   Diagnosis Date    Anxiety     Cervical lymphadenopathy     GERD     Interstitial cystitis       No Known Allergies    Current Outpatient Medications:     acyclovir (ZOVIRAX) 400 MG tablet, Take 1 tablet (400 mg) by mouth 2 times daily, Disp: 60 tablet, Rfl: 4    cimetidine (TAGAMET) 200 MG tablet, Take 200 mg by mouth 2 times daily, Disp: , Rfl:     dexAMETHasone (DECADRON) 4 MG tablet, Take 2 tablets (8 mg) by mouth daily Starting 4/10 for two days (4/10 and 4/11), Disp: 4 tablet, Rfl: 0    fluconazole (DIFLUCAN) 200 MG tablet, Take 1 tablet (200 mg) by mouth daily, Disp: 30 tablet, Rfl: 0    levofloxacin (LEVAQUIN) 250 MG tablet, Take 1 tablet (250 mg) by mouth daily, Disp: 30 tablet, Rfl: 0    Levonorgestrel (KYLEENA IU), 1 Device by Intrauterine route once, Disp: , Rfl:     metoclopramide (REGLAN) 10 MG tablet, Take 1 tablet (10 mg) by mouth 4 times daily as needed (constipation), Disp: 30 tablet, Rfl: 3    modafinil (PROVIGIL) 200 MG tablet, Take 1 tablet (200 mg) by mouth daily as needed, Disp: , Rfl:     OLANZapine zydis (ZYPREXA) 5 MG ODT, Take 1 tablet (5 mg) by mouth at bedtime, Disp: 10 tablet, Rfl: 0    ondansetron (ZOFRAN) 4 MG tablet, Take 1-2 tablets (4-8 mg) by mouth every 8 hours as needed for nausea or vomiting, Disp: 30 tablet, Rfl: 0    pantoprazole (PROTONIX) 40 MG EC tablet, Take 1 tablet (40 mg) by mouth 2 times daily, Disp: 60 tablet, Rfl: 0    polyethylene glycol (MIRALAX) 17 GM/Dose powder, Take 17 g by mouth daily as needed for constipation, Disp: 510 g, Rfl: 0    potassium chloride ER (K-TAB) 20 MEQ CR tablet, Take 1 tablet (20 mEq) by mouth 2 times daily, Disp: 4 tablet, Rfl: 0    predniSONE (DELTASONE) 50 MG tablet, Take 2 tablets (100 mg) by mouth 2 times daily Starting 4/8 at around 4pm and in the morning of 4/9, Disp: 4 tablet, Rfl: 0    prochlorperazine (COMPAZINE) 10 MG tablet, Take 1 tablet (10 mg) by mouth every 6  hours as needed for nausea or vomiting, Disp: 30 tablet, Rfl: 0    senna-docusate (SENOKOT-S/PERICOLACE) 8.6-50 MG tablet, Take 1 tablet by mouth daily as needed for constipation, Disp: 60 tablet, Rfl: 0    triamcinolone (KENALOG) 0.1 % external cream, Apply topically 2 times daily as needed for irritation, Disp: 15 g, Rfl: 0  No current facility-administered medications for this visit.    REVIEW OF SYSTEMS:  10-point ROS reviewed and negative other than that mentioned in HPI.     Objective  VITAL SIGNS  /53   Pulse 83   Temp 97.9  F (36.6  C) (Oral)   Resp 16   Wt 64.4 kg (142 lb)   SpO2 99%   BMI 22.92 kg/m      ECOG PS: 0     PHYSICAL EXAM:  General: Awake, alert, in no acute distress. Oriented x 3.  HEENT: Chemo induced alopecia. Normocephalic, atraumatic. No scleral icterus. Oral mucosa pink and moist with no signs of thrush or  mucositis  Lymph: No cervical, supraclavicular, and axillary LAD appreciated.   CV: Tachycardic and regular rhythm. No murmurs, rubs, or gallops appreciated.  Resp: Good inspiratory effort, lungs clear to auscultation bilaterally.  Ext: No peripheral edema bilaterally.  Neuro: CN II-XII grossly intact. No focal deficits.   Skin: No rash, unusual bruising or prominent lesions.  Psych: Pleasant, normal affect.    LABS:  I reviewed the following labs:  Lab Results   Component Value Date    WBC 7.0 04/25/2024    HGB 10.7 (L) 04/25/2024    HCT 31.8 (L) 04/25/2024    MCV 94 04/25/2024     04/25/2024    INR 1.09 04/08/2024     Lab Results   Component Value Date     04/25/2024    POTASSIUM 4.0 04/25/2024    CR 0.71 04/25/2024    BUN 14.5 04/25/2024    CHLORIDE 105 04/25/2024    DEE 8.9 04/25/2024     (H) 04/25/2024      Lab Results   Component Value Date    ALT 12 04/25/2024    AST 15 04/25/2024    ALKPHOS 60 04/25/2024    BILITOTAL <0.2 04/25/2024      Lab Results   Component Value Date     04/25/2024    URIC 3.4 04/25/2024 4/1/24 PET:  IMPRESSION:  In this patient with primary mediastinal B-cell lymphoma  following 2 cycles of chemotherapy:   1. Complete metabolic response by Lugano criteria  2. Resolution of cervical and mediastinal hypermetabolic  lymphadenopathy.    4/15/24 CTA chest:  IMPRESSION:   1. Exam is negative for acute pulmonary embolism.  2. No acute finding in the chest.    Assessment & Plan  Primary mediastinal B-cell lymphoma  Dx 1/2024, presenting cervical lymphadenopathy. Initial FNA suggestive of possible Hodgkin lymphoma; however excisional biopsy showed large B-cell lymphoma with differential diagnosis of primary mediastinal large B-cell lymphoma vs DLBCL NOS. Additional IHC staining for , MAL, and PD-L1, and Otgny7HF gene expression profile all favor PMBCL diagnosis over DLBCL NOS. PET with cervical and mediastinal lymphadenopathy only. LDH normal.   Previously discussed PMBCL diagnosis and plan for DA-R-EPOCH x 6 cycles. In the single-arm phase 2 prospective study of R-EPOCH (without radiation) for PMBCL, 5-year EFS was 93% and OS 97% (10.1056/[id number]).   Cycle 1 R-EPOCH 2/22/24, tolerating well so far other than constipation.  Cycle 2 R-EPOCH 3/14/24. ANC dread >0.5 last cycle so qualifies for 20% increase in doxorubicin, etoposide, and cyclophosphamide (dose level 2).   PET after 2 cycles shows complete response!   Cycle 3 R-EPOCH 4/4/24- qualified for 20% increase in doxorubicin, etoposide, and cyclophosphamide again (dose level 3); however capped vincristine at 2 mg due to neuropathy.   Proceed with Cycle 4 R-EPOCH today. Qualifies for 20% dose increase again (dose level 4) but will omit vincristine this cycle to allow some recovery of neuropathy and consider reintroducing at 50% dose for Cycle 5/6.   Next PET at EOT given CR already.     Constipation- improved  Scheduled Miralax and Senna-S.  PRN Reglan.    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 Initially affecting fingertips only.   Cap vincristine  at 2 mg starting Cycle 3.  S/p C3 and neuropathy in fingertips have worsened with some difficulty using her phone or buttoning shirts and now neuropathy in bilateral feet that does not impact balance of walking.   Hold vincristine Cycle 4 to allow some recovery of neuropathy and consider reintroducing at 50% dose for Cycle 5/6.     Palpitations/tachycardia  SOB  3/25/24 NT proBNP normal at <36.   4/15/24 CTA chest negative for PE. Ziopatch requested but did not hear from Cardiology - will ask inpatient team if we can place prior to discharge.      Fertility preservation  Established with [facility] and underwent egg retrieval on 2/18/24.      Ppx  TLS ppx: now done with allopurinol.  ID ppx: continue  mg BID. Levo and fluc when ANC <1.0. Monthly pentamidine for PJP ppx (last given 4/1/24, next due ~5/1/24).  Vaccines: up to date on COVID and flu shots.       PLAN:  Admit for Cycle 4 R-EPOCH, increase to dose level 4 but hold vincristine    Total of 30 minutes on patient visit, reviewing records, interpreting test results, placing orders, and documentation on the day of service.    The longitudinal plan of care for the diagnosis(es)/condition(s) as documented were addressed during this visit. Due to the added complexity in care, I will continue to support Clementine in the subsequent management and with ongoing continuity of care.     Vanessa Oakes MD  Attending Physician, [facility]

## 2024-04-26 LAB
ALBUMIN SERPL BCG-MCNC: 4.2 G/DL (ref 3.5–5.2)
ALP SERPL-CCNC: 64 U/L (ref 40–150)
ALT SERPL W P-5'-P-CCNC: 14 U/L (ref 0–50)
ANION GAP SERPL CALCULATED.3IONS-SCNC: 8 MMOL/L (ref 7–15)
APTT PPP: 32 SECONDS (ref 22–38)
AST SERPL W P-5'-P-CCNC: 15 U/L (ref 0–45)
ATRIAL RATE - MUSE: 70 BPM
BASOPHILS # BLD AUTO: 0 10E3/UL (ref 0–0.2)
BASOPHILS NFR BLD AUTO: 0 %
BILIRUB SERPL-MCNC: <0.2 MG/DL
BUN SERPL-MCNC: 11.6 MG/DL (ref 6–20)
CALCIUM SERPL-MCNC: 9.3 MG/DL (ref 8.6–10)
CHLORIDE SERPL-SCNC: 107 MMOL/L (ref 98–107)
CREAT SERPL-MCNC: 0.59 MG/DL (ref 0.51–0.95)
DEPRECATED HCO3 PLAS-SCNC: 24 MMOL/L (ref 22–29)
DIASTOLIC BLOOD PRESSURE - MUSE: NORMAL MMHG
EGFRCR SERPLBLD CKD-EPI 2021: >90 ML/MIN/1.73M2
EOSINOPHIL # BLD AUTO: 0 10E3/UL (ref 0–0.7)
EOSINOPHIL NFR BLD AUTO: 0 %
ERYTHROCYTE [DISTWIDTH] IN BLOOD BY AUTOMATED COUNT: 19.9 % (ref 10–15)
FIBRINOGEN PPP-MCNC: 263 MG/DL (ref 170–490)
GLUCOSE SERPL-MCNC: 134 MG/DL (ref 70–99)
HCT VFR BLD AUTO: 34.7 % (ref 35–47)
HGB BLD-MCNC: 11.3 G/DL (ref 11.7–15.7)
IMM GRANULOCYTES # BLD: 0.2 10E3/UL
IMM GRANULOCYTES NFR BLD: 1 %
INR PPP: 1.13 (ref 0.85–1.15)
INTERPRETATION ECG - MUSE: NORMAL
LDH SERPL L TO P-CCNC: 208 U/L (ref 0–250)
LYMPHOCYTES # BLD AUTO: 0.5 10E3/UL (ref 0.8–5.3)
LYMPHOCYTES NFR BLD AUTO: 3 %
MAGNESIUM SERPL-MCNC: 1.8 MG/DL (ref 1.7–2.3)
MCH RBC QN AUTO: 30.7 PG (ref 26.5–33)
MCHC RBC AUTO-ENTMCNC: 32.6 G/DL (ref 31.5–36.5)
MCV RBC AUTO: 94 FL (ref 78–100)
MONOCYTES # BLD AUTO: 0.1 10E3/UL (ref 0–1.3)
MONOCYTES NFR BLD AUTO: 1 %
NEUTROPHILS # BLD AUTO: 12.8 10E3/UL (ref 1.6–8.3)
NEUTROPHILS NFR BLD AUTO: 95 %
NRBC # BLD AUTO: 0 10E3/UL
NRBC BLD AUTO-RTO: 0 /100
P AXIS - MUSE: 48 DEGREES
PHOSPHATE SERPL-MCNC: 2.4 MG/DL (ref 2.5–4.5)
PLATELET # BLD AUTO: 344 10E3/UL (ref 150–450)
POTASSIUM SERPL-SCNC: 4.4 MMOL/L (ref 3.4–5.3)
PR INTERVAL - MUSE: 154 MS
PROT SERPL-MCNC: 6.5 G/DL (ref 6.4–8.3)
QRS DURATION - MUSE: 78 MS
QT - MUSE: 434 MS
QTC - MUSE: 468 MS
R AXIS - MUSE: 69 DEGREES
RBC # BLD AUTO: 3.68 10E6/UL (ref 3.8–5.2)
SODIUM SERPL-SCNC: 139 MMOL/L (ref 135–145)
SYSTOLIC BLOOD PRESSURE - MUSE: NORMAL MMHG
T AXIS - MUSE: 52 DEGREES
URATE SERPL-MCNC: 2.5 MG/DL (ref 2.4–5.7)
VENTRICULAR RATE- MUSE: 70 BPM
WBC # BLD AUTO: 13.4 10E3/UL (ref 4–11)

## 2024-04-26 PROCEDURE — 250N000013 HC RX MED GY IP 250 OP 250 PS 637: Performed by: INTERNAL MEDICINE

## 2024-04-26 PROCEDURE — 93005 ELECTROCARDIOGRAM TRACING: CPT

## 2024-04-26 PROCEDURE — 250N000011 HC RX IP 250 OP 636: Mod: JZ | Performed by: INTERNAL MEDICINE

## 2024-04-26 PROCEDURE — 85384 FIBRINOGEN ACTIVITY: CPT

## 2024-04-26 PROCEDURE — 85610 PROTHROMBIN TIME: CPT

## 2024-04-26 PROCEDURE — 93010 ELECTROCARDIOGRAM REPORT: CPT | Performed by: INTERNAL MEDICINE

## 2024-04-26 PROCEDURE — 85730 THROMBOPLASTIN TIME PARTIAL: CPT

## 2024-04-26 PROCEDURE — 250N000013 HC RX MED GY IP 250 OP 250 PS 637

## 2024-04-26 PROCEDURE — 84550 ASSAY OF BLOOD/URIC ACID: CPT

## 2024-04-26 PROCEDURE — 250N000013 HC RX MED GY IP 250 OP 250 PS 637: Performed by: PHYSICIAN ASSISTANT

## 2024-04-26 PROCEDURE — 250N000012 HC RX MED GY IP 250 OP 636 PS 637: Performed by: INTERNAL MEDICINE

## 2024-04-26 PROCEDURE — 85025 COMPLETE CBC W/AUTO DIFF WBC: CPT

## 2024-04-26 PROCEDURE — 258N000003 HC RX IP 258 OP 636: Performed by: INTERNAL MEDICINE

## 2024-04-26 PROCEDURE — 99233 SBSQ HOSP IP/OBS HIGH 50: CPT | Performed by: PHYSICIAN ASSISTANT

## 2024-04-26 PROCEDURE — 83615 LACTATE (LD) (LDH) ENZYME: CPT

## 2024-04-26 PROCEDURE — 120N000002 HC R&B MED SURG/OB UMMC

## 2024-04-26 PROCEDURE — 80053 COMPREHEN METABOLIC PANEL: CPT

## 2024-04-26 PROCEDURE — 250N000011 HC RX IP 250 OP 636

## 2024-04-26 PROCEDURE — 84100 ASSAY OF PHOSPHORUS: CPT | Performed by: PHYSICIAN ASSISTANT

## 2024-04-26 PROCEDURE — 83735 ASSAY OF MAGNESIUM: CPT | Performed by: PHYSICIAN ASSISTANT

## 2024-04-26 RX ORDER — POLYETHYLENE GLYCOL 3350 17 G/17G
17 POWDER, FOR SOLUTION ORAL 2 TIMES DAILY
Status: DISCONTINUED | OUTPATIENT
Start: 2024-04-26 | End: 2024-04-29 | Stop reason: HOSPADM

## 2024-04-26 RX ADMIN — CIMETIDINE 200 MG: 200 TABLET, FILM COATED ORAL at 20:07

## 2024-04-26 RX ADMIN — ACYCLOVIR 400 MG: 400 TABLET ORAL at 20:12

## 2024-04-26 RX ADMIN — ETOPOSIDE 145 MG: 20 INJECTION, SOLUTION INTRAVENOUS at 19:44

## 2024-04-26 RX ADMIN — POLYETHYLENE GLYCOL 3350 17 G: 17 POWDER, FOR SOLUTION ORAL at 20:07

## 2024-04-26 RX ADMIN — ENOXAPARIN SODIUM 40 MG: 40 INJECTION SUBCUTANEOUS at 20:07

## 2024-04-26 RX ADMIN — POTASSIUM & SODIUM PHOSPHATES POWDER PACK 280-160-250 MG 1 PACKET: 280-160-250 PACK at 08:48

## 2024-04-26 RX ADMIN — ONDANSETRON HYDROCHLORIDE 16 MG: 8 TABLET, FILM COATED ORAL at 20:07

## 2024-04-26 RX ADMIN — CIMETIDINE 200 MG: 200 TABLET, FILM COATED ORAL at 12:17

## 2024-04-26 RX ADMIN — DOCUSATE SODIUM 50 MG AND SENNOSIDES 8.6 MG 2 TABLET: 8.6; 5 TABLET, FILM COATED ORAL at 20:07

## 2024-04-26 RX ADMIN — POTASSIUM & SODIUM PHOSPHATES POWDER PACK 280-160-250 MG 1 PACKET: 280-160-250 PACK at 12:17

## 2024-04-26 RX ADMIN — POLYETHYLENE GLYCOL 3350 17 G: 17 POWDER, FOR SOLUTION ORAL at 12:17

## 2024-04-26 RX ADMIN — PANTOPRAZOLE SODIUM 40 MG: 40 TABLET, DELAYED RELEASE ORAL at 08:48

## 2024-04-26 RX ADMIN — PREDNISONE 100 MG: 50 TABLET ORAL at 16:04

## 2024-04-26 RX ADMIN — PREDNISONE 100 MG: 50 TABLET ORAL at 08:49

## 2024-04-26 RX ADMIN — POTASSIUM & SODIUM PHOSPHATES POWDER PACK 280-160-250 MG 1 PACKET: 280-160-250 PACK at 16:04

## 2024-04-26 RX ADMIN — PANTOPRAZOLE SODIUM 40 MG: 40 TABLET, DELAYED RELEASE ORAL at 20:08

## 2024-04-26 RX ADMIN — ACYCLOVIR 400 MG: 400 TABLET ORAL at 08:48

## 2024-04-26 RX ADMIN — DOCUSATE SODIUM 50 MG AND SENNOSIDES 8.6 MG 2 TABLET: 8.6; 5 TABLET, FILM COATED ORAL at 08:48

## 2024-04-26 RX ADMIN — SODIUM CHLORIDE: 9 INJECTION, SOLUTION INTRAVENOUS at 19:44

## 2024-04-26 ASSESSMENT — ACTIVITIES OF DAILY LIVING (ADL)
ADLS_ACUITY_SCORE: 18
DEPENDENT_IADLS:: INDEPENDENT
ADLS_ACUITY_SCORE: 18

## 2024-04-26 NOTE — PLAN OF CARE
Goal Outcome Evaluation:      Plan of Care Reviewed With: patient    Overall Patient Progress: no change    [id number]: Patient AVSS on room air. A&O x 4. No pain, no nausea/vomiting. Rituximab infused through port, tolerated well. Premeds given appropriately. Doxorubicin and etoposide running through port. Premeds given appropriately. Independent. Calls appropriately. Continue with plan of care.

## 2024-04-26 NOTE — PROGRESS NOTES
[facility]    Hematology / Oncology Progress Note    Patient: Clementine Kathleen  MRN: [id number]  Admission Date: 4/25/2024  Hospital Day # 1    Date of Service (when I saw the patient): 04/26/2024     Assessment & Plan   Clementine Kathleen is a 28 year old female with a past medical history including primary mediastinal B-cell lymphoma who is admitted 4/25/2024 for scheduled chemotherapy with dose adjusted R-EPOCH (C4D1= 4/25/2024).      Today:  - C4D2 of DA-R-EPOCH; tolerating well thus far. Continue to monitor for chemo related side effects.  - Continue best supportive cares    HEME  # Primary mediastinal B-cell lymphoma  Patient of Dr. Oakes. Initially presented with cervical lymphadenopathy 12/2023. Initial FNA (1/29/24) suggestive of possible Hodgkin lymphoma; however excisional LN biopsy (2/9/24) shows large B-cell lymphoma with differential diagnosis of primary mediastinal large B-cell lymphoma vs DLBCL NOS. PET with cervical and mediastinal lymphadenopathy only. Overall presentation would be quite consistent with PMBCL given her age, gender, sites of involvement, dim CD30 expression, and prominent fibrotic background.  Although CD23 IHC was negative, IHC for , MAL, and PD-L1 were positive in majority of the neoplastic cells suggesting PMBCL over DLBCL NOS. Rejat9DW gene expression profile also consistent with PMBCL. FISH with gain of BCL2 but no MYC, BCL2, or BCL6 rearrangements.  PET showed bilateral cervical and mediastinal hypermetabolic lymphadenopathy (largest 4.4 cm with SUVmax 27 in a prevascular LN); no disease below diaphragm. Most recent PET 4/1/24 with evidence of complete response. Seen by Dr. Oakes in clinic on 4/25/2024 and plan to proceed with admission for C4 at dose level 4 and hold vincristine due to neuropathy (see more below), with a planned total of 6 cycles. Next PET at EOT given CR already.   - Port accessed on admission   - Most recent echo  2/19 with LVEF 60%, no significant valvular abnormalities present     Treatment Plan: Dose adjusted R-EPOCH (C4D1=4/25/2024)   - Rituximab 375 mg/m2 (600mg) IV x 1 dose - D1  - Prednisone 100 mg BID x 10 doses - D1-5  - Etoposide 86mg/m2 (125 mg) IV x 4 doses - D1-4  - Doxorubicin (29 mg/m2) x 4 doses CIVI- D1-4  - Cyclophosphamide 1296 mg/m2 (2215 mg) IV x 1 dose - D5  - Pegfilgrastim x1 - D6 (to be scheduled in clinic)  - Pre-meds: tylenol, benadryl, zofran, emend, dexamethasone (D6-7)     # Risk of TLS  - Encourage good PO hydration. Allopurinol no longer indicated as pt in CR.      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 Secondary to chemotherapy. Respectfully declined need for medication management at this time. Vincristine was capped at 2mg for C3. S/p C3 and neuropathy in fingertips have worsened with some difficulty using her phone or buttoning shirts and now neuropathy in bilateral feet that does not impact balance of walking. Seems to be improving just prior to admission for C4 per patient report  - Per Dr. Oakes, omitting vincristine for C4  - Monitor closely     # ID PPx  - Acyclovir 400 mg BID  - Levofloxacin 250 mg daily and fluconazole 200 mg daily when ANC <1  - Pentamidine monthly for PJP ppx, due 5/1/24     # History of CINV   Chemotherapy induced nausea and vomiting during previous cycles, managed with prn zofran, compazine, scheduled emend, and zyprexa at bedtime. Symptoms improved within a few days of hospital discharge from cycle 2.  - Antiemetics per chemo regimen: ondansetron 16 mg daily (D1-5), fosaprepitant D5  - Continue prn zofran and compazine as indicated and Zyprexa at bedtime.  - If CINV is persistent can consider Aloxi or PRN ativan.      # History of chemotherapy related rash  Maculopapular light pink, scattered, non-pruritic rash noted during cycle 2 at base of skull. Similar episode with C1 with rash to right side of neck and right arm. Managed with home  "cortisone cream.   - Monitor for rashes with chemotherapy     # Palpitations/tachycardia  Noted as heart \"racing/pounding\" beginning after cycle 2 of chemo. No associated chest pain, shortness of breath, lower extremity pain/swelling, abdominal pain/nausea, dizziness. Echo from 2/2024 unremarkable. 3/25/24 NT proBNP normal at <36. Ongoing symptoms following C3, with more exertional SOB. CT PE 4/15 unremarkable; ziopatch ordered 4/15 however not yet placed.  - No significant palpitations after cycle 3  - Will consider ziopatch placement at discharge     # Constipation  Regular on bowel regimen miralax and senna BID. Continue while inpatient. Consider PRN Reglan if additional bowel medication is needed     MISC  # Fertility preservation - Established with [facility], underwent egg retrieval on 2/18.   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 Her boyfriend is a general surgery resident here at [facility]. Parents live in [place].       Clinically Significant Risk Factors                             # Financial/Environmental Concerns: none           FEN:  - IVF: Per chemotherapy protocol  - Standard lyte replacement   - Regular diet as tolerated     Prophy/Misc:  - GI: PPI  - DVT: Enoxaparin ppx        FEN   - IVF per chemotherapy regimen, IVF bolus prn   - PRN lyte replacement per standing protocol  - Regular diet as tolerated      Lines/Drains:   DVT ppx: Lovenox, hold if Plt <50K   GI ppx: PTA PPI or Pepcid  Consults: TBD    DISPO: Anticipate 5 day stay for scheduled chemotherapy. Tentative discharge 4/29-4/30 pending completion of chemo and barring any clinical complications.   Medically Ready for Discharge: Anticipated in 5+ Days      I spent >50 minutes face-to-face and/or coordinating or discussing care plan. Over 50% of our time on the unit was spent counseling the patient and/or coordinating care    Patient is seen and " examined by attending physician Dr. Vera and JASON Sanchez PA-C   Hematology/Oncology   Pager: #[phone]    ___________________________________________________________________    Interval History   Chart reviewed, no acute events noted overnight. Clementine is feeling well today. She started chemo last evening and no concerns or complaints. She notes her cimetidine was changed to famotidine this admission (for cystitis), likely due to pharmacy substitution - which I will fix today. Denies headache, dizziness, nausea, vomiting, chest pain, palpitations, shortness of breath, abdominal pain. No questions at this time. Father is present at bedside.    Complete and Comprehensive review of systems review and negative other than noted here or in the HPI.       Physical Exam   Temp: 98.2  F (36.8  C) Temp src: Oral BP: 102/53 Pulse: 63   Resp: 18 SpO2: 100 % O2 Device: None (Room air)    Vitals:    04/25/24 1622 04/26/24 0748   Weight: 63.6 kg (140 lb 3.2 oz) 64.2 kg (141 lb 9.6 oz)     Vital Signs with Ranges  Temp:  [97.6  F (36.4  C)-98.2  F (36.8  C)] 98.2  F (36.8  C)  Pulse:  [63-76] 63  Resp:  [18] 18  BP: (102-109)/(52-61) 102/53  SpO2:  [97 %-100 %] 100 %  I/O last 3 completed shifts:  In: 3602.1 [P.O.:720; I.V.:10; IV Piggyback:2872.1]  Out: -     Constitutional: Awake and conversational. Non- toxic appearing. No acute distress.   HEENT: NCAT Moist mucus membranes without lesions, thrush, or exudates appreciated    Respiratory: Breathing comfortably on room air with no accessory muscle use. Speaking in full sentences, no evidence of respiratory distress. Lungs CTAB without stridor, wheeze, rhonchi, or rales.   Cardiovascular: Regular rate and rhythm. No peripheral edema.    GI: Abdomen with normoactive bowel sounds, soft and non-tender throughout. No rebound, guarding, or peritoneal sign.   Skin: Skin is clean, dry, intact. No jaundice or significant rashes appreciated on exposed areas.   Neurologic: Alert and with  normal speech. Grossly nonfocal.  Moves extremities spontaneously.  Memory and thought process preserved.  Neuropsychiatric: Calm, affect congruent to situation. Appropriate mood and affect.     Labs & Studies: I personally reviewed the following studies:  Data   Results for orders placed or performed during the hospital encounter of 04/25/24 (from the past 24 hour(s))   EKG 12-lead, tracing only   Result Value Ref Range    Systolic Blood Pressure  mmHg    Diastolic Blood Pressure  mmHg    Ventricular Rate 70 BPM    Atrial Rate 70 BPM    DE Interval 154 ms    QRS Duration 78 ms     ms    QTc 468 ms    P Axis 48 degrees    R AXIS 69 degrees    T Axis 52 degrees    Interpretation ECG       Sinus rhythm  Cannot rule out Anterior infarct , age undetermined  Abnormal ECG  When compared with ECG of 07-APR-2024 13:03,  Nonspecific T wave abnormality no longer evident in Inferior leads  Nonspecific T wave abnormality, improved in Anterolateral leads  Confirmed by MD KYLE, SHAHID ([id number]) on 4/26/2024 12:14:43 PM     CBC with platelets differential    Narrative    The following orders were created for panel order CBC with platelets differential.  Procedure                               Abnormality         Status                     ---------                               -----------         ------                     CBC with platelets and d...[[id number]]  Abnormal            Final result                 Please view results for these tests on the individual orders.   Comprehensive metabolic panel   Result Value Ref Range    Sodium 139 135 - 145 mmol/L    Potassium 4.4 3.4 - 5.3 mmol/L    Carbon Dioxide (CO2) 24 22 - 29 mmol/L    Anion Gap 8 7 - 15 mmol/L    Urea Nitrogen 11.6 6.0 - 20.0 mg/dL    Creatinine 0.59 0.51 - 0.95 mg/dL    GFR Estimate >90 >60 mL/min/1.73m2    Calcium 9.3 8.6 - 10.0 mg/dL    Chloride 107 98 - 107 mmol/L    Glucose 134 (H) 70 - 99 mg/dL    Alkaline Phosphatase 64 40 - 150 U/L    AST 15 0 - 45  U/L    ALT 14 0 - 50 U/L    Protein Total 6.5 6.4 - 8.3 g/dL    Albumin 4.2 3.5 - 5.2 g/dL    Bilirubin Total <0.2 <=1.2 mg/dL   INR   Result Value Ref Range    INR 1.13 0.85 - 1.15   Fibrinogen activity   Result Value Ref Range    Fibrinogen Activity 263 170 - 490 mg/dL   Uric acid   Result Value Ref Range    Uric Acid 2.5 2.4 - 5.7 mg/dL   Magnesium   Result Value Ref Range    Magnesium 1.8 1.7 - 2.3 mg/dL   Phosphorus   Result Value Ref Range    Phosphorus 2.4 (L) 2.5 - 4.5 mg/dL   Lactate Dehydrogenase   Result Value Ref Range    Lactate Dehydrogenase 208 0 - 250 U/L   Partial thromboplastin time   Result Value Ref Range    aPTT 32 22 - 38 Seconds   CBC with platelets and differential   Result Value Ref Range    WBC Count 13.4 (H) 4.0 - 11.0 10e3/uL    RBC Count 3.68 (L) 3.80 - 5.20 10e6/uL    Hemoglobin 11.3 (L) 11.7 - 15.7 g/dL    Hematocrit 34.7 (L) 35.0 - 47.0 %    MCV 94 78 - 100 fL    MCH 30.7 26.5 - 33.0 pg    MCHC 32.6 31.5 - 36.5 g/dL    RDW 19.9 (H) 10.0 - 15.0 %    Platelet Count 344 150 - 450 10e3/uL    % Neutrophils 95 %    % Lymphocytes 3 %    % Monocytes 1 %    % Eosinophils 0 %    % Basophils 0 %    % Immature Granulocytes 1 %    NRBCs per 100 WBC 0 <1 /100    Absolute Neutrophils 12.8 (H) 1.6 - 8.3 10e3/uL    Absolute Lymphocytes 0.5 (L) 0.8 - 5.3 10e3/uL    Absolute Monocytes 0.1 0.0 - 1.3 10e3/uL    Absolute Eosinophils 0.0 0.0 - 0.7 10e3/uL    Absolute Basophils 0.0 0.0 - 0.2 10e3/uL    Absolute Immature Granulocytes 0.2 <=0.4 10e3/uL    Absolute NRBCs 0.0 10e3/uL           Medications list for reference:    Medications   Current Facility-Administered Medications   Medication Dose Route Frequency Provider Last Rate Last Admin    No rectal suppositories if WBC less than 1000/microliters or platelets less than 50,000/ L   Does not apply Continuous PRN Mary Leary PA-C         Current Facility-Administered Medications   Medication Dose Route Frequency Provider Last Rate Last Admin     acyclovir (ZOVIRAX) tablet 400 mg  400 mg Oral BID Mary Leary PA-C   400 mg at 04/26/24 0848    Chemotherapy Infusing-Continuous Infusion   Does not apply Q8H Vanessa Oakes MD 25.3 mL/hr at 04/26/24 0534 Given at 04/26/24 1217    cimetidine (TAGAMET) tablet 200 mg  200 mg Oral BID Angle Sanchez PA-C   200 mg at 04/26/24 1217    [START ON 4/29/2024] cycloPHOSphamide (CYTOXAN) 2,215 mg in sodium chloride 0.9 % 660.75 mL infusion  1,296 mg/m2 (Treatment Plan Recorded) Intravenous Once Vanessa Oakes MD        [START ON 4/30/2024] dexAMETHasone (DECADRON) tablet 8 mg  8 mg Oral Daily Vanessa Oakes MD        DOXOrubicin (ADRIAMYCIN) 29 mg in sodium chloride 0.9 % 1,064.5 mL infusion   Intravenous Q22H Vanessa Oakes MD 48.4 mL/hr at 04/25/24 2236 New Bag at 04/25/24 2236    enoxaparin ANTICOAGULANT (LOVENOX) injection 40 mg  40 mg Subcutaneous Q24H Mary Leary PA-C   40 mg at 04/25/24 2205    etoposide (TOPOSAR) 145 mg in sodium chloride 0.9 % 557.25 mL infusion  86 mg/m2 (Treatment Plan Recorded) Intravenous Q22H Vanessa Oakes MD 25.3 mL/hr at 04/25/24 2236 145 mg at 04/25/24 2236    [START ON 4/29/2024] fosaprepitant (EMEND) 150 mg in sodium chloride 0.9 % 275 mL intermittent infusion  150 mg Intravenous Once Vanessa Oakes MD        heparin 100 unit/mL injection 5-10 mL  5-10 mL Intracatheter Q28 Days Angle Sanchez PA-C        heparin lock flush 10 UNIT/ML injection 5-10 mL  5-10 mL Intracatheter Q24H Angle Sanchez PA-C        OLANZapine zydis (zyPREXA) ODT tab 5 mg  5 mg Oral At Bedtime Mary Leary PA-C        ondansetron (ZOFRAN) tablet 16 mg  16 mg Oral Q22H Vanessa Oakes MD   16 mg at 04/25/24 2202    pantoprazole (PROTONIX) EC tablet 40 mg  40 mg Oral BID Mary Leary PA-C   40 mg at 04/26/24 0848    polyethylene glycol (MIRALAX) Packet 17 g  17 g Oral BID Angle Sanchez PA-C   17 g at 04/26/24 1217    predniSONE (DELTASONE) tablet 100 mg  100 mg Oral BID Vanessa Oakes MD   100 mg at 04/26/24 1604    senna-docusate  (SENOKOT-S/PERICOLACE) 8.6-50 MG per tablet 2 tablet  2 tablet Oral BID Vanessa Oakes MD   2 tablet at 04/26/24 0848    sodium chloride (PF) 0.9% PF flush 10-20 mL  10-20 mL Intracatheter Q28 Days Angle Sanchez, PA-C

## 2024-04-26 NOTE — PLAN OF CARE
"Goal Outcome Evaluation    [id number]    /53 (BP Location: Right arm)   Pulse 63   Temp 98.2  F (36.8  C) (Oral)   Resp 18   Ht 1.66 m (5' 5.35\")   Wt 64.2 kg (141 lb 9.6 oz)   SpO2 100%   BMI 23.31 kg/m      Reason for admission: Admitted for C4 R-DA-EPOCH   Activity: UAL  Pain: Denies  Neuro: AxOx4. Neuros intact.  Cardiac: WDL  Respiratory: NLB on RA. O2 sats WDL.   GI/: Voiding not saving, reports WDL. LBM 4/25.   Diet: Regular   Lines: PAC intact, infusing CIVI Etop/Dox. Site WDL.   Wounds: No noted deficits.   Labs/imaging: Reviewed. See chart.       Continue to monitor and follow POC      "

## 2024-04-26 NOTE — PROGRESS NOTES
Nursing Focus: Chemotherapy  D: Positive blood return via port . Insertion site is clean/dry/intact, dressing intact with no complaints of pain.  Pre infusion assessment documented in Flowsheet (if applicable).   I: Premedications of tylenol, benadryl, prednisone given per order (see electronic medical administration record). Dose #1 of rituximab started to infuse over 90 minutes. Premedications of zofran given per order (see electronic medical administration record). Dose #1 of doxorubicin started to infuse over 22 hours. Dose #1 of etoposide started to infuse over 22 hours. Reviewed pt teaching on chemotherapy side effects.  Pt denies need for further teaching. Chemotherapy double checked per protocol by two chemotherapy competent RN's.   A: Tolerating infusion well. Denies nausea and or pain.   P: Continue to monitor urine output and symptoms of nausea. Screen for symptoms of toxicity.

## 2024-04-26 NOTE — CONSULTS
Care Management Initial Consult    General Information  Assessment completed with: Patient,    Type of CM/SW Visit: Initial Assessment    Primary Care Provider verified and updated as needed: Yes   Readmission within the last 30 days: planned readmission      Reason for Consult: discharge planning  Advance Care Planning: Advance Care Planning Reviewed: There are no Health Care Directives on file. Social Work offered the patient a blank Health Care Directive. The patient states she is not interested in completing a Health Care Directive at this time.    Communication Assessment  Patient's communication style: spoken language (English or Bilingual)    Hearing Difficulty or Deaf: no   Wear Glasses or Blind: no    Cognitive  Cognitive/Neuro/Behavioral: WDL                      Living Environment:   People in home: significant other (Tobias)     Current living Arrangements: apartment      Able to return to prior arrangements: yes       Family/Social Support: Patient's significant other Tobias Phone Number [phone], Patient's mother John Kathleen Phone Number [phone], Patient's Mother Angle Lugo Phone Number [phone].       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  No    Lifestyle & Psychosocial Needs:  Social Determinants of Health     Food Insecurity: Not on file   Depression: Not at risk (2/6/2024)    PHQ-2     " PHQ-2 Score: 0   Housing Stability: Not on file   Tobacco Use: Low Risk  (4/4/2024)    Patient History     Smoking Tobacco Use: Never     Smokeless Tobacco Use: Never     Passive Exposure: Never   Financial Resource Strain: Not on file   Alcohol Use: Not on file   Transportation Needs: Not on file   Physical Activity: Not on file   Interpersonal Safety: Not on file   Stress: Not on file   Social Connections: Not on file   Health Literacy: Not on file       Functional Status:  Prior to admission patient needed assistance:   Dependent ADLs:: Independent  Dependent IADLs:: Independent       Mental Health Status:  Mental Health Status: No Current Concerns       Chemical Dependency Status:  Chemical Dependency Status: No Current Concerns             Values/Beliefs:  Spiritual, Cultural Beliefs, Spiritism Practices, Values that affect care: no               Additional Information: Per H&P, \"Clementine Kathleen is a 28 year old female with a past medical history including primary mediastinal B-cell lymphoma who is admitted 4/25/2024 for scheduled chemotherapy.\"    Care management team consulted d/t elevated unplanned hospital readmission risk score (HERMINIO). This writer met with patient at bedside to complete the care management assessment. Writer introduced self and the role of the .    Patient stated she currently lives in an apartment with her significant other Tobias.     Social work provided the following support for the patient during this patient interaction: active listening, facilitating a calm, non-judgmental conversation.     Social work received message from Hematology/Oncology Provider on Vocera today. Per Hematology/Oncology Provider, there are no anticipated Care Management Discharge needs at this time.     Social Work and RNCC to continue to follow and assist with discharge planing as needed.     Sandra Shore MSW, LGSW  5A/5C  Covering 5A beds:[id number]  5C beds [id number] (no BMT " pt's)   Unit  Phone Number [phone]  [facility]  Social Work & Care Management Department

## 2024-04-26 NOTE — PLAN OF CARE
Goal Outcome Evaluation:      Plan of Care Reviewed With: patient    Overall Patient Progress: no changeOverall Patient Progress: no change           Status: C4 Day 2  Activity: Independent  Neuro: WDL, AnO x4, afebrile  Cardiac: Soft hypotensive BPs  Respiratory: WDL, on RA  GI/: Voiding without difficulty, last BM 4/24, passing gas  Diet: Regular  Skin: WDL  Lines/Drains: Port cath  Pain/Nausea: Denies

## 2024-04-26 NOTE — PLAN OF CARE
Goal Outcome Evaluation:      Plan of Care Reviewed With: patient      Outcome Evaluation: Social Work and RNCC will continue to follow and assist with discharge planning as needed.    Sandra WHITT, LGSW  5A/5C  Covering 5A beds:[id number]  5C beds [phone] (no BMT pt's)   Unit  Phone Number [phone]  [facility]  Social Work & Care Management Department

## 2024-04-27 LAB
ALBUMIN SERPL BCG-MCNC: 3.7 G/DL (ref 3.5–5.2)
ALP SERPL-CCNC: 58 U/L (ref 40–150)
ALT SERPL W P-5'-P-CCNC: 8 U/L (ref 0–50)
ANION GAP SERPL CALCULATED.3IONS-SCNC: 8 MMOL/L (ref 7–15)
APTT PPP: 29 SECONDS (ref 22–38)
AST SERPL W P-5'-P-CCNC: 13 U/L (ref 0–45)
BASOPHILS # BLD AUTO: 0 10E3/UL (ref 0–0.2)
BASOPHILS NFR BLD AUTO: 0 %
BILIRUB SERPL-MCNC: 0.3 MG/DL
BUN SERPL-MCNC: 11 MG/DL (ref 6–20)
CALCIUM SERPL-MCNC: 9.1 MG/DL (ref 8.6–10)
CHLORIDE SERPL-SCNC: 107 MMOL/L (ref 98–107)
CREAT SERPL-MCNC: 0.65 MG/DL (ref 0.51–0.95)
DEPRECATED HCO3 PLAS-SCNC: 24 MMOL/L (ref 22–29)
EGFRCR SERPLBLD CKD-EPI 2021: >90 ML/MIN/1.73M2
EOSINOPHIL # BLD AUTO: 0 10E3/UL (ref 0–0.7)
EOSINOPHIL NFR BLD AUTO: 0 %
ERYTHROCYTE [DISTWIDTH] IN BLOOD BY AUTOMATED COUNT: 20 % (ref 10–15)
FIBRINOGEN PPP-MCNC: 209 MG/DL (ref 170–490)
GLUCOSE SERPL-MCNC: 113 MG/DL (ref 70–99)
HCT VFR BLD AUTO: 30.6 % (ref 35–47)
HGB BLD-MCNC: 10.3 G/DL (ref 11.7–15.7)
IMM GRANULOCYTES # BLD: 0.3 10E3/UL
IMM GRANULOCYTES NFR BLD: 2 %
INR PPP: 1.21 (ref 0.85–1.15)
LDH SERPL L TO P-CCNC: 181 U/L (ref 0–250)
LYMPHOCYTES # BLD AUTO: 0.7 10E3/UL (ref 0.8–5.3)
LYMPHOCYTES NFR BLD AUTO: 5 %
MAGNESIUM SERPL-MCNC: 1.8 MG/DL (ref 1.7–2.3)
MCH RBC QN AUTO: 31.6 PG (ref 26.5–33)
MCHC RBC AUTO-ENTMCNC: 33.7 G/DL (ref 31.5–36.5)
MCV RBC AUTO: 94 FL (ref 78–100)
MONOCYTES # BLD AUTO: 1.1 10E3/UL (ref 0–1.3)
MONOCYTES NFR BLD AUTO: 7 %
NEUTROPHILS # BLD AUTO: 13.2 10E3/UL (ref 1.6–8.3)
NEUTROPHILS NFR BLD AUTO: 86 %
NRBC # BLD AUTO: 0 10E3/UL
NRBC BLD AUTO-RTO: 0 /100
PHOSPHATE SERPL-MCNC: 4.5 MG/DL (ref 2.5–4.5)
PLATELET # BLD AUTO: 328 10E3/UL (ref 150–450)
POTASSIUM SERPL-SCNC: 3.9 MMOL/L (ref 3.4–5.3)
PROT SERPL-MCNC: 5.6 G/DL (ref 6.4–8.3)
RBC # BLD AUTO: 3.26 10E6/UL (ref 3.8–5.2)
SODIUM SERPL-SCNC: 139 MMOL/L (ref 135–145)
URATE SERPL-MCNC: 1.8 MG/DL (ref 2.4–5.7)
WBC # BLD AUTO: 15.2 10E3/UL (ref 4–11)

## 2024-04-27 PROCEDURE — 250N000011 HC RX IP 250 OP 636: Performed by: INTERNAL MEDICINE

## 2024-04-27 PROCEDURE — 250N000013 HC RX MED GY IP 250 OP 250 PS 637: Performed by: PHYSICIAN ASSISTANT

## 2024-04-27 PROCEDURE — 258N000003 HC RX IP 258 OP 636: Performed by: INTERNAL MEDICINE

## 2024-04-27 PROCEDURE — 84550 ASSAY OF BLOOD/URIC ACID: CPT

## 2024-04-27 PROCEDURE — 250N000011 HC RX IP 250 OP 636

## 2024-04-27 PROCEDURE — 99233 SBSQ HOSP IP/OBS HIGH 50: CPT | Performed by: PHYSICIAN ASSISTANT

## 2024-04-27 PROCEDURE — 85730 THROMBOPLASTIN TIME PARTIAL: CPT

## 2024-04-27 PROCEDURE — 83735 ASSAY OF MAGNESIUM: CPT | Performed by: PHYSICIAN ASSISTANT

## 2024-04-27 PROCEDURE — 250N000013 HC RX MED GY IP 250 OP 250 PS 637: Performed by: INTERNAL MEDICINE

## 2024-04-27 PROCEDURE — 85384 FIBRINOGEN ACTIVITY: CPT

## 2024-04-27 PROCEDURE — 82247 BILIRUBIN TOTAL: CPT

## 2024-04-27 PROCEDURE — 120N000002 HC R&B MED SURG/OB UMMC

## 2024-04-27 PROCEDURE — 84100 ASSAY OF PHOSPHORUS: CPT | Performed by: PHYSICIAN ASSISTANT

## 2024-04-27 PROCEDURE — 85025 COMPLETE CBC W/AUTO DIFF WBC: CPT

## 2024-04-27 PROCEDURE — 85610 PROTHROMBIN TIME: CPT

## 2024-04-27 PROCEDURE — 83615 LACTATE (LD) (LDH) ENZYME: CPT

## 2024-04-27 PROCEDURE — 250N000013 HC RX MED GY IP 250 OP 250 PS 637

## 2024-04-27 PROCEDURE — 250N000012 HC RX MED GY IP 250 OP 636 PS 637: Performed by: INTERNAL MEDICINE

## 2024-04-27 RX ADMIN — CIMETIDINE 200 MG: 200 TABLET, FILM COATED ORAL at 09:35

## 2024-04-27 RX ADMIN — PANTOPRAZOLE SODIUM 40 MG: 40 TABLET, DELAYED RELEASE ORAL at 20:54

## 2024-04-27 RX ADMIN — POLYETHYLENE GLYCOL 3350 17 G: 17 POWDER, FOR SOLUTION ORAL at 20:54

## 2024-04-27 RX ADMIN — PANTOPRAZOLE SODIUM 40 MG: 40 TABLET, DELAYED RELEASE ORAL at 09:35

## 2024-04-27 RX ADMIN — PROCHLORPERAZINE MALEATE 10 MG: 5 TABLET ORAL at 08:44

## 2024-04-27 RX ADMIN — ACYCLOVIR 400 MG: 400 TABLET ORAL at 09:35

## 2024-04-27 RX ADMIN — CIMETIDINE 200 MG: 200 TABLET, FILM COATED ORAL at 20:54

## 2024-04-27 RX ADMIN — PROCHLORPERAZINE MALEATE 10 MG: 5 TABLET ORAL at 16:02

## 2024-04-27 RX ADMIN — SODIUM CHLORIDE: 9 INJECTION, SOLUTION INTRAVENOUS at 17:23

## 2024-04-27 RX ADMIN — ENOXAPARIN SODIUM 40 MG: 40 INJECTION SUBCUTANEOUS at 20:54

## 2024-04-27 RX ADMIN — DOCUSATE SODIUM 50 MG AND SENNOSIDES 8.6 MG 2 TABLET: 8.6; 5 TABLET, FILM COATED ORAL at 20:54

## 2024-04-27 RX ADMIN — DOCUSATE SODIUM 50 MG AND SENNOSIDES 8.6 MG 1 TABLET: 8.6; 5 TABLET, FILM COATED ORAL at 09:35

## 2024-04-27 RX ADMIN — PREDNISONE 100 MG: 50 TABLET ORAL at 09:35

## 2024-04-27 RX ADMIN — OLANZAPINE 5 MG: 5 TABLET, ORALLY DISINTEGRATING ORAL at 22:38

## 2024-04-27 RX ADMIN — ONDANSETRON HYDROCHLORIDE 16 MG: 8 TABLET, FILM COATED ORAL at 17:25

## 2024-04-27 RX ADMIN — ACYCLOVIR 400 MG: 400 TABLET ORAL at 20:54

## 2024-04-27 RX ADMIN — POLYETHYLENE GLYCOL 3350 17 G: 17 POWDER, FOR SOLUTION ORAL at 09:36

## 2024-04-27 RX ADMIN — PREDNISONE 100 MG: 50 TABLET ORAL at 16:02

## 2024-04-27 RX ADMIN — ETOPOSIDE 145 MG: 20 INJECTION, SOLUTION INTRAVENOUS at 17:23

## 2024-04-27 ASSESSMENT — ACTIVITIES OF DAILY LIVING (ADL)
ADLS_ACUITY_SCORE: 18

## 2024-04-27 NOTE — PROGRESS NOTES
Nursing Focus: Chemotherapy  D: Positive blood return via port. Insertion site is clean/dry/intact, dressing intact with no complaints of pain.  Urine output is recorded in intake in Doc Flowsheet.    I: Premedications given per order (see electronic medical administration record). Dose #3 of Etoposide started to infuse over 22 hours. Reviewed pt teaching on chemotherapy side effects.  Pt denies need for further teaching. Chemotherapy double checked per protocol by two chemotherapy competent RN's.   A: Tolerating procedure well. Denies nausea and or pain.   P: Continue to monitor urine output and symptoms of nausea. Screen for symptoms of toxicity.      Nursing Focus: Chemotherapy  D: Positive blood return via port. Insertion site is clean/dry/intact, dressing intact with no complaints of pain.  Urine output is recorded in intake in Doc Flowsheet.    I: Premedications given per order (see electronic medical administration record). Dose #3 of Doxorubicin started to infuse over 22  hours. Reviewed pt teaching on chemotherapy side effects.  Pt denies need for further teaching. Chemotherapy double checked per protocol by two chemotherapy competent RN's.   A: Tolerating procedure well. Denies nausea and or pain.   P: Continue to monitor urine output and symptoms of nausea. Screen for symptoms of toxicity.

## 2024-04-27 NOTE — PROGRESS NOTES
[facility]    Hematology / Oncology Progress Note    Patient: Clementine Kathleen  MRN: [id number]  Admission Date: 4/25/2024  Hospital Day # 2    Date of Service (when I saw the patient): 04/27/2024     Assessment & Plan   Clementine Kathleen is a 28 year old female with a past medical history including primary mediastinal B-cell lymphoma who is admitted 4/25/2024 for scheduled chemotherapy with dose adjusted R-EPOCH (C4D1= 4/25/2024).      Today:  - C4D3 of DA-R-EPOCH; tolerating well thus far. Continue to monitor for chemo related side effects.  - Nausea noted this morning, improved with compazine. Continue with antiemetics  - Continue best supportive cares    HEME  # Primary mediastinal B-cell lymphoma  Patient of Dr. Oakes. Initially presented with cervical lymphadenopathy 12/2023. Initial FNA (1/29/24) suggestive of possible Hodgkin lymphoma; however excisional LN biopsy (2/9/24) shows large B-cell lymphoma with differential diagnosis of primary mediastinal large B-cell lymphoma vs DLBCL NOS. PET with cervical and mediastinal lymphadenopathy only. Overall presentation would be quite consistent with PMBCL given her age, gender, sites of involvement, dim CD30 expression, and prominent fibrotic background.  Although CD23 IHC was negative, IHC for , MAL, and PD-L1 were positive in majority of the neoplastic cells suggesting PMBCL over DLBCL NOS. Qorqm1QS gene expression profile also consistent with PMBCL. FISH with gain of BCL2 but no MYC, BCL2, or BCL6 rearrangements.  PET showed bilateral cervical and mediastinal hypermetabolic lymphadenopathy (largest 4.4 cm with SUVmax 27 in a prevascular LN); no disease below diaphragm. Most recent PET 4/1/24 with evidence of complete response. Seen by Dr. Oakes in clinic on 4/25/2024 and plan to proceed with admission for C4 at dose level 4 and hold vincristine due to neuropathy (see more below), with a planned total of 6 cycles. Next PET  at [facility] given CR already.   - Port accessed on admission   - Most recent echo 2/19 with LVEF 60%, no significant valvular abnormalities present     Treatment Plan: Dose adjusted R-EPOCH (C4D1=4/25/2024)   - Rituximab 375 mg/m2 (600mg) IV x 1 dose - D1  - Prednisone 100 mg BID x 10 doses - D1-5  - Etoposide 86mg/m2 (125 mg) IV x 4 doses - D1-4  - Doxorubicin (29 mg/m2) x 4 doses CIVI- D1-4  - Cyclophosphamide 1296 mg/m2 (2215 mg) IV x 1 dose - D5  - Pegfilgrastim x1 - D6 (to be scheduled in clinic)  - Pre-meds: tylenol, benadryl, zofran, emend, dexamethasone (D6-7)     # Risk of TLS  - Encourage good PO hydration. Allopurinol no longer indicated as pt in CR.      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 Secondary to chemotherapy. Respectfully declined need for medication management at this time. Vincristine was capped at 2mg for C3. S/p C3 and neuropathy in fingertips have worsened with some difficulty using her phone or buttoning shirts and now neuropathy in bilateral feet that does not impact balance of walking. Seems to be improving just prior to admission for C4 per patient report  - Per Dr. Oakes, omitting vincristine for C4  - Monitor closely     # ID PPx  - Acyclovir 400 mg BID  - Levofloxacin 250 mg daily and fluconazole 200 mg daily when ANC <1  - Pentamidine monthly for PJP ppx, due 5/1/24     # History of CINV   Chemotherapy induced nausea and vomiting during previous cycles, managed with prn zofran, compazine, scheduled emend, and zyprexa at bedtime. Symptoms improved within a few days of hospital discharge from cycle 2.  - Antiemetics per chemo regimen: ondansetron 16 mg daily (D1-5), fosaprepitant D5  - Continue prn zofran and compazine as indicated and Zyprexa at bedtime.  - If CINV is persistent can consider Aloxi or PRN ativan.      # History of chemotherapy related rash  Maculopapular light pink, scattered, non-pruritic rash noted during cycle 2 at base of skull. Similar  "episode with C1 with rash to right side of neck and right arm. Managed with home cortisone cream.   - Monitor for rashes with chemotherapy     # Palpitations/tachycardia  Noted as heart \"racing/pounding\" beginning after cycle 2 of chemo. No associated chest pain, shortness of breath, lower extremity pain/swelling, abdominal pain/nausea, dizziness. Echo from 2/2024 unremarkable. 3/25/24 NT proBNP normal at <36. Ongoing symptoms following C3, with more exertional SOB. CT PE 4/15 unremarkable; ziopatch ordered 4/15 however not yet placed.  - No significant palpitations after cycle 3  - Will consider ziopatch placement at discharge     # Constipation  Regular on bowel regimen miralax and senna BID. Continue while inpatient. Consider PRN Reglan if additional bowel medication is needed     MISC  # Fertility preservation - Established with [facility], underwent egg retrieval on 2/18.   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 Her boyfriend is a general surgery resident here at [facility]. Parents live in [place].       Clinically Significant Risk Factors               # Coagulation Defect: INR = 1.21 (Ref range: 0.85 - 1.15) and/or PTT = 29 Seconds (Ref range: 22 - 38 Seconds), will monitor for bleeding               # Financial/Environmental Concerns: none           FEN:  - IVF: Per chemotherapy protocol  - Standard lyte replacement   - Regular diet as tolerated     Prophy/Misc:  - GI: PPI  - DVT: Enoxaparin ppx        FEN   - IVF per chemotherapy regimen, IVF bolus prn   - PRN lyte replacement per standing protocol  - Regular diet as tolerated      Lines/Drains:   DVT ppx: Lovenox, hold if Plt <50K   GI ppx: PTA PPI or Pepcid  Consults: TBD    DISPO: Anticipate 5 day stay for scheduled chemotherapy. Tentative discharge 4/29-4/30 pending completion of chemo and barring any clinical complications.   Medically Ready for Discharge: " Anticipated in 5+ Days      I spent >30 minutes face-to-face and/or coordinating or discussing care plan. Over 50% of our time on the unit was spent counseling the patient and/or coordinating care    Patient is seen and examined by attending physician Dr. Vera and JASON Quiroz PA-C   Hematology/Oncology   Pager: #[phone]    ___________________________________________________________________    Interval History   Chart reviewed, no acute events noted overnight.  Clementine is feeling ok at time of visit. Struggled with nausea this am but improved with compazine. No other new symptoms or concerns.   Appetite intact. Energy ok.   Denies headache, dizziness, nausea, vomiting, chest pain, palpitations, shortness of breath, abdominal pain.  All questions answered at bedside.     Complete and Comprehensive review of systems review and negative other than noted here or in the HPI.       Physical Exam   Temp: 98  F (36.7  C) Temp src: Oral BP: 110/65 Pulse: 70   Resp: 16 SpO2: 96 % O2 Device: None (Room air)    Vitals:    04/25/24 1622 04/26/24 0748 04/27/24 0750   Weight: 63.6 kg (140 lb 3.2 oz) 64.2 kg (141 lb 9.6 oz) 64.3 kg (141 lb 11.2 oz)     Vital Signs with Ranges  Temp:  [98  F (36.7  C)-98.2  F (36.8  C)] 98  F (36.7  C)  Pulse:  [63-78] 70  Resp:  [16-18] 16  BP: (102-115)/(49-68) 110/65  SpO2:  [96 %-100 %] 96 %  I/O last 3 completed shifts:  In: 3088.6 [P.O.:1320; IV Piggyback:1768.6]  Out: -     Constitutional: Awake and conversational. Non- toxic appearing. No acute distress.   HEENT: NCAT Moist mucus membranes without lesions, thrush, or exudates appreciated    Respiratory: Breathing comfortably on room air with no accessory muscle use. Speaking in full sentences, no evidence of respiratory distress. Lungs CTAB without stridor, wheeze, rhonchi, or rales.   Cardiovascular: Regular rate and rhythm. No peripheral edema.    GI: Abdomen with normoactive bowel sounds, soft and non-tender throughout. No  rebound, guarding, or peritoneal sign.   Skin: Skin is clean, dry, intact. No jaundice or significant rashes appreciated on exposed areas.   Neurologic: Alert and with normal speech. Grossly nonfocal.  Moves extremities spontaneously.  Memory and thought process preserved.  Neuropsychiatric: Calm, affect congruent to situation. Appropriate mood and affect.     Labs & Studies: I personally reviewed the following studies:  Data   Results for orders placed or performed during the hospital encounter of 04/25/24 (from the past 24 hour(s))   CBC with platelets differential    Narrative    The following orders were created for panel order CBC with platelets differential.  Procedure                               Abnormality         Status                     ---------                               -----------         ------                     CBC with platelets and d...[[id number]]  Abnormal            Final result                 Please view results for these tests on the individual orders.   Comprehensive metabolic panel   Result Value Ref Range    Sodium 139 135 - 145 mmol/L    Potassium 3.9 3.4 - 5.3 mmol/L    Carbon Dioxide (CO2) 24 22 - 29 mmol/L    Anion Gap 8 7 - 15 mmol/L    Urea Nitrogen 11.0 6.0 - 20.0 mg/dL    Creatinine 0.65 0.51 - 0.95 mg/dL    GFR Estimate >90 >60 mL/min/1.73m2    Calcium 9.1 8.6 - 10.0 mg/dL    Chloride 107 98 - 107 mmol/L    Glucose 113 (H) 70 - 99 mg/dL    Alkaline Phosphatase 58 40 - 150 U/L    AST 13 0 - 45 U/L    ALT 8 0 - 50 U/L    Protein Total 5.6 (L) 6.4 - 8.3 g/dL    Albumin 3.7 3.5 - 5.2 g/dL    Bilirubin Total 0.3 <=1.2 mg/dL   INR   Result Value Ref Range    INR 1.21 (H) 0.85 - 1.15   Fibrinogen activity   Result Value Ref Range    Fibrinogen Activity 209 170 - 490 mg/dL   Uric acid   Result Value Ref Range    Uric Acid 1.8 (L) 2.4 - 5.7 mg/dL   Magnesium   Result Value Ref Range    Magnesium 1.8 1.7 - 2.3 mg/dL   Phosphorus   Result Value Ref Range    Phosphorus 4.5 2.5 - 4.5  mg/dL   Lactate Dehydrogenase   Result Value Ref Range    Lactate Dehydrogenase 181 0 - 250 U/L   Partial thromboplastin time   Result Value Ref Range    aPTT 29 22 - 38 Seconds   CBC with platelets and differential   Result Value Ref Range    WBC Count 15.2 (H) 4.0 - 11.0 10e3/uL    RBC Count 3.26 (L) 3.80 - 5.20 10e6/uL    Hemoglobin 10.3 (L) 11.7 - 15.7 g/dL    Hematocrit 30.6 (L) 35.0 - 47.0 %    MCV 94 78 - 100 fL    MCH 31.6 26.5 - 33.0 pg    MCHC 33.7 31.5 - 36.5 g/dL    RDW 20.0 (H) 10.0 - 15.0 %    Platelet Count 328 150 - 450 10e3/uL    % Neutrophils 86 %    % Lymphocytes 5 %    % Monocytes 7 %    % Eosinophils 0 %    % Basophils 0 %    % Immature Granulocytes 2 %    NRBCs per 100 WBC 0 <1 /100    Absolute Neutrophils 13.2 (H) 1.6 - 8.3 10e3/uL    Absolute Lymphocytes 0.7 (L) 0.8 - 5.3 10e3/uL    Absolute Monocytes 1.1 0.0 - 1.3 10e3/uL    Absolute Eosinophils 0.0 0.0 - 0.7 10e3/uL    Absolute Basophils 0.0 0.0 - 0.2 10e3/uL    Absolute Immature Granulocytes 0.3 <=0.4 10e3/uL    Absolute NRBCs 0.0 10e3/uL           Medications list for reference:    Medications   Current Facility-Administered Medications   Medication Dose Route Frequency Provider Last Rate Last Admin    No rectal suppositories if WBC less than 1000/microliters or platelets less than 50,000/ L   Does not apply Continuous PRN Mary Leary PA-C         Current Facility-Administered Medications   Medication Dose Route Frequency Provider Last Rate Last Admin    acyclovir (ZOVIRAX) tablet 400 mg  400 mg Oral BID Mary Leary PA-C   400 mg at 04/27/24 0935    Chemotherapy Infusing-Continuous Infusion   Does not apply Q8H Vanessa Oakes MD 25.3 mL/hr at 04/27/24 0525 Given at 04/27/24 0525    cimetidine (TAGAMET) tablet 200 mg  200 mg Oral BID Angle Sanchez PA-C   200 mg at 04/27/24 0935    [START ON 4/29/2024] cycloPHOSphamide (CYTOXAN) 2,215 mg in sodium chloride 0.9 % 660.75 mL infusion  1,296 mg/m2 (Treatment Plan Recorded) Intravenous Once  Vanessa Oakes MD        [START ON 4/30/2024] dexAMETHasone (DECADRON) tablet 8 mg  8 mg Oral Daily Vanessa Oakes MD        DOXOrubicin (ADRIAMYCIN) 29 mg in sodium chloride 0.9 % 1,064.5 mL infusion   Intravenous Q22H Vanessa Oakes MD 48.4 mL/hr at 04/26/24 1944 Rate Verify at 04/27/24 0846    enoxaparin ANTICOAGULANT (LOVENOX) injection 40 mg  40 mg Subcutaneous Q24H Mary Leary PA-C   40 mg at 04/26/24 2007    etoposide (TOPOSAR) 145 mg in sodium chloride 0.9 % 557.25 mL infusion  86 mg/m2 (Treatment Plan Recorded) Intravenous Q22H Vanessa Oakes MD 25.3 mL/hr at 04/27/24 0846 Rate Verify at 04/27/24 0846    [START ON 4/29/2024] fosaprepitant (EMEND) 150 mg in sodium chloride 0.9 % 275 mL intermittent infusion  150 mg Intravenous Once Vanessa Oakes MD        heparin 100 unit/mL injection 5-10 mL  5-10 mL Intracatheter Q28 Days Angle Sanchez PA-C        heparin lock flush 10 UNIT/ML injection 5-10 mL  5-10 mL Intracatheter Q24H Angle Sanchez PA-C        OLANZapine zydis (zyPREXA) ODT tab 5 mg  5 mg Oral At Bedtime Mary Leary PA-C        ondansetron (ZOFRAN) tablet 16 mg  16 mg Oral Q22H Vanessa Oakes MD   16 mg at 04/26/24 2007    pantoprazole (PROTONIX) EC tablet 40 mg  40 mg Oral BID Mary Leary PA-C   40 mg at 04/27/24 0935    polyethylene glycol (MIRALAX) Packet 17 g  17 g Oral BID Angle Sanchez PA-C   17 g at 04/27/24 0936    predniSONE (DELTASONE) tablet 100 mg  100 mg Oral BID Vanessa Oakes MD   100 mg at 04/27/24 0935    senna-docusate (SENOKOT-S/PERICOLACE) 8.6-50 MG per tablet 2 tablet  2 tablet Oral BID Vanessa Oakes MD   1 tablet at 04/27/24 0935    sodium chloride (PF) 0.9% PF flush 10-20 mL  10-20 mL Intracatheter Q28 Days Angle Sanchez, SUMIT

## 2024-04-27 NOTE — PLAN OF CARE
Goal Outcome Evaluation:      Plan of Care Reviewed With: patient    Overall Patient Progress: improving    C4 D2  R-DA-EPOCH, planning for 5 day stay.    Alert, oriented, AVSS, up independently.  Dox and Etop infusing into port.  Denies pain. Nausea managed with scheduled Zofran, declined Zyprexa.    Tolerating regular diet.  Voiding, passing gas and stool.

## 2024-04-27 NOTE — PROGRESS NOTES
Nursing Focus: Chemotherapy    D: Positive blood return via port . Insertion site is clean/dry/intact, dressing intact with no complaints of pain.  Pre infusion assessment documented in Flowsheet (if applicable).    I: Premedications given per order (see electronic medical administration record). Dose #2 of doxorubicin, etoposide started to infuse over 22 hours. Reviewed pt teaching on chemotherapy side effects.  Pt denies need for further teaching. Chemotherapy double checked per protocol by two chemotherapy competent RN's.   A: Tolerating infusion well. Denies nausea and or pain.   P: Continue to monitor urine output and symptoms of nausea. Screen for symptoms of toxicity.

## 2024-04-27 NOTE — PLAN OF CARE
Goal Outcome Evaluation:  Clementine is C4 D2 Dox and Etoposide running CIVI via IVAD with + blood return.  Some nausea this am relieved by po Compazine.  Vincristine omitted this cycle due to neuropathy in fingertips.  UAL in room and family here.

## 2024-04-28 ENCOUNTER — ORDERS ONLY (AUTO-RELEASED) (OUTPATIENT)
Dept: ONCOLOGY | Facility: CLINIC | Age: 29
End: 2024-04-28
Payer: COMMERCIAL

## 2024-04-28 DIAGNOSIS — R00.2 PALPITATIONS: ICD-10-CM

## 2024-04-28 LAB
ABO/RH(D): NORMAL
ALBUMIN SERPL BCG-MCNC: 3.9 G/DL (ref 3.5–5.2)
ALP SERPL-CCNC: 51 U/L (ref 40–150)
ALT SERPL W P-5'-P-CCNC: 10 U/L (ref 0–50)
ANION GAP SERPL CALCULATED.3IONS-SCNC: 8 MMOL/L (ref 7–15)
ANTIBODY SCREEN: NEGATIVE
APTT PPP: 29 SECONDS (ref 22–38)
AST SERPL W P-5'-P-CCNC: 11 U/L (ref 0–45)
BASOPHILS # BLD AUTO: 0 10E3/UL (ref 0–0.2)
BASOPHILS NFR BLD AUTO: 0 %
BILIRUB SERPL-MCNC: 0.3 MG/DL
BUN SERPL-MCNC: 11.5 MG/DL (ref 6–20)
CALCIUM SERPL-MCNC: 9.2 MG/DL (ref 8.6–10)
CHLORIDE SERPL-SCNC: 106 MMOL/L (ref 98–107)
CREAT SERPL-MCNC: 0.62 MG/DL (ref 0.51–0.95)
DEPRECATED HCO3 PLAS-SCNC: 25 MMOL/L (ref 22–29)
EGFRCR SERPLBLD CKD-EPI 2021: >90 ML/MIN/1.73M2
EOSINOPHIL # BLD AUTO: 0 10E3/UL (ref 0–0.7)
EOSINOPHIL NFR BLD AUTO: 0 %
ERYTHROCYTE [DISTWIDTH] IN BLOOD BY AUTOMATED COUNT: 19.9 % (ref 10–15)
FIBRINOGEN PPP-MCNC: 184 MG/DL (ref 170–490)
GLUCOSE SERPL-MCNC: 112 MG/DL (ref 70–99)
HCT VFR BLD AUTO: 30.1 % (ref 35–47)
HGB BLD-MCNC: 9.9 G/DL (ref 11.7–15.7)
IMM GRANULOCYTES # BLD: 0.1 10E3/UL
IMM GRANULOCYTES NFR BLD: 1 %
INR PPP: 1.05 (ref 0.85–1.15)
LDH SERPL L TO P-CCNC: 160 U/L (ref 0–250)
LYMPHOCYTES # BLD AUTO: 0.5 10E3/UL (ref 0.8–5.3)
LYMPHOCYTES NFR BLD AUTO: 8 %
MAGNESIUM SERPL-MCNC: 1.9 MG/DL (ref 1.7–2.3)
MCH RBC QN AUTO: 30.8 PG (ref 26.5–33)
MCHC RBC AUTO-ENTMCNC: 32.9 G/DL (ref 31.5–36.5)
MCV RBC AUTO: 94 FL (ref 78–100)
MONOCYTES # BLD AUTO: 0.8 10E3/UL (ref 0–1.3)
MONOCYTES NFR BLD AUTO: 12 %
NEUTROPHILS # BLD AUTO: 5.3 10E3/UL (ref 1.6–8.3)
NEUTROPHILS NFR BLD AUTO: 79 %
NRBC # BLD AUTO: 0 10E3/UL
NRBC BLD AUTO-RTO: 0 /100
PHOSPHATE SERPL-MCNC: 4.4 MG/DL (ref 2.5–4.5)
PLATELET # BLD AUTO: 318 10E3/UL (ref 150–450)
POTASSIUM SERPL-SCNC: 4 MMOL/L (ref 3.4–5.3)
PROT SERPL-MCNC: 5.6 G/DL (ref 6.4–8.3)
RBC # BLD AUTO: 3.21 10E6/UL (ref 3.8–5.2)
SODIUM SERPL-SCNC: 139 MMOL/L (ref 135–145)
SPECIMEN EXPIRATION DATE: NORMAL
URATE SERPL-MCNC: 1.9 MG/DL (ref 2.4–5.7)
WBC # BLD AUTO: 6.6 10E3/UL (ref 4–11)

## 2024-04-28 PROCEDURE — 250N000013 HC RX MED GY IP 250 OP 250 PS 637

## 2024-04-28 PROCEDURE — 85610 PROTHROMBIN TIME: CPT

## 2024-04-28 PROCEDURE — 85384 FIBRINOGEN ACTIVITY: CPT

## 2024-04-28 PROCEDURE — 120N000002 HC R&B MED SURG/OB UMMC

## 2024-04-28 PROCEDURE — 85730 THROMBOPLASTIN TIME PARTIAL: CPT

## 2024-04-28 PROCEDURE — 83735 ASSAY OF MAGNESIUM: CPT | Performed by: PHYSICIAN ASSISTANT

## 2024-04-28 PROCEDURE — 250N000013 HC RX MED GY IP 250 OP 250 PS 637: Performed by: INTERNAL MEDICINE

## 2024-04-28 PROCEDURE — 250N000011 HC RX IP 250 OP 636: Mod: JZ | Performed by: INTERNAL MEDICINE

## 2024-04-28 PROCEDURE — 83615 LACTATE (LD) (LDH) ENZYME: CPT

## 2024-04-28 PROCEDURE — 250N000013 HC RX MED GY IP 250 OP 250 PS 637: Performed by: PHYSICIAN ASSISTANT

## 2024-04-28 PROCEDURE — 86900 BLOOD TYPING SEROLOGIC ABO: CPT

## 2024-04-28 PROCEDURE — 84550 ASSAY OF BLOOD/URIC ACID: CPT

## 2024-04-28 PROCEDURE — 250N000012 HC RX MED GY IP 250 OP 636 PS 637: Performed by: INTERNAL MEDICINE

## 2024-04-28 PROCEDURE — 85025 COMPLETE CBC W/AUTO DIFF WBC: CPT

## 2024-04-28 PROCEDURE — 99233 SBSQ HOSP IP/OBS HIGH 50: CPT | Performed by: PHYSICIAN ASSISTANT

## 2024-04-28 PROCEDURE — 84100 ASSAY OF PHOSPHORUS: CPT | Performed by: PHYSICIAN ASSISTANT

## 2024-04-28 PROCEDURE — 250N000011 HC RX IP 250 OP 636

## 2024-04-28 PROCEDURE — 258N000003 HC RX IP 258 OP 636: Performed by: INTERNAL MEDICINE

## 2024-04-28 PROCEDURE — 80053 COMPREHEN METABOLIC PANEL: CPT

## 2024-04-28 RX ORDER — LORAZEPAM 0.5 MG/1
0.5 TABLET ORAL EVERY 6 HOURS PRN
Qty: 30 TABLET | Refills: 0 | Status: SHIPPED | OUTPATIENT
Start: 2024-04-28

## 2024-04-28 RX ORDER — POLYETHYLENE GLYCOL 3350 17 G/17G
17 POWDER, FOR SOLUTION ORAL 2 TIMES DAILY
COMMUNITY
Start: 2024-04-28

## 2024-04-28 RX ORDER — DEXAMETHASONE 4 MG/1
8 TABLET ORAL DAILY
Qty: 4 TABLET | Refills: 0 | Status: ON HOLD | OUTPATIENT
Start: 2024-04-30 | End: 2024-05-17

## 2024-04-28 RX ORDER — AMOXICILLIN 250 MG
1 CAPSULE ORAL 2 TIMES DAILY
Status: ON HOLD | COMMUNITY
Start: 2024-04-28 | End: 2024-05-20

## 2024-04-28 RX ORDER — PREDNISONE 50 MG/1
100 TABLET ORAL 2 TIMES DAILY
Qty: 4 TABLET | Refills: 0 | Status: ON HOLD | OUTPATIENT
Start: 2024-04-28 | End: 2024-05-17

## 2024-04-28 RX ADMIN — ONDANSETRON HYDROCHLORIDE 16 MG: 8 TABLET, FILM COATED ORAL at 15:42

## 2024-04-28 RX ADMIN — LORAZEPAM 0.5 MG: 0.5 TABLET ORAL at 20:09

## 2024-04-28 RX ADMIN — OLANZAPINE 5 MG: 5 TABLET, ORALLY DISINTEGRATING ORAL at 22:28

## 2024-04-28 RX ADMIN — PANTOPRAZOLE SODIUM 40 MG: 40 TABLET, DELAYED RELEASE ORAL at 08:46

## 2024-04-28 RX ADMIN — POLYETHYLENE GLYCOL 3350 17 G: 17 POWDER, FOR SOLUTION ORAL at 08:46

## 2024-04-28 RX ADMIN — PREDNISONE 100 MG: 50 TABLET ORAL at 08:46

## 2024-04-28 RX ADMIN — ACYCLOVIR 400 MG: 400 TABLET ORAL at 20:08

## 2024-04-28 RX ADMIN — PREDNISONE 100 MG: 50 TABLET ORAL at 15:42

## 2024-04-28 RX ADMIN — DOCUSATE SODIUM 50 MG AND SENNOSIDES 8.6 MG 1 TABLET: 8.6; 5 TABLET, FILM COATED ORAL at 08:46

## 2024-04-28 RX ADMIN — DOCUSATE SODIUM 50 MG AND SENNOSIDES 8.6 MG 2 TABLET: 8.6; 5 TABLET, FILM COATED ORAL at 20:11

## 2024-04-28 RX ADMIN — ENOXAPARIN SODIUM 40 MG: 40 INJECTION SUBCUTANEOUS at 20:08

## 2024-04-28 RX ADMIN — ACYCLOVIR 400 MG: 400 TABLET ORAL at 08:46

## 2024-04-28 RX ADMIN — CIMETIDINE 200 MG: 200 TABLET, FILM COATED ORAL at 20:08

## 2024-04-28 RX ADMIN — PANTOPRAZOLE SODIUM 40 MG: 40 TABLET, DELAYED RELEASE ORAL at 20:11

## 2024-04-28 RX ADMIN — LORAZEPAM 0.5 MG: 0.5 TABLET ORAL at 10:05

## 2024-04-28 RX ADMIN — POLYETHYLENE GLYCOL 3350 17 G: 17 POWDER, FOR SOLUTION ORAL at 20:08

## 2024-04-28 RX ADMIN — ETOPOSIDE 145 MG: 20 INJECTION, SOLUTION INTRAVENOUS at 14:18

## 2024-04-28 RX ADMIN — SODIUM CHLORIDE: 9 INJECTION, SOLUTION INTRAVENOUS at 14:21

## 2024-04-28 RX ADMIN — CIMETIDINE 200 MG: 200 TABLET, FILM COATED ORAL at 08:46

## 2024-04-28 ASSESSMENT — ACTIVITIES OF DAILY LIVING (ADL)
ADLS_ACUITY_SCORE: 18

## 2024-04-28 NOTE — PROGRESS NOTES
[facility]    Hematology / Oncology Progress Note    Patient: Clementine Kathleen  MRN: [id number]  Admission Date: 4/25/2024  Hospital Day # 3    Date of Service (when I saw the patient): 04/28/2024     Assessment & Plan   Clementine Kathleen is a 28 year old female with a past medical history significant for primary mediastinal B-cell lymphoma who was admitted on 4/25/2024 for scheduled chemotherapy with dose adjusted R-EPOCH (C4D1=4/25/2024).      Today:  - C4D4 of DA-R-EPOCH.  - Significant nausea today despite scheduled Zofran, scheduled Zyprexa, ongoing steroids. Continue PRN compazine/Ativan. Consider an earlier dose of Emend with future cycles; due on D5 prior to cyclophosphamide.  - Mail-in order for Zio patch placed. Per discussions with cardiology, there is no longer an inpatient workflow for placement of Zio patches in the hospital and these must be mailed to patients.  - Anticipate discharge to home tomorrow, 4/29, around ~3 PM.    HEME  # Primary mediastinal B-cell lymphoma  Patient of Dr. Oakes. Initially presented with cervical lymphadenopathy 12/2023. Initial FNA (1/29/24) suggestive of possible Hodgkin lymphoma; however excisional LN biopsy (2/9/24) shows large B-cell lymphoma with differential diagnosis of primary mediastinal large B-cell lymphoma vs DLBCL NOS. PET with cervical and mediastinal lymphadenopathy only. Overall presentation felt to be consistent with PMBCL given her age, gender, sites of involvement, dim CD30 expression, and prominent fibrotic background.  Although CD23 IHC was negative, IHC for , MAL, and PD-L1 were positive in majority of the neoplastic cells suggesting PMBCL over DLBCL NOS. [id number] gene expression profile also consistent with PMBCL. FISH with gain of BCL2 but no MYC, BCL2, or BCL6 rearrangements.  PET showed bilateral cervical and mediastinal hypermetabolic lymphadenopathy (largest 4.4 cm with SUVmax 27 in a prevascular LN);  no disease below diaphragm. Baseline TTE (2/19/24) with LVEF 60%. Started on treatment with R-DA-EPOCH (C1D1=2/22/24; C2D1= 3/14/24; C3D1=4/4/24). Most recent PET 4/1/24 (after C2) with evidence of complete response. Has thus far been able to increase one dose level with each cycle (C1=dose level 1, C2=dose level 2, etc.). Seen by Dr. Oakes in clinic on 4/25/2024 with plan to proceed with admission for C4 at dose level 4 and hold vincristine due to neuropathy (see more below), with a planned total of 6 cycles. Next PET at [facility] given CR already.   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 Secondary to chemotherapy. Respectfully declined need for medication management at this time. Vincristine was capped at 2 mg for C3. S/p C3 and neuropathy in fingertips have worsened with some difficulty using her phone or buttoning shirts and now neuropathy in bilateral feet that does not impact balance of walking. Seems to be improving just prior to admission for C4 per patient report  - Per Dr. Oakes, omitting vincristine for C4  - Monitor closely    # CINV  Chemotherapy induced nausea and vomiting during previous cycles, managed with prn zofran, compazine, scheduled emend, and zyprexa at bedtime. Symptoms improved within a few days of hospital discharge from cycle 2. Noted worsening nausea on 4/28. On chart review, patient has previously received IV Emend earlier in her hospital course (D1-2) in addition to prior to discharge (D5); the first dose  "was not scheduled with this cycle (C4).   - Continue PRN Ativan and compazine as indicated and Zyprexa at bedtime.  - Scheduled Zofran 16 mg daily per chemo protocol; Emend D5  - Consider scheduling IV Emend on day ~2 (worst nausea D3-4) with subsequent cycles  - Could also consider alternate agents - phenergan or Aloxi/Kytril may be options (though would likely need to change to PO     # History of chemotherapy-related rash  Maculopapular pinkish rash noted with C1-2 of R-DA-EPOCH. Managed supportively with topical steroids.  - Monitor clinically for recurrence     ID  # ID PPx  - Acyclovir 400 mg BID  - Levofloxacin 250 mg daily and fluconazole 200 mg daily when ANC <1  - Pentamidine monthly for PJP ppx, scheduled 5/3/24     CV  # Palpitations/tachycardia  Noted heart \"racing/pounding\" beginning after cycle 2 of chemo. No associated chest pain, shortness of breath, lower extremity pain/swelling, abdominal pain/nausea, dizziness. Echo from 2/2024 unremarkable. 3/25/24 NT proBNP normal at <36. Ongoing symptoms following C3, with more exertional SOB. CT PE 4/15/24 unremarkable. Zio patch ordered 4/15; however, not yet placed.  - No significant palpitations after cycle 3  - Zio patch mail-out order placed x4/28. Of note, there is NO inpatient Zio patch workflow anymore, so must wait for this to be mailed to patient.     GI  # Constipation  Regular on bowel regimen of Miralax and senna BID. Continue while inpatient.     MISC  # Fertility preservation - Established with [facility], underwent egg retrieval on 2/18.   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 Her boyfriend is a general surgery resident at [facility]. Parents live in [place].     Clinically Significant Risk Factors                             # Financial/Environmental Concerns: none        FEN:  - No mIVF, bolus PRN  - Standard lyte replacement   - Regular diet as " tolerated     Prophy/Misc:  - GI: PTA PPI BID  - DVT: Enoxaparin ppx    DISPO: Discharge to home upon chemotherapy completion    Medically Ready for Discharge: Anticipated Tomorrow    Total time spent: 30 minutes    Staffed with Dr. Vera.    Angela Peterson PA-C  Hematology/Oncology  Pager: [phone]   ___________________________________________________________________    Interval History   No acute overnight events. Nausea noted since yesterday, no vomiting. Has tried compazine without much effect, had Zyprexa at bedtime. No abdominal pain. Declined meds initially this AM, but upon further discussion, was amenable to PO Ativan. Reviewed other options for antiemetics down the road. Discussed timing of anticipated chemotherapy completion. Wonders about Zio patch; I assured her I will look into this.    Physical Exam   Temp: 98  F (36.7  C) Temp src: Oral BP: 111/62 Pulse: 57   Resp: 16 SpO2: 97 % O2 Device: None (Room air)    Vitals:    04/26/24 0748 04/27/24 0750 04/28/24 1005   Weight: 64.2 kg (141 lb 9.6 oz) 64.3 kg (141 lb 11.2 oz) 64 kg (141 lb)     Vital Signs with Ranges  Temp:  [98  F (36.7  C)-98.2  F (36.8  C)] 98  F (36.7  C)  Pulse:  [56-69] 57  Resp:  [16-18] 16  BP: (107-117)/(62-71) 111/62  SpO2:  [97 %] 97 %  I/O last 3 completed shifts:  In: 589.6 [IV Piggyback:589.6]  Out: -     Constitutional: Awake and conversational. Lying in bed, huddled under blankets. Appears uncomfortable, albeit nontoxic.   HEENT: NCAT. Moist mucus membranes.  Respiratory: Breathing comfortably on room air with no accessory muscle use. Speaking in full sentences, no evidence of respiratory distress. Lungs clear to auscultation bilaterally, no wheezing.  Cardiovascular: Regular rate and rhythm.  Skin: Skin is clean, dry, intact. No jaundice or significant rashes appreciated on exposed areas.   Neurologic: Alert and with normal speech. Moves all extremities spontaneously.  Psych: Calm, pleasant, affect congruent to  situation.     Labs & Studies: I personally reviewed the following studies:  Data   Results for orders placed or performed during the hospital encounter of 04/25/24 (from the past 24 hour(s))   CBC with platelets differential    Narrative    The following orders were created for panel order CBC with platelets differential.  Procedure                               Abnormality         Status                     ---------                               -----------         ------                     CBC with platelets and d...[[id number]]  Abnormal            Final result                 Please view results for these tests on the individual orders.   ABO/Rh type and screen    Narrative    The following orders were created for panel order ABO/Rh type and screen.  Procedure                               Abnormality         Status                     ---------                               -----------         ------                     Adult Type and Screen[[id number]]                            Final result                 Please view results for these tests on the individual orders.   Comprehensive metabolic panel   Result Value Ref Range    Sodium 139 135 - 145 mmol/L    Potassium 4.0 3.4 - 5.3 mmol/L    Carbon Dioxide (CO2) 25 22 - 29 mmol/L    Anion Gap 8 7 - 15 mmol/L    Urea Nitrogen 11.5 6.0 - 20.0 mg/dL    Creatinine 0.62 0.51 - 0.95 mg/dL    GFR Estimate >90 >60 mL/min/1.73m2    Calcium 9.2 8.6 - 10.0 mg/dL    Chloride 106 98 - 107 mmol/L    Glucose 112 (H) 70 - 99 mg/dL    Alkaline Phosphatase 51 40 - 150 U/L    AST 11 0 - 45 U/L    ALT 10 0 - 50 U/L    Protein Total 5.6 (L) 6.4 - 8.3 g/dL    Albumin 3.9 3.5 - 5.2 g/dL    Bilirubin Total 0.3 <=1.2 mg/dL   INR   Result Value Ref Range    INR 1.05 0.85 - 1.15   Fibrinogen activity   Result Value Ref Range    Fibrinogen Activity 184 170 - 490 mg/dL   Uric acid   Result Value Ref Range    Uric Acid 1.9 (L) 2.4 - 5.7 mg/dL   Magnesium   Result Value Ref Range     Magnesium 1.9 1.7 - 2.3 mg/dL   Phosphorus   Result Value Ref Range    Phosphorus 4.4 2.5 - 4.5 mg/dL   Lactate Dehydrogenase   Result Value Ref Range    Lactate Dehydrogenase 160 0 - 250 U/L   Partial thromboplastin time   Result Value Ref Range    aPTT 29 22 - 38 Seconds   CBC with platelets and differential   Result Value Ref Range    WBC Count 6.6 4.0 - 11.0 10e3/uL    RBC Count 3.21 (L) 3.80 - 5.20 10e6/uL    Hemoglobin 9.9 (L) 11.7 - 15.7 g/dL    Hematocrit 30.1 (L) 35.0 - 47.0 %    MCV 94 78 - 100 fL    MCH 30.8 26.5 - 33.0 pg    MCHC 32.9 31.5 - 36.5 g/dL    RDW 19.9 (H) 10.0 - 15.0 %    Platelet Count 318 150 - 450 10e3/uL    % Neutrophils 79 %    % Lymphocytes 8 %    % Monocytes 12 %    % Eosinophils 0 %    % Basophils 0 %    % Immature Granulocytes 1 %    NRBCs per 100 WBC 0 <1 /100    Absolute Neutrophils 5.3 1.6 - 8.3 10e3/uL    Absolute Lymphocytes 0.5 (L) 0.8 - 5.3 10e3/uL    Absolute Monocytes 0.8 0.0 - 1.3 10e3/uL    Absolute Eosinophils 0.0 0.0 - 0.7 10e3/uL    Absolute Basophils 0.0 0.0 - 0.2 10e3/uL    Absolute Immature Granulocytes 0.1 <=0.4 10e3/uL    Absolute NRBCs 0.0 10e3/uL   Adult Type and Screen   Result Value Ref Range    ABO/RH(D) O POS     Antibody Screen Negative Negative    SPECIMEN EXPIRATION DATE [id number]            Medications list for reference:    Medications   Current Facility-Administered Medications   Medication Dose Route Frequency Provider Last Rate Last Admin    No rectal suppositories if WBC less than 1000/microliters or platelets less than 50,000/ L   Does not apply Continuous PRN Mary Leary PA-C         Current Facility-Administered Medications   Medication Dose Route Frequency Provider Last Rate Last Admin    acyclovir (ZOVIRAX) tablet 400 mg  400 mg Oral BID Mary Leary PA-C   400 mg at 04/28/24 0846    Chemotherapy Infusing-Continuous Infusion   Does not apply Q8H Vanessa Oakes MD 25.3 mL/hr at 04/27/24 0525 Given at 04/28/24 0558    cimetidine (TAGAMET)  tablet 200 mg  200 mg Oral BID Angle Sanchez PA-C   200 mg at 04/28/24 0846    [START ON 4/29/2024] cycloPHOSphamide (CYTOXAN) 2,215 mg in sodium chloride 0.9 % 660.75 mL infusion  1,296 mg/m2 (Treatment Plan Recorded) Intravenous Once Vanessa Oakes MD        [START ON 4/30/2024] dexAMETHasone (DECADRON) tablet 8 mg  8 mg Oral Daily Vanessa Oakes MD        DOXOrubicin (ADRIAMYCIN) 29 mg in sodium chloride 0.9 % 1,064.5 mL infusion   Intravenous Q22H Vanessa Oakes MD 48.4 mL/hr at 04/27/24 1723 New Bag at 04/27/24 1723    enoxaparin ANTICOAGULANT (LOVENOX) injection 40 mg  40 mg Subcutaneous Q24H Mary Leary PA-C   40 mg at 04/27/24 2054    etoposide (TOPOSAR) 145 mg in sodium chloride 0.9 % 557.25 mL infusion  86 mg/m2 (Treatment Plan Recorded) Intravenous Q22H Vanessa Oakes MD 25.3 mL/hr at 04/27/24 1723 145 mg at 04/27/24 1723    [START ON 4/29/2024] fosaprepitant (EMEND) 150 mg in sodium chloride 0.9 % 275 mL intermittent infusion  150 mg Intravenous Once Vanessa Oakes MD        heparin 100 unit/mL injection 5-10 mL  5-10 mL Intracatheter Q28 Days Angle Sanchez PA-C        heparin lock flush 10 UNIT/ML injection 5-10 mL  5-10 mL Intracatheter Q24H Angle Sanchez PA-C        OLANZapine zydis (zyPREXA) ODT tab 5 mg  5 mg Oral At Bedtime Mary Leary PA-C   5 mg at 04/27/24 2238    ondansetron (ZOFRAN) tablet 16 mg  16 mg Oral Q22H Vanessa Oakes MD   16 mg at 04/27/24 1725    pantoprazole (PROTONIX) EC tablet 40 mg  40 mg Oral BID Mary Leary PA-C   40 mg at 04/28/24 0846    polyethylene glycol (MIRALAX) Packet 17 g  17 g Oral BID Angle Sanchez PA-C   17 g at 04/28/24 0846    predniSONE (DELTASONE) tablet 100 mg  100 mg Oral BID Vanessa Oakes MD   100 mg at 04/28/24 0846    senna-docusate (SENOKOT-S/PERICOLACE) 8.6-50 MG per tablet 2 tablet  2 tablet Oral BID Vanessa Oakes MD   1 tablet at 04/28/24 0846    sodium chloride (PF) 0.9% PF flush 10-20 mL  10-20 mL Intracatheter Q28 Days Angle Sanchez, PA-C

## 2024-04-28 NOTE — PLAN OF CARE
Goal Outcome Evaluation:    A+Ox4. VSS on RA. Denied pain. C/o intermittent nausea, no prns given. Port infusing etoposide and doxorubicin. Numbness present in fingertips. No BM, voiding adequately. Pt went on multiple walks in the evening with her family. Pt slept most of the night. Continue with plan of care.

## 2024-04-28 NOTE — PLAN OF CARE
Goal Outcome Evaluation:    Plan of Care Reviewed With: patient  Overall Patient Progress: improving    Pt here for C4/Day #4 Doxorubicin and Etoposide  VSS on RA.  UAL in room.  Denies nausea, tolerating regular diet.  Denies pain or any discomfort.  Port with +blood return.  Plan: Cytoxan and Emend tomorrow.

## 2024-04-28 NOTE — PLAN OF CARE
Goal Outcome Evaluation:  Clementine is having a difficult time with nausea today.  Declined Compazine in am but PA discussed taking Ativan and Clementine agreed to 0.5mg of po Ativan.  She slept after that.  Has C4 D3 Dox and Etoposide running CIVI via IVAD with + blood return.  Not eating this am and family here. Plan to give a dose of Emend before discharge.      1420 - C4 Day #4 Doxorubicin and Etoposide hung at 1420.  Running CIVI via IVAD with + blood return.  More awake in the afternoon and eating something now.

## 2024-04-29 VITALS
RESPIRATION RATE: 16 BRPM | HEART RATE: 70 BPM | HEIGHT: 65 IN | TEMPERATURE: 97.7 F | BODY MASS INDEX: 23.49 KG/M2 | OXYGEN SATURATION: 96 % | WEIGHT: 141 LBS | SYSTOLIC BLOOD PRESSURE: 124 MMHG | DIASTOLIC BLOOD PRESSURE: 70 MMHG

## 2024-04-29 LAB
ALBUMIN SERPL BCG-MCNC: 3.8 G/DL (ref 3.5–5.2)
ALP SERPL-CCNC: 48 U/L (ref 40–150)
ALT SERPL W P-5'-P-CCNC: 5 U/L (ref 0–50)
ANION GAP SERPL CALCULATED.3IONS-SCNC: 9 MMOL/L (ref 7–15)
AST SERPL W P-5'-P-CCNC: 11 U/L (ref 0–45)
BASOPHILS # BLD AUTO: 0 10E3/UL (ref 0–0.2)
BASOPHILS NFR BLD AUTO: 0 %
BILIRUB SERPL-MCNC: 0.5 MG/DL
BUN SERPL-MCNC: 12 MG/DL (ref 6–20)
CALCIUM SERPL-MCNC: 9.1 MG/DL (ref 8.6–10)
CHLORIDE SERPL-SCNC: 106 MMOL/L (ref 98–107)
CREAT SERPL-MCNC: 0.61 MG/DL (ref 0.51–0.95)
DEPRECATED HCO3 PLAS-SCNC: 25 MMOL/L (ref 22–29)
EGFRCR SERPLBLD CKD-EPI 2021: >90 ML/MIN/1.73M2
EOSINOPHIL # BLD AUTO: 0 10E3/UL (ref 0–0.7)
EOSINOPHIL NFR BLD AUTO: 0 %
ERYTHROCYTE [DISTWIDTH] IN BLOOD BY AUTOMATED COUNT: 19.1 % (ref 10–15)
GLUCOSE SERPL-MCNC: 114 MG/DL (ref 70–99)
HCT VFR BLD AUTO: 30.1 % (ref 35–47)
HGB BLD-MCNC: 10.2 G/DL (ref 11.7–15.7)
IMM GRANULOCYTES # BLD: 0.1 10E3/UL
IMM GRANULOCYTES NFR BLD: 2 %
LDH SERPL L TO P-CCNC: 141 U/L (ref 0–250)
LYMPHOCYTES # BLD AUTO: 0.3 10E3/UL (ref 0.8–5.3)
LYMPHOCYTES NFR BLD AUTO: 10 %
MAGNESIUM SERPL-MCNC: 1.9 MG/DL (ref 1.7–2.3)
MCH RBC QN AUTO: 31.6 PG (ref 26.5–33)
MCHC RBC AUTO-ENTMCNC: 33.9 G/DL (ref 31.5–36.5)
MCV RBC AUTO: 93 FL (ref 78–100)
MONOCYTES # BLD AUTO: 0.4 10E3/UL (ref 0–1.3)
MONOCYTES NFR BLD AUTO: 11 %
NEUTROPHILS # BLD AUTO: 2.4 10E3/UL (ref 1.6–8.3)
NEUTROPHILS NFR BLD AUTO: 77 %
NRBC # BLD AUTO: 0 10E3/UL
NRBC BLD AUTO-RTO: 0 /100
PHOSPHATE SERPL-MCNC: 4.8 MG/DL (ref 2.5–4.5)
PLATELET # BLD AUTO: 341 10E3/UL (ref 150–450)
POTASSIUM SERPL-SCNC: 3.9 MMOL/L (ref 3.4–5.3)
PROT SERPL-MCNC: 5.5 G/DL (ref 6.4–8.3)
RBC # BLD AUTO: 3.23 10E6/UL (ref 3.8–5.2)
SODIUM SERPL-SCNC: 140 MMOL/L (ref 135–145)
URATE SERPL-MCNC: 1.9 MG/DL (ref 2.4–5.7)
WBC # BLD AUTO: 3.1 10E3/UL (ref 4–11)

## 2024-04-29 PROCEDURE — 83735 ASSAY OF MAGNESIUM: CPT | Performed by: PHYSICIAN ASSISTANT

## 2024-04-29 PROCEDURE — 85004 AUTOMATED DIFF WBC COUNT: CPT

## 2024-04-29 PROCEDURE — 83615 LACTATE (LD) (LDH) ENZYME: CPT

## 2024-04-29 PROCEDURE — 250N000013 HC RX MED GY IP 250 OP 250 PS 637: Performed by: INTERNAL MEDICINE

## 2024-04-29 PROCEDURE — 99239 HOSP IP/OBS DSCHRG MGMT >30: CPT | Performed by: PHYSICIAN ASSISTANT

## 2024-04-29 PROCEDURE — 258N000003 HC RX IP 258 OP 636: Performed by: INTERNAL MEDICINE

## 2024-04-29 PROCEDURE — 250N000013 HC RX MED GY IP 250 OP 250 PS 637: Performed by: PHYSICIAN ASSISTANT

## 2024-04-29 PROCEDURE — 250N000013 HC RX MED GY IP 250 OP 250 PS 637

## 2024-04-29 PROCEDURE — 84100 ASSAY OF PHOSPHORUS: CPT | Performed by: PHYSICIAN ASSISTANT

## 2024-04-29 PROCEDURE — 250N000011 HC RX IP 250 OP 636: Performed by: PHYSICIAN ASSISTANT

## 2024-04-29 PROCEDURE — 250N000012 HC RX MED GY IP 250 OP 636 PS 637: Performed by: INTERNAL MEDICINE

## 2024-04-29 PROCEDURE — 82040 ASSAY OF SERUM ALBUMIN: CPT

## 2024-04-29 PROCEDURE — 84550 ASSAY OF BLOOD/URIC ACID: CPT

## 2024-04-29 PROCEDURE — 250N000011 HC RX IP 250 OP 636: Performed by: INTERNAL MEDICINE

## 2024-04-29 RX ADMIN — PANTOPRAZOLE SODIUM 40 MG: 40 TABLET, DELAYED RELEASE ORAL at 09:06

## 2024-04-29 RX ADMIN — PREDNISONE 100 MG: 50 TABLET ORAL at 08:15

## 2024-04-29 RX ADMIN — ACYCLOVIR 400 MG: 400 TABLET ORAL at 09:07

## 2024-04-29 RX ADMIN — ONDANSETRON HYDROCHLORIDE 16 MG: 8 TABLET, FILM COATED ORAL at 11:25

## 2024-04-29 RX ADMIN — CYCLOPHOSPHAMIDE 2215 MG: 2 INJECTION, POWDER, FOR SOLUTION INTRAVENOUS; ORAL at 11:46

## 2024-04-29 RX ADMIN — Medication 5 ML: at 13:00

## 2024-04-29 RX ADMIN — FOSAPREPITANT 150 MG: 150 INJECTION, POWDER, LYOPHILIZED, FOR SOLUTION INTRAVENOUS at 11:21

## 2024-04-29 RX ADMIN — CIMETIDINE 200 MG: 200 TABLET, FILM COATED ORAL at 09:10

## 2024-04-29 RX ADMIN — PROCHLORPERAZINE MALEATE 10 MG: 5 TABLET ORAL at 08:15

## 2024-04-29 ASSESSMENT — ACTIVITIES OF DAILY LIVING (ADL)
ADLS_ACUITY_SCORE: 18

## 2024-04-29 NOTE — PROGRESS NOTES
Nursing Focus: Chemotherapy    D: Positive blood return via port. Insertion site is clean/dry/intact, dressing intact with no complaints of pain.  Urine output is recorded in intake in Doc Flowsheet.      I: Premedications given per order (see electronic medical administration record). Dose #1 of cytoxan started to infuse over 1 hours. Reviewed pt teaching on chemotherapy side effects.  Pt denies need for further teaching. Chemotherapy double checked per protocol by two chemotherapy competent RN's.     A: Tolerating infusion well. Denies nausea and or pain.     P: Continue to monitor urine output and symptoms of nausea. Screen for symptoms of toxicity.

## 2024-04-29 NOTE — PROGRESS NOTES
Care Management Discharge Note    Discharge Date: 04/29/2024     Discharge Disposition: Home    Discharge Services:  None     Discharge DME:  None     Discharge Transportation: Patient states her dad will provide her a ride at discharge.     Private pay costs discussed: Not applicable    Does the patient's insurance plan have a 3 day qualifying hospital stay waiver?  No    PAS Confirmation Code:  Not applicable.   Patient/family educated on Medicare website which has current facility and service quality ratings:  Not applicable.     Education Provided on the Discharge Plan:  Yes  Persons Notified of Discharge Plans: Patient by bedside RN  Patient/Family in Agreement with the Plan:  Yes    Handoff Referral Completed: Yes  Internal Handoff Completed     Additional Information: Social work received a message on Vocera from Hemaotlogy/Oncology Provider on [employer] this morning. Per Hemaotlogy/Oncology Provider, there are no Care Management discharge needs. Patient states her dad will provide her a ride at discharge.     Sandra WHITT, TOÑITO  5A/5C  Covering 5A [phone]  5C beds [phone] (no BMT pt's)   Unit  Phone Number [phone]  [facility]  Social Work & Care Management Department

## 2024-04-29 NOTE — PLAN OF CARE
Goal Outcome Evaluation:      Plan of Care Reviewed With: patient     Admitted for scheduled chemotherapy with dose adjusted R-EPOCH (C4D1=4/25/2024).     A/Ox4. VSS, on room air. UAL.  Reg-diet. Denies nausea, chest pain, and SOB. On C4D5 of DA-R-EPOCH. Chemo currently infusing via chest port with good blood return. Void spont. Passing gas but Bm overnight. No acute changes overnight. Plan is Cytoxan and Emend today. Cont POC.

## 2024-04-29 NOTE — PROGRESS NOTES
"[id number]: /70 (BP Location: Right arm)   Pulse 70   Temp 97.7  F (36.5  C) (Oral)   Resp 16   Ht 1.66 m (5' 5.35\")   Wt 64 kg (141 lb)   SpO2 96%   BMI 23.21 kg/m    VSS, afebrile on RA. Denies pain or dyspnea. Experiencing continued nausea, PRN po compazine, emend and schedule zofran in use to manage nausea. Finding some relief from medications. Feeling overall fatigued. Completed D4 of Dox/etop. Cytoxan given and tolerated well. Baseline neuropathy unchanged. Eager to discharge. Discharge instructions reviewed with patient, Clementine verbalized readiness for discharge. Port deacessed prior to discharge.     Discharge  D: Orders for discharge and outpatient medications written.  I: Home medications and return to clinic schedule reviewed with patient. Discharge instructions and parameters for calling Health Care Provider reviewed. Patient left at 1315 accompanied by her family.   A: Patient/family verbalized understanding and was ready for discharge.   P: Patient instructed to  medications in Pharmacy. [place] [facility]     "

## 2024-04-29 NOTE — DISCHARGE SUMMARY
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 Overall she tolerated this cycle of chemotherapy quite well without significant complications. On the day of discharge, Clementine notes some ongoing nausea though reports adequately controlled on current regimen. She did receive emend on D5 prior to chemotherapy. Could consider dosing earlier in subsequent cycles for better nausea control. She otherwise is feeling well and denies fevers, chills, chest pain/palpitations, shortness of breath, abdominal pain, vomiting, urinary or bowel symptoms. She is stable and eager for discharge home, with close follow scheduled as detailed below.     Prior to discharge, I reviewed with Clementine the plan of care, including upcoming follow-up appointments and new medications. Appropriate prescriptions were sent to the discharge pharmacy, as needed. We reviewed strict discharge precautions, including reasons to call clinic triage or present to the ED, and she voiced understanding. She was provided with the clinic triage number, as well as written discharge instructions, in their discharge paperwork. Patient had an opportunity to ask questions, all of which were answered to their stated satisfaction. On the day of discharge, patient was overall  well-appearing, hemodynamically stable, and felt safe and comfortable with the plans for discharge to home with follow-up as described.    Outpatient follow-up issues:  - Significant nausea this cycle despite scheduled zofran, steroids, and prn compazine/ativan. Emend given D5 this cycle - consider earlier dose with subsequent cycles.  - Zio patch ordered upon discharge - to be mailed with instructions to patient's house. Please follow up to ensure this is received and follow for results.     Discharge medications:  New/changed:  - Prednisone 100 mg BID to complete 10 doses (4/29 PM and 4/30 AM)  - Dexamethasone 8 mg daily x 2 days (5/1-5/2)  - Ativan 0.5 mg q6 hours prn for nausea    Remainder of medications continued as PTA     Upcoming follow-up:  - Labs 2x weekly starting 5/1  - 5/1 - labs, neulasta infusion  - 5/3 - labs, pentamidine  - 5/16 - follow up with [facility]   Clementine Kathleen was admitted on 4/25/2024.  The following problems were addressed during her hospitalization:    HEME  # Primary mediastinal B-cell lymphoma  Patient of Dr. Oakes. Initially presented with cervical lymphadenopathy 12/2023. Initial FNA (1/29/24) suggestive of possible Hodgkin lymphoma; however excisional LN biopsy (2/9/24) shows large B-cell lymphoma with differential diagnosis of primary mediastinal large B-cell lymphoma vs DLBCL NOS. PET with cervical and mediastinal lymphadenopathy only. Overall presentation felt to be consistent with PMBCL given her age, gender, sites of involvement, dim CD30 expression, and prominent fibrotic background.  Although CD23 IHC was negative, IHC for , MAL, and PD-L1 were positive in majority of the neoplastic cells suggesting PMBCL over DLBCL NOS. [id number] gene expression profile also consistent with PMBCL. FISH with gain of BCL2 but no MYC, BCL2, or BCL6 rearrangements.  PET showed bilateral cervical and mediastinal hypermetabolic lymphadenopathy (largest 4.4 cm with SUVmax 27  in a prevascular LN); no disease below diaphragm. Baseline TTE (2/19/24) with LVEF 60%. Started on treatment with R-DA-EPOCH (C1D1=2/22/24; C2D1= 3/14/24; C3D1=4/4/24). Most recent PET 4/1/24 (after C2) with evidence of complete response. Has thus far been able to increase one dose level with each cycle (C1=dose level 1, C2=dose level 2, etc.). Seen by Dr. Oakes in clinic on 4/25/2024 with plan to proceed with admission for C4 at dose level 4 and hold vincristine due to neuropathy (see more below), with a planned total of 6 cycles. Next PET at [facility] given CR already.   - Port accessed on admission, deaccessed upon discharge      Treatment Plan: Dose adjusted R-EPOCH (C4D1=4/25/2024)   - Rituximab 375 mg/m2 (600mg) IV x 1 dose - D1  - Prednisone 100 mg BID x 10 doses - D1-5  - Etoposide 86 mg/m2 (125 mg) CIVI x 4 doses - D1-4  - Doxorubicin 16.8 mg/m2 x 4 doses CIVI - D1-4  - Cyclophosphamide 1296 mg/m2 (2215 mg) IV x 1 dose - D5  - Pegfilgrastim x1 - D6; scheduled for 5/1/24 in clinic  - Pre-meds: tylenol, benadryl, zofran, emend, dexamethasone (D6-7)      # Grade 1-2 peripheral neuropathy   Endorsed L>R neuropathy in her fingertips. Secondary to chemotherapy. Respectfully declined need for medication management at this time. Vincristine was capped at 2 mg for C3. S/p C3 and neuropathy in fingertips have worsened with some difficulty using her phone or buttoning shirts and now neuropathy in bilateral feet that does not impact balance of walking. Seems to be improving just prior to admission for C4 per patient report  - Per Dr. Oakes, omitting vincristine for C4  - Monitor closely     # CINV  Chemotherapy induced nausea and vomiting during previous cycles, managed with prn zofran, compazine, scheduled emend, and zyprexa at bedtime. Symptoms improved within a few days of hospital discharge from cycle 2. Noted worsening nausea on 4/28. On chart review, patient has previously received IV Emend earlier in her hospital course  "(D1-2) in addition to prior to discharge (D5); the first dose was not scheduled with this cycle (C4).   - Continue PRN Ativan and compazine as indicated and Zyprexa at bedtime.  - Scheduled Zofran 16 mg daily per chemo protocol; Emend D5  - Consider scheduling IV Emend on day ~2 (worst nausea D3-4) with subsequent cycles  - Could also consider alternate agents - phenergan or Aloxi/Kytril may be options (though would likely need to change to PO)     # History of chemotherapy-related rash  Maculopapular pinkish rash noted with C1-2 of R-DA-EPOCH. Managed supportively with topical steroids.  - Monitor clinically for recurrence     ID  # ID PPx  - Acyclovir 400 mg BID  - Levofloxacin 250 mg daily and fluconazole 200 mg daily when ANC <1  - Pentamidine monthly for PJP ppx, scheduled 5/3/24     CV  # Palpitations/tachycardia  Noted heart \"racing/pounding\" beginning after cycle 2 of chemo. No associated chest pain, shortness of breath, lower extremity pain/swelling, abdominal pain/nausea, dizziness. Echo from 2/2024 unremarkable. 3/25/24 NT proBNP normal at <36. Ongoing symptoms following C3, with more exertional SOB. CT PE 4/15/24 unremarkable. Zio patch ordered 4/15; however, not yet placed.  - No significant palpitations after cycle 3  - Zio patch mail-out order placed x4/28. Of note, there is NO inpatient Zio patch workflow anymore, so must wait for this to be mailed to patient. Patient notes she has received text confirmation regarding delivery prior to discharge.     GI  # Constipation  Regular on bowel regimen of Miralax and senna BID. Continued inpatient and upon discharge.     MISC  # Fertility preservation - Established with [facility], underwent egg retrieval on 2/18.   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 Her boyfriend is a general surgery resident at [facility]. Parents live in [place].       Clinically Significant " "Risk Factors                             # Financial/Environmental Concerns: none           FEN:  - No mIVF, bolus PRN  - Standard lyte replacement   - Regular diet as tolerated     Prophy/Misc:  - GI: PTA PPI BID  - DVT: Enoxaparin ppx      Patient was staffed with Dr. Jody Sanchez PA-C  Hematology/Oncology  Page: #[phone]    I spent >55 minutes in the care of this patient today, which included time necessary for review of interval events, obtaining history and physical exam, ordering medication(s)/test(s) as medically indicated, discussion with interdisciplinary/consult team(s), and documentation time. Over 50% of time was spent face-to-face and/or coordinating care.      Pending Results   These results will be followed up by outpatient heme/onc team  Unresulted Labs Ordered in the Past 30 Days of this Admission       No orders found from 3/26/2024 to 4/26/2024.            Code Status   Full Code    Primary Care Physician   Chela Peter    /70 (BP Location: Right arm)   Pulse 70   Temp 97.7  F (36.5  C) (Oral)   Resp 16   Ht 1.66 m (5' 5.35\")   Wt 64 kg (141 lb)   SpO2 96%   BMI 23.21 kg/m      Constitutional: Awake and conversational. Non- toxic appearing. No acute distress.  HEENT: NCAT. Moist mucus membranes on limited oral pharyngeal exam.   Lymph: Neck supple, no ridigity. No significant adenopathy noted.   Respiratory: Breathing comfortably on room air with no accessory muscle use. Speaking in full sentences, no evidence of respiratory distress. Lungs CTAB without stridor, wheeze, rhonchi, or rales.   Cardiovascular: Regular rate and rhythm. No peripheral edema.    GI: Abdomen with normoactive bowel sounds, soft and non-tender throughout. No rebound, guarding, or peritoneal sign.   Skin: Skin is clean, dry, intact. No jaundice or significant rashes appreciated on exposed areas.   Neurologic: Alert and with normal speech. Grossly nonfocal.  Moves extremities spontaneously.  Memory " and thought process preserved.   Neuropsychiatric: Calm, affect congruent to situation. Appropriate mood and affect.       Time Spent on this Encounter   IAngle PA-C, personally saw the patient today and spent greater than 30 minutes discharging this patient.    Discharge Disposition   Discharged to home  Condition at discharge: Stable    Consultations This Hospital Stay   CARE MANAGEMENT / SOCIAL WORK IP CONSULT    Discharge Orders      Primary Care - Care Coordination Referral      Reason for your hospital stay    Scheduled chemotherapy (C4 of R-DA-EPOCH regimen)     Activity    Your activity upon discharge: activity as tolerated     Follow Up and recommended labs and tests    An appointment for hospital follow up was requested for you. If it is not scheduled by the time you discharge you will be contacted with the date and time. You may call clinic to makes changes to this appointment if needed.    Already scheduled appointments are listed below.     When to contact your care team    Please call the [facility] at [phone] if you develop temperature above 100.4, shortness of breath, chest pain, headaches, vision changes, bleeding, uncontrolled nausea, vomiting, diarrhea, pain, or any other signs or symptoms of concern. If you are concerned that your symptoms are life-threatening, don't hesitate to call 911 or go to the nearest Emergency Room.     Discharge Instructions    Take your last two doses of prednisone on 4/29 PM and 4/30 AM. Dexamethasone will start on 5/1 AM for two doses (last dose 5/2).  I wanted you to have some Ativan available to you at home for nausea if needed. You can also use the Zofran and compazine if you prefer. Hopefully between all of those, the Emend, Zyprexa, and the steroids, you'll start feeling better, but please don't hesitate to reach out if that's not cutting it. We have plenty more things we can try.     Diet    Follow  this diet upon discharge: Regular     ZIO PATCH MAIL OUT     Discharge Medications   Discharge Medication List as of 4/29/2024 12:53 PM        START taking these medications    Details   dexAMETHasone (DECADRON) 4 MG tablet Take 2 tablets (8 mg) by mouth daily On 5/1 and 5/2., Disp-4 tablet, R-0, E-Prescribe      LORazepam (ATIVAN) 0.5 MG tablet Take 1 tablet (0.5 mg) by mouth every 6 hours as needed for nausea or vomiting, Disp-30 tablet, R-0, E-Prescribe      predniSONE (DELTASONE) 50 MG tablet Take 2 tablets (100 mg) by mouth 2 times daily . Take on 4/29 PM and 4/30 AM., Disp-4 tablet, R-0, E-Prescribe           CONTINUE these medications which have CHANGED    Details   polyethylene glycol (MIRALAX) 17 GM/Dose powder Take 17 g by mouth 2 times daily, Historical      senna-docusate (SENOKOT-S/PERICOLACE) 8.6-50 MG tablet Take 1 tablet by mouth 2 times daily, Historical           CONTINUE these medications which have NOT CHANGED    Details   acyclovir (ZOVIRAX) 400 MG tablet Take 1 tablet (400 mg) by mouth 2 times daily, Disp-60 tablet, R-4, E-Prescribe      cimetidine (TAGAMET) 200 MG tablet Take 200 mg by mouth 2 times daily, Historical      fluconazole (DIFLUCAN) 200 MG tablet Take 1 tablet (200 mg) by mouth daily, Disp-30 tablet, R-0, E-Prescribe      levofloxacin (LEVAQUIN) 250 MG tablet Take 1 tablet (250 mg) by mouth daily, Disp-30 tablet, R-0, E-Prescribe      Levonorgestrel (KYLEENA IU) 1 Device by Intrauterine route once, Historical      metoclopramide (REGLAN) 10 MG tablet Take 1 tablet (10 mg) by mouth 4 times daily as needed (constipation), Disp-30 tablet, R-3, E-Prescribe      modafinil (PROVIGIL) 200 MG tablet Take 200 mg by mouth daily as needed (hypersomnia), Historical      OLANZapine zydis (ZYPREXA) 5 MG ODT Take 1 tablet (5 mg) by mouth at bedtime, Disp-10 tablet, R-0, E-Prescribe      ondansetron (ZOFRAN) 4 MG tablet Take 1-2 tablets (4-8 mg) by mouth every 8 hours as needed for nausea or  vomiting, Disp-30 tablet, R-0, E-Prescribe      pantoprazole (PROTONIX) 40 MG EC tablet Take 1 tablet (40 mg) by mouth 2 times daily, Disp-60 tablet, R-0, E-Prescribe      prochlorperazine (COMPAZINE) 10 MG tablet Take 1 tablet (10 mg) by mouth every 6 hours as needed for nausea or vomiting, Disp-30 tablet, R-0, E-Prescribe           Allergies   No Known Allergies  Data   Most Recent 3 CBC's:  Recent Labs   Lab Test 04/29/24 0438 04/28/24  0509 04/27/24  0527   WBC 3.1* 6.6 15.2*   HGB 10.2* 9.9* 10.3*   MCV 93 94 94    318 328      Most Recent 3 BMP's:  Recent Labs   Lab Test 04/29/24 0438 04/28/24  0509 04/27/24  0527    139 139   POTASSIUM 3.9 4.0 3.9   CHLORIDE 106 106 107   CO2 25 25 24   BUN 12.0 11.5 11.0   CR 0.61 0.62 0.65   ANIONGAP 9 8 8   DEE 9.1 9.2 9.1   * 112* 113*     Most Recent 2 LFT's:  Recent Labs   Lab Test 04/29/24 0438 04/28/24  0509   AST 11 11   ALT 5 10   ALKPHOS 48 51   BILITOTAL 0.5 0.3     Most Recent INR's and Anticoagulation Dosing History:  Anticoagulation Dose History  More data exists         Latest Ref Rng & Units 4/5/2024 4/6/2024 4/7/2024 4/8/2024 4/26/2024 4/27/2024 4/28/2024   Recent Dosing and Labs   INR 0.85 - 1.15 1.04  1.10  1.23  1.09  1.13  1.21  1.05      Most Recent 3 Troponin's:No lab results found.  Most Recent Cholesterol Panel:No lab results found.  Most Recent 6 Bacteria Isolates From Any Culture (See EPIC Reports for Culture Details):No lab results found.  Most Recent TSH, T4 and A1c Labs:No lab results found.  Results for orders placed or performed in visit on 04/15/24   CT Chest Pulmonary Embolism w Contrast    Narrative    EXAMINATION: CTA pulmonary angiogram, 4/15/2024 2:25 PM     CLINICAL HISTORY: Shortness of breast        Female sex; Not pregnant; No prior imaging in the last 24 hours;  Pulmonary Embolism Rule-Out Criteria (PERC) score > 0; Revised Edwards  Score (RGS) not >= 11; No D-dimer result available; D-dimer not  ordered         COMPARISON: PET CT 4/1/2024    TECHNIQUE: Volumetric helical acquisition of CT images of the chest  from the lung apices to the kidneys were acquired after the  administration of 64 cc Isovue-370..  Post-processed multiplanar  and/or MIP reformations were obtained, archived to PACS and used in  interpretation of this study.     FINDINGS:    Pulmonary arteries: Contrast bolus is adequate. Exam is negative for  acute pulmonary embolism. Normal size of the main pulmonary artery  measuring 2.2 cm.    Lungs: No consolidation, pleural effusion, or pneumothorax. No new or  enlarging pulmonary nodule.  Airways: Central tracheobronchial tree is clear.  Vessels: Main pulmonary artery and aorta are normal in caliber.Normal  three-vessel arch   The right chest port catheter terminates in the high right atrium.  Lymph nodes: No mediastinal or hilar lymphadenopathy.  Thyroid: Imaged thyroid within normal limits.  Esophagus: Within normal limits.    Upper abdomen: Evaluation of the upper abdomen is limited.    Bones and soft tissues: No suspicious axillary lymphadenopathy or soft  tissue mass. No suspicious osseous lesion.      Impression    IMPRESSION:   1. Exam is negative for acute pulmonary embolism.    2. No acute finding in the chest.    In the event of a positive result for acute pulmonary embolism  resulting in right heart strain, consider calling the   [facility] patient placement ([phone]) for PERT (Pulmonary Embolism  Response Team) Activation?    PERT -- Pulmonary Embolism Response Team (Multidisciplinary team  including cardiology, interventional radiology, critical care,  hematology)    I have personally reviewed the examination and initial interpretation  and I agree with the findings.    ALEXIA GILLESPIE MD         SYSTEM ID:  [id number]

## 2024-05-01 ENCOUNTER — INFUSION THERAPY VISIT (OUTPATIENT)
Dept: ONCOLOGY | Facility: CLINIC | Age: 29
End: 2024-05-01
Attending: INTERNAL MEDICINE
Payer: COMMERCIAL

## 2024-05-01 ENCOUNTER — APPOINTMENT (OUTPATIENT)
Dept: LAB | Facility: CLINIC | Age: 29
End: 2024-05-01
Attending: INTERNAL MEDICINE
Payer: COMMERCIAL

## 2024-05-01 VITALS
OXYGEN SATURATION: 99 % | TEMPERATURE: 97.4 F | BODY MASS INDEX: 23.62 KG/M2 | RESPIRATION RATE: 16 BRPM | HEART RATE: 59 BPM | DIASTOLIC BLOOD PRESSURE: 72 MMHG | WEIGHT: 143.5 LBS | SYSTOLIC BLOOD PRESSURE: 113 MMHG

## 2024-05-01 DIAGNOSIS — E87.6 HYPOKALEMIA: Primary | ICD-10-CM

## 2024-05-01 DIAGNOSIS — Z76.89 PREVENTION OF CHEMOTHERAPY-INDUCED NEUTROPENIA: Primary | ICD-10-CM

## 2024-05-01 DIAGNOSIS — C83.32 DIFFUSE LARGE B-CELL LYMPHOMA OF INTRATHORACIC LYMPH NODES (H): ICD-10-CM

## 2024-05-01 DIAGNOSIS — C85.28 MEDIASTINAL LARGE B-CELL LYMPHOMA OF LYMPH NODES OF MULTIPLE REGIONS (H): ICD-10-CM

## 2024-05-01 LAB
ALBUMIN SERPL BCG-MCNC: 3.8 G/DL (ref 3.5–5.2)
ALP SERPL-CCNC: 44 U/L (ref 40–150)
ALT SERPL W P-5'-P-CCNC: <5 U/L (ref 0–50)
ANION GAP SERPL CALCULATED.3IONS-SCNC: 6 MMOL/L (ref 7–15)
AST SERPL W P-5'-P-CCNC: 10 U/L (ref 0–45)
BASOPHILS # BLD AUTO: 0 10E3/UL (ref 0–0.2)
BASOPHILS NFR BLD AUTO: 0 %
BILIRUB SERPL-MCNC: 0.5 MG/DL
BUN SERPL-MCNC: 12.6 MG/DL (ref 6–20)
CALCIUM SERPL-MCNC: 8.6 MG/DL (ref 8.6–10)
CHLORIDE SERPL-SCNC: 107 MMOL/L (ref 98–107)
CREAT SERPL-MCNC: 0.62 MG/DL (ref 0.51–0.95)
DEPRECATED HCO3 PLAS-SCNC: 27 MMOL/L (ref 22–29)
EGFRCR SERPLBLD CKD-EPI 2021: >90 ML/MIN/1.73M2
EOSINOPHIL # BLD AUTO: 0 10E3/UL (ref 0–0.7)
EOSINOPHIL NFR BLD AUTO: 0 %
ERYTHROCYTE [DISTWIDTH] IN BLOOD BY AUTOMATED COUNT: 18.6 % (ref 10–15)
GLUCOSE SERPL-MCNC: 84 MG/DL (ref 70–99)
HCT VFR BLD AUTO: 31.2 % (ref 35–47)
HGB BLD-MCNC: 10.3 G/DL (ref 11.7–15.7)
IMM GRANULOCYTES # BLD: 0 10E3/UL
IMM GRANULOCYTES NFR BLD: 1 %
LDH SERPL L TO P-CCNC: 139 U/L (ref 0–250)
LYMPHOCYTES # BLD AUTO: 0.6 10E3/UL (ref 0.8–5.3)
LYMPHOCYTES NFR BLD AUTO: 33 %
MCH RBC QN AUTO: 31 PG (ref 26.5–33)
MCHC RBC AUTO-ENTMCNC: 33 G/DL (ref 31.5–36.5)
MCV RBC AUTO: 94 FL (ref 78–100)
MONOCYTES # BLD AUTO: 0 10E3/UL (ref 0–1.3)
MONOCYTES NFR BLD AUTO: 1 %
NEUTROPHILS # BLD AUTO: 1.3 10E3/UL (ref 1.6–8.3)
NEUTROPHILS NFR BLD AUTO: 65 %
NRBC # BLD AUTO: 0 10E3/UL
NRBC BLD AUTO-RTO: 0 /100
PLATELET # BLD AUTO: 307 10E3/UL (ref 150–450)
POTASSIUM SERPL-SCNC: 2.9 MMOL/L (ref 3.4–5.3)
PROT SERPL-MCNC: 5.6 G/DL (ref 6.4–8.3)
RBC # BLD AUTO: 3.32 10E6/UL (ref 3.8–5.2)
SODIUM SERPL-SCNC: 140 MMOL/L (ref 135–145)
WBC # BLD AUTO: 1.9 10E3/UL (ref 4–11)

## 2024-05-01 PROCEDURE — 96372 THER/PROPH/DIAG INJ SC/IM: CPT | Performed by: INTERNAL MEDICINE

## 2024-05-01 PROCEDURE — 84155 ASSAY OF PROTEIN SERUM: CPT

## 2024-05-01 PROCEDURE — 82374 ASSAY BLOOD CARBON DIOXIDE: CPT

## 2024-05-01 PROCEDURE — 36415 COLL VENOUS BLD VENIPUNCTURE: CPT

## 2024-05-01 PROCEDURE — 250N000011 HC RX IP 250 OP 636: Mod: JZ | Performed by: INTERNAL MEDICINE

## 2024-05-01 PROCEDURE — 83615 LACTATE (LD) (LDH) ENZYME: CPT

## 2024-05-01 PROCEDURE — 85041 AUTOMATED RBC COUNT: CPT

## 2024-05-01 PROCEDURE — 36591 DRAW BLOOD OFF VENOUS DEVICE: CPT

## 2024-05-01 RX ORDER — POTASSIUM CHLORIDE 1500 MG/1
20 TABLET, EXTENDED RELEASE ORAL 2 TIMES DAILY
Qty: 4 TABLET | Refills: 0 | Status: ON HOLD | OUTPATIENT
Start: 2024-05-01 | End: 2024-05-17

## 2024-05-01 RX ADMIN — PEGFILGRASTIM 6 MG: 6 INJECTION SUBCUTANEOUS at 13:35

## 2024-05-01 ASSESSMENT — PAIN SCALES - GENERAL: PAINLEVEL: NO PAIN (0)

## 2024-05-01 NOTE — NURSING NOTE
Chief Complaint   Patient presents with    Blood Draw     Vitals taken, venipuncture labs drawn, checked into next appt     /72 (BP Location: Left arm, Patient Position: Sitting, Cuff Size: Adult Regular)   Pulse 59   Temp 97.4  F (36.3  C) (Oral)   Resp 16   Wt 65.1 kg (143 lb 8 oz)   SpO2 99%   BMI 23.62 kg/m    Santana Magaña RN on 5/1/2024 at 1:05 PM

## 2024-05-01 NOTE — PROGRESS NOTES
Infusion Nursing Note:  Clementine Kathleen presents today for Neulasta injection.    Patient seen by provider today: No   present during visit today: Not Applicable.    Note: Pt assessed upon arrival to infusion suite. Denies fever, chills, cough, SOB or other signs/symptoms of infection. Pt states she is eating/drinking adequately and denies any dizziness or lightheadedness. Pt does take Claritin and Tylenol for a few days to help with bone pain/discomfort from Neulasta injection. No other issues or concerns.     After patient had left CMP resulted with K 2.9. Charisse Weinberg CNP notified.     Post Infusion Assessment:  Patient tolerated injection to back to right arm without incident.       Discharge Plan:   Patient declined prescription refills.  AVS to patient via [employer].  Patient will return 5/3/24 for next appointment.   Patient discharged in stable condition accompanied by: self.  Departure Mode: Ambulatory.      Louise Madrigal RN

## 2024-05-02 DIAGNOSIS — E87.6 HYPOKALEMIA: ICD-10-CM

## 2024-05-03 ENCOUNTER — PATIENT OUTREACH (OUTPATIENT)
Dept: ONCOLOGY | Facility: CLINIC | Age: 29
End: 2024-05-03

## 2024-05-03 ENCOUNTER — LAB (OUTPATIENT)
Dept: LAB | Facility: CLINIC | Age: 29
End: 2024-05-03
Payer: COMMERCIAL

## 2024-05-03 DIAGNOSIS — C85.28 MEDIASTINAL LARGE B-CELL LYMPHOMA OF LYMPH NODES OF MULTIPLE REGIONS (H): ICD-10-CM

## 2024-05-03 DIAGNOSIS — C83.32 DIFFUSE LARGE B-CELL LYMPHOMA OF INTRATHORACIC LYMPH NODES (H): Primary | ICD-10-CM

## 2024-05-03 DIAGNOSIS — Z29.89 NEED FOR PNEUMOCYSTIS PROPHYLAXIS: ICD-10-CM

## 2024-05-03 LAB
ALBUMIN SERPL BCG-MCNC: 4 G/DL (ref 3.5–5.2)
ALP SERPL-CCNC: 88 U/L (ref 40–150)
ALT SERPL W P-5'-P-CCNC: 6 U/L (ref 0–50)
ANION GAP SERPL CALCULATED.3IONS-SCNC: 10 MMOL/L (ref 7–15)
AST SERPL W P-5'-P-CCNC: 10 U/L (ref 0–45)
BASOPHILS # BLD AUTO: ABNORMAL 10*3/UL
BASOPHILS # BLD MANUAL: 0 10E3/UL (ref 0–0.2)
BASOPHILS NFR BLD AUTO: ABNORMAL %
BASOPHILS NFR BLD MANUAL: 0 %
BILIRUB SERPL-MCNC: 0.7 MG/DL
BUN SERPL-MCNC: 14.3 MG/DL (ref 6–20)
CALCIUM SERPL-MCNC: 8.9 MG/DL (ref 8.6–10)
CHLORIDE SERPL-SCNC: 105 MMOL/L (ref 98–107)
CREAT SERPL-MCNC: 0.58 MG/DL (ref 0.51–0.95)
DEPRECATED HCO3 PLAS-SCNC: 24 MMOL/L (ref 22–29)
EGFRCR SERPLBLD CKD-EPI 2021: >90 ML/MIN/1.73M2
EOSINOPHIL # BLD AUTO: ABNORMAL 10*3/UL
EOSINOPHIL # BLD MANUAL: 0 10E3/UL (ref 0–0.7)
EOSINOPHIL NFR BLD AUTO: ABNORMAL %
EOSINOPHIL NFR BLD MANUAL: 0 %
ERYTHROCYTE [DISTWIDTH] IN BLOOD BY AUTOMATED COUNT: 18.5 % (ref 10–15)
GLUCOSE SERPL-MCNC: 115 MG/DL (ref 70–99)
HCT VFR BLD AUTO: 30.8 % (ref 35–47)
HGB BLD-MCNC: 10.4 G/DL (ref 11.7–15.7)
IMM GRANULOCYTES # BLD: ABNORMAL 10*3/UL
IMM GRANULOCYTES NFR BLD: ABNORMAL %
LDH SERPL L TO P-CCNC: 200 U/L (ref 0–250)
LYMPHOCYTES # BLD AUTO: ABNORMAL 10*3/UL
LYMPHOCYTES # BLD MANUAL: 0.7 10E3/UL (ref 0.8–5.3)
LYMPHOCYTES NFR BLD AUTO: ABNORMAL %
LYMPHOCYTES NFR BLD MANUAL: 2 %
MCH RBC QN AUTO: 31.7 PG (ref 26.5–33)
MCHC RBC AUTO-ENTMCNC: 33.8 G/DL (ref 31.5–36.5)
MCV RBC AUTO: 94 FL (ref 78–100)
MONOCYTES # BLD AUTO: ABNORMAL 10*3/UL
MONOCYTES # BLD MANUAL: 0 10E3/UL (ref 0–1.3)
MONOCYTES NFR BLD AUTO: ABNORMAL %
MONOCYTES NFR BLD MANUAL: 0 %
NEUTROPHILS # BLD AUTO: ABNORMAL 10*3/UL
NEUTROPHILS # BLD MANUAL: 35.2 10E3/UL (ref 1.6–8.3)
NEUTROPHILS NFR BLD AUTO: ABNORMAL %
NEUTROPHILS NFR BLD MANUAL: 98 %
NRBC # BLD AUTO: 0 10E3/UL
NRBC BLD AUTO-RTO: 0 /100
PLAT MORPH BLD: ABNORMAL
PLATELET # BLD AUTO: 207 10E3/UL (ref 150–450)
POTASSIUM SERPL-SCNC: 3.9 MMOL/L (ref 3.4–5.3)
PROT SERPL-MCNC: 5.9 G/DL (ref 6.4–8.3)
RBC # BLD AUTO: 3.28 10E6/UL (ref 3.8–5.2)
RBC MORPH BLD: ABNORMAL
SODIUM SERPL-SCNC: 139 MMOL/L (ref 135–145)
URATE SERPL-MCNC: 1.5 MG/DL (ref 2.4–5.7)
WBC # BLD AUTO: 35.9 10E3/UL (ref 4–11)

## 2024-05-03 PROCEDURE — 80053 COMPREHEN METABOLIC PANEL: CPT

## 2024-05-03 PROCEDURE — 94642 AEROSOL INHALATION TREATMENT: CPT | Performed by: INTERNAL MEDICINE

## 2024-05-03 PROCEDURE — 85007 BL SMEAR W/DIFF WBC COUNT: CPT

## 2024-05-03 PROCEDURE — 84550 ASSAY OF BLOOD/URIC ACID: CPT

## 2024-05-03 PROCEDURE — 85027 COMPLETE CBC AUTOMATED: CPT

## 2024-05-03 PROCEDURE — 83615 LACTATE (LD) (LDH) ENZYME: CPT

## 2024-05-03 PROCEDURE — 94640 AIRWAY INHALATION TREATMENT: CPT | Performed by: INTERNAL MEDICINE

## 2024-05-03 PROCEDURE — 36415 COLL VENOUS BLD VENIPUNCTURE: CPT

## 2024-05-03 RX ORDER — PENTAMIDINE ISETHIONATE 300 MG/300MG
300 INHALANT RESPIRATORY (INHALATION)
Status: CANCELLED
Start: 2024-05-24

## 2024-05-03 RX ORDER — ALBUTEROL SULFATE 90 UG/1
1-2 AEROSOL, METERED RESPIRATORY (INHALATION)
Status: CANCELLED
Start: 2024-05-24

## 2024-05-03 RX ORDER — MEPERIDINE HYDROCHLORIDE 25 MG/ML
25 INJECTION INTRAMUSCULAR; INTRAVENOUS; SUBCUTANEOUS EVERY 30 MIN PRN
Status: CANCELLED | OUTPATIENT
Start: 2024-05-24

## 2024-05-03 RX ORDER — DIPHENHYDRAMINE HYDROCHLORIDE 50 MG/ML
50 INJECTION INTRAMUSCULAR; INTRAVENOUS
Status: CANCELLED
Start: 2024-05-24

## 2024-05-03 RX ORDER — METHYLPREDNISOLONE SODIUM SUCCINATE 125 MG/2ML
125 INJECTION, POWDER, LYOPHILIZED, FOR SOLUTION INTRAMUSCULAR; INTRAVENOUS
Status: CANCELLED
Start: 2024-05-24

## 2024-05-03 RX ORDER — ALBUTEROL SULFATE 0.83 MG/ML
2.5 SOLUTION RESPIRATORY (INHALATION)
Status: CANCELLED | OUTPATIENT
Start: 2024-05-24

## 2024-05-03 RX ORDER — PENTAMIDINE ISETHIONATE 300 MG/300MG
300 INHALANT RESPIRATORY (INHALATION)
Status: DISCONTINUED | OUTPATIENT
Start: 2024-05-03 | End: 2024-05-03 | Stop reason: HOSPADM

## 2024-05-03 RX ORDER — ALBUTEROL SULFATE 0.83 MG/ML
2.5 SOLUTION RESPIRATORY (INHALATION) ONCE
Status: CANCELLED
Start: 2024-05-24 | End: 2024-05-24

## 2024-05-03 RX ORDER — ALBUTEROL SULFATE 0.83 MG/ML
2.5 SOLUTION RESPIRATORY (INHALATION) ONCE
Status: DISCONTINUED | OUTPATIENT
Start: 2024-05-03 | End: 2024-05-03 | Stop reason: HOSPADM

## 2024-05-03 RX ORDER — EPINEPHRINE 1 MG/ML
0.3 INJECTION, SOLUTION, CONCENTRATE INTRAVENOUS EVERY 5 MIN PRN
Status: CANCELLED | OUTPATIENT
Start: 2024-05-24

## 2024-05-03 RX ADMIN — PENTAMIDINE ISETHIONATE 300 MG: 300 INHALANT RESPIRATORY (INHALATION) at 09:34

## 2024-05-03 NOTE — NURSING NOTE
Chief Complaint   Patient presents with    Labs Only     Labs drawn via  by RN in lab.       Labs collected from venipuncture by RN.     Patricia Mclean RN

## 2024-05-03 NOTE — PROGRESS NOTES
Patient was seen today for a Pentamidine nebulizer tx ordered by Rosemarie Gonzalez PA-C.    Patient was first given 4 puffs of Albuterol via spacer  after which Pentamidine 300 mg (Lot # [id number]) mixed with 6cc Sterile H20 was administered through a filtered nebulizer.    Pre-treatment: SpO2 = 97%   HR = 95   BBS = Clear   Post-treatment: SpO2 = 99%  HR = 90  BBS = Clear    No adverse side effects noted by the patient.    This service today was provided under the direct supervision of Dr. Collier, who was available if needed.     Procedure was completed by Ara Hollis. ELADIA

## 2024-05-06 ENCOUNTER — TELEPHONE (OUTPATIENT)
Dept: ONCOLOGY | Facility: CLINIC | Age: 29
End: 2024-05-06
Payer: COMMERCIAL

## 2024-05-06 NOTE — TELEPHONE ENCOUNTER
STD paperwork completed, checked for accuracy, signed and faxed to [employer] @ [phone]. A copy was made, sent to scanning and original mailed to patient at home address.    Successful transmission verified in Right Fax.      Louise Rome

## 2024-05-06 NOTE — TELEPHONE ENCOUNTER
STD forms received via Fax from [employer].      Forms to be completed and put in folder for provider to approve.    Fax #:  [phone]  Claim: [id number]    Lizzie Warren

## 2024-05-07 ENCOUNTER — LAB (OUTPATIENT)
Dept: LAB | Facility: CLINIC | Age: 29
End: 2024-05-07
Attending: INTERNAL MEDICINE
Payer: COMMERCIAL

## 2024-05-07 DIAGNOSIS — C85.28 MEDIASTINAL LARGE B-CELL LYMPHOMA OF LYMPH NODES OF MULTIPLE REGIONS (H): ICD-10-CM

## 2024-05-07 LAB
ALBUMIN SERPL BCG-MCNC: 4.1 G/DL (ref 3.5–5.2)
ALP SERPL-CCNC: 60 U/L (ref 40–150)
ALT SERPL W P-5'-P-CCNC: 6 U/L (ref 0–50)
ANION GAP SERPL CALCULATED.3IONS-SCNC: 9 MMOL/L (ref 7–15)
AST SERPL W P-5'-P-CCNC: 10 U/L (ref 0–45)
BASOPHILS # BLD AUTO: ABNORMAL 10*3/UL
BASOPHILS # BLD MANUAL: 0.1 10E3/UL (ref 0–0.2)
BASOPHILS NFR BLD AUTO: ABNORMAL %
BASOPHILS NFR BLD MANUAL: 4 %
BILIRUB SERPL-MCNC: 0.2 MG/DL
BUN SERPL-MCNC: 11 MG/DL (ref 6–20)
CALCIUM SERPL-MCNC: 9 MG/DL (ref 8.6–10)
CHLORIDE SERPL-SCNC: 104 MMOL/L (ref 98–107)
CREAT SERPL-MCNC: 0.59 MG/DL (ref 0.51–0.95)
DEPRECATED HCO3 PLAS-SCNC: 25 MMOL/L (ref 22–29)
EGFRCR SERPLBLD CKD-EPI 2021: >90 ML/MIN/1.73M2
EOSINOPHIL # BLD AUTO: ABNORMAL 10*3/UL
EOSINOPHIL # BLD MANUAL: 0 10E3/UL (ref 0–0.7)
EOSINOPHIL NFR BLD AUTO: ABNORMAL %
EOSINOPHIL NFR BLD MANUAL: 3 %
ERYTHROCYTE [DISTWIDTH] IN BLOOD BY AUTOMATED COUNT: 16.9 % (ref 10–15)
GLUCOSE SERPL-MCNC: 89 MG/DL (ref 70–99)
HCT VFR BLD AUTO: 29.7 % (ref 35–47)
HGB BLD-MCNC: 10.1 G/DL (ref 11.7–15.7)
IMM GRANULOCYTES # BLD: ABNORMAL 10*3/UL
IMM GRANULOCYTES NFR BLD: ABNORMAL %
LDH SERPL L TO P-CCNC: 122 U/L (ref 0–250)
LYMPHOCYTES # BLD AUTO: ABNORMAL 10*3/UL
LYMPHOCYTES # BLD MANUAL: 0.5 10E3/UL (ref 0.8–5.3)
LYMPHOCYTES NFR BLD AUTO: ABNORMAL %
LYMPHOCYTES NFR BLD MANUAL: 34 %
MCH RBC QN AUTO: 31.6 PG (ref 26.5–33)
MCHC RBC AUTO-ENTMCNC: 34 G/DL (ref 31.5–36.5)
MCV RBC AUTO: 93 FL (ref 78–100)
METAMYELOCYTES # BLD MANUAL: 0 10E3/UL
METAMYELOCYTES NFR BLD MANUAL: 2 %
MONOCYTES # BLD AUTO: ABNORMAL 10*3/UL
MONOCYTES # BLD MANUAL: 0.3 10E3/UL (ref 0–1.3)
MONOCYTES NFR BLD AUTO: ABNORMAL %
MONOCYTES NFR BLD MANUAL: 16 %
NEUTROPHILS # BLD AUTO: ABNORMAL 10*3/UL
NEUTROPHILS # BLD MANUAL: 0.7 10E3/UL (ref 1.6–8.3)
NEUTROPHILS NFR BLD AUTO: ABNORMAL %
NEUTROPHILS NFR BLD MANUAL: 41 %
NRBC # BLD AUTO: 0 10E3/UL
NRBC BLD AUTO-RTO: 0 /100
PLAT MORPH BLD: ABNORMAL
PLATELET # BLD AUTO: 54 10E3/UL (ref 150–450)
POTASSIUM SERPL-SCNC: 4.5 MMOL/L (ref 3.4–5.3)
PROT SERPL-MCNC: 6.2 G/DL (ref 6.4–8.3)
RBC # BLD AUTO: 3.2 10E6/UL (ref 3.8–5.2)
RBC MORPH BLD: ABNORMAL
SODIUM SERPL-SCNC: 138 MMOL/L (ref 135–145)
WBC # BLD AUTO: 1.6 10E3/UL (ref 4–11)

## 2024-05-07 PROCEDURE — 85007 BL SMEAR W/DIFF WBC COUNT: CPT

## 2024-05-07 PROCEDURE — 36415 COLL VENOUS BLD VENIPUNCTURE: CPT

## 2024-05-07 PROCEDURE — 85014 HEMATOCRIT: CPT

## 2024-05-07 PROCEDURE — 83615 LACTATE (LD) (LDH) ENZYME: CPT

## 2024-05-07 PROCEDURE — 82040 ASSAY OF SERUM ALBUMIN: CPT

## 2024-05-07 NOTE — NURSING NOTE
Chief Complaint   Patient presents with    Blood Draw     Labs drawn via  by RN in lab.      Labs collected from venipuncture by RN.     Ida Gutierres RN

## 2024-05-08 ENCOUNTER — MYC MEDICAL ADVICE (OUTPATIENT)
Dept: ONCOLOGY | Facility: CLINIC | Age: 29
End: 2024-05-08
Payer: COMMERCIAL

## 2024-05-08 DIAGNOSIS — C83.32 DIFFUSE LARGE B-CELL LYMPHOMA OF INTRATHORACIC LYMPH NODES (H): ICD-10-CM

## 2024-05-08 RX ORDER — ACYCLOVIR 400 MG/1
400 TABLET ORAL 2 TIMES DAILY
Qty: 60 TABLET | Refills: 4 | Status: ON HOLD | OUTPATIENT
Start: 2024-05-08 | End: 2024-06-08

## 2024-05-08 NOTE — TELEPHONE ENCOUNTER
Medication requested: acyclovir 400 mg tablet    Last prescribing provider: Nereida Thomas CNP    Last clinic visit date: 4/25/24 with Dr. Oakes    Recommendations for requested medication (if none, N/A): Per patient: On mychart it looks like the prescription has refills, but Arturo tells me the prescription has been closed by the provider and needs a renewal.     Any other pertinent information (if none, N/A): I called the pharmacy and waited on hold for > 20 minutes and got disconnected. I wonder if they need a new prescription because Nereida is no longer here at our clinic?    Refilled: Y/N, if NO, why?    Pended and Routed to Vanessa Oakes MD

## 2024-05-09 NOTE — TELEPHONE ENCOUNTER
Clementine called to check status of Acyclovir prescription    This writer informed Clementine signed and sent

## 2024-05-10 ENCOUNTER — LAB (OUTPATIENT)
Dept: LAB | Facility: CLINIC | Age: 29
End: 2024-05-10
Attending: INTERNAL MEDICINE
Payer: COMMERCIAL

## 2024-05-10 DIAGNOSIS — C85.28 MEDIASTINAL LARGE B-CELL LYMPHOMA OF LYMPH NODES OF MULTIPLE REGIONS (H): ICD-10-CM

## 2024-05-10 LAB
ALBUMIN SERPL BCG-MCNC: 4 G/DL (ref 3.5–5.2)
ALP SERPL-CCNC: 97 U/L (ref 40–150)
ALT SERPL W P-5'-P-CCNC: 7 U/L (ref 0–50)
ANION GAP SERPL CALCULATED.3IONS-SCNC: 10 MMOL/L (ref 7–15)
AST SERPL W P-5'-P-CCNC: 23 U/L (ref 0–45)
BASOPHILS # BLD AUTO: ABNORMAL 10*3/UL
BASOPHILS # BLD MANUAL: 1.3 10E3/UL (ref 0–0.2)
BASOPHILS NFR BLD AUTO: ABNORMAL %
BASOPHILS NFR BLD MANUAL: 6 %
BILIRUB SERPL-MCNC: <0.2 MG/DL
BUN SERPL-MCNC: 10.8 MG/DL (ref 6–20)
CALCIUM SERPL-MCNC: 9 MG/DL (ref 8.6–10)
CHLORIDE SERPL-SCNC: 109 MMOL/L (ref 98–107)
CREAT SERPL-MCNC: 0.82 MG/DL (ref 0.51–0.95)
DACRYOCYTES BLD QL SMEAR: SLIGHT
DEPRECATED HCO3 PLAS-SCNC: 22 MMOL/L (ref 22–29)
EGFRCR SERPLBLD CKD-EPI 2021: >90 ML/MIN/1.73M2
EOSINOPHIL # BLD AUTO: ABNORMAL 10*3/UL
EOSINOPHIL # BLD MANUAL: 0 10E3/UL (ref 0–0.7)
EOSINOPHIL NFR BLD AUTO: ABNORMAL %
EOSINOPHIL NFR BLD MANUAL: 0 %
ERYTHROCYTE [DISTWIDTH] IN BLOOD BY AUTOMATED COUNT: 18.8 % (ref 10–15)
FRAGMENTS BLD QL SMEAR: SLIGHT
GLUCOSE SERPL-MCNC: 97 MG/DL (ref 70–99)
HCT VFR BLD AUTO: 30 % (ref 35–47)
HGB BLD-MCNC: 10.2 G/DL (ref 11.7–15.7)
IMM GRANULOCYTES # BLD: ABNORMAL 10*3/UL
IMM GRANULOCYTES NFR BLD: ABNORMAL %
LDH SERPL L TO P-CCNC: 400 U/L (ref 0–250)
LYMPHOCYTES # BLD AUTO: ABNORMAL 10*3/UL
LYMPHOCYTES # BLD MANUAL: 2 10E3/UL (ref 0.8–5.3)
LYMPHOCYTES NFR BLD AUTO: ABNORMAL %
LYMPHOCYTES NFR BLD MANUAL: 9 %
MCH RBC QN AUTO: 31.9 PG (ref 26.5–33)
MCHC RBC AUTO-ENTMCNC: 34 G/DL (ref 31.5–36.5)
MCV RBC AUTO: 94 FL (ref 78–100)
METAMYELOCYTES # BLD MANUAL: 0.2 10E3/UL
METAMYELOCYTES NFR BLD MANUAL: 1 %
MONOCYTES # BLD AUTO: ABNORMAL 10*3/UL
MONOCYTES # BLD MANUAL: 2.2 10E3/UL (ref 0–1.3)
MONOCYTES NFR BLD AUTO: ABNORMAL %
MONOCYTES NFR BLD MANUAL: 10 %
MYELOCYTES # BLD MANUAL: 0.9 10E3/UL
MYELOCYTES NFR BLD MANUAL: 4 %
NEUTROPHILS # BLD AUTO: ABNORMAL 10*3/UL
NEUTROPHILS # BLD MANUAL: 14.7 10E3/UL (ref 1.6–8.3)
NEUTROPHILS NFR BLD AUTO: ABNORMAL %
NEUTROPHILS NFR BLD MANUAL: 67 %
NRBC # BLD AUTO: 0.3 10E3/UL
NRBC # BLD AUTO: 1.1 10E3/UL
NRBC BLD AUTO-RTO: 1 /100
NRBC BLD MANUAL-RTO: 5 %
PLAT MORPH BLD: ABNORMAL
PLATELET # BLD AUTO: 138 10E3/UL (ref 150–450)
POLYCHROMASIA BLD QL SMEAR: SLIGHT
POTASSIUM SERPL-SCNC: 4.1 MMOL/L (ref 3.4–5.3)
PROMYELOCYTES # BLD MANUAL: 0.7 10E3/UL
PROMYELOCYTES NFR BLD MANUAL: 3 %
PROT SERPL-MCNC: 6.2 G/DL (ref 6.4–8.3)
RBC # BLD AUTO: 3.2 10E6/UL (ref 3.8–5.2)
RBC MORPH BLD: ABNORMAL
SODIUM SERPL-SCNC: 141 MMOL/L (ref 135–145)
URATE SERPL-MCNC: 5.2 MG/DL (ref 2.4–5.7)
WBC # BLD AUTO: 22 10E3/UL (ref 4–11)

## 2024-05-10 PROCEDURE — 84550 ASSAY OF BLOOD/URIC ACID: CPT

## 2024-05-10 PROCEDURE — 36591 DRAW BLOOD OFF VENOUS DEVICE: CPT

## 2024-05-10 PROCEDURE — 85027 COMPLETE CBC AUTOMATED: CPT

## 2024-05-10 PROCEDURE — 85007 BL SMEAR W/DIFF WBC COUNT: CPT

## 2024-05-10 PROCEDURE — 83615 LACTATE (LD) (LDH) ENZYME: CPT

## 2024-05-10 PROCEDURE — 84520 ASSAY OF UREA NITROGEN: CPT

## 2024-05-10 NOTE — NURSING NOTE
Chief Complaint   Patient presents with    Lab Only     Labs drawn with  by rn.     Labs drawn with  by rn.  Pt tolerated well.      Africa Sandhu RN

## 2024-05-13 ENCOUNTER — LAB (OUTPATIENT)
Dept: LAB | Facility: CLINIC | Age: 29
End: 2024-05-13
Attending: INTERNAL MEDICINE
Payer: COMMERCIAL

## 2024-05-13 DIAGNOSIS — C85.28 MEDIASTINAL LARGE B-CELL LYMPHOMA OF LYMPH NODES OF MULTIPLE REGIONS (H): ICD-10-CM

## 2024-05-13 LAB
ALBUMIN SERPL BCG-MCNC: 4.3 G/DL (ref 3.5–5.2)
ALP SERPL-CCNC: 90 U/L (ref 40–150)
ALT SERPL W P-5'-P-CCNC: 9 U/L (ref 0–50)
ANION GAP SERPL CALCULATED.3IONS-SCNC: 9 MMOL/L (ref 7–15)
AST SERPL W P-5'-P-CCNC: 18 U/L (ref 0–45)
BASOPHILS # BLD AUTO: ABNORMAL 10*3/UL
BASOPHILS # BLD MANUAL: 0 10E3/UL (ref 0–0.2)
BASOPHILS NFR BLD AUTO: ABNORMAL %
BASOPHILS NFR BLD MANUAL: 0 %
BILIRUB SERPL-MCNC: 0.2 MG/DL
BUN SERPL-MCNC: 9.7 MG/DL (ref 6–20)
CALCIUM SERPL-MCNC: 9.3 MG/DL (ref 8.6–10)
CHLORIDE SERPL-SCNC: 107 MMOL/L (ref 98–107)
CREAT SERPL-MCNC: 0.75 MG/DL (ref 0.51–0.95)
DACRYOCYTES BLD QL SMEAR: SLIGHT
DEPRECATED HCO3 PLAS-SCNC: 24 MMOL/L (ref 22–29)
EGFRCR SERPLBLD CKD-EPI 2021: >90 ML/MIN/1.73M2
EOSINOPHIL # BLD AUTO: ABNORMAL 10*3/UL
EOSINOPHIL # BLD MANUAL: 0 10E3/UL (ref 0–0.7)
EOSINOPHIL NFR BLD AUTO: ABNORMAL %
EOSINOPHIL NFR BLD MANUAL: 0 %
ERYTHROCYTE [DISTWIDTH] IN BLOOD BY AUTOMATED COUNT: 19.5 % (ref 10–15)
GLUCOSE SERPL-MCNC: 95 MG/DL (ref 70–99)
HCT VFR BLD AUTO: 32.9 % (ref 35–47)
HGB BLD-MCNC: 10.8 G/DL (ref 11.7–15.7)
IMM GRANULOCYTES # BLD: ABNORMAL 10*3/UL
IMM GRANULOCYTES NFR BLD: ABNORMAL %
LDH SERPL L TO P-CCNC: 321 U/L (ref 0–250)
LYMPHOCYTES # BLD AUTO: ABNORMAL 10*3/UL
LYMPHOCYTES # BLD MANUAL: 0.8 10E3/UL (ref 0.8–5.3)
LYMPHOCYTES NFR BLD AUTO: ABNORMAL %
LYMPHOCYTES NFR BLD MANUAL: 5 %
MCH RBC QN AUTO: 31.8 PG (ref 26.5–33)
MCHC RBC AUTO-ENTMCNC: 32.8 G/DL (ref 31.5–36.5)
MCV RBC AUTO: 97 FL (ref 78–100)
METAMYELOCYTES # BLD MANUAL: 0.3 10E3/UL
METAMYELOCYTES NFR BLD MANUAL: 2 %
MONOCYTES # BLD AUTO: ABNORMAL 10*3/UL
MONOCYTES # BLD MANUAL: 1.1 10E3/UL (ref 0–1.3)
MONOCYTES NFR BLD AUTO: ABNORMAL %
MONOCYTES NFR BLD MANUAL: 7 %
MYELOCYTES # BLD MANUAL: 0.3 10E3/UL
MYELOCYTES NFR BLD MANUAL: 2 %
NEUTROPHILS # BLD AUTO: ABNORMAL 10*3/UL
NEUTROPHILS # BLD MANUAL: 13 10E3/UL (ref 1.6–8.3)
NEUTROPHILS NFR BLD AUTO: ABNORMAL %
NEUTROPHILS NFR BLD MANUAL: 83 %
NRBC # BLD AUTO: 0.1 10E3/UL
NRBC BLD AUTO-RTO: 1 /100
PLAT MORPH BLD: ABNORMAL
PLATELET # BLD AUTO: 250 10E3/UL (ref 150–450)
POLYCHROMASIA BLD QL SMEAR: SLIGHT
POTASSIUM SERPL-SCNC: 4.3 MMOL/L (ref 3.4–5.3)
PROMYELOCYTES # BLD MANUAL: 0.2 10E3/UL
PROMYELOCYTES NFR BLD MANUAL: 1 %
PROT SERPL-MCNC: 6.6 G/DL (ref 6.4–8.3)
RBC # BLD AUTO: 3.4 10E6/UL (ref 3.8–5.2)
RBC MORPH BLD: ABNORMAL
SODIUM SERPL-SCNC: 140 MMOL/L (ref 135–145)
WBC # BLD AUTO: 15.7 10E3/UL (ref 4–11)

## 2024-05-13 PROCEDURE — 85007 BL SMEAR W/DIFF WBC COUNT: CPT

## 2024-05-13 PROCEDURE — 36591 DRAW BLOOD OFF VENOUS DEVICE: CPT

## 2024-05-13 PROCEDURE — 82247 BILIRUBIN TOTAL: CPT

## 2024-05-13 PROCEDURE — 85027 COMPLETE CBC AUTOMATED: CPT

## 2024-05-13 PROCEDURE — 83615 LACTATE (LD) (LDH) ENZYME: CPT

## 2024-05-13 NOTE — NURSING NOTE
Chief Complaint   Patient presents with    Blood Draw     Labs drawn via  by RN in lab.      Labs drawn via venipuncture. Patient declined port access.   Gita Olvera RN

## 2024-05-15 NOTE — PROGRESS NOTES
" [facility]      FOLLOW-UP VISIT NOTE                       May 16, 2024  Subjective   REASON FOR VISIT: Follow up PMBCL, pre Cycle 5 R-EPOCH    ONCOLOGIC SUMMARY:  Diagnosis:  Primary mediastinal B-cell lymphoma dx 1/2024, presenting with cervical LAD. 1/29/24 FNA indeterminate; 2/9/24 left level 3 excisional LN biopsy showed large B-cell lymphoma with rare abnormal B-cells on flow cytometry. Although CD23 IHC was negative, IHC for , MAL, and PD-L1 were positive in majority of the neoplastic cells suggesting PMBCL over DLBCL NOS. [id number] gene expression profile also consistent with PMBCL. FISH with gain of BCL2 but no MYC, BCL2, or BCL6 rearrangements. PET showed bilateral cervical and mediastinal hypermetabolic lymphadenopathy (largest 4.4 cm with SUVmax 27 in a prevascular LN); no disease below diaphragm.     Intent of treatment: Curative    Treatment history:  2/22/24: Cycle 1 R-EPOCH, dose level 1  3/14/24: Cycle 2 R-EPOCH, dose level 2. PET after 2 cycles shows CR!  4/4/24: Cycle 3 R-EPOCH, dose level 3 but vincristine capped at 2 mg d/t neuropathy  4/25/24: Cycle 4 R-EPOCH, dose level 4 but held vincristine d/t neuropathy  5/16/24  Cycle 5 R-EPOCH, dose level 5 but vincristine capped at 1.2 mg d/t neuropathy    INTERVAL HISTORY:  Clementine is here with Dima prior to Cycle 5 R-EPOCH.   She finished her Ziopatch yesterday and sent it in.  She had some \"heart flutters\" but did not have any SOB.  She had worsening mouth sores this cycle but resolved after the first week.  The pads of her fingers are still numb but thinks this may have improved slightly.  Her energy levels have been a little lower this cycle and is more fatigued at the end of the day.  She is still able to be active and go for walks.  Her appetite is good and eating and drinking well.  She ate more soft foods with the mouth sores.  Her nausea was the first couple days home from the hospital which she manages with anti-emetics and " TUMS.  Bowels are regular and had no issues this cycle  Denies fevers/chills, night sweats, new pains, new lump/bumps, bleeding issues, rashes, all other acute issues.     I have reviewed and updated the following:  Past Medical History:   Diagnosis Date    Anxiety     Cervical lymphadenopathy     GERD     Interstitial cystitis       No Known Allergies    Current Outpatient Medications:     magic mouthwash (ENTER INGREDIENTS IN COMMENTS) suspension, Swish and spit 15 ml every 6 hours as needed, Disp: 200 mL, Rfl: 0    acyclovir (ZOVIRAX) 400 MG tablet, Take 1 tablet (400 mg) by mouth 2 times daily, Disp: 60 tablet, Rfl: 4    cimetidine (TAGAMET) 200 MG tablet, Take 200 mg by mouth 2 times daily, Disp: , Rfl:     dexAMETHasone (DECADRON) 4 MG tablet, Take 2 tablets (8 mg) by mouth daily On 5/1 and 5/2., Disp: 4 tablet, Rfl: 0    fluconazole (DIFLUCAN) 200 MG tablet, Take 1 tablet (200 mg) by mouth daily, Disp: 30 tablet, Rfl: 0    levofloxacin (LEVAQUIN) 250 MG tablet, Take 1 tablet (250 mg) by mouth daily, Disp: 30 tablet, Rfl: 0    Levonorgestrel (KYLEENA IU), 1 Device by Intrauterine route once, Disp: , Rfl:     LORazepam (ATIVAN) 0.5 MG tablet, Take 1 tablet (0.5 mg) by mouth every 6 hours as needed for nausea or vomiting, Disp: 30 tablet, Rfl: 0    metoclopramide (REGLAN) 10 MG tablet, Take 1 tablet (10 mg) by mouth 4 times daily as needed (constipation), Disp: 30 tablet, Rfl: 3    modafinil (PROVIGIL) 200 MG tablet, Take 200 mg by mouth daily as needed (hypersomnia), Disp: , Rfl:     OLANZapine zydis (ZYPREXA) 5 MG ODT, Take 1 tablet (5 mg) by mouth at bedtime, Disp: 10 tablet, Rfl: 0    ondansetron (ZOFRAN) 4 MG tablet, Take 1-2 tablets (4-8 mg) by mouth every 8 hours as needed for nausea or vomiting, Disp: 30 tablet, Rfl: 0    pantoprazole (PROTONIX) 40 MG EC tablet, Take 1 tablet (40 mg) by mouth 2 times daily, Disp: 60 tablet, Rfl: 0    polyethylene glycol (MIRALAX) 17 GM/Dose powder, Take 17 g by mouth 2  times daily, Disp: , Rfl:     potassium chloride ER (K-TAB) 20 MEQ CR tablet, Take 1 tablet (20 mEq) by mouth 2 times daily, Disp: 4 tablet, Rfl: 0    predniSONE (DELTASONE) 50 MG tablet, Take 2 tablets (100 mg) by mouth 2 times daily . Take on 4/29 PM and 4/30 AM., Disp: 4 tablet, Rfl: 0    prochlorperazine (COMPAZINE) 10 MG tablet, Take 1 tablet (10 mg) by mouth every 6 hours as needed for nausea or vomiting, Disp: 30 tablet, Rfl: 0    senna-docusate (SENOKOT-S/PERICOLACE) 8.6-50 MG tablet, Take 1 tablet by mouth 2 times daily, Disp: , Rfl:     Current Facility-Administered Medications:     heparin 100 unit/mL injection 5 mL, 5 mL, Intracatheter, Q1H PRN, Promes, Charisse, APRN CNP, 5 mL at 05/16/24 0808    REVIEW OF SYSTEMS:  10-point ROS reviewed and negative other than that mentioned in HPI.     Objective   VITAL SIGNS  /71 (BP Location: Left arm, Patient Position: Sitting, Cuff Size: Adult Regular)   Pulse 80   Temp 97.6  F (36.4  C) (Oral)   Resp 16   Wt 63.2 kg (139 lb 4.8 oz)   SpO2 97%   BMI 22.93 kg/m      ECOG PS: 0     PHYSICAL EXAM:  General: Awake, alert, in no acute distress. Oriented x 3.    HEENT: Chemo induced alopecia. Normocephalic, atraumatic. No scleral icterus. Oral mucosa pink and moist with no signs of thrush or  mucositis  Lymph: No cervical, supraclavicular, and axillary LAD appreciated.   CV: Regular rate and  regular rhythm. No murmurs, rubs, or gallops appreciated.  Resp: Good inspiratory effort, lungs clear to auscultation bilaterally.  Ext: No peripheral edema bilaterally.  Neuro: CN II-XII grossly intact. No focal deficits.   Skin: No rash, unusual bruising or prominent lesions.  Psych: Pleasant, normal affect.    LABS:  I reviewed the following labs:  Lab Results   Component Value Date    WBC 6.0 05/16/2024    HGB 10.6 (L) 05/16/2024    HCT 32.4 (L) 05/16/2024    MCV 96 05/16/2024     05/16/2024    INR 1.05 04/28/2024    PTT 29 04/28/2024     Lab Results   Component  Value Date     05/16/2024    POTASSIUM 4.1 05/16/2024    CR 0.76 05/16/2024    BUN 12.0 05/16/2024    CHLORIDE 107 05/16/2024    DEE 9.1 05/16/2024    GLC 94 05/16/2024      Lab Results   Component Value Date    ALT 13 05/16/2024    AST 17 05/16/2024    ALKPHOS 78 05/16/2024    BILITOTAL <0.2 05/16/2024      Lab Results   Component Value Date     05/16/2024    URIC 5.2 05/10/2024      4/1/24 PET:  IMPRESSION: In this patient with primary mediastinal B-cell lymphoma  following 2 cycles of chemotherapy:   1. Complete metabolic response by Lugano criteria  2. Resolution of cervical and mediastinal hypermetabolic  lymphadenopathy.    4/15/24 CTA chest:  IMPRESSION:   1. Exam is negative for acute pulmonary embolism.  2. No acute finding in the chest.    Assessment & Plan   Primary mediastinal B-cell lymphoma  Dx 1/2024, presenting cervical lymphadenopathy. Initial FNA suggestive of possible Hodgkin lymphoma; however excisional biopsy showed large B-cell lymphoma with differential diagnosis of primary mediastinal large B-cell lymphoma vs DLBCL NOS. Additional IHC staining for , MAL, and PD-L1, and [id number] gene expression profile all favor PMBCL diagnosis over DLBCL NOS. PET with cervical and mediastinal lymphadenopathy only. LDH normal.   Previously discussed PMBCL diagnosis and plan for DA-R-EPOCH x 6 cycles. In the single-arm phase 2 prospective study of R-EPOCH (without radiation) for PMBCL, 5-year EFS was 93% and OS 97% (10.[facility]/[id number]).   Cycle 1 R-EPOCH 2/22/24, tolerating well so far other than constipation.  Cycle 2 R-EPOCH 3/14/24. ANC dread >0.5 last cycle so qualifies for 20% increase in doxorubicin, etoposide, and cyclophosphamide (dose level 2).   PET after 2 cycles shows complete response!   Cycle 3 R-EPOCH 4/4/24- qualified for 20% increase in doxorubicin, etoposide, and cyclophosphamide again (dose level 3); however capped vincristine at 2 mg due to neuropathy.   Cycle 4 R-EPOCH-  "Qualified for 20% dose increase again (dose level 4) but omitted vincristine  to allow some recovery of neuropathy.  S/p C4 and tolerating treatment well.  She did have some mouth sores that resolved after a week and was sent MMW incase she develops after C5.  Neuropathy did mildly improve in the R hand with withholding Vincristine.  Proceed with Cycle 5 R-EPOCH today (5/16) dose level 5 but dose cap vincristine 1.2 mg  Next PET at E[facility] given CR already.     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 Initially affecting fingertips only.   Cap vincristine at 2 mg starting Cycle 3.  S/p C3 and neuropathy in fingertips have worsened with some difficulty using her phone or buttoning shirts and now neuropathy in bilateral feet that does not impact balance of walking.   Held vincristine Cycle 4 to allow some recovery of neuropathy and consider reintroducing at 50% dose for Cycle 5/6.   Neuropathy did very mildly improve in the R hand after holding Vincristine with C4- will reintroduce vincristine with C5 at capped dose 1.2 mg    Palpitations/tachycardia  SOB  3/25/24 NT proBNP normal at <36.   4/15/24 CTA chest negative for PE. Ziopatch requested but did not hear from Cardiology   Ziopatch completed 5/16- no episodes of SOB after C4 and a couple episodes of \"heart fluttering\"    Constipation- improved  Scheduled Miralax and Senna-S.  PRN Reglan.     Fertility preservation  Established with [facility] and underwent egg retrieval on 2/18/24.      Ppx  TLS ppx: now done with allopurinol.  ID ppx: continue  mg BID. Levo and fluc when ANC <1.0. Monthly pentamidine for PJP ppx (last given 4/1/24, next due ~5/1/24).  Vaccines: up to date on COVID and flu shots.       PLAN:  Admit for Cycle 5 R-EPOCH, dose level 5 but vincristine capped at 1.2 mg d/t neuropathy  Labs, AMEENA, and admission for C6 6/6  Repeat PET at EOT  Dr. Oakes to review EOT imaging    46 minutes spent on the date of the encounter doing chart review, " review of test results, interpretation of tests, patient visit, and documentation     The longitudinal plan of care for the diagnosis(es)/condition(s) as documented were addressed during this visit. Due to the added complexity in care, I will continue to support Clementine in the subsequent management and with ongoing continuity of care.     CALOS Martinez CNP

## 2024-05-16 ENCOUNTER — ONCOLOGY VISIT (OUTPATIENT)
Dept: ONCOLOGY | Facility: CLINIC | Age: 29
End: 2024-05-16
Payer: COMMERCIAL

## 2024-05-16 ENCOUNTER — HOSPITAL ENCOUNTER (INPATIENT)
Facility: CLINIC | Age: 29
LOS: 4 days | Discharge: HOME | End: 2024-05-20
Payer: COMMERCIAL

## 2024-05-16 ENCOUNTER — APPOINTMENT (OUTPATIENT)
Dept: LAB | Facility: CLINIC | Age: 29
End: 2024-05-16
Attending: INTERNAL MEDICINE
Payer: COMMERCIAL

## 2024-05-16 VITALS
OXYGEN SATURATION: 97 % | TEMPERATURE: 97.6 F | DIASTOLIC BLOOD PRESSURE: 71 MMHG | WEIGHT: 139.3 LBS | BODY MASS INDEX: 22.93 KG/M2 | HEART RATE: 80 BPM | RESPIRATION RATE: 16 BRPM | SYSTOLIC BLOOD PRESSURE: 107 MMHG

## 2024-05-16 DIAGNOSIS — Z76.89 PREVENTION OF CHEMOTHERAPY-INDUCED NEUTROPENIA: Primary | ICD-10-CM

## 2024-05-16 DIAGNOSIS — Z76.89 PREVENTION OF CHEMOTHERAPY-INDUCED NEUTROPENIA: ICD-10-CM

## 2024-05-16 DIAGNOSIS — K12.31 MUCOSITIS DUE TO CHEMOTHERAPY: ICD-10-CM

## 2024-05-16 DIAGNOSIS — C83.32 DIFFUSE LARGE B-CELL LYMPHOMA OF INTRATHORACIC LYMPH NODES (H): ICD-10-CM

## 2024-05-16 DIAGNOSIS — G62.9 NEUROPATHY: ICD-10-CM

## 2024-05-16 DIAGNOSIS — R06.02 SHORTNESS OF BREATH: ICD-10-CM

## 2024-05-16 DIAGNOSIS — R00.0 TACHYCARDIA: ICD-10-CM

## 2024-05-16 DIAGNOSIS — R11.0 NAUSEA: ICD-10-CM

## 2024-05-16 DIAGNOSIS — C85.28 MEDIASTINAL LARGE B-CELL LYMPHOMA OF LYMPH NODES OF MULTIPLE REGIONS (H): Primary | ICD-10-CM

## 2024-05-16 LAB
ACANTHOCYTES BLD QL SMEAR: ABNORMAL
ALBUMIN SERPL BCG-MCNC: 4.3 G/DL (ref 3.5–5.2)
ALP SERPL-CCNC: 78 U/L (ref 40–150)
ALT SERPL W P-5'-P-CCNC: 13 U/L (ref 0–50)
ANION GAP SERPL CALCULATED.3IONS-SCNC: 9 MMOL/L (ref 7–15)
AST SERPL W P-5'-P-CCNC: 17 U/L (ref 0–45)
AUER BODIES BLD QL SMEAR: ABNORMAL
BASO STIPL BLD QL SMEAR: ABNORMAL
BASOPHILS # BLD AUTO: 0 10E3/UL (ref 0–0.2)
BASOPHILS NFR BLD AUTO: 1 %
BILIRUB SERPL-MCNC: <0.2 MG/DL
BITE CELLS BLD QL SMEAR: ABNORMAL
BLISTER CELLS BLD QL SMEAR: ABNORMAL
BUN SERPL-MCNC: 12 MG/DL (ref 6–20)
BURR CELLS BLD QL SMEAR: ABNORMAL
CALCIUM SERPL-MCNC: 9.1 MG/DL (ref 8.6–10)
CHLORIDE SERPL-SCNC: 107 MMOL/L (ref 98–107)
CREAT SERPL-MCNC: 0.76 MG/DL (ref 0.51–0.95)
DACRYOCYTES BLD QL SMEAR: ABNORMAL
DEPRECATED HCO3 PLAS-SCNC: 25 MMOL/L (ref 22–29)
EGFRCR SERPLBLD CKD-EPI 2021: >90 ML/MIN/1.73M2
ELLIPTOCYTES BLD QL SMEAR: ABNORMAL
EOSINOPHIL # BLD AUTO: 0 10E3/UL (ref 0–0.7)
EOSINOPHIL NFR BLD AUTO: 0 %
ERYTHROCYTE [DISTWIDTH] IN BLOOD BY AUTOMATED COUNT: 19.3 % (ref 10–15)
FIBRINOGEN PPP-MCNC: 265 MG/DL (ref 170–490)
FRAGMENTS BLD QL SMEAR: SLIGHT
GLUCOSE SERPL-MCNC: 94 MG/DL (ref 70–99)
HCT VFR BLD AUTO: 32.4 % (ref 35–47)
HGB BLD-MCNC: 10.6 G/DL (ref 11.7–15.7)
HGB C CRYSTALS: ABNORMAL
HOWELL-JOLLY BOD BLD QL SMEAR: ABNORMAL
IMM GRANULOCYTES # BLD: 0.2 10E3/UL
IMM GRANULOCYTES NFR BLD: 4 %
INR PPP: 1.08 (ref 0.85–1.15)
LDH SERPL L TO P-CCNC: 239 U/L (ref 0–250)
LYMPHOCYTES # BLD AUTO: 0.8 10E3/UL (ref 0.8–5.3)
LYMPHOCYTES NFR BLD AUTO: 14 %
MAGNESIUM SERPL-MCNC: 1.7 MG/DL (ref 1.7–2.3)
MCH RBC QN AUTO: 31.5 PG (ref 26.5–33)
MCHC RBC AUTO-ENTMCNC: 32.7 G/DL (ref 31.5–36.5)
MCV RBC AUTO: 96 FL (ref 78–100)
MONOCYTES # BLD AUTO: 0.9 10E3/UL (ref 0–1.3)
MONOCYTES NFR BLD AUTO: 14 %
NEUTROPHILS # BLD AUTO: 4.1 10E3/UL (ref 1.6–8.3)
NEUTROPHILS NFR BLD AUTO: 67 %
NEUTS HYPERSEG BLD QL SMEAR: ABNORMAL
NRBC # BLD AUTO: 0 10E3/UL
NRBC BLD AUTO-RTO: 0 /100
PHOSPHATE SERPL-MCNC: 3.9 MG/DL (ref 2.5–4.5)
PLAT MORPH BLD: ABNORMAL
PLATELET # BLD AUTO: 356 10E3/UL (ref 150–450)
POLYCHROMASIA BLD QL SMEAR: SLIGHT
POTASSIUM SERPL-SCNC: 4.1 MMOL/L (ref 3.4–5.3)
PROT SERPL-MCNC: 6.4 G/DL (ref 6.4–8.3)
RBC # BLD AUTO: 3.36 10E6/UL (ref 3.8–5.2)
RBC AGGLUT BLD QL: ABNORMAL
RBC MORPH BLD: ABNORMAL
ROULEAUX BLD QL SMEAR: ABNORMAL
SICKLE CELLS BLD QL SMEAR: ABNORMAL
SMUDGE CELLS BLD QL SMEAR: ABNORMAL
SODIUM SERPL-SCNC: 141 MMOL/L (ref 135–145)
SPHEROCYTES BLD QL SMEAR: ABNORMAL
STOMATOCYTES BLD QL SMEAR: ABNORMAL
TARGETS BLD QL SMEAR: ABNORMAL
TOXIC GRANULES BLD QL SMEAR: ABNORMAL
URATE SERPL-MCNC: 3.8 MG/DL (ref 2.4–5.7)
VARIANT LYMPHS BLD QL SMEAR: ABNORMAL
WBC # BLD AUTO: 6 10E3/UL (ref 4–11)

## 2024-05-16 PROCEDURE — 86900 BLOOD TYPING SEROLOGIC ABO: CPT

## 2024-05-16 PROCEDURE — 3E0430M INTRODUCTION OF MONOCLONAL ANTIBODY INTO CENTRAL VEIN, PERCUTANEOUS APPROACH: ICD-10-PCS

## 2024-05-16 PROCEDURE — 99223 1ST HOSP IP/OBS HIGH 75: CPT | Mod: AI

## 2024-05-16 PROCEDURE — 250N000011 HC RX IP 250 OP 636

## 2024-05-16 PROCEDURE — 99215 OFFICE O/P EST HI 40 MIN: CPT

## 2024-05-16 PROCEDURE — 83735 ASSAY OF MAGNESIUM: CPT

## 2024-05-16 PROCEDURE — 80053 COMPREHEN METABOLIC PANEL: CPT

## 2024-05-16 PROCEDURE — 250N000013 HC RX MED GY IP 250 OP 250 PS 637

## 2024-05-16 PROCEDURE — 3E04305 INTRODUCTION OF OTHER ANTINEOPLASTIC INTO CENTRAL VEIN, PERCUTANEOUS APPROACH: ICD-10-PCS

## 2024-05-16 PROCEDURE — 83615 LACTATE (LD) (LDH) ENZYME: CPT

## 2024-05-16 PROCEDURE — 250N000012 HC RX MED GY IP 250 OP 636 PS 637

## 2024-05-16 PROCEDURE — 84550 ASSAY OF BLOOD/URIC ACID: CPT

## 2024-05-16 PROCEDURE — 99213 OFFICE O/P EST LOW 20 MIN: CPT

## 2024-05-16 PROCEDURE — 85384 FIBRINOGEN ACTIVITY: CPT

## 2024-05-16 PROCEDURE — 120N000002 HC R&B MED SURG/OB UMMC

## 2024-05-16 PROCEDURE — 36591 DRAW BLOOD OFF VENOUS DEVICE: CPT

## 2024-05-16 PROCEDURE — 258N000003 HC RX IP 258 OP 636

## 2024-05-16 PROCEDURE — 85610 PROTHROMBIN TIME: CPT

## 2024-05-16 PROCEDURE — 84100 ASSAY OF PHOSPHORUS: CPT

## 2024-05-16 PROCEDURE — 85025 COMPLETE CBC W/AUTO DIFF WBC: CPT

## 2024-05-16 RX ORDER — AMOXICILLIN 250 MG
1 CAPSULE ORAL 2 TIMES DAILY PRN
Status: DISCONTINUED | OUTPATIENT
Start: 2024-05-16 | End: 2024-05-20 | Stop reason: HOSPADM

## 2024-05-16 RX ORDER — HEPARIN SODIUM,PORCINE 10 UNIT/ML
5-10 VIAL (ML) INTRAVENOUS
Status: DISCONTINUED | OUTPATIENT
Start: 2024-05-16 | End: 2024-05-20 | Stop reason: HOSPADM

## 2024-05-16 RX ORDER — LORAZEPAM 2 MG/ML
.5-1 INJECTION INTRAMUSCULAR EVERY 6 HOURS PRN
Status: CANCELLED | OUTPATIENT
Start: 2024-05-16

## 2024-05-16 RX ORDER — POTASSIUM CHLORIDE 750 MG/1
20 CAPSULE, EXTENDED RELEASE ORAL 2 TIMES DAILY
Status: DISCONTINUED | OUTPATIENT
Start: 2024-05-16 | End: 2024-05-20 | Stop reason: HOSPADM

## 2024-05-16 RX ORDER — ONDANSETRON 8 MG/1
16 TABLET, FILM COATED ORAL
Status: DISCONTINUED | OUTPATIENT
Start: 2024-05-16 | End: 2024-05-20 | Stop reason: HOSPADM

## 2024-05-16 RX ORDER — ALBUTEROL SULFATE 90 UG/1
1-2 AEROSOL, METERED RESPIRATORY (INHALATION)
Status: DISCONTINUED | OUTPATIENT
Start: 2024-05-16 | End: 2024-05-20 | Stop reason: HOSPADM

## 2024-05-16 RX ORDER — ALBUTEROL SULFATE 0.83 MG/ML
2.5 SOLUTION RESPIRATORY (INHALATION)
Status: DISCONTINUED | OUTPATIENT
Start: 2024-05-16 | End: 2024-05-20 | Stop reason: HOSPADM

## 2024-05-16 RX ORDER — PANTOPRAZOLE SODIUM 40 MG/1
40 TABLET, DELAYED RELEASE ORAL 2 TIMES DAILY
Status: DISCONTINUED | OUTPATIENT
Start: 2024-05-16 | End: 2024-05-20 | Stop reason: HOSPADM

## 2024-05-16 RX ORDER — METHYLPREDNISOLONE SODIUM SUCCINATE 125 MG/2ML
125 INJECTION, POWDER, LYOPHILIZED, FOR SOLUTION INTRAMUSCULAR; INTRAVENOUS
Status: CANCELLED
Start: 2024-05-16

## 2024-05-16 RX ORDER — DIPHENHYDRAMINE HCL 25 MG
50 CAPSULE ORAL ONCE
Qty: 2 CAPSULE | Refills: 0 | Status: COMPLETED | OUTPATIENT
Start: 2024-05-16 | End: 2024-05-16

## 2024-05-16 RX ORDER — AMOXICILLIN 250 MG
2 CAPSULE ORAL 2 TIMES DAILY PRN
Status: DISCONTINUED | OUTPATIENT
Start: 2024-05-16 | End: 2024-05-20 | Stop reason: HOSPADM

## 2024-05-16 RX ORDER — LORAZEPAM 0.5 MG/1
.5-1 TABLET ORAL EVERY 6 HOURS PRN
Status: DISCONTINUED | OUTPATIENT
Start: 2024-05-16 | End: 2024-05-17

## 2024-05-16 RX ORDER — FAMOTIDINE 10 MG
10 TABLET ORAL 2 TIMES DAILY
Status: DISCONTINUED | OUTPATIENT
Start: 2024-05-16 | End: 2024-05-16

## 2024-05-16 RX ORDER — ACETAMINOPHEN 325 MG/1
650 TABLET ORAL EVERY 4 HOURS PRN
Status: DISCONTINUED | OUTPATIENT
Start: 2024-05-16 | End: 2024-05-20 | Stop reason: HOSPADM

## 2024-05-16 RX ORDER — PANTOPRAZOLE SODIUM 40 MG/1
40 TABLET, DELAYED RELEASE ORAL 2 TIMES DAILY
Status: DISCONTINUED | OUTPATIENT
Start: 2024-05-17 | End: 2024-05-16

## 2024-05-16 RX ORDER — DEXAMETHASONE 4 MG/1
8 TABLET ORAL DAILY
Status: CANCELLED
Start: 2024-05-22

## 2024-05-16 RX ORDER — ENOXAPARIN SODIUM 100 MG/ML
40 INJECTION SUBCUTANEOUS EVERY 24 HOURS
Status: DISCONTINUED | OUTPATIENT
Start: 2024-05-16 | End: 2024-05-20 | Stop reason: HOSPADM

## 2024-05-16 RX ORDER — ACETAMINOPHEN 325 MG/1
650 TABLET ORAL ONCE
Qty: 2 TABLET | Refills: 0 | Status: COMPLETED | OUTPATIENT
Start: 2024-05-16 | End: 2024-05-16

## 2024-05-16 RX ORDER — FLUORIDE TOOTHPASTE
5-10 TOOTHPASTE DENTAL 4 TIMES DAILY PRN
Status: DISCONTINUED | OUTPATIENT
Start: 2024-05-16 | End: 2024-05-20 | Stop reason: HOSPADM

## 2024-05-16 RX ORDER — DIPHENHYDRAMINE HCL 25 MG
50 CAPSULE ORAL ONCE
Status: CANCELLED
Start: 2024-05-16

## 2024-05-16 RX ORDER — PREDNISONE 50 MG/1
100 TABLET ORAL 2 TIMES DAILY
Status: CANCELLED
Start: 2024-05-16

## 2024-05-16 RX ORDER — DEXAMETHASONE 4 MG/1
8 TABLET ORAL DAILY
Qty: 4 TABLET | Refills: 0 | Status: DISCONTINUED | OUTPATIENT
Start: 2024-05-21 | End: 2024-05-20 | Stop reason: HOSPADM

## 2024-05-16 RX ORDER — ACETAMINOPHEN 325 MG/1
650 TABLET ORAL ONCE
Status: CANCELLED
Start: 2024-05-16

## 2024-05-16 RX ORDER — DIPHENHYDRAMINE HYDROCHLORIDE 50 MG/ML
50 INJECTION INTRAMUSCULAR; INTRAVENOUS
Status: DISCONTINUED | OUTPATIENT
Start: 2024-05-16 | End: 2024-05-20 | Stop reason: HOSPADM

## 2024-05-16 RX ORDER — ALBUTEROL SULFATE 0.83 MG/ML
2.5 SOLUTION RESPIRATORY (INHALATION)
Status: CANCELLED | OUTPATIENT
Start: 2024-05-16

## 2024-05-16 RX ORDER — POLYETHYLENE GLYCOL 3350 17 G/17G
17 POWDER, FOR SOLUTION ORAL DAILY PRN
Status: DISCONTINUED | OUTPATIENT
Start: 2024-05-16 | End: 2024-05-20 | Stop reason: HOSPADM

## 2024-05-16 RX ORDER — PROCHLORPERAZINE MALEATE 5 MG
10 TABLET ORAL EVERY 6 HOURS PRN
Status: DISCONTINUED | OUTPATIENT
Start: 2024-05-16 | End: 2024-05-20 | Stop reason: HOSPADM

## 2024-05-16 RX ORDER — ACYCLOVIR 400 MG/1
400 TABLET ORAL 2 TIMES DAILY
Status: DISCONTINUED | OUTPATIENT
Start: 2024-05-16 | End: 2024-05-20 | Stop reason: HOSPADM

## 2024-05-16 RX ORDER — METOCLOPRAMIDE 5 MG/1
10 TABLET ORAL 4 TIMES DAILY PRN
Status: DISCONTINUED | OUTPATIENT
Start: 2024-05-16 | End: 2024-05-20 | Stop reason: HOSPADM

## 2024-05-16 RX ORDER — LORAZEPAM 0.5 MG/1
.5-1 TABLET ORAL EVERY 6 HOURS PRN
Status: CANCELLED
Start: 2024-05-16

## 2024-05-16 RX ORDER — METHYLPREDNISOLONE SODIUM SUCCINATE 125 MG/2ML
125 INJECTION, POWDER, LYOPHILIZED, FOR SOLUTION INTRAMUSCULAR; INTRAVENOUS
Status: DISCONTINUED | OUTPATIENT
Start: 2024-05-16 | End: 2024-05-20 | Stop reason: HOSPADM

## 2024-05-16 RX ORDER — DIPHENHYDRAMINE HYDROCHLORIDE AND LIDOCAINE HYDROCHLORIDE AND ALUMINUM HYDROXIDE AND MAGNESIUM HYDRO
10 KIT EVERY 6 HOURS PRN
Status: DISCONTINUED | OUTPATIENT
Start: 2024-05-16 | End: 2024-05-20 | Stop reason: HOSPADM

## 2024-05-16 RX ORDER — LEVOFLOXACIN 250 MG/1
250 TABLET, FILM COATED ORAL DAILY
Status: DISCONTINUED | OUTPATIENT
Start: 2024-05-16 | End: 2024-05-20 | Stop reason: HOSPADM

## 2024-05-16 RX ORDER — ONDANSETRON 2 MG/ML
4 INJECTION INTRAMUSCULAR; INTRAVENOUS EVERY 8 HOURS PRN
Status: DISCONTINUED | OUTPATIENT
Start: 2024-05-16 | End: 2024-05-17

## 2024-05-16 RX ORDER — LORAZEPAM 2 MG/ML
.5-1 INJECTION INTRAMUSCULAR EVERY 6 HOURS PRN
Status: DISCONTINUED | OUTPATIENT
Start: 2024-05-16 | End: 2024-05-17

## 2024-05-16 RX ORDER — OLANZAPINE 5 MG/1
5 TABLET, ORALLY DISINTEGRATING ORAL AT BEDTIME
Status: DISCONTINUED | OUTPATIENT
Start: 2024-05-16 | End: 2024-05-20 | Stop reason: HOSPADM

## 2024-05-16 RX ORDER — EPINEPHRINE 1 MG/ML
0.3 INJECTION, SOLUTION INTRAMUSCULAR; SUBCUTANEOUS EVERY 5 MIN PRN
Status: CANCELLED | OUTPATIENT
Start: 2024-05-16

## 2024-05-16 RX ORDER — PROCHLORPERAZINE MALEATE 5 MG
5 TABLET ORAL EVERY 6 HOURS PRN
Status: DISCONTINUED | OUTPATIENT
Start: 2024-05-16 | End: 2024-05-16

## 2024-05-16 RX ORDER — ONDANSETRON 4 MG/1
4 TABLET, ORALLY DISINTEGRATING ORAL EVERY 8 HOURS PRN
Status: DISCONTINUED | OUTPATIENT
Start: 2024-05-16 | End: 2024-05-17

## 2024-05-16 RX ORDER — MEPERIDINE HYDROCHLORIDE 25 MG/ML
25 INJECTION INTRAMUSCULAR; INTRAVENOUS; SUBCUTANEOUS EVERY 30 MIN PRN
Status: CANCELLED | OUTPATIENT
Start: 2024-05-16

## 2024-05-16 RX ORDER — AMOXICILLIN 250 MG
2 CAPSULE ORAL 2 TIMES DAILY
Status: DISCONTINUED | OUTPATIENT
Start: 2024-05-16 | End: 2024-05-20 | Stop reason: HOSPADM

## 2024-05-16 RX ORDER — ALBUTEROL SULFATE 90 UG/1
1-2 AEROSOL, METERED RESPIRATORY (INHALATION)
Status: CANCELLED
Start: 2024-05-16

## 2024-05-16 RX ORDER — ONDANSETRON 4 MG/1
4 TABLET, FILM COATED ORAL EVERY 8 HOURS PRN
Status: DISCONTINUED | OUTPATIENT
Start: 2024-05-16 | End: 2024-05-17

## 2024-05-16 RX ORDER — AMOXICILLIN 250 MG
2 CAPSULE ORAL 2 TIMES DAILY
Status: CANCELLED | OUTPATIENT
Start: 2024-05-16

## 2024-05-16 RX ORDER — MEPERIDINE HYDROCHLORIDE 25 MG/ML
25 INJECTION INTRAMUSCULAR; INTRAVENOUS; SUBCUTANEOUS EVERY 30 MIN PRN
Status: DISCONTINUED | OUTPATIENT
Start: 2024-05-16 | End: 2024-05-20 | Stop reason: HOSPADM

## 2024-05-16 RX ORDER — HEPARIN SODIUM,PORCINE 10 UNIT/ML
5-10 VIAL (ML) INTRAVENOUS EVERY 24 HOURS
Status: DISCONTINUED | OUTPATIENT
Start: 2024-05-16 | End: 2024-05-20 | Stop reason: HOSPADM

## 2024-05-16 RX ORDER — HEPARIN SODIUM (PORCINE) LOCK FLUSH IV SOLN 100 UNIT/ML 100 UNIT/ML
5 SOLUTION INTRAVENOUS
Status: DISCONTINUED | OUTPATIENT
Start: 2024-05-16 | End: 2024-05-16 | Stop reason: HOSPADM

## 2024-05-16 RX ORDER — EPINEPHRINE 1 MG/ML
0.3 INJECTION, SOLUTION, CONCENTRATE INTRAVENOUS EVERY 5 MIN PRN
Status: DISCONTINUED | OUTPATIENT
Start: 2024-05-16 | End: 2024-05-20 | Stop reason: HOSPADM

## 2024-05-16 RX ORDER — PROCHLORPERAZINE MALEATE 10 MG
10 TABLET ORAL EVERY 6 HOURS PRN
Status: CANCELLED
Start: 2024-05-16

## 2024-05-16 RX ORDER — LORAZEPAM 0.5 MG/1
0.5 TABLET ORAL EVERY 6 HOURS PRN
Status: DISCONTINUED | OUTPATIENT
Start: 2024-05-16 | End: 2024-05-16

## 2024-05-16 RX ORDER — HEPARIN SODIUM (PORCINE) LOCK FLUSH IV SOLN 100 UNIT/ML 100 UNIT/ML
5-10 SOLUTION INTRAVENOUS
Status: DISCONTINUED | OUTPATIENT
Start: 2024-05-16 | End: 2024-05-20 | Stop reason: HOSPADM

## 2024-05-16 RX ORDER — PREDNISONE 50 MG/1
100 TABLET ORAL 2 TIMES DAILY
Qty: 20 TABLET | Refills: 0 | Status: COMPLETED | OUTPATIENT
Start: 2024-05-16 | End: 2024-05-20

## 2024-05-16 RX ORDER — DIPHENHYDRAMINE HYDROCHLORIDE 50 MG/ML
50 INJECTION INTRAMUSCULAR; INTRAVENOUS
Status: CANCELLED
Start: 2024-05-16

## 2024-05-16 RX ORDER — ONDANSETRON 8 MG/1
16 TABLET, FILM COATED ORAL
Status: CANCELLED
Start: 2024-05-16

## 2024-05-16 RX ORDER — FLUCONAZOLE 200 MG/1
200 TABLET ORAL DAILY
Status: DISCONTINUED | OUTPATIENT
Start: 2024-05-16 | End: 2024-05-20 | Stop reason: HOSPADM

## 2024-05-16 RX ADMIN — DOCUSATE SODIUM 50 MG AND SENNOSIDES 8.6 MG 1 TABLET: 8.6; 5 TABLET, FILM COATED ORAL at 19:27

## 2024-05-16 RX ADMIN — PANTOPRAZOLE SODIUM 40 MG: 40 TABLET, DELAYED RELEASE ORAL at 19:27

## 2024-05-16 RX ADMIN — RITUXIMAB-ABBS 600 MG: 10 INJECTION, SOLUTION INTRAVENOUS at 13:51

## 2024-05-16 RX ADMIN — ONDANSETRON HYDROCHLORIDE 16 MG: 8 TABLET, FILM COATED ORAL at 15:06

## 2024-05-16 RX ADMIN — DIPHENHYDRAMINE HYDROCHLORIDE 50 MG: 25 CAPSULE ORAL at 12:59

## 2024-05-16 RX ADMIN — PREDNISONE 100 MG: 50 TABLET ORAL at 12:59

## 2024-05-16 RX ADMIN — ACETAMINOPHEN 650 MG: 325 TABLET, FILM COATED ORAL at 13:00

## 2024-05-16 RX ADMIN — ENOXAPARIN SODIUM 40 MG: 40 INJECTION SUBCUTANEOUS at 16:21

## 2024-05-16 RX ADMIN — LEVOFLOXACIN 250 MG: 250 TABLET, FILM COATED ORAL at 13:00

## 2024-05-16 RX ADMIN — POTASSIUM CHLORIDE 20 MEQ: 750 CAPSULE, EXTENDED RELEASE ORAL at 19:28

## 2024-05-16 RX ADMIN — ETOPOSIDE 175 MG: 20 INJECTION, SOLUTION INTRAVENOUS at 15:32

## 2024-05-16 RX ADMIN — ACYCLOVIR 400 MG: 400 TABLET ORAL at 19:27

## 2024-05-16 RX ADMIN — VINCRISTINE SULFATE 0.3 MG: 1 INJECTION, SOLUTION INTRAVENOUS at 15:32

## 2024-05-16 RX ADMIN — FLUCONAZOLE 200 MG: 200 TABLET ORAL at 13:00

## 2024-05-16 RX ADMIN — PREDNISONE 100 MG: 50 TABLET ORAL at 19:27

## 2024-05-16 RX ADMIN — CIMETIDINE 200 MG: 200 TABLET, FILM COATED ORAL at 19:28

## 2024-05-16 RX ADMIN — Medication 5 ML: at 08:08

## 2024-05-16 ASSESSMENT — ACTIVITIES OF DAILY LIVING (ADL)
ADLS_ACUITY_SCORE: 18
WEAR_GLASSES_OR_BLIND: NO
CONCENTRATING,_REMEMBERING_OR_MAKING_DECISIONS_DIFFICULTY: NO
ADLS_ACUITY_SCORE: 18
ADLS_ACUITY_SCORE: 18
CHANGE_IN_FUNCTIONAL_STATUS_SINCE_ONSET_OF_CURRENT_ILLNESS/INJURY: NO
FALL_HISTORY_WITHIN_LAST_SIX_MONTHS: NO
DOING_ERRANDS_INDEPENDENTLY_DIFFICULTY: NO
DRESSING/BATHING_DIFFICULTY: NO
ADLS_ACUITY_SCORE: 18
HEARING_DIFFICULTY_OR_DEAF: NO
DIFFICULTY_COMMUNICATING: NO
ADLS_ACUITY_SCORE: 18
WALKING_OR_CLIMBING_STAIRS_DIFFICULTY: NO
DIFFICULTY_EATING/SWALLOWING: NO

## 2024-05-16 ASSESSMENT — PAIN SCALES - GENERAL: PAINLEVEL: NO PAIN (0)

## 2024-05-16 NOTE — PROGRESS NOTES
Nursing Focus: Rituxan    D: Positive blood return via port. Insertion site is clean/dry/intact, dressing intact with no complaints of pain.  I: Premedications given per order (see electronic medical administration record). Dose # 1 of Rituxan started to infuse via rapid infusion. Reviewed pt teaching on chemotherapy side effects. Pt denies need for further teaching. Chemotherapy double checked per protocol by two chemotherapy competent RN's.   A: Tolerating procedure well. Denies nausea and or pain.   P: Continue to monitor urine output and symptoms of nausea. Screen for symptoms of toxicity. Notify MD with any concerns and or changes in pt's condition. Continue with plan of care.

## 2024-05-16 NOTE — NURSING NOTE
Chief Complaint   Patient presents with    Port Draw     Vitals taken, port accessed, labs drawn, heparin locked, checked into next appt     /71 (BP Location: Left arm, Patient Position: Sitting, Cuff Size: Adult Regular)   Pulse 80   Temp 97.6  F (36.4  C) (Oral)   Resp 16   Wt 63.2 kg (139 lb 4.8 oz)   SpO2 97%   BMI 22.93 kg/m    Santana Magaña RN on 5/16/2024 at 8:11 AM

## 2024-05-16 NOTE — LETTER
"    5/16/2024         RE: Clementine Kathleen  [address]  [place] [address]        Dear Colleague,    Thank you for referring your patient, Clementine Kathleen, to the [facility]. Please see a copy of my visit note below.     [facility]      FOLLOW-UP VISIT NOTE                       May 16, 2024  Subjective  REASON FOR VISIT: Follow up PMBCL, pre Cycle 5 R-EPOCH    ONCOLOGIC SUMMARY:  Diagnosis:  Primary mediastinal B-cell lymphoma dx 1/2024, presenting with cervical LAD. 1/29/24 FNA indeterminate; 2/9/24 left level 3 excisional LN biopsy showed large B-cell lymphoma with rare abnormal B-cells on flow cytometry. Although CD23 IHC was negative, IHC for , MAL, and PD-L1 were positive in majority of the neoplastic cells suggesting PMBCL over DLBCL NOS. [id number] gene expression profile also consistent with PMBCL. FISH with gain of BCL2 but no MYC, BCL2, or BCL6 rearrangements. PET showed bilateral cervical and mediastinal hypermetabolic lymphadenopathy (largest 4.4 cm with SUVmax 27 in a prevascular LN); no disease below diaphragm.     Intent of treatment: Curative    Treatment history:  2/22/24: Cycle 1 R-EPOCH, dose level 1  3/14/24: Cycle 2 R-EPOCH, dose level 2. PET after 2 cycles shows CR!  4/4/24: Cycle 3 R-EPOCH, dose level 3 but vincristine capped at 2 mg d/t neuropathy  4/25/24: Cycle 4 R-EPOCH, dose level 4 but held vincristine d/t neuropathy  5/16/24  Cycle 5 R-EPOCH, dose level 5 but vincristine capped at 1.2 mg d/t neuropathy    INTERVAL HISTORY:  Clementine is here with Dima prior to Cycle 5 R-EPOCH.   She finished her Ziopatch yesterday and sent it in.  She had some \"heart flutters\" but did not have any SOB.  She had worsening mouth sores this cycle but resolved after the first week.  The pads of her fingers are still numb but thinks this may have improved slightly.  Her energy levels have been a little lower this cycle and is more fatigued at the end of " the day.  She is still able to be active and go for walks.  Her appetite is good and eating and drinking well.  She ate more soft foods with the mouth sores.  Her nausea was the first couple days home from the hospital which she manages with anti-emetics and TUMS.  Bowels are regular and had no issues this cycle  Denies fevers/chills, night sweats, new pains, new lump/bumps, bleeding issues, rashes, all other acute issues.     I have reviewed and updated the following:  Past Medical History:   Diagnosis Date    Anxiety     Cervical lymphadenopathy     GERD     Interstitial cystitis       No Known Allergies    Current Outpatient Medications:     magic mouthwash (ENTER INGREDIENTS IN COMMENTS) suspension, Swish and spit 15 ml every 6 hours as needed, Disp: 200 mL, Rfl: 0    acyclovir (ZOVIRAX) 400 MG tablet, Take 1 tablet (400 mg) by mouth 2 times daily, Disp: 60 tablet, Rfl: 4    cimetidine (TAGAMET) 200 MG tablet, Take 200 mg by mouth 2 times daily, Disp: , Rfl:     dexAMETHasone (DECADRON) 4 MG tablet, Take 2 tablets (8 mg) by mouth daily On 5/1 and 5/2., Disp: 4 tablet, Rfl: 0    fluconazole (DIFLUCAN) 200 MG tablet, Take 1 tablet (200 mg) by mouth daily, Disp: 30 tablet, Rfl: 0    levofloxacin (LEVAQUIN) 250 MG tablet, Take 1 tablet (250 mg) by mouth daily, Disp: 30 tablet, Rfl: 0    Levonorgestrel (KYLEENA IU), 1 Device by Intrauterine route once, Disp: , Rfl:     LORazepam (ATIVAN) 0.5 MG tablet, Take 1 tablet (0.5 mg) by mouth every 6 hours as needed for nausea or vomiting, Disp: 30 tablet, Rfl: 0    metoclopramide (REGLAN) 10 MG tablet, Take 1 tablet (10 mg) by mouth 4 times daily as needed (constipation), Disp: 30 tablet, Rfl: 3    modafinil (PROVIGIL) 200 MG tablet, Take 200 mg by mouth daily as needed (hypersomnia), Disp: , Rfl:     OLANZapine zydis (ZYPREXA) 5 MG ODT, Take 1 tablet (5 mg) by mouth at bedtime, Disp: 10 tablet, Rfl: 0    ondansetron (ZOFRAN) 4 MG tablet, Take 1-2 tablets (4-8 mg) by mouth  every 8 hours as needed for nausea or vomiting, Disp: 30 tablet, Rfl: 0    pantoprazole (PROTONIX) 40 MG EC tablet, Take 1 tablet (40 mg) by mouth 2 times daily, Disp: 60 tablet, Rfl: 0    polyethylene glycol (MIRALAX) 17 GM/Dose powder, Take 17 g by mouth 2 times daily, Disp: , Rfl:     potassium chloride ER (K-TAB) 20 MEQ CR tablet, Take 1 tablet (20 mEq) by mouth 2 times daily, Disp: 4 tablet, Rfl: 0    predniSONE (DELTASONE) 50 MG tablet, Take 2 tablets (100 mg) by mouth 2 times daily . Take on 4/29 PM and 4/30 AM., Disp: 4 tablet, Rfl: 0    prochlorperazine (COMPAZINE) 10 MG tablet, Take 1 tablet (10 mg) by mouth every 6 hours as needed for nausea or vomiting, Disp: 30 tablet, Rfl: 0    senna-docusate (SENOKOT-S/PERICOLACE) 8.6-50 MG tablet, Take 1 tablet by mouth 2 times daily, Disp: , Rfl:     Current Facility-Administered Medications:     heparin 100 unit/mL injection 5 mL, 5 mL, Intracatheter, Q1H PRN, Promes, Charisse, APRN CNP, 5 mL at 05/16/24 0808    REVIEW OF SYSTEMS:  10-point ROS reviewed and negative other than that mentioned in HPI.     Objective  VITAL SIGNS  /71 (BP Location: Left arm, Patient Position: Sitting, Cuff Size: Adult Regular)   Pulse 80   Temp 97.6  F (36.4  C) (Oral)   Resp 16   Wt 63.2 kg (139 lb 4.8 oz)   SpO2 97%   BMI 22.93 kg/m      ECOG PS: 0     PHYSICAL EXAM:  General: Awake, alert, in no acute distress. Oriented x 3.    HEENT: Chemo induced alopecia. Normocephalic, atraumatic. No scleral icterus. Oral mucosa pink and moist with no signs of thrush or  mucositis  Lymph: No cervical, supraclavicular, and axillary LAD appreciated.   CV: Regular rate and  regular rhythm. No murmurs, rubs, or gallops appreciated.  Resp: Good inspiratory effort, lungs clear to auscultation bilaterally.  Ext: No peripheral edema bilaterally.  Neuro: CN II-XII grossly intact. No focal deficits.   Skin: No rash, unusual bruising or prominent lesions.  Psych: Pleasant, normal  affect.    LABS:  I reviewed the following labs:  Lab Results   Component Value Date    WBC 6.0 05/16/2024    HGB 10.6 (L) 05/16/2024    HCT 32.4 (L) 05/16/2024    MCV 96 05/16/2024     05/16/2024    INR 1.05 04/28/2024    PTT 29 04/28/2024     Lab Results   Component Value Date     05/16/2024    POTASSIUM 4.1 05/16/2024    CR 0.76 05/16/2024    BUN 12.0 05/16/2024    CHLORIDE 107 05/16/2024    DEE 9.1 05/16/2024    GLC 94 05/16/2024      Lab Results   Component Value Date    ALT 13 05/16/2024    AST 17 05/16/2024    ALKPHOS 78 05/16/2024    BILITOTAL <0.2 05/16/2024      Lab Results   Component Value Date     05/16/2024    URIC 5.2 05/10/2024      4/1/24 PET:  IMPRESSION: In this patient with primary mediastinal B-cell lymphoma  following 2 cycles of chemotherapy:   1. Complete metabolic response by Lugano criteria  2. Resolution of cervical and mediastinal hypermetabolic  lymphadenopathy.    4/15/24 CTA chest:  IMPRESSION:   1. Exam is negative for acute pulmonary embolism.  2. No acute finding in the chest.    Assessment & Plan  Primary mediastinal B-cell lymphoma  Dx 1/2024, presenting cervical lymphadenopathy. Initial FNA suggestive of possible Hodgkin lymphoma; however excisional biopsy showed large B-cell lymphoma with differential diagnosis of primary mediastinal large B-cell lymphoma vs DLBCL NOS. Additional IHC staining for , MAL, and PD-L1, and [id number] gene expression profile all favor PMBCL diagnosis over DLBCL NOS. PET with cervical and mediastinal lymphadenopathy only. LDH normal.   Previously discussed PMBCL diagnosis and plan for DA-R-EPOCH x 6 cycles. In the single-arm phase 2 prospective study of R-EPOCH (without radiation) for PMBCL, 5-year EFS was 93% and OS 97% ([id number]).   Cycle 1 R-EPOCH 2/22/24, tolerating well so far other than constipation.  Cycle 2 R-EPOCH 3/14/24. ANC dread >0.5 last cycle so qualifies for 20% increase in doxorubicin, etoposide, and  "cyclophosphamide (dose level 2).   PET after 2 cycles shows complete response!   Cycle 3 R-EPOCH 4/4/24- qualified for 20% increase in doxorubicin, etoposide, and cyclophosphamide again (dose level 3); however capped vincristine at 2 mg due to neuropathy.   Cycle 4 R-EPOCH- Qualified for 20% dose increase again (dose level 4) but omitted vincristine  to allow some recovery of neuropathy.  S/p C4 and tolerating treatment well.  She did have some mouth sores that resolved after a week and was sent MMW incase she develops after C5.  Neuropathy did mildly improve in the R hand with withholding Vincristine.  Proceed with Cycle 5 R-EPOCH today (5/16) dose level 5 but dose cap vincristine 1.2 mg  Next PET at EOT given CR already.     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 Initially affecting fingertips only.   Cap vincristine at 2 mg starting Cycle 3.  S/p C3 and neuropathy in fingertips have worsened with some difficulty using her phone or buttoning shirts and now neuropathy in bilateral feet that does not impact balance of walking.   Held vincristine Cycle 4 to allow some recovery of neuropathy and consider reintroducing at 50% dose for Cycle 5/6.   Neuropathy did very mildly improve in the R hand after holding Vincristine with C4- will reintroduce vincristine with C5 at capped dose 1.2 mg    Palpitations/tachycardia  SOB  3/25/24 NT proBNP normal at <36.   4/15/24 CTA chest negative for PE. Ziopatch requested but did not hear from Cardiology   Ziopatch completed 5/16- no episodes of SOB after C4 and a couple episodes of \"heart fluttering\"    Constipation- improved  Scheduled Miralax and Senna-S.  PRN Reglan.     Fertility preservation  Established with [facility] and underwent egg retrieval on 2/18/24.      Ppx  TLS ppx: now done with allopurinol.  ID ppx: continue  mg BID. Levo and fluc when ANC <1.0. Monthly pentamidine for PJP ppx (last given 4/1/24, next due ~5/1/24).  Vaccines: up to date on " COVID and flu shots.       PLAN:  Admit for Cycle 5 R-EPOCH, dose level 5 but vincristine capped at 1.2 mg d/t neuropathy  Labs, AMEENA, and admission for C6 6/6  Repeat PET at EOT  Dr. Oakes to review EOT imaging    46 minutes spent on the date of the encounter doing chart review, review of test results, interpretation of tests, patient visit, and documentation     The longitudinal plan of care for the diagnosis(es)/condition(s) as documented were addressed during this visit. Due to the added complexity in care, I will continue to support Clementine in the subsequent management and with ongoing continuity of care.     CALOS Martinez CNP

## 2024-05-16 NOTE — NURSING NOTE
"Oncology Rooming Note    May 16, 2024 8:16 AM   Clementine Kathleen is a 28 year old female who presents for:    Chief Complaint   Patient presents with    Port Draw     Vitals taken, port accessed, labs drawn, heparin locked, checked into next appt    Oncology Clinic Visit     Mediastinal large B-cell lymphoma of lymph nodes of multiple regions     Initial Vitals: /71 (BP Location: Left arm, Patient Position: Sitting, Cuff Size: Adult Regular)   Pulse 80   Temp 97.6  F (36.4  C) (Oral)   Resp 16   Wt 63.2 kg (139 lb 4.8 oz)   SpO2 97%   BMI 22.93 kg/m   Estimated body mass index is 22.93 kg/m  as calculated from the following:    Height as of 4/25/24: 1.66 m (5' 5.35\").    Weight as of this encounter: 63.2 kg (139 lb 4.8 oz). Body surface area is 1.71 meters squared.  No Pain (0) Comment: Data Unavailable   No LMP recorded. (Menstrual status: IUD).  Allergies reviewed: Yes  Medications reviewed: Yes    Medications: Medication refills not needed today.  Pharmacy name entered into [facility]:    [id number] [place], [place] - [address] AT [facility] & [address] [place] [address] DRUG STORE #[id number] - [facility] [address] AT [facility] OF [facility]    Frailty Screening:   Is the patient here for a new oncology consult visit in cancer care? 2. No      Clinical concerns: none       Michaela Munguia              "

## 2024-05-16 NOTE — PLAN OF CARE
Goal Outcome Evaluation:      Plan of Care Reviewed With: patient    Overall Patient Progress: no changeOverall Patient Progress: no change    Outcome Evaluation: C5 D1 DA R-EPOCH    AVSS. Denied pain, nausea/vomiting. Up ad tio. Good po intake. Voiding spontaneously without difficulty, not saving urine to be measured. BM today. Currently receiving rapid infusion Rituxan, tolerating well thus far. No acute events. Continue with plan of care.

## 2024-05-16 NOTE — PROGRESS NOTES
Nursing Focus: Admission    D: Patient admitted from home via private transport for C5 DA R-EPOCH.      I: Upon arrival to the unit patient was oriented to room, unit, and call light. Patient s height, weight, and vital signs were obtained. Allergies reviewed. MD notified of patient s arrival on the unit. Adult AVS completed. Head to toe assessment completed. Education assessment completed. Care plan initiated.     A: Vital signs stable upon admission.Two RN skin assessment completed: Yes. Second RN was Radha ROMO Significant Skin Findings include: none. [facility] Nurse Consult Ordered: No. Bed Algorithm can be found in PCS flow sheets (Support Surface Algorithm) and on IP [facility] NURSE RESOURCE TAB, was this used during this assessment? Yes.     P: Continue with plan of care. Notify MD with any concerns or changes in patient status.

## 2024-05-16 NOTE — H&P
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    HEME  # Primary mediastinal B-cell lymphoma  Follows with Dr. Oakes. Initially presented with cervical lymphadenopathy (12/2023). Initial FNA (1/29/24) suggestive of possible Hodgkin lymphoma; however excisional LN biopsy (2/9/24) shows large B-cell lymphoma with differential diagnosis of primary mediastinal large B-cell lymphoma vs DLBCL NOS. PET with cervical and mediastinal lymphadenopathy only. Overall presentation felt to be consistent with PMBCL given her age, gender, sites of involvement, dim CD30 expression, and prominent fibrotic background.  Although CD23 IHC was negative, IHC for , MAL, and PD-L1 were positive in majority of the neoplastic cells suggesting PMBCL over DLBCL NOS. [id number] gene expression profile also consistent with PMBCL. FISH with gain of BCL2 but no MYC, BCL2, or BCL6 rearrangements. PET showed bilateral cervical and mediastinal hypermetabolic lymphadenopathy (largest 4.4 cm with SUV max 27 in a prevascular LN); no disease below diaphragm. Baseline TTE (2/19/24) with LVEF 60%. Initial treatment R-DA-EPOCH (C1D1=2/22/24; C2D1= 3/14/24; C3D1=4/4/24). Which was overall tolerated well but Clementine had CINV and developed bilateral hand neuropathy.  PET (4/1/24) (after C2) showed evidence of complete metabolic response with resolution of cervical and mediastinal lymphadenopathy. Have increased dose each cycle by one level (ex: C1=dose level 1, C2=dose level 2, etc.). She  was last seen by Dr. Oakes in clinic on 4/25/2024 and then proceeded with C4 DA-R-EPOCH at dose level 4 but vincristine held to give time for improvement of bothersome neuropathy. She is now admitted for C5 DA-R-EPOCH (level 5), will give vincristine but capped at 1.2 mg to hopefully prevent worsening of her neuropathy. OA she endorses improved symptoms overall; less dyspnea on exertion, minimal dyspnea on rest, less frequent episodes of heart palpitations, and R>L improvement of bilateral neuropathy of hands). CINV after C4 discharge was well controlled on PRN's.   - Plan is total of 6 cycles. Next PET at EOT given CR.  - Port accessed on admission, is CDI, not overtly infected appearing, she reports it has not been bothersome.     Treatment Plan: Dose adjusted (level 5) R-EPOCH (C5D1=5/16/2024)   - Rituximab IV (375 mg/m2) over 90 mins; D1  - Prednisone PO (100 mg) BID;  D1-5  - Etoposide IV (86 mg/m2); D1-4  - Vincristine IV (dose reduced to 0.3 mg with 1.2 mg cap); D1-4  - Doxorubicin IV 16 mg/m2;  D1-4  - Cyclophosphamide IV (1,296 mg/m2); D5  - Pegfilgrastim x1 - D6  - Pre-meds: tylenol, benadryl, zofran 16mg D1-D5, emend D5, dexamethasone 8 mg (D6-7)    # Peripheral neuropathy   Endorsed L>R neuropathy in her fingertips. Secondary to chemotherapy. Respectfully declined need for medication management at this time. Vincristine was capped at 2 mg for C3. S/p C3 and neuropathy in fingertips have worsened with some difficulty using her phone or buttoning shirts and now neuropathy in bilateral feet that does not impact balance of walking. Seems to be improving just prior to admission for C4 per patient report. Vincristine omitted for C4 to give time for recovery.  - Monitor closely     # CINV  Chemotherapy induced nausea and vomiting during previous cycles, managed with prn zofran, compazine, scheduled emend, and zyprexa at bedtime. Symptoms improved within a few days of hospital discharge from C2. Noted worsening  "nausea on . On chart review, patient has previously received IV Emend earlier in her hospital course (D1-2) in addition to prior to discharge (D5). OA patient reports N/V has been well controlled after C4 with breakthrough PRNs at home.  - OA Clementine is interested in D2 emend, discussed with pharmacy today and unfortunately both emend and vincristine are CY inhibitors, concern for possible increase in concentration of vincristine which would place Clementine at increased risk of toxicities in setting of already experiencing peripheral neuropathy. Will not give additional dose of emend on D2. Okay to give D5 per Dr. Oakes.  - Continue PRN Ativan and compazine as indicated and Zyprexa HS.  - Scheduled Zofran 16 mg daily per chemo protocol; Emend D5     # H/o chemotherapy-related rash  Maculopapular pinkish rash noted with C1 and C2 DA-R-EPOCH, resolved with topical steroids. There has not been recurrence in rash with C3 and C4.  - Monitor clinically for recurrence     # Mucositis  Patient endorses previous oral ulceration that was painful but resolved after previous chemotherapy cycles. However, she has had increase of oral sores over the past few weeks that have been painful and making swallowing more difficult.  - MMW & biotene PRN, encourage frequent oral moisturization with saline rinses.  - If visible ulceration develops while inpatient consider HSV swab.    ID  # ID PPx  - Acyclovir 400 mg BID  - Levofloxacin 250 mg daily and fluconazole 200 mg daily when ANC <1  - Pentamidine monthly for PJP ppx, scheduled 5/3/24     CV  # H/o palpitations  # H/o tachycardia  Noted heart \"racing and pounding\" after C2 of chemo. Denied chest pain, dyspnea, lower extremity swelling, abdominal pain, nausea, dizziness. Echo from 2024 unremarkable. 3/25/24 BNP normal at <36. She again reported symptoms following C3, with increased dyspnea on exertion. CT PE 4/15/24 unremarkable. OA she reports that symptoms have improved overall; " has less dyspnea on exertion, minimal dyspnea on rest, less frequent episodes of heart palpitations,  - Sent in [facility] 5/15  - No acute inpatient requirements at this time    GI  # H/o constipation  - Continue PTA miralax and senna BID.      MISC  # Fertility preservation  - Established with [facility], underwent egg retrieval on 2/18.     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 Her boyfriend is a general surgery resident at [employer]. Parents live in [place].    FEN:  - IVF; per chemotherapy protocol  - Standard lyte replacement protocol  - Regular diet as tolerated    Prophy/Misc:  - GI: Continue PTA PPI BID  - DVT: Enoxaparin ppx    Clinically Significant Risk Factors Present on Admission                           # Financial/Environmental Concerns:            Follow-up: Not yet scheduled, will need GCSF.  Dispo: Discharge after D5 chemotherapy  Medically Ready for Discharge: Anticipated in 5+ Days   ________________________________________________    This patient care plan was thoroughly discussed with the attending, Dr. Paiz.    I spent 45 minutes in the care of this patient today, which included time necessary for review of interval events, obtaining history and physical exam, ordering medication(s)/test(s) as medically indicated, discussion with interdisciplinary/consult team(s), and documentation time. Over 50% of time was spent face-to-face and/or coordinating care.    Mary Leary PA-C  Hematology/Oncology  Pager: [phone]  Phone: [phone]      Code Status : Full Code    Primary Care Physician   Chela Peter    History of Present Illness   History obtained from chart and discussed with the patient.    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    Upon my visit today patient is seen sitting up on the side of her hospital bed with her significant other Dima at bedside. She is non-toxic appearing and in no acute distress. Patient states there has been no change in medical history or medications. She endorsed that her symptoms are overall improving. She has experienced less dyspnea on exertion, minimal dyspnea on rest, less frequent episodes of heart palpitations, and R>L improvement of bilateral neuropathy of hands. CINV after C4 discharge was well controlled on PRN's. She is interested in D2 emend, will discuss with pharmacy today. She denies rash and other full ROS.    Discussed plan for today is to start C5 DA R EPOCH. Patient denied having any questions or concerns at this time.      Past Medical History    Past Medical History:   Diagnosis Date    Anxiety     Cervical lymphadenopathy     GERD     Interstitial cystitis        Past Surgical History   Past Surgical History:   Procedure Laterality Date    CL AFF SURGICAL PATHOLOGY      Pearl neuroma    CLOSED REDUCTION PROXIMAL FIBULAR FRACTURE Left     EXCISE LESION NECK Left 2024    Procedure: Excisional Node Biopsy Left Neck;  Surgeon: Asher Damon MD;  Location: [facility] OR    INSERT PORT VASCULAR ACCESS Right 3/1/2024    Procedure: Insert port vascular access;  Surgeon: Solo Contreras PA-C;  Location: [facility] OR    IR CHEST PORT PLACEMENT > 5 YRS OF AGE  3/1/2024    PICC INSERTION - DOUBLE LUMEN Right 2024    41-1cm, Medial brachial vein       Prior to Admission Medications   Prior to Admission Medications   Prescriptions Last Dose Informant Patient Reported? Taking?   LORazepam (ATIVAN) 0.5 MG tablet   No No   Sig: Take 1 tablet (0.5 mg) by mouth every 6 hours as needed for nausea or vomiting   Levonorgestrel (KYLEENA IU)   Yes No   Si Device by Intrauterine route once   OLANZapine zydis (ZYPREXA) 5 MG ODT   No No   Sig:  Take 1 tablet (5 mg) by mouth at bedtime   acyclovir (ZOVIRAX) 400 MG tablet   No No   Sig: Take 1 tablet (400 mg) by mouth 2 times daily   cimetidine (TAGAMET) 200 MG tablet   Yes No   Sig: Take 200 mg by mouth 2 times daily   dexAMETHasone (DECADRON) 4 MG tablet   No No   Sig: Take 2 tablets (8 mg) by mouth daily On 5/1 and 5/2.   fluconazole (DIFLUCAN) 200 MG tablet   No No   Sig: Take 1 tablet (200 mg) by mouth daily   levofloxacin (LEVAQUIN) 250 MG tablet   No No   Sig: Take 1 tablet (250 mg) by mouth daily   magic mouthwash (ENTER INGREDIENTS IN COMMENTS) suspension   No No   Sig: Swish and spit 15 ml every 6 hours as needed   metoclopramide (REGLAN) 10 MG tablet   No No   Sig: Take 1 tablet (10 mg) by mouth 4 times daily as needed (constipation)   modafinil (PROVIGIL) 200 MG tablet   Yes No   Sig: Take 200 mg by mouth daily as needed (hypersomnia)   ondansetron (ZOFRAN) 4 MG tablet   No No   Sig: Take 1-2 tablets (4-8 mg) by mouth every 8 hours as needed for nausea or vomiting   pantoprazole (PROTONIX) 40 MG EC tablet   No No   Sig: Take 1 tablet (40 mg) by mouth 2 times daily   polyethylene glycol (MIRALAX) 17 GM/Dose powder   Yes No   Sig: Take 17 g by mouth 2 times daily   potassium chloride ER (K-TAB) 20 MEQ CR tablet   No No   Sig: Take 1 tablet (20 mEq) by mouth 2 times daily   predniSONE (DELTASONE) 50 MG tablet   No No   Sig: Take 2 tablets (100 mg) by mouth 2 times daily . Take on 4/29 PM and 4/30 AM.   prochlorperazine (COMPAZINE) 10 MG tablet   No No   Sig: Take 1 tablet (10 mg) by mouth every 6 hours as needed for nausea or vomiting   senna-docusate (SENOKOT-S/PERICOLACE) 8.6-50 MG tablet   Yes No   Sig: Take 1 tablet by mouth 2 times daily      Facility-Administered Medications: None     Allergies   No Known Allergies    Social History   Social History     Socioeconomic History    Marital status: Single     Spouse name: Not on file    Number of children: Not on file    Years of education: Not on  file    Highest education level: Not on file   Occupational History    Not on file   Tobacco Use    Smoking status: Never     Passive exposure: Never    Smokeless tobacco: Never   Vaping Use    Vaping status: Never Used   Substance and Sexual Activity    Alcohol use: Not Currently    Drug use: Not Currently    Sexual activity: Not on file   Other Topics Concern    Not on file   Social History Narrative    Not on file     Social Determinants of Health     Financial Resource Strain: Not on file   Food Insecurity: Not on file   Transportation Needs: Not on file   Physical Activity: Not on file   Stress: Not on file   Social Connections: Not on file   Interpersonal Safety: Not on file   Housing Stability: Not on file       Family History   Family History   Problem Relation Age of Onset    Anesthesia Reaction No family hx of     Clotting Disorder No family hx of        Review of Systems   A 14-point ROS is negative unless otherwise noted above in the HPI.    Physical Exam   Vital Signs with Ranges  Temp:  [97.6  F (36.4  C)-97.8  F (36.6  C)] 97.8  F (36.6  C)  Pulse:  [80-84] 84  Resp:  [16] 16  BP: (107-113)/(71-76) 113/76  SpO2:  [97 %] 97 %  138 lbs 4.8 oz    General: Pleasant, well appearing female sitting up on side of hospital bed, alert and interactive, NAD, non-toxic appearing.  HEENT: NCAT w/ no obvious deformities. CI alopecia. EOMI, anicteric sclera, moist mucous membranes on limited exam.  Respiratory: Breathing comfortably on room air, satting 93% O2, lungs CTAB with no rhonchi, wheezes, or crackles.  Cardiac: No chest heaves or obvious deformities, auscultation showing RRR with no murmurs, gallops, or rubs. Chest port CDI, non tender, no overt erythema or signs of infection.  Derm: Warm, dry, well perfused appearing, w/ no ecchymoses, rashes, or wounds.  GI: No obvious external abdominal abnormalities.  Musculoskeletal: No joint swelling or erythema. Moves all extremities without obvious signs of deficits  or pain.   Psych: Calm, affect WNL, no pressured speech.  Neuro: No obvious focal deficits, A&Ox3, motor and strength appears preserved.    Recent Labs  CBC   Recent Labs   Lab 05/16/24  0807 05/13/24  1039 05/10/24  0959   WBC 6.0 15.7* 22.0*   RBC 3.36* 3.40* 3.20*   HGB 10.6* 10.8* 10.2*   HCT 32.4* 32.9* 30.0*   MCV 96 97 94   MCH 31.5 31.8 31.9   MCHC 32.7 32.8 34.0   RDW 19.3* 19.5* 18.8*    250 138*       CMP   Recent Labs   Lab 05/16/24  1005 05/16/24  0807 05/13/24  1039 05/10/24  0959   NA  --  141 140 141   POTASSIUM  --  4.1 4.3 4.1   CHLORIDE  --  107 107 109*   CO2  --  25 24 22   ANIONGAP  --  9 9 10   GLC  --  94 95 97   BUN  --  12.0 9.7 10.8   CR  --  0.76 0.75 0.82   GFRESTIMATED  --  >90 >90 >90   DEE  --  9.1 9.3 9.0   MAG 1.7  --   --   --    PHOS 3.9  --   --   --    PROTTOTAL  --  6.4 6.6 6.2*   ALBUMIN  --  4.3 4.3 4.0   BILITOTAL  --  <0.2 0.2 <0.2   ALKPHOS  --  78 90 97   AST  --  17 18 23   ALT  --  13 9 7       LFTs:   Recent Labs   Lab 05/16/24  0807   PROTTOTAL 6.4   ALBUMIN 4.3   BILITOTAL <0.2   ALKPHOS 78   AST 17   ALT 13       Coagulation Studies:   Recent Labs   Lab 05/16/24  1005   INR 1.08

## 2024-05-16 NOTE — PLAN OF CARE
Nursing Focus: Chemotherapy  D: Positive blood return via Port . Insertion site is clean/dry/intact, dressing intact with no complaints of pain.  Pre infusion assessment documented in Flowsheet (if applicable).    I: Premedications given per order (see electronic medical administration record). Dose #1 of Etoposide and Vincristine/Doxorubicin started to infuse over 22 hours. Reviewed pt teaching on chemotherapy side effects.  Pt denies need for further teaching. Chemotherapy double checked per protocol by two chemotherapy competent RN's.   A: Tolerating infusion well. Denies nausea and or pain.   P: Continue to monitor urine output and symptoms of nausea. Screen for symptoms of toxicity.

## 2024-05-17 LAB
ABO/RH(D): NORMAL
ALBUMIN SERPL BCG-MCNC: 4.1 G/DL (ref 3.5–5.2)
ALP SERPL-CCNC: 77 U/L (ref 40–150)
ALT SERPL W P-5'-P-CCNC: 14 U/L (ref 0–50)
ANION GAP SERPL CALCULATED.3IONS-SCNC: 9 MMOL/L (ref 7–15)
ANTIBODY SCREEN: NEGATIVE
AST SERPL W P-5'-P-CCNC: 16 U/L (ref 0–45)
BASOPHILS # BLD AUTO: 0 10E3/UL (ref 0–0.2)
BASOPHILS NFR BLD AUTO: 0 %
BILIRUB SERPL-MCNC: <0.2 MG/DL
BUN SERPL-MCNC: 12 MG/DL (ref 6–20)
CALCIUM SERPL-MCNC: 9.3 MG/DL (ref 8.6–10)
CHLORIDE SERPL-SCNC: 108 MMOL/L (ref 98–107)
CREAT SERPL-MCNC: 0.68 MG/DL (ref 0.51–0.95)
DEPRECATED HCO3 PLAS-SCNC: 22 MMOL/L (ref 22–29)
EGFRCR SERPLBLD CKD-EPI 2021: >90 ML/MIN/1.73M2
EOSINOPHIL # BLD AUTO: 0 10E3/UL (ref 0–0.7)
EOSINOPHIL NFR BLD AUTO: 0 %
ERYTHROCYTE [DISTWIDTH] IN BLOOD BY AUTOMATED COUNT: 19.4 % (ref 10–15)
FIBRINOGEN PPP-MCNC: 241 MG/DL (ref 170–490)
GLUCOSE SERPL-MCNC: 141 MG/DL (ref 70–99)
HCT VFR BLD AUTO: 32.2 % (ref 35–47)
HGB BLD-MCNC: 10.5 G/DL (ref 11.7–15.7)
IMM GRANULOCYTES # BLD: 0.2 10E3/UL
IMM GRANULOCYTES NFR BLD: 2 %
INR PPP: 1.13 (ref 0.85–1.15)
LYMPHOCYTES # BLD AUTO: 0.4 10E3/UL (ref 0.8–5.3)
LYMPHOCYTES NFR BLD AUTO: 3 %
MAGNESIUM SERPL-MCNC: 1.8 MG/DL (ref 1.7–2.3)
MCH RBC QN AUTO: 31.3 PG (ref 26.5–33)
MCHC RBC AUTO-ENTMCNC: 32.6 G/DL (ref 31.5–36.5)
MCV RBC AUTO: 96 FL (ref 78–100)
MONOCYTES # BLD AUTO: 0.1 10E3/UL (ref 0–1.3)
MONOCYTES NFR BLD AUTO: 1 %
NEUTROPHILS # BLD AUTO: 14.6 10E3/UL (ref 1.6–8.3)
NEUTROPHILS NFR BLD AUTO: 94 %
NRBC # BLD AUTO: 0 10E3/UL
NRBC BLD AUTO-RTO: 0 /100
PHOSPHATE SERPL-MCNC: 4.2 MG/DL (ref 2.5–4.5)
PLATELET # BLD AUTO: 368 10E3/UL (ref 150–450)
POTASSIUM SERPL-SCNC: 4.4 MMOL/L (ref 3.4–5.3)
PROT SERPL-MCNC: 6.1 G/DL (ref 6.4–8.3)
RBC # BLD AUTO: 3.35 10E6/UL (ref 3.8–5.2)
SODIUM SERPL-SCNC: 139 MMOL/L (ref 135–145)
SPECIMEN EXPIRATION DATE: NORMAL
URATE SERPL-MCNC: 2.2 MG/DL (ref 2.4–5.7)
WBC # BLD AUTO: 15.3 10E3/UL (ref 4–11)

## 2024-05-17 PROCEDURE — 258N000003 HC RX IP 258 OP 636

## 2024-05-17 PROCEDURE — 120N000002 HC R&B MED SURG/OB UMMC

## 2024-05-17 PROCEDURE — 80053 COMPREHEN METABOLIC PANEL: CPT

## 2024-05-17 PROCEDURE — 83735 ASSAY OF MAGNESIUM: CPT

## 2024-05-17 PROCEDURE — 99418 PROLNG IP/OBS E/M EA 15 MIN: CPT

## 2024-05-17 PROCEDURE — 85049 AUTOMATED PLATELET COUNT: CPT

## 2024-05-17 PROCEDURE — 250N000011 HC RX IP 250 OP 636

## 2024-05-17 PROCEDURE — 99233 SBSQ HOSP IP/OBS HIGH 50: CPT | Mod: FS

## 2024-05-17 PROCEDURE — 250N000013 HC RX MED GY IP 250 OP 250 PS 637

## 2024-05-17 PROCEDURE — 85610 PROTHROMBIN TIME: CPT

## 2024-05-17 PROCEDURE — 85384 FIBRINOGEN ACTIVITY: CPT

## 2024-05-17 PROCEDURE — 84550 ASSAY OF BLOOD/URIC ACID: CPT

## 2024-05-17 PROCEDURE — 250N000012 HC RX MED GY IP 250 OP 636 PS 637

## 2024-05-17 PROCEDURE — 84100 ASSAY OF PHOSPHORUS: CPT

## 2024-05-17 RX ORDER — LORAZEPAM 1 MG/1
1 TABLET ORAL EVERY 6 HOURS PRN
Status: DISCONTINUED | OUTPATIENT
Start: 2024-05-17 | End: 2024-05-17

## 2024-05-17 RX ORDER — PALONOSETRON 0.05 MG/ML
0.25 INJECTION, SOLUTION INTRAVENOUS ONCE
Status: COMPLETED | OUTPATIENT
Start: 2024-05-17 | End: 2024-05-17

## 2024-05-17 RX ORDER — LORAZEPAM 2 MG/ML
1 INJECTION INTRAMUSCULAR EVERY 8 HOURS PRN
Status: DISCONTINUED | OUTPATIENT
Start: 2024-05-17 | End: 2024-05-20 | Stop reason: HOSPADM

## 2024-05-17 RX ORDER — LORAZEPAM 2 MG/ML
1 INJECTION INTRAMUSCULAR EVERY 6 HOURS PRN
Status: DISCONTINUED | OUTPATIENT
Start: 2024-05-17 | End: 2024-05-17

## 2024-05-17 RX ORDER — LORAZEPAM 1 MG/1
1 TABLET ORAL EVERY 8 HOURS PRN
Status: DISCONTINUED | OUTPATIENT
Start: 2024-05-17 | End: 2024-05-20 | Stop reason: HOSPADM

## 2024-05-17 RX ADMIN — ACYCLOVIR 400 MG: 400 TABLET ORAL at 07:54

## 2024-05-17 RX ADMIN — CIMETIDINE 200 MG: 200 TABLET, FILM COATED ORAL at 07:54

## 2024-05-17 RX ADMIN — POTASSIUM CHLORIDE 20 MEQ: 750 CAPSULE, EXTENDED RELEASE ORAL at 21:03

## 2024-05-17 RX ADMIN — LEVOFLOXACIN 250 MG: 250 TABLET, FILM COATED ORAL at 07:55

## 2024-05-17 RX ADMIN — ACYCLOVIR 400 MG: 400 TABLET ORAL at 21:03

## 2024-05-17 RX ADMIN — DOCUSATE SODIUM 50 MG AND SENNOSIDES 8.6 MG 2 TABLET: 8.6; 5 TABLET, FILM COATED ORAL at 21:03

## 2024-05-17 RX ADMIN — PANTOPRAZOLE SODIUM 40 MG: 40 TABLET, DELAYED RELEASE ORAL at 07:55

## 2024-05-17 RX ADMIN — FLUCONAZOLE 200 MG: 200 TABLET ORAL at 07:54

## 2024-05-17 RX ADMIN — POTASSIUM CHLORIDE 20 MEQ: 750 CAPSULE, EXTENDED RELEASE ORAL at 07:55

## 2024-05-17 RX ADMIN — OLANZAPINE 5 MG: 5 TABLET, ORALLY DISINTEGRATING ORAL at 21:03

## 2024-05-17 RX ADMIN — LORAZEPAM 1 MG: 1 TABLET ORAL at 11:14

## 2024-05-17 RX ADMIN — LORAZEPAM 1 MG: 1 TABLET ORAL at 18:17

## 2024-05-17 RX ADMIN — ENOXAPARIN SODIUM 40 MG: 40 INJECTION SUBCUTANEOUS at 16:28

## 2024-05-17 RX ADMIN — ONDANSETRON HYDROCHLORIDE 4 MG: 4 TABLET, FILM COATED ORAL at 07:52

## 2024-05-17 RX ADMIN — PREDNISONE 100 MG: 50 TABLET ORAL at 07:55

## 2024-05-17 RX ADMIN — DOCUSATE SODIUM 50 MG AND SENNOSIDES 8.6 MG 2 TABLET: 8.6; 5 TABLET, FILM COATED ORAL at 07:55

## 2024-05-17 RX ADMIN — PANTOPRAZOLE SODIUM 40 MG: 40 TABLET, DELAYED RELEASE ORAL at 21:02

## 2024-05-17 RX ADMIN — PREDNISONE 100 MG: 50 TABLET ORAL at 16:28

## 2024-05-17 RX ADMIN — CIMETIDINE 200 MG: 200 TABLET, FILM COATED ORAL at 21:03

## 2024-05-17 RX ADMIN — PALONOSETRON HYDROCHLORIDE 0.25 MG: 0.25 INJECTION INTRAVENOUS at 13:54

## 2024-05-17 RX ADMIN — ETOPOSIDE 175 MG: 20 INJECTION, SOLUTION INTRAVENOUS at 13:11

## 2024-05-17 RX ADMIN — VINCRISTINE SULFATE 0.3 MG: 1 INJECTION, SOLUTION INTRAVENOUS at 13:11

## 2024-05-17 ASSESSMENT — ACTIVITIES OF DAILY LIVING (ADL)
ADLS_ACUITY_SCORE: 18

## 2024-05-17 NOTE — PROGRESS NOTES
"CLINICAL NUTRITION SERVICES - ASSESSMENT NOTE     Nutrition Prescription    RECOMMENDATIONS FOR MDs/PROVIDERS TO ORDER:   None at this time.     Malnutrition Status:   Patient does not meet two of the established criteria necessary for diagnosing malnutrition but is at risk for malnutrition    Recommendations already ordered by Registered Dietitian (RD):   PRN snacks/supplements.     Future/Additional Recommendations:   Monitor weight/intake trends.      REASON FOR ASSESSMENT   Clementine Kathleen is a/an 28 year old female assessed by the dietitian for Provider Order - \"28 y.o. F with B cell lymphoma on cycle 5 chemotherapy, she is interested in exploring supplementation; we specifically discussed christina barger which she is interested in trying. 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 Pt reports having a good appetite PTA, but finds that she struggles with every round of chemo. Pt states that there has already been improvement due to her receiving Ativan. Provided patient with snacks/supplements list and ordered a few supplements for patient to trial.     Nutrition/GI: Per Flowsheets, no BMs noted this admission (recent admit, 5/16).   Skin: Berny score 22 with nutrition marked as adequate per flowsheets.     CURRENT NUTRITION ORDERS   Diet: Regular Diet Adult    Snacks/Supplements: PRN  Intake/Tolerance: Per flowsheets, no intake documentation this admission (recent admit). Good intake PTA per pt report.     LABS   Labs Reviewed   05/16/24 08:07 05/16/24 10:05 05/17/24 05:50   Sodium 141  139   Potassium 4.1  4.4   Creatinine 0.76  0.68   GFR Estimate >90  >90   Magnesium  1.7 1.8   Phosphorus  3.9 4.2   Glucose 94  141 (H) " "  Hemoglobin 10.6 (L)  10.5 (L)   Platelet Count 356  368   MCV 96  96     MEDICATIONS   Medications reviewed  - Acyclovir  - Tagamet  - Fluconazole  - Levofloxacin  - Zyprexa  - Zofran  - Palonosetron  - Protonix  - Potassium Chloride  - Prednisone  - Senokot  - Zofran PRN    ANTHROPOMETRICS  Height: 166 cm (5' 5.354\")  Most Recent Weight: 62.6 kg (138 lb)    IBW: 56.8 kg   BMI: 22.72 - Normal BMI  Weight History:   Wt Readings from Last Encounters:   05/17/24 62.6 kg (138 lb)   05/16/24 63.2 kg (139 lb 4.8 oz)   05/01/24 65.1 kg (143 lb 8 oz)   04/28/24 64 kg (141 lb)   04/25/24 64.4 kg (142 lb)   04/15/24 63.9 kg (140 lb 14.4 oz)   04/10/24 64 kg (141 lb)   04/07/24 63.8 kg (140 lb 9.6 oz)   04/04/24 65.4 kg (144 lb 1.6 oz)   03/25/24 64.6 kg (142 lb 8 oz)   03/19/24 61.7 kg (136 lb 1.6 oz)   03/14/24 63.3 kg (139 lb 8 oz)   03/04/24 64.1 kg (141 lb 6.4 oz)   03/01/24 61.2 kg (135 lb)   02/28/24 61.7 kg (136 lb 1.6 oz)   02/27/24 61.6 kg (135 lb 12.9 oz)   02/15/24 62 kg (136 lb 9.6 oz)   02/09/24 61.6 kg (135 lb 12.9 oz)   02/06/24 61.5 kg (135 lb 9.6 oz)   01/29/24 63.4 kg (139 lb 11.2 oz)     Dosing Weight: 63 kg - Actual    ASSESSED NUTRITION NEEDS   Estimated Energy Needs: 3915-6826 kcals/day (30 - 35 kcals/kg )  Justification: Increased needs  Estimated Protein Needs: 75-95 grams protein/day (1.2 - 1.5 grams of pro/kg)  Justification: Increased needs  Estimated Fluid Needs: 5733-8926 mL/day (25 - 30 mL/kg)   Justification: Maintenance    PHYSICAL FINDINGS   See malnutrition section below.    MALNUTRITION   % Intake: Decreased intake does not meet criteria  % Weight Loss: Weight loss does not meet criteria  Subcutaneous Fat Loss: None observed  Muscle Loss: Temporal: Mild  Fluid Accumulation/Edema: None noted  Malnutrition Diagnosis: Patient does not meet two of the established criteria necessary for diagnosing malnutrition but is at risk for malnutrition    NUTRITION DIAGNOSIS   Inadequate oral intake " related to decreased appetite with chemo as evidenced by pt self report.       INTERVENTIONS   Implementation   PRN snacks/supplements.     Goals   Patient to consume % of nutritionally adequate meal trays TID, or the equivalent with supplements/snacks.     Monitoring/Evaluation   Progress toward goals will be monitored and evaluated per protocol.  Carline Lyman RD, LD   Available on [employer]  No longer available by pager

## 2024-05-17 NOTE — PHARMACY-ADMISSION MEDICATION HISTORY
Pharmacist Admission Medication History    Admission medication history is complete. The information provided in this note is only as accurate as the sources available at the time of the update.    Information Source(s): Patient and CareEverywhere/SureScripts via in-person    Pertinent Information: none    Changes made to PTA medication list:  Added: None  Deleted: prednisone, dexamethasone, potassium,   Changed:   levofloxacin & fluconazole - takes only PRN for neutropenia prophylaxis   Olanzapine - takes PRN     Allergies reviewed with patient and updates made in EHR: yes    Medication History Completed By: Choco Prescott [facility] 5/17/2024 11:50 AM      Prior to Admission medications    Medication Sig Last Dose Taking? Auth Provider Long Term End Date   acyclovir (ZOVIRAX) 400 MG tablet Take 1 tablet (400 mg) by mouth 2 times daily 5/16/2024 Yes Vanessa Oakes MD Yes    cimetidine (TAGAMET) 200 MG tablet Take 200 mg by mouth 2 times daily 5/16/2024 Yes Reported, Patient     fluconazole (DIFLUCAN) 200 MG tablet Take 1 tablet (200 mg) by mouth daily  Patient taking differently: Take 200 mg by mouth daily as needed (neutropenia prophylaxis) Past Month at 5/10/24 Yes Rosemarie Gonzalez PA-C     levofloxacin (LEVAQUIN) 250 MG tablet Take 1 tablet (250 mg) by mouth daily  Patient taking differently: Take 250 mg by mouth daily as needed (neutropenia prophylaxis) Past Month at 5/10/24 Yes Rosemarie Gonzalez PA-C     Levonorgestrel (KYLEENA IU) 1 Device by Intrauterine route once  Yes Reported, Patient Yes    LORazepam (ATIVAN) 0.5 MG tablet Take 1 tablet (0.5 mg) by mouth every 6 hours as needed for nausea or vomiting More than a month Yes Angela Peterson PA-C     magic mouthwash (ENTER INGREDIENTS IN COMMENTS) suspension Swish and spit 15 ml every 6 hours as needed Unknown Yes Charisse Weinberg APRN CNP     metoclopramide (REGLAN) 10 MG tablet Take 1 tablet (10 mg) by mouth 4 times daily as needed (constipation)  Past Month Yes Nereida Thomas APRN CNP     modafinil (PROVIGIL) 200 MG tablet Take 200 mg by mouth daily as needed (hypersomnia) More than a month Yes Rosemarie Gonzalez PA-C     OLANZapine zydis (ZYPREXA) 5 MG ODT Take 1 tablet (5 mg) by mouth at bedtime  Patient taking differently: Take 5 mg by mouth nightly as needed (nausea) Past Month Yes Angela Blank PA-C Yes    ondansetron (ZOFRAN) 4 MG tablet Take 1-2 tablets (4-8 mg) by mouth every 8 hours as needed for nausea or vomiting Past Month Yes Angela Blank PA-C     pantoprazole (PROTONIX) 40 MG EC tablet Take 1 tablet (40 mg) by mouth 2 times daily 5/16/2024 at am Yes Charisse Weinberg APRN CNP     polyethylene glycol (MIRALAX) 17 GM/Dose powder Take 17 g by mouth 2 times daily 5/16/2024 at am Yes Angela Peterson PA-C     prochlorperazine (COMPAZINE) 10 MG tablet Take 1 tablet (10 mg) by mouth every 6 hours as needed for nausea or vomiting Past Month Yes Angela Blank PA-C     senna-docusate (SENOKOT-S/PERICOLACE) 8.6-50 MG tablet Take 1 tablet by mouth 2 times daily 5/16/2024 at am Yes Angela Peterson PA-C

## 2024-05-17 NOTE — PLAN OF CARE
[id number]  Goal Outcome Evaluation:      Plan of Care Reviewed With: patient    Overall Patient Progress: no change    Pt A&Ox4, VSS on RA. Denies pain/N/V. D1 chemo infusing via port. Sleep/rest promoted overnight with clustered cares and minimized interruptions. Continue to monitor and with POC.

## 2024-05-17 NOTE — PROGRESS NOTES
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     Today:  - Day 2 C5 DA R EPOCH (level 5)  - Tolerating well so far apart from nausea. Unfortunately cannot do a D2 emend w/ vincristine (both CY inhibitors) and worrisome for increased toxicities. Patient reports she will trial compazine in addition to her zofran plus 1 mg ativan for breakthrough (0.5 mg ativan has not worked for her). Will hold 16 mg zofran at this time and rotate to Aloxi (palonsetron) which is long acting and monitor nausea response.   - RD consult placed today to explore supplementation in setting of low appetite. Patient expressed interest in [employer].  - Continue to monitor closely and provide best supportive cares    HEME  # Primary mediastinal B-cell lymphoma  Follows with Dr. Oakes. Initially presented with cervical lymphadenopathy (2023). Initial FNA (24) suggestive of possible Hodgkin lymphoma; however excisional LN biopsy (24) shows large B-cell lymphoma with differential diagnosis of primary mediastinal large B-cell lymphoma vs DLBCL NOS. PET with cervical and mediastinal lymphadenopathy only. Overall presentation felt to be consistent with PMBCL given her age, gender, sites of involvement, dim CD30 expression, and prominent fibrotic background.  Although CD23 IHC was negative, IHC for , MAL, and PD-L1 were positive in majority of the neoplastic cells suggesting PMBCL over DLBCL NOS. [id number] gene expression profile also consistent with PMBCL. FISH with gain  of BCL2 but no MYC, BCL2, or BCL6 rearrangements. PET showed bilateral cervical and mediastinal hypermetabolic lymphadenopathy (largest 4.4 cm with SUV max 27 in a prevascular LN); no disease below diaphragm. Baseline TTE (2/19/24) with LVEF 60%. Initial treatment R-DA-EPOCH (C1D1=2/22/24; C2D1= 3/14/24; C3D1=4/4/24). Which was overall tolerated well but Clementine had CINV and developed bilateral hand neuropathy.  PET (4/1/24) (after C2) showed evidence of complete metabolic response with resolution of cervical and mediastinal lymphadenopathy. Have increased dose each cycle by one level (ex: C1=dose level 1, C2=dose level 2, etc.). She was last seen by Dr. Oakes in clinic on 4/25/2024 and then proceeded with C4 DA-R-EPOCH at dose level 4 but vincristine held to give time for improvement of bothersome neuropathy. She is now admitted for C5 DA-R-EPOCH (level 5), will give vincristine but capped at 1.2 mg to hopefully prevent worsening of her neuropathy. OA she endorses improved symptoms overall; less dyspnea on exertion, minimal dyspnea on rest, less frequent episodes of heart palpitations, and R>L improvement of bilateral neuropathy of hands). CINV after C4 discharge was well controlled on PRN's.   - Plan is total of 6 cycles. Next PET at EOT given CR.  - Port accessed on admission, is CDI, not overtly infected appearing, she reports it has not been bothersome.  - Will add mesna to this regimen     Treatment Plan: Dose adjusted (level 5) R-EPOCH (C5D1=5/16/2024)   - Rituximab IV (375 mg/m2) over 90 mins; D1  - Prednisone PO (100 mg) BID;  D1-5  - Etoposide IV (86 mg/m2); D1-4  - Vincristine IV (dose reduced to 0.3 mg with 1.2 mg cap); D1-4  - Doxorubicin IV 16 mg/m2;  D1-4  - Cyclophosphamide IV (1,296 mg/m2); D5  - Pegfilgrastim x1 - D6  - Pre-meds: tylenol, benadryl, zofran 16mg D1-D5, emend D5, dexamethasone 8 mg (D6-7)     # Peripheral neuropathy   Endorsed L>R neuropathy in her fingertips. Secondary to chemotherapy.  Respectfully declined need for medication management at this time. Vincristine was capped at 2 mg for C3. S/p C3 and neuropathy in fingertips have worsened with some difficulty using her phone or buttoning shirts and now neuropathy in bilateral feet that does not impact balance of walking. Seems to be improving just prior to admission for C4 per patient report. Vincristine omitted for C4 to give time for recovery. On admission patient endorsing recent improvement of neuropathy; R>L.  - Monitor closely  - Patient declined needing intervention at this time as not currently painful, numbness.     # CINV  # Low appetite  Chemotherapy induced nausea and vomiting during previous cycles, managed with prn zofran, compazine, scheduled emend, and zyprexa at bedtime. Symptoms improved within a few days of hospital discharge from C2. Noted worsening nausea on . On chart review, patient has previously received IV Emend earlier in her hospital course (D1-2) in addition to prior to discharge (D5). OA patient reports N/V has been well controlled after C4 with breakthrough PRNs at home.  - OA Clementine is interested in D2 emend, discussed with pharmacy today and unfortunately both emend and vincristine are CY inhibitors, concern for possible increase in concentration of vincristine which would place Clementine at increased risk of toxicities in setting of already experiencing peripheral neuropathy. Will not give additional dose of emend on D2. Okay to give D5 per Dr. Oakes.  - 1 mg Ativan q8hr PRN (reports 0.5mg not effective) and compazine + Zyprexa HS.  - Scheduled Zofran 16 mg daily per chemo protocol HELD; rotated to Aloxi (palonosetron) instead . Will closely monitor nausea response.  - Plan to give D5 emend  - RD consulted  to explore supplementation with eZono    # H/o chemotherapy-related rash  Maculopapular pinkish rash noted with C1 and C2 DA-R-EPOCH, resolved with topical steroids. There has not been recurrence  "in rash with C3 and C4.  - Monitor clinically for recurrence     # Mucositis  Patient endorses previous oral ulceration that was painful but resolved after previous chemotherapy cycles. However, she has had increase of oral sores over the past few weeks that have been painful and making swallowing more difficult.  - MMW & biotene PRN, encourage frequent oral moisturization with saline rinses.  - If visible ulceration develops while inpatient consider HSV swab.     ID  # ID PPx  - Acyclovir 400 mg BID  - Levofloxacin 250 mg daily and fluconazole 200 mg daily when ANC <1  - Pentamidine monthly for PJP ppx; last given 5/3/24, due ~5/31     CV  # H/o palpitations  # H/o tachycardia  Noted heart \"racing and pounding\" after C2 of chemo. Denied chest pain, dyspnea, lower extremity swelling, abdominal pain, nausea, dizziness. Echo from 2/2024 unremarkable. 3/25/24 BNP normal at <36. She again reported symptoms following C3, with increased dyspnea on exertion. CT PE 4/15/24 unremarkable. OA she reports that symptoms have improved overall; has less dyspnea on exertion, minimal dyspnea on rest, less frequent episodes of heart palpitations,  - Sent in ziopatch 5/15  - No acute inpatient requirements at this time     GI  # H/o constipation  - Continue PTA miralax and senna BID.      MISC  # Fertility preservation  - Established with [facility], underwent egg retrieval on 2/18.      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 Her boyfriend is a general surgery resident at [employer]. Parents live in [place].     FEN:  - IVF; per chemotherapy protocol  - Standard lyte replacement protocol  - Regular diet as tolerated     Prophy/Misc:  - GI: Continue PTA PPI BID  - DVT: Enoxaparin ppx    Clinically Significant Risk Factors Present on Admission                           # Financial/Environmental Concerns:            Follow-up: Neulasta 5/21 and lab follow-up scheduled.  Dispo: Discharge after D5 " chemotherapy/completion of mesna.  Medically Ready for Discharge: Anticipated in 2-4 Days   __________________________  This patient care plan was thoroughly discussed with the attending, Dr. Paiz.    I spent 45 minutes in the care of this patient today, which included time necessary for review of interval events, obtaining history and physical exam, ordering medication(s)/test(s) as medically indicated, discussion with interdisciplinary/consult team(s), and documentation time. Over 50% of time was spent face-to-face and/or coordinating care.    Mary Leary PA-C  Hematology/Oncology  Pager: [phone]  Phone: [phone]      Interval History   Charting thoroughly reviewed showing no acute events occurred overnight. Clementine is afebrile and hemodynamically stable today.    Upon my visit today patient is seen sitting up in her hospital bed with her dad at bedside, she is non-toxic appearing, and in no acute distress. She endorses nausea this morning but no episodes of emesis. She is willing to try ativan 1 mg for breakthrough not responsive to zofran/compazine. Reporting 0.5mg ativan has not been helpful in the past. She denied full ROS such as V/D, F/C/NS, headache, dizziness, vision or hearing changes, chest pain, SOB, heart palpitations, abdominal pain, swelling, rash, bruises, or lesions.    Discussed RD consult to which patient was agreeable, she is interesting in tasting supplementation options, such as [employer]. She endorsed low appetite today. She was also agreeable to trial Aloxi for nausea and hold zofran. Otherwise plan for today is to continue chemotherapy, close monitoring, and best supportive cares.      Physical Exam   Vital Signs with Ranges  Temp:  [97.5  F (36.4  C)-98  F (36.7  C)] 97.5  F (36.4  C)  Pulse:  [56-84] 75  Resp:  [16-18] 16  BP: (106-115)/(56-76) 106/58  SpO2:  [96 %-100 %] 97 %    I/O last 3 completed shifts:  In: 2079.7 [P.O.:425; I.V.:10; IV Piggyback:1644.7]  Out: -     Vitals:     05/16/24 0933 05/17/24 0700   Weight: 62.7 kg (138 lb 4.8 oz) 62.6 kg (138 lb)     General: Pleasant, well appearing female sitting up in hospital bed, alert and interactive, NAD, non-toxic appearing.  HEENT: NCAT w/ no obvious deformities. CI alopecia. EOMI, anicteric sclera, moist mucous membranes on limited exam.  Respiratory: Breathing comfortably on room air, satting 96% O2, lungs CTAB with no rhonchi, wheezes, or crackles.  Cardiac: No chest heaves or obvious deformities, auscultation showing RRR with no murmurs, gallops, or rubs. Chest port CDI, non tender, no overt erythema or signs of infection.  Derm: Warm, dry, well perfused appearing, w/ no ecchymoses, rashes, or wounds.  GI: No obvious external abdominal abnormalities.  Musculoskeletal: No joint swelling or erythema. Moves all extremities without obvious signs of deficits or pain.   Psych: Calm, affect WNL, no pressured speech.  Neuro: No obvious focal deficits, A&Ox3, motor and strength appears preserved.    Medications   Current Facility-Administered Medications   Medication Dose Route Frequency Provider Last Rate Last Admin    No rectal suppositories if WBC less than 1000/microliters or platelets less than 50,000/ L   Does not apply Continuous PRN Angela Blank PA-C           Current Facility-Administered Medications   Medication Dose Route Frequency Provider Last Rate Last Admin    acyclovir (ZOVIRAX) tablet 400 mg  400 mg Oral BID Angela Blank PA-C   400 mg at 05/17/24 0754    Chemotherapy Infusing-Continuous Infusion   Does not apply Q8H Charisse Weinberg APRN CNP 25.4 mL/hr at 05/17/24 0549 Given at 05/17/24 0549    cimetidine (TAGAMET) tablet 200 mg  200 mg Oral BID Sharon Paiz MD   200 mg at 05/17/24 0754    [START ON 5/20/2024] cycloPHOSphamide (CYTOXAN) 2,660 mg in sodium chloride 0.9 % 500 mL infusion  1,555 mg/m2 (Treatment Plan Recorded) Intravenous Once Charisse Weinberg APRN CNP        [START ON 5/21/2024]  dexAMETHasone (DECADRON) tablet 8 mg  8 mg Oral Daily PromCharisse banks APRN CNP        enoxaparin ANTICOAGULANT (LOVENOX) injection 40 mg  40 mg Subcutaneous Q24H Angela Blank PA-C   40 mg at 05/16/24 1621    etoposide (TOPOSAR) 175 mg in sodium chloride 0.9 % 558.75 mL infusion  103 mg/m2 (Treatment Plan Recorded) Intravenous Q22H PromdarrenCharisse APRN CNP 25.4 mL/hr at 05/16/24 1532 175 mg at 05/16/24 1532    fluconazole (DIFLUCAN) tablet 200 mg  200 mg Oral Daily Angela Blank PA-C   200 mg at 05/17/24 0754    [START ON 5/20/2024] fosaprepitant (EMEND) 150 mg in sodium chloride 0.9 % 275 mL intermittent infusion  150 mg Intravenous Once Lenard CALOS Mcqueen CNP        heparin 100 unit/mL injection 5-10 mL  5-10 mL Intracatheter Q28 Days Angela Blank PA-C        heparin lock flush 10 UNIT/ML injection 5-10 mL  5-10 mL Intracatheter Q24H Angela Blank PA-C        levofloxacin (LEVAQUIN) tablet 250 mg  250 mg Oral Daily Angela Blank PA-C   250 mg at 05/17/24 0755    [START ON 5/20/2024] mesna (MESNEX) 860 mg in sodium chloride 0.9 % 63.6 mL infusion  500 mg/m2 (Treatment Plan Recorded) Intravenous Once LenardCharisse APRN CNP        [START ON 5/20/2024] mesna (MESNEX) 860 mg in sodium chloride 0.9 % 63.6 mL infusion  500 mg/m2 (Treatment Plan Recorded) Intravenous Q4H PromdarrenCharisse APRN CNP        OLANZapine zydis (zyPREXA) ODT tab 5 mg  5 mg Oral At Bedtime Angela Blank PA-C        ondansetron (ZOFRAN) tablet 16 mg  16 mg Oral Q22H Prom[employer] banks APRN CNP   16 mg at 05/16/24 1506    pantoprazole (PROTONIX) EC tablet 40 mg  40 mg Oral BID Sharon Paiz MD   40 mg at 05/17/24 0755    potassium chloride ER (MICRO-K) CR capsule 20 mEq  20 mEq Oral BID Angela Blank PA-C   20 mEq at 05/17/24 0755    predniSONE (DELTASONE) tablet 100 mg  100 mg Oral BID Charisse Weinberg APRN CNP   100 mg at 05/17/24 0755    senna-docusate  (SENOKOT-S/PERICOLACE) 8.6-50 MG per tablet 2 tablet  2 tablet Oral BID Charisse Weinberg APRN CNP   2 tablet at 05/17/24 0755    sodium chloride (PF) 0.9% PF flush 10-20 mL  10-20 mL Intracatheter Q28 Days Angela Blank PA-C        vinCRIStine (ONCOVIN) 0.3 mg, DOXOrubicin (ADRIAMYCIN) 34 mg in sodium chloride 0.9 % 1,067.3 mL infusion  0.3 mg Intravenous Q22H Charisse Weinberg APRN CNP 48.5 mL/hr at 05/16/24 1532 0.3 mg at 05/16/24 1532       Antiinfectives  Anti-infectives (From now, onward)      Start     Dose/Rate Route Frequency Ordered Stop    05/16/24 2000  acyclovir (ZOVIRAX) tablet 400 mg        Note to Pharmacy: PTA Sig:Take 1 tablet (400 mg) by mouth 2 times daily      400 mg Oral 2 TIMES DAILY 05/16/24 0926      05/16/24 1300  fluconazole (DIFLUCAN) tablet 200 mg        Note to Pharmacy: PTA Sig:Take 1 tablet (200 mg) by mouth daily      200 mg Oral DAILY 05/16/24 0926      05/16/24 1300  levofloxacin (LEVAQUIN) tablet 250 mg        Note to Pharmacy: PTA Sig:Take 1 tablet (250 mg) by mouth daily      250 mg Oral DAILY 05/16/24 0926              Data   CBC   Recent Labs   Lab 05/17/24  0550 05/16/24  0807 05/13/24  1039 05/10/24  0959   WBC 15.3* 6.0 15.7* 22.0*   RBC 3.35* 3.36* 3.40* 3.20*   HGB 10.5* 10.6* 10.8* 10.2*   HCT 32.2* 32.4* 32.9* 30.0*   MCV 96 96 97 94   MCH 31.3 31.5 31.8 31.9   MCHC 32.6 32.7 32.8 34.0   RDW 19.4* 19.3* 19.5* 18.8*    356 250 138*       CMP   Recent Labs   Lab 05/17/24  0550 05/16/24  1005 05/16/24  0807 05/13/24  1039 05/10/24  0959     --  141 140 141   POTASSIUM 4.4  --  4.1 4.3 4.1   CHLORIDE 108*  --  107 107 109*   CO2 22  --  25 24 22   ANIONGAP 9  --  9 9 10   *  --  94 95 97   BUN 12.0  --  12.0 9.7 10.8   CR 0.68  --  0.76 0.75 0.82   GFRESTIMATED >90  --  >90 >90 >90   DEE 9.3  --  9.1 9.3 9.0   MAG 1.8 1.7  --   --   --    PHOS 4.2 3.9  --   --   --    PROTTOTAL 6.1*  --  6.4 6.6 6.2*   ALBUMIN 4.1  --  4.3 4.3 4.0   BILITOTAL <0.2   --  <0.2 0.2 <0.2   ALKPHOS 77  --  78 90 97   AST 16  --  17 18 23   ALT 14  --  13 9 7       LFTs   Recent Labs   Lab 05/17/24  0550   PROTTOTAL 6.1*   ALBUMIN 4.1   BILITOTAL <0.2   ALKPHOS 77   AST 16   ALT 14       Coagulation Studies   Recent Labs   Lab 05/17/24  0550   INR 1.13

## 2024-05-17 NOTE — PLAN OF CARE
[id number]. VSS on RA. Denies pain/nausea. Chemo infusing into port, tolerating well. Port with + blood return. Tolerating regular diet. Up ad tio. Continue to monitor and with POC.

## 2024-05-17 NOTE — PLAN OF CARE
"Goal Outcome Evaluation:      Plan of Care Reviewed With: patient    Overall Patient Progress: improving     RN: [id number]    Vital signs: /72 (BP Location: Right arm)   Pulse 87   Temp 97.9  F (36.6  C) (Oral)   Resp 16   Ht 1.66 m (5' 5.35\")   Wt 62.6 kg (138 lb)   SpO2 98%   BMI 22.72 kg/m    Status: 27 y/o w/ hx of primary mediastinal B cell lymphoma diagnosed earlier this year. Admitted 5/16/24 for chemotherapy.   Neuro: A&Ox4. Pleasant. Cooperative.   Pain/Nausea: Reported nausea early this morning- rec'd PRN zofran. Zofran has since been discontinued and she has received IV Aloxi. Pt has also been utilizing PRN ativan. Reports chemo-induced neuropathy pain in bilateral hands/fingers- numbness/tingling present.   Cardiac: WDL  Respiratory: WDL  Mobility: Up ad tio, independent  Diet: Regular  LDAs: Single lumen port  Skin/incisions: No new concerns as of present.   GI/: WDL  Continue with plan of care               "

## 2024-05-18 LAB
ALBUMIN SERPL BCG-MCNC: 4 G/DL (ref 3.5–5.2)
ALP SERPL-CCNC: 55 U/L (ref 40–150)
ALT SERPL W P-5'-P-CCNC: 9 U/L (ref 0–50)
ANION GAP SERPL CALCULATED.3IONS-SCNC: 7 MMOL/L (ref 7–15)
AST SERPL W P-5'-P-CCNC: 12 U/L (ref 0–45)
BASOPHILS # BLD AUTO: 0 10E3/UL (ref 0–0.2)
BASOPHILS NFR BLD AUTO: 0 %
BILIRUB SERPL-MCNC: <0.2 MG/DL
BUN SERPL-MCNC: 12.4 MG/DL (ref 6–20)
CALCIUM SERPL-MCNC: 9.1 MG/DL (ref 8.6–10)
CHLORIDE SERPL-SCNC: 111 MMOL/L (ref 98–107)
CREAT SERPL-MCNC: 0.65 MG/DL (ref 0.51–0.95)
DEPRECATED HCO3 PLAS-SCNC: 23 MMOL/L (ref 22–29)
EGFRCR SERPLBLD CKD-EPI 2021: >90 ML/MIN/1.73M2
EOSINOPHIL # BLD AUTO: 0 10E3/UL (ref 0–0.7)
EOSINOPHIL NFR BLD AUTO: 0 %
ERYTHROCYTE [DISTWIDTH] IN BLOOD BY AUTOMATED COUNT: 19.5 % (ref 10–15)
FIBRINOGEN PPP-MCNC: 201 MG/DL (ref 170–490)
GLUCOSE SERPL-MCNC: 122 MG/DL (ref 70–99)
HCT VFR BLD AUTO: 29.3 % (ref 35–47)
HGB BLD-MCNC: 9.7 G/DL (ref 11.7–15.7)
IMM GRANULOCYTES # BLD: 0.5 10E3/UL
IMM GRANULOCYTES NFR BLD: 3 %
INR PPP: 1.11 (ref 0.85–1.15)
LYMPHOCYTES # BLD AUTO: 0.5 10E3/UL (ref 0.8–5.3)
LYMPHOCYTES NFR BLD AUTO: 3 %
MAGNESIUM SERPL-MCNC: 2 MG/DL (ref 1.7–2.3)
MCH RBC QN AUTO: 32.4 PG (ref 26.5–33)
MCHC RBC AUTO-ENTMCNC: 33.1 G/DL (ref 31.5–36.5)
MCV RBC AUTO: 98 FL (ref 78–100)
MONOCYTES # BLD AUTO: 1.2 10E3/UL (ref 0–1.3)
MONOCYTES NFR BLD AUTO: 7 %
NEUTROPHILS # BLD AUTO: 14.5 10E3/UL (ref 1.6–8.3)
NEUTROPHILS NFR BLD AUTO: 87 %
NRBC # BLD AUTO: 0 10E3/UL
NRBC BLD AUTO-RTO: 0 /100
PHOSPHATE SERPL-MCNC: 4.3 MG/DL (ref 2.5–4.5)
PLATELET # BLD AUTO: 350 10E3/UL (ref 150–450)
POTASSIUM SERPL-SCNC: 4.4 MMOL/L (ref 3.4–5.3)
PROT SERPL-MCNC: 5.8 G/DL (ref 6.4–8.3)
RBC # BLD AUTO: 2.99 10E6/UL (ref 3.8–5.2)
SODIUM SERPL-SCNC: 141 MMOL/L (ref 135–145)
URATE SERPL-MCNC: 1.9 MG/DL (ref 2.4–5.7)
WBC # BLD AUTO: 16.8 10E3/UL (ref 4–11)

## 2024-05-18 PROCEDURE — 99418 PROLNG IP/OBS E/M EA 15 MIN: CPT

## 2024-05-18 PROCEDURE — 99233 SBSQ HOSP IP/OBS HIGH 50: CPT | Mod: FS

## 2024-05-18 PROCEDURE — 250N000011 HC RX IP 250 OP 636

## 2024-05-18 PROCEDURE — 250N000013 HC RX MED GY IP 250 OP 250 PS 637

## 2024-05-18 PROCEDURE — 258N000003 HC RX IP 258 OP 636

## 2024-05-18 PROCEDURE — 85025 COMPLETE CBC W/AUTO DIFF WBC: CPT

## 2024-05-18 PROCEDURE — 84100 ASSAY OF PHOSPHORUS: CPT

## 2024-05-18 PROCEDURE — 85384 FIBRINOGEN ACTIVITY: CPT

## 2024-05-18 PROCEDURE — 84550 ASSAY OF BLOOD/URIC ACID: CPT

## 2024-05-18 PROCEDURE — 120N000002 HC R&B MED SURG/OB UMMC

## 2024-05-18 PROCEDURE — 83735 ASSAY OF MAGNESIUM: CPT

## 2024-05-18 PROCEDURE — 250N000012 HC RX MED GY IP 250 OP 636 PS 637

## 2024-05-18 PROCEDURE — 85610 PROTHROMBIN TIME: CPT

## 2024-05-18 PROCEDURE — 80053 COMPREHEN METABOLIC PANEL: CPT

## 2024-05-18 RX ADMIN — PREDNISONE 100 MG: 50 TABLET ORAL at 16:05

## 2024-05-18 RX ADMIN — DOCUSATE SODIUM 50 MG AND SENNOSIDES 8.6 MG 1 TABLET: 8.6; 5 TABLET, FILM COATED ORAL at 20:10

## 2024-05-18 RX ADMIN — POTASSIUM CHLORIDE 20 MEQ: 750 CAPSULE, EXTENDED RELEASE ORAL at 20:09

## 2024-05-18 RX ADMIN — FLUCONAZOLE 200 MG: 200 TABLET ORAL at 08:15

## 2024-05-18 RX ADMIN — CIMETIDINE 200 MG: 200 TABLET, FILM COATED ORAL at 08:15

## 2024-05-18 RX ADMIN — PREDNISONE 100 MG: 50 TABLET ORAL at 08:15

## 2024-05-18 RX ADMIN — ACYCLOVIR 400 MG: 400 TABLET ORAL at 08:15

## 2024-05-18 RX ADMIN — ETOPOSIDE 175 MG: 20 INJECTION, SOLUTION INTRAVENOUS at 11:03

## 2024-05-18 RX ADMIN — VINCRISTINE SULFATE 0.3 MG: 1 INJECTION, SOLUTION INTRAVENOUS at 11:03

## 2024-05-18 RX ADMIN — OLANZAPINE 5 MG: 5 TABLET, ORALLY DISINTEGRATING ORAL at 21:27

## 2024-05-18 RX ADMIN — CIMETIDINE 200 MG: 200 TABLET, FILM COATED ORAL at 20:09

## 2024-05-18 RX ADMIN — POTASSIUM CHLORIDE 20 MEQ: 750 CAPSULE, EXTENDED RELEASE ORAL at 08:15

## 2024-05-18 RX ADMIN — DOCUSATE SODIUM 50 MG AND SENNOSIDES 8.6 MG 1 TABLET: 8.6; 5 TABLET, FILM COATED ORAL at 08:15

## 2024-05-18 RX ADMIN — ACYCLOVIR 400 MG: 400 TABLET ORAL at 20:10

## 2024-05-18 RX ADMIN — PANTOPRAZOLE SODIUM 40 MG: 40 TABLET, DELAYED RELEASE ORAL at 20:10

## 2024-05-18 RX ADMIN — ENOXAPARIN SODIUM 40 MG: 40 INJECTION SUBCUTANEOUS at 16:05

## 2024-05-18 RX ADMIN — PANTOPRAZOLE SODIUM 40 MG: 40 TABLET, DELAYED RELEASE ORAL at 08:15

## 2024-05-18 ASSESSMENT — ACTIVITIES OF DAILY LIVING (ADL)
ADLS_ACUITY_SCORE: 18

## 2024-05-18 NOTE — PROGRESS NOTES
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     Today:  - Day 3 C5 DA R EPOCH (level 5).   - Nausea improved today with transition from Zofran ? aloxi and with addition of prn Ativan. Continue monitoring symptoms closely.   - Continue to encourage PO hydration and nutrition.    HEME  # Primary mediastinal B-cell lymphoma  Follows with Dr. Oakes. Initially presented with cervical lymphadenopathy (12/2023). Initial FNA (1/29/24) suggestive of possible Hodgkin lymphoma; however excisional LN biopsy (2/9/24) shows large B-cell lymphoma with differential diagnosis of primary mediastinal large B-cell lymphoma vs DLBCL NOS. PET with cervical and mediastinal lymphadenopathy only. Overall presentation felt to be consistent with PMBCL given her age, gender, sites of involvement, dim CD30 expression, and prominent fibrotic background.  Although CD23 IHC was negative, IHC for , MAL, and PD-L1 were positive in majority of the neoplastic cells suggesting PMBCL over DLBCL NOS. [id number] gene expression profile also consistent with PMBCL. FISH with gain of BCL2 but no MYC, BCL2, or BCL6 rearrangements. PET showed bilateral cervical and mediastinal hypermetabolic lymphadenopathy (largest 4.4 cm with SUV max 27 in a prevascular LN); no disease below diaphragm. Baseline TTE (2/19/24) with LVEF 60%. Initial treatment R-DA-EPOCH (C1D1=2/22/24; C2D1= 3/14/24; C3D1=4/4/24). Which was overall tolerated well but Clementine had CINV and developed bilateral hand neuropathy.  PET  (4/1/24) (after C2) showed evidence of complete metabolic response with resolution of cervical and mediastinal lymphadenopathy. Have increased dose each cycle by one level (ex: C1=dose level 1, C2=dose level 2, etc.). She was last seen by Dr. Oakes in clinic on 4/25/2024 and then proceeded with C4 DA-R-EPOCH at dose level 4 but vincristine held to give time for improvement of bothersome neuropathy. She is now admitted for C5 DA-R-EPOCH (level 5), will give vincristine but capped at 1.2 mg to hopefully prevent worsening of her neuropathy. OA she endorses improved symptoms overall; less dyspnea on exertion, minimal dyspnea on rest, less frequent episodes of heart palpitations, and R>L improvement of bilateral neuropathy of hands). CINV after C4 discharge was well controlled on PRN's.   - Plan is total of 6 cycles. Next PET at EOT given CR.  - Port accessed on admission.  - Will add mesna to this regimen     Treatment Plan: Dose adjusted (level 5) R-EPOCH (C5D1=5/16/2024)   - Rituximab IV (375 mg/m2) over 90 mins; D1  - Prednisone PO (100 mg) BID;  D1-5  - Etoposide IV (103 mg/m2); D1-4  - Vincristine IV (dose reduced to 0.3 mg with 1.2 mg cap); D1-4  - Doxorubicin IV (20.1 mg/m2);  D1-4  - Cyclophosphamide IV (1,555 mg/m2); D5  - Mesna IV (860 mg); D5 after Cyclophosphamide  - Pegfilgrastim x1 - D6  - Pre-meds: tylenol, benadryl, zofran 16mg D1-D5, emend D5, dexamethasone 8 mg (D6-7)     # Peripheral neuropathy   Endorsed L>R neuropathy in her fingertips. Secondary to chemotherapy. Respectfully declined need for medication management at this time. Vincristine was capped at 2 mg for C3. S/p C3 and neuropathy in fingertips have worsened with some difficulty using her phone or buttoning shirts and now neuropathy in bilateral feet that does not impact balance of walking. Seems to be improving just prior to admission for C4 per patient report. Vincristine omitted for C4 to give time for recovery. On admission patient  endorsing recent improvement of neuropathy; R>L.  - Monitor closely  - Patient declined needing intervention at this time as not currently painful, numbness.     # CINV  # Low appetite  Chemotherapy induced nausea and vomiting during previous cycles, managed with prn zofran, compazine, scheduled emend, and zyprexa at bedtime. Symptoms improved within a few days of hospital discharge from C2. Noted worsening nausea on . On chart review, patient has previously received IV Emend earlier in her hospital course (D1-2) in addition to prior to discharge (D5). OA patient reports N/V has been well controlled after C4 with breakthrough PRNs at home.  - OA Clementine is interested in D2 emend, discussed with pharmacy today and unfortunately both emend and vincristine are CY inhibitors, concern for possible increase in concentration of vincristine which would place Clementine at increased risk of toxicities in setting of already experiencing peripheral neuropathy. Will not give additional dose of emend on D2. Okay to give D5 per Dr. Oakes.  - 1 mg Ativan q8hr PRN with prn compazine + Zyprexa scheduled HS.  - Scheduled Zofran 16 mg daily per chemo protocol HELD; rotated to Aloxi (palonosetron) x  with notable improvement.   - Plan to give D5 emend  - RD consulted  to explore supplementation with [employer]    # H/o chemotherapy-related rash  Maculopapular pinkish rash noted with C1 and C2 DA-R-EPOCH, resolved with topical steroids. There has not been recurrence in rash with C3 and C4.  - Monitor clinically for recurrence     # Mucositis  Patient endorses previous oral ulceration that was painful but resolved after previous chemotherapy cycles. However, she has had increase of oral sores over the past few weeks that have been painful and making swallowing more difficult.  - MMW & biotene PRN, encourage frequent oral moisturization with saline rinses.  - If visible ulceration develops while inpatient consider HSV swab.    "  ID  # ID PPx  - Acyclovir 400 mg BID  - Levofloxacin 250 mg daily and fluconazole 200 mg daily when ANC <1  - Pentamidine monthly for PJP ppx; last given 5/3/24, due ~5/31     CV  # H/o palpitations  # H/o tachycardia  Noted heart \"racing and pounding\" after C2 of chemo. Denied chest pain, dyspnea, lower extremity swelling, abdominal pain, nausea, dizziness. Echo from 2/2024 unremarkable. 3/25/24 BNP normal at <36. She again reported symptoms following C3, with increased dyspnea on exertion. CT PE 4/15/24 unremarkable. OA she reports that symptoms have improved overall; has less dyspnea on exertion, minimal dyspnea on rest, less frequent episodes of heart palpitations,  - Sent in [facility] 5/15  - No acute inpatient requirements at this time     GI  # H/o constipation  - Continue PTA miralax and senna BID.      MISC  # Fertility preservation  - Established with [facility], underwent egg retrieval on 2/18.      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 Her boyfriend is a general surgery resident at [employer]. Parents live in [place].     FEN:  - IVF; per chemotherapy protocol  - Standard lyte replacement protocol  - Regular diet as tolerated     Prophy/Misc:  - GI: Continue PTA PPI BID  - DVT: Enoxaparin ppx    Clinically Significant Risk Factors                             # Financial/Environmental Concerns:            Follow-up: Neulasta 5/21 and lab follow-up scheduled.  Dispo: Discharge after D5 chemotherapy/completion of mesna.  Medically Ready for Discharge: Anticipated in 2-4 Days     This patient care plan was discussed with staff attending Dr. Paiz.    I spent 40 minutes in the care of this patient today, which included time necessary for review of interval events, obtaining history and physical exam, ordering medication(s)/test(s) as medically indicated, discussion with interdisciplinary/consult team(s), and documentation time. Over 50% of time was spent " face-to-face and/or coordinating care.    Rosemarie Gonzalez PA-C  Hematology/Oncology  Pager: #[phone]     Interval History   No acute events overnight. Nursing notes reviewed. Remains afebrile and HDS. Endorses some nausea, but significant improved since addition of aloxi and ativan prn yesterday. Ate breakfast and this is settling well. Denies headache, vision changes, chest pain, palpitations, shortness of breath, mouth sores, abdominal pain or cramping, dysuria. Continues to move bowels. She is in good spirits today, father supportive at bedside. All questions addressed.    A comprehensive review of symptoms was performed and was negative except as detailed in the interval history above.     Physical Exam   Vital Signs with Ranges  Temp:  [97.7  F (36.5  C)-98.2  F (36.8  C)] 97.9  F (36.6  C)  Pulse:  [59-87] 78  Resp:  [16] 16  BP: (106-120)/(59-72) 114/63  SpO2:  [95 %-99 %] 98 %    I/O last 3 completed shifts:  In: 1136.8 [P.O.:250; IV Piggyback:886.8]  Out: 1000 [Urine:1000]    Vitals:    05/16/24 0933 05/17/24 0700 05/18/24 0752   Weight: 62.7 kg (138 lb 4.8 oz) 62.6 kg (138 lb) 63.1 kg (139 lb 1.6 oz)     Constitutional: Awake, pleasant, and conversational. Non-toxic appearing. No acute distress.    HEENT: Normocephalic, atraumatic. Sclerae anicteric. Moist mucus membranes without lesion or abscess.    Respiratory: Breathing comfortably on room air with no accessory muscle use. Speaking in full sentences, no evidence of respiratory distress. Lungs CTAB, no wheeze or rales.   Cardiovascular: Regular rate and rhythm. No murmurs appreciated.  Circulatory: 2+ peripheral pulses. No peripheral edema.    GI: Abdomen with normoactive bowel sounds, soft, non-distended, and non-tender throughout. No rebound or guarding.   Skin: Skin is clean, dry, intact. No jaundice or significant rashes appreciated on limited exam.   Neurologic: Alert and oriented with normal speech. Grossly nonfocal exam. Moves all extremities  equally.   Neuropsychiatric: Calm, appropriate mood and affect congruent to situation. Good judgment and insight.       Medications   Current Facility-Administered Medications   Medication Dose Route Frequency Provider Last Rate Last Admin    No rectal suppositories if WBC less than 1000/microliters or platelets less than 50,000/ L   Does not apply Continuous PRN Angela Blank PA-C           Current Facility-Administered Medications   Medication Dose Route Frequency Provider Last Rate Last Admin    acyclovir (ZOVIRAX) tablet 400 mg  400 mg Oral BID Angela Blank PA-C   400 mg at 05/18/24 0815    Chemotherapy Infusing-Continuous Infusion   Does not apply Q8H Charisse Weinberg APRN CNP 25.4 mL/hr at 05/17/24 0549 Given at 05/18/24 0536    cimetidine (TAGAMET) tablet 200 mg  200 mg Oral BID Sharon Paiz MD   200 mg at 05/18/24 0815    [START ON 5/20/2024] cycloPHOSphamide (CYTOXAN) 2,660 mg in sodium chloride 0.9 % 500 mL infusion  1,555 mg/m2 (Treatment Plan Recorded) Intravenous Once Charisse Weinberg APRN CNP        [START ON 5/21/2024] dexAMETHasone (DECADRON) tablet 8 mg  8 mg Oral Daily Charisse Weinberg APRN CNP        enoxaparin ANTICOAGULANT (LOVENOX) injection 40 mg  40 mg Subcutaneous Q24H Angela Blank PA-C   40 mg at 05/17/24 1628    etoposide (TOPOSAR) 175 mg in sodium chloride 0.9 % 558.75 mL infusion  103 mg/m2 (Treatment Plan Recorded) Intravenous Q22H Charisse Weinberg APRN CNP 25.4 mL/hr at 05/18/24 1103 175 mg at 05/18/24 1103    fluconazole (DIFLUCAN) tablet 200 mg  200 mg Oral Daily Angela Blank PA-C   200 mg at 05/18/24 0815    [START ON 5/20/2024] fosaprepitant (EMEND) 150 mg in sodium chloride 0.9 % 275 mL intermittent infusion  150 mg Intravenous Once Charisse Weinberg APRN CNP        heparin 100 unit/mL injection 5-10 mL  5-10 mL Intracatheter Q28 Days Angela Blank PA-C        heparin lock flush 10 UNIT/ML injection 5-10 mL  5-10 mL  Intracatheter Q24H Angela Blank PA-C        [Held by provider] levofloxacin (LEVAQUIN) tablet 250 mg  250 mg Oral Daily Angela Blank PA-C   250 mg at 05/17/24 0755    [START ON 5/20/2024] mesna (MESNEX) 860 mg in sodium chloride 0.9 % 63.6 mL infusion  500 mg/m2 (Treatment Plan Recorded) Intravenous Once Charisse Weinberg APRN CNP        [START ON 5/20/2024] mesna (MESNEX) 860 mg in sodium chloride 0.9 % 63.6 mL infusion  500 mg/m2 (Treatment Plan Recorded) Intravenous Q4H Charisse Weinberg APRN CNP        OLANZapine zydis (zyPREXA) ODT tab 5 mg  5 mg Oral At Bedtime Angela Blank PA-C   5 mg at 05/17/24 2103    [Held by provider] ondansetron (ZOFRAN) tablet 16 mg  16 mg Oral Q22H Charisse Weinberg APRN CNP   16 mg at 05/16/24 1506    pantoprazole (PROTONIX) EC tablet 40 mg  40 mg Oral BID Sharon Paiz MD   40 mg at 05/18/24 0815    potassium chloride ER (MICRO-K) CR capsule 20 mEq  20 mEq Oral BID Angela Blank PA-C   20 mEq at 05/18/24 0815    predniSONE (DELTASONE) tablet 100 mg  100 mg Oral BID Charisse Weinberg APRN CNP   100 mg at 05/18/24 0815    senna-docusate (SENOKOT-S/PERICOLACE) 8.6-50 MG per tablet 2 tablet  2 tablet Oral BID Charisse Weinberg APRN CNP   1 tablet at 05/18/24 0815    sodium chloride (PF) 0.9% PF flush 10-20 mL  10-20 mL Intracatheter Q28 Days Angela Blank PA-C        vinCRIStine (ONCOVIN) 0.3 mg, DOXOrubicin (ADRIAMYCIN) 34 mg in sodium chloride 0.9 % 1,067.3 mL infusion  0.3 mg Intravenous Q22H Charisse Weinberg APRN CNP 48.5 mL/hr at 05/18/24 1103 0.3 mg at 05/18/24 1103       Antiinfectives  Anti-infectives (From now, onward)      Start     Dose/Rate Route Frequency Ordered Stop    05/16/24 2000  acyclovir (ZOVIRAX) tablet 400 mg        Note to Pharmacy: ABDIEL Sig:Take 1 tablet (400 mg) by mouth 2 times daily      400 mg Oral 2 TIMES DAILY 05/16/24 0926      05/16/24 1300  fluconazole (DIFLUCAN) tablet 200 mg        Note to  Pharmacy: PTA Sig:Take 1 tablet (200 mg) by mouth daily      200 mg Oral DAILY 05/16/24 0926      05/16/24 1300  [Held by provider]  levofloxacin (LEVAQUIN) tablet 250 mg        (On hold since yesterday at 1237 until manually unheld; held by Mary Leary PA-CHold Reason: Other)   Note to Pharmacy: PTA Sig:Take 1 tablet (250 mg) by mouth daily      250 mg Oral DAILY 05/16/24 0926              Data   CBC   Recent Labs   Lab 05/18/24 0417 05/17/24  0550 05/16/24  0807 05/13/24  1039   WBC 16.8* 15.3* 6.0 15.7*   RBC 2.99* 3.35* 3.36* 3.40*   HGB 9.7* 10.5* 10.6* 10.8*   HCT 29.3* 32.2* 32.4* 32.9*   MCV 98 96 96 97   MCH 32.4 31.3 31.5 31.8   MCHC 33.1 32.6 32.7 32.8   RDW 19.5* 19.4* 19.3* 19.5*    368 356 250       CMP   Recent Labs   Lab 05/18/24  0417 05/17/24  0550 05/16/24  1005 05/16/24  0807 05/13/24  1039    139  --  141 140   POTASSIUM 4.4 4.4  --  4.1 4.3   CHLORIDE 111* 108*  --  107 107   CO2 23 22  --  25 24   ANIONGAP 7 9  --  9 9   * 141*  --  94 95   BUN 12.4 12.0  --  12.0 9.7   CR 0.65 0.68  --  0.76 0.75   GFRESTIMATED >90 >90  --  >90 >90   DEE 9.1 9.3  --  9.1 9.3   MAG 2.0 1.8 1.7  --   --    PHOS 4.3 4.2 3.9  --   --    PROTTOTAL 5.8* 6.1*  --  6.4 6.6   ALBUMIN 4.0 4.1  --  4.3 4.3   BILITOTAL <0.2 <0.2  --  <0.2 0.2   ALKPHOS 55 77  --  78 90   AST 12 16  --  17 18   ALT 9 14  --  13 9       LFTs   Recent Labs   Lab 05/18/24 0417   PROTTOTAL 5.8*   ALBUMIN 4.0   BILITOTAL <0.2   ALKPHOS 55   AST 12   ALT 9       Coagulation Studies   Recent Labs   Lab 05/18/24 0417   INR 1.11

## 2024-05-18 NOTE — PROGRESS NOTES
Nursing Focus: Chemotherapy  D: Positive blood return via Port . Insertion site is clean/dry/intact, dressing intact with no complaints of pain.  Pre infusion assessment documented in Flowsheet (if applicable).    I: Premedications given per order (see electronic medical administration record). Dose #3 of Etoposide + Vincristine/Doxorubicin started to infuse over 22 hours. Reviewed pt teaching on chemotherapy side effects.  Pt denies need for further teaching. Chemotherapy double checked per protocol by two chemotherapy competent RN's.   A: Tolerating infusion well. Denies nausea and or pain.   P: Continue to monitor urine output and symptoms of nausea. Screen for symptoms of toxicity.

## 2024-05-18 NOTE — PROGRESS NOTES
Nursing Focus: Chemotherapy  D: Positive blood return via Port . Insertion site is clean/dry/intact, dressing intact with no complaints of pain.  Pre infusion assessment documented in Flowsheet (if applicable).    I: Premedications given per order (see electronic medical administration record). Dose #2 of Etoposide + Vincristine/Doxorubicin started to infuse over 22 hours. Reviewed pt teaching on chemotherapy side effects.  Pt denies need for further teaching. Chemotherapy double checked per protocol by two chemotherapy competent RN's.   A: Tolerating infusion well. Denies nausea and or pain.   P: Continue to monitor urine output and symptoms of nausea. Screen for symptoms of toxicity.

## 2024-05-18 NOTE — PLAN OF CARE
"Goal Outcome Evaluation:      Plan of Care Reviewed With: patient    Overall Patient Progress: improving Overall Patient Progress: improving    Reason for admission: C5D3 R-EPOCH for DLBCL diagnosed 02/2024     VS: /71 (BP Location: Right arm)   Pulse 60   Temp 97.8  F (36.6  C) (Oral)   Resp 16   Ht 1.66 m (5' 5.35\")   Wt 62.6 kg (138 lb)   SpO2 99%   BMI 22.72 kg/m     Pain/Nausea: Denied this shift, has PO ativan available for nausea  Neuro:  A&Ox4  Cardiac: NSR  Resp: Room air  GI/: Voiding spontaneously, on stool softeners to prevent constipation  Skin: Visible skin WNL  Diet: Regular, fair appetite  Activity: Independent  LDA: R Chest port infusing chemo  Labs: See results  Plan: Continue cycle of chemo         "

## 2024-05-19 DIAGNOSIS — C83.32 DIFFUSE LARGE B-CELL LYMPHOMA OF INTRATHORACIC LYMPH NODES (H): ICD-10-CM

## 2024-05-19 LAB
ABO/RH(D): NORMAL
ALBUMIN SERPL BCG-MCNC: 4 G/DL (ref 3.5–5.2)
ALP SERPL-CCNC: 52 U/L (ref 40–150)
ALT SERPL W P-5'-P-CCNC: 9 U/L (ref 0–50)
ANION GAP SERPL CALCULATED.3IONS-SCNC: 8 MMOL/L (ref 7–15)
ANTIBODY SCREEN: NEGATIVE
AST SERPL W P-5'-P-CCNC: 12 U/L (ref 0–45)
BASOPHILS # BLD AUTO: 0 10E3/UL (ref 0–0.2)
BASOPHILS NFR BLD AUTO: 0 %
BILIRUB SERPL-MCNC: 0.2 MG/DL
BUN SERPL-MCNC: 14.1 MG/DL (ref 6–20)
CALCIUM SERPL-MCNC: 9.3 MG/DL (ref 8.6–10)
CHLORIDE SERPL-SCNC: 106 MMOL/L (ref 98–107)
CREAT SERPL-MCNC: 0.58 MG/DL (ref 0.51–0.95)
DEPRECATED HCO3 PLAS-SCNC: 25 MMOL/L (ref 22–29)
EGFRCR SERPLBLD CKD-EPI 2021: >90 ML/MIN/1.73M2
EOSINOPHIL # BLD AUTO: 0 10E3/UL (ref 0–0.7)
EOSINOPHIL NFR BLD AUTO: 0 %
ERYTHROCYTE [DISTWIDTH] IN BLOOD BY AUTOMATED COUNT: 19 % (ref 10–15)
FIBRINOGEN PPP-MCNC: 188 MG/DL (ref 170–490)
GLUCOSE SERPL-MCNC: 129 MG/DL (ref 70–99)
HCT VFR BLD AUTO: 29 % (ref 35–47)
HGB BLD-MCNC: 9.7 G/DL (ref 11.7–15.7)
IMM GRANULOCYTES # BLD: 0.1 10E3/UL
IMM GRANULOCYTES NFR BLD: 2 %
INR PPP: 1.14 (ref 0.85–1.15)
LYMPHOCYTES # BLD AUTO: 0.2 10E3/UL (ref 0.8–5.3)
LYMPHOCYTES NFR BLD AUTO: 3 %
MAGNESIUM SERPL-MCNC: 1.9 MG/DL (ref 1.7–2.3)
MCH RBC QN AUTO: 32.7 PG (ref 26.5–33)
MCHC RBC AUTO-ENTMCNC: 33.4 G/DL (ref 31.5–36.5)
MCV RBC AUTO: 98 FL (ref 78–100)
MONOCYTES # BLD AUTO: 0.2 10E3/UL (ref 0–1.3)
MONOCYTES NFR BLD AUTO: 3 %
NEUTROPHILS # BLD AUTO: 6.1 10E3/UL (ref 1.6–8.3)
NEUTROPHILS NFR BLD AUTO: 92 %
NRBC # BLD AUTO: 0 10E3/UL
NRBC BLD AUTO-RTO: 0 /100
PHOSPHATE SERPL-MCNC: 4.4 MG/DL (ref 2.5–4.5)
PLATELET # BLD AUTO: 328 10E3/UL (ref 150–450)
POTASSIUM SERPL-SCNC: 4.1 MMOL/L (ref 3.4–5.3)
PROT SERPL-MCNC: 5.9 G/DL (ref 6.4–8.3)
RBC # BLD AUTO: 2.97 10E6/UL (ref 3.8–5.2)
SODIUM SERPL-SCNC: 139 MMOL/L (ref 135–145)
SPECIMEN EXPIRATION DATE: NORMAL
URATE SERPL-MCNC: 2.5 MG/DL (ref 2.4–5.7)
WBC # BLD AUTO: 6.6 10E3/UL (ref 4–11)

## 2024-05-19 PROCEDURE — 250N000012 HC RX MED GY IP 250 OP 636 PS 637

## 2024-05-19 PROCEDURE — 85025 COMPLETE CBC W/AUTO DIFF WBC: CPT

## 2024-05-19 PROCEDURE — 258N000003 HC RX IP 258 OP 636

## 2024-05-19 PROCEDURE — 80053 COMPREHEN METABOLIC PANEL: CPT

## 2024-05-19 PROCEDURE — 99233 SBSQ HOSP IP/OBS HIGH 50: CPT | Mod: FS

## 2024-05-19 PROCEDURE — 250N000011 HC RX IP 250 OP 636

## 2024-05-19 PROCEDURE — 85384 FIBRINOGEN ACTIVITY: CPT

## 2024-05-19 PROCEDURE — 250N000013 HC RX MED GY IP 250 OP 250 PS 637

## 2024-05-19 PROCEDURE — 120N000002 HC R&B MED SURG/OB UMMC

## 2024-05-19 PROCEDURE — 86900 BLOOD TYPING SEROLOGIC ABO: CPT

## 2024-05-19 PROCEDURE — 99418 PROLNG IP/OBS E/M EA 15 MIN: CPT

## 2024-05-19 PROCEDURE — 84550 ASSAY OF BLOOD/URIC ACID: CPT

## 2024-05-19 PROCEDURE — 85610 PROTHROMBIN TIME: CPT

## 2024-05-19 PROCEDURE — 84100 ASSAY OF PHOSPHORUS: CPT

## 2024-05-19 PROCEDURE — 83735 ASSAY OF MAGNESIUM: CPT

## 2024-05-19 RX ADMIN — FLUCONAZOLE 200 MG: 200 TABLET ORAL at 07:46

## 2024-05-19 RX ADMIN — POTASSIUM CHLORIDE 20 MEQ: 750 CAPSULE, EXTENDED RELEASE ORAL at 07:46

## 2024-05-19 RX ADMIN — CIMETIDINE 200 MG: 200 TABLET, FILM COATED ORAL at 07:46

## 2024-05-19 RX ADMIN — ACYCLOVIR 400 MG: 400 TABLET ORAL at 07:46

## 2024-05-19 RX ADMIN — CIMETIDINE 200 MG: 200 TABLET, FILM COATED ORAL at 20:14

## 2024-05-19 RX ADMIN — PREDNISONE 100 MG: 50 TABLET ORAL at 07:46

## 2024-05-19 RX ADMIN — PANTOPRAZOLE SODIUM 40 MG: 40 TABLET, DELAYED RELEASE ORAL at 20:14

## 2024-05-19 RX ADMIN — VINCRISTINE SULFATE 0.3 MG: 1 INJECTION, SOLUTION INTRAVENOUS at 08:26

## 2024-05-19 RX ADMIN — POTASSIUM CHLORIDE 20 MEQ: 750 CAPSULE, EXTENDED RELEASE ORAL at 20:14

## 2024-05-19 RX ADMIN — ETOPOSIDE 175 MG: 20 INJECTION, SOLUTION INTRAVENOUS at 08:26

## 2024-05-19 RX ADMIN — DOCUSATE SODIUM 50 MG AND SENNOSIDES 8.6 MG 1 TABLET: 8.6; 5 TABLET, FILM COATED ORAL at 20:14

## 2024-05-19 RX ADMIN — LORAZEPAM 1 MG: 1 TABLET ORAL at 08:30

## 2024-05-19 RX ADMIN — ENOXAPARIN SODIUM 40 MG: 40 INJECTION SUBCUTANEOUS at 16:39

## 2024-05-19 RX ADMIN — PREDNISONE 100 MG: 50 TABLET ORAL at 16:39

## 2024-05-19 RX ADMIN — PANTOPRAZOLE SODIUM 40 MG: 40 TABLET, DELAYED RELEASE ORAL at 07:46

## 2024-05-19 RX ADMIN — LORAZEPAM 1 MG: 1 TABLET ORAL at 23:57

## 2024-05-19 RX ADMIN — ACYCLOVIR 400 MG: 400 TABLET ORAL at 20:14

## 2024-05-19 RX ADMIN — DOCUSATE SODIUM 50 MG AND SENNOSIDES 8.6 MG 1 TABLET: 8.6; 5 TABLET, FILM COATED ORAL at 07:46

## 2024-05-19 ASSESSMENT — ACTIVITIES OF DAILY LIVING (ADL)
ADLS_ACUITY_SCORE: 18
DEPENDENT_IADLS:: INDEPENDENT
ADLS_ACUITY_SCORE: 18

## 2024-05-19 NOTE — PLAN OF CARE
Goal Outcome Evaluation:      Plan of Care Reviewed With: patient    Overall Patient Progress: no changeOverall Patient Progress: no change    Outcome Evaluation: C5D4 chemo, tolerating well    7651-4224:  VSS on RA, afebrile. Denies pain. Some breakthru nausea this morning, prn Ativan given x1 with good effect. Fair appetite. Continues to be a little fatigued but otherwise doing well with no complaints. Day 4 etop/jasmeet/dox infusing via Port, good blood return noted. Continue with POC.

## 2024-05-19 NOTE — PLAN OF CARE
Goal Outcome Evaluation:      Plan of Care Reviewed With: patient    Overall Patient Progress: no changeOverall Patient Progress: no change    Outcome Evaluation: C5D3 chemo, tolerating well    8845-1122:  VSS on RA, afebrile. Denies pain, N/V. Pt states nausea is much improved today. No PRNs needed at all this shift. Fair appetite. Seems a little more fatigued today but otherwise doing well with no complaints. Day 3 etop/jasmeet/dox infusing via Port, good blood return noted. Continue with POC.

## 2024-05-19 NOTE — PROGRESS NOTES
Nursing Focus: Chemotherapy  D: Positive blood return via Port . Insertion site is clean/dry/intact, dressing intact with no complaints of pain.  Pre infusion assessment documented in Flowsheet (if applicable).    I: Premedications given per order (see electronic medical administration record). Dose #4 of Etoposide + Vincristine/Doxorubicin started to infuse over 22 hours. Reviewed pt teaching on chemotherapy side effects.  Pt denies need for further teaching. Chemotherapy double checked per protocol by two chemotherapy competent RN's.   A: Tolerating infusion well. Denies nausea and or pain.   P: Continue to monitor urine output and symptoms of nausea. Screen for symptoms of toxicity.

## 2024-05-19 NOTE — CONSULTS
Care Management Initial Consult    General Information  Assessment completed with: Patient,    Type of CM/SW Visit: Initial Assessment  Primary Care Provider verified and updated as needed: Yes   Readmission within the last 30 days: planned readmission   Return Category: Progression of disease  Reason for Consult: discharge planning  Advance Care Planning: Advance Care Planning Reviewed: no concerns identified       Communication Assessment  Patient's communication style: spoken language (English or Bilingual)    Hearing Difficulty or Deaf: no   Wear Glasses or Blind: no    Cognitive  Cognitive/Neuro/Behavioral: unchanged from my previous assessment                      Living Environment:   People in home: significant other     Current living Arrangements: apartment      Able to return to prior arrangements: yes     Family/Social Support:  Care provided by: self, spouse/significant other  Provides care for: no one  Marital Status: Lives with Significant Other  Significant Other, Parent(s)          Description of Support System: Supportive    Support Assessment: Adequate family and caregiver support, Adequate social supports    Current Resources:   Patient receiving home care services: No  Community Resources: None  Equipment currently used at home: none  Supplies currently used at home: None    Employment/Financial:  Employment Status: employed full-time     Financial Concerns: none   Referral to Financial Worker: No     Does the patient's insurance plan have a 3 day qualifying hospital stay waiver?  No    Lifestyle & Psychosocial Needs: (pulled from chart)  Social Determinants of Health     Food Insecurity: Not on file   Depression: Not at risk (2/6/2024)    PHQ-2     PHQ-2 Score: 0   Housing Stability: Not on file   Tobacco Use: Low Risk  (5/16/2024)    Patient History     Smoking Tobacco Use: Never     Smokeless Tobacco Use: Never     Passive Exposure: Never   Financial Resource Strain: Not on file   Alcohol  Use: Not on file   Transportation Needs: Not on file   Physical Activity: Not on file   Interpersonal Safety: Not on file   Stress: Not on file   Social Connections: Not on file   Health Literacy: Not on file       Functional Status:  Prior to admission patient needed assistance:   Dependent ADLs:: Independent  Dependent IADLs:: Independent    Mental Health Status:  Mental Health Status: No Current Concerns       Chemical Dependency Status:  Chemical Dependency Status: No Current Concerns             Values/Beliefs:  Spiritual, Cultural Beliefs, Synagogue Practices, Values that affect care: no               Additional Information:  Pt is a previously healthy 28 year old female with history pertinent for primary mediastinal B cell lymphoma (dx 2/2024), 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     RNCC completed CM assessment due to elevated risk score. Assessment completed via chart review due to recent admission within the last month. Pt states she lives with her SO in an apartment. There is elevator access so she does not need to climb stairs. Pt is fully independent with all ADLs and IADLs and does not use any assistive devices or home services. Pt endorses support from SO and parents. 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 No financial concerns at this time. Family will provide discharge transportation when pt is medically ready.     RNCC spoke with pt regarding CM consult for notary. Pt showed RNCC the paper she wanted notarized. It was documentation related to her employment. RNCC notified pt our department was only able to notarize HCDs. Pt understands and states she will get it notarized after she discharges. Denies any other discharge needs. RNCC will continue to follow as assist as discharge needs arise.    Marcin Radford, RN BSN  5A RN Care Coordinator   Ph:  [phone]  Pager: [phone]

## 2024-05-19 NOTE — PROGRESS NOTES
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     Today:  - Day 4 C5 DA R EPOCH (level 5).   - Nausea continues to be improved with transition from from Zofran ? aloxi and with addition of prn Ativan. Continue monitoring symptoms closely.   - Continue to encourage PO hydration and nutrition.  - Dispo: Anticipate discharge tomorrow afternoon after completion of Mesna and Day 5 Emend. Neulasta scheduled in the clinic on Tuesday.     HEME  # Primary mediastinal B-cell lymphoma  Follows with Dr. Oakes. Initially presented with cervical lymphadenopathy (12/2023). Initial FNA (1/29/24) suggestive of possible Hodgkin lymphoma; however excisional LN biopsy (2/9/24) shows large B-cell lymphoma with differential diagnosis of primary mediastinal large B-cell lymphoma vs DLBCL NOS. PET with cervical and mediastinal lymphadenopathy only. Overall presentation felt to be consistent with PMBCL given her age, gender, sites of involvement, dim CD30 expression, and prominent fibrotic background.  Although CD23 IHC was negative, IHC for , MAL, and PD-L1 were positive in majority of the neoplastic cells suggesting PMBCL over DLBCL NOS. [id number] gene expression profile also consistent with PMBCL. FISH with gain of BCL2 but no MYC, BCL2, or BCL6 rearrangements. PET showed bilateral cervical and mediastinal hypermetabolic lymphadenopathy (largest 4.4 cm with SUV max 27 in a prevascular LN); no disease below diaphragm. Baseline TTE (2/19/24) with LVEF 60%. Initial treatment  R-DA-EPOCH (C1D1=2/22/24; C2D1= 3/14/24; C3D1=4/4/24). Which was overall tolerated well but Clementine had CINV and developed bilateral hand neuropathy.  PET (4/1/24) (after C2) showed evidence of complete metabolic response with resolution of cervical and mediastinal lymphadenopathy. Have increased dose each cycle by one level (ex: C1=dose level 1, C2=dose level 2, etc.). She was last seen by Dr. Oakes in clinic on 4/25/2024 and then proceeded with C4 DA-R-EPOCH at dose level 4 but vincristine held to give time for improvement of bothersome neuropathy. She is now admitted for C5 DA-R-EPOCH (level 5), will give vincristine but capped at 1.2 mg to hopefully prevent worsening of her neuropathy. OA she endorses improved symptoms overall; less dyspnea on exertion, minimal dyspnea on rest, less frequent episodes of heart palpitations, and R>L improvement of bilateral neuropathy of hands). CINV after C4 discharge was well controlled on PRN's.   - Plan is total of 6 cycles. Next PET at EOT given CR.  - Port accessed on admission.  - Will add mesna to this regimen given higher dose of Cyclophosphamide.      Treatment Plan: Dose adjusted (level 5) R-EPOCH (C5D1=5/16/2024)   - Rituximab IV (375 mg/m2) over 90 mins; D1  - Prednisone PO (100 mg) BID;  D1-5  - Etoposide IV (103 mg/m2); D1-4  - Vincristine IV (dose reduced to 0.3 mg with 1.2 mg cap); D1-4  - Doxorubicin IV (20.1 mg/m2);  D1-4  - Cyclophosphamide IV (1,555 mg/m2); D5  - Mesna IV (860 mg); D5 after Cyclophosphamide  - Pegfilgrastim x1 - D6  - Pre-meds: tylenol, benadryl, zofran 16mg D1-D5, emend D5, dexamethasone 8 mg (D6-7)     # Peripheral neuropathy   Endorsed L>R neuropathy in her fingertips. Secondary to chemotherapy. Respectfully declined need for medication management at this time. Vincristine was capped at 2 mg for C3. S/p C3 and neuropathy in fingertips have worsened with some difficulty using her phone or buttoning shirts and now neuropathy in bilateral feet that  does not impact balance of walking. Seems to be improving just prior to admission for C4 per patient report. Vincristine omitted for C4 to give time for recovery. On admission patient endorsing recent improvement of neuropathy; R>L.  - Monitor closely  - Patient declined needing intervention at this time as not currently painful, numbness.     # CINV  # Low appetite  Chemotherapy induced nausea and vomiting during previous cycles, managed with prn zofran, compazine, scheduled emend, and zyprexa at bedtime. Symptoms improved within a few days of hospital discharge from C2. Noted worsening nausea on . On chart review, patient has previously received IV Emend earlier in her hospital course (D1-2) in addition to prior to discharge (D5). OA patient reports N/V has been well controlled after C4 with breakthrough PRNs at home.  - OA Clementine is interested in D2 emend, discussed with pharmacy today and unfortunately both emend and vincristine are CY inhibitors, concern for possible increase in concentration of vincristine which would place Clementine at increased risk of toxicities in setting of already experiencing peripheral neuropathy. Will not give additional dose of emend on D2. Okay to give D5 per Dr. Oakes.  - 1 mg Ativan q8hr PRN with prn compazine + Zyprexa scheduled HS.  - Scheduled Zofran 16 mg daily per chemo protocol HELD; rotated to Aloxi (palonosetron) x  with notable improvement.   - Plan to give D5 emend  - RD consulted  to explore supplementation with [employer]    # H/o chemotherapy-related rash  Maculopapular pinkish rash noted with C1 and C2 DA-R-EPOCH, resolved with topical steroids. There has not been recurrence in rash with C3 and C4.  - Monitor clinically for recurrence     # Mucositis  Patient endorses previous oral ulceration that was painful but resolved after previous chemotherapy cycles. However, she has had increase of oral sores over the past few weeks that have been painful and making  "swallowing more difficult.  - MMW & biotene PRN, encourage frequent oral moisturization with saline rinses.  - If visible ulceration develops while inpatient consider HSV swab.     ID  # ID PPx  - Acyclovir 400 mg BID  - Levofloxacin 250 mg daily and fluconazole 200 mg daily when ANC <1  - Pentamidine monthly for PJP ppx; last given 5/3/24, due ~5/31     CV  # H/o palpitations  # H/o tachycardia  Noted heart \"racing and pounding\" after C2 of chemo. Denied chest pain, dyspnea, lower extremity swelling, abdominal pain, nausea, dizziness. Echo from 2/2024 unremarkable. 3/25/24 BNP normal at <36. She again reported symptoms following C3, with increased dyspnea on exertion. CT PE 4/15/24 unremarkable. OA she reports that symptoms have improved overall; has less dyspnea on exertion, minimal dyspnea on rest, less frequent episodes of heart palpitations,  - Sent in [facility] 5/15  - No acute inpatient requirements at this time     GI  # H/o constipation  - Continue PTA miralax and senna BID.      MISC  # Fertility preservation  - Established with [facility], underwent egg retrieval on 2/18.      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 Her boyfriend is a general surgery resident at [employer]. Parents live in [place].     FEN:  - IVF; per chemotherapy protocol  - Standard lyte replacement protocol  - Regular diet as tolerated     Prophy/Misc:  - GI: Continue PTA PPI BID  - DVT: Enoxaparin ppx    Clinically Significant Risk Factors                             # Financial/Environmental Concerns: none          Follow-up: Neulasta 5/21 and lab follow-up scheduled.  Dispo: Discharge after D5 chemotherapy/completion of mesna.  Medically Ready for Discharge: Anticipated in 2-4 Days     This patient care plan was discussed with staff attending Dr. Paiz.    I spent 40 minutes in the care of this patient today, which included time necessary for review of interval events, obtaining history " and physical exam, ordering medication(s)/test(s) as medically indicated, discussion with interdisciplinary/consult team(s), and documentation time. Over 50% of time was spent face-to-face and/or coordinating care.    Rosemarie Gonzalez PA-C  Hematology/Oncology  Pager: #[phone]     Interval History   No acute events overnight. Nursing notes reviewed. Remains afebrile and HDS. Reports nausea this morning that improved with prn ativan. States overall her nausea has been much better controlled this admission and she has been able to eat breakfast, which is new. Denies headache, vision changes, N/V, chest pain, palpitations, shortness of breath, abdominal pain or cramping, dysuria. Continues to move bowels. Review plan for today and estimated completion time of mesna tomorrow with likely discharge. Her dad and boyfriend are supportive at bedside. She is in good spirits today. All questions addressed.    A comprehensive review of symptoms was performed and was negative except as detailed in the interval history above.     Physical Exam   Vital Signs with Ranges  Temp:  [97.3  F (36.3  C)-98.1  F (36.7  C)] 97.3  F (36.3  C)  Pulse:  [50-68] 51  Resp:  [16] 16  BP: (109-129)/(63-75) 124/69  SpO2:  [94 %-98 %] 94 %    I/O last 3 completed shifts:  In: 1366.8 [P.O.:480; IV Piggyback:886.8]  Out: 800 [Urine:800]    Vitals:    05/16/24 0933 05/17/24 0700 05/18/24 0752   Weight: 62.7 kg (138 lb 4.8 oz) 62.6 kg (138 lb) 63.1 kg (139 lb 1.6 oz)     Constitutional: Awake, pleasant, and conversational. Non-toxic appearing. No acute distress.    HEENT: Normocephalic, atraumatic. Sclerae anicteric. Moist mucus membranes.    Respiratory: Breathing comfortably on room air with no accessory muscle use. Speaking in full sentences, no evidence of respiratory distress. Lungs CTAB, no wheeze or rales.   Cardiovascular: Regular rate and rhythm. No murmurs appreciated.  Circulatory: 2+ peripheral pulses. No peripheral edema.    GI: Abdomen  with normoactive bowel sounds, soft, non-distended, and non-tender throughout. No rebound or guarding.   Skin: Skin is clean, dry, intact. No jaundice or significant rashes appreciated on limited exam.   Neurologic: Alert and oriented with normal speech. Grossly nonfocal exam. Moves all extremities equally.   Neuropsychiatric: Calm, appropriate mood and affect congruent to situation. Good judgment and insight.       Medications   Current Facility-Administered Medications   Medication Dose Route Frequency Provider Last Rate Last Admin    No rectal suppositories if WBC less than 1000/microliters or platelets less than 50,000/ L   Does not apply Continuous PRN Angela Blank PA-C           Current Facility-Administered Medications   Medication Dose Route Frequency Provider Last Rate Last Admin    acyclovir (ZOVIRAX) tablet 400 mg  400 mg Oral BID Angela Blank PA-C   400 mg at 05/19/24 0746    Chemotherapy Infusing-Continuous Infusion   Does not apply Q8H Charisse Weinberg APRN CNP 25.4 mL/hr at 05/17/24 0549 Given at 05/19/24 0533    cimetidine (TAGAMET) tablet 200 mg  200 mg Oral BID Sharon Paiz MD   200 mg at 05/19/24 0746    [START ON 5/20/2024] cycloPHOSphamide (CYTOXAN) 2,660 mg in sodium chloride 0.9 % 500 mL infusion  1,555 mg/m2 (Treatment Plan Recorded) Intravenous Once Charisse Weinberg APRN CNP        [START ON 5/21/2024] dexAMETHasone (DECADRON) tablet 8 mg  8 mg Oral Daily Charisse Weinberg APRN CNP        enoxaparin ANTICOAGULANT (LOVENOX) injection 40 mg  40 mg Subcutaneous Q24H Angela Blank PA-C   40 mg at 05/18/24 1605    etoposide (TOPOSAR) 175 mg in sodium chloride 0.9 % 558.75 mL infusion  103 mg/m2 (Treatment Plan Recorded) Intravenous Q22H Charisse Weinberg APRN CNP 25.4 mL/hr at 05/19/24 0826 175 mg at 05/19/24 0826    fluconazole (DIFLUCAN) tablet 200 mg  200 mg Oral Daily Angela Blank PA-C   200 mg at 05/19/24 0746    [START ON 5/20/2024]  fosaprepitant (EMEND) 150 mg in sodium chloride 0.9 % 275 mL intermittent infusion  150 mg Intravenous Once Charisse Weinberg APRN CNP        heparin 100 unit/mL injection 5-10 mL  5-10 mL Intracatheter Q28 Days Angela Blank PA-C        heparin lock flush 10 UNIT/ML injection 5-10 mL  5-10 mL Intracatheter Q24H Angela Blank PA-C        [Held by provider] levofloxacin (LEVAQUIN) tablet 250 mg  250 mg Oral Daily Angela Blank PA-C   250 mg at 05/17/24 0755    [START ON 5/20/2024] mesna (MESNEX) 860 mg in sodium chloride 0.9 % 63.6 mL infusion  500 mg/m2 (Treatment Plan Recorded) Intravenous Once Charisse Weinberg APRN CNP        [START ON 5/20/2024] mesna (MESNEX) 860 mg in sodium chloride 0.9 % 63.6 mL infusion  500 mg/m2 (Treatment Plan Recorded) Intravenous Q4H Charisse Weinberg APRN CNP        OLANZapine zydis (zyPREXA) ODT tab 5 mg  5 mg Oral At Bedtime Angela Blank PA-C   5 mg at 05/18/24 2127    [Held by provider] ondansetron (ZOFRAN) tablet 16 mg  16 mg Oral Q22H Charisse Weinberg APRN CNP   16 mg at 05/16/24 1506    pantoprazole (PROTONIX) EC tablet 40 mg  40 mg Oral BID Sharon Paiz MD   40 mg at 05/19/24 0746    potassium chloride ER (MICRO-K) CR capsule 20 mEq  20 mEq Oral BID Angela Blank PA-C   20 mEq at 05/19/24 0746    predniSONE (DELTASONE) tablet 100 mg  100 mg Oral BID Charisse Weinberg APRN CNP   100 mg at 05/19/24 0746    senna-docusate (SENOKOT-S/PERICOLACE) 8.6-50 MG per tablet 2 tablet  2 tablet Oral BID Charisse Weinberg APRN CNP   1 tablet at 05/19/24 0746    sodium chloride (PF) 0.9% PF flush 10-20 mL  10-20 mL Intracatheter Q28 Days Angela Blank PA-C        vinCRIStine (ONCOVIN) 0.3 mg, DOXOrubicin (ADRIAMYCIN) 34 mg in sodium chloride 0.9 % 1,067.3 mL infusion  0.3 mg Intravenous Q22H Charisse Weinberg APRN CNP 48.5 mL/hr at 05/19/24 0826 0.3 mg at 05/19/24 0826       Antiinfectives  Anti-infectives (From now, onward)       Start     Dose/Rate Route Frequency Ordered Stop    05/16/24 2000  acyclovir (ZOVIRAX) tablet 400 mg        Note to Pharmacy: PTA Sig:Take 1 tablet (400 mg) by mouth 2 times daily      400 mg Oral 2 TIMES DAILY 05/16/24 0926 05/16/24 1300  fluconazole (DIFLUCAN) tablet 200 mg        Note to Pharmacy: PTA Sig:Take 1 tablet (200 mg) by mouth daily      200 mg Oral DAILY 05/16/24 0926 05/16/24 1300  [Held by provider]  levofloxacin (LEVAQUIN) tablet 250 mg        (On hold since Fri 5/17/2024 at 1237 until manually unheld; held by Mary Leary PA-CHold Reason: Other)   Note to Pharmacy: PTA Sig:Take 1 tablet (250 mg) by mouth daily      250 mg Oral DAILY 05/16/24 0926              Data   CBC   Recent Labs   Lab 05/19/24 0441 05/18/24 0417 05/17/24  0550 05/16/24  0807   WBC 6.6 16.8* 15.3* 6.0   RBC 2.97* 2.99* 3.35* 3.36*   HGB 9.7* 9.7* 10.5* 10.6*   HCT 29.0* 29.3* 32.2* 32.4*   MCV 98 98 96 96   MCH 32.7 32.4 31.3 31.5   MCHC 33.4 33.1 32.6 32.7   RDW 19.0* 19.5* 19.4* 19.3*    350 368 356       CMP   Recent Labs   Lab 05/19/24  0441 05/18/24  0417 05/17/24  0550 05/16/24  1005 05/16/24  0807    141 139  --  141   POTASSIUM 4.1 4.4 4.4  --  4.1   CHLORIDE 106 111* 108*  --  107   CO2 25 23 22  --  25   ANIONGAP 8 7 9  --  9   * 122* 141*  --  94   BUN 14.1 12.4 12.0  --  12.0   CR 0.58 0.65 0.68  --  0.76   GFRESTIMATED >90 >90 >90  --  >90   DEE 9.3 9.1 9.3  --  9.1   MAG 1.9 2.0 1.8 1.7  --    PHOS 4.4 4.3 4.2 3.9  --    PROTTOTAL 5.9* 5.8* 6.1*  --  6.4   ALBUMIN 4.0 4.0 4.1  --  4.3   BILITOTAL 0.2 <0.2 <0.2  --  <0.2   ALKPHOS 52 55 77  --  78   AST 12 12 16  --  17   ALT 9 9 14  --  13       LFTs   Recent Labs   Lab 05/19/24  0441   PROTTOTAL 5.9*   ALBUMIN 4.0   BILITOTAL 0.2   ALKPHOS 52   AST 12   ALT 9       Coagulation Studies   Recent Labs   Lab 05/19/24  0441   INR 1.14

## 2024-05-19 NOTE — PLAN OF CARE
"Goal Outcome Evaluation:      Plan of Care Reviewed With: patient    Overall Patient Progress: no change Overall Patient Progress: no change    Reason for admission:  C5D4 R-EPOCH starts today for DLBCL diagnosed 02/2024      VS: /63 (BP Location: Right arm)   Pulse 50   Temp 97.7  F (36.5  C) (Oral)   Resp 16   Ht 1.66 m (5' 5.35\")   Wt 63.1 kg (139 lb 1.6 oz)   SpO2 97%   BMI 22.90 kg/m     Pain/Nausea: Reported typical nausea in evening, has PO ativan available as needed  Neuro:  A&Ox4  Cardiac: NSR  Resp: Room air  GI/: Voiding spontaneously, on stool softeners to prevent constipation  Skin: Visible skin WNL  Diet: Regular, fair appetite  Activity: Independent  LDA: R Chest port infusing chemo  Labs: See results  Plan: Continue cycle of chemo - last day of cycle begins 5/20. Likely will discharge 5/21           "

## 2024-05-20 VITALS
DIASTOLIC BLOOD PRESSURE: 72 MMHG | SYSTOLIC BLOOD PRESSURE: 116 MMHG | WEIGHT: 138.01 LBS | RESPIRATION RATE: 16 BRPM | OXYGEN SATURATION: 99 % | HEART RATE: 82 BPM | HEIGHT: 65 IN | TEMPERATURE: 97.6 F | BODY MASS INDEX: 22.99 KG/M2

## 2024-05-20 LAB
ALBUMIN SERPL BCG-MCNC: 3.9 G/DL (ref 3.5–5.2)
ALP SERPL-CCNC: 48 U/L (ref 40–150)
ALT SERPL W P-5'-P-CCNC: 6 U/L (ref 0–50)
ANION GAP SERPL CALCULATED.3IONS-SCNC: 9 MMOL/L (ref 7–15)
AST SERPL W P-5'-P-CCNC: 10 U/L (ref 0–45)
BASOPHILS # BLD AUTO: 0 10E3/UL (ref 0–0.2)
BASOPHILS NFR BLD AUTO: 0 %
BILIRUB SERPL-MCNC: 0.3 MG/DL
BUN SERPL-MCNC: 12.7 MG/DL (ref 6–20)
CALCIUM SERPL-MCNC: 9.4 MG/DL (ref 8.6–10)
CHLORIDE SERPL-SCNC: 107 MMOL/L (ref 98–107)
CREAT SERPL-MCNC: 0.62 MG/DL (ref 0.51–0.95)
DEPRECATED HCO3 PLAS-SCNC: 25 MMOL/L (ref 22–29)
EGFRCR SERPLBLD CKD-EPI 2021: >90 ML/MIN/1.73M2
EOSINOPHIL # BLD AUTO: 0 10E3/UL (ref 0–0.7)
EOSINOPHIL NFR BLD AUTO: 0 %
ERYTHROCYTE [DISTWIDTH] IN BLOOD BY AUTOMATED COUNT: 18.4 % (ref 10–15)
FIBRINOGEN PPP-MCNC: 160 MG/DL (ref 170–490)
GLUCOSE SERPL-MCNC: 95 MG/DL (ref 70–99)
HCT VFR BLD AUTO: 30.7 % (ref 35–47)
HGB BLD-MCNC: 10.1 G/DL (ref 11.7–15.7)
IMM GRANULOCYTES # BLD: 0.1 10E3/UL
IMM GRANULOCYTES NFR BLD: 2 %
INR PPP: 1.19 (ref 0.85–1.15)
LYMPHOCYTES # BLD AUTO: 0.6 10E3/UL (ref 0.8–5.3)
LYMPHOCYTES NFR BLD AUTO: 12 %
MAGNESIUM SERPL-MCNC: 2.1 MG/DL (ref 1.7–2.3)
MCH RBC QN AUTO: 32 PG (ref 26.5–33)
MCHC RBC AUTO-ENTMCNC: 32.9 G/DL (ref 31.5–36.5)
MCV RBC AUTO: 97 FL (ref 78–100)
MONOCYTES # BLD AUTO: 0.2 10E3/UL (ref 0–1.3)
MONOCYTES NFR BLD AUTO: 4 %
NEUTROPHILS # BLD AUTO: 4 10E3/UL (ref 1.6–8.3)
NEUTROPHILS NFR BLD AUTO: 82 %
NRBC # BLD AUTO: 0 10E3/UL
NRBC BLD AUTO-RTO: 0 /100
PHOSPHATE SERPL-MCNC: 3.9 MG/DL (ref 2.5–4.5)
PLATELET # BLD AUTO: 375 10E3/UL (ref 150–450)
POTASSIUM SERPL-SCNC: 4 MMOL/L (ref 3.4–5.3)
PROT SERPL-MCNC: 5.7 G/DL (ref 6.4–8.3)
RBC # BLD AUTO: 3.16 10E6/UL (ref 3.8–5.2)
SODIUM SERPL-SCNC: 141 MMOL/L (ref 135–145)
URATE SERPL-MCNC: 1.9 MG/DL (ref 2.4–5.7)
WBC # BLD AUTO: 4.9 10E3/UL (ref 4–11)

## 2024-05-20 PROCEDURE — 84100 ASSAY OF PHOSPHORUS: CPT

## 2024-05-20 PROCEDURE — 250N000012 HC RX MED GY IP 250 OP 636 PS 637

## 2024-05-20 PROCEDURE — 250N000013 HC RX MED GY IP 250 OP 250 PS 637

## 2024-05-20 PROCEDURE — 82040 ASSAY OF SERUM ALBUMIN: CPT

## 2024-05-20 PROCEDURE — 99239 HOSP IP/OBS DSCHRG MGMT >30: CPT | Mod: FS

## 2024-05-20 PROCEDURE — 250N000011 HC RX IP 250 OP 636

## 2024-05-20 PROCEDURE — 83735 ASSAY OF MAGNESIUM: CPT

## 2024-05-20 PROCEDURE — 258N000003 HC RX IP 258 OP 636

## 2024-05-20 PROCEDURE — 85610 PROTHROMBIN TIME: CPT

## 2024-05-20 PROCEDURE — 85004 AUTOMATED DIFF WBC COUNT: CPT

## 2024-05-20 PROCEDURE — 84550 ASSAY OF BLOOD/URIC ACID: CPT

## 2024-05-20 PROCEDURE — 85384 FIBRINOGEN ACTIVITY: CPT

## 2024-05-20 PROCEDURE — 250N000011 HC RX IP 250 OP 636: Mod: JZ

## 2024-05-20 RX ORDER — FLUCONAZOLE 200 MG/1
200 TABLET ORAL DAILY PRN
Status: SHIPPED
Start: 2024-05-20 | End: 2024-07-29

## 2024-05-20 RX ORDER — AMOXICILLIN 250 MG
1 CAPSULE ORAL 2 TIMES DAILY
Qty: 60 TABLET | Refills: 0 | Status: SHIPPED | OUTPATIENT
Start: 2024-05-20

## 2024-05-20 RX ORDER — DEXAMETHASONE 4 MG/1
8 TABLET ORAL DAILY
Qty: 4 TABLET | Refills: 0 | Status: SHIPPED | OUTPATIENT
Start: 2024-05-21 | End: 2024-05-25

## 2024-05-20 RX ORDER — PANTOPRAZOLE SODIUM 40 MG/1
40 TABLET, DELAYED RELEASE ORAL 2 TIMES DAILY
Qty: 60 TABLET | Refills: 0 | Status: SHIPPED | OUTPATIENT
Start: 2024-05-20 | End: 2024-05-23

## 2024-05-20 RX ORDER — LEVOFLOXACIN 250 MG/1
250 TABLET, FILM COATED ORAL DAILY PRN
Status: SHIPPED
Start: 2024-05-20 | End: 2024-07-29

## 2024-05-20 RX ORDER — OLANZAPINE 5 MG/1
5 TABLET, ORALLY DISINTEGRATING ORAL
Status: SHIPPED
Start: 2024-05-20

## 2024-05-20 RX ADMIN — LORAZEPAM 1 MG: 1 TABLET ORAL at 11:42

## 2024-05-20 RX ADMIN — DOCUSATE SODIUM 50 MG AND SENNOSIDES 8.6 MG 1 TABLET: 8.6; 5 TABLET, FILM COATED ORAL at 07:41

## 2024-05-20 RX ADMIN — ACYCLOVIR 400 MG: 400 TABLET ORAL at 07:41

## 2024-05-20 RX ADMIN — CYCLOPHOSPHAMIDE 2660 MG: 2 INJECTION, POWDER, FOR SOLUTION INTRAVENOUS; ORAL at 07:38

## 2024-05-20 RX ADMIN — FLUCONAZOLE 200 MG: 200 TABLET ORAL at 07:41

## 2024-05-20 RX ADMIN — PREDNISONE 100 MG: 50 TABLET ORAL at 07:40

## 2024-05-20 RX ADMIN — POTASSIUM CHLORIDE 20 MEQ: 750 CAPSULE, EXTENDED RELEASE ORAL at 07:41

## 2024-05-20 RX ADMIN — PREDNISONE 100 MG: 50 TABLET ORAL at 15:39

## 2024-05-20 RX ADMIN — FOSAPREPITANT 150 MG: 150 INJECTION, POWDER, LYOPHILIZED, FOR SOLUTION INTRAVENOUS at 06:42

## 2024-05-20 RX ADMIN — CIMETIDINE 200 MG: 200 TABLET, FILM COATED ORAL at 07:41

## 2024-05-20 RX ADMIN — PANTOPRAZOLE SODIUM 40 MG: 40 TABLET, DELAYED RELEASE ORAL at 07:41

## 2024-05-20 RX ADMIN — MESNA 860 MG: 100 INJECTION, SOLUTION INTRAVENOUS at 15:39

## 2024-05-20 RX ADMIN — MESNA 860 MG: 100 INJECTION, SOLUTION INTRAVENOUS at 11:34

## 2024-05-20 RX ADMIN — MESNA 860 MG: 100 INJECTION, SOLUTION INTRAVENOUS at 07:06

## 2024-05-20 RX ADMIN — Medication 5 ML: at 16:04

## 2024-05-20 ASSESSMENT — ACTIVITIES OF DAILY LIVING (ADL)
ADLS_ACUITY_SCORE: 18

## 2024-05-20 NOTE — PLAN OF CARE
Goal Outcome Evaluation:      Plan of Care Reviewed With: patient    Overall Patient Progress: improvingOverall Patient Progress: improving    Outcome Evaluation: chemotherapy complete, ready for discharge    Nursing Focus: Discharge    D: Patient discharged to home at 1610. Patient accompanied by her father.    I: Discharge prescriptions sent to discharge pharmacy to be filled. All discharge medications and instructions reviewed with Clementine. Patient instructed to call clinic triage nurse if she experiences a fever >100.4, uncontrolled nausea, vomiting, diarrhea, or pain; or experiences any signs or symptoms of bleeding. Other phone numbers to call with questions or concerns after discharge reviewed. Port needle removed. Education completed.    A: Fior verbalized understanding of discharge medications and instructions. Patient will  medications at discharge pharmacy.      P: Patient to follow-up in clinic tomorrow for Neulasta injection.

## 2024-05-20 NOTE — PLAN OF CARE
"Goal Outcome Evaluation:      Plan of Care Reviewed With: patient    Overall Patient Progress: no change Overall Patient Progress: no change    Reason for admission:       VS: /72 (BP Location: Right arm)   Pulse 87   Temp 97.4  F (36.3  C) (Oral)   Resp 16   Ht 1.66 m (5' 5.35\")   Wt 63.1 kg (139 lb 1.6 oz)   SpO2 98%   BMI 22.90 kg/m     Pain/Nausea: Reported typical nausea in evening, has PO ativan available as needed, given x1 overnight.   Neuro:  A&Ox4  Cardiac: NSR  Resp: Room air  GI/: Voiding spontaneously, on stool softeners to prevent constipation  Skin: Visible skin WNL  Diet: Regular, fair appetite  Activity: Independent  LDA: R Chest port infusing chemo - giving final medications of cycle this AM  Labs: See results  Plan: Continue cycle of chemo - last day of cycle - will discharge later today       "

## 2024-05-20 NOTE — DISCHARGE SUMMARY
[facility]    Discharge Summary  Hematology / Oncology    Date of Admission:  5/16/2024  Date of Discharge:  5/20/2024  Discharging Provider: Angle Sanchez PA-C  Date of Service (when I saw the patient): 05/20/24    Discharge Diagnoses   # Primary mediastinal B-cell lymphoma   # Peripheral neuropathy   # CINV  # Low appetite  # H/o chemotherapy-related rash   # Mucositis   # H/o palpitations  # H/o tachycardia  # H/o constipation   # Fertility preservation   # GERD     History of Present Illness   Clementine Kathleen is a previously healthy 28 year old female with history pertinent for primary mediastinal B cell lymphoma (dx 2/2024), 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  Mesna was added to cycle given increased cyclophosphamide dose.  Tolerated this cycle of chemotherapy quite well with improved nausea control and ultimately stable for discharge 5/20/2024.  She is scheduled for close follow-up with Neulasta infusion the day after discharge, labs, and hospital follow-up appointment requested.    On the day of discharge, patient is feeling well at baseline health and looking forward to going home.  Denies all symptoms including headaches, dizziness, chest pain, shortness of breath, abdominal pain, nausea, vomiting, cough, numbness tingling or weakness. She is seen ambulating with a steady gait.     Prior to discharge, I reviewed with Clementine the plan of care, including upcoming follow-up appointments and new medications. Appropriate prescriptions were sent to the discharge pharmacy, as needed. We reviewed strict discharge precautions, including reasons to call clinic triage or present to the ED, and she voiced understanding. She was provided with the clinic triage number, as well as written discharge instructions, in their discharge paperwork. Patient had an  opportunity to ask questions, all of which were answered to their stated satisfaction. On the day of discharge, patient was overall well-appearing, hemodynamically stable, and felt safe and comfortable with the plans for discharge to home with follow-up as described.    Outpatient follow-up issues:  - NA     Discharge medications:  New/changed:  - Dexamethasone 8 mg daily on D6-7    Refilled: senna, protonix    Remainder of medications continued as PTA     Upcoming follow-up:  - 5/21 Neulasta infusion  - 2x weekly labs starting 5/24  - Requested post hospital follow up ~5/28  - Follow up prior to C6 (6/5 with Dr. Oakes requested)    Hospital Course   Clementine Kathleen was admitted on 5/16/2024.  The following problems were addressed during her hospitalization:       HEME  # Primary mediastinal B-cell lymphoma  Follows with Dr. Oakes. Initially presented with cervical lymphadenopathy (12/2023). Initial FNA (1/29/24) suggestive of possible Hodgkin lymphoma; however excisional LN biopsy (2/9/24) shows large B-cell lymphoma with differential diagnosis of primary mediastinal large B-cell lymphoma vs DLBCL NOS. PET with cervical and mediastinal lymphadenopathy only. Overall presentation felt to be consistent with PMBCL given her age, gender, sites of involvement, dim CD30 expression, and prominent fibrotic background.  Although CD23 IHC was negative, IHC for , MAL, and PD-L1 were positive in majority of the neoplastic cells suggesting PMBCL over DLBCL NOS. [id number] gene expression profile also consistent with PMBCL. FISH with gain of BCL2 but no MYC, BCL2, or BCL6 rearrangements. PET showed bilateral cervical and mediastinal hypermetabolic lymphadenopathy (largest 4.4 cm with SUV max 27 in a prevascular LN); no disease below diaphragm. Baseline TTE (2/19/24) with LVEF 60%. Initial treatment R-DA-EPOCH (C1D1=2/22/24; C2D1= 3/14/24; C3D1=4/4/24). Which was overall tolerated well but Clementine had CINV and developed bilateral hand  neuropathy.  PET (4/1/24) (after C2) showed evidence of complete metabolic response with resolution of cervical and mediastinal lymphadenopathy. Have increased dose each cycle by one level (ex: C1=dose level 1, C2=dose level 2, etc.). She was last seen by Dr. Oakes in clinic on 4/25/2024 and then proceeded with C4 DA-R-EPOCH at dose level 4 but vincristine held to give time for improvement of bothersome neuropathy. She is now admitted for C5 DA-R-EPOCH (level 5), will give vincristine but capped at 1.2 mg to hopefully prevent worsening of her neuropathy. OA she endorses improved symptoms overall; less dyspnea on exertion, minimal dyspnea on rest, less frequent episodes of heart palpitations, and R>L improvement of bilateral neuropathy of hands). CINV after C4 discharge was well controlled on PRN's.   - Plan is total of 6 cycles. Next PET at EOT given CR.  - Port accessed on admission, deaccessed upon discharge  - Added mesna to this regimen given higher dose of Cyclophosphamide.      Treatment Plan: Dose adjusted (level 5) R-EPOCH (C5D1=5/16/2024)   - Rituximab IV (375 mg/m2) over 90 mins; D1  - Prednisone PO (100 mg) BID;  D1-5  - Etoposide IV (103 mg/m2); D1-4  - Vincristine IV (dose reduced to 0.3 mg with 1.2 mg cap); D1-4  - Doxorubicin IV (20.1 mg/m2);  D1-4  - Cyclophosphamide IV (1,555 mg/m2); D5  - Mesna IV (860 mg); D5 after Cyclophosphamide  - Pegfilgrastim x1 - D6  - Pre-meds: tylenol, benadryl, zofran 16mg D1-D5, emend D5, dexamethasone 8 mg (D6-7)     # Peripheral neuropathy   Endorsed L>R neuropathy in her fingertips. Secondary to chemotherapy. Respectfully declined need for medication management at this time. Vincristine was capped at 2 mg for C3. S/p C3 and neuropathy in fingertips have worsened with some difficulty using her phone or buttoning shirts and now neuropathy in bilateral feet that does not impact balance of walking. Seems to be improving just prior to admission for C4 per patient report.  Vincristine omitted for C4 to give time for recovery. On admission patient endorsing recent improvement of neuropathy; R>L.  - Monitor closely  - Patient declined needing intervention at this time as not currently painful, numbness.      # CINV  # Low appetite  Chemotherapy induced nausea and vomiting during previous cycles, managed with prn zofran, compazine, scheduled emend, and zyprexa at bedtime. Symptoms improved within a few days of hospital discharge from C2. Noted worsening nausea on . On chart review, patient has previously received IV Emend earlier in her hospital course (D1-2) in addition to prior to discharge (D5). OA patient reports N/V has been well controlled after C4 with breakthrough PRNs at home.  - OA Clementine is interested in D2 emend, discussed with pharmacy on admission and unfortunately both emend and vincristine are CY inhibitors, concern for possible increase in concentration of vincristine which would place Clementine at increased risk of toxicities in setting of already experiencing peripheral neuropathy. Will not give additional dose of emend on D2. Okay to give D5 per Dr. Oakes.  - 1 mg Ativan q8hr PRN with prn compazine + Zyprexa scheduled HS.  - Scheduled Zofran 16 mg daily per chemo protocol HELD; rotated to Aloxi (palonosetron) x  with notable improvement.   - Continue with plan for D5 emend  - RD consulted  to explore supplementation with Dianelys Angel  - Nausea control is much improved     # H/o chemotherapy-related rash  Maculopapular pinkish rash noted with C1 and C2 DA-R-EPOCH, resolved with topical steroids. There has not been recurrence in rash with C3 and C4.  - Monitor clinically for recurrence     # Mucositis  Patient endorses previous oral ulceration that was painful but resolved after previous chemotherapy cycles. However, she has had increase of oral sores over the past few weeks that have been painful and making swallowing more difficult.  - MMW & biotene PRN,  "encourage frequent oral moisturization with saline rinses.  - If visible ulceration develops while inpatient consider HSV swab - none noted while inpatient this admission     ID  # ID PPx  - Acyclovir 400 mg BID  - Levofloxacin 250 mg daily and fluconazole 200 mg to be resumed daily when ANC <1  - Pentamidine monthly for PJP ppx; last given 5/3/24, due ~5/31 (requested outpatient)     CV  # H/o palpitations  # H/o tachycardia  Noted heart \"racing and pounding\" after C2 of chemo. Denied chest pain, dyspnea, lower extremity swelling, abdominal pain, nausea, dizziness. Echo from 2/2024 unremarkable. 3/25/24 BNP normal at <36. She again reported symptoms following C3, with increased dyspnea on exertion. CT PE 4/15/24 unremarkable. OA she reports that symptoms have improved overall; has less dyspnea on exertion, minimal dyspnea on rest, less frequent episodes of heart palpitations,  - Sent in ziopatch 5/15  - No acute inpatient requirements at this time     GI  # H/o constipation  - Continue PTA miralax and senna BID.      MISC  # Fertility preservation  - Established with [facility], underwent egg retrieval on 2/18.      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 Her boyfriend is a general surgery resident at [employer]. Parents live in [place].     FEN:  - IVF; per chemotherapy protocol  - Standard lyte replacement protocol  - Regular diet as tolerated     Prophy/Misc:  - GI: Continue PTA PPI BID  - DVT: Enoxaparin ppx    Clinically Significant Risk Factors               # Coagulation Defect: INR = 1.19 (Ref range: 0.85 - 1.15) and/or PTT = 29 Seconds (Ref range: 22 - 38 Seconds), will monitor for bleeding               # Financial/Environmental Concerns: none            Patient was staffed with Dr. Izabel Sanchez PA-C  Hematology/Oncology  Page: #[phone]    I spent >65 minutes in the care of this patient today, which " "included time necessary for review of interval events, obtaining history and physical exam, ordering medication(s)/test(s) as medically indicated, discussion with interdisciplinary/consult team(s), and documentation time. Over 50% of time was spent face-to-face and/or coordinating care.      Pending Results   These results will be followed up by outpatient heme/onc team    Unresulted Labs Ordered in the Past 30 Days of this Admission       No orders found from 4/16/2024 to 5/17/2024.            Code Status   Full Code    Primary Care Physician   Chela Peter    /72 (BP Location: Right arm)   Pulse 82   Temp 97.6  F (36.4  C) (Oral)   Resp 16   Ht 1.66 m (5' 5.35\")   Wt 62.6 kg (138 lb 0.1 oz)   SpO2 99%   BMI 22.72 kg/m      Constitutional: Awake, pleasant, and conversational. Non-toxic appearing. No acute distress.    HEENT: Normocephalic, atraumatic. Sclerae anicteric. Moist mucus membranes.    Respiratory: Breathing comfortably on room air with no accessory muscle use. Speaking in full sentences, no evidence of respiratory distress. Lungs CTAB, no wheeze or rales.   Cardiovascular: Regular rate and rhythm. No murmurs appreciated.  Circulatory: 2+ peripheral pulses. No peripheral edema.    GI: Abdomen with normoactive bowel sounds, soft, non-distended, and non-tender throughout. No rebound or guarding.   Skin: Skin is clean, dry, intact. No jaundice or significant rashes appreciated on limited exam.   Neurologic: Alert and oriented with normal speech. Grossly nonfocal exam. Moves all extremities equally.   Neuropsychiatric: Calm, appropriate mood and affect congruent to situation. Good judgment and insight.       Time Spent on this Encounter   Angle PHAM PA-C, personally saw the patient today and spent greater than 30 minutes discharging this patient.    Discharge Disposition   Discharged to home  Condition at discharge: Stable    Consultations This Hospital Stay   NUTRITION SERVICES ADULT " IP CONSULT  CARE MANAGEMENT / SOCIAL WORK IP CONSULT    Discharge Orders      Reason for your hospital stay    You were admitted for scheduled chemotherapy.     Activity    Your activity upon discharge: activity as tolerated     Adult [facility] Follow-up and recommended labs and tests    Please check your [employer] account for the most up to date information on appointment scheduling  [place] [facility] [place] [address] [place] [address]   - 5/21 Neulasta  - 2x weekly labs/possible transfusions starting 5/24  - Follow up with [facility] 6/6 -- Requested with Dr. Oakes    Appointments on [place] and/or [facility] (with [facility] or [facility] provider or service). Call [phone] if you haven't heard regarding these appointments within 7 days of discharge.     When to contact your care team    Please call the [facility] at [phone] if you develop temperature above 100.4, shortness of breath, chest pain, headaches, vision changes, bleeding, uncontrolled nausea, vomiting, diarrhea, pain, or any other signs or symptoms of concern. If you are concerned that your symptoms are life-threatening, don't hesitate to call 911 or go to the nearest Emergency Room.     Discharge Instructions    Please take your medications as prescribed.     New  - Dexamethasone 8 mg daily 5/21-5/22  - Levofloxacin and fluconazole when ANC < 1    Continue the rest of your medications as you were taking prior to admission.     It was nice being a part of your hospital care team - best of luck to you and take care!    - Angle Sanchez PA-C     Diet    Follow this diet upon discharge: Regular     Check Out Appointment Request    Pre Dr Oakes and Sonja Garibay RN - please schedule add on appt with Dr. Oakes 6/5 PM and scheduled admission for C6 on 6/6; then ok to cancel AMEENA visit 6/6     Discharge Medications   Current Discharge Medication List        START taking these medications    Details    dexAMETHasone (DECADRON) 4 MG tablet Take 2 tablets (8 mg) by mouth daily Take D6 (5/21) and D7 (5/22)  Qty: 4 tablet, Refills: 0    Associated Diagnoses: Prevention of chemotherapy-induced neutropenia; 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)           CONTINUE these medications which have CHANGED    Details   fluconazole (DIFLUCAN) 200 MG tablet Take 1 tablet (200 mg) by mouth daily as needed (neutropenia prophylaxis)    Associated Diagnoses: 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)      levofloxacin (LEVAQUIN) 250 MG tablet Take 1 tablet (250 mg) by mouth daily as needed (neutropenia prophylaxis)    Associated Diagnoses: 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)      OLANZapine zydis (ZYPREXA) 5 MG ODT Take 1 tablet (5 mg) by mouth nightly as needed (nausea)    Associated Diagnoses: Nausea      senna-docusate (SENOKOT-S/PERICOLACE) 8.6-50 MG tablet Take 1 tablet by mouth 2 times daily  Qty: 60 tablet, Refills: 0    Associated Diagnoses: 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)           CONTINUE these medications which have NOT CHANGED    Details   acyclovir (ZOVIRAX) 400 MG tablet Take 1 tablet (400 mg) by mouth 2 times daily  Qty: 60 tablet, Refills: 4    Associated Diagnoses: 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)      cimetidine (TAGAMET) 200 MG tablet Take 200 mg by mouth 2 times daily      Levonorgestrel (KYLEENA IU) 1 Device by Intrauterine route once      LORazepam (ATIVAN) 0.5 MG tablet Take 1 tablet (0.5 mg) by mouth every 6 hours as needed for nausea or vomiting  Qty: 30 tablet, Refills: 0    Associated Diagnoses: Nausea      magic mouthwash (ENTER INGREDIENTS IN COMMENTS) suspension Swish and spit 15 ml every 6 hours as needed  Qty: 200 mL, Refills: 0    Comments: lidocaine visc 2% 2.5mL/5mL & maalox/mylanta w/ simeth 2.5mL/5mL & diphenhydramine 5mg/5mL  Associated Diagnoses: Mucositis due  to chemotherapy      metoclopramide (REGLAN) 10 MG tablet Take 1 tablet (10 mg) by mouth 4 times daily as needed (constipation)  Qty: 30 tablet, Refills: 3    Associated Diagnoses: Slow transit constipation      modafinil (PROVIGIL) 200 MG tablet Take 200 mg by mouth daily as needed (hypersomnia)    Associated Diagnoses: Idiopathic hypersomnia      ondansetron (ZOFRAN) 4 MG tablet Take 1-2 tablets (4-8 mg) by mouth every 8 hours as needed for nausea or vomiting  Qty: 30 tablet, Refills: 0    Associated Diagnoses: 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)      pantoprazole (PROTONIX) 40 MG EC tablet Take 1 tablet (40 mg) by mouth 2 times daily  Qty: 60 tablet, Refills: 0    Associated Diagnoses: 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)      polyethylene glycol (MIRALAX) 17 GM/Dose powder Take 17 g by mouth 2 times daily    Associated Diagnoses: 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)      prochlorperazine (COMPAZINE) 10 MG tablet Take 1 tablet (10 mg) by mouth every 6 hours as needed for nausea or vomiting  Qty: 30 tablet, Refills: 0    Associated Diagnoses: 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)           STOP taking these medications       potassium chloride ER (K-TAB) 20 MEQ CR tablet Comments:   Reason for Stopping:             Allergies   No Known Allergies  Data   Most Recent 3 CBC's:  Recent Labs   Lab Test 05/20/24  0551 05/19/24  0441 05/18/24  0417   WBC 4.9 6.6 16.8*   HGB 10.1* 9.7* 9.7*   MCV 97 98 98    328 350      Most Recent 3 BMP's:  Recent Labs   Lab Test 05/20/24  0551 05/19/24  0441 05/18/24  0417    139 141   POTASSIUM 4.0 4.1 4.4   CHLORIDE 107 106 111*   CO2 25 25 23   BUN 12.7 14.1 12.4   CR 0.62 0.58 0.65   ANIONGAP 9 8 7   DEE 9.4 9.3 9.1   GLC 95 129* 122*     Most Recent 2 LFT's:  Recent Labs   Lab Test 05/20/24  0551 05/19/24  0441   AST 10 12   ALT 6 9   ALKPHOS 48 52   BILITOTAL 0.3  0.2     Most Recent INR's and Anticoagulation Dosing History:  Anticoagulation Dose History  More data exists         Latest Ref Rng & Units 4/27/2024 4/28/2024 5/16/2024 5/17/2024 5/18/2024 5/19/2024 5/20/2024   Recent Dosing and Labs   INR 0.85 - 1.15 1.21  1.05  1.08  1.13  1.11  1.14  1.19      Most Recent 3 Troponin's:No lab results found.  Most Recent Cholesterol Panel:No lab results found.  Most Recent 6 Bacteria Isolates From Any Culture (See EPIC Reports for Culture Details):No lab results found.  Most Recent TSH, T4 and A1c Labs:No lab results found.  Results for orders placed or performed in visit on 04/15/24   CT Chest Pulmonary Embolism w Contrast    Narrative    EXAMINATION: CTA pulmonary angiogram, 4/15/2024 2:25 PM     CLINICAL HISTORY: Shortness of breast        Female sex; Not pregnant; No prior imaging in the last 24 hours;  Pulmonary Embolism Rule-Out Criteria (PERC) score > 0; Revised [place]  Score (RGS) not >= 11; No D-dimer result available; D-dimer not  ordered        COMPARISON: PET CT 4/1/2024    TECHNIQUE: Volumetric helical acquisition of CT images of the chest  from the lung apices to the kidneys were acquired after the  administration of 64 cc Isovue-370..  Post-processed multiplanar  and/or MIP reformations were obtained, archived to PACS and used in  interpretation of this study.     FINDINGS:    Pulmonary arteries: Contrast bolus is adequate. Exam is negative for  acute pulmonary embolism. Normal size of the main pulmonary artery  measuring 2.2 cm.    Lungs: No consolidation, pleural effusion, or pneumothorax. No new or  enlarging pulmonary nodule.  Airways: Central tracheobronchial tree is clear.  Vessels: Main pulmonary artery and aorta are normal in caliber.Normal  three-vessel arch   The right chest port catheter terminates in the high right atrium.  Lymph nodes: No mediastinal or hilar lymphadenopathy.  Thyroid: Imaged thyroid within normal limits.  Esophagus: Within normal  limits.    Upper abdomen: Evaluation of the upper abdomen is limited.    Bones and soft tissues: No suspicious axillary lymphadenopathy or soft  tissue mass. No suspicious osseous lesion.      Impression    IMPRESSION:   1. Exam is negative for acute pulmonary embolism.    2. No acute finding in the chest.    In the event of a positive result for acute pulmonary embolism  resulting in right heart strain, consider calling the   [facility] patient placement ([phone]) for PERT (Pulmonary Embolism  Response Team) Activation?    PERT -- Pulmonary Embolism Response Team (Multidisciplinary team  including cardiology, interventional radiology, critical care,  hematology)    I have personally reviewed the examination and initial interpretation  and I agree with the findings.    ALEXIA GILLESPIE MD         SYSTEM ID:  [id number]

## 2024-05-20 NOTE — PROGRESS NOTES
"Care Management Discharge Note    Discharge Date: 05/20/2024       Discharge Disposition: Home    Discharge Services: None    Discharge DME: None    Discharge Transportation: family or friend will provide    Private pay costs discussed: Not applicable    Does the patient's insurance plan have a 3 day qualifying hospital stay waiver?  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     Upon my visit today patient is seen sitting up on the side of her hospital bed with her significant other Dima at bedside. She is non-toxic appearing and in no acute distress. Patient states there has been no change in medical history or medications. She endorsed that her symptoms are overall improving. She has experienced less dyspnea on exertion, minimal dyspnea on rest, less frequent episodes of heart palpitations, and R>L improvement of bilateral neuropathy of hands. CINV after C4 discharge was well controlled on PRN's. She is interested in D2 emend, will discuss with pharmacy today. She denies rash and other full ROS.     Discussed plan for today is to start C5 DA R EPOCH. Patient denied having any questions or concerns at this time.\"      Patient discharged home with no care management related discharge needs.       Rianna Gaviria, RN, BSN  RN Care Coordinator    5A Medical Oncology/5C non-BMT  5A beds [id number]  5C beds " [id number] (non-BMT)  [address] [address]  [email or web address]  Gender pronouns: she/her  Units: 5A Onc Vocera & 5C Vocera

## 2024-05-20 NOTE — PLAN OF CARE
Goal Outcome Evaluation:      Plan of Care Reviewed With: patient    Overall Patient Progress: no change    A&O x4, AVSS, Afebrile, RA no SOB, occasional nausea, ativan x1 given. AUOP. Regular diet, tolerating well. UAL with slight fatigue. Port with good blood return, received cytoxan and mesna. Anticipated discharge later today.

## 2024-05-21 ENCOUNTER — INFUSION THERAPY VISIT (OUTPATIENT)
Dept: ONCOLOGY | Facility: CLINIC | Age: 29
End: 2024-05-21
Payer: COMMERCIAL

## 2024-05-21 ENCOUNTER — LAB (OUTPATIENT)
Dept: LAB | Facility: CLINIC | Age: 29
End: 2024-05-21
Attending: INTERNAL MEDICINE
Payer: COMMERCIAL

## 2024-05-21 VITALS
TEMPERATURE: 98.1 F | BODY MASS INDEX: 22.55 KG/M2 | DIASTOLIC BLOOD PRESSURE: 67 MMHG | SYSTOLIC BLOOD PRESSURE: 106 MMHG | HEART RATE: 65 BPM | OXYGEN SATURATION: 99 % | WEIGHT: 137 LBS | RESPIRATION RATE: 16 BRPM

## 2024-05-21 DIAGNOSIS — Z76.89 PREVENTION OF CHEMOTHERAPY-INDUCED NEUTROPENIA: Primary | ICD-10-CM

## 2024-05-21 DIAGNOSIS — C85.28 MEDIASTINAL LARGE B-CELL LYMPHOMA OF LYMPH NODES OF MULTIPLE REGIONS (H): ICD-10-CM

## 2024-05-21 DIAGNOSIS — Z09 HOSPITAL DISCHARGE FOLLOW-UP: ICD-10-CM

## 2024-05-21 DIAGNOSIS — C83.32 DIFFUSE LARGE B-CELL LYMPHOMA OF INTRATHORACIC LYMPH NODES (H): ICD-10-CM

## 2024-05-21 LAB
ALBUMIN SERPL BCG-MCNC: 4.2 G/DL (ref 3.5–5.2)
ALP SERPL-CCNC: 48 U/L (ref 40–150)
ALT SERPL W P-5'-P-CCNC: 9 U/L (ref 0–50)
ANION GAP SERPL CALCULATED.3IONS-SCNC: 10 MMOL/L (ref 7–15)
AST SERPL W P-5'-P-CCNC: 11 U/L (ref 0–45)
BASOPHILS # BLD AUTO: 0 10E3/UL (ref 0–0.2)
BASOPHILS NFR BLD AUTO: 0 %
BILIRUB SERPL-MCNC: 0.4 MG/DL
BUN SERPL-MCNC: 14.4 MG/DL (ref 6–20)
CALCIUM SERPL-MCNC: 9.3 MG/DL (ref 8.6–10)
CHLORIDE SERPL-SCNC: 103 MMOL/L (ref 98–107)
CREAT SERPL-MCNC: 0.65 MG/DL (ref 0.51–0.95)
DEPRECATED HCO3 PLAS-SCNC: 24 MMOL/L (ref 22–29)
EGFRCR SERPLBLD CKD-EPI 2021: >90 ML/MIN/1.73M2
EOSINOPHIL # BLD AUTO: 0 10E3/UL (ref 0–0.7)
EOSINOPHIL NFR BLD AUTO: 0 %
ERYTHROCYTE [DISTWIDTH] IN BLOOD BY AUTOMATED COUNT: 17.5 % (ref 10–15)
GLUCOSE SERPL-MCNC: 131 MG/DL (ref 70–99)
HCT VFR BLD AUTO: 30.3 % (ref 35–47)
HGB BLD-MCNC: 10.5 G/DL (ref 11.7–15.7)
IMM GRANULOCYTES # BLD: 0 10E3/UL
IMM GRANULOCYTES NFR BLD: 2 %
LDH SERPL L TO P-CCNC: 171 U/L (ref 0–250)
LYMPHOCYTES # BLD AUTO: 0.1 10E3/UL (ref 0.8–5.3)
LYMPHOCYTES NFR BLD AUTO: 6 %
MCH RBC QN AUTO: 32.5 PG (ref 26.5–33)
MCHC RBC AUTO-ENTMCNC: 34.7 G/DL (ref 31.5–36.5)
MCV RBC AUTO: 94 FL (ref 78–100)
MONOCYTES # BLD AUTO: 0 10E3/UL (ref 0–1.3)
MONOCYTES NFR BLD AUTO: 0 %
NEUTROPHILS # BLD AUTO: 2.2 10E3/UL (ref 1.6–8.3)
NEUTROPHILS NFR BLD AUTO: 92 %
NRBC # BLD AUTO: 0 10E3/UL
NRBC BLD AUTO-RTO: 0 /100
PLATELET # BLD AUTO: 408 10E3/UL (ref 150–450)
POTASSIUM SERPL-SCNC: 3.8 MMOL/L (ref 3.4–5.3)
PROT SERPL-MCNC: 6.2 G/DL (ref 6.4–8.3)
RBC # BLD AUTO: 3.23 10E6/UL (ref 3.8–5.2)
SODIUM SERPL-SCNC: 137 MMOL/L (ref 135–145)
WBC # BLD AUTO: 2.4 10E3/UL (ref 4–11)

## 2024-05-21 PROCEDURE — 84450 TRANSFERASE (AST) (SGOT): CPT

## 2024-05-21 PROCEDURE — 250N000011 HC RX IP 250 OP 636: Mod: JZ

## 2024-05-21 PROCEDURE — 96372 THER/PROPH/DIAG INJ SC/IM: CPT

## 2024-05-21 PROCEDURE — 36415 COLL VENOUS BLD VENIPUNCTURE: CPT

## 2024-05-21 PROCEDURE — 82040 ASSAY OF SERUM ALBUMIN: CPT

## 2024-05-21 PROCEDURE — 83615 LACTATE (LD) (LDH) ENZYME: CPT

## 2024-05-21 PROCEDURE — 85025 COMPLETE CBC W/AUTO DIFF WBC: CPT

## 2024-05-21 RX ADMIN — PEGFILGRASTIM 6 MG: 6 INJECTION SUBCUTANEOUS at 15:30

## 2024-05-21 ASSESSMENT — PAIN SCALES - GENERAL: PAINLEVEL: NO PAIN (0)

## 2024-05-21 NOTE — PROGRESS NOTES
Infusion Injection Note:  Clementine ROBLERO Kaliaolgalilliam presents today for Neulasta injection.    Patient declined to speak with an RN today; no new questions or concerns.    Treatment Conditions:  Not Applicable.    Pre and Post Injection:  Neulasta injection given to Right Arm without incident.   Patient tolerated procedure well.    Discharge Plan:  Patient and/or family verbalized understanding of discharge instructions and all questions answered.  AVS to patient via MYCHART.    Patient discharged in stable condition accompanied by: self.  Patient will return 5/28/2024 for next appointment.      Monisha MAURICE on 5/21/2024 at 3:38 PM

## 2024-05-22 ENCOUNTER — PATIENT OUTREACH (OUTPATIENT)
Dept: ONCOLOGY | Facility: CLINIC | Age: 29
End: 2024-05-22
Payer: COMMERCIAL

## 2024-05-22 DIAGNOSIS — K21.00 GASTROESOPHAGEAL REFLUX DISEASE WITH ESOPHAGITIS, UNSPECIFIED WHETHER HEMORRHAGE: Primary | ICD-10-CM

## 2024-05-22 RX ORDER — SUCRALFATE 1 G/1
1 TABLET ORAL 4 TIMES DAILY PRN
Qty: 30 TABLET | Refills: 1 | Status: SHIPPED | OUTPATIENT
Start: 2024-05-22 | End: 2024-07-03

## 2024-05-22 NOTE — PROGRESS NOTES
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 Taking protonix and took tums this morning. Tums provided some relief but feels this was the worst acid reflux pain she has had since starting chemotherapy  How are your symptoms? (Red Flag symptoms escalate to triage hotline per guidelines): Unchanged  Do you know how to contact your clinic care team if you have future questions or changes to your health status? : Yes  Does the patient have their discharge instructions? : Yes  Does the patient have questions regarding their discharge instructions? : No  Were you started on any new medications or were there changes to any of your previous medications? : No  Does the patient have all of their medications?: Yes  Do you have questions regarding any of your medications? : No  Do you have all of your needed medical supplies or equipment (DME)?  (i.e. oxygen tank, CPAP, cane, etc.): Yes      Follow up Plan     Discharge Follow-Up  Discharge follow up appointment scheduled in alignment with recommended follow up timeframe or Transitions of Risk Category? (Low = within 30 days; Moderate= within 14 days; High= within 7 days): Yes  Discharge Follow Up Appointment Date: 05/28/24  Discharge Follow Up Appointment Scheduled with?: Specialty Care Provider    Future Appointments   Date Time Provider Department Center   5/23/2024  9:50 AM Hunter Lebron, [facility]    5/24/2024 10:15 AM [facility]   5/28/2024 10:45 AM [facility]   5/28/2024  4:30 PM [facility]   5/31/2024 10:45 AM [facility]   6/3/2024 10:45 AM [facility]   6/6/2024  8:00 AM [facility]   6/6/2024  8:30 AM Charisse Weinberg APRN CNP [facility]   6/14/2024 10:15 AM [facility]   6/18/2024 10[facility]   6/21/2024 10:30 AM [facility]25/2024 10:45 [facility]   6/28/2024 10:45 [facility]   7/11/2024  2:45 PM [facility]   7/11/2024  3:30 PM Vanessa Oakes MD [facility]       Outpatient Plan as outlined on AVS reviewed with patient.    For any urgent concerns, please contact our 24 hour clinic line:   [place]: [phone]       Sonja Garibay RN

## 2024-05-23 ENCOUNTER — VIRTUAL VISIT (OUTPATIENT)
Dept: PHARMACY | Facility: CLINIC | Age: 29
End: 2024-05-23
Attending: FAMILY MEDICINE
Payer: COMMERCIAL

## 2024-05-23 DIAGNOSIS — Z09 HOSPITAL DISCHARGE FOLLOW-UP: ICD-10-CM

## 2024-05-23 DIAGNOSIS — K21.9 GERD WITHOUT ESOPHAGITIS: Primary | ICD-10-CM

## 2024-05-23 PROCEDURE — 99207 PR NO CHARGE LOS: CPT | Mod: 93 | Performed by: PHARMACIST

## 2024-05-23 NOTE — Clinical Note
FYI only - pharmacy department requires that I route this note to you.  We discussed in person today.   Hunter Lebron, Pharm.D.

## 2024-05-23 NOTE — PROGRESS NOTES
Medication Therapy Management (MTM) Encounter    ASSESSMENT:                            Medication Adherence/Access: No issues identified    GERD:   Sucralfate recently added to medication regimen.  Patient is currently taking both an H2 blocker and PPI.  Aggressive acid suppression therapy is appropriate in this patient.  Drug interactions are possible as the sucralfate may reduce the absorption of some medication.  While the antibiotic levofloxacin is only used as needed, I have advised the patient to try to avoid using levofloxacin at the same time as sucralfate as it would reduce the absorption of the manfred quinolone.    PLAN:                            Continue on current home medications.    No changes recommended.    Follow-up: with PCP and oncology team.      SUBJECTIVE/OBJECTIVE:                          Clementine Kathleen is a 28 year old female coming in for a TCM visit.  She was discharged from [facility] on 5/20/24 for treatment of B-cell lymphoma.      Reason for visit: Hospital follow-up.      Allergies/ADRs: Reviewed in chart  Past Medical History: Reviewed in chart  Tobacco: She reports that she has never smoked. She has never been exposed to tobacco smoke. She has never used smokeless tobacco.  Alcohol: not discussed     Medication Adherence/Access: no issues reported    Clementine is a 28-year-old woman that was admitted to the hospital for chemotherapy cycle 4 B-cell lymphoma.  This was a planned admission and treatment schedule treament  Treatment received:  (C5D1=5/16/2024)   - Rituximab IV (375 mg/m2) over 90 mins; D1  - Prednisone PO (100 mg) BID;  D1-5  - Etoposide IV (103 mg/m2); D1-4  - Vincristine IV (dose reduced to 0.3 mg with 1.2 mg cap); D1-4  - Doxorubicin IV (20.1 mg/m2);  D1-4  - Cyclophosphamide IV (1,555 mg/m2); D5  - Mesna IV (860 mg); D5 after Cyclophosphamide  - Pegfilgrastim x1 - D6  - Pre-meds: tylenol, benadryl, zofran 16mg D1-D5, emend D5, dexamethasone 8 mg (D6-7).     Clementine was  discharged on dexamethasone 8 mg that was taken on Tuesday Wednesday following the admission.  She has discontinued taking that medication.  She continues to take her senna and Protonix that were previously scheduled.  Patient was discharged on all her prior to admission medications but was recently prescribed sucralfate for worsening GERD.  ----------------  Post Discharge Medication Reconciliation Status: discharge medications reconciled and changed, per note/orders.    I spent 20 minutes with this patient today. All changes were made via collaborative practice agreement with Chela Peter DO. A copy of the visit note was provided to the patient's provider(s).    A summary of these recommendations was sent via [employer].    Hunter Lebron, Pharm.D.    Telemedicine Visit Details  Type of service:  Telephone visit  Start Time:    9:50 AM  End Time: 10:18 AM     Medication Therapy Recommendations  No medication therapy recommendations to display

## 2024-05-24 ENCOUNTER — LAB (OUTPATIENT)
Dept: LAB | Facility: CLINIC | Age: 29
End: 2024-05-24
Attending: INTERNAL MEDICINE
Payer: COMMERCIAL

## 2024-05-24 DIAGNOSIS — C85.28 MEDIASTINAL LARGE B-CELL LYMPHOMA OF LYMPH NODES OF MULTIPLE REGIONS (H): ICD-10-CM

## 2024-05-24 LAB
ALBUMIN SERPL BCG-MCNC: 4 G/DL (ref 3.5–5.2)
ALP SERPL-CCNC: 67 U/L (ref 40–150)
ALT SERPL W P-5'-P-CCNC: 8 U/L (ref 0–50)
ANION GAP SERPL CALCULATED.3IONS-SCNC: 8 MMOL/L (ref 7–15)
AST SERPL W P-5'-P-CCNC: 9 U/L (ref 0–45)
BASOPHILS # BLD AUTO: ABNORMAL 10*3/UL
BASOPHILS # BLD MANUAL: 0.1 10E3/UL (ref 0–0.2)
BASOPHILS NFR BLD AUTO: ABNORMAL %
BASOPHILS NFR BLD MANUAL: 1 %
BILIRUB SERPL-MCNC: 0.6 MG/DL
BUN SERPL-MCNC: 12 MG/DL (ref 6–20)
CALCIUM SERPL-MCNC: 9.4 MG/DL (ref 8.6–10)
CHLORIDE SERPL-SCNC: 103 MMOL/L (ref 98–107)
CREAT SERPL-MCNC: 0.57 MG/DL (ref 0.51–0.95)
DACRYOCYTES BLD QL SMEAR: SLIGHT
DEPRECATED HCO3 PLAS-SCNC: 27 MMOL/L (ref 22–29)
EGFRCR SERPLBLD CKD-EPI 2021: >90 ML/MIN/1.73M2
EOSINOPHIL # BLD AUTO: ABNORMAL 10*3/UL
EOSINOPHIL # BLD MANUAL: 0 10E3/UL (ref 0–0.7)
EOSINOPHIL NFR BLD AUTO: ABNORMAL %
EOSINOPHIL NFR BLD MANUAL: 0 %
ERYTHROCYTE [DISTWIDTH] IN BLOOD BY AUTOMATED COUNT: 17.1 % (ref 10–15)
GLUCOSE SERPL-MCNC: 99 MG/DL (ref 70–99)
HCT VFR BLD AUTO: 30.4 % (ref 35–47)
HGB BLD-MCNC: 10.2 G/DL (ref 11.7–15.7)
IMM GRANULOCYTES # BLD: ABNORMAL 10*3/UL
IMM GRANULOCYTES NFR BLD: ABNORMAL %
LDH SERPL L TO P-CCNC: 157 U/L (ref 0–250)
LYMPHOCYTES # BLD AUTO: ABNORMAL 10*3/UL
LYMPHOCYTES # BLD MANUAL: 0.2 10E3/UL (ref 0.8–5.3)
LYMPHOCYTES NFR BLD AUTO: ABNORMAL %
LYMPHOCYTES NFR BLD MANUAL: 4 %
MCH RBC QN AUTO: 32.4 PG (ref 26.5–33)
MCHC RBC AUTO-ENTMCNC: 33.6 G/DL (ref 31.5–36.5)
MCV RBC AUTO: 97 FL (ref 78–100)
MONOCYTES # BLD AUTO: ABNORMAL 10*3/UL
MONOCYTES # BLD MANUAL: 0 10E3/UL (ref 0–1.3)
MONOCYTES NFR BLD AUTO: ABNORMAL %
MONOCYTES NFR BLD MANUAL: 0 %
NEUTROPHILS # BLD AUTO: ABNORMAL 10*3/UL
NEUTROPHILS # BLD MANUAL: 5.6 10E3/UL (ref 1.6–8.3)
NEUTROPHILS NFR BLD AUTO: ABNORMAL %
NEUTROPHILS NFR BLD MANUAL: 95 %
NRBC # BLD AUTO: 0 10E3/UL
NRBC BLD AUTO-RTO: 0 /100
PLAT MORPH BLD: ABNORMAL
PLATELET # BLD AUTO: 156 10E3/UL (ref 150–450)
POTASSIUM SERPL-SCNC: 3.9 MMOL/L (ref 3.4–5.3)
PROT SERPL-MCNC: 5.9 G/DL (ref 6.4–8.3)
RBC # BLD AUTO: 3.15 10E6/UL (ref 3.8–5.2)
RBC MORPH BLD: ABNORMAL
SODIUM SERPL-SCNC: 138 MMOL/L (ref 135–145)
WBC # BLD AUTO: 5.9 10E3/UL (ref 4–11)

## 2024-05-24 PROCEDURE — 85007 BL SMEAR W/DIFF WBC COUNT: CPT

## 2024-05-24 PROCEDURE — 83615 LACTATE (LD) (LDH) ENZYME: CPT

## 2024-05-24 PROCEDURE — 80053 COMPREHEN METABOLIC PANEL: CPT

## 2024-05-24 PROCEDURE — 36415 COLL VENOUS BLD VENIPUNCTURE: CPT

## 2024-05-24 PROCEDURE — 85027 COMPLETE CBC AUTOMATED: CPT

## 2024-05-24 NOTE — NURSING NOTE
Chief Complaint   Patient presents with    Blood Draw     Labs drawn by lab RN     Venipuncture labs drawn by lab RN.    Radha Abreu RN

## 2024-05-24 NOTE — PROGRESS NOTES
" [facility]      FOLLOW-UP VISIT NOTE                       May 28, 2024  Subjective   REASON FOR VISIT: Follow up PMBCL    ONCOLOGIC SUMMARY:  Diagnosis:  Primary mediastinal B-cell lymphoma dx 1/2024, presenting with cervical LAD. 1/29/24 FNA indeterminate; 2/9/24 left level 3 excisional LN biopsy showed large B-cell lymphoma with rare abnormal B-cells on flow cytometry. Although CD23 IHC was negative, IHC for , MAL, and PD-L1 were positive in majority of the neoplastic cells suggesting PMBCL over DLBCL NOS. [id number] gene expression profile also consistent with PMBCL. FISH with gain of BCL2 but no MYC, BCL2, or BCL6 rearrangements. PET showed bilateral cervical and mediastinal hypermetabolic lymphadenopathy (largest 4.4 cm with SUVmax 27 in a prevascular LN); no disease below diaphragm.     Intent of treatment: Curative    Treatment history:  2/22/24: Cycle 1 R-EPOCH, dose level 1  3/14/24: Cycle 2 R-EPOCH, dose level 2. PET after 2 cycles shows CR!  4/4/24: Cycle 3 R-EPOCH, dose level 3 but vincristine capped at 2 mg d/t neuropathy  4/25/24: Cycle 4 R-EPOCH, dose level 4 but held vincristine d/t neuropathy  5/16/24  Cycle 5 R-EPOCH, dose level 5 but vincristine capped at 1.2 mg d/t neuropathy    INTERVAL HISTORY:  Clementine is here for post-hospitalization follow-up.  She has been experiencing increased fatigue after this cycle.  She has increased mouth/throat pain and felt like she was \"swallowing razor blades.\"  She tried Carafate with no improvement.  Symptoms resolves on their own after about 3 days.  She continues with mouth pain and has been doing the MMW but it only lasts about 15 minutes.  She has also been doing cold drinks.   Her neuropathy got a little worse after this cycle but did not get as bad as it had with previous cycles.    Her appetite has been good and as been hungry but has been impacted by the mouth pain/sores.  Her nausea has been fine after this cycle.   Bowels are regular and " "had no issues this cycle  Did not have any cardiac issues after this cycle other than her \"fluttering\" that has been ongoing and resolve on their own.  Denies fevers/chills, night sweats, new pains, new lump/bumps, bleeding issues, rashes, all other acute issues.     I have reviewed and updated the following:  Past Medical History:   Diagnosis Date    Anxiety     Cervical lymphadenopathy     GERD     Interstitial cystitis       No Known Allergies    Current Outpatient Medications:     acyclovir (ZOVIRAX) 400 MG tablet, Take 1 tablet (400 mg) by mouth 2 times daily, Disp: 60 tablet, Rfl: 4    cimetidine (TAGAMET) 200 MG tablet, Take 200 mg by mouth 2 times daily, Disp: , Rfl:     fluconazole (DIFLUCAN) 200 MG tablet, Take 1 tablet (200 mg) by mouth daily as needed (neutropenia prophylaxis), Disp: , Rfl:     levofloxacin (LEVAQUIN) 250 MG tablet, Take 1 tablet (250 mg) by mouth daily as needed (neutropenia prophylaxis), Disp: , Rfl:     Levonorgestrel (KYLEENA IU), 1 Device by Intrauterine route once, Disp: , Rfl:     LORazepam (ATIVAN) 0.5 MG tablet, Take 1 tablet (0.5 mg) by mouth every 6 hours as needed for nausea or vomiting, Disp: 30 tablet, Rfl: 0    magic mouthwash (ENTER INGREDIENTS IN COMMENTS) suspension, Swish and spit 15 ml every 6 hours as needed, Disp: 200 mL, Rfl: 0    metoclopramide (REGLAN) 10 MG tablet, Take 1 tablet (10 mg) by mouth 4 times daily as needed (constipation), Disp: 30 tablet, Rfl: 3    modafinil (PROVIGIL) 200 MG tablet, Take 200 mg by mouth daily as needed (hypersomnia), Disp: , Rfl:     OLANZapine zydis (ZYPREXA) 5 MG ODT, Take 1 tablet (5 mg) by mouth nightly as needed (nausea), Disp: , Rfl:     ondansetron (ZOFRAN) 4 MG tablet, Take 1-2 tablets (4-8 mg) by mouth every 8 hours as needed for nausea or vomiting, Disp: 30 tablet, Rfl: 0    pantoprazole (PROTONIX) 40 MG EC tablet, Take 1 tablet (40 mg) by mouth 2 times daily, Disp: 60 tablet, Rfl: 0    polyethylene glycol (MIRALAX) 17 " GM/Dose powder, Take 17 g by mouth 2 times daily, Disp: , Rfl:     prochlorperazine (COMPAZINE) 10 MG tablet, Take 1 tablet (10 mg) by mouth every 6 hours as needed for nausea or vomiting, Disp: 30 tablet, Rfl: 0    senna-docusate (SENOKOT-S/PERICOLACE) 8.6-50 MG tablet, Take 1 tablet by mouth 2 times daily, Disp: 60 tablet, Rfl: 0    sucralfate (CARAFATE) 1 GM tablet, Take 1 tablet (1 g) by mouth 4 times daily as needed (acid reflux), Disp: 30 tablet, Rfl: 1    REVIEW OF SYSTEMS:  10-point ROS reviewed and negative other than that mentioned in HPI.     Objective   VITAL SIGNS  /75 (BP Location: Right arm, Patient Position: Sitting, Cuff Size: Adult Regular)   Pulse 94   Temp 98  F (36.7  C) (Oral)   Resp 16   Wt 60.7 kg (133 lb 14.4 oz)   SpO2 99%   BMI 21.61 kg/m      ECOG PS: 0     PHYSICAL EXAM:  General: Awake, alert, in no acute distress. Oriented x 3.    HEENT: Chemo induced alopecia. Normocephalic, atraumatic. No scleral icterus. Oral mucosa pink and moist with no signs of thrush or  mucositis  CV: Regular rate and  regular rhythm. No murmurs, rubs, or gallops appreciated.  Resp: Good inspiratory effort, lungs clear to auscultation bilaterally.  Neuro: CN II-XII grossly intact. No focal deficits.   Skin: No rash, unusual bruising or prominent lesions.  Psych: Pleasant, normal affect.    LABS:  I reviewed the following labs:  Lab Results   Component Value Date    WBC 0.8 (LL) 05/28/2024    HGB 9.7 (L) 05/28/2024    HCT 28.3 (L) 05/28/2024    MCV 96 05/28/2024    PLT 24 (LL) 05/28/2024    INR 1.19 (H) 05/20/2024    PTT 29 04/28/2024     Lab Results   Component Value Date     05/28/2024    POTASSIUM 4.1 05/28/2024    CR 0.66 05/28/2024    BUN 16.2 05/28/2024    CHLORIDE 104 05/28/2024    DEE 9.3 05/28/2024    GLC 91 05/28/2024      Lab Results   Component Value Date    ALT <5 05/28/2024    AST 9 05/28/2024    ALKPHOS 62 05/28/2024    BILITOTAL 0.2 05/28/2024      Lab Results   Component Value  Date     05/28/2024    URIC 2.7 05/28/2024 4/1/24 PET:  IMPRESSION: In this patient with primary mediastinal B-cell lymphoma  following 2 cycles of chemotherapy:   1. Complete metabolic response by Lugano criteria  2. Resolution of cervical and mediastinal hypermetabolic  lymphadenopathy.    4/15/24 CTA chest:  IMPRESSION:   1. Exam is negative for acute pulmonary embolism.  2. No acute finding in the chest.    Assessment & Plan   Primary mediastinal B-cell lymphoma  Dx 1/2024, presenting cervical lymphadenopathy. Initial FNA suggestive of possible Hodgkin lymphoma; however excisional biopsy showed large B-cell lymphoma with differential diagnosis of primary mediastinal large B-cell lymphoma vs DLBCL NOS. Additional IHC staining for , MAL, and PD-L1, and [id number] gene expression profile all favor PMBCL diagnosis over DLBCL NOS. PET with cervical and mediastinal lymphadenopathy only. LDH normal.   Previously discussed PMBCL diagnosis and plan for DA-R-EPOCH x 6 cycles. In the single-arm phase 2 prospective study of R-EPOCH (without radiation) for PMBCL, 5-year EFS was 93% and OS 97% ([id number]).   Cycle 1 R-EPOCH 2/22/24, tolerating well so far other than constipation.  Cycle 2 R-EPOCH 3/14/24. ANC dread >0.5 last cycle so qualifies for 20% increase in doxorubicin, etoposide, and cyclophosphamide (dose level 2).   PET after 2 cycles shows complete response!   Cycle 3 R-EPOCH 4/4/24- qualified for 20% increase in doxorubicin, etoposide, and cyclophosphamide again (dose level 3); however capped vincristine at 2 mg due to neuropathy.   Cycle 4 R-EPOCH- Qualified for 20% dose increase again (dose level 4) but omitted vincristine  to allow some recovery of neuropathy.  Cycle 5 R-EPOCH- dose level 5 but dose cap vincristine 1.2 mg.  Added mesna given higher dose of Cyclophosphamide.   S/p C5 and tolerating treatment fairly well.  She had more fatigue and mouth sores this cycle.  Her neuropathy  "worsened slightly but was not as bad as prior to 4.  Next PET at [facility] given CR already.     Mucositis  Patient endorsed previous oral ulceration that were painful but resolved after previous chemotherapy cycles. However, she has had increase of oral sores with C5.  - MMW & biotene PRN, encourage frequent oral moisturization with saline rinses.    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 Initially affecting fingertips only.   Cap vincristine at 2 mg starting Cycle 3.  S/p C3 and neuropathy in fingertips  worsened with some difficulty using her phone or buttoning shirts and now neuropathy in bilateral feet that did not impact balance of walking.   Held vincristine Cycle 4 to allow some recovery of neuropathy and consider reintroducing at 50% dose for Cycle 5/6.   Neuropathy did very mildly improve in the R hand after holding Vincristine with C4-  reintroduced vincristine with C5 at capped dose 1.2 mg.  Did have slight worsening of her neuropathy but not as bad as prior to C4.    Palpitations/tachycardia  SOB  3/25/24 NT proBNP normal at <36.   4/15/24 CTA chest negative for PE. Ziopatch requested but did not hear from Cardiology   Ziopatch completed 5/16- no episodes of SOB after C5 and a couple episodes of \"heart fluttering\"    Constipation- resolved  Scheduled Miralax and Senna-S.  PRN Reglan.     Fertility preservation  Established with [facility] and underwent egg retrieval on 2/18/24.      Ppx  TLS ppx: now done with allopurinol.  ID ppx: continue  mg BID. Levo and fluc when ANC <1.0. Monthly pentamidine for PJP ppx (last given 5/3/24, next due 6/3/24).  Vaccines: up to date on COVID and flu shots.       PLAN:  Follow-up with Dr. Oakes prior to C6  Repeat PET at EOT  Dr. Oakes to review EOT imaging      Transition Care Management Services  Admission Date: 05/16/24  Discharge Date: 05/20/24  Discharge Diagnosis: DLBCL  Interactive contact date: 5/22/2024  Face-to-face visit date: " 5/24/2024        Medical complexity decision making: Moderate complexity ([id number])      The longitudinal plan of care for the diagnosis(es)/condition(s) as documented were addressed during this visit. Due to the added complexity in care, I will continue to support Clementine in the subsequent management and with ongoing continuity of care.     CALOS Martinez CNP

## 2024-05-25 ENCOUNTER — APPOINTMENT (OUTPATIENT)
Dept: CT IMAGING | Facility: CLINIC | Age: 29
End: 2024-05-25
Attending: STUDENT IN AN ORGANIZED HEALTH CARE EDUCATION/TRAINING PROGRAM
Payer: COMMERCIAL

## 2024-05-25 ENCOUNTER — HOSPITAL ENCOUNTER (OUTPATIENT)
Facility: CLINIC | Age: 29
Setting detail: OBSERVATION
Discharge: HOME | End: 2024-05-26
Attending: FAMILY MEDICINE | Admitting: INTERNAL MEDICINE
Payer: COMMERCIAL

## 2024-05-25 DIAGNOSIS — D72.819 LEUKOPENIA, UNSPECIFIED TYPE: ICD-10-CM

## 2024-05-25 DIAGNOSIS — R55 SYNCOPE, UNSPECIFIED SYNCOPE TYPE: Primary | ICD-10-CM

## 2024-05-25 DIAGNOSIS — C85.13 B-CELL LYMPHOMA OF INTRA-ABDOMINAL LYMPH NODES, UNSPECIFIED B-CELL LYMPHOMA TYPE (H): ICD-10-CM

## 2024-05-25 DIAGNOSIS — R55 SYNCOPE: ICD-10-CM

## 2024-05-25 LAB
ACANTHOCYTES BLD QL SMEAR: ABNORMAL
ALBUMIN SERPL BCG-MCNC: 4 G/DL (ref 3.5–5.2)
ALBUMIN UR-MCNC: NEGATIVE MG/DL
ALP SERPL-CCNC: 60 U/L (ref 40–150)
ALT SERPL W P-5'-P-CCNC: 9 U/L (ref 0–50)
ANION GAP SERPL CALCULATED.3IONS-SCNC: 10 MMOL/L (ref 7–15)
APPEARANCE UR: CLEAR
AST SERPL W P-5'-P-CCNC: 9 U/L (ref 0–45)
AUER BODIES BLD QL SMEAR: ABNORMAL
BACTERIA #/AREA URNS HPF: ABNORMAL /HPF
BASO STIPL BLD QL SMEAR: ABNORMAL
BASOPHILS # BLD AUTO: ABNORMAL 10*3/UL
BASOPHILS NFR BLD AUTO: ABNORMAL %
BILIRUB DIRECT SERPL-MCNC: <0.2 MG/DL (ref 0–0.3)
BILIRUB SERPL-MCNC: 0.3 MG/DL
BILIRUB UR QL STRIP: NEGATIVE
BITE CELLS BLD QL SMEAR: ABNORMAL
BLISTER CELLS BLD QL SMEAR: ABNORMAL
BUN SERPL-MCNC: 22.8 MG/DL (ref 6–20)
BURR CELLS BLD QL SMEAR: ABNORMAL
CALCIUM SERPL-MCNC: 8.9 MG/DL (ref 8.6–10)
CHLORIDE SERPL-SCNC: 101 MMOL/L (ref 98–107)
COLOR UR AUTO: ABNORMAL
CREAT SERPL-MCNC: 0.63 MG/DL (ref 0.51–0.95)
DACRYOCYTES BLD QL SMEAR: SLIGHT
DEPRECATED HCO3 PLAS-SCNC: 25 MMOL/L (ref 22–29)
EGFRCR SERPLBLD CKD-EPI 2021: >90 ML/MIN/1.73M2
ELLIPTOCYTES BLD QL SMEAR: ABNORMAL
EOSINOPHIL # BLD AUTO: ABNORMAL 10*3/UL
EOSINOPHIL NFR BLD AUTO: ABNORMAL %
ERYTHROCYTE [DISTWIDTH] IN BLOOD BY AUTOMATED COUNT: 16.3 % (ref 10–15)
FRAGMENTS BLD QL SMEAR: ABNORMAL
GLUCOSE SERPL-MCNC: 120 MG/DL (ref 70–99)
GLUCOSE UR STRIP-MCNC: NEGATIVE MG/DL
HCG SERPL QL: NEGATIVE
HCT VFR BLD AUTO: 28.4 % (ref 35–47)
HGB BLD-MCNC: 9.4 G/DL (ref 11.7–15.7)
HGB C CRYSTALS: ABNORMAL
HGB UR QL STRIP: NEGATIVE
HOLD SPECIMEN: NORMAL
HOWELL-JOLLY BOD BLD QL SMEAR: ABNORMAL
IMM GRANULOCYTES # BLD: ABNORMAL 10*3/UL
IMM GRANULOCYTES NFR BLD: ABNORMAL %
KETONES UR STRIP-MCNC: NEGATIVE MG/DL
LEUKOCYTE ESTERASE UR QL STRIP: NEGATIVE
LYMPHOCYTES # BLD AUTO: ABNORMAL 10*3/UL
LYMPHOCYTES NFR BLD AUTO: ABNORMAL %
MAGNESIUM SERPL-MCNC: 1.9 MG/DL (ref 1.7–2.3)
MCH RBC QN AUTO: 32.3 PG (ref 26.5–33)
MCHC RBC AUTO-ENTMCNC: 33.1 G/DL (ref 31.5–36.5)
MCV RBC AUTO: 98 FL (ref 78–100)
MONOCYTES # BLD AUTO: ABNORMAL 10*3/UL
MONOCYTES NFR BLD AUTO: ABNORMAL %
NEUTROPHILS # BLD AUTO: ABNORMAL 10*3/UL
NEUTROPHILS NFR BLD AUTO: ABNORMAL %
NEUTS HYPERSEG BLD QL SMEAR: ABNORMAL
NITRATE UR QL: NEGATIVE
PH UR STRIP: 7 [PH] (ref 5–7)
PLAT MORPH BLD: ABNORMAL
PLATELET # BLD AUTO: 71 10E3/UL (ref 150–450)
POLYCHROMASIA BLD QL SMEAR: ABNORMAL
POTASSIUM SERPL-SCNC: 4.2 MMOL/L (ref 3.4–5.3)
PROT SERPL-MCNC: 6 G/DL (ref 6.4–8.3)
RBC # BLD AUTO: 2.91 10E6/UL (ref 3.8–5.2)
RBC AGGLUT BLD QL: ABNORMAL
RBC MORPH BLD: ABNORMAL
RBC URINE: 0 /HPF
ROULEAUX BLD QL SMEAR: ABNORMAL
SICKLE CELLS BLD QL SMEAR: ABNORMAL
SMUDGE CELLS BLD QL SMEAR: ABNORMAL
SODIUM SERPL-SCNC: 136 MMOL/L (ref 135–145)
SP GR UR STRIP: 1.02 (ref 1–1.03)
SPHEROCYTES BLD QL SMEAR: ABNORMAL
SQUAMOUS EPITHELIAL: 2 /HPF
STOMATOCYTES BLD QL SMEAR: ABNORMAL
TARGETS BLD QL SMEAR: ABNORMAL
TOXIC GRANULES BLD QL SMEAR: ABNORMAL
TROPONIN T SERPL HS-MCNC: 8 NG/L
TSH SERPL DL<=0.005 MIU/L-ACNC: 1.16 UIU/ML (ref 0.3–4.2)
UROBILINOGEN UR STRIP-MCNC: NORMAL MG/DL
VARIANT LYMPHS BLD QL SMEAR: ABNORMAL
WBC # BLD AUTO: 0.2 10E3/UL (ref 4–11)
WBC URINE: 4 /HPF

## 2024-05-25 PROCEDURE — 85027 COMPLETE CBC AUTOMATED: CPT

## 2024-05-25 PROCEDURE — 96361 HYDRATE IV INFUSION ADD-ON: CPT | Performed by: FAMILY MEDICINE

## 2024-05-25 PROCEDURE — 93010 ELECTROCARDIOGRAM REPORT: CPT | Performed by: FAMILY MEDICINE

## 2024-05-25 PROCEDURE — 80048 BASIC METABOLIC PNL TOTAL CA: CPT

## 2024-05-25 PROCEDURE — 99285 EMERGENCY DEPT VISIT HI MDM: CPT | Mod: FS | Performed by: FAMILY MEDICINE

## 2024-05-25 PROCEDURE — 84703 CHORIONIC GONADOTROPIN ASSAY: CPT

## 2024-05-25 PROCEDURE — 99223 1ST HOSP IP/OBS HIGH 75: CPT | Mod: AI | Performed by: STUDENT IN AN ORGANIZED HEALTH CARE EDUCATION/TRAINING PROGRAM

## 2024-05-25 PROCEDURE — 96374 THER/PROPH/DIAG INJ IV PUSH: CPT | Performed by: FAMILY MEDICINE

## 2024-05-25 PROCEDURE — G0378 HOSPITAL OBSERVATION PER HR: HCPCS

## 2024-05-25 PROCEDURE — 84443 ASSAY THYROID STIM HORMONE: CPT

## 2024-05-25 PROCEDURE — 250N000011 HC RX IP 250 OP 636: Performed by: STUDENT IN AN ORGANIZED HEALTH CARE EDUCATION/TRAINING PROGRAM

## 2024-05-25 PROCEDURE — 81003 URINALYSIS AUTO W/O SCOPE: CPT | Performed by: STUDENT IN AN ORGANIZED HEALTH CARE EDUCATION/TRAINING PROGRAM

## 2024-05-25 PROCEDURE — 83735 ASSAY OF MAGNESIUM: CPT

## 2024-05-25 PROCEDURE — 82248 BILIRUBIN DIRECT: CPT

## 2024-05-25 PROCEDURE — 250N000013 HC RX MED GY IP 250 OP 250 PS 637: Performed by: STUDENT IN AN ORGANIZED HEALTH CARE EDUCATION/TRAINING PROGRAM

## 2024-05-25 PROCEDURE — 80053 COMPREHEN METABOLIC PANEL: CPT

## 2024-05-25 PROCEDURE — 96372 THER/PROPH/DIAG INJ SC/IM: CPT | Mod: XS | Performed by: STUDENT IN AN ORGANIZED HEALTH CARE EDUCATION/TRAINING PROGRAM

## 2024-05-25 PROCEDURE — 36415 COLL VENOUS BLD VENIPUNCTURE: CPT

## 2024-05-25 PROCEDURE — 250N000011 HC RX IP 250 OP 636

## 2024-05-25 PROCEDURE — 93005 ELECTROCARDIOGRAM TRACING: CPT | Performed by: FAMILY MEDICINE

## 2024-05-25 PROCEDURE — 70450 CT HEAD/BRAIN W/O DYE: CPT

## 2024-05-25 PROCEDURE — 258N000003 HC RX IP 258 OP 636

## 2024-05-25 PROCEDURE — 99285 EMERGENCY DEPT VISIT HI MDM: CPT | Mod: 25 | Performed by: FAMILY MEDICINE

## 2024-05-25 PROCEDURE — 70450 CT HEAD/BRAIN W/O DYE: CPT | Mod: 26 | Performed by: RADIOLOGY

## 2024-05-25 PROCEDURE — 84484 ASSAY OF TROPONIN QUANT: CPT

## 2024-05-25 RX ORDER — SUCRALFATE 1 G/1
1 TABLET ORAL 4 TIMES DAILY PRN
Status: DISCONTINUED | OUTPATIENT
Start: 2024-05-25 | End: 2024-05-26 | Stop reason: HOSPADM

## 2024-05-25 RX ORDER — MODAFINIL 200 MG/1
200 TABLET ORAL DAILY PRN
Status: DISCONTINUED | OUTPATIENT
Start: 2024-05-25 | End: 2024-05-26 | Stop reason: HOSPADM

## 2024-05-25 RX ORDER — AMOXICILLIN 250 MG
2 CAPSULE ORAL 2 TIMES DAILY PRN
Status: DISCONTINUED | OUTPATIENT
Start: 2024-05-25 | End: 2024-05-25

## 2024-05-25 RX ORDER — PROCHLORPERAZINE MALEATE 10 MG
10 TABLET ORAL EVERY 6 HOURS PRN
Status: DISCONTINUED | OUTPATIENT
Start: 2024-05-25 | End: 2024-05-26 | Stop reason: HOSPADM

## 2024-05-25 RX ORDER — LORAZEPAM 0.5 MG/1
0.5 TABLET ORAL EVERY 6 HOURS PRN
Status: DISCONTINUED | OUTPATIENT
Start: 2024-05-25 | End: 2024-05-26 | Stop reason: HOSPADM

## 2024-05-25 RX ORDER — DIPHENHYDRAMINE HYDROCHLORIDE AND LIDOCAINE HYDROCHLORIDE AND ALUMINUM HYDROXIDE AND MAGNESIUM HYDRO
15 KIT EVERY 4 HOURS PRN
Status: DISCONTINUED | OUTPATIENT
Start: 2024-05-25 | End: 2024-05-26

## 2024-05-25 RX ORDER — POLYETHYLENE GLYCOL 3350 17 G/17G
17 POWDER, FOR SOLUTION ORAL 2 TIMES DAILY
Status: DISCONTINUED | OUTPATIENT
Start: 2024-05-25 | End: 2024-05-26 | Stop reason: HOSPADM

## 2024-05-25 RX ORDER — FLUCONAZOLE 200 MG/1
200 TABLET ORAL DAILY
Status: DISCONTINUED | OUTPATIENT
Start: 2024-05-25 | End: 2024-05-26 | Stop reason: HOSPADM

## 2024-05-25 RX ORDER — ACYCLOVIR 400 MG/1
400 TABLET ORAL 2 TIMES DAILY
Status: DISCONTINUED | OUTPATIENT
Start: 2024-05-25 | End: 2024-05-26 | Stop reason: HOSPADM

## 2024-05-25 RX ORDER — LORAZEPAM 2 MG/ML
0.5 INJECTION INTRAMUSCULAR ONCE
Status: DISCONTINUED | OUTPATIENT
Start: 2024-05-25 | End: 2024-05-25

## 2024-05-25 RX ORDER — AMOXICILLIN 250 MG
1 CAPSULE ORAL 2 TIMES DAILY PRN
Status: DISCONTINUED | OUTPATIENT
Start: 2024-05-25 | End: 2024-05-25

## 2024-05-25 RX ORDER — DEXAMETHASONE 2 MG/1
8 TABLET ORAL DAILY
Status: DISCONTINUED | OUTPATIENT
Start: 2024-05-25 | End: 2024-05-25

## 2024-05-25 RX ORDER — AMOXICILLIN 250 MG
1 CAPSULE ORAL 2 TIMES DAILY
Status: DISCONTINUED | OUTPATIENT
Start: 2024-05-25 | End: 2024-05-26 | Stop reason: HOSPADM

## 2024-05-25 RX ORDER — HEPARIN SODIUM 5000 [USP'U]/.5ML
5000 INJECTION, SOLUTION INTRAVENOUS; SUBCUTANEOUS EVERY 8 HOURS
Status: DISCONTINUED | OUTPATIENT
Start: 2024-05-25 | End: 2024-05-26 | Stop reason: HOSPADM

## 2024-05-25 RX ORDER — LEVOFLOXACIN 250 MG/1
250 TABLET, FILM COATED ORAL DAILY PRN
Status: DISCONTINUED | OUTPATIENT
Start: 2024-05-25 | End: 2024-05-25

## 2024-05-25 RX ORDER — ONDANSETRON 4 MG/1
4 TABLET, ORALLY DISINTEGRATING ORAL EVERY 6 HOURS PRN
Status: DISCONTINUED | OUTPATIENT
Start: 2024-05-25 | End: 2024-05-25

## 2024-05-25 RX ORDER — LEVOFLOXACIN 250 MG/1
250 TABLET, FILM COATED ORAL DAILY
Status: DISCONTINUED | OUTPATIENT
Start: 2024-05-25 | End: 2024-05-26 | Stop reason: HOSPADM

## 2024-05-25 RX ORDER — METOCLOPRAMIDE 5 MG/1
10 TABLET ORAL 4 TIMES DAILY PRN
Status: DISCONTINUED | OUTPATIENT
Start: 2024-05-25 | End: 2024-05-26 | Stop reason: HOSPADM

## 2024-05-25 RX ORDER — PANTOPRAZOLE SODIUM 40 MG/1
40 TABLET, DELAYED RELEASE ORAL 2 TIMES DAILY
Status: DISCONTINUED | OUTPATIENT
Start: 2024-05-25 | End: 2024-05-26 | Stop reason: HOSPADM

## 2024-05-25 RX ORDER — FAMOTIDINE 20 MG/1
20 TABLET, FILM COATED ORAL DAILY
Status: DISCONTINUED | OUTPATIENT
Start: 2024-05-25 | End: 2024-05-26

## 2024-05-25 RX ORDER — ONDANSETRON 2 MG/ML
4 INJECTION INTRAMUSCULAR; INTRAVENOUS EVERY 6 HOURS PRN
Status: DISCONTINUED | OUTPATIENT
Start: 2024-05-25 | End: 2024-05-25

## 2024-05-25 RX ORDER — OLANZAPINE 5 MG/1
5 TABLET, ORALLY DISINTEGRATING ORAL
Status: DISCONTINUED | OUTPATIENT
Start: 2024-05-25 | End: 2024-05-26 | Stop reason: HOSPADM

## 2024-05-25 RX ORDER — ONDANSETRON 2 MG/ML
4 INJECTION INTRAMUSCULAR; INTRAVENOUS EVERY 30 MIN PRN
Status: DISCONTINUED | OUTPATIENT
Start: 2024-05-25 | End: 2024-05-25

## 2024-05-25 RX ORDER — FLUCONAZOLE 200 MG/1
200 TABLET ORAL DAILY PRN
Status: DISCONTINUED | OUTPATIENT
Start: 2024-05-25 | End: 2024-05-25

## 2024-05-25 RX ADMIN — PANTOPRAZOLE SODIUM 40 MG: 40 TABLET, DELAYED RELEASE ORAL at 20:18

## 2024-05-25 RX ADMIN — DOCUSATE SODIUM 50 MG AND SENNOSIDES 8.6 MG 1 TABLET: 8.6; 5 TABLET, FILM COATED ORAL at 21:40

## 2024-05-25 RX ADMIN — FLUCONAZOLE 200 MG: 200 TABLET ORAL at 21:40

## 2024-05-25 RX ADMIN — FAMOTIDINE 20 MG: 20 TABLET ORAL at 21:41

## 2024-05-25 RX ADMIN — LEVOFLOXACIN 250 MG: 250 TABLET, FILM COATED ORAL at 21:40

## 2024-05-25 RX ADMIN — ONDANSETRON 4 MG: 2 INJECTION INTRAMUSCULAR; INTRAVENOUS at 17:25

## 2024-05-25 RX ADMIN — HEPARIN SODIUM 5000 UNITS: 5000 INJECTION, SOLUTION INTRAVENOUS; SUBCUTANEOUS at 20:21

## 2024-05-25 RX ADMIN — ACYCLOVIR 400 MG: 400 TABLET ORAL at 21:39

## 2024-05-25 RX ADMIN — LORAZEPAM 0.5 MG: 0.5 TABLET ORAL at 20:18

## 2024-05-25 RX ADMIN — SODIUM CHLORIDE 1000 ML: 9 INJECTION, SOLUTION INTRAVENOUS at 17:05

## 2024-05-25 ASSESSMENT — COLUMBIA-SUICIDE SEVERITY RATING SCALE - C-SSRS
6. HAVE YOU EVER DONE ANYTHING, STARTED TO DO ANYTHING, OR PREPARED TO DO ANYTHING TO END YOUR LIFE?: NO
2. HAVE YOU ACTUALLY HAD ANY THOUGHTS OF KILLING YOURSELF IN THE PAST MONTH?: NO
1. IN THE PAST MONTH, HAVE YOU WISHED YOU WERE DEAD OR WISHED YOU COULD GO TO SLEEP AND NOT WAKE UP?: NO

## 2024-05-25 ASSESSMENT — ACTIVITIES OF DAILY LIVING (ADL)
ADLS_ACUITY_SCORE: 35
ADLS_ACUITY_SCORE: 31
ADLS_ACUITY_SCORE: 35

## 2024-05-25 NOTE — ED PROVIDER NOTES
ED Provider Note  [facility]      History     Chief Complaint   Patient presents with    Syncope     The history is provided by the patient, medical records and the spouse. No  was used.     Clementine Kathleen is a 28 year old female who presents to the ED by EMS after a witnessed syncopal episode.  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  She presents after a witnessed syncopal episode while walking outside just prior to arrival.  She states that she has been walking approximately 1-1/2 miles with her significant other and family when she began feeling ill.  She states that she started feeling lightheaded and was assisted to a nearby bench at that time.  Significant other states that she then had a loss of consciousness while sitting on the bench and he was able to support her head at that time.  He states that she was unconscious for about 15 seconds but then regained consciousness.  Witnesses state that she did appear pale just prior to the event and during the event but states that it appears she has regained her color at this time while in the ED.  Significant other and family deny any fall or head injury.  She has a mild abrasion to her left knee after hitting it on the edge of the bench prior to sitting down.  They deny any convulsions or loss of bladder or bowel function.  She denies any musculoskeletal pain or any tongue injury.  She states that prior to the episode, she reported feeling lightheaded and ill but denies any notable heart palpitations, chest pain, shortness of air, dizziness, vision changes or loss of vision.  She states that she has not had a syncopal episode in the past.  She states that her p.o. intake has been at her baseline this week and today since being discharged from the hospital on  5/20/2024 after her sixth round of chemotherapy.  She does report feeling nauseated this morning without any episodes of emesis which she states may be somewhat abnormal.  She states that she usually has nausea right after her chemotherapy administration and after discharge and not so much this far out from her recent admission.  She states that her peripheral neuropathy is at its baseline without any worsening or changes or otherwise weakness into her upper or lower extremities.  She did not walk to the stretcher or to the ambulance after the event.  She has had 1 dose of Ativan 0.5 mg this morning for her nausea with improvement at that time.    Past Medical History  Past Medical History:   Diagnosis Date    Anxiety     Cervical lymphadenopathy     GERD     Interstitial cystitis      Past Surgical History:   Procedure Laterality Date    CL AFF SURGICAL PATHOLOGY      Pearl neuroma    CLOSED REDUCTION PROXIMAL FIBULAR FRACTURE Left     EXCISE LESION NECK Left 02/09/2024    Procedure: Excisional Node Biopsy Left Neck;  Surgeon: Asher Damon MD;  Location: [facility] OR    INSERT PORT VASCULAR ACCESS Right 3/1/2024    Procedure: Insert port vascular access;  Surgeon: Solo Contreras PA-C;  Location: [facility] OR    IR CHEST PORT PLACEMENT > 5 YRS OF AGE  3/1/2024    PICC INSERTION - DOUBLE LUMEN Right 02/22/2024    41-1cm, Medial brachial vein     No current outpatient medications on file.    No Known Allergies  Family History  Family History   Problem Relation Age of Onset    Anesthesia Reaction No family hx of     Clotting Disorder No family hx of      Social History   Social History     Tobacco Use    Smoking status: Never     Passive exposure: Never    Smokeless tobacco: Never   Vaping Use    Vaping status: Never Used   Substance Use Topics    Alcohol use: Not Currently    Drug use: Not Currently      Past medical history, past surgical history, medications, allergies, family history, and social history were  "reviewed with the patient. No additional pertinent items.   A medically appropriate review of systems was performed with pertinent positives and negatives noted in the HPI, and all other systems negative.    Physical Exam   BP: 107/65  Pulse: 96  Temp: 98.2  F (36.8  C)  Resp: 18  Height: 167.6 cm (5' 6\")  Weight: 61.2 kg (135 lb)  SpO2: 100 %  Lying Orthostatic BP: 109/62  Lying Orthostatic Pulse: 91 bpm  Sitting Orthostatic BP: 122/66  Sitting Orthostatic Pulse: 92 bpm  Standing Orthostatic BP: 115/78  Standing Orthostatic Pulse: 94 bpm    Physical Exam  Vitals and nursing note reviewed.   Constitutional:       General: She is in acute distress.      Appearance: Normal appearance. She is well-developed and normal weight. She is not ill-appearing, toxic-appearing or diaphoretic.   HENT:      Head: Normocephalic and atraumatic.      Nose: Nose normal.      Mouth/Throat:      Mouth: Mucous membranes are moist.      Pharynx: Oropharynx is clear.   Eyes:      General: No scleral icterus.     Extraocular Movements: Extraocular movements intact.      Right eye: Normal extraocular motion and no nystagmus.      Left eye: Normal extraocular motion and no nystagmus.      Conjunctiva/sclera: Conjunctivae normal.      Pupils: Pupils are equal, round, and reactive to light.   Cardiovascular:      Rate and Rhythm: Regular rhythm. Tachycardia present.      Pulses: Normal pulses.   Pulmonary:      Effort: Pulmonary effort is normal. No respiratory distress.      Breath sounds: Normal breath sounds. No stridor. No wheezing, rhonchi or rales.   Abdominal:      General: Abdomen is flat. Bowel sounds are normal.      Palpations: Abdomen is soft.      Tenderness: There is no abdominal tenderness.   Musculoskeletal:      Cervical back: Normal range of motion and neck supple.      Right knee: Laceration (abrasion present) present. No swelling, deformity, erythema, ecchymosis or bony tenderness. No tenderness.      Left knee: Normal. "   Skin:     General: Skin is warm and dry.      Coloration: Skin is not pale.      Findings: No rash.   Neurological:      General: No focal deficit present.      Mental Status: She is alert and oriented to person, place, and time. Mental status is at baseline.      Sensory: No sensory deficit.      Motor: No weakness.   Psychiatric:         Mood and Affect: Mood normal.         Behavior: Behavior normal.         ED Course, Procedures, & Data     ED Course as of 05/25/24 2202   Sat May 25, 2024   1839 Malignant heme/onc paged     Procedures            EKG Interpretation:      Interpreted by Rosa Maria Garcia PA-C  Time reviewed: 1657  Symptoms at time of EKG: syncope   Rhythm: normal sinus   Rate: Normal  Axis: Normal  Ectopy: none  Conduction: normal  ST Segments/ T Waves: No ST-T wave changes and No acute ischemic changes  Q Waves: none  Comparison to prior: Flattened T waves in V2 previously on 4/26/2024; otherwise similar findings    Clinical Impression: Normal EKG with mild to moderate artifact             Results for orders placed or performed during the hospital encounter of 05/25/24   CT Head w/o Contrast     Status: None (Preliminary result)    Impression    RESIDENT PRELIMINARY INTERPRETATION  Impression:   1.  No acute intracranial pathology.  2.  Small caliber ventricular system is unchanged from 2/7/2024.  Although unlikely, findings may represent developing idiopathic  intracranial hypertension in the setting of syncope.   Extra Tube ([place] Draw)     Status: None (In process)    Narrative    The following orders were created for panel order Extra Tube ([place] Draw).  Procedure                               Abnormality         Status                     ---------                               -----------         ------                     Extra Blue Top Tube[[id number]]                              Final result               Extra Red Top Tube[[id number]]                                                           Extra Green Top (Lithium...[[id number]]                                                 Extra Purple Top Tube[[id number]]                                                         Please view results for these tests on the individual orders.   Basic metabolic panel     Status: Abnormal   Result Value Ref Range    Sodium 136 135 - 145 mmol/L    Potassium 4.2 3.4 - 5.3 mmol/L    Chloride 101 98 - 107 mmol/L    Carbon Dioxide (CO2) 25 22 - 29 mmol/L    Anion Gap 10 7 - 15 mmol/L    Urea Nitrogen 22.8 (H) 6.0 - 20.0 mg/dL    Creatinine 0.63 0.51 - 0.95 mg/dL    GFR Estimate >90 >60 mL/min/1.73m2    Calcium 8.9 8.6 - 10.0 mg/dL    Glucose 120 (H) 70 - 99 mg/dL   Magnesium     Status: Normal   Result Value Ref Range    Magnesium 1.9 1.7 - 2.3 mg/dL   Troponin T, High Sensitivity     Status: Normal   Result Value Ref Range    Troponin T, High Sensitivity 8 <=14 ng/L   HCG qualitative Blood     Status: Normal   Result Value Ref Range    hCG Serum Qualitative Negative Negative   TSH with free T4 reflex     Status: Normal   Result Value Ref Range    TSH 1.16 0.30 - 4.20 uIU/mL   Extra Blue Top Tube     Status: None   Result Value Ref Range    Hold Specimen JIC    CBC with platelets and differential     Status: Abnormal   Result Value Ref Range    WBC Count 0.2 (LL) 4.0 - 11.0 10e3/uL    RBC Count 2.91 (L) 3.80 - 5.20 10e6/uL    Hemoglobin 9.4 (L) 11.7 - 15.7 g/dL    Hematocrit 28.4 (L) 35.0 - 47.0 %    MCV 98 78 - 100 fL    MCH 32.3 26.5 - 33.0 pg    MCHC 33.1 31.5 - 36.5 g/dL    RDW 16.3 (H) 10.0 - 15.0 %    Platelet Count 71 (L) 150 - 450 10e3/uL    % Neutrophils      % Lymphocytes      % Monocytes      % Eosinophils      % Basophils      % Immature Granulocytes      Absolute Neutrophils      Absolute Lymphocytes      Absolute Monocytes      Absolute Eosinophils      Absolute Basophils      Absolute Immature Granulocytes     Hepatic function panel     Status: Abnormal   Result Value Ref Range    Protein Total 6.0 (L)  6.4 - 8.3 g/dL    Albumin 4.0 3.5 - 5.2 g/dL    Bilirubin Total 0.3 <=1.2 mg/dL    Alkaline Phosphatase 60 40 - 150 U/L    AST 9 0 - 45 U/L    ALT 9 0 - 50 U/L    Bilirubin Direct <0.20 0.00 - 0.30 mg/dL   RBC and Platelet Morphology     Status: Abnormal   Result Value Ref Range    Platelet Assessment  Automated Count Confirmed. Platelet morphology is normal.     Automated Count Confirmed. Platelet morphology is normal.    Acanthocytes      Andres Rods      Basophilic Stippling      Bite Cells      Blister Cells      [place] Cells      Elliptocytes      Hgb C Crystals      Hoffmann-Jolly Bodies      Hypersegmented Neutrophils      Polychromasia      RBC agglutination      RBC Fragments      Reactive Lymphocytes      Rouleaux      Sickle Cells      Smudge Cells      Spherocytes      Stomatocytes      Target Cells      Teardrop Cells Slight (A) None Seen    Toxic Neutrophils      RBC Morphology Confirmed RBC Indices    EKG 12-lead, complete     Status: None (Preliminary result)   Result Value Ref Range    Systolic Blood Pressure  mmHg    Diastolic Blood Pressure  mmHg    Ventricular Rate 95 BPM    Atrial Rate 95 BPM    CA Interval 130 ms    QRS Duration 76 ms     ms    QTc 459 ms    P Axis 46 degrees    R AXIS 69 degrees    T Axis 42 degrees    Interpretation ECG Sinus rhythm  Normal ECG      CBC with platelets differential     Status: Abnormal    Narrative    The following orders were created for panel order CBC with platelets differential.  Procedure                               Abnormality         Status                     ---------                               -----------         ------                     CBC with platelets and d...[[id number]]  Abnormal            Final result               RBC and Platelet Morphology[[id number]]  Abnormal            Final result                 Please view results for these tests on the individual orders.     Medications   heparin ANTICOAGULANT injection 5,000 Units (5,000  Units Subcutaneous $Given 5/25/24 2021)   acyclovir (ZOVIRAX) tablet 400 mg (400 mg Oral $Given 5/25/24 2139)   famotidine (PEPCID) tablet 20 mg (20 mg Oral $Given 5/25/24 2141)   LORazepam (ATIVAN) tablet 0.5 mg (0.5 mg Oral $Given 5/25/24 2018)   magic mouthwash suspension (diphenhydramine, lidocaine, aluminum-magnesium & simethicone) (has no administration in time range)   metoclopramide (REGLAN) tablet 10 mg ( Oral Unheld by provider 5/25/24 1921)   modafinil (PROVIGIL) tablet 200 mg (has no administration in time range)   OLANZapine zydis (zyPREXA) ODT tab 5 mg (has no administration in time range)   pantoprazole (PROTONIX) EC tablet 40 mg (40 mg Oral $Given 5/25/24 2018)   polyethylene glycol (MIRALAX) Packet 17 g (17 g Oral Not Given 5/25/24 2147)   prochlorperazine (COMPAZINE) tablet 10 mg ( Oral Unheld by provider 5/25/24 1921)   senna-docusate (SENOKOT-S/PERICOLACE) 8.6-50 MG per tablet 1 tablet (1 tablet Oral $Given 5/25/24 2140)   sucralfate (CARAFATE) tablet 1 g (has no administration in time range)   levofloxacin (LEVAQUIN) tablet 250 mg (250 mg Oral $Given 5/25/24 2140)   fluconazole (DIFLUCAN) tablet 200 mg (200 mg Oral $Given 5/25/24 2140)   sodium chloride 0.9% BOLUS 1,000 mL (0 mLs Intravenous Stopped 5/25/24 1903)     Labs Ordered and Resulted from Time of ED Arrival to Time of ED Departure   BASIC METABOLIC PANEL - Abnormal       Result Value    Sodium 136      Potassium 4.2      Chloride 101      Carbon Dioxide (CO2) 25      Anion Gap 10      Urea Nitrogen 22.8 (*)     Creatinine 0.63      GFR Estimate >90      Calcium 8.9      Glucose 120 (*)    CBC WITH PLATELETS AND DIFFERENTIAL - Abnormal    WBC Count 0.2 (*)     RBC Count 2.91 (*)     Hemoglobin 9.4 (*)     Hematocrit 28.4 (*)     MCV 98      MCH 32.3      MCHC 33.1      RDW 16.3 (*)     Platelet Count 71 (*)     % Neutrophils        % Lymphocytes        % Monocytes        % Eosinophils        % Basophils        % Immature Granulocytes         Absolute Neutrophils        Absolute Lymphocytes        Absolute Monocytes        Absolute Eosinophils        Absolute Basophils        Absolute Immature Granulocytes       HEPATIC FUNCTION PANEL - Abnormal    Protein Total 6.0 (*)     Albumin 4.0      Bilirubin Total 0.3      Alkaline Phosphatase 60      AST 9      ALT 9      Bilirubin Direct <0.20     RBC AND PLATELET MORPHOLOGY - Abnormal    Platelet Assessment        Value: Automated Count Confirmed. Platelet morphology is normal.    Acanthocytes        Andres Rods        Basophilic Stippling        Bite Cells        Blister Cells        [place] Cells        Elliptocytes        Hgb C Crystals        Hoffmann-Jolly Bodies        Hypersegmented Neutrophils        Polychromasia        RBC agglutination        RBC Fragments        Reactive Lymphocytes        Rouleaux        Sickle Cells        Smudge Cells        Spherocytes        Stomatocytes        Target Cells        Teardrop Cells Slight (*)     Toxic Neutrophils        RBC Morphology Confirmed RBC Indices     MAGNESIUM - Normal    Magnesium 1.9     TROPONIN T, HIGH SENSITIVITY - Normal    Troponin T, High Sensitivity 8     HCG QUALITATIVE PREGNANCY - Normal    hCG Serum Qualitative Negative     TSH WITH FREE T4 REFLEX - Normal    TSH 1.16     ROUTINE UA WITH MICROSCOPIC REFLEX TO CULTURE     CT Head w/o Contrast   Preliminary Result   RESIDENT PRELIMINARY INTERPRETATION   Impression:    1.  No acute intracranial pathology.   2.  Small caliber ventricular system is unchanged from 2/7/2024.   Although unlikely, findings may represent developing idiopathic   intracranial hypertension in the setting of syncope.             Critical care was not performed.     Medical Decision Making  The patient's presentation was of high complexity (an acute health issue posing potential threat to life or bodily function).    The patient's evaluation involved:  review of external note(s) from 1 sources (recent admission for  chemotherapy infusion between 5/16/2024 - 5/20/2024)  review of 1 test result(s) ordered prior to this encounter (previous WBC and other labs)  ordering and/or review of 3+ test(s) in this encounter (see separate area of note for details)  discussion of management or test interpretation with another health professional (malignant hematology/oncology)    The patient's management necessitated high risk (a decision regarding hospitalization).    Most recent admission 5/16/2024-5/20/2024  Assessment & Plan    Clementine is a 28-year-old female that presented to the ED after syncopal episode.  Mild tachycardia on presentation but otherwise acceptable vital signs without hypotension, hypertensive urgency or emergency, fever, or hypoxia on room air.  Patient oriented x 3 without any signs of altered mental status.  Neuroexam appears equal bilaterally.  No adventitious lung sounds on auscultation.  No acute abdomen signs on palpation.  Leukopenia at 0.2.  Absolute neutrophil count pending.  Beta-hCG negative.  Troponin negative.  Labs including electrolytes otherwise unremarkable and within normal limits.  UA pending collection.  IV NS bolus and IV Zofran administered for symptoms with some improvement.  IV Ativan was ordered but canceled by the admitting team prior to administration for symptoms of nausea.  Ativan was ordered due to patient not having luck with improved nausea and vomiting symptoms with first-line antiemetics.  Discussed patient case as well as the leukopenia with hematology/oncology who is agreeable and recommended observation admission due to it being the weekend and not being able to have close outpatient follow-up the next day if discharged.  Discussed all lab results as well as the admission recommendation with the patient and her family at length.  Patient was ultimately agreeable to the admission plan to continue to monitor symptoms as well as development of a fever or other infectious signs or symptoms.   Patient voiced understanding of the admission plan and all questions were answered prior to transfer of care to the malignant hematology/oncology service.    I have reviewed the nursing notes. I have reviewed the findings, diagnosis, plan and need for follow up with the patient.    Current Discharge Medication List          Final diagnoses:   Syncope   Leukopenia, unspecified type       Aly Carias MD  [facility] EMERGENCY DEPARTMENT  5/25/2024     Rosa Maria Garcia PA-C  05/25/24 2040    --    ED Attending Physician Attestation    I Aly Carias MD, cared for this patient with the Advanced Practice Provider (AMEENA). I personally provided a substantive portion of the care for this patient, including approving the care plan for the number and complexity of problems addressed and taking responsibility related to the risk of complications and/or morbidity or mortality of patient management. Please see the AMEENA's documentation for full details.    Summary of HPI, PE, ED Course   Patient is a 28 year old female evaluated in the emergency department for syncopal episode today.  Patient with lymphoma recent hospitalization with chemo discharge Monday was doing fairly well slight nausea today has had some esophagitis reflux symptoms also.  Patient walking after a mile and a half did not feel well sat down on a bench and had a transient loss of consciousness without seizure activity that was brief.  No true postictal changes otherwise patient continues to feel fatigued no fevers evaluated here in the ER. Exam and ED course notable for patient otherwise vitally stable afebrile here in the ER.  Was worked up noted be neutropenic.  Labs otherwise noted cardiac workup etc.  Patient to be admitted to malignant heme as observation overnight to monitor to make sure no fever develops concern for neutropenic fever and to make sure no other changes noted patient family agree with plan.. After the completion  of care in the emergency department, the patient was admitted to observation.      Aly Carias MD  Emergency Medicine     This note was created at least in part by the use of dragon voice dictation system. Inadvertent typographical errors may still exist.  Aly Carias MD.  Patient evaluated in the emergency department during the COVID-19 pandemic period. Careful attention to patients safety was addressed throughout the evaluation. Evaluation and treatment management was initiated with disposition made efficiently and appropriate as possible to minimize any risk of potential exposure to patient during this evaluation.           Aly Carias MD  05/25/24 9304

## 2024-05-25 NOTE — ED NOTES
Bed: ED12  Expected date:   Expected time:   Means of arrival:   Comments:  HEMS [id number], syncope, CA pt

## 2024-05-25 NOTE — ED TRIAGE NOTES
Pt was out for a walk, went about 1.5miles and pt had syncopal episode. Immediate recovery per EMS and bystanders. Stage 2 lymphoma. Soft pressures for EMS.

## 2024-05-26 ENCOUNTER — APPOINTMENT (OUTPATIENT)
Dept: CARDIOLOGY | Facility: CLINIC | Age: 29
End: 2024-05-26
Attending: PHYSICIAN ASSISTANT
Payer: COMMERCIAL

## 2024-05-26 VITALS
RESPIRATION RATE: 20 BRPM | HEART RATE: 93 BPM | TEMPERATURE: 98.1 F | OXYGEN SATURATION: 98 % | WEIGHT: 133.6 LBS | BODY MASS INDEX: 21.47 KG/M2 | SYSTOLIC BLOOD PRESSURE: 109 MMHG | HEIGHT: 66 IN | DIASTOLIC BLOOD PRESSURE: 66 MMHG

## 2024-05-26 LAB
ACANTHOCYTES BLD QL SMEAR: ABNORMAL
ALBUMIN SERPL BCG-MCNC: 3.7 G/DL (ref 3.5–5.2)
ALP SERPL-CCNC: 52 U/L (ref 40–150)
ALT SERPL W P-5'-P-CCNC: 8 U/L (ref 0–50)
ANION GAP SERPL CALCULATED.3IONS-SCNC: 7 MMOL/L (ref 7–15)
AST SERPL W P-5'-P-CCNC: 9 U/L (ref 0–45)
AUER BODIES BLD QL SMEAR: ABNORMAL
BASO STIPL BLD QL SMEAR: ABNORMAL
BASOPHILS # BLD AUTO: ABNORMAL 10*3/UL
BASOPHILS NFR BLD AUTO: ABNORMAL %
BILIRUB SERPL-MCNC: 0.5 MG/DL
BITE CELLS BLD QL SMEAR: ABNORMAL
BLISTER CELLS BLD QL SMEAR: ABNORMAL
BUN SERPL-MCNC: 14.1 MG/DL (ref 6–20)
BURR CELLS BLD QL SMEAR: ABNORMAL
CALCIUM SERPL-MCNC: 9 MG/DL (ref 8.6–10)
CHLORIDE SERPL-SCNC: 104 MMOL/L (ref 98–107)
CREAT SERPL-MCNC: 0.76 MG/DL (ref 0.51–0.95)
DACRYOCYTES BLD QL SMEAR: SLIGHT
DEPRECATED HCO3 PLAS-SCNC: 27 MMOL/L (ref 22–29)
EGFRCR SERPLBLD CKD-EPI 2021: >90 ML/MIN/1.73M2
ELLIPTOCYTES BLD QL SMEAR: ABNORMAL
EOSINOPHIL # BLD AUTO: ABNORMAL 10*3/UL
EOSINOPHIL NFR BLD AUTO: ABNORMAL %
ERYTHROCYTE [DISTWIDTH] IN BLOOD BY AUTOMATED COUNT: 16.1 % (ref 10–15)
FRAGMENTS BLD QL SMEAR: ABNORMAL
GLUCOSE SERPL-MCNC: 95 MG/DL (ref 70–99)
HCT VFR BLD AUTO: 26.5 % (ref 35–47)
HGB BLD-MCNC: 8.7 G/DL (ref 11.7–15.7)
HGB C CRYSTALS: ABNORMAL
HOWELL-JOLLY BOD BLD QL SMEAR: ABNORMAL
IMM GRANULOCYTES # BLD: ABNORMAL 10*3/UL
IMM GRANULOCYTES NFR BLD: ABNORMAL %
LVEF ECHO: NORMAL
LYMPHOCYTES # BLD AUTO: ABNORMAL 10*3/UL
LYMPHOCYTES NFR BLD AUTO: ABNORMAL %
MAGNESIUM SERPL-MCNC: 2 MG/DL (ref 1.7–2.3)
MCH RBC QN AUTO: 32.6 PG (ref 26.5–33)
MCHC RBC AUTO-ENTMCNC: 32.8 G/DL (ref 31.5–36.5)
MCV RBC AUTO: 99 FL (ref 78–100)
MONOCYTES # BLD AUTO: ABNORMAL 10*3/UL
MONOCYTES NFR BLD AUTO: ABNORMAL %
NEUTROPHILS # BLD AUTO: ABNORMAL 10*3/UL
NEUTROPHILS NFR BLD AUTO: ABNORMAL %
NEUTS HYPERSEG BLD QL SMEAR: ABNORMAL
PHOSPHATE SERPL-MCNC: 3.7 MG/DL (ref 2.5–4.5)
PLAT MORPH BLD: ABNORMAL
PLATELET # BLD AUTO: 61 10E3/UL (ref 150–450)
POLYCHROMASIA BLD QL SMEAR: ABNORMAL
POTASSIUM SERPL-SCNC: 4.8 MMOL/L (ref 3.4–5.3)
PROT SERPL-MCNC: 5.4 G/DL (ref 6.4–8.3)
RBC # BLD AUTO: 2.67 10E6/UL (ref 3.8–5.2)
RBC AGGLUT BLD QL: ABNORMAL
RBC MORPH BLD: ABNORMAL
ROULEAUX BLD QL SMEAR: ABNORMAL
SICKLE CELLS BLD QL SMEAR: ABNORMAL
SMUDGE CELLS BLD QL SMEAR: ABNORMAL
SODIUM SERPL-SCNC: 138 MMOL/L (ref 135–145)
SPHEROCYTES BLD QL SMEAR: ABNORMAL
STOMATOCYTES BLD QL SMEAR: ABNORMAL
TARGETS BLD QL SMEAR: ABNORMAL
TOXIC GRANULES BLD QL SMEAR: ABNORMAL
VARIANT LYMPHS BLD QL SMEAR: ABNORMAL
WBC # BLD AUTO: 0.2 10E3/UL (ref 4–11)

## 2024-05-26 PROCEDURE — 36591 DRAW BLOOD OFF VENOUS DEVICE: CPT | Performed by: STUDENT IN AN ORGANIZED HEALTH CARE EDUCATION/TRAINING PROGRAM

## 2024-05-26 PROCEDURE — 36591 DRAW BLOOD OFF VENOUS DEVICE: CPT | Performed by: PHYSICIAN ASSISTANT

## 2024-05-26 PROCEDURE — 99239 HOSP IP/OBS DSCHRG MGMT >30: CPT | Mod: FS | Performed by: PHYSICIAN ASSISTANT

## 2024-05-26 PROCEDURE — 93306 TTE W/DOPPLER COMPLETE: CPT

## 2024-05-26 PROCEDURE — 250N000011 HC RX IP 250 OP 636: Performed by: PHYSICIAN ASSISTANT

## 2024-05-26 PROCEDURE — 250N000013 HC RX MED GY IP 250 OP 250 PS 637: Performed by: PHYSICIAN ASSISTANT

## 2024-05-26 PROCEDURE — 96372 THER/PROPH/DIAG INJ SC/IM: CPT | Performed by: STUDENT IN AN ORGANIZED HEALTH CARE EDUCATION/TRAINING PROGRAM

## 2024-05-26 PROCEDURE — G0378 HOSPITAL OBSERVATION PER HR: HCPCS

## 2024-05-26 PROCEDURE — 93306 TTE W/DOPPLER COMPLETE: CPT | Mod: 26 | Performed by: STUDENT IN AN ORGANIZED HEALTH CARE EDUCATION/TRAINING PROGRAM

## 2024-05-26 PROCEDURE — 250N000011 HC RX IP 250 OP 636: Performed by: STUDENT IN AN ORGANIZED HEALTH CARE EDUCATION/TRAINING PROGRAM

## 2024-05-26 PROCEDURE — 85014 HEMATOCRIT: CPT | Performed by: STUDENT IN AN ORGANIZED HEALTH CARE EDUCATION/TRAINING PROGRAM

## 2024-05-26 PROCEDURE — 250N000013 HC RX MED GY IP 250 OP 250 PS 637: Performed by: STUDENT IN AN ORGANIZED HEALTH CARE EDUCATION/TRAINING PROGRAM

## 2024-05-26 PROCEDURE — 82040 ASSAY OF SERUM ALBUMIN: CPT | Performed by: STUDENT IN AN ORGANIZED HEALTH CARE EDUCATION/TRAINING PROGRAM

## 2024-05-26 PROCEDURE — 84100 ASSAY OF PHOSPHORUS: CPT | Performed by: PHYSICIAN ASSISTANT

## 2024-05-26 PROCEDURE — 83735 ASSAY OF MAGNESIUM: CPT | Performed by: PHYSICIAN ASSISTANT

## 2024-05-26 RX ORDER — HEPARIN SODIUM,PORCINE 10 UNIT/ML
5-10 VIAL (ML) INTRAVENOUS EVERY 24 HOURS
Status: DISCONTINUED | OUTPATIENT
Start: 2024-05-26 | End: 2024-05-26 | Stop reason: HOSPADM

## 2024-05-26 RX ORDER — DIPHENHYDRAMINE HYDROCHLORIDE AND LIDOCAINE HYDROCHLORIDE AND ALUMINUM HYDROXIDE AND MAGNESIUM HYDRO
10 KIT EVERY 6 HOURS PRN
Status: DISCONTINUED | OUTPATIENT
Start: 2024-05-26 | End: 2024-05-26 | Stop reason: HOSPADM

## 2024-05-26 RX ORDER — HEPARIN SODIUM (PORCINE) LOCK FLUSH IV SOLN 100 UNIT/ML 100 UNIT/ML
5-10 SOLUTION INTRAVENOUS
Status: DISCONTINUED | OUTPATIENT
Start: 2024-05-26 | End: 2024-05-26 | Stop reason: HOSPADM

## 2024-05-26 RX ORDER — HEPARIN SODIUM,PORCINE 10 UNIT/ML
5-10 VIAL (ML) INTRAVENOUS
Status: DISCONTINUED | OUTPATIENT
Start: 2024-05-26 | End: 2024-05-26 | Stop reason: HOSPADM

## 2024-05-26 RX ADMIN — CIMETIDINE 200 MG: 200 TABLET, FILM COATED ORAL at 11:50

## 2024-05-26 RX ADMIN — ACYCLOVIR 400 MG: 400 TABLET ORAL at 08:11

## 2024-05-26 RX ADMIN — Medication 5 ML: at 15:33

## 2024-05-26 RX ADMIN — POLYETHYLENE GLYCOL 3350 17 G: 17 POWDER, FOR SOLUTION ORAL at 08:07

## 2024-05-26 RX ADMIN — FLUCONAZOLE 200 MG: 200 TABLET ORAL at 08:11

## 2024-05-26 RX ADMIN — HEPARIN SODIUM 5000 UNITS: 5000 INJECTION, SOLUTION INTRAVENOUS; SUBCUTANEOUS at 06:02

## 2024-05-26 RX ADMIN — PANTOPRAZOLE SODIUM 40 MG: 40 TABLET, DELAYED RELEASE ORAL at 08:07

## 2024-05-26 RX ADMIN — Medication 5 ML: at 08:07

## 2024-05-26 RX ADMIN — LEVOFLOXACIN 250 MG: 250 TABLET, FILM COATED ORAL at 08:11

## 2024-05-26 RX ADMIN — FAMOTIDINE 20 MG: 20 TABLET ORAL at 08:08

## 2024-05-26 RX ADMIN — DOCUSATE SODIUM 50 MG AND SENNOSIDES 8.6 MG 1 TABLET: 8.6; 5 TABLET, FILM COATED ORAL at 08:08

## 2024-05-26 ASSESSMENT — ACTIVITIES OF DAILY LIVING (ADL)
ADLS_ACUITY_SCORE: 31

## 2024-05-26 NOTE — DISCHARGE SUMMARY
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  Workup including EKG, echocardiogram, labs, CT head unrevealing for cause.  Suspect etiology vasovagal most likely related to post-chemo counts dread (first cycle of dose adjusted chemo that she has reached dread) as well as dehydration.  She was monitored with significant symptomatic improvement, feeling well, and ultimately stable for discharge by 5/26/2024.      On the day of discharge, patient is feeling well at baseline health and looking forward to going home. She denies all symptoms including fevers, chills, headache, dizziness, chest pain, palpitations, shortness of breath, abdominal pain, nausea, vomiting, diarrhea, urinary symptoms, lower extremity pain or swelling, numbness tingling or weakness. She is seen ambulating with a steady gait.     Prior to discharge, I reviewed with Clementine the plan of care, including upcoming follow-up appointments and new medications. Appropriate prescriptions were sent to the discharge pharmacy, as needed. We reviewed strict discharge precautions, including reasons to call clinic triage or present to the ED, and she voiced understanding. She was provided with the clinic triage number, as well as written discharge instructions, in  "their discharge paperwork. Patient had an opportunity to ask questions, all of which were answered to their stated satisfaction. On the day of discharge, patient was overall well-appearing, hemodynamically stable, and felt safe and comfortable with the plans for discharge to home with follow-up as described.    Outpatient follow-up issues:  - Mucositis related pain reported this admission, though improving. Follow up symptoms with patient. Advised MMW swish and swallow prn, saline rinses QID     Discharge medications:  No medication changes made this admission.      Upcoming follow-up:  - 5/28: labs and AMEENA follow up with Charisse Daniels due ~5/31 -- requested, though does not yet appear to be scheduled      Hospital Course   Clementine Kathleen was admitted on 5/25/2024.  The following problems were addressed during her hospitalization:    # Syncopal event  Presented to ED 5/25/24 after a witnessed syncopal event.  Noted to be mildly nauseous in the morning 5/25, took as needed Ativan with improvement.  No vomiting.  She was out for her typical walk with her boyfriend, about 1-1.5 miles in, when she stumbled towards the bench (scraped left knee - minimal abrasion noted), and had witnessed syncopal episode lasting about 15 seconds.  Reportedly was pale prior to syncope, though denies overt lightheadedness, dizziness, or other prodromal symptoms including chest pain, shortness of breath, headaches, vision change or loss preceding the event. Did not hit head or sustain trauma/injury. No seizure like activity reported.  Additionally of note, having some mucositis pain in the last few days PTA causing her to stick to a soft/liquid diet, though reports her overall intake has been \"normal.\"  - Prior history of tachycardia/palpitations between previous cycles of chemotherapy; none reported as of recently.    - CT PE 4/2024 negative  - Echo 2/2024 LVEF 60% without abnormality  - Zio patch 5/21/24 - 1 episode SVT with " spontaneous conversion    Admission workup:  - EKG NSR, 95 bpm, QTc 459, no evidence of ischemia  - CBC with leukopenia 0.2, anemia 9.4, and thrombocytopenia 71. Of note, recently received C5 DA-R EPOCH (dose level 5) on 5/16/24. This is the first cycle of dose adjusted chemo that she has reached dread.  - BUN slightly elevated on admission, though Cr wnl. Likely pre-renal 2/2 dehydration, improved on recheck.  - Remainder of labs including BMP, LFTs, troponin, TSH, UA unremarkable.  - CT head without acute intracranial pathology  - Orthostatics negative  - Monitored on cardiac telemetry without event  - Echo 5/26 with LVEF 60-65%, no effusion or noted valve abnormalities    - Ultimately, suspect etiology vasovagal most likely related to post-chemo counts dread (first cycle of dose adjusted chemo that she has reached dread) as well as dehydration.  She was monitored with significant symptomatic improvement, feeling well, and ultimately stable for discharge by 5/26/2024      HEME  # Primary mediastinal B-cell lymphoma  Follows with Dr. Oakes. Initially presented with cervical lymphadenopathy (12/2023). Initial FNA (1/29/24) suggestive of possible Hodgkin lymphoma; however excisional LN biopsy (2/9/24) shows large B-cell lymphoma with differential diagnosis of primary mediastinal large B-cell lymphoma vs DLBCL NOS. PET with cervical and mediastinal lymphadenopathy only. Overall presentation felt to be consistent with PMBCL given her age, gender, sites of involvement, dim CD30 expression, and prominent fibrotic background.  Although CD23 IHC was negative, IHC for , MAL, and PD-L1 were positive in majority of the neoplastic cells suggesting PMBCL over DLBCL NOS. [id number] gene expression profile also consistent with PMBCL. FISH with gain of BCL2 but no MYC, BCL2, or BCL6 rearrangements. PET showed bilateral cervical and mediastinal hypermetabolic lymphadenopathy (largest 4.4 cm with SUV max 27 in a prevascular LN); no  disease below diaphragm. Baseline TTE (2/19/24) with LVEF 60%. Initial treatment R-DA-EPOCH (C1D1=2/22/24; C2D1= 3/14/24; C3D1=4/4/24). Which was overall tolerated well but Clementine had CINV and developed bilateral hand neuropathy.  PET (4/1/24) (after C2) showed evidence of complete metabolic response with resolution of cervical and mediastinal lymphadenopathy. Have increased dose each cycle by one level (ex: C1=dose level 1, C2=dose level 2, etc.). She was last seen by Dr. Oakes in clinic on 4/25/2024 and then proceeded with C4 DA-R-EPOCH at dose level 4 but vincristine held to give time for improvement of bothersome neuropathy. She is now admitted for C5 DA-R-EPOCH (level 5), will give vincristine but capped at 1.2 mg to hopefully prevent worsening of her neuropathy. OA she endorses improved symptoms overall; less dyspnea on exertion, minimal dyspnea on rest, less frequent episodes of heart palpitations, and R>L improvement of bilateral neuropathy of hands). CINV after C4 discharge was well controlled on PRN's.   - Recently discharged 5/20/24 after C5. Received neulasta in clinic on 5/21/24. Neutropenia noted with WBC 0.2 this admission.   - Planned for follow up with labs and AMEENA visit 5/28       # Peripheral neuropathy   Endorsed L>R neuropathy in her fingertips. Secondary to chemotherapy. Respectfully declined need for medication management at this time. Vincristine was capped at 2 mg for C3. S/p C3 and neuropathy in fingertips have worsened with some difficulty using her phone or buttoning shirts and now neuropathy in bilateral feet that does not impact balance of walking. Seems to be improving just prior to admission for C4 per patient report. Vincristine omitted for C4 to give time for recovery. Patient endorsing recent improvement of neuropathy; R>L.  - Monitor closely. No acute inpatient needs.       # CINV  # Low appetite  Chemotherapy induced nausea and vomiting during previous cycles, managed with prn  "zofran, compazine, scheduled emend, and zyprexa at bedtime. Symptoms improved within a few days of hospital discharge from C2. Noted worsening nausea on 4/28. On chart review, patient has previously received IV Emend earlier in her hospital course (D1-2) in addition to prior to discharge (D5). OA patient reports N/V has been well controlled after C4 and C5 with breakthrough PRNs at home.  - Mild nausea 5/25 AM, took ativan with relief.  - Continue zyprexa at bedtime, ativan, zofran, and compazine prn  - No nausea reported x5/26       # Mucositis  Patient endorses previous oral ulceration that was painful but resolved after previous chemotherapy cycles.  This admission, he notes gingival, lip, tongue soreness and \"mucocele\".  Pain and difficulty with swallowing.  Mucocele noted to lip, and lesion of right sided tongue, no clear ulcerations.  Posterior oropharynx clear without lesion.  Likely mucositis in setting of recent chemotherapy and neutropenia.  Per patient report, gradually improving in last 2 days.  - Magic mouthwash swish and spit versus swish and swallow  - Biotene, saline rinses 4 times daily       ID  # ID PPx  - Acyclovir 400 mg BID  - Levofloxacin 250 mg daily and fluconazole 200 mg given ANC <1  - Pentamidine monthly for PJP ppx; last given 5/3/24, due ~5/31 (requested outpatient)     CV  # H/o palpitations  # H/o tachycardia  Noted heart \"racing and pounding\" after C2 of chemo. Denied chest pain, dyspnea, lower extremity swelling, abdominal pain, nausea, dizziness. Echo from 2/2024 unremarkable. 3/25/24 BNP normal at <36. She again reported symptoms following C3, with increased dyspnea on exertion. CT PE 4/15/24 unremarkable. OA she reports that symptoms have improved overall; has less dyspnea on exertion, minimal dyspnea on rest, less frequent episodes of heart palpitations,  - Sent in ziopatch 5/15, noted 1 episode of SVT and otherwise unremarkable  - No chest pain, palpitations, or tachycardia " "noted this admission.   - No acute inpatient requirements at this time     GI  # H/o constipation  - Continue PTA miralax and senna BID.      MISC  # Fertility preservation  - Established with [facility], underwent egg retrieval on 2/18.      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 Her boyfriend is a general surgery resident at [employer]. Parents live in [place].    Clinically Significant Risk Factors Present on Admission                # Thrombocytopenia: Lowest platelets = 61 in last 2 days, will monitor for bleeding            # Financial/Environmental Concerns:              Patient was staffed with Dr. Jody Sanchez PA-C  Hematology/Oncology  Page: #[phone]    I spent >85 minutes in the care of this patient today, which included time necessary for review of interval events, obtaining history and physical exam, ordering medication(s)/test(s) as medically indicated, discussion with interdisciplinary/consult team(s), and documentation time. Over 50% of time was spent face-to-face and/or coordinating care.      Pending Results   These results will be followed up by outpatient heme/onc team  Unresulted Labs Ordered in the Past 30 Days of this Admission       No orders found for last 31 day(s).            Code Status   Full Code    Primary Care Physician   Chela Peter    /66 (BP Location: Right arm)   Pulse 93   Temp 98.1  F (36.7  C) (Oral)   Resp 20   Ht 1.676 m (5' 6\")   Wt 60.6 kg (133 lb 9.6 oz)   SpO2 98%   BMI 21.56 kg/m      Constitutional: Awake and conversational. Non- toxic appearing. No acute distress.   HEENT: NCAT. Moist mucus membranes without thrush or exudates appreciated. Small mucocele noted to lower lip and lesion to right lower tongue. No ulcerations.  Lymph: Neck supple, no ridigity. No significant adenopathy noted.   Respiratory: Breathing comfortably on room air with no " accessory muscle use. Speaking in full sentences, no evidence of respiratory distress. Lungs CTAB without stridor, wheeze, rhonchi, or rales.   Cardiovascular: Regular rate and rhythm. No murmurs. No peripheral edema.    GI: Abdomen with normoactive bowel sounds, soft and non-tender throughout. No rebound, guarding, or peritoneal sign.   Skin: Skin is clean, dry, intact. No jaundice or significant rashes appreciated on exposed areas.   Neurologic: Alert and oriented with normal speech. Grossly nonfocal.  Moves extremities spontaneously.  Memory and thought process preserved. Motor function normal with 5/5 muscle strength bilaterally to UE and LE. Sensation intact to light touch bilaterally.   Neuropsychiatric: Calm, affect congruent to situation. Appropriate mood and affect. Good judgment and insight.   Vascular access: Port right chest, CDI          Time Spent on this Encounter   Angle PHAM PA-C, personally saw the patient today and spent greater than 30 minutes discharging this patient.    Discharge Disposition   Discharged to home  Condition at discharge: Stable    Consultations This Hospital Stay   MEDICATION HISTORY IP PHARMACY CONSULT  MEDICATION HISTORY IP PHARMACY CONSULT    Discharge Orders      Reason for your hospital stay    You were admitted to the hospital for workup and monitoring after a syncopal event.     Activity    Your activity upon discharge: activity as tolerated     Adult [facility] Follow-up and recommended labs and tests    Please check your [employer] account for the most up to date information on appointment scheduling  Your appointments will be at the [facility] - [address] [place] [address]   - 5/28: Labs and AMEENA follow up with Charisse      Appointments on [place] and/or [facility] (with [facility] or [facility] provider or service). Call [phone] if you haven't heard regarding these appointments within 7 days of discharge.     When to contact your care team    Please call the  [facility] at [phone] if you develop temperature above 100.4, shortness of breath, chest pain, headaches, vision changes, bleeding, uncontrolled nausea, vomiting, diarrhea, pain, or any other signs or symptoms of concern. If you are concerned that your symptoms are life-threatening, don't hesitate to call 911 or go to the nearest Emergency Room.     Discharge Instructions    Please take your medications as prescribed. No changes were made this admission.     You may take tylenol (acetaminophen) 325 mg every 6-8 hours as needed for pain. Maximum dose of 4000 mg in a 24 hour period. Do not take ibuprofen (advil/motrin), aspirin, or any other NSAIDs (due to low blood counts)      It was nice being a part of your hospital care team - best of luck to you and take care!    - Angle Sanchez PA-C     Diet    Follow this diet upon discharge: Regular     Discharge Medications   Discharge Medication List as of 5/26/2024  3:25 PM        CONTINUE these medications which have NOT CHANGED    Details   acyclovir (ZOVIRAX) 400 MG tablet Take 1 tablet (400 mg) by mouth 2 times daily, Disp-60 tablet, R-4, E-Prescribe      cimetidine (TAGAMET) 200 MG tablet Take 200 mg by mouth 2 times daily, Historical      fluconazole (DIFLUCAN) 200 MG tablet Take 1 tablet (200 mg) by mouth daily as needed (neutropenia prophylaxis), No Print Out      levofloxacin (LEVAQUIN) 250 MG tablet Take 1 tablet (250 mg) by mouth daily as needed (neutropenia prophylaxis), No Print Out      LORazepam (ATIVAN) 0.5 MG tablet Take 1 tablet (0.5 mg) by mouth every 6 hours as needed for nausea or vomiting, Disp-30 tablet, R-0, E-Prescribe      magic mouthwash (ENTER INGREDIENTS IN COMMENTS) suspension Swish and spit 15 ml every 6 hours as needed, Disp-200 mL, R-0, E-Prescribelidocaine visc 2% 2.5mL/5mL & maalox/mylanta w/ simeth 2.5mL/5mL & diphenhydramine 5mg/5mL      metoclopramide (REGLAN) 10 MG tablet Take 1 tablet  (10 mg) by mouth 4 times daily as needed (constipation), Disp-30 tablet, R-3, E-Prescribe      modafinil (PROVIGIL) 200 MG tablet Take 200 mg by mouth daily as needed (hypersomnia), Historical      OLANZapine zydis (ZYPREXA) 5 MG ODT Take 1 tablet (5 mg) by mouth nightly as needed (nausea), No Print Out      ondansetron (ZOFRAN) 4 MG tablet Take 1-2 tablets (4-8 mg) by mouth every 8 hours as needed for nausea or vomiting, Disp-30 tablet, R-0, E-Prescribe      pantoprazole (PROTONIX) 40 MG EC tablet Take 1 tablet (40 mg) by mouth 2 times daily, Disp-60 tablet, R-0, E-Prescribe      polyethylene glycol (MIRALAX) 17 GM/Dose powder Take 17 g by mouth 2 times daily, Historical      prochlorperazine (COMPAZINE) 10 MG tablet Take 1 tablet (10 mg) by mouth every 6 hours as needed for nausea or vomiting, Disp-30 tablet, R-0, E-Prescribe      senna-docusate (SENOKOT-S/PERICOLACE) 8.6-50 MG tablet Take 1 tablet by mouth 2 times daily, Disp-60 tablet, R-0, E-Prescribe      Levonorgestrel (KYLEENA IU) 1 Device by Intrauterine route once, Historical      sucralfate (CARAFATE) 1 GM tablet Take 1 tablet (1 g) by mouth 4 times daily as needed (acid reflux), Disp-30 tablet, R-1, E-Prescribe           Allergies   No Known Allergies  Data   Most Recent 3 CBC's:  Recent Labs   Lab Test 05/26/24  0541 05/25/24  1704 05/24/24  1033   WBC 0.2* 0.2* 5.9   HGB 8.7* 9.4* 10.2*   MCV 99 98 97   PLT 61* 71* 156      Most Recent 3 BMP's:  Recent Labs   Lab Test 05/26/24  0541 05/25/24  1704 05/24/24  1033    136 138   POTASSIUM 4.8 4.2 3.9   CHLORIDE 104 101 103   CO2 27 25 27   BUN 14.1 22.8* 12.0   CR 0.76 0.63 0.57   ANIONGAP 7 10 8   DEE 9.0 8.9 9.4   GLC 95 120* 99     Most Recent 2 LFT's:  Recent Labs   Lab Test 05/26/24  0541 05/25/24  1704   AST 9 9   ALT 8 9   ALKPHOS 52 60   BILITOTAL 0.5 0.3     Most Recent INR's and Anticoagulation Dosing History:  Anticoagulation Dose History  More data exists         Latest Ref Rng & Units  2024   Recent Dosing and Labs   INR 0.85 - 1.15 1.21  1.05  1.08  1.13  1.11  1.14  1.19      Most Recent 3 Troponin's:No lab results found.  Most Recent Cholesterol Panel:No lab results found.  Most Recent 6 Bacteria Isolates From Any Culture (See EPIC Reports for Culture Details):No lab results found.  Most Recent TSH, T4 and A1c Labs:  Recent Labs   Lab Test 24  1704   TSH 1.16     Results for orders placed or performed during the hospital encounter of 24   CT Head w/o Contrast    Narrative    CT HEAD W/O CONTRAST 2024 7:44 PM    Provided History: to assess for intracranial etiology of syncope    Comparison: CT soft tissue neck 2024. Pet/CT 2024    Technique: Using multidetector thin collimation helical acquisition  technique, axial, coronal and sagittal CT images from the skull base  to the vertex were obtained without intravenous contrast.     Findings:  No evidence of intracranial hemorrhage, mass effect, or  midline shift. No acute loss of gray-white differentiation. Small  appearance of the ventricular system and mild protrusion of the  bilateral cerebellar tonsils through the foramen magnum,, which  appears similar to CT soft tissue neck exam on 2024. Mild  effacement of the perimesencephalic basal cisterns.    Intact bony calvaria and skull base. Mastoid air cells are clear.  Paranasal sinuses are clear.      Impression    Impression: No acute intracranial pathology.    I have personally reviewed the examination and initial interpretation  and I agree with the findings.    SHAHID DE LOS SANTOS MD         SYSTEM ID:  [id number]   Echo Complete     Value    LVEF  60-65%    Narrative    [id number]  [id number]  [id number]  [id number]^[id number]^ROBBIN^ANNIKA     [facility],[place]  Echocardiography Laboratory  [address] [place] [id number]     Name: JAMSHID THORNTON  MRN: [id number]  : 1995  Study  Date: 2024 12:17 PM  Age: 28 yrs  Gender: Female  Patient [facility]  Reason For Study: Chemo  Ordering Physician: ROBBIN BERNAL  Performed By: Gabriela Burgos RDCS     BSA: 1.7 m2  Height: 66 in  Weight: 133 lb  HR: 78  BP: 99/54 mmHg  ______________________________________________________________________________  Procedure  Complete Portable Echo Adult. Echocardiogram with two-dimensional, color and  spectral Doppler performed.  ______________________________________________________________________________  Interpretation Summary  Global and regional left ventricular function is normal with an EF of 60-65%.  Right ventricular function, chamber size, wall motion, and thickness are  normal.  No pericardial effusion is present.  ______________________________________________________________________________  Left Ventricle  Global and regional left ventricular function is normal with an EF of 60-65%.  Left ventricular diastolic function is normal.     Right Ventricle  Right ventricular function, chamber size, wall motion, and thickness are  normal.     Atria  Both atria appear normal.     Mitral Valve  The mitral valve is normal.     Aortic Valve  Aortic valve is normal in structure and function. The aortic valve is  tricuspid.     Tricuspid Valve  The tricuspid valve is normal.     Pulmonic Valve  The pulmonic valve is normal.     Vessels  The aorta root is normal. The thoracic aorta is normal. The pulmonary artery  cannot be assessed. The inferior vena cava was normal in size with preserved  respiratory variability.     Pericardium  No pericardial effusion is present.     Compared to Previous Study  No significant changes noted.  ______________________________________________________________________________  Doppler Measurements & Calculations  MV E max katerine: 62.7 cm/sec  MV A max katerine: 53.2 cm/sec  MV E/A: 1.2  MV dec slope: 363.6 cm/sec2  MV dec time: 0.17 sec  E/E' av.7  Lateral E/e': 3.9  Medial  E/e': 5.4     ______________________________________________________________________________  Report approved by: MD Graeme Rodriguez 05/26/2024 03:08 PM

## 2024-05-26 NOTE — PROGRESS NOTES
"Observation Goals:    -diagnostic tests and consults completed and resulted: Progressing     -vital signs normal or at patient baseline: Met. /61 (BP Location: Right arm)   Pulse 69   Temp 98.4  F (36.9  C) (Oral)   Resp 16   Ht 1.676 m (5' 6\")   Wt 60.6 kg (133 lb 9.6 oz)   SpO2 99%   BMI 21.56 kg/m          "

## 2024-05-26 NOTE — MEDICATION SCRIBE - ADMISSION MEDICATION HISTORY
Medication Scribe Admission Medication History    Admission medication history is complete. The information provided in this note is only as accurate as the sources available at the time of the update.    Information Source(s): Patient and CareEverywhere/SureScripts via in-person    Pertinent Information: None    Changes made to PTA medication list:  Added: None  Deleted: Dexamethasone 4 mg  Changed: None    Allergies reviewed with patient and updates made in EHR: yes    Medication History Completed By: Suri Aiken 5/25/2024 8:08 PM    PTA Med List   Medication Sig Last Dose    acyclovir (ZOVIRAX) 400 MG tablet Take 1 tablet (400 mg) by mouth 2 times daily 5/25/2024 at am    cimetidine (TAGAMET) 200 MG tablet Take 200 mg by mouth 2 times daily 5/25/2024 at am    fluconazole (DIFLUCAN) 200 MG tablet Take 1 tablet (200 mg) by mouth daily as needed (neutropenia prophylaxis) Unknown at unknown    levofloxacin (LEVAQUIN) 250 MG tablet Take 1 tablet (250 mg) by mouth daily as needed (neutropenia prophylaxis) Unknown at unknown    Levonorgestrel (KYLEENA IU) 1 Device by Intrauterine route once     LORazepam (ATIVAN) 0.5 MG tablet Take 1 tablet (0.5 mg) by mouth every 6 hours as needed for nausea or vomiting Unknown at unknown    magic mouthwash (ENTER INGREDIENTS IN COMMENTS) suspension Swish and spit 15 ml every 6 hours as needed Unknown at unknown    metoclopramide (REGLAN) 10 MG tablet Take 1 tablet (10 mg) by mouth 4 times daily as needed (constipation) Unknown at unknown    modafinil (PROVIGIL) 200 MG tablet Take 200 mg by mouth daily as needed (hypersomnia) Unknown at unknown    OLANZapine zydis (ZYPREXA) 5 MG ODT Take 1 tablet (5 mg) by mouth nightly as needed (nausea) Unknown at unknown    ondansetron (ZOFRAN) 4 MG tablet Take 1-2 tablets (4-8 mg) by mouth every 8 hours as needed for nausea or vomiting 5/24/2024 at unknown    pantoprazole (PROTONIX) 40 MG EC tablet Take 1 tablet (40 mg) by mouth 2 times daily  5/25/2024 at am    polyethylene glycol (MIRALAX) 17 GM/Dose powder Take 17 g by mouth 2 times daily 5/25/2024 at am    prochlorperazine (COMPAZINE) 10 MG tablet Take 1 tablet (10 mg) by mouth every 6 hours as needed for nausea or vomiting Unknown at unknown    senna-docusate (SENOKOT-S/PERICOLACE) 8.6-50 MG tablet Take 1 tablet by mouth 2 times daily 5/25/2024 at am    sucralfate (CARAFATE) 1 GM tablet Take 1 tablet (1 g) by mouth 4 times daily as needed (acid reflux) Past Week at unknown

## 2024-05-26 NOTE — PLAN OF CARE
"Observation Goals    -diagnostic tests and consults completed and resulted: Progressing    -vital signs normal or at patient baseline: Progressing. /61   Pulse 96   Temp 98.7  F (37.1  C) (Oral)   Resp 18   Ht 1.676 m (5' 6\")   Wt 61.2 kg (135 lb)   SpO2 97%   BMI 21.79 kg/m     "

## 2024-05-26 NOTE — PROGRESS NOTES
Observation goals  PRIOR TO DISCHARGE        Comments:   -diagnostic tests and consults completed and resulted: met  -vital signs normal or at patient baseline: met

## 2024-05-26 NOTE — H&P
[facility]    History and Physical - Hospitalist Service       Date of Admission:  5/25/2024    Assessment & Plan      Clementine Kathleen is a 28 year old female admitted on 5/25/2024. She presents with syncope like episodes needing workup.     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   Patients description could be 2/2 peripheral neuropathy from recently used chemotherapy agents like etoposide, Awaiting on orthostatic vitals, recently done echo on 2/19/2024 was unremarkable for valve disease or ejection fraction issues, EKG on admission does not show any arrhythmias or coronary ischemia, awaiting on head ct, no seizure like symptoms reported, Patient also denies any signs of hypoglycemia, hypothermia, hypotension or hypoxemia on admission workup     Non-Zhang lymphoma c/w pta R-EPOCH     Infection ppx   C/w levofloxacin for bacterial infection ppx   C/w fluconazole for fungal infection ppx   C/w acylovir for CMV infection ppx   Patient not actively on steroids hence bactrim for pjp ppx would not be needed     Gerd c/w pta famotidine and pantoprazole   Chronic constipation c/w pta PEG            Observation Goals: -diagnostic tests and consults completed and resulted, -vital signs normal or at patient baseline, Nurse to notify provider when observation goals have been met and patient is ready for discharge.  Diet: Combination Diet Regular Diet    DVT Prophylaxis: Heparin SQ  Jones Catheter: Not present  Lines: PRESENT      Port a Cath 03/01/24 Single Lumen Right Chest wall-Site Assessment: WDL      Cardiac Monitoring: None  Code Status: Full Code      Clinically Significant Risk Factors Present on Admission                # Thrombocytopenia: Lowest platelets = 71 in last 2 days, will monitor for bleeding            # Financial/Environmental Concerns:           Disposition Plan     Medically Ready for Discharge: Anticipated  Tomorrow           Phoenix Denny MD  Hospitalist Service  [facility]  Securely message with [employer] (more info)  Text page via [facility] Paging/Directory     ______________________________________________________________________    Chief Complaint   Syncope     History is obtained from the patient    History of Present Illness   Clementine Kathleen is a 28 year old female with pmh of non-hodgkin's lymphoma, GERD presents with syncope like episode, Patient describes an event earlier when she suddenly felt lightheaded at the time the symptoms started patient was taking a walk when suddenly felt lightheaded and warm she proceeded to gradually lost consciousness, Patient was supported by a family members to break her fall , Patient denies any preceding symptoms of chest pain, fevers, tongue biting, rhythmic movement of the hands and feet, fevers or malaise.     In the ER patient had labs and basic imaging done and was admitted for observation and workup for her syncope like symptoms.       Past Medical History    Past Medical History:   Diagnosis Date    Anxiety     Cervical lymphadenopathy     GERD     Interstitial cystitis        Past Surgical History   Past Surgical History:   Procedure Laterality Date    CL AFF SURGICAL PATHOLOGY      Pearl neuroma    CLOSED REDUCTION PROXIMAL FIBULAR FRACTURE Left     EXCISE LESION NECK Left 02/09/2024    Procedure: Excisional Node Biopsy Left Neck;  Surgeon: Asher Damon MD;  Location: [facility] OR    INSERT PORT VASCULAR ACCESS Right 3/1/2024    Procedure: Insert port vascular access;  Surgeon: Solo Contreras PA-C;  Location: [facility] OR    IR CHEST PORT PLACEMENT > 5 YRS OF AGE  3/1/2024    PICC INSERTION - DOUBLE LUMEN Right 02/22/2024    41-1cm, Medial brachial vein       Prior to Admission Medications   Prior to Admission Medications   Prescriptions Last Dose Informant Patient Reported? Taking?   LORazepam (ATIVAN) 0.5 MG tablet    No No   Sig: Take 1 tablet (0.5 mg) by mouth every 6 hours as needed for nausea or vomiting   Levonorgestrel (KYLEENA IU)   Yes No   Si Device by Intrauterine route once   OLANZapine zydis (ZYPREXA) 5 MG ODT   No No   Sig: Take 1 tablet (5 mg) by mouth nightly as needed (nausea)   acyclovir (ZOVIRAX) 400 MG tablet 2024  No Yes   Sig: Take 1 tablet (400 mg) by mouth 2 times daily   cimetidine (TAGAMET) 200 MG tablet 2024  Yes Yes   Sig: Take 200 mg by mouth 2 times daily   dexAMETHasone (DECADRON) 4 MG tablet   No No   Sig: Take 2 tablets (8 mg) by mouth daily Take D6 () and D7 ()   fluconazole (DIFLUCAN) 200 MG tablet   No No   Sig: Take 1 tablet (200 mg) by mouth daily as needed (neutropenia prophylaxis)   levofloxacin (LEVAQUIN) 250 MG tablet   No No   Sig: Take 1 tablet (250 mg) by mouth daily as needed (neutropenia prophylaxis)   magic mouthwash (ENTER INGREDIENTS IN COMMENTS) suspension   No No   Sig: Swish and spit 15 ml every 6 hours as needed   metoclopramide (REGLAN) 10 MG tablet   No No   Sig: Take 1 tablet (10 mg) by mouth 4 times daily as needed (constipation)   modafinil (PROVIGIL) 200 MG tablet   Yes No   Sig: Take 200 mg by mouth daily as needed (hypersomnia)   ondansetron (ZOFRAN) 4 MG tablet   No No   Sig: Take 1-2 tablets (4-8 mg) by mouth every 8 hours as needed for nausea or vomiting   pantoprazole (PROTONIX) 40 MG EC tablet   No No   Sig: Take 1 tablet (40 mg) by mouth 2 times daily   polyethylene glycol (MIRALAX) 17 GM/Dose powder   Yes No   Sig: Take 17 g by mouth 2 times daily   prochlorperazine (COMPAZINE) 10 MG tablet   No No   Sig: Take 1 tablet (10 mg) by mouth every 6 hours as needed for nausea or vomiting   senna-docusate (SENOKOT-S/PERICOLACE) 8.6-50 MG tablet   No No   Sig: Take 1 tablet by mouth 2 times daily   sucralfate (CARAFATE) 1 GM tablet   No No   Sig: Take 1 tablet (1 g) by mouth 4 times daily as needed (acid reflux)      Facility-Administered  Medications: None        Review of Systems    The 10 point Review of Systems is negative other than noted in the HPI or here.      Physical Exam   Vital Signs: Temp: 98.2  F (36.8  C) Temp src: Oral BP: 107/63 Pulse: 104   Resp: 18 SpO2: 100 % O2 Device: None (Room air)    Weight: 135 lbs 0 oz    Constitutional: awake, alert, cooperative, no apparent distress, and appears stated age  Eyes: Lids and lashes normal, pupils equal, round and reactive to light, extra ocular muscles intact, sclera clear, conjunctiva normal  ENT: Normocephalic, without obvious abnormality, atraumatic, sinuses nontender on palpation, external ears without lesions, oral pharynx with moist mucous membranes, tonsils without erythema or exudates, gums normal and good dentition.  Hematologic / Lymphatic: no cervical lymphadenopathy  Respiratory: No increased work of breathing, good air exchange, clear to auscultation bilaterally, no crackles or wheezing  Cardiovascular: Normal apical impulse, regular rate and rhythm, normal S1 and S2, no S3 or S4, and no murmur noted  GI: No scars, normal bowel sounds, soft, non-distended, non-tender, no masses palpated, no hepatosplenomegally  Genitounirinary:   Skin: no bruising or bleeding  Musculoskeletal: There is no redness, warmth, or swelling of the joints.  Full range of motion noted.  Motor strength is 5 out of 5 all extremities bilaterally.  Tone is normal.  Neurologic: Awake, alert, oriented to name, place and time.  Cranial nerves II-XII are grossly intact.  Motor is 5 out of 5 bilaterally.  Cerebellar finger to nose, heel to shin intact.  Sensory is intact.  Babinski down going, Romberg negative, and gait is normal.  Neuropsychiatric: General: normal, calm, and normal eye contact    Medical Decision Making       38 MINUTES SPENT BY ME on the date of service doing chart review, history, exam, documentation & further activities per the note.      Data     I have personally reviewed the following data  over the past 24 hrs:    0.2 (LL)  \   9.4 (L)   / 71 (L)     136 101 22.8 (H) /  120 (H)   4.2 25 0.63 \     ALT: 9 AST: 9 AP: 60 TBILI: 0.3   ALB: 4.0 TOT PROTEIN: 6.0 (L) LIPASE: N/A     Trop: 8 BNP: N/A     TSH: 1.16 T4: N/A A1C: N/A       Imaging results reviewed over the past 24 hrs:   No results found for this or any previous visit (from the past 24 hour(s)).

## 2024-05-26 NOTE — PROGRESS NOTES
Patient's After Visit Summary was reviewed with patient.  Patient verbalized understanding of After Visit Summary, recommended follow up and was given an opportunity to ask questions.   Discharge medications sent home with patient/family: Not applicable   Discharged with spouse, mother, and father.

## 2024-05-27 LAB
ATRIAL RATE - MUSE: 95 BPM
ATRIAL RATE - MUSE: 95 BPM
DIASTOLIC BLOOD PRESSURE - MUSE: NORMAL MMHG
DIASTOLIC BLOOD PRESSURE - MUSE: NORMAL MMHG
INTERPRETATION ECG - MUSE: NORMAL
INTERPRETATION ECG - MUSE: NORMAL
P AXIS - MUSE: 43 DEGREES
P AXIS - MUSE: 46 DEGREES
PR INTERVAL - MUSE: 130 MS
PR INTERVAL - MUSE: 138 MS
QRS DURATION - MUSE: 66 MS
QRS DURATION - MUSE: 76 MS
QT - MUSE: 352 MS
QT - MUSE: 366 MS
QTC - MUSE: 442 MS
QTC - MUSE: 459 MS
R AXIS - MUSE: 69 DEGREES
R AXIS - MUSE: 78 DEGREES
SYSTOLIC BLOOD PRESSURE - MUSE: NORMAL MMHG
SYSTOLIC BLOOD PRESSURE - MUSE: NORMAL MMHG
T AXIS - MUSE: 42 DEGREES
T AXIS - MUSE: 46 DEGREES
VENTRICULAR RATE- MUSE: 95 BPM
VENTRICULAR RATE- MUSE: 95 BPM

## 2024-05-28 ENCOUNTER — PATIENT OUTREACH (OUTPATIENT)
Dept: CARE COORDINATION | Facility: CLINIC | Age: 29
End: 2024-05-28
Payer: COMMERCIAL

## 2024-05-28 ENCOUNTER — APPOINTMENT (OUTPATIENT)
Dept: LAB | Facility: CLINIC | Age: 29
End: 2024-05-28
Attending: INTERNAL MEDICINE
Payer: COMMERCIAL

## 2024-05-28 ENCOUNTER — ONCOLOGY VISIT (OUTPATIENT)
Dept: ONCOLOGY | Facility: CLINIC | Age: 29
End: 2024-05-28
Attending: INTERNAL MEDICINE
Payer: COMMERCIAL

## 2024-05-28 VITALS
WEIGHT: 133.9 LBS | HEART RATE: 94 BPM | RESPIRATION RATE: 16 BRPM | OXYGEN SATURATION: 99 % | BODY MASS INDEX: 21.61 KG/M2 | DIASTOLIC BLOOD PRESSURE: 75 MMHG | SYSTOLIC BLOOD PRESSURE: 115 MMHG | TEMPERATURE: 98 F

## 2024-05-28 DIAGNOSIS — R11.0 NAUSEA: ICD-10-CM

## 2024-05-28 DIAGNOSIS — K12.31 MUCOSITIS DUE TO CHEMOTHERAPY: ICD-10-CM

## 2024-05-28 DIAGNOSIS — C85.28 MEDIASTINAL LARGE B-CELL LYMPHOMA OF LYMPH NODES OF MULTIPLE REGIONS (H): Primary | ICD-10-CM

## 2024-05-28 DIAGNOSIS — R00.0 TACHYCARDIA: ICD-10-CM

## 2024-05-28 DIAGNOSIS — Z76.89 PREVENTION OF CHEMOTHERAPY-INDUCED NEUTROPENIA: ICD-10-CM

## 2024-05-28 DIAGNOSIS — G62.9 NEUROPATHY: ICD-10-CM

## 2024-05-28 LAB
ALBUMIN SERPL BCG-MCNC: 4.1 G/DL (ref 3.5–5.2)
ALP SERPL-CCNC: 62 U/L (ref 40–150)
ALT SERPL W P-5'-P-CCNC: <5 U/L (ref 0–50)
ANION GAP SERPL CALCULATED.3IONS-SCNC: 10 MMOL/L (ref 7–15)
AST SERPL W P-5'-P-CCNC: 9 U/L (ref 0–45)
BILIRUB SERPL-MCNC: 0.2 MG/DL
BUN SERPL-MCNC: 16.2 MG/DL (ref 6–20)
CALCIUM SERPL-MCNC: 9.3 MG/DL (ref 8.6–10)
CHLORIDE SERPL-SCNC: 104 MMOL/L (ref 98–107)
CREAT SERPL-MCNC: 0.66 MG/DL (ref 0.51–0.95)
DEPRECATED HCO3 PLAS-SCNC: 23 MMOL/L (ref 22–29)
EGFRCR SERPLBLD CKD-EPI 2021: >90 ML/MIN/1.73M2
GLUCOSE SERPL-MCNC: 91 MG/DL (ref 70–99)
LDH SERPL L TO P-CCNC: 125 U/L (ref 0–250)
POTASSIUM SERPL-SCNC: 4.1 MMOL/L (ref 3.4–5.3)
PROT SERPL-MCNC: 6.4 G/DL (ref 6.4–8.3)
SODIUM SERPL-SCNC: 137 MMOL/L (ref 135–145)
URATE SERPL-MCNC: 2.7 MG/DL (ref 2.4–5.7)

## 2024-05-28 PROCEDURE — 36415 COLL VENOUS BLD VENIPUNCTURE: CPT

## 2024-05-28 PROCEDURE — 83615 LACTATE (LD) (LDH) ENZYME: CPT

## 2024-05-28 PROCEDURE — 80053 COMPREHEN METABOLIC PANEL: CPT

## 2024-05-28 PROCEDURE — 85025 COMPLETE CBC W/AUTO DIFF WBC: CPT

## 2024-05-28 PROCEDURE — 99213 OFFICE O/P EST LOW 20 MIN: CPT

## 2024-05-28 PROCEDURE — G2211 COMPLEX E/M VISIT ADD ON: HCPCS

## 2024-05-28 PROCEDURE — 84550 ASSAY OF BLOOD/URIC ACID: CPT

## 2024-05-28 PROCEDURE — 99212 OFFICE O/P EST SF 10 MIN: CPT

## 2024-05-28 ASSESSMENT — PAIN SCALES - GENERAL: PAINLEVEL: NO PAIN (0)

## 2024-05-28 NOTE — NURSING NOTE
Chief Complaint   Patient presents with    Blood Draw     Labs drawn with  by rn.  VS taken.     Labs drawn with  by rn.  Pt tolerated well.  VS taken.     Africa Sandhu RN

## 2024-05-28 NOTE — PROGRESS NOTES
"Clinic Care Coordination Contact  Transitions of Care Outreach  Chief Complaint   Patient presents with    Clinic Care Coordination - Post Hospital       Most Recent Admission Date: 5/25/2024   Most Recent Admission Diagnosis: Syncope - R55  Leukopenia, unspecified type - D72.819     Most Recent Discharge Date: 5/26/2024   Most Recent Discharge Diagnosis: Syncope - R55  Leukopenia, unspecified type - D72.819     Transitions of Care Assessment    Discharge Assessment  How are you doing now that you are home?: \" Doing good \"  How are your symptoms? (Red Flag symptoms escalate to triage hotline per guidelines): Improved  Do you know how to contact your clinic care team if you have future questions or changes to your health status? : Yes  Does the patient have their discharge instructions? : Yes  Does the patient have questions regarding their discharge instructions? : No  Were you started on any new medications or were there changes to any of your previous medications? : No  Do you have all of your needed medical supplies or equipment (DME)?  (i.e. oxygen tank, CPAP, cane, etc.): Yes    Post-op ([facility] Only)  If the patient had a surgery or procedure, do they have any questions for a nurse?: No         [facility] Explained and offered Care Coordination support to eligible patients: Yes    Patient accepted? No    Follow up Plan     Discharge Follow-Up  Discharge follow up appointment scheduled in alignment with recommended follow up timeframe or Transitions of Risk Category? (Low = within 30 days; Moderate= within 14 days; High= within 7 days): Yes  Discharge Follow Up Appointment Date: 05/28/24  Discharge Follow Up Appointment Scheduled with?: Specialty Care Provider    Future Appointments   Date Time Provider Department Center   5/28/2024  4:30 PM Charisse Weinberg APRN CNP [facility]   5/31/2024 10:45 AM  [facility]   6/3/2024 10:45 AM  [facility]   6/6/2024  8:00 AM  [facility]   6/6/2024  8:30 AM Charisse Weinberg APRN CNP [facility]   6/14/2024 10:15 AM  [facility]   6/18/2024 10:45 AM  [facility]   6/21/2024 10:30 AM  [facility]   6/25/2024 10:45 AM  [facility]   6/28/2024 10:45 AM  [facility]   7/11/2024  2:45 PM  [facility]   7/11/2024  3:30 PM Vanessa Oakes MD [facility]       Outpatient Plan as outlined on AVS reviewed with patient.    For any urgent concerns, please contact our 24 hour nurse triage line: [phone] ([phone])       Flor Messer MA

## 2024-05-28 NOTE — NURSING NOTE
"Oncology Rooming Note    May 28, 2024 4:09 PM   Clementine Kathleen is a 28 year old female who presents for:    Chief Complaint   Patient presents with    Blood Draw     Labs drawn with  by rn.  VS taken.    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    Weight as of this encounter: 60.7 kg (133 lb 14.4 oz). Body surface area is 1.68 meters squared.  No Pain (0) Comment: Data Unavailable   No LMP recorded. (Menstrual status: IUD).  Allergies reviewed: Yes  Medications reviewed: Yes    Medications: Medication refills not needed today.  Pharmacy name entered into [facility]: [id number] [address] [facility]    Frailty Screening:   Is the patient here for a new oncology consult visit in cancer care? 2. No      Clinical concerns: None       Payal Perry CMA            "

## 2024-05-28 NOTE — LETTER
"    5/28/2024         RE: Clementine Kathleen  [address]  [facility] [id number]        Dear Colleague,    Thank you for referring your patient, Clementine Kathleen, to the [facility]. Please see a copy of my visit note below.     [facility]      FOLLOW-UP VISIT NOTE                       May 28, 2024  Subjective  REASON FOR VISIT: Follow up PMBCL    ONCOLOGIC SUMMARY:  Diagnosis:  Primary mediastinal B-cell lymphoma dx 1/2024, presenting with cervical LAD. 1/29/24 FNA indeterminate; 2/9/24 left level 3 excisional LN biopsy showed large B-cell lymphoma with rare abnormal B-cells on flow cytometry. Although CD23 IHC was negative, IHC for , MAL, and PD-L1 were positive in majority of the neoplastic cells suggesting PMBCL over DLBCL NOS. [id number] gene expression profile also consistent with PMBCL. FISH with gain of BCL2 but no MYC, BCL2, or BCL6 rearrangements. PET showed bilateral cervical and mediastinal hypermetabolic lymphadenopathy (largest 4.4 cm with SUVmax 27 in a prevascular LN); no disease below diaphragm.     Intent of treatment: Curative    Treatment history:  2/22/24: Cycle 1 R-EPOCH, dose level 1  3/14/24: Cycle 2 R-EPOCH, dose level 2. PET after 2 cycles shows CR!  4/4/24: Cycle 3 R-EPOCH, dose level 3 but vincristine capped at 2 mg d/t neuropathy  4/25/24: Cycle 4 R-EPOCH, dose level 4 but held vincristine d/t neuropathy  5/16/24  Cycle 5 R-EPOCH, dose level 5 but vincristine capped at 1.2 mg d/t neuropathy    INTERVAL HISTORY:  Clementine is here for post-hospitalization follow-up.  She has been experiencing increased fatigue after this cycle.  She has increased mouth/throat pain and felt like she was \"swallowing razor blades.\"  She tried Carafate with no improvement.  Symptoms resolves on their own after about 3 days.  She continues with mouth pain and has been doing the MMW but it only lasts about 15 minutes.  She has also been doing cold drinks.   Her " "neuropathy got a little worse after this cycle but did not get as bad as it had with previous cycles.    Her appetite has been good and as been hungry but has been impacted by the mouth pain/sores.  Her nausea has been fine after this cycle.   Bowels are regular and had no issues this cycle  Did not have any cardiac issues after this cycle other than her \"fluttering\" that has been ongoing and resolve on their own.  Denies fevers/chills, night sweats, new pains, new lump/bumps, bleeding issues, rashes, all other acute issues.     I have reviewed and updated the following:  Past Medical History:   Diagnosis Date    Anxiety     Cervical lymphadenopathy     GERD     Interstitial cystitis       No Known Allergies    Current Outpatient Medications:     acyclovir (ZOVIRAX) 400 MG tablet, Take 1 tablet (400 mg) by mouth 2 times daily, Disp: 60 tablet, Rfl: 4    cimetidine (TAGAMET) 200 MG tablet, Take 200 mg by mouth 2 times daily, Disp: , Rfl:     fluconazole (DIFLUCAN) 200 MG tablet, Take 1 tablet (200 mg) by mouth daily as needed (neutropenia prophylaxis), Disp: , Rfl:     levofloxacin (LEVAQUIN) 250 MG tablet, Take 1 tablet (250 mg) by mouth daily as needed (neutropenia prophylaxis), Disp: , Rfl:     Levonorgestrel (KYLEENA IU), 1 Device by Intrauterine route once, Disp: , Rfl:     LORazepam (ATIVAN) 0.5 MG tablet, Take 1 tablet (0.5 mg) by mouth every 6 hours as needed for nausea or vomiting, Disp: 30 tablet, Rfl: 0    magic mouthwash (ENTER INGREDIENTS IN COMMENTS) suspension, Swish and spit 15 ml every 6 hours as needed, Disp: 200 mL, Rfl: 0    metoclopramide (REGLAN) 10 MG tablet, Take 1 tablet (10 mg) by mouth 4 times daily as needed (constipation), Disp: 30 tablet, Rfl: 3    modafinil (PROVIGIL) 200 MG tablet, Take 200 mg by mouth daily as needed (hypersomnia), Disp: , Rfl:     OLANZapine zydis (ZYPREXA) 5 MG ODT, Take 1 tablet (5 mg) by mouth nightly as needed (nausea), Disp: , Rfl:     ondansetron (ZOFRAN) 4 MG " tablet, Take 1-2 tablets (4-8 mg) by mouth every 8 hours as needed for nausea or vomiting, Disp: 30 tablet, Rfl: 0    pantoprazole (PROTONIX) 40 MG EC tablet, Take 1 tablet (40 mg) by mouth 2 times daily, Disp: 60 tablet, Rfl: 0    polyethylene glycol (MIRALAX) 17 GM/Dose powder, Take 17 g by mouth 2 times daily, Disp: , Rfl:     prochlorperazine (COMPAZINE) 10 MG tablet, Take 1 tablet (10 mg) by mouth every 6 hours as needed for nausea or vomiting, Disp: 30 tablet, Rfl: 0    senna-docusate (SENOKOT-S/PERICOLACE) 8.6-50 MG tablet, Take 1 tablet by mouth 2 times daily, Disp: 60 tablet, Rfl: 0    sucralfate (CARAFATE) 1 GM tablet, Take 1 tablet (1 g) by mouth 4 times daily as needed (acid reflux), Disp: 30 tablet, Rfl: 1    REVIEW OF SYSTEMS:  10-point ROS reviewed and negative other than that mentioned in HPI.     Objective  VITAL SIGNS  /75 (BP Location: Right arm, Patient Position: Sitting, Cuff Size: Adult Regular)   Pulse 94   Temp 98  F (36.7  C) (Oral)   Resp 16   Wt 60.7 kg (133 lb 14.4 oz)   SpO2 99%   BMI 21.61 kg/m      ECOG PS: 0     PHYSICAL EXAM:  General: Awake, alert, in no acute distress. Oriented x 3.    HEENT: Chemo induced alopecia. Normocephalic, atraumatic. No scleral icterus. Oral mucosa pink and moist with no signs of thrush or  mucositis  CV: Regular rate and  regular rhythm. No murmurs, rubs, or gallops appreciated.  Resp: Good inspiratory effort, lungs clear to auscultation bilaterally.  Neuro: CN II-XII grossly intact. No focal deficits.   Skin: No rash, unusual bruising or prominent lesions.  Psych: Pleasant, normal affect.    LABS:  I reviewed the following labs:  Lab Results   Component Value Date    WBC 0.8 (LL) 05/28/2024    HGB 9.7 (L) 05/28/2024    HCT 28.3 (L) 05/28/2024    MCV 96 05/28/2024    PLT 24 (LL) 05/28/2024    INR 1.19 (H) 05/20/2024    PTT 29 04/28/2024     Lab Results   Component Value Date     05/28/2024    POTASSIUM 4.1 05/28/2024    CR 0.66  05/28/2024    BUN 16.2 05/28/2024    CHLORIDE 104 05/28/2024    DEE 9.3 05/28/2024    GLC 91 05/28/2024      Lab Results   Component Value Date    ALT <5 05/28/2024    AST 9 05/28/2024    ALKPHOS 62 05/28/2024    BILITOTAL 0.2 05/28/2024      Lab Results   Component Value Date     05/28/2024    URIC 2.7 05/28/2024 4/1/24 PET:  IMPRESSION: In this patient with primary mediastinal B-cell lymphoma  following 2 cycles of chemotherapy:   1. Complete metabolic response by Lugano criteria  2. Resolution of cervical and mediastinal hypermetabolic  lymphadenopathy.    4/15/24 CTA chest:  IMPRESSION:   1. Exam is negative for acute pulmonary embolism.  2. No acute finding in the chest.    Assessment & Plan  Primary mediastinal B-cell lymphoma  Dx 1/2024, presenting cervical lymphadenopathy. Initial FNA suggestive of possible Hodgkin lymphoma; however excisional biopsy showed large B-cell lymphoma with differential diagnosis of primary mediastinal large B-cell lymphoma vs DLBCL NOS. Additional IHC staining for , MAL, and PD-L1, and [id number] gene expression profile all favor PMBCL diagnosis over DLBCL NOS. PET with cervical and mediastinal lymphadenopathy only. LDH normal.   Previously discussed PMBCL diagnosis and plan for DA-R-EPOCH x 6 cycles. In the single-arm phase 2 prospective study of R-EPOCH (without radiation) for PMBCL, 5-year EFS was 93% and OS 97% (10.1056/[id number]).   Cycle 1 R-EPOCH 2/22/24, tolerating well so far other than constipation.  Cycle 2 R-EPOCH 3/14/24. ANC dread >0.5 last cycle so qualifies for 20% increase in doxorubicin, etoposide, and cyclophosphamide (dose level 2).   PET after 2 cycles shows complete response!   Cycle 3 R-EPOCH 4/4/24- qualified for 20% increase in doxorubicin, etoposide, and cyclophosphamide again (dose level 3); however capped vincristine at 2 mg due to neuropathy.   Cycle 4 R-EPOCH- Qualified for 20% dose increase again (dose level 4) but omitted  "vincristine  to allow some recovery of neuropathy.  Cycle 5 R-EPOCH- dose level 5 but dose cap vincristine 1.2 mg.  Added mesna given higher dose of Cyclophosphamide.   S/p C5 and tolerating treatment fairly well.  She had more fatigue and mouth sores this cycle.  Her neuropathy worsened slightly but was not as bad as prior to 4.  Next PET at [facility] given CR already.     Mucositis  Patient endorsed previous oral ulceration that were painful but resolved after previous chemotherapy cycles. However, she has had increase of oral sores with C5.  - MMW & biotene PRN, encourage frequent oral moisturization with saline rinses.    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 Initially affecting fingertips only.   Cap vincristine at 2 mg starting Cycle 3.  S/p C3 and neuropathy in fingertips  worsened with some difficulty using her phone or buttoning shirts and now neuropathy in bilateral feet that did not impact balance of walking.   Held vincristine Cycle 4 to allow some recovery of neuropathy and consider reintroducing at 50% dose for Cycle 5/6.   Neuropathy did very mildly improve in the R hand after holding Vincristine with C4-  reintroduced vincristine with C5 at capped dose 1.2 mg.  Did have slight worsening of her neuropathy but not as bad as prior to C4.    Palpitations/tachycardia  SOB  3/25/24 NT proBNP normal at <36.   4/15/24 CTA chest negative for PE. Ziopatch requested but did not hear from Cardiology   Ziopatch completed 5/16- no episodes of SOB after C5 and a couple episodes of \"heart fluttering\"    Constipation- resolved  Scheduled Miralax and Senna-S.  PRN Reglan.     Fertility preservation  Established with [facility] and underwent egg retrieval on 2/18/24.      Ppx  TLS ppx: now done with allopurinol.  ID ppx: continue  mg BID. Levo and fluc when ANC <1.0. Monthly pentamidine for PJP ppx (last given 5/3/24, next due 6/3/24).  Vaccines: up to date on COVID and flu shots.       PLAN:  Follow-up " with Dr. Oakes prior to C6  Repeat PET at EOT  Dr. Oakes to review EOT imaging      Transition Care Management Services  Admission Date: 05/16/24  Discharge Date: 05/20/24  Discharge Diagnosis: DLBCL  Interactive contact date: 5/22/2024  Face-to-face visit date: 5/24/2024        Medical complexity decision making: Moderate complexity ([id number])      The longitudinal plan of care for the diagnosis(es)/condition(s) as documented were addressed during this visit. Due to the added complexity in care, I will continue to support Clementine in the subsequent management and with ongoing continuity of care.     CALOS Martinez CNP

## 2024-05-30 ENCOUNTER — HOSPITAL ENCOUNTER (OUTPATIENT)
Age: 29
End: 2024-05-30
Attending: STUDENT IN AN ORGANIZED HEALTH CARE EDUCATION/TRAINING PROGRAM | Admitting: STUDENT IN AN ORGANIZED HEALTH CARE EDUCATION/TRAINING PROGRAM
Payer: COMMERCIAL

## 2024-05-30 DIAGNOSIS — Z76.89 PREVENTION OF CHEMOTHERAPY-INDUCED NEUTROPENIA: Primary | ICD-10-CM

## 2024-05-30 DIAGNOSIS — C83.32 DIFFUSE LARGE B-CELL LYMPHOMA OF INTRATHORACIC LYMPH NODES (H): ICD-10-CM

## 2024-05-30 LAB
BASOPHILS # BLD AUTO: 0 10E3/UL (ref 0–0.2)
BASOPHILS NFR BLD AUTO: 5 %
EOSINOPHIL # BLD AUTO: 0 10E3/UL (ref 0–0.7)
EOSINOPHIL NFR BLD AUTO: 0 %
ERYTHROCYTE [DISTWIDTH] IN BLOOD BY AUTOMATED COUNT: 14.8 % (ref 10–15)
HCT VFR BLD AUTO: 28.3 % (ref 35–47)
HGB BLD-MCNC: 9.7 G/DL (ref 11.7–15.7)
IMM GRANULOCYTES # BLD: 0 10E3/UL
IMM GRANULOCYTES NFR BLD: 2 %
LYMPHOCYTES # BLD AUTO: 0.3 10E3/UL (ref 0.8–5.3)
LYMPHOCYTES NFR BLD AUTO: 40 %
MCH RBC QN AUTO: 32.8 PG (ref 26.5–33)
MCHC RBC AUTO-ENTMCNC: 34.3 G/DL (ref 31.5–36.5)
MCV RBC AUTO: 96 FL (ref 78–100)
MONOCYTES # BLD AUTO: 0.1 10E3/UL (ref 0–1.3)
MONOCYTES NFR BLD AUTO: 17 %
NEUTROPHILS # BLD AUTO: 0.3 10E3/UL (ref 1.6–8.3)
NEUTROPHILS NFR BLD AUTO: 36 %
NRBC # BLD AUTO: 0 10E3/UL
NRBC BLD AUTO-RTO: 1 /100
PLATELET # BLD AUTO: 24 10E3/UL (ref 150–450)
RBC # BLD AUTO: 2.96 10E6/UL (ref 3.8–5.2)
WBC # BLD AUTO: 0.8 10E3/UL (ref 4–11)

## 2024-05-31 ENCOUNTER — LAB (OUTPATIENT)
Dept: LAB | Facility: CLINIC | Age: 29
End: 2024-05-31
Attending: INTERNAL MEDICINE
Payer: COMMERCIAL

## 2024-05-31 DIAGNOSIS — C85.28 MEDIASTINAL LARGE B-CELL LYMPHOMA OF LYMPH NODES OF MULTIPLE REGIONS (H): ICD-10-CM

## 2024-05-31 LAB
ALBUMIN SERPL BCG-MCNC: 4 G/DL (ref 3.5–5.2)
ALP SERPL-CCNC: 89 U/L (ref 40–150)
ALT SERPL W P-5'-P-CCNC: 13 U/L (ref 0–50)
ANION GAP SERPL CALCULATED.3IONS-SCNC: 8 MMOL/L (ref 7–15)
AST SERPL W P-5'-P-CCNC: 20 U/L (ref 0–45)
BASOPHILS # BLD AUTO: ABNORMAL 10*3/UL
BASOPHILS # BLD MANUAL: 0 10E3/UL (ref 0–0.2)
BASOPHILS NFR BLD AUTO: ABNORMAL %
BASOPHILS NFR BLD MANUAL: 0 %
BILIRUB SERPL-MCNC: <0.2 MG/DL
BUN SERPL-MCNC: 12.6 MG/DL (ref 6–20)
CALCIUM SERPL-MCNC: 9.1 MG/DL (ref 8.6–10)
CHLORIDE SERPL-SCNC: 106 MMOL/L (ref 98–107)
CREAT SERPL-MCNC: 0.78 MG/DL (ref 0.51–0.95)
DACRYOCYTES BLD QL SMEAR: SLIGHT
DEPRECATED HCO3 PLAS-SCNC: 26 MMOL/L (ref 22–29)
EGFRCR SERPLBLD CKD-EPI 2021: >90 ML/MIN/1.73M2
EOSINOPHIL # BLD AUTO: ABNORMAL 10*3/UL
EOSINOPHIL # BLD MANUAL: 0 10E3/UL (ref 0–0.7)
EOSINOPHIL NFR BLD AUTO: ABNORMAL %
EOSINOPHIL NFR BLD MANUAL: 0 %
ERYTHROCYTE [DISTWIDTH] IN BLOOD BY AUTOMATED COUNT: 15.3 % (ref 10–15)
GLUCOSE SERPL-MCNC: 91 MG/DL (ref 70–99)
HCT VFR BLD AUTO: 28.2 % (ref 35–47)
HGB BLD-MCNC: 9.2 G/DL (ref 11.7–15.7)
IMM GRANULOCYTES # BLD: ABNORMAL 10*3/UL
IMM GRANULOCYTES NFR BLD: ABNORMAL %
LDH SERPL L TO P-CCNC: 357 U/L (ref 0–250)
LYMPHOCYTES # BLD AUTO: ABNORMAL 10*3/UL
LYMPHOCYTES # BLD MANUAL: 0.9 10E3/UL (ref 0.8–5.3)
LYMPHOCYTES NFR BLD AUTO: ABNORMAL %
LYMPHOCYTES NFR BLD MANUAL: 4 %
MCH RBC QN AUTO: 31.9 PG (ref 26.5–33)
MCHC RBC AUTO-ENTMCNC: 32.6 G/DL (ref 31.5–36.5)
MCV RBC AUTO: 98 FL (ref 78–100)
METAMYELOCYTES # BLD MANUAL: 1.6 10E3/UL
METAMYELOCYTES NFR BLD MANUAL: 7 %
MONOCYTES # BLD AUTO: ABNORMAL 10*3/UL
MONOCYTES # BLD MANUAL: 2.5 10E3/UL (ref 0–1.3)
MONOCYTES NFR BLD AUTO: ABNORMAL %
MONOCYTES NFR BLD MANUAL: 11 %
MYELOCYTES # BLD MANUAL: 0.9 10E3/UL
MYELOCYTES NFR BLD MANUAL: 4 %
NEUTROPHILS # BLD AUTO: ABNORMAL 10*3/UL
NEUTROPHILS # BLD MANUAL: 15.5 10E3/UL (ref 1.6–8.3)
NEUTROPHILS NFR BLD AUTO: ABNORMAL %
NEUTROPHILS NFR BLD MANUAL: 69 %
NRBC # BLD AUTO: 0.3 10E3/UL
NRBC # BLD AUTO: 0.7 10E3/UL
NRBC BLD AUTO-RTO: 1 /100
NRBC BLD MANUAL-RTO: 3 %
PLAT MORPH BLD: ABNORMAL
PLATELET # BLD AUTO: 82 10E3/UL (ref 150–450)
POLYCHROMASIA BLD QL SMEAR: SLIGHT
POTASSIUM SERPL-SCNC: 4.2 MMOL/L (ref 3.4–5.3)
PROMYELOCYTES # BLD MANUAL: 1.1 10E3/UL
PROMYELOCYTES NFR BLD MANUAL: 5 %
PROT SERPL-MCNC: 6.2 G/DL (ref 6.4–8.3)
RBC # BLD AUTO: 2.88 10E6/UL (ref 3.8–5.2)
RBC MORPH BLD: ABNORMAL
SODIUM SERPL-SCNC: 140 MMOL/L (ref 135–145)
URATE SERPL-MCNC: 3.9 MG/DL (ref 2.4–5.7)
WBC # BLD AUTO: 22.5 10E3/UL (ref 4–11)

## 2024-05-31 PROCEDURE — 83615 LACTATE (LD) (LDH) ENZYME: CPT

## 2024-05-31 PROCEDURE — 84550 ASSAY OF BLOOD/URIC ACID: CPT

## 2024-05-31 PROCEDURE — 36415 COLL VENOUS BLD VENIPUNCTURE: CPT

## 2024-05-31 PROCEDURE — 85007 BL SMEAR W/DIFF WBC COUNT: CPT

## 2024-05-31 PROCEDURE — 84155 ASSAY OF PROTEIN SERUM: CPT

## 2024-05-31 PROCEDURE — 82040 ASSAY OF SERUM ALBUMIN: CPT

## 2024-05-31 PROCEDURE — 85027 COMPLETE CBC AUTOMATED: CPT

## 2024-05-31 NOTE — NURSING NOTE
Chief Complaint   Patient presents with    Blood Draw     Vpt blood draw by lab RN     Venipuncture labs drawn by lab RN    Rosa Maria Rose, RN

## 2024-06-03 ENCOUNTER — LAB (OUTPATIENT)
Dept: LAB | Facility: CLINIC | Age: 29
End: 2024-06-03
Attending: INTERNAL MEDICINE
Payer: COMMERCIAL

## 2024-06-03 DIAGNOSIS — C85.28 MEDIASTINAL LARGE B-CELL LYMPHOMA OF LYMPH NODES OF MULTIPLE REGIONS (H): ICD-10-CM

## 2024-06-03 DIAGNOSIS — C83.32 DIFFUSE LARGE B-CELL LYMPHOMA OF INTRATHORACIC LYMPH NODES (H): Primary | ICD-10-CM

## 2024-06-03 DIAGNOSIS — Z29.89 NEED FOR PNEUMOCYSTIS PROPHYLAXIS: ICD-10-CM

## 2024-06-03 LAB
ALBUMIN SERPL BCG-MCNC: 4.3 G/DL (ref 3.5–5.2)
ALP SERPL-CCNC: 88 U/L (ref 40–150)
ALT SERPL W P-5'-P-CCNC: 15 U/L (ref 0–50)
ANION GAP SERPL CALCULATED.3IONS-SCNC: 8 MMOL/L (ref 7–15)
AST SERPL W P-5'-P-CCNC: 20 U/L (ref 0–45)
BASOPHILS # BLD AUTO: ABNORMAL 10*3/UL
BASOPHILS # BLD MANUAL: 0.1 10E3/UL (ref 0–0.2)
BASOPHILS NFR BLD AUTO: ABNORMAL %
BASOPHILS NFR BLD MANUAL: 1 %
BILIRUB SERPL-MCNC: <0.2 MG/DL
BUN SERPL-MCNC: 8.8 MG/DL (ref 6–20)
CALCIUM SERPL-MCNC: 9.1 MG/DL (ref 8.6–10)
CHLORIDE SERPL-SCNC: 105 MMOL/L (ref 98–107)
CREAT SERPL-MCNC: 0.82 MG/DL (ref 0.51–0.95)
DEPRECATED HCO3 PLAS-SCNC: 26 MMOL/L (ref 22–29)
EGFRCR SERPLBLD CKD-EPI 2021: >90 ML/MIN/1.73M2
EOSINOPHIL # BLD AUTO: ABNORMAL 10*3/UL
EOSINOPHIL # BLD MANUAL: 0.1 10E3/UL (ref 0–0.7)
EOSINOPHIL NFR BLD AUTO: ABNORMAL %
EOSINOPHIL NFR BLD MANUAL: 1 %
ERYTHROCYTE [DISTWIDTH] IN BLOOD BY AUTOMATED COUNT: 16.7 % (ref 10–15)
GLUCOSE SERPL-MCNC: 105 MG/DL (ref 70–99)
HCT VFR BLD AUTO: 29.8 % (ref 35–47)
HGB BLD-MCNC: 9.5 G/DL (ref 11.7–15.7)
IMM GRANULOCYTES # BLD: ABNORMAL 10*3/UL
IMM GRANULOCYTES NFR BLD: ABNORMAL %
LDH SERPL L TO P-CCNC: 347 U/L (ref 0–250)
LYMPHOCYTES # BLD AUTO: ABNORMAL 10*3/UL
LYMPHOCYTES # BLD MANUAL: 1.3 10E3/UL (ref 0.8–5.3)
LYMPHOCYTES NFR BLD AUTO: ABNORMAL %
LYMPHOCYTES NFR BLD MANUAL: 9 %
MCH RBC QN AUTO: 31.8 PG (ref 26.5–33)
MCHC RBC AUTO-ENTMCNC: 31.9 G/DL (ref 31.5–36.5)
MCV RBC AUTO: 100 FL (ref 78–100)
METAMYELOCYTES # BLD MANUAL: 0.4 10E3/UL
METAMYELOCYTES NFR BLD MANUAL: 3 %
MONOCYTES # BLD AUTO: ABNORMAL 10*3/UL
MONOCYTES # BLD MANUAL: 1.5 10E3/UL (ref 0–1.3)
MONOCYTES NFR BLD AUTO: ABNORMAL %
MONOCYTES NFR BLD MANUAL: 10 %
MYELOCYTES # BLD MANUAL: 0.6 10E3/UL
MYELOCYTES NFR BLD MANUAL: 4 %
NEUTROPHILS # BLD AUTO: ABNORMAL 10*3/UL
NEUTROPHILS # BLD MANUAL: 10.7 10E3/UL (ref 1.6–8.3)
NEUTROPHILS NFR BLD AUTO: ABNORMAL %
NEUTROPHILS NFR BLD MANUAL: 72 %
NRBC # BLD AUTO: 0.1 10E3/UL
NRBC # BLD AUTO: 0.1 10E3/UL
NRBC BLD AUTO-RTO: 1 /100
NRBC BLD MANUAL-RTO: 1 %
PLAT MORPH BLD: ABNORMAL
PLATELET # BLD AUTO: 258 10E3/UL (ref 150–450)
POTASSIUM SERPL-SCNC: 4.2 MMOL/L (ref 3.4–5.3)
PROT SERPL-MCNC: 6.5 G/DL (ref 6.4–8.3)
RBC # BLD AUTO: 2.99 10E6/UL (ref 3.8–5.2)
RBC MORPH BLD: ABNORMAL
SODIUM SERPL-SCNC: 139 MMOL/L (ref 135–145)
URATE SERPL-MCNC: 4.4 MG/DL (ref 2.4–5.7)
WBC # BLD AUTO: 14.8 10E3/UL (ref 4–11)

## 2024-06-03 PROCEDURE — 80053 COMPREHEN METABOLIC PANEL: CPT

## 2024-06-03 PROCEDURE — 84550 ASSAY OF BLOOD/URIC ACID: CPT

## 2024-06-03 PROCEDURE — 85007 BL SMEAR W/DIFF WBC COUNT: CPT

## 2024-06-03 PROCEDURE — 83615 LACTATE (LD) (LDH) ENZYME: CPT

## 2024-06-03 PROCEDURE — 94642 AEROSOL INHALATION TREATMENT: CPT | Performed by: INTERNAL MEDICINE

## 2024-06-03 PROCEDURE — 94640 AIRWAY INHALATION TREATMENT: CPT | Mod: JZ | Performed by: INTERNAL MEDICINE

## 2024-06-03 PROCEDURE — 85027 COMPLETE CBC AUTOMATED: CPT

## 2024-06-03 PROCEDURE — 36415 COLL VENOUS BLD VENIPUNCTURE: CPT

## 2024-06-03 RX ORDER — PENTAMIDINE ISETHIONATE 300 MG/300MG
300 INHALANT RESPIRATORY (INHALATION)
Status: CANCELLED
Start: 2024-06-28

## 2024-06-03 RX ORDER — METHYLPREDNISOLONE SODIUM SUCCINATE 125 MG/2ML
125 INJECTION, POWDER, LYOPHILIZED, FOR SOLUTION INTRAMUSCULAR; INTRAVENOUS
Start: 2024-06-28

## 2024-06-03 RX ORDER — ALBUTEROL SULFATE 0.83 MG/ML
2.5 SOLUTION RESPIRATORY (INHALATION) ONCE
Status: CANCELLED
Start: 2024-06-28 | End: 2024-06-28

## 2024-06-03 RX ORDER — MEPERIDINE HYDROCHLORIDE 25 MG/ML
25 INJECTION INTRAMUSCULAR; INTRAVENOUS; SUBCUTANEOUS EVERY 30 MIN PRN
OUTPATIENT
Start: 2024-06-28

## 2024-06-03 RX ORDER — PENTAMIDINE ISETHIONATE 300 MG/300MG
300 INHALANT RESPIRATORY (INHALATION)
Status: DISCONTINUED | OUTPATIENT
Start: 2024-06-03 | End: 2024-06-03 | Stop reason: HOSPADM

## 2024-06-03 RX ORDER — DIPHENHYDRAMINE HYDROCHLORIDE 50 MG/ML
50 INJECTION INTRAMUSCULAR; INTRAVENOUS
Start: 2024-06-28

## 2024-06-03 RX ORDER — ALBUTEROL SULFATE 90 UG/1
1-2 AEROSOL, METERED RESPIRATORY (INHALATION)
Start: 2024-06-28

## 2024-06-03 RX ORDER — EPINEPHRINE 1 MG/ML
0.3 INJECTION, SOLUTION, CONCENTRATE INTRAVENOUS EVERY 5 MIN PRN
OUTPATIENT
Start: 2024-06-28

## 2024-06-03 RX ORDER — ALBUTEROL SULFATE 0.83 MG/ML
2.5 SOLUTION RESPIRATORY (INHALATION) ONCE
Status: DISCONTINUED | OUTPATIENT
Start: 2024-06-03 | End: 2024-06-03 | Stop reason: HOSPADM

## 2024-06-03 RX ORDER — ALBUTEROL SULFATE 0.83 MG/ML
2.5 SOLUTION RESPIRATORY (INHALATION)
OUTPATIENT
Start: 2024-06-28

## 2024-06-03 RX ADMIN — PENTAMIDINE ISETHIONATE 300 MG: 300 INHALANT RESPIRATORY (INHALATION) at 13:11

## 2024-06-03 NOTE — PROGRESS NOTES
Patient was seen today for a Pentamidine nebulizer tx ordered by Rosemarie Gonzalez PA-C.    Patient was first given 4 puffs of Albuterol via spacer after which Pentamidine 300 mg (Lot # [id number]) mixed with 6cc Sterile H20 was administered through a filtered nebulizer.    Pre-treatment: SpO2 = 98%   HR = 82   BBS = Clear   Post-treatment: SpO2 = 99%  HR = 91  BBS = Clear    No adverse side effects noted by the patient.    This service today was provided under the direct supervision of Dr. Brownlee, who was available if needed.     Procedure was completed by Ara Hollis. ELADIA

## 2024-06-05 ENCOUNTER — APPOINTMENT (OUTPATIENT)
Dept: LAB | Facility: CLINIC | Age: 29
End: 2024-06-05
Attending: INTERNAL MEDICINE
Payer: COMMERCIAL

## 2024-06-05 ENCOUNTER — ONCOLOGY VISIT (OUTPATIENT)
Dept: TRANSPLANT | Facility: CLINIC | Age: 29
End: 2024-06-05
Attending: INTERNAL MEDICINE
Payer: COMMERCIAL

## 2024-06-05 VITALS
HEART RATE: 76 BPM | WEIGHT: 134.7 LBS | DIASTOLIC BLOOD PRESSURE: 68 MMHG | OXYGEN SATURATION: 98 % | TEMPERATURE: 97.5 F | BODY MASS INDEX: 21.74 KG/M2 | SYSTOLIC BLOOD PRESSURE: 102 MMHG | RESPIRATION RATE: 16 BRPM

## 2024-06-05 DIAGNOSIS — C85.28 MEDIASTINAL LARGE B-CELL LYMPHOMA OF LYMPH NODES OF MULTIPLE REGIONS (H): Primary | ICD-10-CM

## 2024-06-05 DIAGNOSIS — K21.00 GASTROESOPHAGEAL REFLUX DISEASE WITH ESOPHAGITIS, UNSPECIFIED WHETHER HEMORRHAGE: ICD-10-CM

## 2024-06-05 LAB
ALBUMIN SERPL BCG-MCNC: 4.2 G/DL (ref 3.5–5.2)
ALP SERPL-CCNC: 74 U/L (ref 40–150)
ALT SERPL W P-5'-P-CCNC: 17 U/L (ref 0–50)
ANION GAP SERPL CALCULATED.3IONS-SCNC: 10 MMOL/L (ref 7–15)
AST SERPL W P-5'-P-CCNC: 21 U/L (ref 0–45)
BASOPHILS # BLD AUTO: ABNORMAL 10*3/UL
BASOPHILS # BLD MANUAL: 0 10E3/UL (ref 0–0.2)
BASOPHILS NFR BLD AUTO: ABNORMAL %
BASOPHILS NFR BLD MANUAL: 0 %
BILIRUB SERPL-MCNC: <0.2 MG/DL
BUN SERPL-MCNC: 15.7 MG/DL (ref 6–20)
CALCIUM SERPL-MCNC: 9.1 MG/DL (ref 8.6–10)
CHLORIDE SERPL-SCNC: 106 MMOL/L (ref 98–107)
CREAT SERPL-MCNC: 0.97 MG/DL (ref 0.51–0.95)
DACRYOCYTES BLD QL SMEAR: SLIGHT
DEPRECATED HCO3 PLAS-SCNC: 23 MMOL/L (ref 22–29)
EGFRCR SERPLBLD CKD-EPI 2021: 81 ML/MIN/1.73M2
EOSINOPHIL # BLD AUTO: ABNORMAL 10*3/UL
EOSINOPHIL # BLD MANUAL: 0 10E3/UL (ref 0–0.7)
EOSINOPHIL NFR BLD AUTO: ABNORMAL %
EOSINOPHIL NFR BLD MANUAL: 0 %
ERYTHROCYTE [DISTWIDTH] IN BLOOD BY AUTOMATED COUNT: 17.3 % (ref 10–15)
GLUCOSE SERPL-MCNC: 89 MG/DL (ref 70–99)
HCT VFR BLD AUTO: 29 % (ref 35–47)
HGB BLD-MCNC: 9.8 G/DL (ref 11.7–15.7)
IMM GRANULOCYTES # BLD: ABNORMAL 10*3/UL
IMM GRANULOCYTES NFR BLD: ABNORMAL %
LDH SERPL L TO P-CCNC: 301 U/L (ref 0–250)
LYMPHOCYTES # BLD AUTO: ABNORMAL 10*3/UL
LYMPHOCYTES # BLD MANUAL: 0.6 10E3/UL (ref 0.8–5.3)
LYMPHOCYTES NFR BLD AUTO: ABNORMAL %
LYMPHOCYTES NFR BLD MANUAL: 7 %
MCH RBC QN AUTO: 33.7 PG (ref 26.5–33)
MCHC RBC AUTO-ENTMCNC: 33.8 G/DL (ref 31.5–36.5)
MCV RBC AUTO: 100 FL (ref 78–100)
METAMYELOCYTES # BLD MANUAL: 0.3 10E3/UL
METAMYELOCYTES NFR BLD MANUAL: 3 %
MONOCYTES # BLD AUTO: ABNORMAL 10*3/UL
MONOCYTES # BLD MANUAL: 1 10E3/UL (ref 0–1.3)
MONOCYTES NFR BLD AUTO: ABNORMAL %
MONOCYTES NFR BLD MANUAL: 12 %
MYELOCYTES # BLD MANUAL: 0.3 10E3/UL
MYELOCYTES NFR BLD MANUAL: 4 %
NEUTROPHILS # BLD AUTO: ABNORMAL 10*3/UL
NEUTROPHILS # BLD MANUAL: 6.2 10E3/UL (ref 1.6–8.3)
NEUTROPHILS NFR BLD AUTO: ABNORMAL %
NEUTROPHILS NFR BLD MANUAL: 74 %
NRBC # BLD AUTO: 0 10E3/UL
NRBC BLD AUTO-RTO: 1 /100
PLAT MORPH BLD: ABNORMAL
PLATELET # BLD AUTO: 384 10E3/UL (ref 150–450)
POLYCHROMASIA BLD QL SMEAR: SLIGHT
POTASSIUM SERPL-SCNC: 4.6 MMOL/L (ref 3.4–5.3)
PROT SERPL-MCNC: 6.4 G/DL (ref 6.4–8.3)
RBC # BLD AUTO: 2.91 10E6/UL (ref 3.8–5.2)
RBC MORPH BLD: ABNORMAL
SODIUM SERPL-SCNC: 139 MMOL/L (ref 135–145)
URATE SERPL-MCNC: 4.3 MG/DL (ref 2.4–5.7)
WBC # BLD AUTO: 8.4 10E3/UL (ref 4–11)

## 2024-06-05 PROCEDURE — G2211 COMPLEX E/M VISIT ADD ON: HCPCS

## 2024-06-05 PROCEDURE — 84550 ASSAY OF BLOOD/URIC ACID: CPT

## 2024-06-05 PROCEDURE — 80053 COMPREHEN METABOLIC PANEL: CPT

## 2024-06-05 PROCEDURE — 85027 COMPLETE CBC AUTOMATED: CPT

## 2024-06-05 PROCEDURE — 36591 DRAW BLOOD OFF VENOUS DEVICE: CPT

## 2024-06-05 PROCEDURE — 99214 OFFICE O/P EST MOD 30 MIN: CPT

## 2024-06-05 PROCEDURE — 83615 LACTATE (LD) (LDH) ENZYME: CPT

## 2024-06-05 PROCEDURE — 85007 BL SMEAR W/DIFF WBC COUNT: CPT

## 2024-06-05 RX ORDER — METHYLPREDNISOLONE SODIUM SUCCINATE 125 MG/2ML
125 INJECTION, POWDER, LYOPHILIZED, FOR SOLUTION INTRAMUSCULAR; INTRAVENOUS
Status: CANCELLED
Start: 2024-06-06

## 2024-06-05 RX ORDER — ALBUTEROL SULFATE 90 UG/1
1-2 AEROSOL, METERED RESPIRATORY (INHALATION)
Status: CANCELLED
Start: 2024-06-06

## 2024-06-05 RX ORDER — EPINEPHRINE 1 MG/ML
0.3 INJECTION, SOLUTION INTRAMUSCULAR; SUBCUTANEOUS EVERY 5 MIN PRN
Status: CANCELLED | OUTPATIENT
Start: 2024-06-06

## 2024-06-05 RX ORDER — ALBUTEROL SULFATE 0.83 MG/ML
2.5 SOLUTION RESPIRATORY (INHALATION)
Status: CANCELLED | OUTPATIENT
Start: 2024-06-06

## 2024-06-05 RX ORDER — AMOXICILLIN 250 MG
2 CAPSULE ORAL 2 TIMES DAILY
Status: CANCELLED | OUTPATIENT
Start: 2024-06-06

## 2024-06-05 RX ORDER — ACETAMINOPHEN 325 MG/1
650 TABLET ORAL ONCE
Status: CANCELLED
Start: 2024-06-06

## 2024-06-05 RX ORDER — PREDNISONE 50 MG/1
100 TABLET ORAL 2 TIMES DAILY
Status: CANCELLED
Start: 2024-06-06

## 2024-06-05 RX ORDER — DIPHENHYDRAMINE HCL 25 MG
50 CAPSULE ORAL ONCE
Status: CANCELLED
Start: 2024-06-06

## 2024-06-05 RX ORDER — DIPHENHYDRAMINE HYDROCHLORIDE 50 MG/ML
50 INJECTION INTRAMUSCULAR; INTRAVENOUS
Status: CANCELLED
Start: 2024-06-06

## 2024-06-05 RX ORDER — LORAZEPAM 0.5 MG/1
.5-1 TABLET ORAL EVERY 6 HOURS PRN
Status: CANCELLED
Start: 2024-06-06

## 2024-06-05 RX ORDER — ONDANSETRON 8 MG/1
16 TABLET, FILM COATED ORAL
Status: CANCELLED
Start: 2024-06-06

## 2024-06-05 RX ORDER — DEXAMETHASONE 4 MG/1
8 TABLET ORAL DAILY
Status: CANCELLED
Start: 2024-06-12

## 2024-06-05 RX ORDER — PROCHLORPERAZINE MALEATE 10 MG
10 TABLET ORAL EVERY 6 HOURS PRN
Status: CANCELLED
Start: 2024-06-06

## 2024-06-05 RX ORDER — MEPERIDINE HYDROCHLORIDE 25 MG/ML
25 INJECTION INTRAMUSCULAR; INTRAVENOUS; SUBCUTANEOUS EVERY 30 MIN PRN
Status: CANCELLED | OUTPATIENT
Start: 2024-06-06

## 2024-06-05 RX ORDER — LORAZEPAM 2 MG/ML
.5-1 INJECTION INTRAMUSCULAR EVERY 6 HOURS PRN
Status: CANCELLED | OUTPATIENT
Start: 2024-06-06

## 2024-06-05 ASSESSMENT — PAIN SCALES - GENERAL: PAINLEVEL: NO PAIN (0)

## 2024-06-05 NOTE — PROGRESS NOTES
[facility]      FOLLOW-UP VISIT NOTE                       Jun 5, 2024  Subjective   REASON FOR VISIT: Follow up PMBCL    ONCOLOGIC SUMMARY:  Diagnosis:  Primary mediastinal B-cell lymphoma dx 1/2024, presenting with cervical LAD. 1/29/24 FNA indeterminate; 2/9/24 left level 3 excisional LN biopsy showed large B-cell lymphoma with rare abnormal B-cells on flow cytometry. Although CD23 IHC was negative, IHC for , MAL, and PD-L1 were positive in majority of the neoplastic cells suggesting PMBCL over DLBCL NOS. [id number] gene expression profile also consistent with PMBCL. FISH with gain of BCL2 but no MYC, BCL2, or BCL6 rearrangements. PET showed bilateral cervical and mediastinal hypermetabolic lymphadenopathy (largest 4.4 cm with SUVmax 27 in a prevascular LN); no disease below diaphragm.     Intent of treatment: Curative    Treatment history:  2/22/24: Cycle 1 R-EPOCH, dose level 1  3/14/24: Cycle 2 R-EPOCH, dose level 2. PET after 2 cycles shows CR!  4/4/24: Cycle 3 R-EPOCH, dose level 3 but vincristine capped at 2 mg d/t neuropathy  4/25/24: Cycle 4 R-EPOCH, dose level 4 but held vincristine d/t neuropathy  5/16/24  Cycle 5 R-EPOCH, dose level 5 but vincristine capped at 1.2 mg d/t neuropathy    INTERVAL HISTORY:  Clementine is here with Dima for follow-up before Cycle 6 R-EPOCH. Cycle 5 was the toughest so far with brief hospital stay for syncope and a week of severe throat pain. Never actually saw mouth sores but now it is much improved over the last week. Neuropathy is stable, still only in fingertips and not feet. Otherwise no fevers, chest pain, SOB, vomiting, abd pain, diarrhea, or bleeding. Trying to walk daily still.     I have reviewed and updated the following:  Past Medical History:   Diagnosis Date    Anxiety     Cervical lymphadenopathy     GERD     Interstitial cystitis       No Known Allergies    Current Outpatient Medications:     acyclovir (ZOVIRAX) 400 MG tablet, Take 1 tablet (400 mg)  by mouth 2 times daily, Disp: 60 tablet, Rfl: 4    cimetidine (TAGAMET) 200 MG tablet, Take 200 mg by mouth 2 times daily, Disp: , Rfl:     fluconazole (DIFLUCAN) 200 MG tablet, Take 1 tablet (200 mg) by mouth daily as needed (neutropenia prophylaxis), Disp: , Rfl:     levofloxacin (LEVAQUIN) 250 MG tablet, Take 1 tablet (250 mg) by mouth daily as needed (neutropenia prophylaxis), Disp: , Rfl:     Levonorgestrel (KYLEENA IU), 1 Device by Intrauterine route once, Disp: , Rfl:     LORazepam (ATIVAN) 0.5 MG tablet, Take 1 tablet (0.5 mg) by mouth every 6 hours as needed for nausea or vomiting, Disp: 30 tablet, Rfl: 0    magic mouthwash (ENTER INGREDIENTS IN COMMENTS) suspension, Swish and spit 15 ml every 6 hours as needed, Disp: 200 mL, Rfl: 0    metoclopramide (REGLAN) 10 MG tablet, Take 1 tablet (10 mg) by mouth 4 times daily as needed (constipation), Disp: 30 tablet, Rfl: 3    modafinil (PROVIGIL) 200 MG tablet, Take 200 mg by mouth daily as needed (hypersomnia), Disp: , Rfl:     OLANZapine zydis (ZYPREXA) 5 MG ODT, Take 1 tablet (5 mg) by mouth nightly as needed (nausea), Disp: , Rfl:     ondansetron (ZOFRAN) 4 MG tablet, Take 1-2 tablets (4-8 mg) by mouth every 8 hours as needed for nausea or vomiting, Disp: 30 tablet, Rfl: 0    pantoprazole (PROTONIX) 40 MG EC tablet, Take 1 tablet (40 mg) by mouth 2 times daily, Disp: 60 tablet, Rfl: 0    polyethylene glycol (MIRALAX) 17 GM/Dose powder, Take 17 g by mouth 2 times daily, Disp: , Rfl:     prochlorperazine (COMPAZINE) 10 MG tablet, Take 1 tablet (10 mg) by mouth every 6 hours as needed for nausea or vomiting, Disp: 30 tablet, Rfl: 0    senna-docusate (SENOKOT-S/PERICOLACE) 8.6-50 MG tablet, Take 1 tablet by mouth 2 times daily, Disp: 60 tablet, Rfl: 0    sucralfate (CARAFATE) 1 GM tablet, Take 1 tablet (1 g) by mouth 4 times daily as needed (acid reflux), Disp: 30 tablet, Rfl: 1    REVIEW OF SYSTEMS:  10-point ROS reviewed and negative other than that mentioned in  HPI.     Objective   VITAL SIGNS  /68 (BP Location: Left arm, Patient Position: Sitting, Cuff Size: Adult Regular)   Pulse 76   Temp 97.5  F (36.4  C) (Oral)   Resp 16   Wt 61.1 kg (134 lb 11.2 oz)   SpO2 98%   BMI 21.74 kg/m      ECOG PS: 0     PHYSICAL EXAM:  General: Awake, alert, in no acute distress. Oriented x 3.    HEENT: Chemo induced alopecia. Normocephalic, atraumatic. No scleral icterus. Oral mucosa pink and moist with no signs of thrush or  mucositis  CV: Regular rate and  regular rhythm. No murmurs, rubs, or gallops appreciated.  Resp: Good inspiratory effort, lungs clear to auscultation bilaterally.  Abdomen: Soft, NT, ND.   Extremities: No peripheral edema bilaterally.   Neuro: CN II-XII grossly intact. No focal deficits.   Skin: No rash, unusual bruising or prominent lesions.  Psych: Pleasant, normal affect.    LABS:  I reviewed the following labs:  Lab Results   Component Value Date    WBC 8.4 06/05/2024    HGB 9.8 (L) 06/05/2024    HCT 29.0 (L) 06/05/2024     06/05/2024     06/05/2024    INR 1.19 (H) 05/20/2024    PTT 29 04/28/2024     Lab Results   Component Value Date     06/03/2024    POTASSIUM 4.2 06/03/2024    CR 0.82 06/03/2024    BUN 8.8 06/03/2024    CHLORIDE 105 06/03/2024    DEE 9.1 06/03/2024     (H) 06/03/2024      Lab Results   Component Value Date    ALT 15 06/03/2024    AST 20 06/03/2024    ALKPHOS 88 06/03/2024    BILITOTAL <0.2 06/03/2024      Lab Results   Component Value Date     (H) 06/05/2024    URIC 4.3 06/05/2024        Assessment & Plan   Primary mediastinal B-cell lymphoma  Dx 1/2024, presenting cervical lymphadenopathy. Initial FNA suggestive of possible Hodgkin lymphoma; however excisional biopsy showed large B-cell lymphoma with differential diagnosis of primary mediastinal large B-cell lymphoma vs DLBCL NOS. Additional IHC staining for , MAL, and PD-L1, and [id number] gene expression profile all favor PMBCL diagnosis over  DLBCL NOS. PET with cervical and mediastinal lymphadenopathy only. LDH normal.   Previously discussed PMBCL diagnosis and plan for DA-R-EPOCH x 6 cycles. In the single-arm phase 2 prospective study of R-EPOCH (without radiation) for PMBCL, 5-year EFS was 93% and OS 97% (10.1056/[id number]).   Cycle 1 R-EPOCH 2/22/24, tolerating well so far other than constipation.  Cycle 2 R-EPOCH 3/14/24. ANC dread >0.5 last cycle so qualifies for 20% increase in doxorubicin, etoposide, and cyclophosphamide (dose level 2).   PET after 2 cycles showed complete response!   Cycle 3 R-EPOCH 4/4/24- qualified for 20% increase in doxorubicin, etoposide, and cyclophosphamide again (dose level 3); however capped vincristine at 2 mg due to neuropathy.   Cycle 4 R-EPOCH- Qualified for 20% dose increase again (dose level 4) but omitted vincristine to allow some recovery of neuropathy.  Cycle 5 R-EPOCH- dose level 5 but dose cap vincristine 1.2 mg.  Added mesna given higher dose of Cyclophosphamide. Had more fatigue and mouth sores  Proceed with Cycle 6 R-EPOCH tomorrow, decrease back to dose level 4 due to plt dread of 24 and ANC <0.5 on 3 measurements with last cycle. Keep vincristine 1.2 mg.   Next PET at EOT - 1 month after Cycle 6.     Mucositis  Patient endorsed previous oral ulceration that were painful but resolved after previous chemotherapy cycles. However, she has had increase of oral sores with C5.   MMW & biotene PRN, encourage frequent oral moisturization with saline rinses.    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 Initially affecting fingertips only.   Capped vincristine at 2 mg starting Cycle 3.  S/p C3 and neuropathy in fingertips worsened with some difficulty using her phone or buttoning shirts and now neuropathy in bilateral feet that did not impact balance of walking.   Held vincristine Cycle 4 to allow some recovery of neuropathy and consider reintroducing at 50% dose for Cycle 5/6.   Neuropathy did  "very mildly improve in the R hand after holding Vincristine with C4-  reintroduced vincristine with C5 at capped dose 1.2 mg.  Did have slight worsening of her neuropathy but not as bad as prior to C4.    Palpitations/tachycardia, resolved  SOB, resolved  3/25/24 NT proBNP normal at <36.   4/15/24 CTA chest negative for PE. Ziopatch requested but did not hear from Cardiology   Ziopatch completed 5/16- no episodes of SOB after C5 and a couple episodes of \"heart fluttering\"    Constipation- resolved  Scheduled Miralax and Senna-S.  PRN Reglan.     Fertility preservation  Established with [facility] and underwent egg retrieval on 2/18/24.      Ppx  TLS ppx: now done with allopurinol.  ID ppx: continue  mg BID. Levo and fluc when ANC <1.0. Monthly pentamidine for PJP ppx (last given 6/3/24).   Vaccines: up to date on COVID and flu shots.       PLAN:  Admit for Cycle 6 R-EPOCH tomorrow (back to dose level 4)  Will reduce labs to weekly after Cycle 6 since we don't need it to guide next cycle dosing   PET and follow up with me in 1 month    Total of 30 minutes on patient visit, reviewing records, interpreting test results, placing orders, and documentation on the day of service.    The longitudinal plan of care for the diagnosis(es)/condition(s) as documented were addressed during this visit. Due to the added complexity in care, I will continue to support Clementine in the subsequent management and with ongoing continuity of care.     Vanessa Oakes MD  Attending Physician, [facility]   "

## 2024-06-05 NOTE — NURSING NOTE
"Oncology Rooming Note    June 5, 2024 3:17 PM   Clementine Kathleen is a 28 year old female who presents for:    Chief Complaint   Patient presents with    Blood Draw     Vitals taken, venipuncture labs drawn, checked into next appt    Oncology Clinic Visit     Mediastinal large B-cell lymphoma of lymph nodes of multiple regions      Initial Vitals: /68 (BP Location: Left arm, Patient Position: Sitting, Cuff Size: Adult Regular)   Pulse 76   Temp 97.5  F (36.4  C) (Oral)   Resp 16   Wt 61.1 kg (134 lb 11.2 oz)   SpO2 98%   BMI 21.74 kg/m   Estimated body mass index is 21.74 kg/m  as calculated from the following:    Height as of 5/25/24: 1.676 m (5' 6\").    Weight as of this encounter: 61.1 kg (134 lb 11.2 oz). Body surface area is 1.69 meters squared.  No Pain (0) Comment: Data Unavailable   No LMP recorded. (Menstrual status: IUD).  Allergies reviewed: Yes  Medications reviewed: Yes    Medications: Medication refills not needed today.  Pharmacy name entered into [facility]: [id number] [id number] AT [facility]    Frailty Screening:   Is the patient here for a new oncology consult visit in cancer care? 2. No      Clinical concerns: no other complaints      Isaiah Santos"

## 2024-06-05 NOTE — NURSING NOTE
Chief Complaint   Patient presents with    Blood Draw     Vitals taken, venipuncture labs drawn, checked into next appt     /68 (BP Location: Left arm, Patient Position: Sitting, Cuff Size: Adult Regular)   Pulse 76   Temp 97.5  F (36.4  C) (Oral)   Resp 16   Wt 61.1 kg (134 lb 11.2 oz)   SpO2 98%   BMI 21.74 kg/m    Santana Magaña RN on 6/5/2024 at 2:57 PM

## 2024-06-05 NOTE — LETTER
6/5/2024      Clementine Kathleen  [address]  [facility] [address]      Dear Colleague,    Thank you for referring your patient, Clementine Kathleen, to the [facility]LO[facility] AND [facility]ANSPLANT [facility] [place]. Please see a copy of my visit note below.     [facility]      FOLLOW-UP VISIT NOTE                       Jun 5, 2024  Subjective  REASON FOR VISIT: Follow up PMBCL    ONCOLOGIC SUMMARY:  Diagnosis:  Primary mediastinal B-cell lymphoma dx 1/2024, presenting with cervical LAD. 1/29/24 FNA indeterminate; 2/9/24 left level 3 excisional LN biopsy showed large B-cell lymphoma with rare abnormal B-cells on flow cytometry. Although CD23 IHC was negative, IHC for , MAL, and PD-L1 were positive in majority of the neoplastic cells suggesting PMBCL over DLBCL NOS. [id number] gene expression profile also consistent with PMBCL. FISH with gain of BCL2 but no MYC, BCL2, or BCL6 rearrangements. PET showed bilateral cervical and mediastinal hypermetabolic lymphadenopathy (largest 4.4 cm with SUVmax 27 in a prevascular LN); no disease below diaphragm.     Intent of treatment: Curative    Treatment history:  2/22/24: Cycle 1 R-EPOCH, dose level 1  3/14/24: Cycle 2 R-EPOCH, dose level 2. PET after 2 cycles shows CR!  4/4/24: Cycle 3 R-EPOCH, dose level 3 but vincristine capped at 2 mg d/t neuropathy  4/25/24: Cycle 4 R-EPOCH, dose level 4 but held vincristine d/t neuropathy  5/16/24  Cycle 5 R-EPOCH, dose level 5 but vincristine capped at 1.2 mg d/t neuropathy    INTERVAL HISTORY:  Clementine is here with Dima for follow-up before Cycle 6 R-EPOCH. Cycle 5 was the toughest so far with brief hospital stay for syncope and a week of severe throat pain. Never actually saw mouth sores but now it is much improved over the last week. Neuropathy is stable, still only in fingertips and not feet. Otherwise no fevers, chest pain, SOB, vomiting, abd pain, diarrhea, or bleeding. Trying to walk daily still.     I have  reviewed and updated the following:  Past Medical History:   Diagnosis Date    Anxiety     Cervical lymphadenopathy     GERD     Interstitial cystitis       No Known Allergies    Current Outpatient Medications:     acyclovir (ZOVIRAX) 400 MG tablet, Take 1 tablet (400 mg) by mouth 2 times daily, Disp: 60 tablet, Rfl: 4    cimetidine (TAGAMET) 200 MG tablet, Take 200 mg by mouth 2 times daily, Disp: , Rfl:     fluconazole (DIFLUCAN) 200 MG tablet, Take 1 tablet (200 mg) by mouth daily as needed (neutropenia prophylaxis), Disp: , Rfl:     levofloxacin (LEVAQUIN) 250 MG tablet, Take 1 tablet (250 mg) by mouth daily as needed (neutropenia prophylaxis), Disp: , Rfl:     Levonorgestrel (KYLEENA IU), 1 Device by Intrauterine route once, Disp: , Rfl:     LORazepam (ATIVAN) 0.5 MG tablet, Take 1 tablet (0.5 mg) by mouth every 6 hours as needed for nausea or vomiting, Disp: 30 tablet, Rfl: 0    magic mouthwash (ENTER INGREDIENTS IN COMMENTS) suspension, Swish and spit 15 ml every 6 hours as needed, Disp: 200 mL, Rfl: 0    metoclopramide (REGLAN) 10 MG tablet, Take 1 tablet (10 mg) by mouth 4 times daily as needed (constipation), Disp: 30 tablet, Rfl: 3    modafinil (PROVIGIL) 200 MG tablet, Take 200 mg by mouth daily as needed (hypersomnia), Disp: , Rfl:     OLANZapine zydis (ZYPREXA) 5 MG ODT, Take 1 tablet (5 mg) by mouth nightly as needed (nausea), Disp: , Rfl:     ondansetron (ZOFRAN) 4 MG tablet, Take 1-2 tablets (4-8 mg) by mouth every 8 hours as needed for nausea or vomiting, Disp: 30 tablet, Rfl: 0    pantoprazole (PROTONIX) 40 MG EC tablet, Take 1 tablet (40 mg) by mouth 2 times daily, Disp: 60 tablet, Rfl: 0    polyethylene glycol (MIRALAX) 17 GM/Dose powder, Take 17 g by mouth 2 times daily, Disp: , Rfl:     prochlorperazine (COMPAZINE) 10 MG tablet, Take 1 tablet (10 mg) by mouth every 6 hours as needed for nausea or vomiting, Disp: 30 tablet, Rfl: 0    senna-docusate (SENOKOT-S/PERICOLACE) 8.6-50 MG tablet, Take  1 tablet by mouth 2 times daily, Disp: 60 tablet, Rfl: 0    sucralfate (CARAFATE) 1 GM tablet, Take 1 tablet (1 g) by mouth 4 times daily as needed (acid reflux), Disp: 30 tablet, Rfl: 1    REVIEW OF SYSTEMS:  10-point ROS reviewed and negative other than that mentioned in HPI.     Objective  VITAL SIGNS  /68 (BP Location: Left arm, Patient Position: Sitting, Cuff Size: Adult Regular)   Pulse 76   Temp 97.5  F (36.4  C) (Oral)   Resp 16   Wt 61.1 kg (134 lb 11.2 oz)   SpO2 98%   BMI 21.74 kg/m      ECOG PS: 0     PHYSICAL EXAM:  General: Awake, alert, in no acute distress. Oriented x 3.    HEENT: Chemo induced alopecia. Normocephalic, atraumatic. No scleral icterus. Oral mucosa pink and moist with no signs of thrush or  mucositis  CV: Regular rate and  regular rhythm. No murmurs, rubs, or gallops appreciated.  Resp: Good inspiratory effort, lungs clear to auscultation bilaterally.  Abdomen: Soft, NT, ND.   Extremities: No peripheral edema bilaterally.   Neuro: CN II-XII grossly intact. No focal deficits.   Skin: No rash, unusual bruising or prominent lesions.  Psych: Pleasant, normal affect.    LABS:  I reviewed the following labs:  Lab Results   Component Value Date    WBC 8.4 06/05/2024    HGB 9.8 (L) 06/05/2024    HCT 29.0 (L) 06/05/2024     06/05/2024     06/05/2024    INR 1.19 (H) 05/20/2024    PTT 29 04/28/2024     Lab Results   Component Value Date     06/03/2024    POTASSIUM 4.2 06/03/2024    CR 0.82 06/03/2024    BUN 8.8 06/03/2024    CHLORIDE 105 06/03/2024    DEE 9.1 06/03/2024     (H) 06/03/2024      Lab Results   Component Value Date    ALT 15 06/03/2024    AST 20 06/03/2024    ALKPHOS 88 06/03/2024    BILITOTAL <0.2 06/03/2024      Lab Results   Component Value Date     (H) 06/05/2024    URIC 4.3 06/05/2024        Assessment & Plan  Primary mediastinal B-cell lymphoma  Dx 1/2024, presenting cervical lymphadenopathy. Initial FNA suggestive of possible  Hodgkin lymphoma; however excisional biopsy showed large B-cell lymphoma with differential diagnosis of primary mediastinal large B-cell lymphoma vs DLBCL NOS. Additional IHC staining for , MAL, and PD-L1, and [id number] gene expression profile all favor PMBCL diagnosis over DLBCL NOS. PET with cervical and mediastinal lymphadenopathy only. LDH normal.   Previously discussed PMBCL diagnosis and plan for DA-R-EPOCH x 6 cycles. In the single-arm phase 2 prospective study of R-EPOCH (without radiation) for PMBCL, 5-year EFS was 93% and OS 97% (10.1056/[id number]).   Cycle 1 R-EPOCH 2/22/24, tolerating well so far other than constipation.  Cycle 2 R-EPOCH 3/14/24. ANC dread >0.5 last cycle so qualifies for 20% increase in doxorubicin, etoposide, and cyclophosphamide (dose level 2).   PET after 2 cycles showed complete response!   Cycle 3 R-EPOCH 4/4/24- qualified for 20% increase in doxorubicin, etoposide, and cyclophosphamide again (dose level 3); however capped vincristine at 2 mg due to neuropathy.   Cycle 4 R-EPOCH- Qualified for 20% dose increase again (dose level 4) but omitted vincristine to allow some recovery of neuropathy.  Cycle 5 R-EPOCH- dose level 5 but dose cap vincristine 1.2 mg.  Added mesna given higher dose of Cyclophosphamide. Had more fatigue and mouth sores  Proceed with Cycle 6 R-EPOCH tomorrow, decrease back to dose level 4 due to plt dread of 24 and ANC <0.5 on 3 measurements with last cycle. Keep vincristine 1.2 mg.   Next PET at [facility] - 1 month after Cycle 6.     Mucositis  Patient endorsed previous oral ulceration that were painful but resolved after previous chemotherapy cycles. However, she has had increase of oral sores with C5.   MMW & biotene PRN, encourage frequent oral moisturization with saline rinses.    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 Initially affecting fingertips only.   Capped vincristine at 2 mg starting Cycle 3.  S/p C3 and neuropathy in  "fingertips worsened with some difficulty using her phone or buttoning shirts and now neuropathy in bilateral feet that did not impact balance of walking.   Held vincristine Cycle 4 to allow some recovery of neuropathy and consider reintroducing at 50% dose for Cycle 5/6.   Neuropathy did very mildly improve in the R hand after holding Vincristine with C4-  reintroduced vincristine with C5 at capped dose 1.2 mg.  Did have slight worsening of her neuropathy but not as bad as prior to C4.    Palpitations/tachycardia, resolved  SOB, resolved  3/25/24 NT proBNP normal at <36.   4/15/24 CTA chest negative for PE. Ziopatch requested but did not hear from Cardiology   Ziopatch completed 5/16- no episodes of SOB after C5 and a couple episodes of \"heart fluttering\"    Constipation- resolved  Scheduled Miralax and Senna-S.  PRN Reglan.     Fertility preservation  Established with [facility] and underwent egg retrieval on 2/18/24.      Ppx  TLS ppx: now done with allopurinol.  ID ppx: continue  mg BID. Levo and fluc when ANC <1.0. Monthly pentamidine for PJP ppx (last given 6/3/24).   Vaccines: up to date on COVID and flu shots.       PLAN:  Admit for Cycle 6 R-EPOCH tomorrow (back to dose level 4)  Will reduce labs to weekly after Cycle 6 since we don't need it to guide next cycle dosing   PET and follow up with me in 1 month    Total of 30 minutes on patient visit, reviewing records, interpreting test results, placing orders, and documentation on the day of service.    The longitudinal plan of care for the diagnosis(es)/condition(s) as documented were addressed during this visit. Due to the added complexity in care, I will continue to support Clementine in the subsequent management and with ongoing continuity of care.     Vanessa Oakes MD  Attending Physician, [facility]     "

## 2024-06-06 ENCOUNTER — HOSPITAL ENCOUNTER (INPATIENT)
Facility: CLINIC | Age: 29
LOS: 4 days | Discharge: HOME | End: 2024-06-10
Attending: STUDENT IN AN ORGANIZED HEALTH CARE EDUCATION/TRAINING PROGRAM | Admitting: STUDENT IN AN ORGANIZED HEALTH CARE EDUCATION/TRAINING PROGRAM
Payer: COMMERCIAL

## 2024-06-06 DIAGNOSIS — C83.32 DIFFUSE LARGE B-CELL LYMPHOMA OF INTRATHORACIC LYMPH NODES (H): ICD-10-CM

## 2024-06-06 DIAGNOSIS — Z76.89 PREVENTION OF CHEMOTHERAPY-INDUCED NEUTROPENIA: Primary | ICD-10-CM

## 2024-06-06 DIAGNOSIS — K21.00 GASTROESOPHAGEAL REFLUX DISEASE WITH ESOPHAGITIS WITHOUT HEMORRHAGE: ICD-10-CM

## 2024-06-06 LAB
ABO/RH(D): NORMAL
ALBUMIN SERPL BCG-MCNC: 4 G/DL (ref 3.5–5.2)
ALP SERPL-CCNC: 66 U/L (ref 40–150)
ALT SERPL W P-5'-P-CCNC: 13 U/L (ref 0–50)
ANION GAP SERPL CALCULATED.3IONS-SCNC: 10 MMOL/L (ref 7–15)
ANTIBODY SCREEN: NEGATIVE
AST SERPL W P-5'-P-CCNC: 18 U/L (ref 0–45)
BASOPHILS # BLD AUTO: 0 10E3/UL (ref 0–0.2)
BASOPHILS NFR BLD AUTO: 1 %
BILIRUB SERPL-MCNC: <0.2 MG/DL
BUN SERPL-MCNC: 11.6 MG/DL (ref 6–20)
CALCIUM SERPL-MCNC: 9.2 MG/DL (ref 8.6–10)
CHLORIDE SERPL-SCNC: 106 MMOL/L (ref 98–107)
CREAT SERPL-MCNC: 0.8 MG/DL (ref 0.51–0.95)
DEPRECATED HCO3 PLAS-SCNC: 22 MMOL/L (ref 22–29)
EGFRCR SERPLBLD CKD-EPI 2021: >90 ML/MIN/1.73M2
EOSINOPHIL # BLD AUTO: 0 10E3/UL (ref 0–0.7)
EOSINOPHIL NFR BLD AUTO: 0 %
ERYTHROCYTE [DISTWIDTH] IN BLOOD BY AUTOMATED COUNT: 17.3 % (ref 10–15)
FIBRINOGEN PPP-MCNC: 277 MG/DL (ref 170–490)
GLUCOSE SERPL-MCNC: 98 MG/DL (ref 70–99)
HCT VFR BLD AUTO: 30.3 % (ref 35–47)
HGB BLD-MCNC: 9.7 G/DL (ref 11.7–15.7)
IMM GRANULOCYTES # BLD: 0.1 10E3/UL
IMM GRANULOCYTES NFR BLD: 3 %
INR PPP: 1.06 (ref 0.85–1.15)
LYMPHOCYTES # BLD AUTO: 0.5 10E3/UL (ref 0.8–5.3)
LYMPHOCYTES NFR BLD AUTO: 13 %
MAGNESIUM SERPL-MCNC: 1.7 MG/DL (ref 1.7–2.3)
MCH RBC QN AUTO: 32.6 PG (ref 26.5–33)
MCHC RBC AUTO-ENTMCNC: 32 G/DL (ref 31.5–36.5)
MCV RBC AUTO: 102 FL (ref 78–100)
MONOCYTES # BLD AUTO: 0.6 10E3/UL (ref 0–1.3)
MONOCYTES NFR BLD AUTO: 15 %
NEUTROPHILS # BLD AUTO: 3 10E3/UL (ref 1.6–8.3)
NEUTROPHILS NFR BLD AUTO: 68 %
NRBC # BLD AUTO: 0 10E3/UL
NRBC BLD AUTO-RTO: 0 /100
PHOSPHATE SERPL-MCNC: 4.1 MG/DL (ref 2.5–4.5)
PLATELET # BLD AUTO: 367 10E3/UL (ref 150–450)
POTASSIUM SERPL-SCNC: 4.2 MMOL/L (ref 3.4–5.3)
PROT SERPL-MCNC: 6.2 G/DL (ref 6.4–8.3)
RBC # BLD AUTO: 2.98 10E6/UL (ref 3.8–5.2)
SODIUM SERPL-SCNC: 138 MMOL/L (ref 135–145)
SPECIMEN EXPIRATION DATE: NORMAL
WBC # BLD AUTO: 4.3 10E3/UL (ref 4–11)

## 2024-06-06 PROCEDURE — 85384 FIBRINOGEN ACTIVITY: CPT | Performed by: PHYSICIAN ASSISTANT

## 2024-06-06 PROCEDURE — 250N000013 HC RX MED GY IP 250 OP 250 PS 637: Performed by: INTERNAL MEDICINE

## 2024-06-06 PROCEDURE — 82247 BILIRUBIN TOTAL: CPT | Performed by: PHYSICIAN ASSISTANT

## 2024-06-06 PROCEDURE — 250N000011 HC RX IP 250 OP 636: Mod: JZ | Performed by: INTERNAL MEDICINE

## 2024-06-06 PROCEDURE — 36415 COLL VENOUS BLD VENIPUNCTURE: CPT | Performed by: PHYSICIAN ASSISTANT

## 2024-06-06 PROCEDURE — 120N000002 HC R&B MED SURG/OB UMMC

## 2024-06-06 PROCEDURE — 3E0430M INTRODUCTION OF MONOCLONAL ANTIBODY INTO CENTRAL VEIN, PERCUTANEOUS APPROACH: ICD-10-PCS | Performed by: STUDENT IN AN ORGANIZED HEALTH CARE EDUCATION/TRAINING PROGRAM

## 2024-06-06 PROCEDURE — 86900 BLOOD TYPING SEROLOGIC ABO: CPT | Performed by: PHYSICIAN ASSISTANT

## 2024-06-06 PROCEDURE — 82374 ASSAY BLOOD CARBON DIOXIDE: CPT | Performed by: PHYSICIAN ASSISTANT

## 2024-06-06 PROCEDURE — 83735 ASSAY OF MAGNESIUM: CPT | Performed by: PHYSICIAN ASSISTANT

## 2024-06-06 PROCEDURE — 84100 ASSAY OF PHOSPHORUS: CPT | Performed by: PHYSICIAN ASSISTANT

## 2024-06-06 PROCEDURE — 85610 PROTHROMBIN TIME: CPT | Performed by: PHYSICIAN ASSISTANT

## 2024-06-06 PROCEDURE — 250N000013 HC RX MED GY IP 250 OP 250 PS 637: Performed by: PHYSICIAN ASSISTANT

## 2024-06-06 PROCEDURE — 258N000003 HC RX IP 258 OP 636: Mod: JZ | Performed by: INTERNAL MEDICINE

## 2024-06-06 PROCEDURE — 99223 1ST HOSP IP/OBS HIGH 75: CPT | Mod: AI | Performed by: PHYSICIAN ASSISTANT

## 2024-06-06 PROCEDURE — 250N000011 HC RX IP 250 OP 636: Mod: JZ | Performed by: PHYSICIAN ASSISTANT

## 2024-06-06 PROCEDURE — 250N000012 HC RX MED GY IP 250 OP 636 PS 637: Performed by: INTERNAL MEDICINE

## 2024-06-06 PROCEDURE — 85004 AUTOMATED DIFF WBC COUNT: CPT | Performed by: PHYSICIAN ASSISTANT

## 2024-06-06 PROCEDURE — 3E04305 INTRODUCTION OF OTHER ANTINEOPLASTIC INTO CENTRAL VEIN, PERCUTANEOUS APPROACH: ICD-10-PCS | Performed by: STUDENT IN AN ORGANIZED HEALTH CARE EDUCATION/TRAINING PROGRAM

## 2024-06-06 RX ORDER — PROCHLORPERAZINE MALEATE 5 MG
5 TABLET ORAL EVERY 6 HOURS PRN
Status: DISCONTINUED | OUTPATIENT
Start: 2024-06-06 | End: 2024-06-06

## 2024-06-06 RX ORDER — ONDANSETRON 8 MG/1
8 TABLET, FILM COATED ORAL EVERY 8 HOURS PRN
Status: DISCONTINUED | OUTPATIENT
Start: 2024-06-06 | End: 2024-06-06

## 2024-06-06 RX ORDER — LORAZEPAM 0.5 MG/1
.5-1 TABLET ORAL EVERY 6 HOURS PRN
Status: DISCONTINUED | OUTPATIENT
Start: 2024-06-06 | End: 2024-06-10 | Stop reason: HOSPADM

## 2024-06-06 RX ORDER — ALBUTEROL SULFATE 0.83 MG/ML
2.5 SOLUTION RESPIRATORY (INHALATION)
Status: DISCONTINUED | OUTPATIENT
Start: 2024-06-06 | End: 2024-06-10 | Stop reason: HOSPADM

## 2024-06-06 RX ORDER — OLANZAPINE 5 MG/1
5 TABLET, ORALLY DISINTEGRATING ORAL
Status: DISCONTINUED | OUTPATIENT
Start: 2024-06-06 | End: 2024-06-06

## 2024-06-06 RX ORDER — ACETAMINOPHEN 325 MG/1
650 TABLET ORAL ONCE
Qty: 2 TABLET | Refills: 0 | Status: COMPLETED | OUTPATIENT
Start: 2024-06-06 | End: 2024-06-06

## 2024-06-06 RX ORDER — ONDANSETRON 2 MG/ML
8 INJECTION INTRAMUSCULAR; INTRAVENOUS EVERY 8 HOURS PRN
Status: DISCONTINUED | OUTPATIENT
Start: 2024-06-06 | End: 2024-06-06

## 2024-06-06 RX ORDER — HEPARIN SODIUM,PORCINE 10 UNIT/ML
5-10 VIAL (ML) INTRAVENOUS
Status: DISCONTINUED | OUTPATIENT
Start: 2024-06-06 | End: 2024-06-10 | Stop reason: HOSPADM

## 2024-06-06 RX ORDER — ONDANSETRON 8 MG/1
16 TABLET, FILM COATED ORAL
Status: DISCONTINUED | OUTPATIENT
Start: 2024-06-06 | End: 2024-06-06

## 2024-06-06 RX ORDER — SUCRALFATE 1 G/1
1 TABLET ORAL 4 TIMES DAILY PRN
Status: DISCONTINUED | OUTPATIENT
Start: 2024-06-06 | End: 2024-06-10 | Stop reason: HOSPADM

## 2024-06-06 RX ORDER — AMOXICILLIN 250 MG
2 CAPSULE ORAL 2 TIMES DAILY
Status: DISCONTINUED | OUTPATIENT
Start: 2024-06-06 | End: 2024-06-10 | Stop reason: HOSPADM

## 2024-06-06 RX ORDER — EPINEPHRINE 1 MG/ML
0.3 INJECTION, SOLUTION, CONCENTRATE INTRAVENOUS EVERY 5 MIN PRN
Status: DISCONTINUED | OUTPATIENT
Start: 2024-06-06 | End: 2024-06-10 | Stop reason: HOSPADM

## 2024-06-06 RX ORDER — PROCHLORPERAZINE MALEATE 5 MG
10 TABLET ORAL EVERY 6 HOURS PRN
Status: DISCONTINUED | OUTPATIENT
Start: 2024-06-06 | End: 2024-06-06

## 2024-06-06 RX ORDER — DIPHENHYDRAMINE HCL 25 MG
50 CAPSULE ORAL ONCE
Qty: 2 CAPSULE | Refills: 0 | Status: COMPLETED | OUTPATIENT
Start: 2024-06-06 | End: 2024-06-06

## 2024-06-06 RX ORDER — DEXAMETHASONE 4 MG/1
8 TABLET ORAL DAILY
Status: DISCONTINUED | OUTPATIENT
Start: 2024-06-12 | End: 2024-06-10 | Stop reason: HOSPADM

## 2024-06-06 RX ORDER — ALBUTEROL SULFATE 90 UG/1
1-2 AEROSOL, METERED RESPIRATORY (INHALATION)
Status: DISCONTINUED | OUTPATIENT
Start: 2024-06-06 | End: 2024-06-10 | Stop reason: HOSPADM

## 2024-06-06 RX ORDER — FAMOTIDINE 20 MG/1
20 TABLET, FILM COATED ORAL 2 TIMES DAILY
Status: DISCONTINUED | OUTPATIENT
Start: 2024-06-06 | End: 2024-06-10 | Stop reason: HOSPADM

## 2024-06-06 RX ORDER — PREDNISONE 50 MG/1
100 TABLET ORAL 2 TIMES DAILY
Qty: 20 TABLET | Refills: 0 | Status: DISCONTINUED | OUTPATIENT
Start: 2024-06-06 | End: 2024-06-10 | Stop reason: HOSPADM

## 2024-06-06 RX ORDER — DIPHENHYDRAMINE HYDROCHLORIDE 50 MG/ML
50 INJECTION INTRAMUSCULAR; INTRAVENOUS
Status: DISCONTINUED | OUTPATIENT
Start: 2024-06-06 | End: 2024-06-10 | Stop reason: HOSPADM

## 2024-06-06 RX ORDER — AMOXICILLIN 250 MG
1 CAPSULE ORAL 2 TIMES DAILY PRN
Status: DISCONTINUED | OUTPATIENT
Start: 2024-06-06 | End: 2024-06-10 | Stop reason: HOSPADM

## 2024-06-06 RX ORDER — ONDANSETRON 4 MG/1
8 TABLET, ORALLY DISINTEGRATING ORAL EVERY 8 HOURS PRN
Status: DISCONTINUED | OUTPATIENT
Start: 2024-06-06 | End: 2024-06-06

## 2024-06-06 RX ORDER — AMOXICILLIN 250 MG
2 CAPSULE ORAL 2 TIMES DAILY PRN
Status: DISCONTINUED | OUTPATIENT
Start: 2024-06-06 | End: 2024-06-10 | Stop reason: HOSPADM

## 2024-06-06 RX ORDER — ACETAMINOPHEN 325 MG/1
650 TABLET ORAL EVERY 4 HOURS PRN
Status: DISCONTINUED | OUTPATIENT
Start: 2024-06-06 | End: 2024-06-10 | Stop reason: HOSPADM

## 2024-06-06 RX ORDER — PALONOSETRON 0.05 MG/ML
0.25 INJECTION, SOLUTION INTRAVENOUS ONCE
Qty: 5 ML | Refills: 0 | Status: COMPLETED | OUTPATIENT
Start: 2024-06-06 | End: 2024-06-06

## 2024-06-06 RX ORDER — POLYETHYLENE GLYCOL 3350 17 G/17G
17 POWDER, FOR SOLUTION ORAL DAILY PRN
Status: DISCONTINUED | OUTPATIENT
Start: 2024-06-06 | End: 2024-06-10 | Stop reason: HOSPADM

## 2024-06-06 RX ORDER — MEPERIDINE HYDROCHLORIDE 25 MG/ML
25 INJECTION INTRAMUSCULAR; INTRAVENOUS; SUBCUTANEOUS EVERY 30 MIN PRN
Status: DISCONTINUED | OUTPATIENT
Start: 2024-06-06 | End: 2024-06-10 | Stop reason: HOSPADM

## 2024-06-06 RX ORDER — LORAZEPAM 1 MG/1
1 TABLET ORAL EVERY 8 HOURS PRN
Status: DISCONTINUED | OUTPATIENT
Start: 2024-06-06 | End: 2024-06-10 | Stop reason: HOSPADM

## 2024-06-06 RX ORDER — ACYCLOVIR 400 MG/1
400 TABLET ORAL 2 TIMES DAILY
Status: DISCONTINUED | OUTPATIENT
Start: 2024-06-06 | End: 2024-06-10 | Stop reason: HOSPADM

## 2024-06-06 RX ORDER — OLANZAPINE 5 MG/1
5 TABLET, ORALLY DISINTEGRATING ORAL AT BEDTIME
Status: DISCONTINUED | OUTPATIENT
Start: 2024-06-06 | End: 2024-06-10 | Stop reason: HOSPADM

## 2024-06-06 RX ORDER — PANTOPRAZOLE SODIUM 40 MG/1
40 TABLET, DELAYED RELEASE ORAL 2 TIMES DAILY
Status: DISCONTINUED | OUTPATIENT
Start: 2024-06-06 | End: 2024-06-10 | Stop reason: HOSPADM

## 2024-06-06 RX ORDER — PROCHLORPERAZINE MALEATE 5 MG
5-10 TABLET ORAL EVERY 6 HOURS PRN
Status: DISCONTINUED | OUTPATIENT
Start: 2024-06-06 | End: 2024-06-10 | Stop reason: HOSPADM

## 2024-06-06 RX ORDER — METHYLPREDNISOLONE SODIUM SUCCINATE 125 MG/2ML
125 INJECTION, POWDER, LYOPHILIZED, FOR SOLUTION INTRAMUSCULAR; INTRAVENOUS
Status: DISCONTINUED | OUTPATIENT
Start: 2024-06-06 | End: 2024-06-10 | Stop reason: HOSPADM

## 2024-06-06 RX ORDER — DIPHENHYDRAMINE HYDROCHLORIDE AND LIDOCAINE HYDROCHLORIDE AND ALUMINUM HYDROXIDE AND MAGNESIUM HYDRO
KIT EVERY 6 HOURS PRN
Status: DISCONTINUED | OUTPATIENT
Start: 2024-06-06 | End: 2024-06-10 | Stop reason: HOSPADM

## 2024-06-06 RX ORDER — METOCLOPRAMIDE 5 MG/1
10 TABLET ORAL 4 TIMES DAILY PRN
Status: DISCONTINUED | OUTPATIENT
Start: 2024-06-06 | End: 2024-06-10 | Stop reason: HOSPADM

## 2024-06-06 RX ORDER — LORAZEPAM 2 MG/ML
.5-1 INJECTION INTRAMUSCULAR EVERY 6 HOURS PRN
Status: DISCONTINUED | OUTPATIENT
Start: 2024-06-06 | End: 2024-06-10 | Stop reason: HOSPADM

## 2024-06-06 RX ORDER — HEPARIN SODIUM,PORCINE 10 UNIT/ML
5-10 VIAL (ML) INTRAVENOUS EVERY 24 HOURS
Status: DISCONTINUED | OUTPATIENT
Start: 2024-06-06 | End: 2024-06-10 | Stop reason: HOSPADM

## 2024-06-06 RX ORDER — ENOXAPARIN SODIUM 100 MG/ML
40 INJECTION SUBCUTANEOUS EVERY 24 HOURS
Status: DISCONTINUED | OUTPATIENT
Start: 2024-06-06 | End: 2024-06-10 | Stop reason: HOSPADM

## 2024-06-06 RX ORDER — HEPARIN SODIUM (PORCINE) LOCK FLUSH IV SOLN 100 UNIT/ML 100 UNIT/ML
5-10 SOLUTION INTRAVENOUS
Status: DISCONTINUED | OUTPATIENT
Start: 2024-06-06 | End: 2024-06-10 | Stop reason: HOSPADM

## 2024-06-06 RX ADMIN — DOXORUBICIN HYDROCHLORIDE 0.3 MG: 2 INJECTION, SOLUTION INTRAVENOUS at 21:12

## 2024-06-06 RX ADMIN — PREDNISONE 100 MG: 50 TABLET ORAL at 17:21

## 2024-06-06 RX ADMIN — ETOPOSIDE 145 MG: 20 INJECTION, SOLUTION INTRAVENOUS at 21:16

## 2024-06-06 RX ADMIN — RITUXIMAB-ABBS 600 MG: 10 INJECTION, SOLUTION INTRAVENOUS at 18:16

## 2024-06-06 RX ADMIN — OLANZAPINE 5 MG: 5 TABLET, ORALLY DISINTEGRATING ORAL at 22:26

## 2024-06-06 RX ADMIN — PALONOSETRON 0.25 MG: 0.05 INJECTION, SOLUTION INTRAVENOUS at 20:35

## 2024-06-06 RX ADMIN — ACYCLOVIR 400 MG: 400 TABLET ORAL at 19:33

## 2024-06-06 RX ADMIN — DOCUSATE SODIUM 50 MG AND SENNOSIDES 8.6 MG 2 TABLET: 8.6; 5 TABLET, FILM COATED ORAL at 19:33

## 2024-06-06 RX ADMIN — ACETAMINOPHEN 650 MG: 325 TABLET, FILM COATED ORAL at 17:21

## 2024-06-06 RX ADMIN — ENOXAPARIN SODIUM 40 MG: 40 INJECTION SUBCUTANEOUS at 14:08

## 2024-06-06 RX ADMIN — PANTOPRAZOLE SODIUM 40 MG: 40 TABLET, DELAYED RELEASE ORAL at 19:33

## 2024-06-06 RX ADMIN — FAMOTIDINE 20 MG: 20 TABLET ORAL at 19:33

## 2024-06-06 RX ADMIN — DIPHENHYDRAMINE HYDROCHLORIDE 50 MG: 25 CAPSULE ORAL at 17:21

## 2024-06-06 ASSESSMENT — ACTIVITIES OF DAILY LIVING (ADL)
ADLS_ACUITY_SCORE: 33
ADLS_ACUITY_SCORE: 18

## 2024-06-06 NOTE — H&P
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     HEME  # Primary mediastinal B-cell lymphoma  Follows with Dr. Oakes. Initially presented with cervical lymphadenopathy (12/2023). Initial FNA (1/29/24) suggestive of possible Hodgkin lymphoma; however excisional LN biopsy (2/9/24) shows large B-cell lymphoma with differential diagnosis of primary mediastinal large B-cell lymphoma vs DLBCL NOS. PET with cervical and mediastinal lymphadenopathy only. Overall presentation felt to be consistent with PMBCL given her age, gender, sites of involvement, dim CD30 expression, and prominent fibrotic background.  Although CD23 IHC was negative, IHC for , MAL, and PD-L1 were positive in majority of the neoplastic cells suggesting PMBCL over DLBCL NOS. [id number] gene expression profile also consistent with PMBCL. FISH with gain of BCL2 but no MYC, BCL2, or BCL6 rearrangements. PET showed bilateral cervical and mediastinal hypermetabolic lymphadenopathy (largest 4.4 cm with SUV max 27 in a prevascular LN); no disease below diaphragm. Baseline TTE (2/19/24) with LVEF 60%. Initial treatment R-DA-EPOCH (C1D1=2/22/24; C2D1= 3/14/24; C3D1=4/4/24). Which was overall tolerated well but Clementine had CINV and developed bilateral hand neuropathy.  PET (4/1/24) (after C2) showed evidence of complete metabolic response with resolution of cervical and mediastinal lymphadenopathy. Have increased dose each cycle by one level (ex: C1=dose level 1, C2=dose level 2, etc.). She was last seen by Dr. Oakes in clinic on 4/25/2024 and then proceeded with C4  DA-R-EPOCH at dose level 4 but vincristine held to give time for improvement of bothersome neuropathy. C5D1 = 5/16/24 she received level 5 with a capped dose of 1.2 mg on Vincristine given neuropathy. She is currently being admitted for Cycle 6; will receive level 4 as cycle 5 c/b near syncopal episode and mouth pain.   - Port accessed on admission.  - Will continue mesna based on current cytoxan dose after discussion with pharmacy      Treatment Plan: Dose adjusted (level 4) R-EPOCH (C6D1=6/6/24)   - Rituximab  mg/m2 (600 mg) over 90 mins; D1  - Prednisone  mg BID;  D1-5  - Etoposide IV 86 mg/m2 (145 mg); D1-4  - Vincristine IV capped at 1.2 mg ; D1-4  - Doxorubicin IV 12.6 mg/m2 (29 mg);  D1-4  - Cyclophosphamide IV 1296 mg/m2 (2215 mg); D5   - Neulasta 6 mg subcutaneous -- D6 -- will need to schedule  - Pre-meds: Tylenol, Benadryl, Aloxi, Emend D5, Dexamethasone 8 mg (D6-7)     # Peripheral neuropathy   Endorsed L>R neuropathy in her fingertips. Secondary to chemotherapy. Vincristine was capped at 2 mg for C3. S/p C3 and neuropathy in fingertips have worsened with some difficulty using her phone or buttoning shirts and now neuropathy in bilateral feet that does not impact balance of walking. Seems to be improving just prior to admission for C4 per patient report. Vincristine omitted for C4 to give time for recovery. Vincristine subsequently capped at 1.2 mg for Cycle 5 and 6.   - Monitor closely  - Patient declined needing intervention at this time as not currently painful, numbness.      # CINV  Chemotherapy induced nausea and vomiting during previous cycles, managed with prn zofran, compazine, scheduled emend, and zyprexa at bedtime. Nausea regimen used with Cycle 5 effective; will replicate for Cycle 6. Report that nausea well control with breakthrough PRNs at home.  - Scheduled Zyprexa 5 mg at bedtime   - Compazine 5-10 mg q6h PRN and Ativan 1 mg q8hr PRN available for breakthrough nausea   -  "Scheduled Zofran 16 mg daily per chemo protocol HELD; will substitute with Aloxi   - Plan to give D5 emend     # Mucositis  Patient endorses previous oral ulceration that was painful but resolved after previous chemotherapy cycles. However, she has had increase of oral sores over the past few weeks that have been painful and making swallowing more difficult.  - MMW & biotene PRN, encourage frequent oral moisturization with saline rinses.     ID  # ID PPx  - Acyclovir 400 mg BID  - Levofloxacin 250 mg daily and fluconazole 200 mg daily when ANC <1  - Pentamidine monthly for PJP ppx; last given 6/3/24     CV  # H/o palpitations  # H/o tachycardia  Noted heart \"racing and pounding\" after C2 of chemo. Denied chest pain, dyspnea, lower extremity swelling, abdominal pain, nausea, dizziness. Echo from 2/2024 unremarkable. 3/25/24 BNP normal at <36. She again reported symptoms following C3, with increased dyspnea on exertion. CT PE 4/15/24 unremarkable. OA she reports that symptoms have improved overall; has less dyspnea on exertion, minimal dyspnea on rest, less frequent episodes of heart palpitations,  - No acute inpatient requirements at this time     GI  # H/o constipation  - Continue PTA miralax and senna BID.      MISC  # Fertility preservation  - Established with [facility], underwent egg retrieval on 2/18.      # GERD   - Continue PTA PPI BID      FEN:  - IVF; per chemotherapy protocol  - Standard lyte replacement protocol  - Regular diet as tolerated     Prophy/Misc:  - GI: Continue PTA PPI BID  - DVT: Enoxaparin ppx       Clinically Significant Risk Factors Present on Admission                     # Anemia: based on hgb <11               # Financial/Environmental Concerns:             Dispo: Pending completion and tolerance of chemotherapy   Medically Ready for Discharge: Anticipated in 5+ Days      This patient was discussed with Dr. Ha     I spent >45 minutes face-to-face or coordinating care of Clementine Kathleen. " Over 50% of our time on the unit was spent counseling the patient and/or coordinating care.    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     On admission, Clementine was seen resting comfortably in bed. She reports overall feeling well. Excited that this is the last cycle of chemotherapy! She reports that her neuropathy is improving, rviewed that her Vincristine dose will still be capped at 1.2 mg. She reports nausea was well controlled at home and is usually bad while in the hospital. The regimen used for last cycle worked well so will plan to replicate for this cycle. No further questions or concerns.     A comprehensive review of systems was obtained and is negative other than noted here or in the HPI.      Past Medical History    I have reviewed this patient's medical history and updated it with pertinent information if needed.   Past Medical History:   Diagnosis Date    Anxiety     Cervical lymphadenopathy     GERD     Interstitial cystitis        Past Surgical History   I have reviewed this patient's surgical history and updated it with pertinent information if needed.  Past Surgical History:   Procedure Laterality Date    CL AFF SURGICAL PATHOLOGY      Pearl neuroma    CLOSED REDUCTION PROXIMAL FIBULAR FRACTURE Left     EXCISE LESION NECK Left 02/09/2024    Procedure: Excisional Node Biopsy Left Neck;  Surgeon: Asher Damon MD;  Location: [facility] OR    INSERT PORT VASCULAR ACCESS Right 3/1/2024    Procedure: Insert port vascular access;  Surgeon: Diane  Solo Perry PA-C;  Location: [facility] OR    IR CHEST PORT PLACEMENT > 5 YRS OF AGE  3/1/2024    PICC INSERTION - DOUBLE LUMEN Right 02/22/2024    41-1cm, Medial brachial vein       Prior to Admission Medications   Cannot display prior to admission medications because the patient has not been admitted in this contact.     Allergies   No Known Allergies    Social History   I have reviewed this patient's social history and updated it with pertinent information if needed. Clementine Kathleen  reports that she has never smoked. She has never been exposed to tobacco smoke. She has never used smokeless tobacco. She reports that she does not currently use alcohol. She reports that she does not currently use drugs.    Family History   I have reviewed this patient's family history and updated it with pertinent information if needed.   Family History   Problem Relation Age of Onset    Anesthesia Reaction No family hx of     Clotting Disorder No family hx of        Review of Systems   A comprehensive review of symptoms was obtained and negative unless noted above.    Physical Exam                      Vital Signs with Ranges  Temp:  [97.5  F (36.4  C)] 97.5  F (36.4  C)  Pulse:  [76] 76  Resp:  [16] 16  BP: (102)/(68) 102/68  SpO2:  [98 %] 98 %  0 lbs 0 oz    Constitutional: Pleasant female seen resting comfortably in bed in NAD. Alert and interactive.   HEENT: NCAT. PERRL, EOMI, anicteric sclera. Oral mucosa pink and moist with no lesions or thrush.  Respiratory: Non-labored breathing, good air exchange, lungs clear to auscultation bilaterally. No cough or wheeze noted.   Cardiovascular: Regular rate and rhythm. No murmur or rub.   GI: Normoactive bowel sounds. Abdomen soft, non-distended, and non-tender. No palpable masses or organomegaly.  Skin: Warm and dry. No concerning lesions or rash on exposed surfaces.  Musculoskeletal: Extremities grossly normal, non-tender, no edema. Strong peripheral pulses. Good strength and ROM in  bed.   Neuropsychiatric: Mentation and affect appear normal/appropriate.  Vascular Access: Port is CDI without erythema, swelling, or discharge.       Data   Results for orders placed or performed in visit on 06/05/24 (from the past 24 hour(s))   CBC with Platelets & Differential    Narrative    The following orders were created for panel order CBC with Platelets & Differential.  Procedure                               Abnormality         Status                     ---------                               -----------         ------                     CBC with platelets and d...[[id number]]  Abnormal            Final result               Manual Differential[[id number]]          Abnormal            Final result                 Please view results for these tests on the individual orders.   Comprehensive metabolic panel   Result Value Ref Range    Sodium 139 135 - 145 mmol/L    Potassium 4.6 3.4 - 5.3 mmol/L    Carbon Dioxide (CO2) 23 22 - 29 mmol/L    Anion Gap 10 7 - 15 mmol/L    Urea Nitrogen 15.7 6.0 - 20.0 mg/dL    Creatinine 0.97 (H) 0.51 - 0.95 mg/dL    GFR Estimate 81 >60 mL/min/1.73m2    Calcium 9.1 8.6 - 10.0 mg/dL    Chloride 106 98 - 107 mmol/L    Glucose 89 70 - 99 mg/dL    Alkaline Phosphatase 74 40 - 150 U/L    AST 21 0 - 45 U/L    ALT 17 0 - 50 U/L    Protein Total 6.4 6.4 - 8.3 g/dL    Albumin 4.2 3.5 - 5.2 g/dL    Bilirubin Total <0.2 <=1.2 mg/dL   Lactate Dehydrogenase   Result Value Ref Range    Lactate Dehydrogenase 301 (H) 0 - 250 U/L   Uric acid   Result Value Ref Range    Uric Acid 4.3 2.4 - 5.7 mg/dL   CBC with platelets and differential   Result Value Ref Range    WBC Count 8.4 4.0 - 11.0 10e3/uL    RBC Count 2.91 (L) 3.80 - 5.20 10e6/uL    Hemoglobin 9.8 (L) 11.7 - 15.7 g/dL    Hematocrit 29.0 (L) 35.0 - 47.0 %     78 - 100 fL    MCH 33.7 (H) 26.5 - 33.0 pg    MCHC 33.8 31.5 - 36.5 g/dL    RDW 17.3 (H) 10.0 - 15.0 %    Platelet Count 384 150 - 450 10e3/uL    % Neutrophils      %  Lymphocytes      % Monocytes      % Eosinophils      % Basophils      % Immature Granulocytes      NRBCs per 100 WBC 1 (H) <1 /100    Absolute Neutrophils      Absolute Lymphocytes      Absolute Monocytes      Absolute Eosinophils      Absolute Basophils      Absolute Immature Granulocytes      Absolute NRBCs 0.0 10e3/uL   Manual Differential   Result Value Ref Range    % Neutrophils 74 %    % Lymphocytes 7 %    % Monocytes 12 %    % Eosinophils 0 %    % Basophils 0 %    % Metamyelocytes 3 %    % Myelocytes 4 %    Absolute Neutrophils 6.2 1.6 - 8.3 10e3/uL    Absolute Lymphocytes 0.6 (L) 0.8 - 5.3 10e3/uL    Absolute Monocytes 1.0 0.0 - 1.3 10e3/uL    Absolute Eosinophils 0.0 0.0 - 0.7 10e3/uL    Absolute Basophils 0.0 0.0 - 0.2 10e3/uL    Absolute Metamyelocytes 0.3 (H) <=0.0 10e3/uL    Absolute Myelocytes 0.3 (H) <=0.0 10e3/uL    RBC Morphology Confirmed RBC Indices     Platelet Assessment  Automated Count Confirmed. Platelet morphology is normal.     Automated Count Confirmed. Platelet morphology is normal.    Polychromasia Slight (A) None Seen    Teardrop Cells Slight (A) None Seen

## 2024-06-06 NOTE — PROGRESS NOTES
Nursing Focus: Admission    D: Patient admitted/transferred from Home for Chemo.      I: Upon arrival to the unit patient was oriented to room, unit, and call light. Patient s height, weight, and vital signs were obtained. Allergies reviewed and allergy band applied. MD notified of patient s arrival on the unit. Adult AVS completed. Head to toe assessment completed. Education assessment completed. Care plan initiated.     A: Vital signs stable upon admission. Patient rates pain at 0. Two RN skin assessment completed Yes. Second RN was Robin TRAN Significant Skin Findings include None. [facility] Nurse Consult Ordered No. Bed Algorithm can be found in PCS flow sheets (Support Surface Algorithm) and on IP [facility] NURSE RESOURCE TAB, was this used during this assessment? No.     P: Continue to monitor patient s status and intervene as needed. Continue with plan of care. Notify MD with any concerns or changes in patient status.

## 2024-06-06 NOTE — PLAN OF CARE
"Goal Outcome Evaluation:      Plan of Care Reviewed With: patient      /67 (BP Location: Right arm)   Pulse 69   Temp 97.8  F (36.6  C) (Oral)   Resp 18   Ht 1.651 m (5' 5\")   Wt 60.2 kg (132 lb 12.8 oz)   SpO2 99%   BMI 22.10 kg/m       AVSS, alert and oriented x 4. Denies any pain, nausea or dizziness. Up independently, BM x 1 today 6/6/2024. No acute events this shift, continue with care as planned.           "

## 2024-06-06 NOTE — LETTER
Clementine Kathleen MRN# [id number]   YOB: 1995 Age: 28 year old     Date of Admission:  ***  Date of Discharge:  {DISCHARGE DATE:119492}  Admitting Physician:  Irina Ha MD  Discharging Physician: {:[id number]} (Contact: ***)  Discharging Service:  {:[id number]}  Hospitalization Status: {:[id number]}     Primary Care Clinic:  {:[id number]}  Primary Care Provider: Chela Peter     {   Salutation            :[id number]}            You have been identified as the Primary Care Provider for Clementine Kathleen, who was recently admitted to the [facility].  Thank you for the referral to our hospital.  It is our goal to provide the highest quality of care for our patients, including planning for seamless continuity of care by providing you with timely, accurate and concise information.  After reviewing the following combined discharge summary and final progress note, please contact us if you have any remaining questions.  The Discharging Physician will be the best informed, with their contact information listed above.  If unable to reach them, or if you have received this letter in error, please call [phone] and someone will try to help you.

## 2024-06-07 LAB
ALBUMIN SERPL BCG-MCNC: 3.8 G/DL (ref 3.5–5.2)
ALP SERPL-CCNC: 61 U/L (ref 40–150)
ALT SERPL W P-5'-P-CCNC: 13 U/L (ref 0–50)
ANION GAP SERPL CALCULATED.3IONS-SCNC: 7 MMOL/L (ref 7–15)
AST SERPL W P-5'-P-CCNC: 14 U/L (ref 0–45)
BASOPHILS # BLD AUTO: 0 10E3/UL (ref 0–0.2)
BASOPHILS NFR BLD AUTO: 0 %
BILIRUB SERPL-MCNC: <0.2 MG/DL
BUN SERPL-MCNC: 11 MG/DL (ref 6–20)
CALCIUM SERPL-MCNC: 8.9 MG/DL (ref 8.6–10)
CHLORIDE SERPL-SCNC: 109 MMOL/L (ref 98–107)
CREAT SERPL-MCNC: 0.61 MG/DL (ref 0.51–0.95)
DEPRECATED HCO3 PLAS-SCNC: 23 MMOL/L (ref 22–29)
EGFRCR SERPLBLD CKD-EPI 2021: >90 ML/MIN/1.73M2
EOSINOPHIL # BLD AUTO: 0 10E3/UL (ref 0–0.7)
EOSINOPHIL NFR BLD AUTO: 0 %
ERYTHROCYTE [DISTWIDTH] IN BLOOD BY AUTOMATED COUNT: 17.2 % (ref 10–15)
FIBRINOGEN PPP-MCNC: 253 MG/DL (ref 170–490)
GLUCOSE SERPL-MCNC: 175 MG/DL (ref 70–99)
HCT VFR BLD AUTO: 27.3 % (ref 35–47)
HGB BLD-MCNC: 9.1 G/DL (ref 11.7–15.7)
IMM GRANULOCYTES # BLD: 0.1 10E3/UL
IMM GRANULOCYTES NFR BLD: 1 %
INR PPP: 1.07 (ref 0.85–1.15)
LYMPHOCYTES # BLD AUTO: 0.5 10E3/UL (ref 0.8–5.3)
LYMPHOCYTES NFR BLD AUTO: 6 %
MAGNESIUM SERPL-MCNC: 1.8 MG/DL (ref 1.7–2.3)
MCH RBC QN AUTO: 33.1 PG (ref 26.5–33)
MCHC RBC AUTO-ENTMCNC: 33.3 G/DL (ref 31.5–36.5)
MCV RBC AUTO: 99 FL (ref 78–100)
MONOCYTES # BLD AUTO: 0.7 10E3/UL (ref 0–1.3)
MONOCYTES NFR BLD AUTO: 8 %
NEUTROPHILS # BLD AUTO: 7.5 10E3/UL (ref 1.6–8.3)
NEUTROPHILS NFR BLD AUTO: 85 %
NRBC # BLD AUTO: 0 10E3/UL
NRBC BLD AUTO-RTO: 0 /100
PHOSPHATE SERPL-MCNC: 3.3 MG/DL (ref 2.5–4.5)
PLATELET # BLD AUTO: 373 10E3/UL (ref 150–450)
POTASSIUM SERPL-SCNC: 4.4 MMOL/L (ref 3.4–5.3)
PROT SERPL-MCNC: 5.8 G/DL (ref 6.4–8.3)
RBC # BLD AUTO: 2.75 10E6/UL (ref 3.8–5.2)
SODIUM SERPL-SCNC: 139 MMOL/L (ref 135–145)
URATE SERPL-MCNC: 2.2 MG/DL (ref 2.4–5.7)
WBC # BLD AUTO: 8.9 10E3/UL (ref 4–11)

## 2024-06-07 PROCEDURE — 250N000011 HC RX IP 250 OP 636: Performed by: INTERNAL MEDICINE

## 2024-06-07 PROCEDURE — 120N000002 HC R&B MED SURG/OB UMMC

## 2024-06-07 PROCEDURE — 250N000013 HC RX MED GY IP 250 OP 250 PS 637: Performed by: PHYSICIAN ASSISTANT

## 2024-06-07 PROCEDURE — 250N000011 HC RX IP 250 OP 636: Mod: JZ | Performed by: PHYSICIAN ASSISTANT

## 2024-06-07 PROCEDURE — 85384 FIBRINOGEN ACTIVITY: CPT | Performed by: PHYSICIAN ASSISTANT

## 2024-06-07 PROCEDURE — 82040 ASSAY OF SERUM ALBUMIN: CPT | Performed by: PHYSICIAN ASSISTANT

## 2024-06-07 PROCEDURE — 99233 SBSQ HOSP IP/OBS HIGH 50: CPT | Mod: FS | Performed by: PHYSICIAN ASSISTANT

## 2024-06-07 PROCEDURE — 84550 ASSAY OF BLOOD/URIC ACID: CPT | Performed by: PHYSICIAN ASSISTANT

## 2024-06-07 PROCEDURE — 83735 ASSAY OF MAGNESIUM: CPT | Performed by: STUDENT IN AN ORGANIZED HEALTH CARE EDUCATION/TRAINING PROGRAM

## 2024-06-07 PROCEDURE — 250N000012 HC RX MED GY IP 250 OP 636 PS 637: Performed by: INTERNAL MEDICINE

## 2024-06-07 PROCEDURE — 258N000003 HC RX IP 258 OP 636: Mod: JZ | Performed by: INTERNAL MEDICINE

## 2024-06-07 PROCEDURE — 85025 COMPLETE CBC W/AUTO DIFF WBC: CPT | Performed by: PHYSICIAN ASSISTANT

## 2024-06-07 PROCEDURE — 250N000013 HC RX MED GY IP 250 OP 250 PS 637: Performed by: INTERNAL MEDICINE

## 2024-06-07 PROCEDURE — 84100 ASSAY OF PHOSPHORUS: CPT | Performed by: STUDENT IN AN ORGANIZED HEALTH CARE EDUCATION/TRAINING PROGRAM

## 2024-06-07 PROCEDURE — 85610 PROTHROMBIN TIME: CPT | Performed by: PHYSICIAN ASSISTANT

## 2024-06-07 RX ADMIN — ACYCLOVIR 400 MG: 400 TABLET ORAL at 21:20

## 2024-06-07 RX ADMIN — PANTOPRAZOLE SODIUM 40 MG: 40 TABLET, DELAYED RELEASE ORAL at 21:20

## 2024-06-07 RX ADMIN — OLANZAPINE 5 MG: 5 TABLET, ORALLY DISINTEGRATING ORAL at 21:19

## 2024-06-07 RX ADMIN — PREDNISONE 100 MG: 50 TABLET ORAL at 09:25

## 2024-06-07 RX ADMIN — ACYCLOVIR 400 MG: 400 TABLET ORAL at 09:26

## 2024-06-07 RX ADMIN — ETOPOSIDE 145 MG: 20 INJECTION, SOLUTION INTRAVENOUS at 18:04

## 2024-06-07 RX ADMIN — DOXORUBICIN HYDROCHLORIDE 0.3 MG: 2 INJECTION, SOLUTION INTRAVENOUS at 20:06

## 2024-06-07 RX ADMIN — FAMOTIDINE 20 MG: 20 TABLET ORAL at 21:20

## 2024-06-07 RX ADMIN — DOCUSATE SODIUM 50 MG AND SENNOSIDES 8.6 MG 2 TABLET: 8.6; 5 TABLET, FILM COATED ORAL at 21:19

## 2024-06-07 RX ADMIN — LORAZEPAM 1 MG: 0.5 TABLET ORAL at 20:17

## 2024-06-07 RX ADMIN — ENOXAPARIN SODIUM 40 MG: 40 INJECTION SUBCUTANEOUS at 16:12

## 2024-06-07 RX ADMIN — LORAZEPAM 1 MG: 0.5 TABLET ORAL at 08:41

## 2024-06-07 RX ADMIN — PANTOPRAZOLE SODIUM 40 MG: 40 TABLET, DELAYED RELEASE ORAL at 09:26

## 2024-06-07 RX ADMIN — DOCUSATE SODIUM 50 MG AND SENNOSIDES 8.6 MG 1 TABLET: 8.6; 5 TABLET, FILM COATED ORAL at 09:25

## 2024-06-07 RX ADMIN — PREDNISONE 100 MG: 50 TABLET ORAL at 16:12

## 2024-06-07 RX ADMIN — FAMOTIDINE 20 MG: 20 TABLET ORAL at 09:25

## 2024-06-07 ASSESSMENT — ACTIVITIES OF DAILY LIVING (ADL)
ADLS_ACUITY_SCORE: 18

## 2024-06-07 NOTE — PHARMACY-ADMISSION MEDICATION HISTORY
Pharmacist Admission Medication History    Admission medication history is complete. The information provided in this note is only as accurate as the sources available at the time of the update.    Information Source(s): Patient and CareEverywhere/SureScripts via in-person    Pertinent Information: Patient denied the use of any other prescription or over the counter medications.     Changes made to PTA medication list:  Added: None  Deleted: None  Changed: None    Allergies reviewed with patient and updates made in EHR: yes    Medication History Completed By: Darion Gastelum RPH 6/7/2024 11:48 AM    PTA Med List   Medication Sig Last Dose    acyclovir (ZOVIRAX) 400 MG tablet Take 1 tablet (400 mg) by mouth 2 times daily 6/6/2024    cimetidine (TAGAMET) 200 MG tablet Take 200 mg by mouth 2 times daily 6/6/2024    fluconazole (DIFLUCAN) 200 MG tablet Take 1 tablet (200 mg) by mouth daily as needed (neutropenia prophylaxis) Past Month    levofloxacin (LEVAQUIN) 250 MG tablet Take 1 tablet (250 mg) by mouth daily as needed (neutropenia prophylaxis) Past Month    Levonorgestrel (KYLEENA IU) 1 Device by Intrauterine route once     LORazepam (ATIVAN) 0.5 MG tablet Take 1 tablet (0.5 mg) by mouth every 6 hours as needed for nausea or vomiting Past Month    magic mouthwash (ENTER INGREDIENTS IN COMMENTS) suspension Swish and spit 15 ml every 6 hours as needed Past Month    metoclopramide (REGLAN) 10 MG tablet Take 1 tablet (10 mg) by mouth 4 times daily as needed (constipation) More than a month    modafinil (PROVIGIL) 200 MG tablet Take 200 mg by mouth daily as needed (hypersomnia) More than a month    OLANZapine zydis (ZYPREXA) 5 MG ODT Take 1 tablet (5 mg) by mouth nightly as needed (nausea) Past Month    ondansetron (ZOFRAN) 4 MG tablet Take 1-2 tablets (4-8 mg) by mouth every 8 hours as needed for nausea or vomiting Past Month    pantoprazole (PROTONIX) 40 MG EC tablet Take 1 tablet (40 mg) by mouth 2 times daily  6/6/2024    polyethylene glycol (MIRALAX) 17 GM/Dose powder Take 17 g by mouth 2 times daily 6/6/2024    prochlorperazine (COMPAZINE) 10 MG tablet Take 1 tablet (10 mg) by mouth every 6 hours as needed for nausea or vomiting Past Month    senna-docusate (SENOKOT-S/PERICOLACE) 8.6-50 MG tablet Take 1 tablet by mouth 2 times daily 6/6/2024    sucralfate (CARAFATE) 1 GM tablet Take 1 tablet (1 g) by mouth 4 times daily as needed (acid reflux) Past Month

## 2024-06-07 NOTE — PROGRESS NOTES
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     HEME  # Primary mediastinal B-cell lymphoma  Follows with Dr. Oakes. Initially presented with cervical lymphadenopathy (12/2023). Initial FNA (1/29/24) suggestive of possible Hodgkin lymphoma; however excisional LN biopsy (2/9/24) shows large B-cell lymphoma with differential diagnosis of primary mediastinal large B-cell lymphoma vs DLBCL NOS. PET with cervical and mediastinal lymphadenopathy only. Overall presentation felt to be consistent with PMBCL given her age, gender, sites of involvement, dim CD30 expression, and prominent fibrotic background.  Although CD23 IHC was negative, IHC for , MAL, and PD-L1 were positive in majority of the neoplastic cells suggesting PMBCL over DLBCL NOS. [id number] gene expression profile also consistent with PMBCL. FISH with gain of BCL2 but no MYC, BCL2, or BCL6 rearrangements. PET showed bilateral cervical and mediastinal hypermetabolic lymphadenopathy (largest 4.4 cm with SUV max 27 in a prevascular LN); no disease below diaphragm. Baseline TTE (2/19/24) with LVEF 60%. Initial treatment R-DA-EPOCH (C1D1=2/22/24; C2D1= 3/14/24; C3D1=4/4/24). Which was overall tolerated well but Clementine had CINV and developed bilateral hand neuropathy.  PET (4/1/24) (after C2) showed evidence of complete metabolic response with resolution of cervical and mediastinal lymphadenopathy. Have increased dose each cycle by one level (ex: C1=dose level 1, C2=dose level 2, etc.). She was last seen by Dr. Oakes in clinic on 4/25/2024 and then  proceeded with C4 DA-R-EPOCH at dose level 4 but vincristine held to give time for improvement of bothersome neuropathy. C5D1 = 5/16/24 she received level 5 with a capped dose of 1.2 mg on Vincristine given neuropathy. She is currently being admitted for Cycle 6; will receive level 4 as cycle 5 c/b near syncopal episode and mouth pain.   - Port accessed on admission.  - Will continue mesna based on current cytoxan dose after discussion with pharmacy      Treatment Plan: Dose adjusted (level 4) R-EPOCH (C6D1=6/6/24)   - Rituximab  mg/m2 (600 mg) over 90 mins; D1  - Prednisone  mg BID;  D1-5  - Etoposide IV 86 mg/m2 (145 mg); D1-4  - Vincristine IV capped at 1.2 mg ; D1-4  - Doxorubicin IV 12.6 mg/m2 (29 mg);  D1-4  - Cyclophosphamide IV 1296 mg/m2 (2215 mg); D5   - Neulasta 6 mg subcutaneous -- D6 -- will need to schedule  - Pre-meds: Tylenol, Benadryl, Aloxi, Emend D5, Dexamethasone 8 mg (D6-7)     # Peripheral neuropathy   Endorsed L>R neuropathy in her fingertips. Secondary to chemotherapy. Vincristine was capped at 2 mg for C3. S/p C3 and neuropathy in fingertips have worsened with some difficulty using her phone or buttoning shirts and now neuropathy in bilateral feet that does not impact balance of walking. Seems to be improving just prior to admission for C4 per patient report. Vincristine omitted for C4 to give time for recovery. Vincristine subsequently capped at 1.2 mg for Cycle 5 and 6.   - Monitor closely  - Patient declined needing intervention at this time as not currently painful, numbness.      # CINV  Chemotherapy induced nausea and vomiting during previous cycles, managed with prn zofran, compazine, scheduled emend, and zyprexa at bedtime. Nausea regimen used with Cycle 5 effective; will replicate for Cycle 6. Report that nausea well control with breakthrough PRNs at home.  - Scheduled Zyprexa 5 mg at bedtime   - Compazine 5-10 mg q6h PRN and Ativan 1 mg q8hr PRN available for  "breakthrough nausea   - Scheduled Zofran 16 mg daily per chemo protocol HELD; will substitute with Aloxi   - Plan to give D5 emend     # Mucositis  Patient endorses previous oral ulceration that was painful but resolved after previous chemotherapy cycles. However, she has had increase of oral sores over the past few weeks that have been painful and making swallowing more difficult.  - MMW & biotene PRN, encourage frequent oral moisturization with saline rinses.     ID  # ID PPx  - Acyclovir 400 mg BID  - Levofloxacin 250 mg daily and fluconazole 200 mg daily when ANC <1  - Pentamidine monthly for PJP ppx; last given 6/3/24     CV  # H/o palpitations  # H/o tachycardia  Noted heart \"racing and pounding\" after C2 of chemo. Denied chest pain, dyspnea, lower extremity swelling, abdominal pain, nausea, dizziness. Echo from 2/2024 unremarkable. 3/25/24 BNP normal at <36. She again reported symptoms following C3, with increased dyspnea on exertion. CT PE 4/15/24 unremarkable. OA she reports that symptoms have improved overall; has less dyspnea on exertion, minimal dyspnea on rest, less frequent episodes of heart palpitations,  - No acute inpatient requirements at this time     GI  # H/o constipation  - Continue PTA miralax and senna BID.      MISC  # Fertility preservation  - Established with [facility], underwent egg retrieval on 2/18.      # GERD   - Continue PTA PPI BID      FEN:  - IVF; per chemotherapy protocol  - Standard lyte replacement protocol  - Regular diet as tolerated     Prophy/Misc:  - GI: Continue PTA PPI BID  - DVT: Enoxaparin ppx       Clinically Significant Risk Factors Present on Admission                     # Anemia: based on hgb <11               # Financial/Environmental Concerns:             Disposition: Pending completion of chemotherapy; anticipated discharge 6/10  Medically Ready for Discharge: Anticipated in 2-4 Days      This patient was discussed with Dr. Ha    I spent >30 minutes face-to-face " or coordinating care of Clementine Kathleen. Over 50% of our time on the unit was spent counseling the patient and/or coordinating care.    Fariha Pack PA-C   Hematology/ Oncology   Pager [phone]    Interval History   Nursing notes reviewed. Clementine is overall doing well. She is feeling a bit nauseous this morning and requesting some PRN Ativan. She otherwise has not noticed any other side effects. She is eating well. She is requesting Neulasta on Tuesday as rides are easier that day. Reviewed it is currently scheduled for Tuesday. All questions answered at this time.     Physical Exam   Temp: 97.8  F (36.6  C) Temp src: Oral BP: 111/64 Pulse: 70   Resp: 18 SpO2: 98 % O2 Device: None (Room air)    Vitals:    06/06/24 1200 06/07/24 0813   Weight: 60.2 kg (132 lb 12.8 oz) 60.8 kg (134 lb)     Vital Signs with Ranges  Temp:  [97.5  F (36.4  C)-97.8  F (36.6  C)] 97.8  F (36.6  C)  Pulse:  [56-82] 70  Resp:  [16-18] 18  BP: ()/(44-71) 111/64  SpO2:  [97 %-99 %] 98 %  I/O last 3 completed shifts:  In: -   Out: 400 [Urine:400]    Constitutional: Pleasant female seen resting comfortably in bed in NAD. Alert and interactive.   HEENT: NCAT. PERRL, EOMI, anicteric sclera. Oral mucosa pink and moist with no lesions or thrush.  Respiratory: Non-labored breathing, good air exchange, lungs clear to auscultation bilaterally. No cough or wheeze noted.   Cardiovascular: Regular rate and rhythm. No murmur or rub.   GI: Normoactive bowel sounds. Abdomen soft, non-distended, and non-tender. No palpable masses or organomegaly.  Skin: Warm and dry. No concerning lesions or rash on exposed surfaces.  Musculoskeletal: Extremities grossly normal, non-tender, no edema. Strong peripheral pulses. Good strength and ROM in bed.   Neuropsychiatric: Mentation and affect appear normal/appropriate.  Vascular Access: Port is CDI without erythema, swelling, or discharge    Medications   Current Facility-Administered Medications   Medication  Dose Route Frequency Provider Last Rate Last Admin    acetaminophen (TYLENOL) tablet 650 mg  650 mg Oral Q4H PRN Fariha Pack PA-C        acyclovir (ZOVIRAX) tablet 400 mg  400 mg Oral BID Fariha Pack PA-C   400 mg at 06/07/24 0926    albuterol (PROVENTIL HFA/VENTOLIN HFA) inhaler  1-2 puff Inhalation Once PRN Vanessa Oakes MD        albuterol (PROVENTIL) neb solution 2.5 mg  2.5 mg Nebulization Once PRN Vanessa Oakes MD        Chemotherapy Infusing-Continuous Infusion   Does not apply Q8H Vanessa Oakes MD 25.3 mL/hr at 06/07/24 0400 Given at 06/07/24 0400    [START ON 6/10/2024] cycloPHOSphamide (CYTOXAN) 2,215 mg in sodium chloride 0.9 % 660.75 mL infusion  1,296 mg/m2 (Treatment Plan Recorded) Intravenous Once Vanessa Oakes MD        [START ON 6/12/2024] dexAMETHasone (DECADRON) tablet 8 mg  8 mg Oral Daily Vanessa Oakes MD        diphenhydrAMINE (BENADRYL) injection 50 mg  50 mg Intravenous Once PRN Vanessa aOkes MD        enoxaparin ANTICOAGULANT (LOVENOX) injection 40 mg  40 mg Subcutaneous Q24H Fariha Pack PA-C   40 mg at 06/06/24 1408    EPINEPHrine PF (ADRENALIN) injection 0.3 mg  0.3 mg Intramuscular Q5 Min PRN Vanessa Oakes MD        etoposide (TOPOSAR) 145 mg in sodium chloride 0.9 % 557.25 mL infusion  86 mg/m2 (Treatment Plan Recorded) Intravenous Q22H Vanessa Oakes MD 25.3 mL/hr at 06/06/24 2116 145 mg at 06/06/24 2116    famotidine (PEPCID) injection 20 mg  20 mg Intravenous Once PRN Vanessa Oakes MD        famotidine (PEPCID) tablet 20 mg  20 mg Oral BID Fariha Pack PA-C   20 mg at 06/07/24 0925    [START ON 6/10/2024] fosaprepitant (EMEND) 150 mg in sodium chloride 0.9 % 275 mL intermittent infusion  150 mg Intravenous Once Vanessa Oakes MD        heparin lock flush 10 unit/mL injection 5-10 mL  5-10 mL Intracatheter Q1H PRN Fariha Pack PA-C        heparin lock flush 10 unit/mL injection 5-10 mL  5-10 mL Intracatheter Q24H Fariha Pack PA-C        heparin  lock flush 100 unit/mL injection 5-10 mL  5-10 mL Intracatheter Q28 Days Fariha Pack PA-C        LORazepam (ATIVAN) injection 0.5-1 mg  0.5-1 mg Intravenous Q6H PRN Vanessa Oakes MD        LORazepam (ATIVAN) tablet 0.5-1 mg  0.5-1 mg Oral Q6H PRN Vanessa Oakes MD   1 mg at 06/07/24 0841    LORazepam (ATIVAN) tablet 1 mg  1 mg Oral Q8H PRN Fariha Pack PA-C        magic mouthwash suspension (diphenhydramine, lidocaine, aluminum-magnesium & simethicone)   Swish & Spit Q6H PRN Fariha Pack PA-C        meperidine (DEMEROL) injection 25 mg  25 mg Intravenous Q30 Min PRN Vanessa Oakes MD        methylPREDNISolone sodium succinate (solu-MEDROL) injection 125 mg  125 mg Intravenous Once PRN Vanessa Oakes MD        metoclopramide (REGLAN) tablet 10 mg  10 mg Oral 4x Daily PRN Fariha Pack PA-C        No rectal suppositories if WBC less than 1000/microliters or platelets less than 50,000/ L   Does not apply Continuous PRN Fariha Pack PA-C        OLANZapine zydis (zyPREXA) ODT tab 5 mg  5 mg Oral At Bedtime Fariha Pack PA-C   5 mg at 06/06/24 2226    pantoprazole (PROTONIX) EC tablet 40 mg  40 mg Oral BID Fariha Pack PA-C   40 mg at 06/07/24 0926    polyethylene glycol (MIRALAX) Packet 17 g  17 g Oral Daily PRN Fariha Pack PA-C        predniSONE (DELTASONE) tablet 100 mg  100 mg Oral BID Vanessa Oakes MD   100 mg at 06/07/24 0925    prochlorperazine (COMPAZINE) tablet 5-10 mg  5-10 mg Oral Q6H PRN Irina Ha MD        Or    prochlorperazine (COMPAZINE) injection 5-10 mg  5-10 mg Intravenous Q6H PRN Irina Ha MD        senna-docusate (SENOKOT-S/PERICOLACE) 8.6-50 MG per tablet 1 tablet  1 tablet Oral BID PRN Debol, Fariha Guillory PA-C        Or    senna-docusate (SENOKOT-S/PERICOLACE) 8.6-50 MG per tablet 2 tablet  2 tablet Oral BID PRN Debol, Fariha Guillory PA-C        senna-docusate (SENOKOT-S/PERICOLACE) 8.6-50 MG per tablet 2  tablet  2 tablet Oral BID Vanessa Oakes MD   1 tablet at 06/07/24 0925    sodium chloride (PF) 0.9% PF flush 10-20 mL  10-20 mL Intracatheter q1 min prn Fariha Pack PA-C        sodium chloride (PF) 0.9% PF flush 10-20 mL  10-20 mL Intracatheter Q1H PRN Hermelindaol, DARLIN Wecl[facility]-IFTIKHAR        sodium chloride (PF) 0.9% PF flush 10-20 mL  10-20 mL Intracatheter Q28 Days Fariha Pack PA-C        sodium chloride 0.9% BOLUS 500 mL  500 mL Intravenous Once PRN Vanessa Oakes MD        sucralfate (CARAFATE) tablet 1 g  1 g Oral 4x Daily PRN Fariha Pack PA-C        vinCRIStine (ONCOVIN) 0.3 mg, DOXOrubicin (ADRIAMYCIN) 29 mg in sodium chloride 0.9 % 1,064.8 mL infusion  0.3 mg Intravenous Q22H Vanessa Oakes MD   0.3 mg at 06/06/24 2112       Data   CBC  Recent Labs   Lab 06/07/24  0621 06/06/24  1246 06/05/24  1454 06/03/24  1256   WBC 8.9 4.3 8.4 14.8*   RBC 2.75* 2.98* 2.91* 2.99*   HGB 9.1* 9.7* 9.8* 9.5*   HCT 27.3* 30.3* 29.0* 29.8*   MCV 99 102* 100 100   MCH 33.1* 32.6 33.7* 31.8   MCHC 33.3 32.0 33.8 31.9   RDW 17.2* 17.3* 17.3* 16.7*    367 384 258     CMP  Recent Labs   Lab 06/07/24  0401 06/06/24  1246 06/05/24  1454 06/03/24  1256    138 139 139   POTASSIUM 4.4 4.2 4.6 4.2   CHLORIDE 109* 106 106 105   CO2 23 22 23 26   ANIONGAP 7 10 10 8   * 98 89 105*   BUN 11.0 11.6 15.7 8.8   CR 0.61 0.80 0.97* 0.82   GFRESTIMATED >90 >90 81 >90   DEE 8.9 9.2 9.1 9.1   MAG 1.8 1.7  --   --    PHOS 3.3 4.1  --   --    PROTTOTAL 5.8* 6.2* 6.4 6.5   ALBUMIN 3.8 4.0 4.2 4.3   BILITOTAL <0.2 <0.2 <0.2 <0.2   ALKPHOS 61 66 74 88   AST 14 18 21 20   ALT 13 13 17 15     INR  Recent Labs   Lab 06/07/24  0401 06/06/24  1246   INR 1.07 1.06       Results for orders placed or performed during the hospital encounter of 05/25/24   CT Head w/o Contrast    Narrative    CT HEAD W/O CONTRAST 5/25/2024 7:44 PM    Provided History: to assess for intracranial etiology of syncope    Comparison: CT soft  tissue neck 2024. Pet/CT 2024    Technique: Using multidetector thin collimation helical acquisition  technique, axial, coronal and sagittal CT images from the skull base  to the vertex were obtained without intravenous contrast.     Findings:  No evidence of intracranial hemorrhage, mass effect, or  midline shift. No acute loss of gray-white differentiation. Small  appearance of the ventricular system and mild protrusion of the  bilateral cerebellar tonsils through the foramen magnum,, which  appears similar to CT soft tissue neck exam on 2024. Mild  effacement of the perimesencephalic basal cisterns.    Intact bony calvaria and skull base. Mastoid air cells are clear.  Paranasal sinuses are clear.      Impression    Impression: No acute intracranial pathology.    I have personally reviewed the examination and initial interpretation  and I agree with the findings.    SHAHID DE LOS SANTOS MD         SYSTEM ID:  [id number]   Echo Complete     Value    LVEF  60-65%    Narrative    [id number]  [id number]  [id number]  [id number]^GABE^DANIELLE     [facility],[place]  Echocardiography Laboratory  [address] [place] [id number]     Name: JAMSHID THORNTON  MRN: [id number]  : 1995  Study Date: 2024 12:17 PM  Age: 28 yrs  Gender: Female  Patient Location: [facility]  Reason For Study: Chemo  Ordering Physician: ROBBIN BERNAL  Performed By: Gabriela Burgos RDCS     BSA: 1.7 m2  Height: 66 in  Weight: 133 lb  HR: 78  BP: 99/54 mmHg  ______________________________________________________________________________  Procedure  Complete Portable Echo Adult. Echocardiogram with two-dimensional, color and  spectral Doppler performed.  ______________________________________________________________________________  Interpretation Summary  Global and regional left ventricular function is normal with an EF of 60-65%.  Right ventricular function, chamber size, wall motion, and thickness  are  normal.  No pericardial effusion is present.  ______________________________________________________________________________  Left Ventricle  Global and regional left ventricular function is normal with an EF of 60-65%.  Left ventricular diastolic function is normal.     Right Ventricle  Right ventricular function, chamber size, wall motion, and thickness are  normal.     Atria  Both atria appear normal.     Mitral Valve  The mitral valve is normal.     Aortic Valve  Aortic valve is normal in structure and function. The aortic valve is  tricuspid.     Tricuspid Valve  The tricuspid valve is normal.     Pulmonic Valve  The pulmonic valve is normal.     Vessels  The aorta root is normal. The thoracic aorta is normal. The pulmonary artery  cannot be assessed. The inferior vena cava was normal in size with preserved  respiratory variability.     Pericardium  No pericardial effusion is present.     Compared to Previous Study  No significant changes noted.  ______________________________________________________________________________  Doppler Measurements & Calculations  MV E max katerine: 62.7 cm/sec  MV A max katerine: 53.2 cm/sec  MV E/A: 1.2  MV dec slope: 363.6 cm/sec2  MV dec time: 0.17 sec  E/E' av.7  Lateral E/e': 3.9  Medial E/e': 5.4     ______________________________________________________________________________  Report approved by: MD Graeme Rodriguez 2024 03:08 PM

## 2024-06-07 NOTE — PROGRESS NOTES
Nursing Focus: Chemotherapy  D: Positive blood return via PORT . Insertion site is clean/dry/intact, dressing intact with no complaints of pain.  Pre infusion assessment documented in Flowsheet (if applicable).    I: Premedications given per order (see electronic medical administration record). Dose #1 of Vincristine and Etoposide started to infuse over 22 hours/minutes. Reviewed pt teaching on chemotherapy side effects.  Pt denies need for further teaching. Chemotherapy double checked per protocol by two chemotherapy competent RN's.   A: Tolerating infusion well. Denies nausea and or pain.   P: Continue to monitor urine output and symptoms of nausea. Screen for symptoms of toxicity.

## 2024-06-07 NOTE — PLAN OF CARE
"[id number]    /64 (BP Location: Right arm)   Pulse 70   Temp 97.8  F (36.6  C) (Oral)   Resp 18   Ht 1.651 m (5' 5\")   Wt 60.8 kg (134 lb)   SpO2 98%   BMI 22.30 kg/m      VSS on RA, Afebrile. Denies pain, SOB. PRN Ativan 1 mg x1 for nausea. D2 DA-R-EPOCH running to port; good blood returns. Ambulates in hallway frequently. BM 6/6; reports good UOP. Family at bedside. Continue POC.     Goal Outcome Evaluation:      Plan of Care Reviewed With: patient    Overall Patient Progress: no changeOverall Patient Progress: no change           "

## 2024-06-07 NOTE — PLAN OF CARE
"Goal Outcome Evaluation:    Time    /69 (BP Location: Right arm)   Pulse 79   Temp 97.7  F (36.5  C) (Oral)   Resp 18   Ht 1.651 m (5' 5\")   Wt 60.2 kg (132 lb 12.8 oz)   SpO2 98%   BMI 22.10 kg/m      Reason for admission: C6 DA-R-EPOCH   Activity: UAL  Pain: denies  Neuro: Alert and oriented  Cardiac: wnl, denies chest pain  Respiratory: denies SOB, no distress observed, on RA  GI/: LBM 6/5, voids spontaneously   Diet: regular  Lines: Port  Wounds:   Labs/imaging: please review lab in the chart      New changes this shift:     Plan:       Continue to monitor and follow POC    "

## 2024-06-08 LAB
ALBUMIN SERPL BCG-MCNC: 3.9 G/DL (ref 3.5–5.2)
ALP SERPL-CCNC: 56 U/L (ref 40–150)
ALT SERPL W P-5'-P-CCNC: 11 U/L (ref 0–50)
ANION GAP SERPL CALCULATED.3IONS-SCNC: 7 MMOL/L (ref 7–15)
AST SERPL W P-5'-P-CCNC: 12 U/L (ref 0–45)
BASOPHILS # BLD AUTO: 0 10E3/UL (ref 0–0.2)
BASOPHILS NFR BLD AUTO: 0 %
BILIRUB SERPL-MCNC: 0.2 MG/DL
BUN SERPL-MCNC: 8.5 MG/DL (ref 6–20)
CALCIUM SERPL-MCNC: 9.1 MG/DL (ref 8.6–10)
CHLORIDE SERPL-SCNC: 107 MMOL/L (ref 98–107)
CREAT SERPL-MCNC: 0.58 MG/DL (ref 0.51–0.95)
DEPRECATED HCO3 PLAS-SCNC: 24 MMOL/L (ref 22–29)
EGFRCR SERPLBLD CKD-EPI 2021: >90 ML/MIN/1.73M2
EOSINOPHIL # BLD AUTO: 0 10E3/UL (ref 0–0.7)
EOSINOPHIL NFR BLD AUTO: 0 %
ERYTHROCYTE [DISTWIDTH] IN BLOOD BY AUTOMATED COUNT: 17.2 % (ref 10–15)
FIBRINOGEN PPP-MCNC: 212 MG/DL (ref 170–490)
GLUCOSE SERPL-MCNC: 108 MG/DL (ref 70–99)
HCT VFR BLD AUTO: 27.9 % (ref 35–47)
HGB BLD-MCNC: 9.2 G/DL (ref 11.7–15.7)
IMM GRANULOCYTES # BLD: 0.1 10E3/UL
IMM GRANULOCYTES NFR BLD: 1 %
INR PPP: 1.11 (ref 0.85–1.15)
LYMPHOCYTES # BLD AUTO: 0.6 10E3/UL (ref 0.8–5.3)
LYMPHOCYTES NFR BLD AUTO: 6 %
MAGNESIUM SERPL-MCNC: 1.9 MG/DL (ref 1.7–2.3)
MCH RBC QN AUTO: 33 PG (ref 26.5–33)
MCHC RBC AUTO-ENTMCNC: 33 G/DL (ref 31.5–36.5)
MCV RBC AUTO: 100 FL (ref 78–100)
MONOCYTES # BLD AUTO: 0.9 10E3/UL (ref 0–1.3)
MONOCYTES NFR BLD AUTO: 9 %
NEUTROPHILS # BLD AUTO: 8.9 10E3/UL (ref 1.6–8.3)
NEUTROPHILS NFR BLD AUTO: 84 %
NRBC # BLD AUTO: 0 10E3/UL
NRBC BLD AUTO-RTO: 0 /100
PHOSPHATE SERPL-MCNC: 4.8 MG/DL (ref 2.5–4.5)
PLATELET # BLD AUTO: 381 10E3/UL (ref 150–450)
POTASSIUM SERPL-SCNC: 3.9 MMOL/L (ref 3.4–5.3)
PROT SERPL-MCNC: 5.6 G/DL (ref 6.4–8.3)
RBC # BLD AUTO: 2.79 10E6/UL (ref 3.8–5.2)
SODIUM SERPL-SCNC: 138 MMOL/L (ref 135–145)
URATE SERPL-MCNC: 1.9 MG/DL (ref 2.4–5.7)
WBC # BLD AUTO: 10.6 10E3/UL (ref 4–11)

## 2024-06-08 PROCEDURE — 250N000011 HC RX IP 250 OP 636: Performed by: INTERNAL MEDICINE

## 2024-06-08 PROCEDURE — 85384 FIBRINOGEN ACTIVITY: CPT | Performed by: PHYSICIAN ASSISTANT

## 2024-06-08 PROCEDURE — 120N000002 HC R&B MED SURG/OB UMMC

## 2024-06-08 PROCEDURE — 250N000011 HC RX IP 250 OP 636: Mod: JZ | Performed by: PHYSICIAN ASSISTANT

## 2024-06-08 PROCEDURE — 84550 ASSAY OF BLOOD/URIC ACID: CPT | Performed by: PHYSICIAN ASSISTANT

## 2024-06-08 PROCEDURE — 85610 PROTHROMBIN TIME: CPT | Performed by: PHYSICIAN ASSISTANT

## 2024-06-08 PROCEDURE — 258N000003 HC RX IP 258 OP 636: Mod: JZ | Performed by: INTERNAL MEDICINE

## 2024-06-08 PROCEDURE — 250N000013 HC RX MED GY IP 250 OP 250 PS 637: Performed by: PHYSICIAN ASSISTANT

## 2024-06-08 PROCEDURE — 85025 COMPLETE CBC W/AUTO DIFF WBC: CPT | Performed by: PHYSICIAN ASSISTANT

## 2024-06-08 PROCEDURE — 80053 COMPREHEN METABOLIC PANEL: CPT | Performed by: PHYSICIAN ASSISTANT

## 2024-06-08 PROCEDURE — 250N000012 HC RX MED GY IP 250 OP 636 PS 637: Performed by: INTERNAL MEDICINE

## 2024-06-08 PROCEDURE — 99233 SBSQ HOSP IP/OBS HIGH 50: CPT | Mod: FS | Performed by: PHYSICIAN ASSISTANT

## 2024-06-08 PROCEDURE — 83735 ASSAY OF MAGNESIUM: CPT | Performed by: STUDENT IN AN ORGANIZED HEALTH CARE EDUCATION/TRAINING PROGRAM

## 2024-06-08 PROCEDURE — 84100 ASSAY OF PHOSPHORUS: CPT | Performed by: STUDENT IN AN ORGANIZED HEALTH CARE EDUCATION/TRAINING PROGRAM

## 2024-06-08 PROCEDURE — 250N000013 HC RX MED GY IP 250 OP 250 PS 637: Performed by: INTERNAL MEDICINE

## 2024-06-08 RX ORDER — POLYETHYLENE GLYCOL 3350 17 G/17G
17 POWDER, FOR SOLUTION ORAL DAILY
Status: DISCONTINUED | OUTPATIENT
Start: 2024-06-08 | End: 2024-06-10 | Stop reason: HOSPADM

## 2024-06-08 RX ORDER — ACYCLOVIR 400 MG/1
400 TABLET ORAL 2 TIMES DAILY
Qty: 60 TABLET | Refills: 4 | Status: SHIPPED | OUTPATIENT
Start: 2024-06-08

## 2024-06-08 RX ORDER — PANTOPRAZOLE SODIUM 40 MG/1
40 TABLET, DELAYED RELEASE ORAL 2 TIMES DAILY
Qty: 60 TABLET | Refills: 3 | Status: SHIPPED | OUTPATIENT
Start: 2024-06-08

## 2024-06-08 RX ORDER — DEXAMETHASONE 4 MG/1
8 TABLET ORAL DAILY
Qty: 4 TABLET | Refills: 0 | Status: SHIPPED | OUTPATIENT
Start: 2024-06-11 | End: 2024-07-29

## 2024-06-08 RX ADMIN — PREDNISONE 100 MG: 50 TABLET ORAL at 16:27

## 2024-06-08 RX ADMIN — POLYETHYLENE GLYCOL 3350 17 G: 17 POWDER, FOR SOLUTION ORAL at 16:27

## 2024-06-08 RX ADMIN — LORAZEPAM 1 MG: 0.5 TABLET ORAL at 17:59

## 2024-06-08 RX ADMIN — DOCUSATE SODIUM 50 MG AND SENNOSIDES 8.6 MG 2 TABLET: 8.6; 5 TABLET, FILM COATED ORAL at 09:37

## 2024-06-08 RX ADMIN — FAMOTIDINE 20 MG: 20 TABLET ORAL at 21:41

## 2024-06-08 RX ADMIN — PANTOPRAZOLE SODIUM 40 MG: 40 TABLET, DELAYED RELEASE ORAL at 21:42

## 2024-06-08 RX ADMIN — OLANZAPINE 5 MG: 5 TABLET, ORALLY DISINTEGRATING ORAL at 21:42

## 2024-06-08 RX ADMIN — FAMOTIDINE 20 MG: 20 TABLET ORAL at 09:37

## 2024-06-08 RX ADMIN — ACYCLOVIR 400 MG: 400 TABLET ORAL at 09:37

## 2024-06-08 RX ADMIN — ETOPOSIDE 145 MG: 20 INJECTION, SOLUTION INTRAVENOUS at 15:05

## 2024-06-08 RX ADMIN — DOXORUBICIN HYDROCHLORIDE 0.3 MG: 2 INJECTION, SOLUTION INTRAVENOUS at 18:41

## 2024-06-08 RX ADMIN — PREDNISONE 100 MG: 50 TABLET ORAL at 09:36

## 2024-06-08 RX ADMIN — LORAZEPAM 1 MG: 0.5 TABLET ORAL at 04:23

## 2024-06-08 RX ADMIN — ACYCLOVIR 400 MG: 400 TABLET ORAL at 21:41

## 2024-06-08 RX ADMIN — PANTOPRAZOLE SODIUM 40 MG: 40 TABLET, DELAYED RELEASE ORAL at 09:37

## 2024-06-08 RX ADMIN — DOCUSATE SODIUM 50 MG AND SENNOSIDES 8.6 MG 2 TABLET: 8.6; 5 TABLET, FILM COATED ORAL at 21:42

## 2024-06-08 RX ADMIN — ENOXAPARIN SODIUM 40 MG: 40 INJECTION SUBCUTANEOUS at 16:27

## 2024-06-08 ASSESSMENT — ACTIVITIES OF DAILY LIVING (ADL)
ADLS_ACUITY_SCORE: 18

## 2024-06-08 NOTE — PROGRESS NOTES
Nursing Focus: Chemotherapy  D: Positive blood return via right chest port. Insertion site is clean/dry/intact, dressing intact with no complaints of pain.  Pre infusion assessment documented in Flowsheet (if applicable).    I: Premedications given per order (see electronic medical administration record). Dose #2 of Vincristine/Doxorubicin started to infuse over 22 hours. Reviewed pt teaching on chemotherapy side effects.  Pt denies need for further teaching. Chemotherapy double checked per protocol by two chemotherapy competent RN's.   A: Tolerating infusion well. C/o nausea; PRN Ativan given twice per pt request. Denies pain.   P: Continue to monitor urine output and symptoms of nausea. Screen for symptoms of toxicity.

## 2024-06-08 NOTE — PROGRESS NOTES
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     TODAY   - Day 3 of DA-R-EPOCH; continue chemotherapy per protocol   - Scheduled Miralax per patient request   - Continue supportive cares for nausea     HEME  # Primary mediastinal B-cell lymphoma  Follows with Dr. Oakes. Initially presented with cervical lymphadenopathy (12/2023). Initial FNA (1/29/24) suggestive of possible Hodgkin lymphoma; however excisional LN biopsy (2/9/24) shows large B-cell lymphoma with differential diagnosis of primary mediastinal large B-cell lymphoma vs DLBCL NOS. PET with cervical and mediastinal lymphadenopathy only. Overall presentation felt to be consistent with PMBCL given her age, gender, sites of involvement, dim CD30 expression, and prominent fibrotic background.  Although CD23 IHC was negative, IHC for , MAL, and PD-L1 were positive in majority of the neoplastic cells suggesting PMBCL over DLBCL NOS. [id number] gene expression profile also consistent with PMBCL. FISH with gain of BCL2 but no MYC, BCL2, or BCL6 rearrangements. PET showed bilateral cervical and mediastinal hypermetabolic lymphadenopathy (largest 4.4 cm with SUV max 27 in a prevascular LN); no disease below diaphragm. Baseline TTE (2/19/24) with LVEF 60%. Initial treatment R-DA-EPOCH (C1D1=2/22/24; C2D1= 3/14/24; C3D1=4/4/24). Which was overall tolerated well but Clementine had CINV and developed bilateral hand neuropathy.  PET (4/1/24) (after C2) showed evidence of complete metabolic response with resolution of cervical and mediastinal lymphadenopathy.  Have increased dose each cycle by one level (ex: C1=dose level 1, C2=dose level 2, etc.). She was last seen by Dr. Oakes in clinic on 4/25/2024 and then proceeded with C4 DA-R-EPOCH at dose level 4 but vincristine held to give time for improvement of bothersome neuropathy. C5D1 = 5/16/24 she received level 5 with a capped dose of 1.2 mg on Vincristine given neuropathy. She is currently being admitted for Cycle 6; will receive level 4 as cycle 5 c/b near syncopal episode and mouth pain.   - Port accessed on admission.  - Will continue mesna based on current cytoxan dose after discussion with pharmacy      Treatment Plan: Dose adjusted (level 4) R-EPOCH (C6D1=6/6/24)   - Rituximab  mg/m2 (600 mg) over 90 mins; D1  - Prednisone  mg BID;  D1-5  - Etoposide IV 86 mg/m2 (145 mg); D1-4  - Vincristine IV capped at 1.2 mg ; D1-4  - Doxorubicin IV 12.6 mg/m2 (29 mg);  D1-4  - Cyclophosphamide IV 1296 mg/m2 (2215 mg); D5   - Neulasta 6 mg subcutaneous -- D6 -- will need to schedule  - Pre-meds: Tylenol, Benadryl, Aloxi, Emend D5, Dexamethasone 8 mg (D6-7)     # Peripheral neuropathy   Endorsed L>R neuropathy in her fingertips. Secondary to chemotherapy. Vincristine was capped at 2 mg for C3. S/p C3 and neuropathy in fingertips have worsened with some difficulty using her phone or buttoning shirts and now neuropathy in bilateral feet that does not impact balance of walking. Seems to be improving just prior to admission for C4 per patient report. Vincristine omitted for C4 to give time for recovery. Vincristine subsequently capped at 1.2 mg for Cycle 5 and 6.   - Monitor closely  - Patient declined needing intervention at this time as not currently painful, numbness.      # CINV  Chemotherapy induced nausea and vomiting during previous cycles, managed with prn zofran, compazine, scheduled emend, and zyprexa at bedtime. Nausea regimen used with Cycle 5 effective; will replicate for Cycle 6. Report that nausea well  "control with breakthrough PRNs at home.  - Scheduled Zyprexa 5 mg at bedtime   - Compazine 5-10 mg q6h PRN and Ativan 1 mg q8hr PRN available for breakthrough nausea   - Scheduled Zofran 16 mg daily per chemo protocol HELD; will substitute with Aloxi   - Plan to give D5 emend     # Mucositis  Patient endorses previous oral ulceration that was painful but resolved after previous chemotherapy cycles. However, she has had increase of oral sores over the past few weeks that have been painful and making swallowing more difficult.  - MMW & biotene PRN, encourage frequent oral moisturization with saline rinses.     ID  # ID PPx  - Acyclovir 400 mg BID  - Levofloxacin 250 mg daily and fluconazole 200 mg daily when ANC <1  - Pentamidine monthly for PJP ppx; last given 6/3/24     CV  # H/o palpitations  # H/o tachycardia  Noted heart \"racing and pounding\" after C2 of chemo. Denied chest pain, dyspnea, lower extremity swelling, abdominal pain, nausea, dizziness. Echo from 2/2024 unremarkable. 3/25/24 BNP normal at <36. She again reported symptoms following C3, with increased dyspnea on exertion. CT PE 4/15/24 unremarkable. OA she reports that symptoms have improved overall; has less dyspnea on exertion, minimal dyspnea on rest, less frequent episodes of heart palpitations,  - No acute inpatient requirements at this time     GI  # H/o constipation  - Continue PTA miralax and senna BID.      MISC  # Fertility preservation  - Established with [facility], underwent egg retrieval on 2/18.      # GERD   - Continue PTA PPI BID      FEN:  - IVF; per chemotherapy protocol  - Standard lyte replacement protocol  - Regular diet as tolerated     Prophy/Misc:  - GI: Continue PTA PPI BID  - DVT: Enoxaparin ppx       Clinically Significant Risk Factors                                     # Financial/Environmental Concerns:             Disposition: Pending completion of chemotherapy; anticipated discharge 6/10  Medically Ready for Discharge: " Anticipated in 2-4 Days      This patient was discussed with Dr. Ha    I spent >30 minutes face-to-face or coordinating care of Clementine Kathleen. Over 50% of our time on the unit was spent counseling the patient and/or coordinating care.    Fariha Pack PA-C   Hematology/ Oncology   Pager [phone]    Interval History   Nursing notes reviewed. Clementine is doing ok this morning. She continues to have nausea but this is well controlled with PRNs. She is requesting Miralax as her bowel movement this morning was a bit firm and Miralax usually helps with this. Will send refills for Acyclovir and protonix today.  All questions answered at this time.     Physical Exam   Temp: 97.9  F (36.6  C) Temp src: Oral BP: 119/68 Pulse: 55   Resp: 14 SpO2: 100 % O2 Device: None (Room air)    Vitals:    06/06/24 1200 06/07/24 0813   Weight: 60.2 kg (132 lb 12.8 oz) 60.8 kg (134 lb)     Vital Signs with Ranges  Temp:  [97.6  F (36.4  C)-98.1  F (36.7  C)] 97.9  F (36.6  C)  Pulse:  [55-78] 55  Resp:  [14-18] 14  BP: (111-138)/(55-72) 119/68  SpO2:  [97 %-100 %] 100 %  I/O last 3 completed shifts:  In: 836 [IV Piggyback:836]  Out: 800 [Urine:800]    Constitutional: Pleasant female seen resting comfortably in bed in NAD. Alert and interactive.   HEENT: NCAT. PERRL, EOMI, anicteric sclera. Oral mucosa pink and moist with no lesions or thrush.  Respiratory: Non-labored breathing, good air exchange, lungs clear to auscultation bilaterally. No cough or wheeze noted.   Cardiovascular: Regular rate and rhythm. No murmur or rub.   GI: Normoactive bowel sounds. Abdomen soft, non-distended, and non-tender. No palpable masses or organomegaly.  Skin: Warm and dry. No concerning lesions or rash on exposed surfaces.  Musculoskeletal: Extremities grossly normal, non-tender, no edema. Strong peripheral pulses. Good strength and ROM in bed.   Neuropsychiatric: Mentation and affect appear normal/appropriate.  Vascular Access: Port is CDI without  erythema, swelling, or discharge    Medications   Current Facility-Administered Medications   Medication Dose Route Frequency Provider Last Rate Last Admin    acetaminophen (TYLENOL) tablet 650 mg  650 mg Oral Q4H PRN Fariha Pack PA-C        acyclovir (ZOVIRAX) tablet 400 mg  400 mg Oral BID Fariha Pack PA-C   400 mg at 06/08/24 0937    albuterol (PROVENTIL HFA/VENTOLIN HFA) inhaler  1-2 puff Inhalation Once PRN Vanessa Oakes MD        albuterol (PROVENTIL) neb solution 2.5 mg  2.5 mg Nebulization Once PRN Vanessa Oakes MD        Chemotherapy Infusing-Continuous Infusion   Does not apply Q8H Vanessa Oakes MD 25.3 mL/hr at 06/07/24 0400 Given at 06/08/24 0406    [START ON 6/10/2024] cycloPHOSphamide (CYTOXAN) 2,215 mg in sodium chloride 0.9 % 660.75 mL infusion  1,296 mg/m2 (Treatment Plan Recorded) Intravenous Once Vanessa Oakes MD        [START ON 6/12/2024] dexAMETHasone (DECADRON) tablet 8 mg  8 mg Oral Daily Vanessa Oakes MD        diphenhydrAMINE (BENADRYL) injection 50 mg  50 mg Intravenous Once PRN Vanessa Oakes MD        enoxaparin ANTICOAGULANT (LOVENOX) injection 40 mg  40 mg Subcutaneous Q24H Fariha Pack PA-C   40 mg at 06/07/24 1612    EPINEPHrine PF (ADRENALIN) injection 0.3 mg  0.3 mg Intramuscular Q5 Min PRN Vanessa Oakes MD        etoposide (TOPOSAR) 145 mg in sodium chloride 0.9 % 557.25 mL infusion  86 mg/m2 (Treatment Plan Recorded) Intravenous Q22H Vanessa Oakes MD 25.3 mL/hr at 06/07/24 1804 145 mg at 06/07/24 1804    famotidine (PEPCID) injection 20 mg  20 mg Intravenous Once PRN Vanessa Oakes MD        famotidine (PEPCID) tablet 20 mg  20 mg Oral BID Fariha Pack PA-C   20 mg at 06/08/24 0937    [START ON 6/10/2024] fosaprepitant (EMEND) 150 mg in sodium chloride 0.9 % 275 mL intermittent infusion  150 mg Intravenous Once Vanessa Oakes MD        heparin lock flush 10 unit/mL injection 5-10 mL  5-10 mL Intracatheter Q1H PRN Fariha Pack PA-C        heparin lock flush  10 unit/mL injection 5-10 mL  5-10 mL Intracatheter Q24H Fariha Pack PA-C        heparin lock flush 100 unit/mL injection 5-10 mL  5-10 mL Intracatheter Q28 Days Fariha Pack PA-C        LORazepam (ATIVAN) injection 0.5-1 mg  0.5-1 mg Intravenous Q6H PRN Vanessa Oakes MD        LORazepam (ATIVAN) tablet 0.5-1 mg  0.5-1 mg Oral Q6H PRN Vanessa Oakes MD   1 mg at 06/08/24 0423    LORazepam (ATIVAN) tablet 1 mg  1 mg Oral Q8H PRN Fariha Pack PA-C        magic mouthwash suspension (diphenhydramine, lidocaine, aluminum-magnesium & simethicone)   Swish & Spit Q6H PRN Fariha Pack PA-C        meperidine (DEMEROL) injection 25 mg  25 mg Intravenous Q30 Min PRN Vanessa Oakes MD        methylPREDNISolone sodium succinate (solu-MEDROL) injection 125 mg  125 mg Intravenous Once PRN Vanessa Oakes MD        metoclopramide (REGLAN) tablet 10 mg  10 mg Oral 4x Daily PRN Fariha Pack PA-C        No rectal suppositories if WBC less than 1000/microliters or platelets less than 50,000/ L   Does not apply Continuous PRN Fariha Pack PA-C        OLANZapine zydis (zyPREXA) ODT tab 5 mg  5 mg Oral At Bedtime Fariha Pack PA-C   5 mg at 06/07/24 2119    pantoprazole (PROTONIX) EC tablet 40 mg  40 mg Oral BID Fariha Pack PA-C   40 mg at 06/08/24 0937    polyethylene glycol (MIRALAX) Packet 17 g  17 g Oral Daily PRN Fariha Pack PA-C        predniSONE (DELTASONE) tablet 100 mg  100 mg Oral BID Vanessa Oakes MD   100 mg at 06/08/24 0936    prochlorperazine (COMPAZINE) tablet 5-10 mg  5-10 mg Oral Q6H PRN Irina Ha MD        Or    prochlorperazine (COMPAZINE) injection 5-10 mg  5-10 mg Intravenous Q6H PRN Irina Ha MD        senna-docusate (SENOKOT-S/PERICOLACE) 8.6-50 MG per tablet 1 tablet  1 tablet Oral BID PRN Fariha Pack PA-C        Or    senna-docusate (SENOKOT-S/PERICOLACE) 8.6-50 MG per tablet 2 tablet  2 tablet Oral BID PRN  Debtravis, Fariha Guillory PA-C        senna-docusate (SENOKOT-S/PERICOLACE) 8.6-50 MG per tablet 2 tablet  2 tablet Oral BID Vanessa Oakes MD   2 tablet at 06/08/24 0937    sodium chloride (PF) 0.9% PF flush 10-20 mL  10-20 mL Intracatheter q1 min prn Debol, DARLIN Welch-C        sodium chloride (PF) 0.9% PF flush 10-20 mL  10-20 mL Intracatheter Q1H PRN Debol, Fariha DARLIN Guillory-IFTIKHAR        sodium chloride (PF) 0.9% PF flush 10-20 mL  10-20 mL Intracatheter Q28 Days Debol, Fariha Guillory PA-C        sodium chloride 0.9% BOLUS 500 mL  500 mL Intravenous Once PRN Vanessa Oakes MD        sucralfate (CARAFATE) tablet 1 g  1 g Oral 4x Daily PRN Fariha Pack PA-C        vinCRIStine (ONCOVIN) 0.3 mg, DOXOrubicin (ADRIAMYCIN) 29 mg in sodium chloride 0.9 % 1,064.8 mL infusion  0.3 mg Intravenous Q22H Vanessa Oakes MD 48.4 mL/hr at 06/07/24 1811 0.3 mg at 06/07/24 2006       Data   CBC  Recent Labs   Lab 06/08/24  0407 06/07/24  0621 06/06/24  1246 06/05/24  1454   WBC 10.6 8.9 4.3 8.4   RBC 2.79* 2.75* 2.98* 2.91*   HGB 9.2* 9.1* 9.7* 9.8*   HCT 27.9* 27.3* 30.3* 29.0*    99 102* 100   MCH 33.0 33.1* 32.6 33.7*   MCHC 33.0 33.3 32.0 33.8   RDW 17.2* 17.2* 17.3* 17.3*    373 367 384     CMP  Recent Labs   Lab 06/08/24  0407 06/07/24  0401 06/06/24  1246 06/05/24  1454    139 138 139   POTASSIUM 3.9 4.4 4.2 4.6   CHLORIDE 107 109* 106 106   CO2 24 23 22 23   ANIONGAP 7 7 10 10   * 175* 98 89   BUN 8.5 11.0 11.6 15.7   CR 0.58 0.61 0.80 0.97*   GFRESTIMATED >90 >90 >90 81   DEE 9.1 8.9 9.2 9.1   MAG 1.9 1.8 1.7  --    PHOS 4.8* 3.3 4.1  --    PROTTOTAL 5.6* 5.8* 6.2* 6.4   ALBUMIN 3.9 3.8 4.0 4.2   BILITOTAL 0.2 <0.2 <0.2 <0.2   ALKPHOS 56 61 66 74   AST 12 14 18 21   ALT 11 13 13 17     INR  Recent Labs   Lab 06/08/24  0407 06/07/24  0401 06/06/24  1246   INR 1.11 1.07 1.06       Results for orders placed or performed during the hospital encounter of 05/25/24   CT Head w/o Contrast     Narrative    CT HEAD W/O CONTRAST 2024 7:44 PM    Provided History: to assess for intracranial etiology of syncope    Comparison: CT soft tissue neck 2024. Pet/CT 2024    Technique: Using multidetector thin collimation helical acquisition  technique, axial, coronal and sagittal CT images from the skull base  to the vertex were obtained without intravenous contrast.     Findings:  No evidence of intracranial hemorrhage, mass effect, or  midline shift. No acute loss of gray-white differentiation. Small  appearance of the ventricular system and mild protrusion of the  bilateral cerebellar tonsils through the foramen magnum,, which  appears similar to CT soft tissue neck exam on 2024. Mild  effacement of the perimesencephalic basal cisterns.    Intact bony calvaria and skull base. Mastoid air cells are clear.  Paranasal sinuses are clear.      Impression    Impression: No acute intracranial pathology.    I have personally reviewed the examination and initial interpretation  and I agree with the findings.    SHAHID DE LOS SANTOS MD         SYSTEM ID:  [id number]   Echo Complete     Value    LVEF  60-65%    Narrative    [id number]  [id number]  [id number]  [id number]^GABE^ROBBIN^ANNIKA     [facility]  Echocardiography Laboratory  [address] [place] [address]     Name: JAMSHID THORNTON  MRN: [id number]  : 1995  Study Date: 2024 12:17 PM  Age: 28 yrs  Gender: Female  Patient Location: [facility]  Reason For Study: Chemo  Ordering Physician: ROBBIN BERNAL  Performed By: Gabriela Burgos RDCS     BSA: 1.7 m2  Height: 66 in  Weight: 133 lb  HR: 78  BP: 99/54 mmHg  ______________________________________________________________________________  Procedure  Complete Portable Echo Adult. Echocardiogram with two-dimensional, color and  spectral Doppler performed.  ______________________________________________________________________________  Interpretation Summary  Global and  regional left ventricular function is normal with an EF of 60-65%.  Right ventricular function, chamber size, wall motion, and thickness are  normal.  No pericardial effusion is present.  ______________________________________________________________________________  Left Ventricle  Global and regional left ventricular function is normal with an EF of 60-65%.  Left ventricular diastolic function is normal.     Right Ventricle  Right ventricular function, chamber size, wall motion, and thickness are  normal.     Atria  Both atria appear normal.     Mitral Valve  The mitral valve is normal.     Aortic Valve  Aortic valve is normal in structure and function. The aortic valve is  tricuspid.     Tricuspid Valve  The tricuspid valve is normal.     Pulmonic Valve  The pulmonic valve is normal.     Vessels  The aorta root is normal. The thoracic aorta is normal. The pulmonary artery  cannot be assessed. The inferior vena cava was normal in size with preserved  respiratory variability.     Pericardium  No pericardial effusion is present.     Compared to Previous Study  No significant changes noted.  ______________________________________________________________________________  Doppler Measurements & Calculations  MV E max katerine: 62.7 cm/sec  MV A max katerine: 53.2 cm/sec  MV E/A: 1.2  MV dec slope: 363.6 cm/sec2  MV dec time: 0.17 sec  E/E' av.7  Lateral E/e': 3.9  Medial E/e': 5.4     ______________________________________________________________________________  Report approved by: MD Graeme Rodriguez 2024 03:08 PM

## 2024-06-08 NOTE — PROGRESS NOTES
"Time 1900-0730    /72 (BP Location: Right arm)   Pulse 58   Temp 97.7  F (36.5  C) (Oral)   Resp 14   Ht 1.651 m (5' 5\")   Wt 60.8 kg (134 lb)   SpO2 97%   BMI 22.30 kg/m      Reason for admission: DLBCL  Activity: UAL  Pain: denies  Neuro: peripheral neuropathy baseline  Cardiac: WNL  Respiratory: WNL  GI/: LBM 6/7. Voiding adequately  Diet: Regular  Lines: right chest port - good blood return  Wounds: skin intact  Labs/imaging: WBC 10.6, Hgb 9.2, Plt 381. K+ 3.9, Mg1.9. Phos 4.8. Uric acid 1.9      New changes this shift: No changes. Pt continues to c/o nausea but no vomiting.      Plan: Fall prevention. Pain control. Ongoing assessment and interventions as needed.      Continue to monitor and follow POC    "

## 2024-06-09 DIAGNOSIS — K21.00 GASTROESOPHAGEAL REFLUX DISEASE WITH ESOPHAGITIS, UNSPECIFIED WHETHER HEMORRHAGE: ICD-10-CM

## 2024-06-09 LAB
ABO/RH(D): NORMAL
ALBUMIN SERPL BCG-MCNC: 3.8 G/DL (ref 3.5–5.2)
ALP SERPL-CCNC: 52 U/L (ref 40–150)
ALT SERPL W P-5'-P-CCNC: 11 U/L (ref 0–50)
ANION GAP SERPL CALCULATED.3IONS-SCNC: 8 MMOL/L (ref 7–15)
ANTIBODY SCREEN: NEGATIVE
AST SERPL W P-5'-P-CCNC: 12 U/L (ref 0–45)
BASOPHILS # BLD AUTO: 0 10E3/UL (ref 0–0.2)
BASOPHILS NFR BLD AUTO: 0 %
BILIRUB SERPL-MCNC: 0.2 MG/DL
BUN SERPL-MCNC: 12.6 MG/DL (ref 6–20)
CALCIUM SERPL-MCNC: 9.1 MG/DL (ref 8.6–10)
CHLORIDE SERPL-SCNC: 107 MMOL/L (ref 98–107)
CREAT SERPL-MCNC: 0.55 MG/DL (ref 0.51–0.95)
DEPRECATED HCO3 PLAS-SCNC: 24 MMOL/L (ref 22–29)
EGFRCR SERPLBLD CKD-EPI 2021: >90 ML/MIN/1.73M2
EOSINOPHIL # BLD AUTO: 0 10E3/UL (ref 0–0.7)
EOSINOPHIL NFR BLD AUTO: 0 %
ERYTHROCYTE [DISTWIDTH] IN BLOOD BY AUTOMATED COUNT: 16.8 % (ref 10–15)
FIBRINOGEN PPP-MCNC: 191 MG/DL (ref 170–490)
GLUCOSE SERPL-MCNC: 128 MG/DL (ref 70–99)
HCT VFR BLD AUTO: 27.9 % (ref 35–47)
HGB BLD-MCNC: 9 G/DL (ref 11.7–15.7)
IMM GRANULOCYTES # BLD: 0.1 10E3/UL
IMM GRANULOCYTES NFR BLD: 1 %
INR PPP: 1.1 (ref 0.85–1.15)
LYMPHOCYTES # BLD AUTO: 0.3 10E3/UL (ref 0.8–5.3)
LYMPHOCYTES NFR BLD AUTO: 5 %
MAGNESIUM SERPL-MCNC: 1.9 MG/DL (ref 1.7–2.3)
MCH RBC QN AUTO: 32.3 PG (ref 26.5–33)
MCHC RBC AUTO-ENTMCNC: 32.3 G/DL (ref 31.5–36.5)
MCV RBC AUTO: 100 FL (ref 78–100)
MONOCYTES # BLD AUTO: 0.6 10E3/UL (ref 0–1.3)
MONOCYTES NFR BLD AUTO: 9 %
NEUTROPHILS # BLD AUTO: 5.4 10E3/UL (ref 1.6–8.3)
NEUTROPHILS NFR BLD AUTO: 85 %
NRBC # BLD AUTO: 0 10E3/UL
NRBC BLD AUTO-RTO: 0 /100
PHOSPHATE SERPL-MCNC: 4.1 MG/DL (ref 2.5–4.5)
PLATELET # BLD AUTO: 373 10E3/UL (ref 150–450)
POTASSIUM SERPL-SCNC: 3.8 MMOL/L (ref 3.4–5.3)
PROT SERPL-MCNC: 5.4 G/DL (ref 6.4–8.3)
RBC # BLD AUTO: 2.79 10E6/UL (ref 3.8–5.2)
SODIUM SERPL-SCNC: 139 MMOL/L (ref 135–145)
SPECIMEN EXPIRATION DATE: NORMAL
URATE SERPL-MCNC: 2.1 MG/DL (ref 2.4–5.7)
WBC # BLD AUTO: 6.3 10E3/UL (ref 4–11)

## 2024-06-09 PROCEDURE — 120N000002 HC R&B MED SURG/OB UMMC

## 2024-06-09 PROCEDURE — 83735 ASSAY OF MAGNESIUM: CPT | Performed by: STUDENT IN AN ORGANIZED HEALTH CARE EDUCATION/TRAINING PROGRAM

## 2024-06-09 PROCEDURE — 258N000003 HC RX IP 258 OP 636: Mod: JZ | Performed by: STUDENT IN AN ORGANIZED HEALTH CARE EDUCATION/TRAINING PROGRAM

## 2024-06-09 PROCEDURE — 85384 FIBRINOGEN ACTIVITY: CPT | Performed by: PHYSICIAN ASSISTANT

## 2024-06-09 PROCEDURE — 250N000013 HC RX MED GY IP 250 OP 250 PS 637: Performed by: INTERNAL MEDICINE

## 2024-06-09 PROCEDURE — 80053 COMPREHEN METABOLIC PANEL: CPT | Performed by: PHYSICIAN ASSISTANT

## 2024-06-09 PROCEDURE — 258N000003 HC RX IP 258 OP 636: Mod: JZ | Performed by: INTERNAL MEDICINE

## 2024-06-09 PROCEDURE — 250N000011 HC RX IP 250 OP 636: Mod: JZ | Performed by: INTERNAL MEDICINE

## 2024-06-09 PROCEDURE — 250N000013 HC RX MED GY IP 250 OP 250 PS 637: Performed by: PHYSICIAN ASSISTANT

## 2024-06-09 PROCEDURE — 84550 ASSAY OF BLOOD/URIC ACID: CPT | Performed by: PHYSICIAN ASSISTANT

## 2024-06-09 PROCEDURE — 85610 PROTHROMBIN TIME: CPT | Performed by: PHYSICIAN ASSISTANT

## 2024-06-09 PROCEDURE — 84100 ASSAY OF PHOSPHORUS: CPT | Performed by: STUDENT IN AN ORGANIZED HEALTH CARE EDUCATION/TRAINING PROGRAM

## 2024-06-09 PROCEDURE — 85025 COMPLETE CBC W/AUTO DIFF WBC: CPT | Performed by: PHYSICIAN ASSISTANT

## 2024-06-09 PROCEDURE — 250N000012 HC RX MED GY IP 250 OP 636 PS 637: Performed by: INTERNAL MEDICINE

## 2024-06-09 PROCEDURE — 86900 BLOOD TYPING SEROLOGIC ABO: CPT | Performed by: PHYSICIAN ASSISTANT

## 2024-06-09 PROCEDURE — 250N000011 HC RX IP 250 OP 636: Mod: JZ | Performed by: PHYSICIAN ASSISTANT

## 2024-06-09 PROCEDURE — 99233 SBSQ HOSP IP/OBS HIGH 50: CPT | Mod: FS | Performed by: PHYSICIAN ASSISTANT

## 2024-06-09 PROCEDURE — 250N000011 HC RX IP 250 OP 636: Performed by: STUDENT IN AN ORGANIZED HEALTH CARE EDUCATION/TRAINING PROGRAM

## 2024-06-09 RX ADMIN — LORAZEPAM 1 MG: 0.5 TABLET ORAL at 21:39

## 2024-06-09 RX ADMIN — PANTOPRAZOLE SODIUM 40 MG: 40 TABLET, DELAYED RELEASE ORAL at 08:55

## 2024-06-09 RX ADMIN — DOCUSATE SODIUM 50 MG AND SENNOSIDES 8.6 MG 2 TABLET: 8.6; 5 TABLET, FILM COATED ORAL at 08:55

## 2024-06-09 RX ADMIN — ACYCLOVIR 400 MG: 400 TABLET ORAL at 08:55

## 2024-06-09 RX ADMIN — ETOPOSIDE 15 MG: 20 INJECTION, SOLUTION INTRAVENOUS at 14:56

## 2024-06-09 RX ADMIN — PANTOPRAZOLE SODIUM 40 MG: 40 TABLET, DELAYED RELEASE ORAL at 20:41

## 2024-06-09 RX ADMIN — POLYETHYLENE GLYCOL 3350 17 G: 17 POWDER, FOR SOLUTION ORAL at 08:55

## 2024-06-09 RX ADMIN — ENOXAPARIN SODIUM 40 MG: 40 INJECTION SUBCUTANEOUS at 17:12

## 2024-06-09 RX ADMIN — LORAZEPAM 1 MG: 0.5 TABLET ORAL at 08:55

## 2024-06-09 RX ADMIN — FAMOTIDINE 20 MG: 20 TABLET ORAL at 08:55

## 2024-06-09 RX ADMIN — LORAZEPAM 1 MG: 0.5 TABLET ORAL at 16:19

## 2024-06-09 RX ADMIN — FAMOTIDINE 20 MG: 20 TABLET ORAL at 20:41

## 2024-06-09 RX ADMIN — OLANZAPINE 5 MG: 5 TABLET, ORALLY DISINTEGRATING ORAL at 21:39

## 2024-06-09 RX ADMIN — ETOPOSIDE 145 MG: 20 INJECTION, SOLUTION INTRAVENOUS at 17:25

## 2024-06-09 RX ADMIN — ACYCLOVIR 400 MG: 400 TABLET ORAL at 20:41

## 2024-06-09 RX ADMIN — PREDNISONE 100 MG: 50 TABLET ORAL at 17:12

## 2024-06-09 RX ADMIN — DOXORUBICIN HYDROCHLORIDE 0.3 MG: 2 INJECTION, SOLUTION INTRAVENOUS at 17:25

## 2024-06-09 RX ADMIN — PREDNISONE 100 MG: 50 TABLET ORAL at 08:55

## 2024-06-09 RX ADMIN — DOCUSATE SODIUM 50 MG AND SENNOSIDES 8.6 MG 2 TABLET: 8.6; 5 TABLET, FILM COATED ORAL at 20:41

## 2024-06-09 ASSESSMENT — ACTIVITIES OF DAILY LIVING (ADL)
ADLS_ACUITY_SCORE: 18
DEPENDENT_IADLS:: INDEPENDENT
ADLS_ACUITY_SCORE: 18

## 2024-06-09 NOTE — PROGRESS NOTES
Nursing Focus: Chemotherapy  D: Positive blood return via port. Insertion site is clean/dry/intact, dressing intact with no complaints of pain.  Urine output is recorded in intake in Doc Flowsheet.    I: Premedications given per order (see electronic medical administration record). Dose #4 of Etoposide started to infuse over 22 hours. Reviewed pt teaching on chemotherapy side effects.  Pt denies need for further teaching. Chemotherapy double checked per protocol by two chemotherapy competent RN's.   A: Tolerating procedure well. Denies nausea and or pain.   P: Continue to monitor urine output and symptoms of nausea. Screen for symptoms of toxicity.    Nursing Focus: Chemotherapy  D: Positive blood return via port. Insertion site is clean/dry/intact, dressing intact with no complaints of pain.  Urine output is recorded in intake in Doc Flowsheet.    I: Premedications given per order (see electronic medical administration record). Dose #4 of Clemente/Doxorubicin started to infuse over 22 hours. Reviewed pt teaching on chemotherapy side effects.  Pt denies need for further teaching. Chemotherapy double checked per protocol by two chemotherapy competent RN's.   A: Tolerating procedure well. Denies nausea and or pain.   P: Continue to monitor urine output and symptoms of nausea. Screen for symptoms of toxicity.

## 2024-06-09 NOTE — CONSULTS
Care Management Initial Consult    General Information  Assessment completed with: Patient,    Type of CM/SW Visit: Compliance    Primary Care Provider verified and updated as needed: Yes   Readmission within the last 30 days: planned readmission      Reason for Consult: discharge planning  Advance Care Planning: Advance Care Planning Reviewed: no concerns identified          Communication Assessment  Patient's communication style: spoken language (English or Bilingual)    Hearing Difficulty or Deaf: no   Wear Glasses or Blind: no    Cognitive  Cognitive/Neuro/Behavioral: WDL                      Living Environment:   People in home: significant other  Tobias  Current living Arrangements: apartment      Able to return to prior arrangements: yes       Family/Social Support:  Care provided by: spouse/significant other  Provides care for: no one  Marital Status: Lives with Significant Other  Significant Other, Parent(s)       Tobias  Description of Support System: Supportive, Involved    Support Assessment: Adequate family and caregiver support, Adequate social supports    Current Resources:   Patient receiving home care services: No     Community Resources: None  Equipment currently used at home: none  Supplies currently used at home: None    Employment/Financial:  Employment Status: employed full-time        Financial Concerns: none   Referral to Financial Worker: No       Does the patient's insurance plan have a 3 day qualifying hospital stay waiver?  No    Lifestyle & Psychosocial Needs:  Social Determinants of Health     Food Insecurity: Not on file   Depression: Not at risk (2/6/2024)    PHQ-2     PHQ-2 Score: 0   Housing Stability: Not on file   Tobacco Use: Low Risk  (5/16/2024)    Patient History     Smoking Tobacco Use: Never     Smokeless Tobacco Use: Never     Passive Exposure: Never   Financial Resource Strain: Not on file   Alcohol Use: Not on file   Transportation Needs: Not on file   Physical  Activity: Not on file   Interpersonal Safety: Not on file   Stress: Not on file   Social Connections: Not on file   Health Literacy: Not on file       Functional Status:  Prior to admission patient needed assistance:   Dependent ADLs:: Independent  Dependent IADLs:: Independent       Mental Health Status:  Mental Health Status: No Current Concerns       Chemical Dependency Status:  Chemical Dependency Status: No Current Concerns             Values/Beliefs:  Spiritual, Cultural Beliefs, Hinduism Practices, Values that affect care: no               Additional Information:  SW met with the pt to introduce self and role. Pt is 28 year old female currently admitted for cycle 6 of her chemotherapy. Pt shared she had no questions or concerns for discharge at this time. She noted she lives with her SO Tobias and has good support from her parents as well. PCP confirmed, no HCD, pt declined wanting to complete at this time.    Pt aware to reach out should any discharge needs or questions come up prior to discharge.    JOSE EDUARDO Hopkins, LICSW  [facility]  Weekend Coverage- Units 5A and 5C  [facility] Adult Social Work- unit 5A/C  Vocera:    5A Onc 5201 thru 5219 SW    5A Onc 5220 thru 5240    5C OFF SERVICE 5401 thru 5416    5C OFF SERVICE 5417 thru 5432

## 2024-06-09 NOTE — PROGRESS NOTES
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     TODAY   - Day 4 of DA-R-EPOCH; continue chemotherapy per protocol   - Continue supportive cares for nausea   - Outpatient follow up arranged for anticipated discharge 6/10    HEME  # Primary mediastinal B-cell lymphoma  Follows with Dr. Oakes. Initially presented with cervical lymphadenopathy (12/2023). Initial FNA (1/29/24) suggestive of possible Hodgkin lymphoma; however excisional LN biopsy (2/9/24) shows large B-cell lymphoma with differential diagnosis of primary mediastinal large B-cell lymphoma vs DLBCL NOS. PET with cervical and mediastinal lymphadenopathy only. Overall presentation felt to be consistent with PMBCL given her age, gender, sites of involvement, dim CD30 expression, and prominent fibrotic background.  Although CD23 IHC was negative, IHC for , MAL, and PD-L1 were positive in majority of the neoplastic cells suggesting PMBCL over DLBCL NOS. Iumyl9DV gene expression profile also consistent with PMBCL. FISH with gain of BCL2 but no MYC, BCL2, or BCL6 rearrangements. PET showed bilateral cervical and mediastinal hypermetabolic lymphadenopathy (largest 4.4 cm with SUV max 27 in a prevascular LN); no disease below diaphragm. Baseline TTE (2/19/24) with LVEF 60%. Initial treatment R-DA-EPOCH (C1D1=2/22/24; C2D1= 3/14/24; C3D1=4/4/24). Which was overall tolerated well but Clementine had CINV and developed bilateral hand neuropathy.  PET (4/1/24) (after C2) showed evidence of complete metabolic response with resolution of cervical and  mediastinal lymphadenopathy. Have increased dose each cycle by one level (ex: C1=dose level 1, C2=dose level 2, etc.). She was last seen by Dr. Oakes in clinic on 4/25/2024 and then proceeded with C4 DA-R-EPOCH at dose level 4 but vincristine held to give time for improvement of bothersome neuropathy. C5D1 = 5/16/24 she received level 5 with a capped dose of 1.2 mg on Vincristine given neuropathy. She is currently being admitted for Cycle 6; will receive level 4 as cycle 5 c/b near syncopal episode and mouth pain.   - Port accessed on admission.     Treatment Plan: Dose adjusted (level 4) R-EPOCH (C6D1=6/6/24)   - Rituximab  mg/m2 (600 mg) over 90 mins; D1  - Prednisone  mg BID;  D1-5  - Etoposide IV 86 mg/m2 (145 mg); D1-4  - Vincristine IV capped at 1.2 mg ; D1-4  - Doxorubicin IV 12.6 mg/m2 (29 mg);  D1-4  - Cyclophosphamide IV 1296 mg/m2 (2215 mg); D5   - Neulasta 6 mg subcutaneous -- D6 -- will need to schedule  - Pre-meds: Tylenol, Benadryl, Aloxi, Emend D5, Dexamethasone 8 mg (D6-7)     # Peripheral neuropathy   Endorsed L>R neuropathy in her fingertips. Secondary to chemotherapy. Vincristine was capped at 2 mg for C3. S/p C3 and neuropathy in fingertips have worsened with some difficulty using her phone or buttoning shirts and now neuropathy in bilateral feet that does not impact balance of walking. Seems to be improving just prior to admission for C4 per patient report. Vincristine omitted for C4 to give time for recovery. Vincristine subsequently capped at 1.2 mg for Cycle 5 and 6.   - Monitor closely  - Patient declined needing intervention at this time as not currently painful, numbness.      # CINV, well controlled   Chemotherapy induced nausea and vomiting during previous cycles, managed with prn zofran, compazine, scheduled emend, and zyprexa at bedtime. Nausea regimen used with Cycle 5 effective; will replicate for Cycle 6. Report that nausea well control with breakthrough PRNs at home.  -  "Scheduled Zyprexa 5 mg at bedtime   - Compazine 5-10 mg q6h PRN and Ativan 1 mg q8hr PRN available for breakthrough nausea   - Scheduled Zofran 16 mg daily per chemo protocol HELD; will substitute with Aloxi   - Plan to give D5 emend     # Mucositis  Patient endorses previous oral ulceration that was painful but resolved after previous chemotherapy cycles. However, she has had increase of oral sores over the past few weeks that have been painful and making swallowing more difficult.  - MMW & biotene PRN, encourage frequent oral moisturization with saline rinses.     ID  # ID PPx  - Acyclovir 400 mg BID  - Levofloxacin 250 mg daily and fluconazole 200 mg daily when ANC <1  - Pentamidine monthly for PJP ppx; last given 6/3/24; would next be due 7/1.      CV  # H/o palpitations  # H/o tachycardia  Noted heart \"racing and pounding\" after C2 of chemo. Denied chest pain, dyspnea, lower extremity swelling, abdominal pain, nausea, dizziness. Echo from 2/2024 unremarkable. 3/25/24 BNP normal at <36. She again reported symptoms following C3, with increased dyspnea on exertion. CT PE 4/15/24 unremarkable. OA she reports that symptoms have improved overall; has less dyspnea on exertion, minimal dyspnea on rest, less frequent episodes of heart palpitations,  - No acute inpatient requirements at this time     GI  # H/o constipation  - Continue PTA miralax and senna BID.      MISC  # Fertility preservation  - Established with [facility], underwent egg retrieval on 2/18.      # GERD   - Continue PTA PPI BID      FEN:  - IVF; per chemotherapy protocol  - Standard lyte replacement protocol  - Regular diet as tolerated     Prophy/Misc:  - GI: Continue PTA PPI BID  - DVT: Enoxaparin ppx       Clinically Significant Risk Factors                                     # Financial/Environmental Concerns:             Disposition: Pending completion of chemotherapy; anticipated discharge 6/10  Medically Ready for Discharge: Anticipated in 2-4 " Days      This patient was discussed with Dr. Ha    I spent >35 minutes face-to-face or coordinating care of Clementine Kathleen. Over 50% of our time on the unit was spent counseling the patient and/or coordinating care.    Fariha Pack PA-C   Hematology/ Oncology   Pager [phone]    Interval History   Nursing notes reviewed. Clementine is feeling well. She continues to have some nausea but reports it is well controlled with PRNs. Bedside RN has some concerns regarding precipitation in the line, chemo pharmacist was on the way to examine but reports that this is not uncommon. All questions answered at this time.     Physical Exam   Temp: 97.7  F (36.5  C) Temp src: Oral BP: 115/79 Pulse: 59   Resp: 16 SpO2: 97 % O2 Device: None (Room air)    Vitals:    06/06/24 1200 06/07/24 0813 06/08/24 1100   Weight: 60.2 kg (132 lb 12.8 oz) 60.8 kg (134 lb) 62.2 kg (137 lb 3.2 oz)     Vital Signs with Ranges  Temp:  [97.6  F (36.4  C)-98.1  F (36.7  C)] 97.7  F (36.5  C)  Pulse:  [55-95] 59  Resp:  [14-16] 16  BP: (111-128)/(59-79) 115/79  SpO2:  [94 %-100 %] 97 %  No intake/output data recorded.    Constitutional: Pleasant female seen resting comfortably in bed in NAD. Alert and interactive.   HEENT: NCAT. PERRL, EOMI, anicteric sclera. Oral mucosa pink and moist with no lesions or thrush.  Respiratory: Non-labored breathing, good air exchange, lungs clear to auscultation bilaterally. No cough or wheeze noted.   Cardiovascular: Regular rate and rhythm. No murmur or rub.   GI: Normoactive bowel sounds. Abdomen soft, non-distended, and non-tender. No palpable masses or organomegaly.  Skin: Warm and dry. No concerning lesions or rash on exposed surfaces.  Musculoskeletal: Extremities grossly normal, non-tender, no edema. Strong peripheral pulses. Good strength and ROM in bed.   Neuropsychiatric: Mentation and affect appear normal/appropriate.  Vascular Access: Port is CDI without erythema, swelling, or discharge    Medications    Current Facility-Administered Medications   Medication Dose Route Frequency Provider Last Rate Last Admin    acetaminophen (TYLENOL) tablet 650 mg  650 mg Oral Q4H PRN Fariha Pack PA-C        acyclovir (ZOVIRAX) tablet 400 mg  400 mg Oral BID Fariha Pack PA-C   400 mg at 06/08/24 2141    albuterol (PROVENTIL HFA/VENTOLIN HFA) inhaler  1-2 puff Inhalation Once PRN Vanessa Oakes MD        albuterol (PROVENTIL) neb solution 2.5 mg  2.5 mg Nebulization Once PRN Vanessa Oakes MD        Chemotherapy Infusing-Continuous Infusion   Does not apply Q8H Vanessa Oakes MD 25.3 mL/hr at 06/07/24 0400 Given at 06/09/24 0441    [START ON 6/10/2024] cycloPHOSphamide (CYTOXAN) 2,215 mg in sodium chloride 0.9 % 660.75 mL infusion  1,296 mg/m2 (Treatment Plan Recorded) Intravenous Once Vanessa Oakes MD        [START ON 6/12/2024] dexAMETHasone (DECADRON) tablet 8 mg  8 mg Oral Daily Vanessa Oakes MD        diphenhydrAMINE (BENADRYL) injection 50 mg  50 mg Intravenous Once PRN Vanessa Oakes MD        enoxaparin ANTICOAGULANT (LOVENOX) injection 40 mg  40 mg Subcutaneous Q24H Fariha Pack PA-C   40 mg at 06/08/24 1627    EPINEPHrine PF (ADRENALIN) injection 0.3 mg  0.3 mg Intramuscular Q5 Min PRN Vanessa Oakes MD        etoposide (TOPOSAR) 145 mg in sodium chloride 0.9 % 557.25 mL infusion  86 mg/m2 (Treatment Plan Recorded) Intravenous Q22H Vanessa Oakes MD 25.3 mL/hr at 06/08/24 1505 145 mg at 06/08/24 1505    famotidine (PEPCID) injection 20 mg  20 mg Intravenous Once PRN Vanessa Oakes MD        famotidine (PEPCID) tablet 20 mg  20 mg Oral BID Fariha Pack PA-C   20 mg at 06/08/24 2141    [START ON 6/10/2024] fosaprepitant (EMEND) 150 mg in sodium chloride 0.9 % 275 mL intermittent infusion  150 mg Intravenous Once Vanessa Oakes MD        heparin lock flush 10 unit/mL injection 5-10 mL  5-10 mL Intracatheter Q1H PRN Fariha Pack PA-C        heparin lock flush 10 unit/mL injection 5-10 mL  5-10 mL  Intracatheter Q24H Fariha Pack PA-C        heparin lock flush 100 unit/mL injection 5-10 mL  5-10 mL Intracatheter Q28 Days Fariha Pack PA-C        LORazepam (ATIVAN) injection 0.5-1 mg  0.5-1 mg Intravenous Q6H PRN Vanessa Oakes MD        LORazepam (ATIVAN) tablet 0.5-1 mg  0.5-1 mg Oral Q6H PRN Vanessa Oakes MD   1 mg at 06/08/24 1759    LORazepam (ATIVAN) tablet 1 mg  1 mg Oral Q8H PRN Fariha Pack PA-C        magic mouthwash suspension (diphenhydramine, lidocaine, aluminum-magnesium & simethicone)   Swish & Spit Q6H PRN Fariha Pack PA-C        meperidine (DEMEROL) injection 25 mg  25 mg Intravenous Q30 Min PRN Vanessa Oakes MD        methylPREDNISolone sodium succinate (solu-MEDROL) injection 125 mg  125 mg Intravenous Once PRN Vanessa Oakes MD        metoclopramide (REGLAN) tablet 10 mg  10 mg Oral 4x Daily PRN Fariha Pack PA-C        No rectal suppositories if WBC less than 1000/microliters or platelets less than 50,000/ L   Does not apply Continuous PRN Fariha Pack PA-C        OLANZapine zydis (zyPREXA) ODT tab 5 mg  5 mg Oral At Bedtime Fariha Pack PA-C   5 mg at 06/08/24 2142    pantoprazole (PROTONIX) EC tablet 40 mg  40 mg Oral BID Fariha Pack Pastora, PA-C   40 mg at 06/08/24 2142    polyethylene glycol (MIRALAX) Packet 17 g  17 g Oral Daily DebolFariha PA-C   17 g at 06/08/24 1627    polyethylene glycol (MIRALAX) Packet 17 g  17 g Oral Daily PRN Fariha Pack PA-C        predniSONE (DELTASONE) tablet 100 mg  100 mg Oral BID Vanessa Oakes MD   100 mg at 06/08/24 1627    prochlorperazine (COMPAZINE) tablet 5-10 mg  5-10 mg Oral Q6H PRN Irina Ha MD        Or    prochlorperazine (COMPAZINE) injection 5-10 mg  5-10 mg Intravenous Q6H PRN Irina Ha MD senna-docusate (SENOKOT-S/PERICOLACE) 8.6-50 MG per tablet 1 tablet  1 tablet Oral BID PRN Fariha Pack PA-C        Or    kristinte  (SENOKOT-S/PERICOLACE) 8.6-50 MG per tablet 2 tablet  2 tablet Oral BID PRN Debol, Fariha Guillory PA-C        senna-docusate (SENOKOT-S/PERICOLACE) 8.6-50 MG per tablet 2 tablet  2 tablet Oral BID Vanessa Oakes MD   2 tablet at 06/08/24 2142    sodium chloride (PF) 0.9% PF flush 10-20 mL  10-20 mL Intracatheter q1 min prn Debol, Fariha Pastroa, PA-C        sodium chloride (PF) 0.9% PF flush 10-20 mL  10-20 mL Intracatheter Q1H PRN Debol, Fariha Pastora, PA-C        sodium chloride (PF) 0.9% PF flush 10-20 mL  10-20 mL Intracatheter Q28 Days Debol, DARLIN Welch-C        sodium chloride 0.9% BOLUS 500 mL  500 mL Intravenous Once PRN Vanessa Oakes MD        sucralfate (CARAFATE) tablet 1 g  1 g Oral 4x Daily PRN Sirena, Fariha Guillory PA-C        vinCRIStine (ONCOVIN) 0.3 mg, DOXOrubicin (ADRIAMYCIN) 29 mg in sodium chloride 0.9 % 1,064.8 mL infusion  0.3 mg Intravenous Q22H Vanessa Oakes MD 48.4 mL/hr at 06/08/24 1841 0.3 mg at 06/08/24 1841       Data   CBC  Recent Labs   Lab 06/09/24  0442 06/08/24  0407 06/07/24  0621 06/06/24  1246   WBC 6.3 10.6 8.9 4.3   RBC 2.79* 2.79* 2.75* 2.98*   HGB 9.0* 9.2* 9.1* 9.7*   HCT 27.9* 27.9* 27.3* 30.3*    100 99 102*   MCH 32.3 33.0 33.1* 32.6   MCHC 32.3 33.0 33.3 32.0   RDW 16.8* 17.2* 17.2* 17.3*    381 373 367     CMP  Recent Labs   Lab 06/09/24  0442 06/08/24  0407 06/07/24  0401 06/06/24  1246    138 139 138   POTASSIUM 3.8 3.9 4.4 4.2   CHLORIDE 107 107 109* 106   CO2 24 24 23 22   ANIONGAP 8 7 7 10   * 108* 175* 98   BUN 12.6 8.5 11.0 11.6   CR 0.55 0.58 0.61 0.80   GFRESTIMATED >90 >90 >90 >90   DEE 9.1 9.1 8.9 9.2   MAG 1.9 1.9 1.8 1.7   PHOS 4.1 4.8* 3.3 4.1   PROTTOTAL 5.4* 5.6* 5.8* 6.2*   ALBUMIN 3.8 3.9 3.8 4.0   BILITOTAL 0.2 0.2 <0.2 <0.2   ALKPHOS 52 56 61 66   AST 12 12 14 18   ALT 11 11 13 13     INR  Recent Labs   Lab 06/09/24  0442 06/08/24  0407 06/07/24  0401 06/06/24  1246   INR 1.10 1.11 1.07 1.06       Results for orders  placed or performed during the hospital encounter of 24   CT Head w/o Contrast    Narrative    CT HEAD W/O CONTRAST 2024 7:44 PM    Provided History: to assess for intracranial etiology of syncope    Comparison: CT soft tissue neck 2024. Pet/CT 2024    Technique: Using multidetector thin collimation helical acquisition  technique, axial, coronal and sagittal CT images from the skull base  to the vertex were obtained without intravenous contrast.     Findings:  No evidence of intracranial hemorrhage, mass effect, or  midline shift. No acute loss of gray-white differentiation. Small  appearance of the ventricular system and mild protrusion of the  bilateral cerebellar tonsils through the foramen magnum,, which  appears similar to CT soft tissue neck exam on 2024. Mild  effacement of the perimesencephalic basal cisterns.    Intact bony calvaria and skull base. Mastoid air cells are clear.  Paranasal sinuses are clear.      Impression    Impression: No acute intracranial pathology.    I have personally reviewed the examination and initial interpretation  and I agree with the findings.    SHAHID DE LOS SANTOS MD         SYSTEM ID:  [id number]   Echo Complete     Value    LVEF  60-65%    Narrative    [id number]  [id number]  [id number]  [id number]^GABE^ROBBIN^ANNIKA     [facility]  Echocardiography Laboratory  [address] [place] [address]     Name: JAMSHID THORNTON  MRN: [id number]  : 1995  Study Date: 2024 12:17 PM  Age: 28 yrs  Gender: Female  Patient Location: [facility]  Reason For Study: Chemo  Ordering Physician: ROBBIN BERNAL  Performed By: Gabriela Burgos RDCS     BSA: 1.7 m2  Height: 66 in  Weight: 133 lb  HR: 78  BP: 99/54 mmHg  ______________________________________________________________________________  Procedure  Complete Portable Echo Adult. Echocardiogram with two-dimensional, color and  spectral Doppler  performed.  ______________________________________________________________________________  Interpretation Summary  Global and regional left ventricular function is normal with an EF of 60-65%.  Right ventricular function, chamber size, wall motion, and thickness are  normal.  No pericardial effusion is present.  ______________________________________________________________________________  Left Ventricle  Global and regional left ventricular function is normal with an EF of 60-65%.  Left ventricular diastolic function is normal.     Right Ventricle  Right ventricular function, chamber size, wall motion, and thickness are  normal.     Atria  Both atria appear normal.     Mitral Valve  The mitral valve is normal.     Aortic Valve  Aortic valve is normal in structure and function. The aortic valve is  tricuspid.     Tricuspid Valve  The tricuspid valve is normal.     Pulmonic Valve  The pulmonic valve is normal.     Vessels  The aorta root is normal. The thoracic aorta is normal. The pulmonary artery  cannot be assessed. The inferior vena cava was normal in size with preserved  respiratory variability.     Pericardium  No pericardial effusion is present.     Compared to Previous Study  No significant changes noted.  ______________________________________________________________________________  Doppler Measurements & Calculations  MV E max katerine: 62.7 cm/sec  MV A max katerine: 53.2 cm/sec  MV E/A: 1.2  MV dec slope: 363.6 cm/sec2  MV dec time: 0.17 sec  E/E' av.7  Lateral E/e': 3.9  Medial E/e': 5.4     ______________________________________________________________________________  Report approved by: MD Graeme Rodriguez 2024 03:08 PM

## 2024-06-09 NOTE — PLAN OF CARE
Goal Outcome Evaluation:      Plan of Care Reviewed With: patient    Overall Patient Progress: improvingOverall Patient Progress: improving    Outcome Evaluation: IDT will continue to follow to support safe d/c planning and emotional support.

## 2024-06-09 NOTE — PLAN OF CARE
"0700- 1900    /66 (BP Location: Right arm)   Pulse 65   Temp 97.8  F (36.6  C) (Oral)   Resp 14   Ht 1.651 m (5' 5\")   Wt 61.3 kg (135 lb 1.6 oz)   SpO2 97%   BMI 22.48 kg/m      VSS on RA, Afebrile. Denies pain, SOB. PRN Ativan 1 mg x2 for nausea. D4 R-EPOCH infusing; good blood returns via port. BM 6/8; good UOP. Continue POC.     Goal Outcome Evaluation:      Plan of Care Reviewed With: patient    Overall Patient Progress: no changeOverall Patient Progress: no change           "

## 2024-06-09 NOTE — PHARMACY-PHARMACOTHERAPY NOTE
RN called to assess etoposide bag as the pump was giving an occlusion warning and infusing slowly.     Bag and line contained precipitates which is common for etoposide.  There was a 0.22 micron filter included on the infusion tubing which should collect precipitates.      Decided to stop current bag.  Bought bag to pharmacy and had chemo technician withdraw remaining amount to determine precise volume. There was 60mL remaining which equates to 15.6mg remaining of 145mg/557.25mL bag.      Talked with Dr Ha and entered a one time order for 15.6mg to infuse at same etoposide rate of 25.3mL/h to finish when Dose 4 bag is due ~16:30.     Owen Wilson, PharmD

## 2024-06-09 NOTE — PLAN OF CARE
"Time 2521-7848    /79 (BP Location: Right arm)   Pulse 59   Temp 97.7  F (36.5  C) (Oral)   Resp 16   Ht 1.651 m (5' 5\")   Wt 62.2 kg (137 lb 3.2 oz)   SpO2 94%   BMI 22.83 kg/m      Reason for admission: DLBCL Cycle 6 Treatment  Activity: UAL  Pain: denies  Neuro: WNL  Cardiac: WNL  Respiratory: WNL  GI/: LBM 6/8. Voiding adequately  Diet: Regular  Lines: right chest port  Wounds: skin intact  Labs/imaging: WBC 6.3, Hgb 9, Plt 373. K+ 3.8, Mg 1.9, Phos 4.1. Uric acid 2.1      New changes this shift: No changes. Pt stable. No requests for PRNs     Plan: Fall prevention. Pain control. Ongoing treatment      Continue to monitor and follow POC    Goal Outcome Evaluation:      Plan of Care Reviewed With: patient    Overall Patient Progress: no changeOverall Patient Progress: no change    Outcome Evaluation: Pt stable. Nausea under control. No other complaints      "

## 2024-06-09 NOTE — DISCHARGE SUMMARY
[facility] - [facility]    Discharge Summary  Hematology / Oncology    Date of Admission:  6/6/2024  Date of Discharge:  6/10/2024  Discharging Provider: Fariha Pack PA-C  Date of Service (when I saw the patient): 06/10/24    Discharge Diagnoses    - Primary mediastinal B-Cell Lymphoma    - Peripheral Neuropathy, stable   - CINV, well controlled   - Mucositis   - H/o palpitation   - H/o tachycardia    - H/o constipation    - GERD     History of Present Illness   Clementine Kathleen is an  28 year old female with history pertinent for primary mediastinal B cell lymphoma (dx 2/2024), 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 Patient overall tolerated chemotherapy quite well aside from some chemotherapy-induced nausea, which was controlled with PRN antiemetics. On day of discharge, she was well-appearing and eager to complete chemotherapy and be discharged to home.     Prior to discharge, I reviewed with Clementine the plan of care, including upcoming follow-up appointments and new medications. Appropriate prescriptions were sent to the discharge pharmacy, as needed. We reviewed strict discharge precautions, including reasons to call clinic triage or present to the ED, and she voiced understanding. She was provided with the clinic triage number, as well as written discharge instructions, in her discharge paperwork. Patient had an opportunity to ask questions, all of which were answered to her stated satisfaction. On the day of discharge, Clementine was overall well-appearing, hemodynamically stable, and felt safe and comfortable with the plans for discharge to home with follow-up as described.     Outpatient follow-up issues:  - None specific    Discharge Medications   - Acyclovir, refill  - Protonix, refill   - Prednisone 100 mg x1 dose (dose #10, 6/11 AM)  - Dexamethasone 8 mg x2 doses  (6/12-6/13)    Outpatient Follow Up     Future Appointments   Date Time Provider Department Center   6/11/2024  1:45 PM  [facility] LAB DRAW [facility]   6/11/2024  2:30 PM  ONC INFUSION NURSE [facility]   6/18/2024 10:45 AM  [facility] LAB DRAW [facility]   6/19/2024 10:00 AM Angle Perkins APRN CNP [facility]   6/25/2024 10:45 AM  [facility] LAB DRAW [facility]   7/8/2024  9:00 AM [email or web address] [facility]   7/11/2024  2:45 PM  [facility]ON[facility] LAB DRAW [facility]   7/11/2024  3:30 PM Vanessa Oakes MD [facility]       Hospital Course   Clementine Kathleen was admitted on 6/6/2024.  The following problems were addressed during her hospitalization:    HEME  # Primary mediastinal B-cell lymphoma  Follows with Dr. Oakes. Initially presented with cervical lymphadenopathy (12/2023). Initial FNA (1/29/24) suggestive of possible Hodgkin lymphoma; however excisional LN biopsy (2/9/24) shows large B-cell lymphoma with differential diagnosis of primary mediastinal large B-cell lymphoma vs DLBCL NOS. PET with cervical and mediastinal lymphadenopathy only. Overall presentation felt to be consistent with PMBCL given her age, gender, sites of involvement, dim CD30 expression, and prominent fibrotic background.  Although CD23 IHC was negative, IHC for , MAL, and PD-L1 were positive in majority of the neoplastic cells suggesting PMBCL over DLBCL NOS. [id number] gene expression profile also consistent with PMBCL. FISH with gain of BCL2 but no MYC, BCL2, or BCL6 rearrangements. PET showed bilateral cervical and mediastinal hypermetabolic lymphadenopathy (largest 4.4 cm with SUV max 27 in a prevascular LN); no disease below diaphragm. Baseline TTE (2/19/24) with LVEF 60%. Initial treatment R-DA-EPOCH (C1D1=2/22/24; C2D1= 3/14/24; C3D1=4/4/24). Which was overall tolerated well but Clementine had CINV and developed bilateral hand neuropathy.  PET (4/1/24) (after C2) showed evidence of complete metabolic response with resolution of cervical  and mediastinal lymphadenopathy. Have increased dose each cycle by one level (ex: C1=dose level 1, C2=dose level 2, etc.). She was last seen by Dr. Oakes in clinic on 4/25/2024 and then proceeded with C4 DA-R-EPOCH at dose level 4 but vincristine held to give time for improvement of bothersome neuropathy. C5D1 = 5/16/24 she received level 5 with a capped dose of 1.2 mg on Vincristine given neuropathy. She is currently being admitted for Cycle 6; will receive level 4 as cycle 5 c/b near syncopal episode and mouth pain.   - Port de accessed on discharge     Treatment Plan: Dose adjusted (level 4) R-EPOCH (C6D1=6/6/24)   - Rituximab  mg/m2 (600 mg) over 90 mins; D1  - Prednisone  mg BID;  D1-5  - Etoposide IV 86 mg/m2 (145 mg); D1-4  - Vincristine IV capped at 1.2 mg ; D1-4  - Doxorubicin IV 12.6 mg/m2 (29 mg);  D1-4  - Cyclophosphamide IV 1296 mg/m2 (2215 mg); D5   - Neulasta 6 mg subcutaneous -- D6 -- scheduled for 6/11  - Pre-meds: Tylenol, Benadryl, Aloxi, Emend D5, Dexamethasone 8 mg (D6-7)     # Peripheral neuropathy   Endorsed L>R neuropathy in her fingertips. Secondary to chemotherapy. Vincristine was capped at 2 mg for C3. S/p C3 and neuropathy in fingertips have worsened with some difficulty using her phone or buttoning shirts and now neuropathy in bilateral feet that does not impact balance of walking. Seems to be improving just prior to admission for C4 per patient report. Vincristine omitted for C4 to give time for recovery. Vincristine subsequently capped at 1.2 mg for Cycle 5 and 6.   - Monitor closely  - Patient declined needing intervention at this time as not currently painful, numbness.      # CINV, well-controlled   Chemotherapy induced nausea and vomiting during previous cycles, managed with prn zofran, compazine, scheduled emend, and zyprexa at bedtime. Nausea regimen used with Cycle 5 effective; will replicate for Cycle 6. Report that nausea well control with breakthrough PRNs at  "home.  - Scheduled Zyprexa 5 mg at bedtime   - Compazine 5-10 mg q6h PRN and Ativan 1 mg q8hr PRN available for breakthrough nausea   - Scheduled Zofran 16 mg daily per chemo protocol HELD; will substitute with Aloxi   - Plan to give D5 Emend prior to      # Mucositis  Patient endorses previous oral ulceration that was painful but resolved after previous chemotherapy cycles. However, she has had increase of oral sores over the past few weeks that have been painful and making swallowing more difficult.  - MMW & biotene PRN, encourage frequent oral moisturization with saline rinses.     ID  # ID PPx  - Acyclovir 400 mg BID  - Levofloxacin 250 mg daily and fluconazole 200 mg daily when ANC <1  - Pentamidine monthly for PJP ppx; last given 6/3/24; would next be due 7/1.      CV  # H/o palpitations  # H/o tachycardia  Noted heart \"racing and pounding\" after C2 of chemo. Denied chest pain, dyspnea, lower extremity swelling, abdominal pain, nausea, dizziness. Echo from 2/2024 unremarkable. 3/25/24 BNP normal at <36. She again reported symptoms following C3, with increased dyspnea on exertion. CT PE 4/15/24 unremarkable. OA she reports that symptoms have improved overall; has less dyspnea on exertion, minimal dyspnea on rest, less frequent episodes of heart palpitations,  - No acute inpatient management needs     GI  # H/o constipation  - Continue PTA miralax and senna BID.      MISC  # Fertility preservation  - Established with [facility], underwent egg retrieval on 2/18.      # GERD   - Continue PTA PPI BID    This patient was seen and discussed with Dr. Ha prior to discharge.     Total time spent on the date of this encounter: 50 minutes    Angela Peterson PA-C  Hematology/Oncology  Pager: #[phone]     Significant Results and Procedures   None    Pending Results   These results will be followed up by none  Unresulted Labs Ordered in the Past 30 Days of this Admission       No orders found from 5/7/2024 to 6/7/2024.      "       Code Status   Full Code    Primary Care Physician   Chela Peter    Physical Exam   Temp: 97.8  F (36.6  C) Temp src: Oral BP: 111/66 Pulse: 65   Resp: 14 SpO2: 97 % O2 Device: None (Room air)    Vitals:    06/06/24 1200 06/07/24 0813 06/08/24 1100   Weight: 60.2 kg (132 lb 12.8 oz) 60.8 kg (134 lb) 62.2 kg (137 lb 3.2 oz)     Vital Signs with Ranges  Temp:  [97.7  F (36.5  C)-98.1  F (36.7  C)] 97.8  F (36.6  C)  Pulse:  [50-95] 65  Resp:  [14-16] 14  BP: (110-128)/(59-79) 111/66  SpO2:  [94 %-99 %] 97 %  I/O last 3 completed shifts:  In: 884.4 [IV Piggyback:884.4]  Out: -     Constitutional: Pleasant and cooperative female seen lying in bed, in NAD. Awake, alert, interactive, non-toxic. Smiling and conversational.  HEENT: Normocephalic/atraumatic. Conjunctiva normal.  Respiratory: No increased work of breathing, CTAB, no crackles or wheezing.  Cardiovascular: RRR, no murmur noted. No peripheral edema.  GI: Normal bowel sounds. Abdomen is non-distended.  Skin: Warm, dry, well-perfused. No bruising, bleeding, rashes, or lesions on limited exam.  Musculoskeletal: Normal muscle bulk, no gross deformities.  Neurologic: A&O. Answers questions appropriately, speech normal. Moves all extremities spontaneously.  Psychiatric: Normal mood and affect.  Vascular access: Port on right chest wall CDI, non-tender, no surrounding erythema.    Time Spent on this Encounter   I, Angela Peterson PA-C, personally saw the patient today and spent greater than 30 minutes discharging this patient.    Discharge Disposition   Discharged to home  Condition at discharge: Stable    Consultations This Hospital Stay   CARE MANAGEMENT / SOCIAL WORK IP CONSULT    Discharge Orders      Reason for your hospital stay    You were admitted for your LAST cycle of chemotherapy!     Activity    Your activity upon discharge: activity as tolerated     Adult [facility] Follow-up and recommended labs and tests    Follow up with appointments  listed     Appointments on [place] and/or [facility] (with [facility] or [facility] provider or service). Call [phone] if you haven't heard regarding these appointments within 7 days of discharge.     When to contact your care team    [facility] cancer clinic triage line at [phone] for temp >100.4, uncontrolled nausea/vomiting/diarrhea/constipation, unrelieved pain, bleeding not relieved with pressure, dizziness, chest pain, shortness of breath, loss of consciousness, and any new or concerning symptoms. If you are concerned that your symptoms are life-threatening don't hesitate to call 911 or go to the nearest Emergency Department.     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)           CONTINUE these medications which have CHANGED    Details   acyclovir (ZOVIRAX) 400 MG tablet Take 1 tablet (400 mg) by mouth 2 times daily  Qty: 60 tablet, Refills: 4    Associated Diagnoses: 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)      pantoprazole (PROTONIX) 40 MG EC tablet Take 1 tablet (40 mg) by mouth 2 times daily  Qty: 60 tablet, Refills: 3    Associated Diagnoses: Gastroesophageal reflux disease with esophagitis without hemorrhage           CONTINUE these medications which have NOT CHANGED    Details   cimetidine (TAGAMET) 200 MG tablet Take 200 mg by mouth 2 times daily      fluconazole (DIFLUCAN) 200 MG tablet Take 1 tablet (200 mg) by mouth daily as needed (neutropenia prophylaxis)    Associated Diagnoses: 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)      levofloxacin (LEVAQUIN) 250 MG tablet Take 1 tablet (250 mg) by mouth  daily as needed (neutropenia prophylaxis)    Associated Diagnoses: 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)      Levonorgestrel (KYLEENA IU) 1 Device by Intrauterine route once      LORazepam (ATIVAN) 0.5 MG tablet Take 1 tablet (0.5 mg) by mouth every 6 hours as needed for nausea or vomiting  Qty: 30 tablet, Refills: 0    Associated Diagnoses: Nausea      magic mouthwash (ENTER INGREDIENTS IN COMMENTS) suspension Swish and spit 15 ml every 6 hours as needed  Qty: 200 mL, Refills: 0    Comments: lidocaine visc 2% 2.5mL/5mL & maalox/mylanta w/ simeth 2.5mL/5mL & diphenhydramine 5mg/5mL  Associated Diagnoses: Mucositis due to chemotherapy      metoclopramide (REGLAN) 10 MG tablet Take 1 tablet (10 mg) by mouth 4 times daily as needed (constipation)  Qty: 30 tablet, Refills: 3    Associated Diagnoses: Slow transit constipation      modafinil (PROVIGIL) 200 MG tablet Take 200 mg by mouth daily as needed (hypersomnia)    Associated Diagnoses: Idiopathic hypersomnia      OLANZapine zydis (ZYPREXA) 5 MG ODT Take 1 tablet (5 mg) by mouth nightly as needed (nausea)    Associated Diagnoses: Nausea      ondansetron (ZOFRAN) 4 MG tablet Take 1-2 tablets (4-8 mg) by mouth every 8 hours as needed for nausea or vomiting  Qty: 30 tablet, Refills: 0    Associated Diagnoses: 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)      polyethylene glycol (MIRALAX) 17 GM/Dose powder Take 17 g by mouth 2 times daily    Associated Diagnoses: 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)      prochlorperazine (COMPAZINE) 10 MG tablet Take 1 tablet (10 mg) by mouth every 6 hours as needed for nausea or vomiting  Qty: 30 tablet, Refills: 0    Associated Diagnoses: 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)      senna-docusate (SENOKOT-S/PERICOLACE) 8.6-50 MG tablet Take 1 tablet by mouth 2 times daily  Qty: 60 tablet, Refills: 0    Associated Diagnoses: Diffuse large B-cell  lymphoma of intrathoracic lymph nodes (H)      sucralfate (CARAFATE) 1 GM tablet Take 1 tablet (1 g) by mouth 4 times daily as needed (acid reflux)  Qty: 30 tablet, Refills: 1    Associated Diagnoses: Gastroesophageal reflux disease with esophagitis, unspecified whether hemorrhage           Allergies   No Known Allergies  Data   Most Recent 3 CBC's:  Recent Labs   Lab Test 06/10/24  0500 06/09/24  0442 06/08/24  0407   WBC 3.1* 6.3 10.6   HGB 8.9* 9.0* 9.2*   MCV 99 100 100    373 381      Most Recent 3 BMP's:  Recent Labs   Lab Test 06/10/24  0500 06/09/24  0442 06/08/24  0407    139 138   POTASSIUM 3.8 3.8 3.9   CHLORIDE 106 107 107   CO2 25 24 24   BUN 12.5 12.6 8.5   CR 0.54 0.55 0.58   ANIONGAP 8 8 7   DEE 9.1 9.1 9.1   * 128* 108*     Most Recent 2 LFT's:  Recent Labs   Lab Test 06/10/24  0500 06/09/24  0442   AST 10 12   ALT 11 11   ALKPHOS 48 52   BILITOTAL 0.2 0.2     Most Recent INR's and Anticoagulation Dosing History:  Anticoagulation Dose History  More data exists         Latest Ref Rng & Units 5/19/2024 5/20/2024 6/6/2024 6/7/2024 6/8/2024 6/9/2024 6/10/2024   Recent Dosing and Labs   INR 0.85 - 1.15 1.14  1.19  1.06  1.07  1.11  1.10  1.12      Most Recent 3 Troponin's:No lab results found.  Most Recent Cholesterol Panel:No lab results found.  Most Recent 6 Bacteria Isolates From Any Culture (See EPIC Reports for Culture Details):No lab results found.  Most Recent TSH, T4 and A1c Labs:  Recent Labs   Lab Test 05/25/24  1704   TSH 1.16     Results for orders placed or performed during the hospital encounter of 05/25/24   CT Head w/o Contrast    Narrative    CT HEAD W/O CONTRAST 5/25/2024 7:44 PM    Provided History: to assess for intracranial etiology of syncope    Comparison: CT soft tissue neck 2/7/2024. Pet/CT 4/1/2024    Technique: Using multidetector thin collimation helical acquisition  technique, axial, coronal and sagittal CT images from the skull base  to the vertex were  obtained without intravenous contrast.     Findings:  No evidence of intracranial hemorrhage, mass effect, or  midline shift. No acute loss of gray-white differentiation. Small  appearance of the ventricular system and mild protrusion of the  bilateral cerebellar tonsils through the foramen magnum,, which  appears similar to CT soft tissue neck exam on 2024. Mild  effacement of the perimesencephalic basal cisterns.    Intact bony calvaria and skull base. Mastoid air cells are clear.  Paranasal sinuses are clear.      Impression    Impression: No acute intracranial pathology.    I have personally reviewed the examination and initial interpretation  and I agree with the findings.    SHAHID DE LOS SANTOS MD         SYSTEM ID:  [id number]   Echo Complete     Value    LVEF  60-65%    Narrative    [id number]  [id number]  [id number]  [id number]^GABE^ROBBIN^ANNIKA     [facility]  Echocardiography Laboratory  [address] [place] [id number]     Name: JAMSHID THORNTON  MRN: [id number]  : 1995  Study Date: 2024 12:17 PM  Age: 28 yrs  Gender: Female  Patient Location: [facility]  Reason For Study: Chemo  Ordering Physician: ROBBIN BERNAL  Performed By: Gabriela Burgos RDCS     BSA: 1.7 m2  Height: 66 in  Weight: 133 lb  HR: 78  BP: 99/54 mmHg  ______________________________________________________________________________  Procedure  Complete Portable Echo Adult. Echocardiogram with two-dimensional, color and  spectral Doppler performed.  ______________________________________________________________________________  Interpretation Summary  Global and regional left ventricular function is normal with an EF of 60-65%.  Right ventricular function, chamber size, wall motion, and thickness are  normal.  No pericardial effusion is present.  ______________________________________________________________________________  Left Ventricle  Global and regional left ventricular function is normal with an  EF of 60-65%.  Left ventricular diastolic function is normal.     Right Ventricle  Right ventricular function, chamber size, wall motion, and thickness are  normal.     Atria  Both atria appear normal.     Mitral Valve  The mitral valve is normal.     Aortic Valve  Aortic valve is normal in structure and function. The aortic valve is  tricuspid.     Tricuspid Valve  The tricuspid valve is normal.     Pulmonic Valve  The pulmonic valve is normal.     Vessels  The aorta root is normal. The thoracic aorta is normal. The pulmonary artery  cannot be assessed. The inferior vena cava was normal in size with preserved  respiratory variability.     Pericardium  No pericardial effusion is present.     Compared to Previous Study  No significant changes noted.  ______________________________________________________________________________  Doppler Measurements & Calculations  MV E max katerine: 62.7 cm/sec  MV A max katerine: 53.2 cm/sec  MV E/A: 1.2  MV dec slope: 363.6 cm/sec2  MV dec time: 0.17 sec  E/E' av.7  Lateral E/e': 3.9  Medial E/e': 5.4     ______________________________________________________________________________  Report approved by: MD Graeme Rodriguez 2024 03:08 PM

## 2024-06-10 VITALS
TEMPERATURE: 98.1 F | OXYGEN SATURATION: 99 % | WEIGHT: 136.4 LBS | HEART RATE: 69 BPM | BODY MASS INDEX: 22.73 KG/M2 | DIASTOLIC BLOOD PRESSURE: 72 MMHG | HEIGHT: 65 IN | SYSTOLIC BLOOD PRESSURE: 125 MMHG | RESPIRATION RATE: 18 BRPM

## 2024-06-10 LAB
ALBUMIN SERPL BCG-MCNC: 3.8 G/DL (ref 3.5–5.2)
ALP SERPL-CCNC: 48 U/L (ref 40–150)
ALT SERPL W P-5'-P-CCNC: 11 U/L (ref 0–50)
ANION GAP SERPL CALCULATED.3IONS-SCNC: 8 MMOL/L (ref 7–15)
AST SERPL W P-5'-P-CCNC: 10 U/L (ref 0–45)
BASOPHILS # BLD AUTO: 0 10E3/UL (ref 0–0.2)
BASOPHILS NFR BLD AUTO: 0 %
BILIRUB SERPL-MCNC: 0.2 MG/DL
BUN SERPL-MCNC: 12.5 MG/DL (ref 6–20)
CALCIUM SERPL-MCNC: 9.1 MG/DL (ref 8.6–10)
CHLORIDE SERPL-SCNC: 106 MMOL/L (ref 98–107)
CREAT SERPL-MCNC: 0.54 MG/DL (ref 0.51–0.95)
DEPRECATED HCO3 PLAS-SCNC: 25 MMOL/L (ref 22–29)
EGFRCR SERPLBLD CKD-EPI 2021: >90 ML/MIN/1.73M2
EOSINOPHIL # BLD AUTO: 0 10E3/UL (ref 0–0.7)
EOSINOPHIL NFR BLD AUTO: 0 %
ERYTHROCYTE [DISTWIDTH] IN BLOOD BY AUTOMATED COUNT: 16.3 % (ref 10–15)
FIBRINOGEN PPP-MCNC: 178 MG/DL (ref 170–490)
GLUCOSE SERPL-MCNC: 130 MG/DL (ref 70–99)
HCT VFR BLD AUTO: 27.2 % (ref 35–47)
HGB BLD-MCNC: 8.9 G/DL (ref 11.7–15.7)
IMM GRANULOCYTES # BLD: 0.1 10E3/UL
IMM GRANULOCYTES NFR BLD: 2 %
INR PPP: 1.12 (ref 0.85–1.15)
LYMPHOCYTES # BLD AUTO: 0.2 10E3/UL (ref 0.8–5.3)
LYMPHOCYTES NFR BLD AUTO: 5 %
MAGNESIUM SERPL-MCNC: 2.3 MG/DL (ref 1.7–2.3)
MCH RBC QN AUTO: 32.5 PG (ref 26.5–33)
MCHC RBC AUTO-ENTMCNC: 32.7 G/DL (ref 31.5–36.5)
MCV RBC AUTO: 99 FL (ref 78–100)
MONOCYTES # BLD AUTO: 0.2 10E3/UL (ref 0–1.3)
MONOCYTES NFR BLD AUTO: 5 %
NEUTROPHILS # BLD AUTO: 2.8 10E3/UL (ref 1.6–8.3)
NEUTROPHILS NFR BLD AUTO: 88 %
NRBC # BLD AUTO: 0 10E3/UL
NRBC BLD AUTO-RTO: 0 /100
PHOSPHATE SERPL-MCNC: 4.1 MG/DL (ref 2.5–4.5)
PLATELET # BLD AUTO: 341 10E3/UL (ref 150–450)
POTASSIUM SERPL-SCNC: 3.8 MMOL/L (ref 3.4–5.3)
PROT SERPL-MCNC: 5.5 G/DL (ref 6.4–8.3)
RBC # BLD AUTO: 2.74 10E6/UL (ref 3.8–5.2)
SODIUM SERPL-SCNC: 139 MMOL/L (ref 135–145)
URATE SERPL-MCNC: 1.7 MG/DL (ref 2.4–5.7)
WBC # BLD AUTO: 3.1 10E3/UL (ref 4–11)

## 2024-06-10 PROCEDURE — 85610 PROTHROMBIN TIME: CPT | Performed by: PHYSICIAN ASSISTANT

## 2024-06-10 PROCEDURE — 258N000003 HC RX IP 258 OP 636: Mod: JZ | Performed by: INTERNAL MEDICINE

## 2024-06-10 PROCEDURE — 85025 COMPLETE CBC W/AUTO DIFF WBC: CPT | Performed by: PHYSICIAN ASSISTANT

## 2024-06-10 PROCEDURE — 250N000012 HC RX MED GY IP 250 OP 636 PS 637: Performed by: INTERNAL MEDICINE

## 2024-06-10 PROCEDURE — 84100 ASSAY OF PHOSPHORUS: CPT | Performed by: STUDENT IN AN ORGANIZED HEALTH CARE EDUCATION/TRAINING PROGRAM

## 2024-06-10 PROCEDURE — 85384 FIBRINOGEN ACTIVITY: CPT | Performed by: PHYSICIAN ASSISTANT

## 2024-06-10 PROCEDURE — 250N000013 HC RX MED GY IP 250 OP 250 PS 637: Performed by: INTERNAL MEDICINE

## 2024-06-10 PROCEDURE — 250N000011 HC RX IP 250 OP 636: Mod: JZ | Performed by: PHYSICIAN ASSISTANT

## 2024-06-10 PROCEDURE — 84550 ASSAY OF BLOOD/URIC ACID: CPT | Performed by: PHYSICIAN ASSISTANT

## 2024-06-10 PROCEDURE — 250N000011 HC RX IP 250 OP 636: Mod: JZ | Performed by: INTERNAL MEDICINE

## 2024-06-10 PROCEDURE — 80053 COMPREHEN METABOLIC PANEL: CPT | Performed by: PHYSICIAN ASSISTANT

## 2024-06-10 PROCEDURE — 83735 ASSAY OF MAGNESIUM: CPT | Performed by: STUDENT IN AN ORGANIZED HEALTH CARE EDUCATION/TRAINING PROGRAM

## 2024-06-10 PROCEDURE — 250N000013 HC RX MED GY IP 250 OP 250 PS 637: Performed by: PHYSICIAN ASSISTANT

## 2024-06-10 PROCEDURE — 99239 HOSP IP/OBS DSCHRG MGMT >30: CPT | Mod: FS | Performed by: PHYSICIAN ASSISTANT

## 2024-06-10 RX ORDER — PREDNISONE 20 MG/1
100 TABLET ORAL DAILY
Qty: 5 TABLET | Refills: 0 | Status: SHIPPED | OUTPATIENT
Start: 2024-06-11 | End: 2024-06-12

## 2024-06-10 RX ADMIN — ACYCLOVIR 400 MG: 400 TABLET ORAL at 08:51

## 2024-06-10 RX ADMIN — LORAZEPAM 1 MG: 0.5 TABLET ORAL at 15:06

## 2024-06-10 RX ADMIN — FAMOTIDINE 20 MG: 20 TABLET ORAL at 08:51

## 2024-06-10 RX ADMIN — CYCLOPHOSPHAMIDE 2215 MG: 2 INJECTION, POWDER, FOR SOLUTION INTRAVENOUS; ORAL at 16:55

## 2024-06-10 RX ADMIN — PANTOPRAZOLE SODIUM 40 MG: 40 TABLET, DELAYED RELEASE ORAL at 08:51

## 2024-06-10 RX ADMIN — PREDNISONE 100 MG: 50 TABLET ORAL at 16:31

## 2024-06-10 RX ADMIN — POLYETHYLENE GLYCOL 3350 17 G: 17 POWDER, FOR SOLUTION ORAL at 08:53

## 2024-06-10 RX ADMIN — LORAZEPAM 1 MG: 0.5 TABLET ORAL at 08:50

## 2024-06-10 RX ADMIN — DOCUSATE SODIUM 50 MG AND SENNOSIDES 8.6 MG 1 TABLET: 8.6; 5 TABLET, FILM COATED ORAL at 08:52

## 2024-06-10 RX ADMIN — Medication 5 ML: at 18:18

## 2024-06-10 RX ADMIN — PREDNISONE 100 MG: 50 TABLET ORAL at 08:51

## 2024-06-10 RX ADMIN — FOSAPREPITANT 150 MG: 150 INJECTION, POWDER, LYOPHILIZED, FOR SOLUTION INTRAVENOUS at 16:30

## 2024-06-10 ASSESSMENT — ACTIVITIES OF DAILY LIVING (ADL)
ADLS_ACUITY_SCORE: 18

## 2024-06-10 NOTE — PLAN OF CARE
Nursing Focus: Discharge    D: Patient discharged to home at 1830. Patient accompanied by family.    I: Discharge prescriptions sent to discharge pharmacy to be filled. All discharge medications and instructions reviewed with patient. Patient instructed to call clinic triage nurse if she experiences a fever >100.4, uncontrolled nausea, vomiting, diarrhea, or pain; or experiences any signs or symptoms of bleeding. Other phone numbers to call with questions or concerns after discharge reviewed. Port de-accessed. Education completed.    A: Pt verbalized understanding of discharge medications and instructions. Patient will  medications at discharge pharmacy.    P: Patient to follow-up in clinic on 7/11 with Vanessa Oakes.

## 2024-06-10 NOTE — PLAN OF CARE
[id number]    A&Ox4. VSS on RA. Nausea managed with prn ativan x1. Denies pain and SOB. CIVI Etoposide and Vincristine/Dox infusing, pt tolerating well. Etoposide finished infusing at 1410. Voiding with adequate urine output. Last BM 6/8. Good appetite. Up independently. Plan for chemotherapy to finish this evening, pt to receive emend and Cytoxan, and discharge. Continue with plan of care.

## 2024-06-10 NOTE — PLAN OF CARE
Nursing Focus: Chemotherapy  D: Positive blood return via Port . Insertion site is clean/dry/intact, dressing intact with no complaints of pain.  Pre infusion assessment documented in Flowsheet (if applicable).    I: Premedications given per order (see electronic medical administration record). Dose #1 of Cyclophosphamide started to infuse over 60 minutes. Reviewed pt teaching on chemotherapy side effects.  Pt denies need for further teaching. Chemotherapy double checked per protocol by two chemotherapy competent RN's.   A: Tolerating infusion well. Denies nausea and or pain.   P: Continue to monitor urine output and symptoms of nausea. Screen for symptoms of toxicity.

## 2024-06-10 NOTE — PLAN OF CARE
"Time 4712-2088    /69 (BP Location: Right arm)   Pulse 56   Temp 97.6  F (36.4  C) (Oral)   Resp 18   Ht 1.651 m (5' 5\")   Wt 61.3 kg (135 lb 1.6 oz)   SpO2 99%   BMI 22.48 kg/m      Reason for admission: DLBCL  Activity: UAL  Pain: denies  Neuro: WNL  Cardiac: WNL  Respiratory: WNL  GI/: LBM 6/8. Voiding adequately  Diet: Regular  Lines: right chest port  Wounds: skin intact  Labs/imaging: WBC 3.1, Hgb 8.9, Plt 341. K+ 3.8, Mg 2.3, Phos 4.1. Uric acid 1.7      New changes this shift: No changes. Pt stable     Plan: Fall prevention. Pain control. Ongoing treatment w/ discharge this week      Continue to monitor and follow POC    Goal Outcome Evaluation:      Plan of Care Reviewed With: patient    Overall Patient Progress: no changeOverall Patient Progress: no change    Outcome Evaluation: Pt tolerating treatment. Occasional nausea - managed w/ scheduled and PRN antiemetics.      "

## 2024-06-11 ENCOUNTER — INFUSION THERAPY VISIT (OUTPATIENT)
Dept: ONCOLOGY | Facility: CLINIC | Age: 29
End: 2024-06-11
Attending: INTERNAL MEDICINE
Payer: COMMERCIAL

## 2024-06-11 ENCOUNTER — APPOINTMENT (OUTPATIENT)
Dept: LAB | Facility: CLINIC | Age: 29
End: 2024-06-11
Attending: INTERNAL MEDICINE
Payer: COMMERCIAL

## 2024-06-11 VITALS
WEIGHT: 136 LBS | TEMPERATURE: 98 F | RESPIRATION RATE: 16 BRPM | OXYGEN SATURATION: 98 % | HEART RATE: 74 BPM | SYSTOLIC BLOOD PRESSURE: 104 MMHG | BODY MASS INDEX: 22.63 KG/M2 | DIASTOLIC BLOOD PRESSURE: 66 MMHG

## 2024-06-11 DIAGNOSIS — C85.28 MEDIASTINAL LARGE B-CELL LYMPHOMA OF LYMPH NODES OF MULTIPLE REGIONS (H): ICD-10-CM

## 2024-06-11 DIAGNOSIS — Z76.89 PREVENTION OF CHEMOTHERAPY-INDUCED NEUTROPENIA: Primary | ICD-10-CM

## 2024-06-11 DIAGNOSIS — C83.32 DIFFUSE LARGE B-CELL LYMPHOMA OF INTRATHORACIC LYMPH NODES (H): ICD-10-CM

## 2024-06-11 LAB
ALBUMIN SERPL BCG-MCNC: 3.9 G/DL (ref 3.5–5.2)
ALP SERPL-CCNC: 44 U/L (ref 40–150)
ALT SERPL W P-5'-P-CCNC: 7 U/L (ref 0–50)
ANION GAP SERPL CALCULATED.3IONS-SCNC: 8 MMOL/L (ref 7–15)
AST SERPL W P-5'-P-CCNC: 10 U/L (ref 0–45)
BASOPHILS # BLD AUTO: 0 10E3/UL (ref 0–0.2)
BASOPHILS NFR BLD AUTO: 0 %
BILIRUB SERPL-MCNC: 0.4 MG/DL
BUN SERPL-MCNC: 17.5 MG/DL (ref 6–20)
CALCIUM SERPL-MCNC: 9.1 MG/DL (ref 8.6–10)
CHLORIDE SERPL-SCNC: 107 MMOL/L (ref 98–107)
CREAT SERPL-MCNC: 0.58 MG/DL (ref 0.51–0.95)
DEPRECATED HCO3 PLAS-SCNC: 25 MMOL/L (ref 22–29)
EGFRCR SERPLBLD CKD-EPI 2021: >90 ML/MIN/1.73M2
EOSINOPHIL # BLD AUTO: 0 10E3/UL (ref 0–0.7)
EOSINOPHIL NFR BLD AUTO: 0 %
ERYTHROCYTE [DISTWIDTH] IN BLOOD BY AUTOMATED COUNT: 15.7 % (ref 10–15)
GLUCOSE SERPL-MCNC: 108 MG/DL (ref 70–99)
HCT VFR BLD AUTO: 27.2 % (ref 35–47)
HGB BLD-MCNC: 9.1 G/DL (ref 11.7–15.7)
IMM GRANULOCYTES # BLD: 0.1 10E3/UL
IMM GRANULOCYTES NFR BLD: 1 %
LDH SERPL L TO P-CCNC: 165 U/L (ref 0–250)
LYMPHOCYTES # BLD AUTO: 0.3 10E3/UL (ref 0.8–5.3)
LYMPHOCYTES NFR BLD AUTO: 6 %
MCH RBC QN AUTO: 32.6 PG (ref 26.5–33)
MCHC RBC AUTO-ENTMCNC: 33.5 G/DL (ref 31.5–36.5)
MCV RBC AUTO: 98 FL (ref 78–100)
MONOCYTES # BLD AUTO: 0 10E3/UL (ref 0–1.3)
MONOCYTES NFR BLD AUTO: 1 %
NEUTROPHILS # BLD AUTO: 3.9 10E3/UL (ref 1.6–8.3)
NEUTROPHILS NFR BLD AUTO: 92 %
NRBC # BLD AUTO: 0 10E3/UL
NRBC BLD AUTO-RTO: 0 /100
PLATELET # BLD AUTO: 340 10E3/UL (ref 150–450)
POTASSIUM SERPL-SCNC: 3.4 MMOL/L (ref 3.4–5.3)
PROT SERPL-MCNC: 5.6 G/DL (ref 6.4–8.3)
RBC # BLD AUTO: 2.79 10E6/UL (ref 3.8–5.2)
SODIUM SERPL-SCNC: 140 MMOL/L (ref 135–145)
URATE SERPL-MCNC: 1.7 MG/DL (ref 2.4–5.7)
WBC # BLD AUTO: 4.2 10E3/UL (ref 4–11)

## 2024-06-11 PROCEDURE — 84550 ASSAY OF BLOOD/URIC ACID: CPT

## 2024-06-11 PROCEDURE — 96372 THER/PROPH/DIAG INJ SC/IM: CPT | Performed by: INTERNAL MEDICINE

## 2024-06-11 PROCEDURE — 36415 COLL VENOUS BLD VENIPUNCTURE: CPT

## 2024-06-11 PROCEDURE — 85025 COMPLETE CBC W/AUTO DIFF WBC: CPT

## 2024-06-11 PROCEDURE — 83615 LACTATE (LD) (LDH) ENZYME: CPT

## 2024-06-11 PROCEDURE — 250N000011 HC RX IP 250 OP 636: Mod: JZ | Performed by: INTERNAL MEDICINE

## 2024-06-11 PROCEDURE — 80053 COMPREHEN METABOLIC PANEL: CPT

## 2024-06-11 RX ADMIN — PEGFILGRASTIM 6 MG: 6 INJECTION SUBCUTANEOUS at 14:12

## 2024-06-11 ASSESSMENT — PAIN SCALES - GENERAL: PAINLEVEL: NO PAIN (0)

## 2024-06-11 NOTE — PROGRESS NOTES
"Care Management Discharge Note    Discharge Date: 06/10/2024       Discharge Disposition: Home    Discharge Services: None    Discharge DME: None    Discharge Transportation: family or friend will provide    Private pay costs discussed: Not applicable    Does the patient's insurance plan have a 3 day qualifying hospital stay waiver?  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      On admission, Clementine was seen resting comfortably in bed. She reports overall feeling well. Excited that this is the last cycle of chemotherapy! She reports that her neuropathy is improving, rviewed that her Vincristine dose will still be capped at 1.2 mg. She reports nausea was well controlled at home and is usually bad while in the hospital. The regimen used for last cycle worked well so will plan to replicate for this cycle. No further questions or concerns.\"    Patient discharged home with no care management discharge needs.    Rianna Gaviria, RN, BSN  RN Care Coordinator    5A Medical Oncology/5C non-BMT  5A beds [id number]  5C beds [id number] (non-BMT)  [address] [address]  [email or web address]  Gender pronouns: she/her  Units: 5A Onc Vocera & 5C Vocera     "

## 2024-06-11 NOTE — PROGRESS NOTES
Infusion Nursing Note:  Clementine Kathleen presents today for neulasta.    Patient seen by provider today: No   present during visit today: Not Applicable.    Note: Pt presents to infusion today feeling well. Pt denies having any fevers/chills, chest pain, sob, nausea/vomiting, rash, bladder or bowel concerns,      Intravenous Access:  No Intravenous access at this visit.    Treatment Conditions:  Lab Results   Component Value Date    HGB 9.1 (L) 06/11/2024    WBC 4.2 06/11/2024    ANEU 6.2 06/05/2024    ANEUTAUTO 3.9 06/11/2024     06/11/2024        Results reviewed, labs MET treatment parameters, ok to proceed with treatment.      Post Infusion Assessment:  Patient tolerated neulasta injection to R arm without incident.       Discharge Plan:   Patient declined prescription refills.  Discharge instructions reviewed with: Patient.  Patient and/or family verbalized understanding of discharge instructions and all questions answered.  AVS to patient via [employer].  Patient will return 6/18 for labs. No further neulasta injections needed as of now.  Patient discharged in stable condition accompanied by: self.  Departure Mode: Ambulatory.      Sherlyn Lee RN

## 2024-06-11 NOTE — PATIENT INSTRUCTIONS
[facility] Triage and after hours / weekends / holidays:  [phone]    Please call the triage or after hours line if you experience a temperature greater than or equal to 100.4, shaking chills, have uncontrolled nausea, vomiting and/or diarrhea, dizziness, shortness of breath, chest pain, bleeding, unexplained bruising, or if you have any other new/concerning symptoms, questions or concerns.      If you are having any concerning symptoms or wish to speak to a provider before your next infusion visit, please call triage to notify them so we can adequately serve you.     If you need a refill on a narcotic prescription or other medication, please call before your infusion appointment.           June 2024 Sunday Monday Tuesday Wednesday Thursday Friday Saturday                                 1       2     3    LAB CENTRAL  12:45 PM   (15 min.)   [facility]   1:15 PM   (60 min.)   [facility] PFL PENT   [facility] Pulmonary Function Testing [place] 4     5    LAB CENTRAL   2:45 PM   (15 min.)   [facility]   [facility]    RETURN CCSL   3:15 PM   (30 min.)    BMT AUTO CELL THERAPY   [facility] Blood and Marrow Transplant Program [place] 6    Admission  10:00 AM   Irina Ha MD    Admission  12:02 PM   Irina Ha MD   [facility] 5A Oncology   (Discharge: 6/10/2024) 7     8       9     10     11    LAB CENTRAL   1:45 PM   (15 min.)   [facility]   [facility]    ONC INFUSION 0.5 HR (30 MIN)   2:30 PM   (30 min.)    ONC INFUSION NURSE   [facility] 12     13     14     15       16     17     18    LAB CENTRAL  10:45 AM   (15 min.)   [facility]   [facility] 19    RETURN CCSL   9:45 AM   (45 min.)   Angle Perkins APRN CNP   [facility] 20      21     22       23     24     25    LAB CENTRAL  10:45 AM   (15 min.)    [facility]   [facility] 26     27     28     29       30                                               July 2024 Sunday Monday Tuesday Wednesday Thursday Friday Saturday        1     2     3     4     5     6       7     8    PET ONCOLOGY WHOLE BODY   8:30 AM   (30 min.)   [email or web address]   [facility] 9     10     11    LAB CENTRAL   2:45 PM   (15 min.)    [facility]   [facility]    RETURN CCSL   3:15 PM   (30 min.)   Vanessa Oakes MD   [facility] 12     13       14     15     16     17     18     19     20       21     22     23     24     25     26     27       28     29     30     31                                   Recent Results (from the past 24 hour(s))   Uric acid    Collection Time: 06/11/24  1:50 PM   Result Value Ref Range    Uric Acid 1.7 (L) 2.4 - 5.7 mg/dL   Lactate Dehydrogenase    Collection Time: 06/11/24  1:50 PM   Result Value Ref Range    Lactate Dehydrogenase 165 0 - 250 U/L   Comprehensive metabolic panel    Collection Time: 06/11/24  1:50 PM   Result Value Ref Range    Sodium 140 135 - 145 mmol/L    Potassium 3.4 3.4 - 5.3 mmol/L    Carbon Dioxide (CO2) 25 22 - 29 mmol/L    Anion Gap 8 7 - 15 mmol/L    Urea Nitrogen 17.5 6.0 - 20.0 mg/dL    Creatinine 0.58 0.51 - 0.95 mg/dL    GFR Estimate >90 >60 mL/min/1.73m2    Calcium 9.1 8.6 - 10.0 mg/dL    Chloride 107 98 - 107 mmol/L    Glucose 108 (H) 70 - 99 mg/dL    Alkaline Phosphatase 44 40 - 150 U/L    AST 10 0 - 45 U/L    ALT 7 0 - 50 U/L    Protein Total 5.6 (L) 6.4 - 8.3 g/dL    Albumin 3.9 3.5 - 5.2 g/dL    Bilirubin Total 0.4 <=1.2 mg/dL   CBC with platelets and differential    Collection Time: 06/11/24  1:50 PM   Result Value Ref Range    WBC Count 4.2 4.0 - 11.0 10e3/uL    RBC Count 2.79 (L) 3.80 - 5.20 10e6/uL    Hemoglobin 9.1 (L) 11.7 - 15.7 g/dL     Hematocrit 27.2 (L) 35.0 - 47.0 %    MCV 98 78 - 100 fL    MCH 32.6 26.5 - 33.0 pg    MCHC 33.5 31.5 - 36.5 g/dL    RDW 15.7 (H) 10.0 - 15.0 %    Platelet Count 340 150 - 450 10e3/uL    % Neutrophils 92 %    % Lymphocytes 6 %    % Monocytes 1 %    % Eosinophils 0 %    % Basophils 0 %    % Immature Granulocytes 1 %    NRBCs per 100 WBC 0 <1 /100    Absolute Neutrophils 3.9 1.6 - 8.3 10e3/uL    Absolute Lymphocytes 0.3 (L) 0.8 - 5.3 10e3/uL    Absolute Monocytes 0.0 0.0 - 1.3 10e3/uL    Absolute Eosinophils 0.0 0.0 - 0.7 10e3/uL    Absolute Basophils 0.0 0.0 - 0.2 10e3/uL    Absolute Immature Granulocytes 0.1 <=0.4 10e3/uL    Absolute NRBCs 0.0 10e3/uL

## 2024-06-12 ENCOUNTER — PATIENT OUTREACH (OUTPATIENT)
Dept: ONCOLOGY | Facility: CLINIC | Age: 29
End: 2024-06-12
Payer: COMMERCIAL

## 2024-06-12 NOTE — PROGRESS NOTES
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 (Red Flag symptoms escalate to triage hotline per guidelines): Improved  Do you know how to contact your clinic care team if you have future questions or changes to your health status? : Yes  Does the patient have their discharge instructions? : Yes  Does the patient have questions regarding their discharge instructions? : No  Were you started on any new medications or were there changes to any of your previous medications? : No  Does the patient have all of their medications?: Yes  Do you have questions regarding any of your medications? : No  Do you have all of your needed medical supplies or equipment (DME)?  (i.e. oxygen tank, CPAP, cane, etc.): Yes    Follow up Plan     Discharge Follow-Up  Discharge follow up appointment scheduled in alignment with recommended follow up timeframe or Transitions of Risk Category? (Low = within 30 days; Moderate= within 14 days; High= within 7 days): Yes  Discharge Follow Up Appointment Date: 06/19/24  Discharge Follow Up Appointment Scheduled with?: Specialty Care Provider    Future Appointments   Date Time Provider Department Center   6/18/2024 10:45 AM  [facility]   6/19/2024 10:00 AM Angle Perkins APRN CNP [facility]   6/25/2024 10:45 AM  [facility]   7/8/2024  9:00 AM [address] [place] [address]   7/11/2024  " 2:45 PM [facility] LAB DRAW [facility]   7/11/2024  3:30 PM Vanessa Oakes MD [facility]       Outpatient Plan as outlined on AVS reviewed with patient.    For any urgent concerns, please contact our 24 hour clinic line:   [place]: [phone]       Sonja Garibay RN

## 2024-06-13 DIAGNOSIS — K21.00 GASTROESOPHAGEAL REFLUX DISEASE WITH ESOPHAGITIS, UNSPECIFIED WHETHER HEMORRHAGE: ICD-10-CM

## 2024-06-17 DIAGNOSIS — K21.00 GASTROESOPHAGEAL REFLUX DISEASE WITH ESOPHAGITIS, UNSPECIFIED WHETHER HEMORRHAGE: ICD-10-CM

## 2024-06-18 ENCOUNTER — LAB (OUTPATIENT)
Dept: LAB | Facility: CLINIC | Age: 29
End: 2024-06-18
Attending: INTERNAL MEDICINE
Payer: COMMERCIAL

## 2024-06-18 DIAGNOSIS — C85.28 MEDIASTINAL LARGE B-CELL LYMPHOMA OF LYMPH NODES OF MULTIPLE REGIONS (H): ICD-10-CM

## 2024-06-18 DIAGNOSIS — C83.32 DIFFUSE LARGE B-CELL LYMPHOMA OF INTRATHORACIC LYMPH NODES (H): ICD-10-CM

## 2024-06-18 LAB
ABO/RH(D): NORMAL
ALBUMIN SERPL BCG-MCNC: 3.9 G/DL (ref 3.5–5.2)
ALP SERPL-CCNC: 66 U/L (ref 40–150)
ALT SERPL W P-5'-P-CCNC: <5 U/L (ref 0–50)
ANION GAP SERPL CALCULATED.3IONS-SCNC: 10 MMOL/L (ref 7–15)
ANTIBODY SCREEN: NEGATIVE
AST SERPL W P-5'-P-CCNC: 10 U/L (ref 0–45)
BASOPHILS # BLD AUTO: ABNORMAL 10*3/UL
BASOPHILS # BLD MANUAL: 0.1 10E3/UL (ref 0–0.2)
BASOPHILS NFR BLD AUTO: ABNORMAL %
BASOPHILS NFR BLD MANUAL: 5 %
BILIRUB SERPL-MCNC: <0.2 MG/DL
BUN SERPL-MCNC: 9.3 MG/DL (ref 6–20)
CALCIUM SERPL-MCNC: 8.9 MG/DL (ref 8.6–10)
CHLORIDE SERPL-SCNC: 103 MMOL/L (ref 98–107)
CREAT SERPL-MCNC: 0.55 MG/DL (ref 0.51–0.95)
DACRYOCYTES BLD QL SMEAR: ABNORMAL
DEPRECATED HCO3 PLAS-SCNC: 25 MMOL/L (ref 22–29)
EGFRCR SERPLBLD CKD-EPI 2021: >90 ML/MIN/1.73M2
EOSINOPHIL # BLD AUTO: ABNORMAL 10*3/UL
EOSINOPHIL # BLD MANUAL: 0 10E3/UL (ref 0–0.7)
EOSINOPHIL NFR BLD AUTO: ABNORMAL %
EOSINOPHIL NFR BLD MANUAL: 2 %
ERYTHROCYTE [DISTWIDTH] IN BLOOD BY AUTOMATED COUNT: 14.3 % (ref 10–15)
GLUCOSE SERPL-MCNC: 97 MG/DL (ref 70–99)
HCT VFR BLD AUTO: 25.4 % (ref 35–47)
HGB BLD-MCNC: 8.4 G/DL (ref 11.7–15.7)
IMM GRANULOCYTES # BLD: ABNORMAL 10*3/UL
IMM GRANULOCYTES NFR BLD: ABNORMAL %
LDH SERPL L TO P-CCNC: 121 U/L (ref 0–250)
LYMPHOCYTES # BLD AUTO: ABNORMAL 10*3/UL
LYMPHOCYTES # BLD MANUAL: 0.3 10E3/UL (ref 0.8–5.3)
LYMPHOCYTES NFR BLD AUTO: ABNORMAL %
LYMPHOCYTES NFR BLD MANUAL: 32 %
MCH RBC QN AUTO: 32.1 PG (ref 26.5–33)
MCHC RBC AUTO-ENTMCNC: 33.1 G/DL (ref 31.5–36.5)
MCV RBC AUTO: 97 FL (ref 78–100)
METAMYELOCYTES # BLD MANUAL: 0 10E3/UL
METAMYELOCYTES NFR BLD MANUAL: 1 %
MONOCYTES # BLD AUTO: ABNORMAL 10*3/UL
MONOCYTES # BLD MANUAL: 0.1 10E3/UL (ref 0–1.3)
MONOCYTES NFR BLD AUTO: ABNORMAL %
MONOCYTES NFR BLD MANUAL: 14 %
MYELOCYTES # BLD MANUAL: 0 10E3/UL
MYELOCYTES NFR BLD MANUAL: 1 %
NEUTROPHILS # BLD AUTO: ABNORMAL 10*3/UL
NEUTROPHILS # BLD MANUAL: 0.5 10E3/UL (ref 1.6–8.3)
NEUTROPHILS NFR BLD AUTO: ABNORMAL %
NEUTROPHILS NFR BLD MANUAL: 45 %
NRBC # BLD AUTO: 0 10E3/UL
NRBC BLD AUTO-RTO: 0 /100
PLAT MORPH BLD: ABNORMAL
PLATELET # BLD AUTO: 18 10E3/UL (ref 150–450)
POTASSIUM SERPL-SCNC: 4.3 MMOL/L (ref 3.4–5.3)
PROT SERPL-MCNC: 5.9 G/DL (ref 6.4–8.3)
RBC # BLD AUTO: 2.62 10E6/UL (ref 3.8–5.2)
RBC MORPH BLD: ABNORMAL
SODIUM SERPL-SCNC: 138 MMOL/L (ref 135–145)
SPECIMEN EXPIRATION DATE: NORMAL
WBC # BLD AUTO: 1 10E3/UL (ref 4–11)

## 2024-06-18 PROCEDURE — 86900 BLOOD TYPING SEROLOGIC ABO: CPT

## 2024-06-18 PROCEDURE — 85007 BL SMEAR W/DIFF WBC COUNT: CPT

## 2024-06-18 PROCEDURE — 36591 DRAW BLOOD OFF VENOUS DEVICE: CPT

## 2024-06-18 PROCEDURE — 80053 COMPREHEN METABOLIC PANEL: CPT

## 2024-06-18 PROCEDURE — 85041 AUTOMATED RBC COUNT: CPT

## 2024-06-18 PROCEDURE — 83615 LACTATE (LD) (LDH) ENZYME: CPT

## 2024-06-18 PROCEDURE — 250N000011 HC RX IP 250 OP 636: Mod: JZ | Performed by: INTERNAL MEDICINE

## 2024-06-18 PROCEDURE — 86923 COMPATIBILITY TEST ELECTRIC: CPT | Performed by: PHYSICIAN ASSISTANT

## 2024-06-18 RX ORDER — HEPARIN SODIUM (PORCINE) LOCK FLUSH IV SOLN 100 UNIT/ML 100 UNIT/ML
5 SOLUTION INTRAVENOUS ONCE
Status: COMPLETED | OUTPATIENT
Start: 2024-06-18 | End: 2024-06-18

## 2024-06-18 RX ADMIN — Medication 5 ML: at 10:56

## 2024-06-18 NOTE — NURSING NOTE
"Chief Complaint   Patient presents with    Port Draw     Labs drawn via port by RN. Port accessed with 20g 3/4\" power needle then de-accessed. Flushed with saline and heparin. Pt tolerated well.      Luara Giang RN    "

## 2024-06-19 ENCOUNTER — VIRTUAL VISIT (OUTPATIENT)
Dept: ONCOLOGY | Facility: CLINIC | Age: 29
End: 2024-06-19
Attending: REGISTERED NURSE
Payer: COMMERCIAL

## 2024-06-19 VITALS — HEIGHT: 65 IN | BODY MASS INDEX: 22.63 KG/M2

## 2024-06-19 DIAGNOSIS — K12.31 MUCOSITIS DUE TO CHEMOTHERAPY: ICD-10-CM

## 2024-06-19 DIAGNOSIS — G62.9 NEUROPATHY: ICD-10-CM

## 2024-06-19 DIAGNOSIS — C85.28 MEDIASTINAL LARGE B-CELL LYMPHOMA OF LYMPH NODES OF MULTIPLE REGIONS (H): Primary | ICD-10-CM

## 2024-06-19 LAB
ABO/RH(D): NORMAL
ANTIBODY SCREEN: NEGATIVE
BLD PROD TYP BPU: NORMAL
BLOOD COMPONENT TYPE: NORMAL
CODING SYSTEM: NORMAL
CROSSMATCH: NORMAL
SPECIMEN EXPIRATION DATE: NORMAL
UNIT ABO/RH: NORMAL
UNIT ABO/RH: NORMAL
UNIT NUMBER: NORMAL
UNIT STATUS: NORMAL
UNIT TYPE ISBT: 5100
UNIT TYPE ISBT: 6200

## 2024-06-19 PROCEDURE — 99495 TRANSJ CARE MGMT MOD F2F 14D: CPT | Mod: 95 | Performed by: REGISTERED NURSE

## 2024-06-19 RX ORDER — HEPARIN SODIUM,PORCINE 10 UNIT/ML
5-20 VIAL (ML) INTRAVENOUS DAILY PRN
OUTPATIENT
Start: 2024-06-19

## 2024-06-19 RX ORDER — EPINEPHRINE 1 MG/ML
0.3 INJECTION, SOLUTION INTRAMUSCULAR; SUBCUTANEOUS EVERY 5 MIN PRN
OUTPATIENT
Start: 2024-06-19

## 2024-06-19 RX ORDER — HEPARIN SODIUM (PORCINE) LOCK FLUSH IV SOLN 100 UNIT/ML 100 UNIT/ML
5 SOLUTION INTRAVENOUS
OUTPATIENT
Start: 2024-06-19

## 2024-06-19 RX ORDER — DIPHENHYDRAMINE HYDROCHLORIDE 50 MG/ML
50 INJECTION INTRAMUSCULAR; INTRAVENOUS
Start: 2024-06-19

## 2024-06-19 ASSESSMENT — PAIN SCALES - GENERAL: PAINLEVEL: NO PAIN (0)

## 2024-06-19 NOTE — NURSING NOTE
Is the patient currently in the state of [place]? YES    Visit mode:VIDEO    If the visit is dropped, the patient can be reconnected by: VIDEO VISIT: Text to cell phone:   Telephone Information:   Mobile [phone]       Will anyone else be joining the visit? NO  (If patient encounters technical issues they should call [phone])    How would you like to obtain your AVS? MyChart    Are changes needed to the allergy or medication list? No, Pt stated no changes to allergies, and Pt stated no med changes    Are refills needed on medications prescribed by this physician? NO    Reason for visit: RECHECK (Return CCSL)    Demetra WALSH

## 2024-06-19 NOTE — LETTER
6/19/2024      Clementine Kathleen  [address]  [place] [address]      Dear Colleague,    Thank you for referring your patient, Clementine Kathleen, to the [facility]. Please see a copy of my visit note below.    Virtual Visit Details    Type of service:  Video Visit     Originating Location (pt. Location): Home    Distant Location (provider location):  Off-site  Platform used for Video Visit: [facility]      FOLLOW-UP VISIT NOTE                       Jun 19, 2024  Subjective  REASON FOR VISIT: Follow up PMBCL    ONCOLOGIC SUMMARY:  Diagnosis:  Primary mediastinal B-cell lymphoma dx 1/2024, presenting with cervical LAD. 1/29/24 FNA indeterminate; 2/9/24 left level 3 excisional LN biopsy showed large B-cell lymphoma with rare abnormal B-cells on flow cytometry. Although CD23 IHC was negative, IHC for , MAL, and PD-L1 were positive in majority of the neoplastic cells suggesting PMBCL over DLBCL NOS. [id number] gene expression profile also consistent with PMBCL. FISH with gain of BCL2 but no MYC, BCL2, or BCL6 rearrangements. PET showed bilateral cervical and mediastinal hypermetabolic lymphadenopathy (largest 4.4 cm with SUVmax 27 in a prevascular LN); no disease below diaphragm.     Intent of treatment: Curative    Treatment history:  2/22/24: Cycle 1 R-EPOCH, dose level 1  3/14/24: Cycle 2 R-EPOCH, dose level 2. PET after 2 cycles shows CR!  4/4/24: Cycle 3 R-EPOCH, dose level 3 but vincristine capped at 2 mg d/t neuropathy  4/25/24: Cycle 4 R-EPOCH, dose level 4 but held vincristine d/t neuropathy  5/16/24  Cycle 5 R-EPOCH, dose level 5 but vincristine capped at 1.2 mg d/t neuropathy  6/6/24: Cycle 6 R-EPOCH, dose level 4 (C5 c/b near syncopal episode and mouth pain)    INTERVAL HISTORY:  Clementine is here for post-hospitalization follow-up.  Feeling better this week than last week; energy is improving  Mouth pain is okay, better this cycle; back of  throat still hurts with swallowing; managing with soft foods  Neuropathy is okay, doesn't really feel it right now  Nausea has been manageable/improving, appetite and bowels are stable  Having hot flashes, notices it more at night but also happens throughout the day, maybe 3-4 times in a day, lasts about 30-60 seconds of feeling really hot  Denies CP or palpitations  Denies epistaxis, melena, hematochezia, hematuria, or easy bruising  Denies fevers/chills, night sweats, new pains, new lump/bumps, rashes, all other acute issues.     I have reviewed and updated the following:  Past Medical History:   Diagnosis Date     Anxiety      Cervical lymphadenopathy      GERD      Interstitial cystitis       No Known Allergies    Current Outpatient Medications:      acyclovir (ZOVIRAX) 400 MG tablet, Take 1 tablet (400 mg) by mouth 2 times daily, Disp: 60 tablet, Rfl: 4     cimetidine (TAGAMET) 200 MG tablet, Take 200 mg by mouth 2 times daily, Disp: , Rfl:      dexAMETHasone (DECADRON) 4 MG tablet, Take 2 tablets (8 mg) by mouth daily, Disp: 4 tablet, Rfl: 0     fluconazole (DIFLUCAN) 200 MG tablet, Take 1 tablet (200 mg) by mouth daily as needed (neutropenia prophylaxis), Disp: , Rfl:      levofloxacin (LEVAQUIN) 250 MG tablet, Take 1 tablet (250 mg) by mouth daily as needed (neutropenia prophylaxis), Disp: , Rfl:      Levonorgestrel (KYLEENA IU), 1 Device by Intrauterine route once, Disp: , Rfl:      LORazepam (ATIVAN) 0.5 MG tablet, Take 1 tablet (0.5 mg) by mouth every 6 hours as needed for nausea or vomiting, Disp: 30 tablet, Rfl: 0     magic mouthwash (ENTER INGREDIENTS IN COMMENTS) suspension, Swish and spit 15 ml every 6 hours as needed, Disp: 200 mL, Rfl: 0     metoclopramide (REGLAN) 10 MG tablet, Take 1 tablet (10 mg) by mouth 4 times daily as needed (constipation), Disp: 30 tablet, Rfl: 3     modafinil (PROVIGIL) 200 MG tablet, Take 200 mg by mouth daily as needed (hypersomnia), Disp: , Rfl:      OLANZapine zydis  "(ZYPREXA) 5 MG ODT, Take 1 tablet (5 mg) by mouth nightly as needed (nausea), Disp: , Rfl:      ondansetron (ZOFRAN) 4 MG tablet, Take 1-2 tablets (4-8 mg) by mouth every 8 hours as needed for nausea or vomiting, Disp: 30 tablet, Rfl: 0     pantoprazole (PROTONIX) 40 MG EC tablet, Take 1 tablet (40 mg) by mouth 2 times daily, Disp: 60 tablet, Rfl: 3     polyethylene glycol (MIRALAX) 17 GM/Dose powder, Take 17 g by mouth 2 times daily, Disp: , Rfl:      prochlorperazine (COMPAZINE) 10 MG tablet, Take 1 tablet (10 mg) by mouth every 6 hours as needed for nausea or vomiting, Disp: 30 tablet, Rfl: 0     senna-docusate (SENOKOT-S/PERICOLACE) 8.6-50 MG tablet, Take 1 tablet by mouth 2 times daily, Disp: 60 tablet, Rfl: 0     sucralfate (CARAFATE) 1 GM tablet, Take 1 tablet (1 g) by mouth 4 times daily as needed (acid reflux), Disp: 30 tablet, Rfl: 1  No current facility-administered medications for this visit.    REVIEW OF SYSTEMS:  10-point ROS reviewed and negative other than that mentioned in HPI.     Objective  VITAL SIGNS  Ht 1.651 m (5' 5\")   BMI 22.63 kg/m      ECOG PS: 0     Video physical exam  General: Patient appears well in no acute distress.   Skin: No visualized rash or lesions on visualized skin  Eyes: EOMI, no erythema, sclera icterus or discharge noted  Resp: Appears to be breathing comfortably without accessory muscle usage, speaking in full sentences, no cough  MSK: Appears to have normal range of motion based on visualized movements  Neurologic: No apparent tremors, facial movements symmetric  Psych: affect bright, alert and oriented      LABS:  I reviewed the following labs:  Lab Results   Component Value Date    WBC 1.0 (L) 06/18/2024    HGB 8.4 (L) 06/18/2024    HCT 25.4 (L) 06/18/2024    MCV 97 06/18/2024    PLT 18 (LL) 06/18/2024    INR 1.12 06/10/2024    PTT 29 04/28/2024     Lab Results   Component Value Date     06/18/2024    POTASSIUM 4.3 06/18/2024    CR 0.55 06/18/2024    BUN 9.3 " 06/18/2024    CHLORIDE 103 06/18/2024    DEE 8.9 06/18/2024    GLC 97 06/18/2024      Lab Results   Component Value Date    ALT <5 06/18/2024    AST 10 06/18/2024    ALKPHOS 66 06/18/2024    BILITOTAL <0.2 06/18/2024      Lab Results   Component Value Date     06/18/2024    URIC 1.7 (L) 06/11/2024 4/1/24 PET:  IMPRESSION: In this patient with primary mediastinal B-cell lymphoma  following 2 cycles of chemotherapy:   1. Complete metabolic response by Lugano criteria  2. Resolution of cervical and mediastinal hypermetabolic  lymphadenopathy.    4/15/24 CTA chest:  IMPRESSION:   1. Exam is negative for acute pulmonary embolism.  2. No acute finding in the chest.    Assessment & Plan  Primary mediastinal B-cell lymphoma  Dx 1/2024, presenting cervical lymphadenopathy. Initial FNA suggestive of possible Hodgkin lymphoma; however excisional biopsy showed large B-cell lymphoma with differential diagnosis of primary mediastinal large B-cell lymphoma vs DLBCL NOS. Additional IHC staining for , MAL, and PD-L1, and [id number] gene expression profile all favor PMBCL diagnosis over DLBCL NOS. PET with cervical and mediastinal lymphadenopathy only. LDH normal.   Previously discussed PMBCL diagnosis and plan for DA-R-EPOCH x 6 cycles. In the single-arm phase 2 prospective study of R-EPOCH (without radiation) for PMBCL, 5-year EFS was 93% and OS 97% (10.1056/[id number]).   Cycle 1 R-EPOCH 2/22/24, tolerating well so far other than constipation.  Cycle 2 R-EPOCH 3/14/24. ANC dread >0.5 last cycle so qualifies for 20% increase in doxorubicin, etoposide, and cyclophosphamide (dose level 2).   PET after 2 cycles shows complete response!   Cycle 3 R-EPOCH 4/4/24- qualified for 20% increase in doxorubicin, etoposide, and cyclophosphamide again (dose level 3); however capped vincristine at 2 mg due to neuropathy.   Cycle 4 R-EPOCH- Qualified for 20% dose increase again (dose level 4) but omitted vincristine  to allow  some recovery of neuropathy.  Cycle 5 R-EPOCH- dose level 5 but dose cap vincristine 1.2 mg.  Added mesna given higher dose of Cyclophosphamide.   Cycle 6 R-EPOCH - decreased to dose level 4 due to near-syncopal episode and mouth sores with level 5 dosing  S/p C6 and tolerating treatment fairly well.    Platelets were 18K yesterday, today is day 14 of treatment so she should be coming out of her dread.  Discussed risk of bleeding, decided not to recheck labs later this week since counts should be recovering at this point, but she will let us know if she notices any bleeding or easy bruising.  Repeat labs next Tuesday as scheduled.  PET and follow-up with Dr. Oakes in July    Mucositis  Patient endorsed previous oral ulceration that were painful but resolved after previous chemotherapy cycles. Mouth pain seems a little better this cycle, back of throat still hurts but manages with soft foods.  - MMW & biotene PRN, encourage frequent oral moisturization with saline rinses.    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 Initially affecting fingertips only.   Cap vincristine at 2 mg starting Cycle 3.  S/p C3 and neuropathy in fingertips  worsened with some difficulty using her phone or buttoning shirts and now neuropathy in bilateral feet that did not impact balance of walking.   Held vincristine Cycle 4 to allow some recovery of neuropathy and consider reintroducing at 50% dose for Cycle 5/6.   Neuropathy did very mildly improve in the R hand after holding Vincristine with C4-  reintroduced vincristine with C5 at capped dose 1.2 mg.  Did have slight worsening of her neuropathy but not as bad as prior to C4.  No concerns today 06/19/24 - states she can hardly feel the neuropathy right now    Palpitations/tachycardia  SOB  3/25/24 NT proBNP normal at <36.   4/15/24 CTA chest negative for PE. Arcelia requested but did not hear from Cardiology   Arcelia completed 5/16- no episodes of SOB after C5 and a couple  "episodes of \"heart fluttering\"  Denies CP or palpations with C6    Constipation- resolved  Scheduled Miralax and Senna-S.  PRN Reglan.     Fertility preservation  Established with [facility] and underwent egg retrieval on 2/18/24.     Hot flashes  Likely secondary to ovary suppression from chemotherapy.  This may self-resolve as she gets more distance from chemotherapy.  Also discussed that she can follow-up with OBGYN for symptomatic control if needed.     Ppx  TLS ppx: now done with allopurinol.  ID ppx: continue  mg BID. Levo and fluc when ANC <1.0. Monthly pentamidine for PJP ppx (last given 5/3/24, next due 7/1/24).  Vaccines: up to date on COVID and flu shots.       PLAN:  Repeat PET at [facility]  Dr. Oakes to review [facility] imaging  Pentamidine 2nd week of July (due 7/1 but she will be out of town with family that week)      Transition Care Management Services  Admission Date: 06/06/24  Discharge Date: 06/10/24  Discharge Diagnosis: DLBCL  Face-to-face visit date: 6/19/2024        Medical complexity decision making: Moderate complexity ([id number])      The longitudinal plan of care for the diagnosis(es)/condition(s) as documented were addressed during this visit. Due to the added complexity in care, I will continue to support Clementine in the subsequent management and with ongoing continuity of care.     CALOS Padron CNP  [facility] [address] [address]  [phone]        Again, thank you for allowing me to participate in the care of your patient.        Sincerely,        CALOS Hutchison CNP  "

## 2024-06-19 NOTE — PROGRESS NOTES
Virtual Visit Details    Type of service:  Video Visit     Originating Location (pt. Location): Home    Distant Location (provider location):  Off-site  Platform used for Video Visit: [facility]      FOLLOW-UP VISIT NOTE                       Jun 19, 2024  Subjective   REASON FOR VISIT: Follow up PMBCL    ONCOLOGIC SUMMARY:  Diagnosis:  Primary mediastinal B-cell lymphoma dx 1/2024, presenting with cervical LAD. 1/29/24 FNA indeterminate; 2/9/24 left level 3 excisional LN biopsy showed large B-cell lymphoma with rare abnormal B-cells on flow cytometry. Although CD23 IHC was negative, IHC for , MAL, and PD-L1 were positive in majority of the neoplastic cells suggesting PMBCL over DLBCL NOS. [id number] gene expression profile also consistent with PMBCL. FISH with gain of BCL2 but no MYC, BCL2, or BCL6 rearrangements. PET showed bilateral cervical and mediastinal hypermetabolic lymphadenopathy (largest 4.4 cm with SUVmax 27 in a prevascular LN); no disease below diaphragm.     Intent of treatment: Curative    Treatment history:  2/22/24: Cycle 1 R-EPOCH, dose level 1  3/14/24: Cycle 2 R-EPOCH, dose level 2. PET after 2 cycles shows CR!  4/4/24: Cycle 3 R-EPOCH, dose level 3 but vincristine capped at 2 mg d/t neuropathy  4/25/24: Cycle 4 R-EPOCH, dose level 4 but held vincristine d/t neuropathy  5/16/24  Cycle 5 R-EPOCH, dose level 5 but vincristine capped at 1.2 mg d/t neuropathy  6/6/24: Cycle 6 R-EPOCH, dose level 4 (C5 c/b near syncopal episode and mouth pain)    INTERVAL HISTORY:  Clementine is here for post-hospitalization follow-up.  Feeling better this week than last week; energy is improving  Mouth pain is okay, better this cycle; back of throat still hurts with swallowing; managing with soft foods  Neuropathy is okay, doesn't really feel it right now  Nausea has been manageable/improving, appetite and bowels are stable  Having hot flashes, notices it more at night but also happens  throughout the day, maybe 3-4 times in a day, lasts about 30-60 seconds of feeling really hot  Denies CP or palpitations  Denies epistaxis, melena, hematochezia, hematuria, or easy bruising  Denies fevers/chills, night sweats, new pains, new lump/bumps, rashes, all other acute issues.     I have reviewed and updated the following:  Past Medical History:   Diagnosis Date    Anxiety     Cervical lymphadenopathy     GERD     Interstitial cystitis       No Known Allergies    Current Outpatient Medications:     acyclovir (ZOVIRAX) 400 MG tablet, Take 1 tablet (400 mg) by mouth 2 times daily, Disp: 60 tablet, Rfl: 4    cimetidine (TAGAMET) 200 MG tablet, Take 200 mg by mouth 2 times daily, Disp: , Rfl:     dexAMETHasone (DECADRON) 4 MG tablet, Take 2 tablets (8 mg) by mouth daily, Disp: 4 tablet, Rfl: 0    fluconazole (DIFLUCAN) 200 MG tablet, Take 1 tablet (200 mg) by mouth daily as needed (neutropenia prophylaxis), Disp: , Rfl:     levofloxacin (LEVAQUIN) 250 MG tablet, Take 1 tablet (250 mg) by mouth daily as needed (neutropenia prophylaxis), Disp: , Rfl:     Levonorgestrel (KYLEENA IU), 1 Device by Intrauterine route once, Disp: , Rfl:     LORazepam (ATIVAN) 0.5 MG tablet, Take 1 tablet (0.5 mg) by mouth every 6 hours as needed for nausea or vomiting, Disp: 30 tablet, Rfl: 0    magic mouthwash (ENTER INGREDIENTS IN COMMENTS) suspension, Swish and spit 15 ml every 6 hours as needed, Disp: 200 mL, Rfl: 0    metoclopramide (REGLAN) 10 MG tablet, Take 1 tablet (10 mg) by mouth 4 times daily as needed (constipation), Disp: 30 tablet, Rfl: 3    modafinil (PROVIGIL) 200 MG tablet, Take 200 mg by mouth daily as needed (hypersomnia), Disp: , Rfl:     OLANZapine zydis (ZYPREXA) 5 MG ODT, Take 1 tablet (5 mg) by mouth nightly as needed (nausea), Disp: , Rfl:     ondansetron (ZOFRAN) 4 MG tablet, Take 1-2 tablets (4-8 mg) by mouth every 8 hours as needed for nausea or vomiting, Disp: 30 tablet, Rfl: 0    pantoprazole (PROTONIX)  "40 MG EC tablet, Take 1 tablet (40 mg) by mouth 2 times daily, Disp: 60 tablet, Rfl: 3    polyethylene glycol (MIRALAX) 17 GM/Dose powder, Take 17 g by mouth 2 times daily, Disp: , Rfl:     prochlorperazine (COMPAZINE) 10 MG tablet, Take 1 tablet (10 mg) by mouth every 6 hours as needed for nausea or vomiting, Disp: 30 tablet, Rfl: 0    senna-docusate (SENOKOT-S/PERICOLACE) 8.6-50 MG tablet, Take 1 tablet by mouth 2 times daily, Disp: 60 tablet, Rfl: 0    sucralfate (CARAFATE) 1 GM tablet, Take 1 tablet (1 g) by mouth 4 times daily as needed (acid reflux), Disp: 30 tablet, Rfl: 1  No current facility-administered medications for this visit.    REVIEW OF SYSTEMS:  10-point ROS reviewed and negative other than that mentioned in HPI.     Objective   VITAL SIGNS  Ht 1.651 m (5' 5\")   BMI 22.63 kg/m      ECOG PS: 0     Video physical exam  General: Patient appears well in no acute distress.   Skin: No visualized rash or lesions on visualized skin  Eyes: EOMI, no erythema, sclera icterus or discharge noted  Resp: Appears to be breathing comfortably without accessory muscle usage, speaking in full sentences, no cough  MSK: Appears to have normal range of motion based on visualized movements  Neurologic: No apparent tremors, facial movements symmetric  Psych: affect bright, alert and oriented      LABS:  I reviewed the following labs:  Lab Results   Component Value Date    WBC 1.0 (L) 06/18/2024    HGB 8.4 (L) 06/18/2024    HCT 25.4 (L) 06/18/2024    MCV 97 06/18/2024    PLT 18 (LL) 06/18/2024    INR 1.12 06/10/2024    PTT 29 04/28/2024     Lab Results   Component Value Date     06/18/2024    POTASSIUM 4.3 06/18/2024    CR 0.55 06/18/2024    BUN 9.3 06/18/2024    CHLORIDE 103 06/18/2024    DEE 8.9 06/18/2024    GLC 97 06/18/2024      Lab Results   Component Value Date    ALT <5 06/18/2024    AST 10 06/18/2024    ALKPHOS 66 06/18/2024    BILITOTAL <0.2 06/18/2024      Lab Results   Component Value Date     " 06/18/2024    URIC 1.7 (L) 06/11/2024 4/1/24 PET:  IMPRESSION: In this patient with primary mediastinal B-cell lymphoma  following 2 cycles of chemotherapy:   1. Complete metabolic response by Lugano criteria  2. Resolution of cervical and mediastinal hypermetabolic  lymphadenopathy.    4/15/24 CTA chest:  IMPRESSION:   1. Exam is negative for acute pulmonary embolism.  2. No acute finding in the chest.    Assessment & Plan   Primary mediastinal B-cell lymphoma  Dx 1/2024, presenting cervical lymphadenopathy. Initial FNA suggestive of possible Hodgkin lymphoma; however excisional biopsy showed large B-cell lymphoma with differential diagnosis of primary mediastinal large B-cell lymphoma vs DLBCL NOS. Additional IHC staining for , MAL, and PD-L1, and [id number] gene expression profile all favor PMBCL diagnosis over DLBCL NOS. PET with cervical and mediastinal lymphadenopathy only. LDH normal.   Previously discussed PMBCL diagnosis and plan for DA-R-EPOCH x 6 cycles. In the single-arm phase 2 prospective study of R-EPOCH (without radiation) for PMBCL, 5-year EFS was 93% and OS 97% ([id number]).   Cycle 1 R-EPOCH 2/22/24, tolerating well so far other than constipation.  Cycle 2 R-EPOCH 3/14/24. ANC dread >0.5 last cycle so qualifies for 20% increase in doxorubicin, etoposide, and cyclophosphamide (dose level 2).   PET after 2 cycles shows complete response!   Cycle 3 R-EPOCH 4/4/24- qualified for 20% increase in doxorubicin, etoposide, and cyclophosphamide again (dose level 3); however capped vincristine at 2 mg due to neuropathy.   Cycle 4 R-EPOCH- Qualified for 20% dose increase again (dose level 4) but omitted vincristine  to allow some recovery of neuropathy.  Cycle 5 R-EPOCH- dose level 5 but dose cap vincristine 1.2 mg.  Added mesna given higher dose of Cyclophosphamide.   Cycle 6 R-EPOCH - decreased to dose level 4 due to near-syncopal episode and mouth sores with level 5 dosing  S/p C6 and  "tolerating treatment fairly well.    Platelets were 18K yesterday, today is day 14 of treatment so she should be coming out of her dread.  Discussed risk of bleeding, will recheck labs tomorrow to keep an eye on her platelets  PET and follow-up with Dr. Oakes in July    Mucositis  Patient endorsed previous oral ulceration that were painful but resolved after previous chemotherapy cycles. Mouth pain seems a little better this cycle, back of throat still hurts but manages with soft foods.  - MMW & biotene PRN, encourage frequent oral moisturization with saline rinses.    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 Initially affecting fingertips only.   Cap vincristine at 2 mg starting Cycle 3.  S/p C3 and neuropathy in fingertips  worsened with some difficulty using her phone or buttoning shirts and now neuropathy in bilateral feet that did not impact balance of walking.   Held vincristine Cycle 4 to allow some recovery of neuropathy and consider reintroducing at 50% dose for Cycle 5/6.   Neuropathy did very mildly improve in the R hand after holding Vincristine with C4-  reintroduced vincristine with C5 at capped dose 1.2 mg.  Did have slight worsening of her neuropathy but not as bad as prior to C4.  No concerns today 06/19/24 - states she can hardly feel the neuropathy right now    Palpitations/tachycardia  SOB  3/25/24 NT proBNP normal at <36.   4/15/24 CTA chest negative for PE. Arcelia requested but did not hear from Cardiology   Arcelia completed 5/16- no episodes of SOB after C5 and a couple episodes of \"heart fluttering\"  Denies CP or palpations with C6    Constipation- resolved  Scheduled Miralax and Senna-S.  PRN Reglan.     Fertility preservation  Established with [facility] and underwent egg retrieval on 2/18/24.     Hot flashes  Likely secondary to ovary suppression from chemotherapy.  This may self-resolve as she gets more distance from chemotherapy.  Also discussed that she can follow-up with " OBGYN for symptomatic control if needed.     Ppx  TLS ppx: now done with allopurinol.  ID ppx: continue  mg BID. Levo and fluc when ANC <1.0. Monthly pentamidine for PJP ppx (last given 5/3/24, next due 7/1/24).  Vaccines: up to date on COVID and flu shots.       PLAN:  Repeat PET at [facility]  Dr. Oakes to review [facility] imaging  Pentamidine 2nd week of July (due 7/1 but she will be out of town with family that week)      Transition Care Management Services  Admission Date: 06/06/24  Discharge Date: 06/10/24  Discharge Diagnosis: DLBCL  Face-to-face visit date: 6/19/2024        Medical complexity decision making: Moderate complexity ([id number])      The longitudinal plan of care for the diagnosis(es)/condition(s) as documented were addressed during this visit. Due to the added complexity in care, I will continue to support Clementine in the subsequent management and with ongoing continuity of care.     CALOS [facility]  [address] [place] [id number]  [phone]

## 2024-06-20 ENCOUNTER — INFUSION THERAPY VISIT (OUTPATIENT)
Dept: ONCOLOGY | Facility: CLINIC | Age: 29
End: 2024-06-20
Attending: INTERNAL MEDICINE
Payer: COMMERCIAL

## 2024-06-20 ENCOUNTER — APPOINTMENT (OUTPATIENT)
Dept: LAB | Facility: CLINIC | Age: 29
End: 2024-06-20
Attending: INTERNAL MEDICINE
Payer: COMMERCIAL

## 2024-06-20 VITALS
SYSTOLIC BLOOD PRESSURE: 107 MMHG | TEMPERATURE: 97.9 F | OXYGEN SATURATION: 94 % | BODY MASS INDEX: 22.33 KG/M2 | WEIGHT: 134.2 LBS | RESPIRATION RATE: 16 BRPM | DIASTOLIC BLOOD PRESSURE: 76 MMHG | HEART RATE: 99 BPM

## 2024-06-20 DIAGNOSIS — C83.32 DIFFUSE LARGE B-CELL LYMPHOMA OF INTRATHORACIC LYMPH NODES (H): Primary | ICD-10-CM

## 2024-06-20 LAB
BASOPHILS # BLD AUTO: ABNORMAL 10*3/UL
BASOPHILS # BLD MANUAL: 0.1 10E3/UL (ref 0–0.2)
BASOPHILS NFR BLD AUTO: ABNORMAL %
BASOPHILS NFR BLD MANUAL: 2 %
DACRYOCYTES BLD QL SMEAR: SLIGHT
ELLIPTOCYTES BLD QL SMEAR: SLIGHT
EOSINOPHIL # BLD AUTO: ABNORMAL 10*3/UL
EOSINOPHIL # BLD MANUAL: 0 10E3/UL (ref 0–0.7)
EOSINOPHIL NFR BLD AUTO: ABNORMAL %
EOSINOPHIL NFR BLD MANUAL: 0 %
ERYTHROCYTE [DISTWIDTH] IN BLOOD BY AUTOMATED COUNT: 14.3 % (ref 10–15)
HCT VFR BLD AUTO: 26.1 % (ref 35–47)
HGB BLD-MCNC: 8.5 G/DL (ref 11.7–15.7)
IMM GRANULOCYTES # BLD: ABNORMAL 10*3/UL
IMM GRANULOCYTES NFR BLD: ABNORMAL %
LYMPHOCYTES # BLD AUTO: ABNORMAL 10*3/UL
LYMPHOCYTES # BLD MANUAL: 1 10E3/UL (ref 0.8–5.3)
LYMPHOCYTES NFR BLD AUTO: ABNORMAL %
LYMPHOCYTES NFR BLD MANUAL: 21 %
MCH RBC QN AUTO: 31.7 PG (ref 26.5–33)
MCHC RBC AUTO-ENTMCNC: 32.6 G/DL (ref 31.5–36.5)
MCV RBC AUTO: 97 FL (ref 78–100)
METAMYELOCYTES # BLD MANUAL: 0 10E3/UL
METAMYELOCYTES NFR BLD MANUAL: 1 %
MONOCYTES # BLD AUTO: ABNORMAL 10*3/UL
MONOCYTES # BLD MANUAL: 0.3 10E3/UL (ref 0–1.3)
MONOCYTES NFR BLD AUTO: ABNORMAL %
MONOCYTES NFR BLD MANUAL: 7 %
MYELOCYTES # BLD MANUAL: 0.2 10E3/UL
MYELOCYTES NFR BLD MANUAL: 4 %
NEUTROPHILS # BLD AUTO: ABNORMAL 10*3/UL
NEUTROPHILS # BLD MANUAL: 3 10E3/UL (ref 1.6–8.3)
NEUTROPHILS NFR BLD AUTO: ABNORMAL %
NEUTROPHILS NFR BLD MANUAL: 62 %
NRBC # BLD AUTO: 0 10E3/UL
NRBC BLD AUTO-RTO: 1 /100
PLAT MORPH BLD: ABNORMAL
PLATELET # BLD AUTO: 48 10E3/UL (ref 150–450)
PROMYELOCYTES # BLD MANUAL: 0.1 10E3/UL
PROMYELOCYTES NFR BLD MANUAL: 3 %
RBC # BLD AUTO: 2.68 10E6/UL (ref 3.8–5.2)
RBC MORPH BLD: ABNORMAL
WBC # BLD AUTO: 4.8 10E3/UL (ref 4–11)

## 2024-06-20 PROCEDURE — 36591 DRAW BLOOD OFF VENOUS DEVICE: CPT | Performed by: PHYSICIAN ASSISTANT

## 2024-06-20 PROCEDURE — 85007 BL SMEAR W/DIFF WBC COUNT: CPT | Performed by: PHYSICIAN ASSISTANT

## 2024-06-20 PROCEDURE — 250N000011 HC RX IP 250 OP 636: Mod: JZ | Performed by: INTERNAL MEDICINE

## 2024-06-20 PROCEDURE — 86900 BLOOD TYPING SEROLOGIC ABO: CPT | Performed by: PHYSICIAN ASSISTANT

## 2024-06-20 PROCEDURE — 85014 HEMATOCRIT: CPT | Performed by: PHYSICIAN ASSISTANT

## 2024-06-20 RX ORDER — HEPARIN SODIUM (PORCINE) LOCK FLUSH IV SOLN 100 UNIT/ML 100 UNIT/ML
5 SOLUTION INTRAVENOUS ONCE
Status: COMPLETED | OUTPATIENT
Start: 2024-06-20 | End: 2024-06-20

## 2024-06-20 RX ADMIN — Medication 5 ML: at 08:53

## 2024-06-20 ASSESSMENT — PAIN SCALES - GENERAL: PAINLEVEL: NO PAIN (0)

## 2024-06-20 NOTE — PROGRESS NOTES
Infusion Nursing Note:  Clementine Kathleen presents today for possible blood/plt transfusion  Patient seen by provider today: No   present during visit today: Not Applicable.    Note: Patient presents to infusion feeling ok. Patient denies acute discomfort and states no acute complaints or concerns needing to be addressed today. Specifically, pt denies s/s of infection such as fever, sore throat (no new changes-tolerated soft foods), cough, chest pain, shortness of breath, body aches, chills, headache, increased nasal congestion, or changes in taste/smell. Patient denies s/s of low hemoglobin such as dizziness, lightheadedness, increased fatigue, chest pain, shortness of breath, headache, appearing pale, or increased heart rate. Patient also denies s/s of low platelets such as increased bruising, frequent nose bleeds, bleeding gums, or blood in urine/stool. Patient aware to seek medical attention if the above symptoms develop at home.      Intravenous Access:  Implanted Port.    Treatment Conditions:  Lab Results   Component Value Date    HGB 8.5 (L) 06/20/2024    WBC 4.8 06/20/2024    ANEU 3.0 06/20/2024    ANEUTAUTO 3.9 06/11/2024    PLT 48 (LL) 06/20/2024        Results reviewed, labs MET treatment parameters, ok to proceed with treatment.      Post Infusion Assessment:  Access discontinued per protocol.       Discharge Plan:   Patient declined prescription refills.  Discharge instructions reviewed with: Patient.  Patient and/or family verbalized understanding of discharge instructions and all questions answered.  AVS to patient via [employer].  Patient will return 6/25 for next appointment for lab draw.  Patient discharged in stable condition accompanied by: Family.  Departure Mode: Ambulatory.      Onur Gomez RN

## 2024-06-20 NOTE — NURSING NOTE
Chief Complaint   Patient presents with    Port Draw     Labs drawn via port by RN in lab. VS taken.      Labs drawn via port by RN. Port accessed with 20g flat needle. Flushed with saline and heparin. Pt tolerated well. Vitals taken. Pt checked into next appt.     Ida Gutierres RN

## 2024-06-20 NOTE — PATIENT INSTRUCTIONS
[facility] Triage and after hours / weekends / holidays:  [phone]    Please call the triage or after hours line if you experience a temperature greater than or equal to 100.4, shaking chills, have uncontrolled nausea, vomiting and/or diarrhea, dizziness, shortness of breath, chest pain, bleeding, unexplained bruising, or if you have any other new/concerning symptoms, questions or concerns.      If you are having any concerning symptoms or wish to speak to a provider before your next infusion visit, please call triage to notify them so we can adequately serve you.     If you need a refill on a narcotic prescription or other medication, please call before your infusion appointment.                 June 2024 Sunday Monday Tuesday Wednesday Thursday Friday Saturday                                 1       2     3    LAB CENTRAL  12:45 PM   (15 min.)   [facility]RAW   [facility]   1:15 PM   (60 min.)   [facility] PFL PENT   [facility] Pulmonary Function Testing [place] 4     5    LAB CENTRAL   2:45 PM   (15 min.)   [facility] DRAW   [facility]    RETURN CCSL   3:15 PM   (30 min.)    BMT AUTO CELL THERAPY   [facility] Blood and Marrow Transplant Program [place] 6    Admission  10:00 AM   Irina Ha MD    Admission  12:02 PM   Irina Ha MD   [facility] 5A Oncology   (Discharge: 6/10/2024) 7     8       9     10     11    LAB CENTRAL   1:45 PM   (15 min.)   [facility] DRAW   [facility]    ONC INFUSION 0.5 HR (30 MIN)   2:30 PM   (30 min.)    ONC INFUSION NURSE   [facility] 12     13     14     15       16     17     18    LAB CENTRAL  10:45 AM   (15 min.)   [facility] DRAW   [facility] 19    RETURN CCSL   9:45 AM   (45 min.)   Angle Perkins APRN CNP   [facility]  20    LAB CENTRAL   8:45 AM   (15 min.)    [facility]   [facility]    ONC INFUSION 3 HR (180 MIN)   9:30 AM   (180 min.)    ONC INFUSION NURSE   [facility] 21     22       23     24     25    LAB CENTRAL  10:45 AM   (15 min.)    [facility] DRAW   [facility] 26     27     28     29       30 July 2024 Sunday Monday Tuesday Wednesday Thursday Friday Saturday        1     2     3     4     5     6       7     8    PET ONCOLOGY WHOLE BODY   8:30 AM   (30 min.)   [id number]   [facility]    PENTAMADINE NEB   9:45 AM   (60 min.)   [facility] PFL PENT   [facility] Pulmonary Function Testing [place] 9     10     11    LAB CENTRAL   2:45 PM   (15 min.)   [facility] LAB DRAW   [facility]    RETURN CCSL   3:15 PM   (30 min.)   Vanessa Oakes MD   [facility] 12     13       14     15     16     17     18     19     20       21     22     23     24     25     26     27       28     29     30     31                                    Lab Results:  Recent Results (from the past 12 hour(s))   Adult Type and Screen    Collection Time: 06/20/24  8:52 AM   Result Value Ref Range    ABO/RH(D) O POS     SPECIMEN EXPIRATION DATE [id number]    CBC with platelets and differential    Collection Time: 06/20/24  8:52 AM   Result Value Ref Range    WBC Count 4.8 4.0 - 11.0 10e3/uL    RBC Count 2.68 (L) 3.80 - 5.20 10e6/uL    Hemoglobin 8.5 (L) 11.7 - 15.7 g/dL    Hematocrit 26.1 (L) 35.0 - 47.0 %    MCV 97 78 - 100 fL    MCH 31.7 26.5 - 33.0 pg    MCHC 32.6 31.5 - 36.5 g/dL    RDW 14.3 10.0 - 15.0 %    Platelet Count 48 (LL) 150 - 450 10e3/uL    % Neutrophils      % Lymphocytes      % Monocytes      % Eosinophils      % Basophils      % Immature Granulocytes      NRBCs per 100 WBC 1 (H) <1 /100    Absolute Neutrophils       Absolute Lymphocytes      Absolute Monocytes      Absolute Eosinophils      Absolute Basophils      Absolute Immature Granulocytes      Absolute NRBCs 0.0 10e3/uL   Manual Differential    Collection Time: 06/20/24  8:52 AM   Result Value Ref Range    % Neutrophils 62 %    % Lymphocytes 21 %    % Monocytes 7 %    % Eosinophils 0 %    % Basophils 2 %    % Metamyelocytes 1 %    % Myelocytes 4 %    % Promyelocytes 3 %    Absolute Neutrophils 3.0 1.6 - 8.3 10e3/uL    Absolute Lymphocytes 1.0 0.8 - 5.3 10e3/uL    Absolute Monocytes 0.3 0.0 - 1.3 10e3/uL    Absolute Eosinophils 0.0 0.0 - 0.7 10e3/uL    Absolute Basophils 0.1 0.0 - 0.2 10e3/uL    Absolute Metamyelocytes 0.0 <=0.0 10e3/uL    Absolute Myelocytes 0.2 (H) <=0.0 10e3/uL    Absolute Promyelocytes 0.1 (H) <=0.0 10e3/uL    RBC Morphology Confirmed RBC Indices     Platelet Assessment  Automated Count Confirmed. Platelet morphology is normal.     Automated Count Confirmed. Platelet morphology is normal.    Elliptocytes Slight (A) None Seen    Teardrop Cells Slight (A) None Seen

## 2024-06-21 DIAGNOSIS — K21.00 GASTROESOPHAGEAL REFLUX DISEASE WITH ESOPHAGITIS, UNSPECIFIED WHETHER HEMORRHAGE: ICD-10-CM

## 2024-06-22 DIAGNOSIS — K21.00 GASTROESOPHAGEAL REFLUX DISEASE WITH ESOPHAGITIS, UNSPECIFIED WHETHER HEMORRHAGE: ICD-10-CM

## 2024-06-25 ENCOUNTER — LAB (OUTPATIENT)
Dept: LAB | Facility: CLINIC | Age: 29
End: 2024-06-25
Attending: INTERNAL MEDICINE
Payer: COMMERCIAL

## 2024-06-25 DIAGNOSIS — C85.28 MEDIASTINAL LARGE B-CELL LYMPHOMA OF LYMPH NODES OF MULTIPLE REGIONS (H): ICD-10-CM

## 2024-06-25 LAB
ALBUMIN SERPL BCG-MCNC: 4 G/DL (ref 3.5–5.2)
ALP SERPL-CCNC: 74 U/L (ref 40–150)
ALT SERPL W P-5'-P-CCNC: 18 U/L (ref 0–50)
ANION GAP SERPL CALCULATED.3IONS-SCNC: 10 MMOL/L (ref 7–15)
AST SERPL W P-5'-P-CCNC: 18 U/L (ref 0–45)
BASOPHILS # BLD AUTO: ABNORMAL 10*3/UL
BASOPHILS # BLD MANUAL: 0 10E3/UL (ref 0–0.2)
BASOPHILS NFR BLD AUTO: ABNORMAL %
BASOPHILS NFR BLD MANUAL: 0 %
BILIRUB SERPL-MCNC: <0.2 MG/DL
BUN SERPL-MCNC: 10.8 MG/DL (ref 6–20)
CALCIUM SERPL-MCNC: 9.1 MG/DL (ref 8.6–10)
CHLORIDE SERPL-SCNC: 107 MMOL/L (ref 98–107)
CREAT SERPL-MCNC: 0.58 MG/DL (ref 0.51–0.95)
DACRYOCYTES BLD QL SMEAR: SLIGHT
DEPRECATED HCO3 PLAS-SCNC: 25 MMOL/L (ref 22–29)
EGFRCR SERPLBLD CKD-EPI 2021: >90 ML/MIN/1.73M2
EOSINOPHIL # BLD AUTO: ABNORMAL 10*3/UL
EOSINOPHIL # BLD MANUAL: 0 10E3/UL (ref 0–0.7)
EOSINOPHIL NFR BLD AUTO: ABNORMAL %
EOSINOPHIL NFR BLD MANUAL: 1 %
ERYTHROCYTE [DISTWIDTH] IN BLOOD BY AUTOMATED COUNT: 17.2 % (ref 10–15)
FRAGMENTS BLD QL SMEAR: SLIGHT
GLUCOSE SERPL-MCNC: 95 MG/DL (ref 70–99)
HCT VFR BLD AUTO: 27.6 % (ref 35–47)
HGB BLD-MCNC: 9 G/DL (ref 11.7–15.7)
IMM GRANULOCYTES # BLD: ABNORMAL 10*3/UL
IMM GRANULOCYTES NFR BLD: ABNORMAL %
LDH SERPL L TO P-CCNC: 215 U/L (ref 0–250)
LYMPHOCYTES # BLD AUTO: ABNORMAL 10*3/UL
LYMPHOCYTES # BLD MANUAL: 0.4 10E3/UL (ref 0.8–5.3)
LYMPHOCYTES NFR BLD AUTO: ABNORMAL %
LYMPHOCYTES NFR BLD MANUAL: 8 %
MCH RBC QN AUTO: 32.7 PG (ref 26.5–33)
MCHC RBC AUTO-ENTMCNC: 32.6 G/DL (ref 31.5–36.5)
MCV RBC AUTO: 100 FL (ref 78–100)
METAMYELOCYTES # BLD MANUAL: 0 10E3/UL
METAMYELOCYTES NFR BLD MANUAL: 1 %
MONOCYTES # BLD AUTO: ABNORMAL 10*3/UL
MONOCYTES # BLD MANUAL: 0.4 10E3/UL (ref 0–1.3)
MONOCYTES NFR BLD AUTO: ABNORMAL %
MONOCYTES NFR BLD MANUAL: 9 %
MYELOCYTES # BLD MANUAL: 0 10E3/UL
MYELOCYTES NFR BLD MANUAL: 1 %
NEUTROPHILS # BLD AUTO: ABNORMAL 10*3/UL
NEUTROPHILS # BLD MANUAL: 3.9 10E3/UL (ref 1.6–8.3)
NEUTROPHILS NFR BLD AUTO: ABNORMAL %
NEUTROPHILS NFR BLD MANUAL: 80 %
NRBC # BLD AUTO: 0 10E3/UL
NRBC BLD AUTO-RTO: 0 /100
PLAT MORPH BLD: ABNORMAL
PLATELET # BLD AUTO: 229 10E3/UL (ref 150–450)
POLYCHROMASIA BLD QL SMEAR: SLIGHT
POTASSIUM SERPL-SCNC: 4.1 MMOL/L (ref 3.4–5.3)
PROT SERPL-MCNC: 6.1 G/DL (ref 6.4–8.3)
RBC # BLD AUTO: 2.75 10E6/UL (ref 3.8–5.2)
RBC MORPH BLD: ABNORMAL
SODIUM SERPL-SCNC: 142 MMOL/L (ref 135–145)
URATE SERPL-MCNC: 2.8 MG/DL (ref 2.4–5.7)
WBC # BLD AUTO: 4.9 10E3/UL (ref 4–11)

## 2024-06-25 PROCEDURE — 83615 LACTATE (LD) (LDH) ENZYME: CPT

## 2024-06-25 PROCEDURE — 84550 ASSAY OF BLOOD/URIC ACID: CPT

## 2024-06-25 PROCEDURE — 250N000011 HC RX IP 250 OP 636: Mod: JZ | Performed by: INTERNAL MEDICINE

## 2024-06-25 PROCEDURE — 80053 COMPREHEN METABOLIC PANEL: CPT

## 2024-06-25 PROCEDURE — 36591 DRAW BLOOD OFF VENOUS DEVICE: CPT

## 2024-06-25 PROCEDURE — 85041 AUTOMATED RBC COUNT: CPT

## 2024-06-25 PROCEDURE — 85007 BL SMEAR W/DIFF WBC COUNT: CPT

## 2024-06-25 RX ORDER — HEPARIN SODIUM (PORCINE) LOCK FLUSH IV SOLN 100 UNIT/ML 100 UNIT/ML
5 SOLUTION INTRAVENOUS
Status: DISCONTINUED | OUTPATIENT
Start: 2024-06-25 | End: 2024-07-01 | Stop reason: HOSPADM

## 2024-06-25 RX ADMIN — Medication 5 ML: at 11:39

## 2024-06-25 NOTE — NURSING NOTE
Chief Complaint   Patient presents with    Port Draw     Lab only visit, port accessed, labs drawn, heparin locked, deaccessed     There were no vitals taken for this visit.  Santana Magaña RN on 6/25/2024 at 11:41 AM

## 2024-06-26 DIAGNOSIS — K21.00 GASTROESOPHAGEAL REFLUX DISEASE WITH ESOPHAGITIS, UNSPECIFIED WHETHER HEMORRHAGE: ICD-10-CM

## 2024-06-30 DIAGNOSIS — K21.00 GASTROESOPHAGEAL REFLUX DISEASE WITH ESOPHAGITIS, UNSPECIFIED WHETHER HEMORRHAGE: ICD-10-CM

## 2024-07-03 DIAGNOSIS — K21.00 GASTROESOPHAGEAL REFLUX DISEASE WITH ESOPHAGITIS, UNSPECIFIED WHETHER HEMORRHAGE: ICD-10-CM

## 2024-07-03 RX ORDER — SUCRALFATE 1 G/1
1 TABLET ORAL 4 TIMES DAILY PRN
Qty: 30 TABLET | Refills: 1 | Status: SHIPPED | OUTPATIENT
Start: 2024-07-03

## 2024-07-03 NOTE — TELEPHONE ENCOUNTER
Medication requested: sucralfate 1 GM tablet    Last prescribing provider: Charisse Weinberg CNP on 5/22/24    Last clinic visit date: 6/19/24 wit Angle Perkins CNP    Recommendations for requested medication (if none, N/A): NA    Any other pertinent information (if none, N/A): NA    Refilled: Y/N, if NO, why?    Pended and Routed to Angle Perkins CNP

## 2024-07-10 RX ORDER — POTASSIUM CHLORIDE 1500 MG/1
20 TABLET, EXTENDED RELEASE ORAL 2 TIMES DAILY
Qty: 4 TABLET | Refills: 0 | OUTPATIENT
Start: 2024-07-10

## 2024-07-14 RX ORDER — PANTOPRAZOLE SODIUM 40 MG/1
40 TABLET, DELAYED RELEASE ORAL 2 TIMES DAILY
Qty: 180 TABLET | OUTPATIENT
Start: 2024-07-14

## 2024-07-23 ENCOUNTER — HOSPITAL ENCOUNTER (OUTPATIENT)
Dept: PET IMAGING | Facility: CLINIC | Age: 29
Discharge: HOME | End: 2024-07-23
Payer: COMMERCIAL

## 2024-07-23 DIAGNOSIS — C85.28 MEDIASTINAL LARGE B-CELL LYMPHOMA OF LYMPH NODES OF MULTIPLE REGIONS (H): ICD-10-CM

## 2024-07-23 DIAGNOSIS — C83.32 DIFFUSE LARGE B-CELL LYMPHOMA OF INTRATHORACIC LYMPH NODES (H): ICD-10-CM

## 2024-07-23 PROCEDURE — 343N000001 HC RX 343

## 2024-07-23 PROCEDURE — 78816 PET IMAGE W/CT FULL BODY: CPT | Mod: 26 | Performed by: RADIOLOGY

## 2024-07-23 PROCEDURE — 74177 CT ABD & PELVIS W/CONTRAST: CPT | Mod: 26 | Performed by: RADIOLOGY

## 2024-07-23 PROCEDURE — 70491 CT SOFT TISSUE NECK W/DYE: CPT

## 2024-07-23 PROCEDURE — 250N000011 HC RX IP 250 OP 636

## 2024-07-23 PROCEDURE — 70491 CT SOFT TISSUE NECK W/DYE: CPT | Mod: 26 | Performed by: RADIOLOGY

## 2024-07-23 PROCEDURE — 71260 CT THORAX DX C+: CPT

## 2024-07-23 PROCEDURE — 71260 CT THORAX DX C+: CPT | Mod: 26 | Performed by: RADIOLOGY

## 2024-07-23 PROCEDURE — A9552 F18 FDG: HCPCS

## 2024-07-23 PROCEDURE — 78816 PET IMAGE W/CT FULL BODY: CPT | Mod: PS

## 2024-07-23 RX ORDER — HEPARIN SODIUM (PORCINE) LOCK FLUSH IV SOLN 100 UNIT/ML 100 UNIT/ML
500 SOLUTION INTRAVENOUS ONCE
Status: COMPLETED | OUTPATIENT
Start: 2024-07-23 | End: 2024-07-23

## 2024-07-23 RX ORDER — FLUDEOXYGLUCOSE F 18 200 MCI/ML
10-18 INJECTION, SOLUTION INTRAVENOUS ONCE
Status: COMPLETED | OUTPATIENT
Start: 2024-07-23 | End: 2024-07-23

## 2024-07-23 RX ORDER — IOPAMIDOL 755 MG/ML
10-135 INJECTION, SOLUTION INTRAVASCULAR ONCE
Status: COMPLETED | OUTPATIENT
Start: 2024-07-23 | End: 2024-07-23

## 2024-07-23 RX ADMIN — IOPAMIDOL 82 ML: 755 INJECTION, SOLUTION INTRAVENOUS at 15:17

## 2024-07-23 RX ADMIN — FLUDEOXYGLUCOSE F 18 10.12 MILLICURIE: 200 INJECTION, SOLUTION INTRAVENOUS at 14:14

## 2024-07-23 RX ADMIN — Medication 500 UNITS: at 15:17

## 2024-07-25 ENCOUNTER — APPOINTMENT (OUTPATIENT)
Dept: LAB | Facility: CLINIC | Age: 29
End: 2024-07-25
Attending: INTERNAL MEDICINE
Payer: COMMERCIAL

## 2024-07-25 ENCOUNTER — ONCOLOGY VISIT (OUTPATIENT)
Dept: ONCOLOGY | Facility: CLINIC | Age: 29
End: 2024-07-25
Payer: COMMERCIAL

## 2024-07-25 VITALS
SYSTOLIC BLOOD PRESSURE: 113 MMHG | TEMPERATURE: 97.3 F | OXYGEN SATURATION: 97 % | DIASTOLIC BLOOD PRESSURE: 69 MMHG | WEIGHT: 137.2 LBS | HEART RATE: 71 BPM | RESPIRATION RATE: 16 BRPM | BODY MASS INDEX: 22.83 KG/M2

## 2024-07-25 DIAGNOSIS — C83.32 DIFFUSE LARGE B-CELL LYMPHOMA OF INTRATHORACIC LYMPH NODES (H): Primary | ICD-10-CM

## 2024-07-25 DIAGNOSIS — C85.28 MEDIASTINAL LARGE B-CELL LYMPHOMA OF LYMPH NODES OF MULTIPLE REGIONS (H): Primary | ICD-10-CM

## 2024-07-25 DIAGNOSIS — C83.32 DIFFUSE LARGE B-CELL LYMPHOMA OF INTRATHORACIC LYMPH NODES (H): ICD-10-CM

## 2024-07-25 DIAGNOSIS — Z29.89 NEED FOR PNEUMOCYSTIS PROPHYLAXIS: ICD-10-CM

## 2024-07-25 LAB
ALBUMIN SERPL BCG-MCNC: 4.5 G/DL (ref 3.5–5.2)
ALP SERPL-CCNC: 86 U/L (ref 40–150)
ALT SERPL W P-5'-P-CCNC: 10 U/L (ref 0–50)
ANION GAP SERPL CALCULATED.3IONS-SCNC: 10 MMOL/L (ref 7–15)
AST SERPL W P-5'-P-CCNC: 19 U/L (ref 0–45)
BASOPHILS # BLD AUTO: 0 10E3/UL (ref 0–0.2)
BASOPHILS NFR BLD AUTO: 1 %
BILIRUB SERPL-MCNC: 0.5 MG/DL
BUN SERPL-MCNC: 12.5 MG/DL (ref 6–20)
CALCIUM SERPL-MCNC: 9.4 MG/DL (ref 8.8–10.4)
CHLORIDE SERPL-SCNC: 103 MMOL/L (ref 98–107)
CREAT SERPL-MCNC: 0.76 MG/DL (ref 0.51–0.95)
EGFRCR SERPLBLD CKD-EPI 2021: >90 ML/MIN/1.73M2
EOSINOPHIL # BLD AUTO: 0.1 10E3/UL (ref 0–0.7)
EOSINOPHIL NFR BLD AUTO: 2 %
ERYTHROCYTE [DISTWIDTH] IN BLOOD BY AUTOMATED COUNT: 14.4 % (ref 10–15)
GLUCOSE SERPL-MCNC: 104 MG/DL (ref 70–99)
HCO3 SERPL-SCNC: 24 MMOL/L (ref 22–29)
HCT VFR BLD AUTO: 37 % (ref 35–47)
HGB BLD-MCNC: 12 G/DL (ref 11.7–15.7)
IMM GRANULOCYTES # BLD: 0 10E3/UL
IMM GRANULOCYTES NFR BLD: 0 %
LDH SERPL L TO P-CCNC: 173 U/L (ref 0–250)
LYMPHOCYTES # BLD AUTO: 0.5 10E3/UL (ref 0.8–5.3)
LYMPHOCYTES NFR BLD AUTO: 16 %
MCH RBC QN AUTO: 31.9 PG (ref 26.5–33)
MCHC RBC AUTO-ENTMCNC: 32.4 G/DL (ref 31.5–36.5)
MCV RBC AUTO: 98 FL (ref 78–100)
MONOCYTES # BLD AUTO: 0.3 10E3/UL (ref 0–1.3)
MONOCYTES NFR BLD AUTO: 10 %
NEUTROPHILS # BLD AUTO: 2.4 10E3/UL (ref 1.6–8.3)
NEUTROPHILS NFR BLD AUTO: 71 %
NRBC # BLD AUTO: 0 10E3/UL
NRBC BLD AUTO-RTO: 0 /100
PLATELET # BLD AUTO: 176 10E3/UL (ref 150–450)
POTASSIUM SERPL-SCNC: 3.9 MMOL/L (ref 3.4–5.3)
PROT SERPL-MCNC: 6.7 G/DL (ref 6.4–8.3)
RBC # BLD AUTO: 3.76 10E6/UL (ref 3.8–5.2)
SODIUM SERPL-SCNC: 137 MMOL/L (ref 135–145)
URATE SERPL-MCNC: 3 MG/DL (ref 2.4–5.7)
WBC # BLD AUTO: 3.4 10E3/UL (ref 4–11)

## 2024-07-25 PROCEDURE — 99214 OFFICE O/P EST MOD 30 MIN: CPT | Performed by: INTERNAL MEDICINE

## 2024-07-25 PROCEDURE — 99213 OFFICE O/P EST LOW 20 MIN: CPT | Performed by: INTERNAL MEDICINE

## 2024-07-25 PROCEDURE — 94642 AEROSOL INHALATION TREATMENT: CPT | Performed by: INTERNAL MEDICINE

## 2024-07-25 PROCEDURE — 250N000011 HC RX IP 250 OP 636: Performed by: INTERNAL MEDICINE

## 2024-07-25 PROCEDURE — 80053 COMPREHEN METABOLIC PANEL: CPT | Performed by: INTERNAL MEDICINE

## 2024-07-25 PROCEDURE — 85025 COMPLETE CBC W/AUTO DIFF WBC: CPT | Performed by: INTERNAL MEDICINE

## 2024-07-25 PROCEDURE — 36591 DRAW BLOOD OFF VENOUS DEVICE: CPT | Performed by: INTERNAL MEDICINE

## 2024-07-25 PROCEDURE — 94640 AIRWAY INHALATION TREATMENT: CPT | Performed by: INTERNAL MEDICINE

## 2024-07-25 PROCEDURE — 84550 ASSAY OF BLOOD/URIC ACID: CPT | Performed by: INTERNAL MEDICINE

## 2024-07-25 PROCEDURE — G2211 COMPLEX E/M VISIT ADD ON: HCPCS | Performed by: INTERNAL MEDICINE

## 2024-07-25 PROCEDURE — 83615 LACTATE (LD) (LDH) ENZYME: CPT | Performed by: INTERNAL MEDICINE

## 2024-07-25 RX ORDER — HEPARIN SODIUM (PORCINE) LOCK FLUSH IV SOLN 100 UNIT/ML 100 UNIT/ML
5 SOLUTION INTRAVENOUS ONCE
Status: COMPLETED | OUTPATIENT
Start: 2024-07-25 | End: 2024-07-25

## 2024-07-25 RX ORDER — ALBUTEROL SULFATE 0.83 MG/ML
2.5 SOLUTION RESPIRATORY (INHALATION)
OUTPATIENT
Start: 2024-07-29

## 2024-07-25 RX ORDER — DIPHENHYDRAMINE HYDROCHLORIDE 50 MG/ML
50 INJECTION INTRAMUSCULAR; INTRAVENOUS
Start: 2024-07-29

## 2024-07-25 RX ORDER — PENTAMIDINE ISETHIONATE 300 MG/300MG
300 INHALANT RESPIRATORY (INHALATION)
Start: 2024-07-29

## 2024-07-25 RX ORDER — PENTAMIDINE ISETHIONATE 300 MG/300MG
300 INHALANT RESPIRATORY (INHALATION)
Status: DISCONTINUED | OUTPATIENT
Start: 2024-07-25 | End: 2024-07-25 | Stop reason: HOSPADM

## 2024-07-25 RX ORDER — METHYLPREDNISOLONE SODIUM SUCCINATE 125 MG/2ML
125 INJECTION, POWDER, LYOPHILIZED, FOR SOLUTION INTRAMUSCULAR; INTRAVENOUS
Start: 2024-07-29

## 2024-07-25 RX ORDER — ALBUTEROL SULFATE 0.83 MG/ML
2.5 SOLUTION RESPIRATORY (INHALATION) ONCE
Start: 2024-07-29 | End: 2024-07-29

## 2024-07-25 RX ORDER — ALBUTEROL SULFATE 90 UG/1
1-2 AEROSOL, METERED RESPIRATORY (INHALATION)
Start: 2024-07-29

## 2024-07-25 RX ORDER — ALBUTEROL SULFATE 0.83 MG/ML
2.5 SOLUTION RESPIRATORY (INHALATION) ONCE
Status: COMPLETED | OUTPATIENT
Start: 2024-07-25 | End: 2024-07-25

## 2024-07-25 RX ORDER — EPINEPHRINE 1 MG/ML
0.3 INJECTION, SOLUTION, CONCENTRATE INTRAVENOUS EVERY 5 MIN PRN
OUTPATIENT
Start: 2024-07-29

## 2024-07-25 RX ORDER — MEPERIDINE HYDROCHLORIDE 25 MG/ML
25 INJECTION INTRAMUSCULAR; INTRAVENOUS; SUBCUTANEOUS EVERY 30 MIN PRN
OUTPATIENT
Start: 2024-07-29

## 2024-07-25 RX ADMIN — ALBUTEROL SULFATE 2.5 MG: 0.83 SOLUTION RESPIRATORY (INHALATION) at 14:55

## 2024-07-25 RX ADMIN — Medication 5 ML: at 15:50

## 2024-07-25 RX ADMIN — PENTAMIDINE ISETHIONATE 300 MG: 300 INHALANT RESPIRATORY (INHALATION) at 15:00

## 2024-07-25 ASSESSMENT — PAIN SCALES - GENERAL: PAINLEVEL: NO PAIN (0)

## 2024-07-25 NOTE — LETTER
7/25/2024      Clementine Kathleen  [address]  [facility] [address]      Dear Colleague,    Thank you for referring your patient, Clementine Kathleen, to the [facility]. Please see a copy of my visit note below.      [facility]      FOLLOW-UP VISIT NOTE                       Jul 25, 2024  Subjective  REASON FOR VISIT: Follow up PMBCL    ONCOLOGIC SUMMARY:  Diagnosis:  Primary mediastinal B-cell lymphoma dx 1/2024, presenting with cervical LAD. 1/29/24 FNA indeterminate; 2/9/24 left level 3 excisional LN biopsy showed large B-cell lymphoma with rare abnormal B-cells on flow cytometry. Although CD23 IHC was negative, IHC for , MAL, and PD-L1 were positive in majority of the neoplastic cells suggesting PMBCL over DLBCL NOS. [id number] gene expression profile also consistent with PMBCL. FISH with gain of BCL2 but no MYC, BCL2, or BCL6 rearrangements. PET showed bilateral cervical and mediastinal hypermetabolic lymphadenopathy (largest 4.4 cm with SUVmax 27 in a prevascular LN); no disease below diaphragm.     Intent of treatment: Curative    Treatment history:  2/22/24: Cycle 1 R-EPOCH, dose level 1  3/14/24: Cycle 2 R-EPOCH, dose level 2. PET after 2 cycles shows CR!  4/4/24: Cycle 3 R-EPOCH, dose level 3 but vincristine capped at 2 mg d/t neuropathy  4/25/24: Cycle 4 R-EPOCH, dose level 4 but held vincristine d/t neuropathy  5/16/24  Cycle 5 R-EPOCH, dose level 5 but vincristine capped at 1.2 mg d/t neuropathy. Readmitted for near syncopal episode and mouth apin  6/6/24: Cycle 6 R-EPOCH, dose level 4 with vincristine still capped at 1.2 mg. EOT PET shows ongoing CR!    INTERVAL HISTORY:  Clementine is here today with Dima for EOT follow-up. Last cycle was definitely toughest with more fatigue and throat pain but now recovering. Had a cold a few weeks ago so we postponed the PET-CT. She tested negative for Covid and Strep. This is now resolved. Has some ongoing hot flashes  which started around Cycle 6. Otherwise no fevers, chest pain, SOB, vomiting, or bleeding. Neuropathy is resolved. Energy and appetite improving, now doing some walking and light exercise.     I have reviewed and updated the following:  Past Medical History:   Diagnosis Date     Anxiety      Cervical lymphadenopathy      GERD      Interstitial cystitis       No Known Allergies    Current Outpatient Medications:      acyclovir (ZOVIRAX) 400 MG tablet, Take 1 tablet (400 mg) by mouth 2 times daily, Disp: 60 tablet, Rfl: 4     cimetidine (TAGAMET) 200 MG tablet, Take 200 mg by mouth 2 times daily, Disp: , Rfl:      Levonorgestrel (KYLEENA IU), 1 Device by Intrauterine route once, Disp: , Rfl:      LORazepam (ATIVAN) 0.5 MG tablet, Take 1 tablet (0.5 mg) by mouth every 6 hours as needed for nausea or vomiting, Disp: 30 tablet, Rfl: 0     metoclopramide (REGLAN) 10 MG tablet, Take 1 tablet (10 mg) by mouth 4 times daily as needed (constipation), Disp: 30 tablet, Rfl: 3     modafinil (PROVIGIL) 200 MG tablet, Take 200 mg by mouth daily as needed (hypersomnia), Disp: , Rfl:      OLANZapine zydis (ZYPREXA) 5 MG ODT, Take 1 tablet (5 mg) by mouth nightly as needed (nausea), Disp: , Rfl:      ondansetron (ZOFRAN) 4 MG tablet, Take 1-2 tablets (4-8 mg) by mouth every 8 hours as needed for nausea or vomiting, Disp: 30 tablet, Rfl: 0     pantoprazole (PROTONIX) 40 MG EC tablet, Take 1 tablet (40 mg) by mouth 2 times daily, Disp: 60 tablet, Rfl: 3     polyethylene glycol (MIRALAX) 17 GM/Dose powder, Take 17 g by mouth 2 times daily, Disp: , Rfl:      prochlorperazine (COMPAZINE) 10 MG tablet, Take 1 tablet (10 mg) by mouth every 6 hours as needed for nausea or vomiting, Disp: 30 tablet, Rfl: 0     senna-docusate (SENOKOT-S/PERICOLACE) 8.6-50 MG tablet, Take 1 tablet by mouth 2 times daily, Disp: 60 tablet, Rfl: 0     sucralfate (CARAFATE) 1 GM tablet, Take 1 tablet (1 g) by mouth 4 times daily as needed (acid reflux), Disp: 30  tablet, Rfl: 1    REVIEW OF SYSTEMS:  10-point ROS reviewed and negative other than that mentioned in HPI.     Objective  VITAL SIGNS  /69 (BP Location: Right arm, Patient Position: Sitting, Cuff Size: Adult Regular)   Pulse 71   Temp 97.3  F (36.3  C) (Oral)   Resp 16   Wt 62.2 kg (137 lb 3.2 oz)   SpO2 97%   BMI 22.83 kg/m      ECOG PS: 0     PHYSICAL EXAM:  General: Awake, alert, in no acute distress.   HEENT: Normocephalic, atraumatic. No scleral icterus.   CV: Regular rate and rhythm. No murmurs, rubs, or gallops appreciated.  Resp: Good inspiratory effort, lungs clear to auscultation bilaterally.  GI: Abdomen soft, nontender, nondistended.   Ext: No peripheral edema bilaterally.  Neuro: CN II-XII grossly intact. No focal deficits appreciated.  Skin: No rash, unusual bruising or prominent lesions.  Psych: Pleasant, normal affect.     LABS:  I reviewed the following labs:  Lab Results   Component Value Date    WBC 3.4 (L) 07/25/2024    HGB 12.0 07/25/2024    HCT 37.0 07/25/2024    MCV 98 07/25/2024     07/25/2024    INR 1.12 06/10/2024    PTT 29 04/28/2024     Lab Results   Component Value Date     07/25/2024    POTASSIUM 3.9 07/25/2024    CR 0.76 07/25/2024    BUN 12.5 07/25/2024    CHLORIDE 103 07/25/2024    DEE 9.4 07/25/2024     (H) 07/25/2024      Lab Results   Component Value Date    ALT 10 07/25/2024    AST 19 07/25/2024    ALKPHOS 86 07/25/2024    BILITOTAL 0.5 07/25/2024      Lab Results   Component Value Date     07/25/2024    URIC 3.0 07/25/2024 7/23/24 PET-CT:  IMPRESSION:   In this patient with primary mediastinal B-cell lymphoma following 6  cycles of chemotherapy:  Complete metabolic response by Lugano criteria. No cervical or  mediastinal lymphadenopathy.    Assessment & Plan  Primary mediastinal B-cell lymphoma  Dx 1/2024, presenting with cervical lymphadenopathy. Initial FNA suggestive of possible Hodgkin lymphoma; however excisional biopsy showed  "large B-cell lymphoma with differential diagnosis of primary mediastinal large B-cell lymphoma vs DLBCL NOS. Additional IHC staining for , MAL, and PD-L1, and [id number] gene expression profile all favor PMBCL diagnosis over DLBCL NOS. PET with cervical and mediastinal lymphadenopathy only. LDH normal.   Previously discussed PMBCL diagnosis and plan for DA-R-EPOCH x 6 cycles. In the single-arm phase 2 prospective study of R-EPOCH (without radiation) for PMBCL, 5-year EFS was 93% and OS 97% (10.1056/[id number]).   Completed 6 cycles of DA-R-EPOCH from 2/22/24 to 6/10/24 as above.   PET after 2 cycles showed CR and EOT PET shows ongoing CR! Shared good news and images today.  Will now move to surveillance with H&P/labs every 3-6 mo for 2 years, then yearly for years 3-5. Imaging every 6 months for first 2 years only.      Peripheral neuropathy, grade 2 - resolved  Secondary to chemotherapy.   Cap vincristine at 2 mg starting Cycle 3.  Held vincristine Cycle 4 to allow some recovery of neuropathy and consider reintroducing at 50% dose for Cycle 5/6.   Neuropathy did very mildly improve in the R hand after holding Vincristine with C4-  reintroduced vincristine with C5 at capped dose 1.2 mg.  Did have slight worsening of her neuropathy but not as bad as prior to C4.  Now resolved.     Palpitations/tachycardia - resolved  SOB - resolved  3/25/24 NT proBNP normal at <36.   4/15/24 CTA chest negative for PE. Ziopatch requested but did not hear from Cardiology   Ziopatch completed 5/16- no episodes of SOB after C5 and a couple episodes of \"heart fluttering\"  Denies CP or palpations currently.    Constipation- resolved  Scheduled Miralax and Senna-S.  PRN Reglan.     Fertility preservation  Established with [facility] and underwent egg retrieval on 2/18/24.     Hot flashes  Likely secondary to ovarian suppression from chemotherapy.  This may self-resolve as she gets more distance from chemotherapy (6-12 months). She is going " to follow up with Ob/Gyn also.      Ppx  ID ppx: continue  mg BID for 6 months post chemo. Done with other ppx now that ANC is recovered.   Vaccines: recommend getting annual flu shot and the new COVID booster when they become available this fall.        PLAN:  - Port flush in 6 weeks at [facility] in 3 months    Total of 30 minutes on patient visit, reviewing records, interpreting test results, placing orders, and documentation on the day of service.    The longitudinal plan of care for the diagnosis(es)/condition(s) as documented were addressed during this visit. Due to the added complexity in care, I will continue to support Clementine in the subsequent management and with ongoing continuity of care.     Vanessa Oakes MD  Attending Physician, [facility]       Again, thank you for allowing me to participate in the care of your patient.        Sincerely,        Vanessa Oakes MD

## 2024-07-25 NOTE — NURSING NOTE
"Oncology Rooming Note    July 25, 2024 4:19 PM   Clementine Kathleen is a 28 year old female who presents for:    Chief Complaint   Patient presents with    Port Draw     Labs drawn via port by RN. VS taken.    Oncology Clinic Visit     Mediastinal large B-cell lymphoma of lymph nodes of multiple regions       Initial Vitals: /69 (BP Location: Right arm, Patient Position: Sitting, Cuff Size: Adult Regular)   Pulse 71   Temp 97.3  F (36.3  C) (Oral)   Resp 16   Wt 62.2 kg (137 lb 3.2 oz)   SpO2 97%   BMI 22.83 kg/m   Estimated body mass index is 22.83 kg/m  as calculated from the following:    Height as of 6/19/24: 1.651 m (5' 5\").    Weight as of this encounter: 62.2 kg (137 lb 3.2 oz). Body surface area is 1.69 meters squared.  No Pain (0) Comment: Data Unavailable   No LMP recorded. (Menstrual status: IUD).  Allergies reviewed: Yes  Medications reviewed: Yes    Medications: Medication refills not needed today.  Pharmacy name entered into [employer]: [facility] #[id number] [address] AT [facility]    Frailty Screening:   Is the patient here for a new oncology consult visit in cancer care? 2. No      Clinical concerns: none.       Marty Jackson"

## 2024-07-25 NOTE — PROGRESS NOTES
Clementine Kathleen was seen today for a Pentamidine nebulizer tx ordered by Rosemarie Gonzalez PA-C.    Patient was first given 2.5 mg / 3 ml of albuterol (LOT#[id number], NDC# [id number], EXP 10/31/2025) nebulizer, after which Pentamidine 300 mg (Lot #[id number], NDC# [id number], EXP 10/31/2025 ) mixed with 6cc Sterile H20 was administered through a filtered nebulizer.    Pre-treatment: SpO2 = 98%   HR = 77 bpm   BBS = clear   Post-treatment: SpO2 = 99%  HR = 78 bpm  BBS = clear    No adverse side effects noted by the patient.    This service today was provided under the supervision of Dr. Bobby Kamara, who was available if needed.     Procedure was completed by Isael Ivy, RRT

## 2024-07-25 NOTE — PROGRESS NOTES
[facility]      FOLLOW-UP VISIT NOTE                       Jul 25, 2024  Subjective   REASON FOR VISIT: Follow up PMBCL    ONCOLOGIC SUMMARY:  Diagnosis:  Primary mediastinal B-cell lymphoma dx 1/2024, presenting with cervical LAD. 1/29/24 FNA indeterminate; 2/9/24 left level 3 excisional LN biopsy showed large B-cell lymphoma with rare abnormal B-cells on flow cytometry. Although CD23 IHC was negative, IHC for , MAL, and PD-L1 were positive in majority of the neoplastic cells suggesting PMBCL over DLBCL NOS. [id number] gene expression profile also consistent with PMBCL. FISH with gain of BCL2 but no MYC, BCL2, or BCL6 rearrangements. PET showed bilateral cervical and mediastinal hypermetabolic lymphadenopathy (largest 4.4 cm with SUVmax 27 in a prevascular LN); no disease below diaphragm.     Intent of treatment: Curative    Treatment history:  2/22/24: Cycle 1 R-EPOCH, dose level 1  3/14/24: Cycle 2 R-EPOCH, dose level 2. PET after 2 cycles shows CR!  4/4/24: Cycle 3 R-EPOCH, dose level 3 but vincristine capped at 2 mg d/t neuropathy  4/25/24: Cycle 4 R-EPOCH, dose level 4 but held vincristine d/t neuropathy  5/16/24  Cycle 5 R-EPOCH, dose level 5 but vincristine capped at 1.2 mg d/t neuropathy. Readmitted for near syncopal episode and mouth apin  6/6/24: Cycle 6 R-EPOCH, dose level 4 with vincristine still capped at 1.2 mg. EOT PET shows ongoing CR!    INTERVAL HISTORY:  Clementine is here today with Dima for EOT follow-up. Last cycle was definitely toughest with more fatigue and throat pain but now recovering. Had a cold a few weeks ago so we postponed the PET-CT. She tested negative for Covid and Strep. This is now resolved. Has some ongoing hot flashes which started around Cycle 6. Otherwise no fevers, chest pain, SOB, vomiting, or bleeding. Neuropathy is resolved. Energy and appetite improving, now doing some walking and light exercise.     I have reviewed and updated the following:  Past Medical  History:   Diagnosis Date    Anxiety     Cervical lymphadenopathy     GERD     Interstitial cystitis       No Known Allergies    Current Outpatient Medications:     acyclovir (ZOVIRAX) 400 MG tablet, Take 1 tablet (400 mg) by mouth 2 times daily, Disp: 60 tablet, Rfl: 4    cimetidine (TAGAMET) 200 MG tablet, Take 200 mg by mouth 2 times daily, Disp: , Rfl:     Levonorgestrel (KYLEENA IU), 1 Device by Intrauterine route once, Disp: , Rfl:     LORazepam (ATIVAN) 0.5 MG tablet, Take 1 tablet (0.5 mg) by mouth every 6 hours as needed for nausea or vomiting, Disp: 30 tablet, Rfl: 0    metoclopramide (REGLAN) 10 MG tablet, Take 1 tablet (10 mg) by mouth 4 times daily as needed (constipation), Disp: 30 tablet, Rfl: 3    modafinil (PROVIGIL) 200 MG tablet, Take 200 mg by mouth daily as needed (hypersomnia), Disp: , Rfl:     OLANZapine zydis (ZYPREXA) 5 MG ODT, Take 1 tablet (5 mg) by mouth nightly as needed (nausea), Disp: , Rfl:     ondansetron (ZOFRAN) 4 MG tablet, Take 1-2 tablets (4-8 mg) by mouth every 8 hours as needed for nausea or vomiting, Disp: 30 tablet, Rfl: 0    pantoprazole (PROTONIX) 40 MG EC tablet, Take 1 tablet (40 mg) by mouth 2 times daily, Disp: 60 tablet, Rfl: 3    polyethylene glycol (MIRALAX) 17 GM/Dose powder, Take 17 g by mouth 2 times daily, Disp: , Rfl:     prochlorperazine (COMPAZINE) 10 MG tablet, Take 1 tablet (10 mg) by mouth every 6 hours as needed for nausea or vomiting, Disp: 30 tablet, Rfl: 0    senna-docusate (SENOKOT-S/PERICOLACE) 8.6-50 MG tablet, Take 1 tablet by mouth 2 times daily, Disp: 60 tablet, Rfl: 0    sucralfate (CARAFATE) 1 GM tablet, Take 1 tablet (1 g) by mouth 4 times daily as needed (acid reflux), Disp: 30 tablet, Rfl: 1    REVIEW OF SYSTEMS:  10-point ROS reviewed and negative other than that mentioned in HPI.     Objective   VITAL SIGNS  /69 (BP Location: Right arm, Patient Position: Sitting, Cuff Size: Adult Regular)   Pulse 71   Temp 97.3  F (36.3  C) (Oral)    Resp 16   Wt 62.2 kg (137 lb 3.2 oz)   SpO2 97%   BMI 22.83 kg/m      ECOG PS: 0     PHYSICAL EXAM:  General: Awake, alert, in no acute distress.   HEENT: Normocephalic, atraumatic. No scleral icterus.   CV: Regular rate and rhythm. No murmurs, rubs, or gallops appreciated.  Resp: Good inspiratory effort, lungs clear to auscultation bilaterally.  GI: Abdomen soft, nontender, nondistended.   Ext: No peripheral edema bilaterally.  Neuro: CN II-XII grossly intact. No focal deficits appreciated.  Skin: No rash, unusual bruising or prominent lesions.  Psych: Pleasant, normal affect.     LABS:  I reviewed the following labs:  Lab Results   Component Value Date    WBC 3.4 (L) 07/25/2024    HGB 12.0 07/25/2024    HCT 37.0 07/25/2024    MCV 98 07/25/2024     07/25/2024    INR 1.12 06/10/2024    PTT 29 04/28/2024     Lab Results   Component Value Date     07/25/2024    POTASSIUM 3.9 07/25/2024    CR 0.76 07/25/2024    BUN 12.5 07/25/2024    CHLORIDE 103 07/25/2024    DEE 9.4 07/25/2024     (H) 07/25/2024      Lab Results   Component Value Date    ALT 10 07/25/2024    AST 19 07/25/2024    ALKPHOS 86 07/25/2024    BILITOTAL 0.5 07/25/2024      Lab Results   Component Value Date     07/25/2024    URIC 3.0 07/25/2024 7/23/24 PET-CT:  IMPRESSION:   In this patient with primary mediastinal B-cell lymphoma following 6  cycles of chemotherapy:  Complete metabolic response by Lugano criteria. No cervical or  mediastinal lymphadenopathy.    Assessment & Plan   Primary mediastinal B-cell lymphoma  Dx 1/2024, presenting with cervical lymphadenopathy. Initial FNA suggestive of possible Hodgkin lymphoma; however excisional biopsy showed large B-cell lymphoma with differential diagnosis of primary mediastinal large B-cell lymphoma vs DLBCL NOS. Additional IHC staining for , MAL, and PD-L1, and [id number] gene expression profile all favor PMBCL diagnosis over DLBCL NOS. PET with cervical and  "mediastinal lymphadenopathy only. LDH normal.   Previously discussed PMBCL diagnosis and plan for DA-R-EPOCH x 6 cycles. In the single-arm phase 2 prospective study of R-EPOCH (without radiation) for PMBCL, 5-year EFS was 93% and OS 97% (10.1056/[id number]).   Completed 6 cycles of DA-R-EPOCH from 2/22/24 to 6/10/24 as above.   PET after 2 cycles showed CR and EOT PET shows ongoing CR! Shared good news and images today.  Will now move to surveillance with H&P/labs every 3-6 mo for 2 years, then yearly for years 3-5. Imaging every 6 months for first 2 years only.      Peripheral neuropathy, grade 2 - resolved  Secondary to chemotherapy.   Cap vincristine at 2 mg starting Cycle 3.  Held vincristine Cycle 4 to allow some recovery of neuropathy and consider reintroducing at 50% dose for Cycle 5/6.   Neuropathy did very mildly improve in the R hand after holding Vincristine with C4-  reintroduced vincristine with C5 at capped dose 1.2 mg.  Did have slight worsening of her neuropathy but not as bad as prior to C4.  Now resolved.     Palpitations/tachycardia - resolved  SOB - resolved  3/25/24 NT proBNP normal at <36.   4/15/24 CTA chest negative for PE. Ziopatch requested but did not hear from Cardiology   Ziopatch completed 5/16- no episodes of SOB after C5 and a couple episodes of \"heart fluttering\"  Denies CP or palpations currently.    Constipation- resolved  Scheduled Miralax and Senna-S.  PRN Reglan.     Fertility preservation  Established with [facility] and underwent egg retrieval on 2/18/24.     Hot flashes  Likely secondary to ovarian suppression from chemotherapy.  This may self-resolve as she gets more distance from chemotherapy (6-12 months). She is going to follow up with Ob/Gyn also.      Ppx  ID ppx: continue  mg BID for 6 months post chemo. Done with other ppx now that ANC is recovered.   Vaccines: recommend getting annual flu shot and the new COVID booster when they become available this fall.  "       PLAN:  - [place] [facility]    Total of 30 minutes on patient visit, reviewing records, interpreting test results, placing orders, and documentation on the day of service.    The longitudinal plan of care for the diagnosis(es)/condition(s) as documented were addressed during this visit. Due to the added complexity in care, I will continue to support Clementine in the subsequent management and with ongoing continuity of care.     Vanessa Oakes MD  Attending Physician, [facility]

## 2024-07-25 NOTE — NURSING NOTE
"Chief Complaint   Patient presents with    Port Draw     Labs drawn via port by RN. VS taken.     Port accessed with 20 gauge, 3/4\" power needle by RN, labs collected, line flushed with saline and heparin, then de-accessed.  Vitals taken. Pt checked in for appointment(s).     Liane Nielsen, RN    "

## 2024-07-31 RX ORDER — POTASSIUM CHLORIDE 1500 MG/1
20 TABLET, EXTENDED RELEASE ORAL 2 TIMES DAILY
Qty: 4 TABLET | Refills: 0 | OUTPATIENT
Start: 2024-07-31

## 2024-08-17 RX ORDER — PANTOPRAZOLE SODIUM 40 MG/1
40 TABLET, DELAYED RELEASE ORAL 2 TIMES DAILY
Qty: 180 TABLET | OUTPATIENT
Start: 2024-08-17

## 2024-09-03 ENCOUNTER — LAB (OUTPATIENT)
Dept: LAB | Facility: CLINIC | Age: 29
End: 2024-09-03
Attending: INTERNAL MEDICINE
Payer: COMMERCIAL

## 2024-09-03 PROCEDURE — 250N000011 HC RX IP 250 OP 636: Performed by: INTERNAL MEDICINE

## 2024-09-03 RX ORDER — SUCRALFATE 1 G/1
TABLET ORAL
Qty: 30 TABLET | Refills: 1 | OUTPATIENT
Start: 2024-09-03

## 2024-09-03 RX ORDER — HEPARIN SODIUM (PORCINE) LOCK FLUSH IV SOLN 100 UNIT/ML 100 UNIT/ML
5 SOLUTION INTRAVENOUS
Status: COMPLETED | OUTPATIENT
Start: 2024-09-03 | End: 2024-09-03

## 2024-09-03 RX ADMIN — Medication 5 ML: at 06:52

## 2024-09-03 NOTE — NURSING NOTE
"Chief Complaint   Patient presents with    Lab Only     Port flushed by rn.     Port accessed with 20 gauge 3/4\" SafeStep needle by rn.  Port flushed with NS and heparin then de-accessed.  Pt tolerated well.      Africa Sandhu RN      "

## 2024-09-07 RX ORDER — SUCRALFATE 1 G/1
TABLET ORAL
Qty: 30 TABLET | Refills: 1 | OUTPATIENT
Start: 2024-09-07

## 2024-09-11 RX ORDER — SUCRALFATE 1 G/1
TABLET ORAL
Qty: 30 TABLET | Refills: 1 | OUTPATIENT
Start: 2024-09-11

## 2024-09-15 RX ORDER — SUCRALFATE 1 G/1
TABLET ORAL
Qty: 30 TABLET | Refills: 1 | OUTPATIENT
Start: 2024-09-15

## 2024-09-19 RX ORDER — SUCRALFATE 1 G/1
TABLET ORAL
Qty: 30 TABLET | Refills: 1 | OUTPATIENT
Start: 2024-09-19

## 2024-09-20 RX ORDER — SUCRALFATE 1 G/1
TABLET ORAL
Qty: 30 TABLET | Refills: 1 | OUTPATIENT
Start: 2024-09-20

## 2024-09-24 RX ORDER — SUCRALFATE 1 G/1
TABLET ORAL
Qty: 30 TABLET | Refills: 1 | OUTPATIENT
Start: 2024-09-24

## 2024-09-28 RX ORDER — SUCRALFATE 1 G/1
TABLET ORAL
Qty: 30 TABLET | Refills: 1 | OUTPATIENT
Start: 2024-09-28

## 2024-10-08 NOTE — PROGRESS NOTES
[facility]      FOLLOW-UP VISIT NOTE                       Oct 10, 2024  Subjective   REASON FOR VISIT: Follow up PMBCL    ONCOLOGIC SUMMARY:  Diagnosis:  Primary mediastinal B-cell lymphoma dx 1/2024, presenting with cervical LAD. 1/29/24 FNA indeterminate; 2/9/24 left level 3 excisional LN biopsy showed large B-cell lymphoma with rare abnormal B-cells on flow cytometry. Although CD23 IHC was negative, IHC for , MAL, and PD-L1 were positive in majority of the neoplastic cells suggesting PMBCL over DLBCL NOS. Zzbof5TV gene expression profile also consistent with PMBCL. FISH with gain of BCL2 but no MYC, BCL2, or BCL6 rearrangements. PET showed bilateral cervical and mediastinal hypermetabolic lymphadenopathy (largest 4.4 cm with SUVmax 27 in a prevascular LN); no disease below diaphragm.     Intent of treatment: Curative    Treatment history:  2/22/24: Cycle 1 R-EPOCH, dose level 1  3/14/24: Cycle 2 R-EPOCH, dose level 2. PET after 2 cycles shows CR!  4/4/24: Cycle 3 R-EPOCH, dose level 3 but vincristine capped at 2 mg d/t neuropathy  4/25/24: Cycle 4 R-EPOCH, dose level 4 but held vincristine d/t neuropathy  5/16/24  Cycle 5 R-EPOCH, dose level 5 but vincristine capped at 1.2 mg d/t neuropathy. Readmitted for near syncopal episode and mouth apin  6/6/24: Cycle 6 R-EPOCH, dose level 4 with vincristine still capped at 1.2 mg. EOT PET shows ongoing CR!    INTERVAL HISTORY:  Clementine is here today with Dima for follow-up. Had a needlestick injury while operating 8/22/24, everything came out okay. She does note some left neck tightness/pain over last few weeks, also has some ongoing throat discomfort (mostly with dry foods like chicken) since Cycle 5-ekaterina of chemo but scope was unremarkable. Otherwise no fevers, recent infections, chest pain, SOB, N/V, diarrhea, bleeding. Hot flashes have resolved and she had her first post chemo period. Neuropathy also completely resolved. 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     I have reviewed and updated the following:  Past Medical History:   Diagnosis Date    Anxiety     Cervical lymphadenopathy     GERD     Interstitial cystitis       No Known Allergies    Current Outpatient Medications:     acyclovir (ZOVIRAX) 400 MG tablet, Take 1 tablet (400 mg) by mouth 2 times daily., Disp: 60 tablet, Rfl: 2    cimetidine (TAGAMET) 200 MG tablet, Take 200 mg by mouth 2 times daily, Disp: , Rfl:     Levonorgestrel (KYLEENA IU), 1 Device by Intrauterine route once, Disp: , Rfl:     modafinil (PROVIGIL) 200 MG tablet, Take 200 mg by mouth daily as needed (hypersomnia), Disp: , Rfl:     pantoprazole (PROTONIX) 40 MG EC tablet, Take 1 tablet (40 mg) by mouth 2 times daily., Disp: 60 tablet, Rfl: 3    LORazepam (ATIVAN) 0.5 MG tablet, Take 1 tablet (0.5 mg) by mouth every 6 hours as needed for nausea or vomiting (Patient not taking: Reported on 10/10/2024), Disp: 30 tablet, Rfl: 0    metoclopramide (REGLAN) 10 MG tablet, Take 1 tablet (10 mg) by mouth 4 times daily as needed (constipation) (Patient not taking: Reported on 10/10/2024), Disp: 30 tablet, Rfl: 3    Multiple Vitamin (MULTI-VITAMINS) TABS, Take by mouth., Disp: , Rfl:     OLANZapine zydis (ZYPREXA) 5 MG ODT, Take 1 tablet (5 mg) by mouth nightly as needed (nausea) (Patient not taking: Reported on 10/10/2024), Disp: , Rfl:     ondansetron (ZOFRAN) 4 MG tablet, Take 1-2 tablets (4-8 mg) by mouth every 8 hours as needed for nausea or vomiting (Patient not taking: Reported on 10/10/2024), Disp: 30 tablet, Rfl: 0    polyethylene glycol (MIRALAX) 17 GM/Dose powder, Take 17 g by mouth 2 times daily (Patient not taking: Reported on 10/10/2024), Disp: , Rfl:     prochlorperazine (COMPAZINE) 10 MG tablet, Take 1 tablet (10 mg) by mouth every 6 hours as needed for nausea or vomiting (Patient not taking: Reported on 10/10/2024), Disp: 30 tablet,  Rfl: 0    senna-docusate (SENOKOT-S/PERICOLACE) 8.6-50 MG tablet, Take 1 tablet by mouth 2 times daily (Patient not taking: Reported on 10/10/2024), Disp: 60 tablet, Rfl: 0    sucralfate (CARAFATE) 1 GM tablet, Take 1 tablet (1 g) by mouth 4 times daily as needed (acid reflux) (Patient not taking: Reported on 10/10/2024), Disp: 30 tablet, Rfl: 1  No current facility-administered medications for this visit.    REVIEW OF SYSTEMS:  10-point ROS reviewed and negative other than that mentioned in HPI.     Objective   VITAL SIGNS  /70   Pulse 73   Temp 97.7  F (36.5  C) (Oral)   Resp 16   Wt 64.7 kg (142 lb 11.2 oz)   SpO2 96%   BMI 23.75 kg/m      ECOG PS: 0     PHYSICAL EXAM:  General: Awake, alert, in no acute distress.   HEENT: Normocephalic, atraumatic. No scleral icterus.   Lymph: No cervical, supraclavicular, or axillary LAD appreciated.   CV: Regular rate and rhythm. No murmurs, rubs, or gallops appreciated.  Resp: Good inspiratory effort, lungs clear to auscultation bilaterally.  GI: Abdomen soft, nontender, nondistended.   Ext: No peripheral edema bilaterally.  Neuro: CN II-XII grossly intact. No focal deficits appreciated.  Skin: No rash, unusual bruising or prominent lesions.  Psych: Pleasant, normal affect.     LABS:  I reviewed the following labs:  Lab Results   Component Value Date    WBC 2.7 (L) 10/10/2024    HGB 11.6 (L) 10/10/2024    HCT 34.7 (L) 10/10/2024    MCV 89 10/10/2024     10/10/2024    INR 1.12 06/10/2024    PTT 29 04/28/2024     Lab Results   Component Value Date     10/10/2024    POTASSIUM 3.6 10/10/2024    CR 0.71 10/10/2024    BUN 13.5 10/10/2024    CHLORIDE 106 10/10/2024    DEE 8.2 (L) 10/10/2024     (H) 10/10/2024      Lab Results   Component Value Date    ALT 10 10/10/2024    AST 17 10/10/2024    ALKPHOS 78 10/10/2024    BILITOTAL 0.3 10/10/2024      Lab Results   Component Value Date     10/10/2024    URIC 3.0 10/10/2024        Assessment & Plan    Primary mediastinal B-cell lymphoma  Dx 1/2024, presenting with cervical lymphadenopathy. Initial FNA suggestive of possible Hodgkin lymphoma; however excisional biopsy showed large B-cell lymphoma with differential diagnosis of primary mediastinal large B-cell lymphoma vs DLBCL NOS. Additional IHC staining for , MAL, and PD-L1, and [id number] gene expression profile all favor PMBCL diagnosis over DLBCL NOS. PET with cervical and mediastinal lymphadenopathy only. LDH normal.   Previously discussed PMBCL diagnosis and plan for DA-R-EPOCH x 6 cycles. In the single-arm phase 2 prospective study of R-EPOCH (without radiation) for PMBCL, 5-year EFS was 93% and OS 97% ([id number]).   Completed 6 cycles of DA-R-EPOCH from 2/22/24 to 6/10/24 as above.   PET after 2 cycles showed CR and EOT PET showed ongoing CR!   Clinically doing well today other than some increased left neck pain recently. Will monitor closely and asked her to let us know if worsening.   Continue surveillance with H&P/labs every 3-6 mo for 2 years, then yearly for years 3-5. Imaging every 6 months for first 2 years only (next 1/2025). Would consider removing port after next negative scan.     Peripheral neuropathy, grade 2 - resolved  Secondary to chemotherapy.   Capped vincristine at 2 mg starting Cycle 3.  Held vincristine Cycle 4 to allow some recovery of neuropathy and consider reintroducing at 50% dose for Cycle 5/6.   Neuropathy did very mildly improve in the R hand after holding Vincristine with C4-  reintroduced vincristine with C5 at capped dose 1.2 mg.  Did have slight worsening of her neuropathy but not as bad as prior to C4.  Now resolved.     Leukopenia  WBC slightly lower today at 2.7, ANC still normal at 1.6. No recent infections or medication changes.  Recheck CBC in 6 weeks.      Hot flashes - resolved  Likely secondary to ovarian suppression from chemotherapy.   Following with Gyn.  Now resolved, also had her first period  post chemo.     Fertility preservation  Established with [facility] and underwent egg retrieval on 2/18/24.      Ppx  ID ppx: continue  mg BID for 6 months post chemo.   Vaccines: can get flu shot and COVID booster now.       PLAN:  - Labs/port flush in 6 weeks at [facility] [place], [place]  - RTC in 3 months with PET-CT    Total of 30 minutes on patient visit, reviewing records, interpreting test results, placing orders, and documentation on the day of service.    The longitudinal plan of care for the diagnosis(es)/condition(s) as documented were addressed during this visit. Due to the added complexity in care, I will continue to support Clementine in the subsequent management and with ongoing continuity of care.     Vanessa Oakes MD  Attending Physician, [facility]

## 2024-10-10 ENCOUNTER — ONCOLOGY VISIT (OUTPATIENT)
Dept: ONCOLOGY | Facility: CLINIC | Age: 29
End: 2024-10-10
Attending: INTERNAL MEDICINE
Payer: COMMERCIAL

## 2024-10-10 ENCOUNTER — APPOINTMENT (OUTPATIENT)
Dept: LAB | Facility: CLINIC | Age: 29
End: 2024-10-10
Attending: INTERNAL MEDICINE
Payer: COMMERCIAL

## 2024-10-10 VITALS
WEIGHT: 142.7 LBS | BODY MASS INDEX: 23.75 KG/M2 | DIASTOLIC BLOOD PRESSURE: 70 MMHG | RESPIRATION RATE: 16 BRPM | HEART RATE: 73 BPM | TEMPERATURE: 97.7 F | SYSTOLIC BLOOD PRESSURE: 119 MMHG | OXYGEN SATURATION: 96 %

## 2024-10-10 DIAGNOSIS — C83.32 DIFFUSE LARGE B-CELL LYMPHOMA OF INTRATHORACIC LYMPH NODES (H): ICD-10-CM

## 2024-10-10 DIAGNOSIS — K21.00 GASTROESOPHAGEAL REFLUX DISEASE WITH ESOPHAGITIS WITHOUT HEMORRHAGE: ICD-10-CM

## 2024-10-10 DIAGNOSIS — C85.28 MEDIASTINAL LARGE B-CELL LYMPHOMA OF LYMPH NODES OF MULTIPLE REGIONS (H): Primary | ICD-10-CM

## 2024-10-10 LAB
ALBUMIN SERPL BCG-MCNC: 3.8 G/DL (ref 3.5–5.2)
ALP SERPL-CCNC: 78 U/L (ref 40–150)
ALT SERPL W P-5'-P-CCNC: 10 U/L (ref 0–50)
ANION GAP SERPL CALCULATED.3IONS-SCNC: 9 MMOL/L (ref 7–15)
AST SERPL W P-5'-P-CCNC: 17 U/L (ref 0–45)
BASOPHILS # BLD AUTO: 0 10E3/UL (ref 0–0.2)
BASOPHILS NFR BLD AUTO: 2 %
BILIRUB SERPL-MCNC: 0.3 MG/DL
BUN SERPL-MCNC: 13.5 MG/DL (ref 6–20)
CALCIUM SERPL-MCNC: 8.2 MG/DL (ref 8.8–10.4)
CHLORIDE SERPL-SCNC: 106 MMOL/L (ref 98–107)
CREAT SERPL-MCNC: 0.71 MG/DL (ref 0.51–0.95)
EGFRCR SERPLBLD CKD-EPI 2021: >90 ML/MIN/1.73M2
EOSINOPHIL # BLD AUTO: 0.1 10E3/UL (ref 0–0.7)
EOSINOPHIL NFR BLD AUTO: 2 %
ERYTHROCYTE [DISTWIDTH] IN BLOOD BY AUTOMATED COUNT: 13.8 % (ref 10–15)
GLUCOSE SERPL-MCNC: 168 MG/DL (ref 70–99)
HCO3 SERPL-SCNC: 23 MMOL/L (ref 22–29)
HCT VFR BLD AUTO: 34.7 % (ref 35–47)
HGB BLD-MCNC: 11.6 G/DL (ref 11.7–15.7)
IMM GRANULOCYTES # BLD: 0 10E3/UL
IMM GRANULOCYTES NFR BLD: 0 %
LDH SERPL L TO P-CCNC: 135 U/L (ref 0–250)
LYMPHOCYTES # BLD AUTO: 0.6 10E3/UL (ref 0.8–5.3)
LYMPHOCYTES NFR BLD AUTO: 22 %
MCH RBC QN AUTO: 29.9 PG (ref 26.5–33)
MCHC RBC AUTO-ENTMCNC: 33.4 G/DL (ref 31.5–36.5)
MCV RBC AUTO: 89 FL (ref 78–100)
MONOCYTES # BLD AUTO: 0.4 10E3/UL (ref 0–1.3)
MONOCYTES NFR BLD AUTO: 14 %
NEUTROPHILS # BLD AUTO: 1.6 10E3/UL (ref 1.6–8.3)
NEUTROPHILS NFR BLD AUTO: 60 %
NRBC # BLD AUTO: 0 10E3/UL
NRBC BLD AUTO-RTO: 0 /100
PLATELET # BLD AUTO: 180 10E3/UL (ref 150–450)
POTASSIUM SERPL-SCNC: 3.6 MMOL/L (ref 3.4–5.3)
PROT SERPL-MCNC: 5.8 G/DL (ref 6.4–8.3)
RBC # BLD AUTO: 3.88 10E6/UL (ref 3.8–5.2)
SODIUM SERPL-SCNC: 138 MMOL/L (ref 135–145)
URATE SERPL-MCNC: 3 MG/DL (ref 2.4–5.7)
WBC # BLD AUTO: 2.7 10E3/UL (ref 4–11)

## 2024-10-10 PROCEDURE — G2211 COMPLEX E/M VISIT ADD ON: HCPCS | Performed by: INTERNAL MEDICINE

## 2024-10-10 PROCEDURE — 36591 DRAW BLOOD OFF VENOUS DEVICE: CPT | Performed by: INTERNAL MEDICINE

## 2024-10-10 PROCEDURE — 99213 OFFICE O/P EST LOW 20 MIN: CPT | Performed by: INTERNAL MEDICINE

## 2024-10-10 PROCEDURE — 80053 COMPREHEN METABOLIC PANEL: CPT | Performed by: INTERNAL MEDICINE

## 2024-10-10 PROCEDURE — 250N000011 HC RX IP 250 OP 636: Performed by: INTERNAL MEDICINE

## 2024-10-10 PROCEDURE — 83615 LACTATE (LD) (LDH) ENZYME: CPT | Performed by: INTERNAL MEDICINE

## 2024-10-10 PROCEDURE — 84550 ASSAY OF BLOOD/URIC ACID: CPT | Performed by: INTERNAL MEDICINE

## 2024-10-10 PROCEDURE — 99214 OFFICE O/P EST MOD 30 MIN: CPT | Performed by: INTERNAL MEDICINE

## 2024-10-10 PROCEDURE — 85018 HEMOGLOBIN: CPT | Performed by: INTERNAL MEDICINE

## 2024-10-10 PROCEDURE — 85004 AUTOMATED DIFF WBC COUNT: CPT | Performed by: INTERNAL MEDICINE

## 2024-10-10 RX ORDER — PANTOPRAZOLE SODIUM 40 MG/1
40 TABLET, DELAYED RELEASE ORAL 2 TIMES DAILY
Qty: 60 TABLET | Refills: 3 | Status: SHIPPED | OUTPATIENT
Start: 2024-10-10

## 2024-10-10 RX ORDER — ACYCLOVIR 400 MG/1
400 TABLET ORAL 2 TIMES DAILY
Qty: 60 TABLET | Refills: 2 | Status: SHIPPED | OUTPATIENT
Start: 2024-10-10

## 2024-10-10 RX ORDER — HEPARIN SODIUM (PORCINE) LOCK FLUSH IV SOLN 100 UNIT/ML 100 UNIT/ML
5 SOLUTION INTRAVENOUS ONCE
Status: COMPLETED | OUTPATIENT
Start: 2024-10-10 | End: 2024-10-10

## 2024-10-10 RX ORDER — DIPHENOXYLATE HYDROCHLORIDE AND ATROPINE SULFATE 2.5; .025 MG/1; MG/1
TABLET ORAL
COMMUNITY

## 2024-10-10 RX ADMIN — Medication 5 ML: at 12:56

## 2024-10-10 ASSESSMENT — PAIN SCALES - GENERAL: PAINLEVEL: NO PAIN (0)

## 2024-10-10 NOTE — NURSING NOTE
"Oncology Rooming Note    October 10, 2024 1:07 PM   Clementine Kathleen is a 28 year old female who presents for:    Chief Complaint   Patient presents with    Blood Draw     Port blood draw by lab RN     Initial Vitals: /70   Pulse 73   Temp 97.7  F (36.5  C) (Oral)   Resp 16   Wt 64.7 kg (142 lb 11.2 oz)   SpO2 96%   BMI 23.75 kg/m   Estimated body mass index is 23.75 kg/m  as calculated from the following:    Height as of 6/19/24: 1.651 m (5' 5\").    Weight as of this encounter: 64.7 kg (142 lb 11.2 oz). Body surface area is 1.72 meters squared.  No Pain (0) Comment: Data Unavailable   No LMP recorded. (Menstrual status: IUD).  Allergies reviewed: Yes  Medications reviewed: Yes    Medications: Medication refills not needed today.  Pharm[facility] entered into [employer]: [id number] [address] AT [facility]    Frailty Screening:   Is the patient here for a new oncology consult visit in cancer care? 2. No      Clinical concerns: Discuss if will require to stay on Acyclovir if needs to stay on will require a refill for the Protonix and Acyclovir.       Nancy Alvarez             "

## 2024-10-10 NOTE — LETTER
10/10/2024      Clementine Kathleen  [address]  [place] [address]      Dear Colleague,    Thank you for referring your patient, Clementine Kathleen, to the [facility]. Please see a copy of my visit note below.      [facility]      FOLLOW-UP VISIT NOTE                       Oct 10, 2024  Subjective  REASON FOR VISIT: Follow up PMBCL    ONCOLOGIC SUMMARY:  Diagnosis:  Primary mediastinal B-cell lymphoma dx 1/2024, presenting with cervical LAD. 1/29/24 FNA indeterminate; 2/9/24 left level 3 excisional LN biopsy showed large B-cell lymphoma with rare abnormal B-cells on flow cytometry. Although CD23 IHC was negative, IHC for , MAL, and PD-L1 were positive in majority of the neoplastic cells suggesting PMBCL over DLBCL NOS. [id number] gene expression profile also consistent with PMBCL. FISH with gain of BCL2 but no MYC, BCL2, or BCL6 rearrangements. PET showed bilateral cervical and mediastinal hypermetabolic lymphadenopathy (largest 4.4 cm with SUVmax 27 in a prevascular LN); no disease below diaphragm.     Intent of treatment: Curative    Treatment history:  2/22/24: Cycle 1 R-EPOCH, dose level 1  3/14/24: Cycle 2 R-EPOCH, dose level 2. PET after 2 cycles shows CR!  4/4/24: Cycle 3 R-EPOCH, dose level 3 but vincristine capped at 2 mg d/t neuropathy  4/25/24: Cycle 4 R-EPOCH, dose level 4 but held vincristine d/t neuropathy  5/16/24  Cycle 5 R-EPOCH, dose level 5 but vincristine capped at 1.2 mg d/t neuropathy. Readmitted for near syncopal episode and mouth apin  6/6/24: Cycle 6 R-EPOCH, dose level 4 with vincristine still capped at 1.2 mg. EOT PET shows ongoing CR!    INTERVAL HISTORY:  Clementine is here today with Dima for follow-up. Had a needlestick injury while operating 8/22/24, everything came out okay. She does note some left neck tightness/pain over last few weeks, also has some ongoing throat discomfort (mostly with dry foods like chicken) since Cycle 5-ekaterina of  chemo but scope was unremarkable. Otherwise no fevers, recent infections, chest pain, SOB, N/V, diarrhea, bleeding. Hot flashes have resolved and she had her first post chemo period. Neuropathy also completely resolved. 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     I have reviewed and updated the following:  Past Medical History:   Diagnosis Date     Anxiety      Cervical lymphadenopathy      GERD      Interstitial cystitis       No Known Allergies    Current Outpatient Medications:      acyclovir (ZOVIRAX) 400 MG tablet, Take 1 tablet (400 mg) by mouth 2 times daily., Disp: 60 tablet, Rfl: 2     cimetidine (TAGAMET) 200 MG tablet, Take 200 mg by mouth 2 times daily, Disp: , Rfl:      Levonorgestrel (KYLEENA IU), 1 Device by Intrauterine route once, Disp: , Rfl:      modafinil (PROVIGIL) 200 MG tablet, Take 200 mg by mouth daily as needed (hypersomnia), Disp: , Rfl:      pantoprazole (PROTONIX) 40 MG EC tablet, Take 1 tablet (40 mg) by mouth 2 times daily., Disp: 60 tablet, Rfl: 3     LORazepam (ATIVAN) 0.5 MG tablet, Take 1 tablet (0.5 mg) by mouth every 6 hours as needed for nausea or vomiting (Patient not taking: Reported on 10/10/2024), Disp: 30 tablet, Rfl: 0     metoclopramide (REGLAN) 10 MG tablet, Take 1 tablet (10 mg) by mouth 4 times daily as needed (constipation) (Patient not taking: Reported on 10/10/2024), Disp: 30 tablet, Rfl: 3     Multiple Vitamin (MULTI-VITAMINS) TABS, Take by mouth., Disp: , Rfl:      OLANZapine zydis (ZYPREXA) 5 MG ODT, Take 1 tablet (5 mg) by mouth nightly as needed (nausea) (Patient not taking: Reported on 10/10/2024), Disp: , Rfl:      ondansetron (ZOFRAN) 4 MG tablet, Take 1-2 tablets (4-8 mg) by mouth every 8 hours as needed for nausea or vomiting (Patient not taking: Reported on 10/10/2024), Disp: 30 tablet, Rfl: 0     polyethylene glycol (MIRALAX) 17 GM/Dose powder, Take 17 g by  mouth 2 times daily (Patient not taking: Reported on 10/10/2024), Disp: , Rfl:      prochlorperazine (COMPAZINE) 10 MG tablet, Take 1 tablet (10 mg) by mouth every 6 hours as needed for nausea or vomiting (Patient not taking: Reported on 10/10/2024), Disp: 30 tablet, Rfl: 0     senna-docusate (SENOKOT-S/PERICOLACE) 8.6-50 MG tablet, Take 1 tablet by mouth 2 times daily (Patient not taking: Reported on 10/10/2024), Disp: 60 tablet, Rfl: 0     sucralfate (CARAFATE) 1 GM tablet, Take 1 tablet (1 g) by mouth 4 times daily as needed (acid reflux) (Patient not taking: Reported on 10/10/2024), Disp: 30 tablet, Rfl: 1  No current facility-administered medications for this visit.    REVIEW OF SYSTEMS:  10-point ROS reviewed and negative other than that mentioned in HPI.     Objective  VITAL SIGNS  /70   Pulse 73   Temp 97.7  F (36.5  C) (Oral)   Resp 16   Wt 64.7 kg (142 lb 11.2 oz)   SpO2 96%   BMI 23.75 kg/m      ECOG PS: 0     PHYSICAL EXAM:  General: Awake, alert, in no acute distress.   HEENT: Normocephalic, atraumatic. No scleral icterus.   Lymph: No cervical, supraclavicular, or axillary LAD appreciated.   CV: Regular rate and rhythm. No murmurs, rubs, or gallops appreciated.  Resp: Good inspiratory effort, lungs clear to auscultation bilaterally.  GI: Abdomen soft, nontender, nondistended.   Ext: No peripheral edema bilaterally.  Neuro: CN II-XII grossly intact. No focal deficits appreciated.  Skin: No rash, unusual bruising or prominent lesions.  Psych: Pleasant, normal affect.     LABS:  I reviewed the following labs:  Lab Results   Component Value Date    WBC 2.7 (L) 10/10/2024    HGB 11.6 (L) 10/10/2024    HCT 34.7 (L) 10/10/2024    MCV 89 10/10/2024     10/10/2024    INR 1.12 06/10/2024    PTT 29 04/28/2024     Lab Results   Component Value Date     10/10/2024    POTASSIUM 3.6 10/10/2024    CR 0.71 10/10/2024    BUN 13.5 10/10/2024    CHLORIDE 106 10/10/2024    DEE 8.2 (L) 10/10/2024      (H) 10/10/2024      Lab Results   Component Value Date    ALT 10 10/10/2024    AST 17 10/10/2024    ALKPHOS 78 10/10/2024    BILITOTAL 0.3 10/10/2024      Lab Results   Component Value Date     10/10/2024    URIC 3.0 10/10/2024        Assessment & Plan  Primary mediastinal B-cell lymphoma  Dx 1/2024, presenting with cervical lymphadenopathy. Initial FNA suggestive of possible Hodgkin lymphoma; however excisional biopsy showed large B-cell lymphoma with differential diagnosis of primary mediastinal large B-cell lymphoma vs DLBCL NOS. Additional IHC staining for , MAL, and PD-L1, and [id number] gene expression profile all favor PMBCL diagnosis over DLBCL NOS. PET with cervical and mediastinal lymphadenopathy only. LDH normal.   Previously discussed PMBCL diagnosis and plan for DA-R-EPOCH x 6 cycles. In the single-arm phase 2 prospective study of R-EPOCH (without radiation) for PMBCL, 5-year EFS was 93% and OS 97% (10.[id number]).   Completed 6 cycles of DA-R-EPOCH from 2/22/24 to 6/10/24 as above.   PET after 2 cycles showed CR and EOT PET showed ongoing CR!   Clinically doing well today other than some increased left neck pain recently. Will monitor closely and asked her to let us know if worsening.   Continue surveillance with H&P/labs every 3-6 mo for 2 years, then yearly for years 3-5. Imaging every 6 months for first 2 years only (next 1/2025). Would consider removing port after next negative scan.     Peripheral neuropathy, grade 2 - resolved  Secondary to chemotherapy.   Capped vincristine at 2 mg starting Cycle 3.  Held vincristine Cycle 4 to allow some recovery of neuropathy and consider reintroducing at 50% dose for Cycle 5/6.   Neuropathy did very mildly improve in the R hand after holding Vincristine with C4-  reintroduced vincristine with C5 at capped dose 1.2 mg.  Did have slight worsening of her neuropathy but not as bad as prior to C4.  Now resolved.     Leukopenia  WBC  slightly lower today at 2.7, ANC still normal at 1.6. No recent infections or medication changes.  Recheck CBC in 6 weeks.      Hot flashes - resolved  Likely secondary to ovarian suppression from chemotherapy.   Following with Gyn.  Now resolved, also had her first period post chemo.     Fertility preservation  Established with [facility] and underwent egg retrieval on 2/18/24.      Ppx  ID ppx: continue  mg BID for 6 months post chemo.   Vaccines: can get flu shot and COVID booster now.       PLAN:  - Labs/port flush in 6 weeks at [facility] [place]  - RT in 3 months with PET-CT    Total of 30 minutes on patient visit, reviewing records, interpreting test results, placing orders, and documentation on the day of service.    The longitudinal plan of care for the diagnosis(es)/condition(s) as documented were addressed during this visit. Due to the added complexity in care, I will continue to support Clementine in the subsequent management and with ongoing continuity of care.     Vanessa Oakes MD  Attending Physician, [facility]       Again, thank you for allowing me to participate in the care of your patient.        Sincerely,        Vanessa Oakes MD

## 2024-10-10 NOTE — NURSING NOTE
Chief Complaint   Patient presents with    Blood Draw     Port blood draw by lab RN       Port accessed with blood draw and heparin flush by lab RN. Vitals taken and next appointment arrived.    Rosa Maria Rose RN

## 2024-10-11 ENCOUNTER — PATIENT OUTREACH (OUTPATIENT)
Dept: ONCOLOGY | Facility: CLINIC | Age: 29
End: 2024-10-11
Payer: COMMERCIAL

## 2024-10-11 NOTE — PROGRESS NOTES
Situation: Patient chart reviewed by care coordinator.    Background: Treated with R-EPOCH from 2/2024-6/2024    Assessment: Last visit with Dr. Oakes 10/10/24.     Plan/Recommendations: Continue with every 3 month follow up at this time.

## 2024-11-19 ENCOUNTER — LAB (OUTPATIENT)
Dept: LAB | Facility: CLINIC | Age: 29
End: 2024-11-19
Attending: INTERNAL MEDICINE
Payer: COMMERCIAL

## 2024-11-19 DIAGNOSIS — C85.28 MEDIASTINAL LARGE B-CELL LYMPHOMA OF LYMPH NODES OF MULTIPLE REGIONS (H): ICD-10-CM

## 2024-11-19 LAB
ALBUMIN SERPL BCG-MCNC: 4.2 G/DL (ref 3.5–5.2)
ALP SERPL-CCNC: 91 U/L (ref 40–150)
ALT SERPL W P-5'-P-CCNC: 10 U/L (ref 0–50)
ANION GAP SERPL CALCULATED.3IONS-SCNC: 8 MMOL/L (ref 7–15)
AST SERPL W P-5'-P-CCNC: 17 U/L (ref 0–45)
BASOPHILS # BLD AUTO: 0 10E3/UL (ref 0–0.2)
BASOPHILS NFR BLD AUTO: 2 %
BILIRUB SERPL-MCNC: 0.6 MG/DL
BUN SERPL-MCNC: 14.8 MG/DL (ref 6–20)
CALCIUM SERPL-MCNC: 9.6 MG/DL (ref 8.8–10.4)
CHLORIDE SERPL-SCNC: 106 MMOL/L (ref 98–107)
CREAT SERPL-MCNC: 0.89 MG/DL (ref 0.51–0.95)
EGFRCR SERPLBLD CKD-EPI 2021: 90 ML/MIN/1.73M2
EOSINOPHIL # BLD AUTO: 0.1 10E3/UL (ref 0–0.7)
EOSINOPHIL NFR BLD AUTO: 6 %
ERYTHROCYTE [DISTWIDTH] IN BLOOD BY AUTOMATED COUNT: 15.5 % (ref 10–15)
GLUCOSE SERPL-MCNC: 91 MG/DL (ref 70–99)
HCO3 SERPL-SCNC: 26 MMOL/L (ref 22–29)
HCT VFR BLD AUTO: 38.6 % (ref 35–47)
HGB BLD-MCNC: 12.9 G/DL (ref 11.7–15.7)
IMM GRANULOCYTES # BLD: 0 10E3/UL
IMM GRANULOCYTES NFR BLD: 0 %
LDH SERPL L TO P-CCNC: 146 U/L (ref 0–250)
LYMPHOCYTES # BLD AUTO: 0.7 10E3/UL (ref 0.8–5.3)
LYMPHOCYTES NFR BLD AUTO: 35 %
MCH RBC QN AUTO: 30.1 PG (ref 26.5–33)
MCHC RBC AUTO-ENTMCNC: 33.4 G/DL (ref 31.5–36.5)
MCV RBC AUTO: 90 FL (ref 78–100)
MONOCYTES # BLD AUTO: 0.3 10E3/UL (ref 0–1.3)
MONOCYTES NFR BLD AUTO: 16 %
NEUTROPHILS # BLD AUTO: 0.8 10E3/UL (ref 1.6–8.3)
NEUTROPHILS NFR BLD AUTO: 42 %
NRBC # BLD AUTO: 0 10E3/UL
NRBC BLD AUTO-RTO: 0 /100
PLATELET # BLD AUTO: 184 10E3/UL (ref 150–450)
POTASSIUM SERPL-SCNC: 4.1 MMOL/L (ref 3.4–5.3)
PROT SERPL-MCNC: 6.4 G/DL (ref 6.4–8.3)
RBC # BLD AUTO: 4.28 10E6/UL (ref 3.8–5.2)
SODIUM SERPL-SCNC: 140 MMOL/L (ref 135–145)
URATE SERPL-MCNC: 2.5 MG/DL (ref 2.4–5.7)
WBC # BLD AUTO: 1.9 10E3/UL (ref 4–11)

## 2024-11-19 PROCEDURE — 85004 AUTOMATED DIFF WBC COUNT: CPT

## 2024-11-19 PROCEDURE — 82247 BILIRUBIN TOTAL: CPT

## 2024-11-19 PROCEDURE — 85014 HEMATOCRIT: CPT

## 2024-11-19 PROCEDURE — 250N000011 HC RX IP 250 OP 636: Performed by: INTERNAL MEDICINE

## 2024-11-19 PROCEDURE — 84550 ASSAY OF BLOOD/URIC ACID: CPT

## 2024-11-19 PROCEDURE — 85048 AUTOMATED LEUKOCYTE COUNT: CPT

## 2024-11-19 PROCEDURE — 36591 DRAW BLOOD OFF VENOUS DEVICE: CPT

## 2024-11-19 PROCEDURE — 82435 ASSAY OF BLOOD CHLORIDE: CPT

## 2024-11-19 PROCEDURE — 84155 ASSAY OF PROTEIN SERUM: CPT

## 2024-11-19 PROCEDURE — 83615 LACTATE (LD) (LDH) ENZYME: CPT

## 2024-11-19 RX ORDER — HEPARIN SODIUM (PORCINE) LOCK FLUSH IV SOLN 100 UNIT/ML 100 UNIT/ML
500 SOLUTION INTRAVENOUS ONCE
Status: COMPLETED | OUTPATIENT
Start: 2024-11-19 | End: 2024-11-19

## 2024-11-19 RX ADMIN — HEPARIN SODIUM (PORCINE) LOCK FLUSH IV SOLN 100 UNIT/ML 500 UNITS: 100 SOLUTION at 06:35

## 2024-12-03 ENCOUNTER — LAB (OUTPATIENT)
Dept: LAB | Facility: CLINIC | Age: 29
End: 2024-12-03
Payer: COMMERCIAL

## 2024-12-03 DIAGNOSIS — C85.28 MEDIASTINAL LARGE B-CELL LYMPHOMA OF LYMPH NODES OF MULTIPLE REGIONS (H): ICD-10-CM

## 2024-12-03 LAB
ALBUMIN SERPL BCG-MCNC: 4.1 G/DL (ref 3.5–5.2)
ALP SERPL-CCNC: 80 U/L (ref 40–150)
ALT SERPL W P-5'-P-CCNC: 9 U/L (ref 0–50)
ANION GAP SERPL CALCULATED.3IONS-SCNC: 8 MMOL/L (ref 7–15)
AST SERPL W P-5'-P-CCNC: 15 U/L (ref 0–45)
BASOPHILS # BLD AUTO: 0 10E3/UL (ref 0–0.2)
BASOPHILS NFR BLD AUTO: 1 %
BILIRUB SERPL-MCNC: 0.4 MG/DL
BUN SERPL-MCNC: 15.6 MG/DL (ref 6–20)
CALCIUM SERPL-MCNC: 8.8 MG/DL (ref 8.8–10.4)
CHLORIDE SERPL-SCNC: 107 MMOL/L (ref 98–107)
CREAT SERPL-MCNC: 0.71 MG/DL (ref 0.51–0.95)
EGFRCR SERPLBLD CKD-EPI 2021: >90 ML/MIN/1.73M2
EOSINOPHIL # BLD AUTO: 0.1 10E3/UL (ref 0–0.7)
EOSINOPHIL NFR BLD AUTO: 3 %
ERYTHROCYTE [DISTWIDTH] IN BLOOD BY AUTOMATED COUNT: 15.9 % (ref 10–15)
GLUCOSE SERPL-MCNC: 113 MG/DL (ref 70–99)
HCO3 SERPL-SCNC: 24 MMOL/L (ref 22–29)
HCT VFR BLD AUTO: 38.8 % (ref 35–47)
HGB BLD-MCNC: 12.9 G/DL (ref 11.7–15.7)
IMM GRANULOCYTES # BLD: 0 10E3/UL
IMM GRANULOCYTES NFR BLD: 0 %
LDH SERPL L TO P-CCNC: 142 U/L (ref 0–250)
LYMPHOCYTES # BLD AUTO: 0.5 10E3/UL (ref 0.8–5.3)
LYMPHOCYTES NFR BLD AUTO: 21 %
MCH RBC QN AUTO: 30.4 PG (ref 26.5–33)
MCHC RBC AUTO-ENTMCNC: 33.2 G/DL (ref 31.5–36.5)
MCV RBC AUTO: 92 FL (ref 78–100)
MONOCYTES # BLD AUTO: 0.3 10E3/UL (ref 0–1.3)
MONOCYTES NFR BLD AUTO: 14 %
NEUTROPHILS # BLD AUTO: 1.5 10E3/UL (ref 1.6–8.3)
NEUTROPHILS NFR BLD AUTO: 61 %
NRBC # BLD AUTO: 0 10E3/UL
NRBC BLD AUTO-RTO: 0 /100
PLATELET # BLD AUTO: 167 10E3/UL (ref 150–450)
POTASSIUM SERPL-SCNC: 4.1 MMOL/L (ref 3.4–5.3)
PROT SERPL-MCNC: 6.3 G/DL (ref 6.4–8.3)
RBC # BLD AUTO: 4.24 10E6/UL (ref 3.8–5.2)
SODIUM SERPL-SCNC: 139 MMOL/L (ref 135–145)
URATE SERPL-MCNC: 2.4 MG/DL (ref 2.4–5.7)
WBC # BLD AUTO: 2.4 10E3/UL (ref 4–11)

## 2024-12-03 PROCEDURE — 250N000011 HC RX IP 250 OP 636: Performed by: INTERNAL MEDICINE

## 2024-12-03 PROCEDURE — 80053 COMPREHEN METABOLIC PANEL: CPT

## 2024-12-03 PROCEDURE — 85025 COMPLETE CBC W/AUTO DIFF WBC: CPT

## 2024-12-03 PROCEDURE — 36591 DRAW BLOOD OFF VENOUS DEVICE: CPT

## 2024-12-03 PROCEDURE — 80051 ELECTROLYTE PANEL: CPT

## 2024-12-03 PROCEDURE — 84460 ALANINE AMINO (ALT) (SGPT): CPT

## 2024-12-03 PROCEDURE — 84550 ASSAY OF BLOOD/URIC ACID: CPT

## 2024-12-03 PROCEDURE — 83615 LACTATE (LD) (LDH) ENZYME: CPT

## 2024-12-03 RX ORDER — HEPARIN SODIUM (PORCINE) LOCK FLUSH IV SOLN 100 UNIT/ML 100 UNIT/ML
5 SOLUTION INTRAVENOUS ONCE
Status: COMPLETED | OUTPATIENT
Start: 2024-12-03 | End: 2024-12-03

## 2024-12-03 RX ADMIN — HEPARIN SODIUM (PORCINE) LOCK FLUSH IV SOLN 100 UNIT/ML 5 ML: 100 SOLUTION at 09:05

## 2024-12-03 NOTE — NURSING NOTE
Chief Complaint   Patient presents with    Blood Draw     Port blood draw by lab RN       Port accessed with blood draw and heparin flush by lab RN.    Rosa Maria Rose RN

## 2025-01-03 ENCOUNTER — ONCOLOGY VISIT (OUTPATIENT)
Dept: ONCOLOGY | Facility: CLINIC | Age: 30
End: 2025-01-03
Attending: INTERNAL MEDICINE
Payer: COMMERCIAL

## 2025-01-03 ENCOUNTER — APPOINTMENT (OUTPATIENT)
Dept: LAB | Facility: CLINIC | Age: 30
End: 2025-01-03
Attending: INTERNAL MEDICINE
Payer: COMMERCIAL

## 2025-01-03 VITALS
SYSTOLIC BLOOD PRESSURE: 106 MMHG | DIASTOLIC BLOOD PRESSURE: 68 MMHG | RESPIRATION RATE: 16 BRPM | HEART RATE: 65 BPM | WEIGHT: 138.2 LBS | BODY MASS INDEX: 23 KG/M2 | TEMPERATURE: 97.5 F | OXYGEN SATURATION: 99 %

## 2025-01-03 DIAGNOSIS — C85.28 MEDIASTINAL LARGE B-CELL LYMPHOMA OF LYMPH NODES OF MULTIPLE REGIONS (H): Primary | ICD-10-CM

## 2025-01-03 DIAGNOSIS — Z23 ENCOUNTER FOR VACCINATION: ICD-10-CM

## 2025-01-03 LAB
ALBUMIN SERPL BCG-MCNC: 4.3 G/DL (ref 3.5–5.2)
ALP SERPL-CCNC: 76 U/L (ref 40–150)
ALT SERPL W P-5'-P-CCNC: 10 U/L (ref 0–50)
ANION GAP SERPL CALCULATED.3IONS-SCNC: 10 MMOL/L (ref 7–15)
AST SERPL W P-5'-P-CCNC: 17 U/L (ref 0–45)
BASOPHILS # BLD AUTO: 0 10E3/UL (ref 0–0.2)
BASOPHILS NFR BLD AUTO: 1 %
BILIRUB SERPL-MCNC: 0.4 MG/DL
BUN SERPL-MCNC: 19.2 MG/DL (ref 6–20)
CALCIUM SERPL-MCNC: 9 MG/DL (ref 8.8–10.4)
CHLORIDE SERPL-SCNC: 105 MMOL/L (ref 98–107)
CREAT SERPL-MCNC: 0.78 MG/DL (ref 0.51–0.95)
EGFRCR SERPLBLD CKD-EPI 2021: >90 ML/MIN/1.73M2
EOSINOPHIL # BLD AUTO: 0.1 10E3/UL (ref 0–0.7)
EOSINOPHIL NFR BLD AUTO: 2 %
ERYTHROCYTE [DISTWIDTH] IN BLOOD BY AUTOMATED COUNT: 14.6 % (ref 10–15)
GLUCOSE SERPL-MCNC: 121 MG/DL (ref 70–99)
HCO3 SERPL-SCNC: 25 MMOL/L (ref 22–29)
HCT VFR BLD AUTO: 38.2 % (ref 35–47)
HGB BLD-MCNC: 12.9 G/DL (ref 11.7–15.7)
IMM GRANULOCYTES # BLD: 0 10E3/UL
IMM GRANULOCYTES NFR BLD: 0 %
LDH SERPL L TO P-CCNC: 157 U/L (ref 0–250)
LYMPHOCYTES # BLD AUTO: 0.7 10E3/UL (ref 0.8–5.3)
LYMPHOCYTES NFR BLD AUTO: 25 %
MCH RBC QN AUTO: 30.6 PG (ref 26.5–33)
MCHC RBC AUTO-ENTMCNC: 33.8 G/DL (ref 31.5–36.5)
MCV RBC AUTO: 91 FL (ref 78–100)
MONOCYTES # BLD AUTO: 0.3 10E3/UL (ref 0–1.3)
MONOCYTES NFR BLD AUTO: 10 %
NEUTROPHILS # BLD AUTO: 1.7 10E3/UL (ref 1.6–8.3)
NEUTROPHILS NFR BLD AUTO: 62 %
NRBC # BLD AUTO: 0 10E3/UL
NRBC BLD AUTO-RTO: 0 /100
PLATELET # BLD AUTO: 170 10E3/UL (ref 150–450)
POTASSIUM SERPL-SCNC: 4 MMOL/L (ref 3.4–5.3)
PROT SERPL-MCNC: 6.6 G/DL (ref 6.4–8.3)
RBC # BLD AUTO: 4.22 10E6/UL (ref 3.8–5.2)
SODIUM SERPL-SCNC: 140 MMOL/L (ref 135–145)
URATE SERPL-MCNC: 2.7 MG/DL (ref 2.4–5.7)
WBC # BLD AUTO: 2.8 10E3/UL (ref 4–11)

## 2025-01-03 PROCEDURE — 83615 LACTATE (LD) (LDH) ENZYME: CPT | Performed by: INTERNAL MEDICINE

## 2025-01-03 PROCEDURE — 84460 ALANINE AMINO (ALT) (SGPT): CPT | Performed by: INTERNAL MEDICINE

## 2025-01-03 PROCEDURE — 36591 DRAW BLOOD OFF VENOUS DEVICE: CPT | Performed by: INTERNAL MEDICINE

## 2025-01-03 PROCEDURE — 99213 OFFICE O/P EST LOW 20 MIN: CPT | Performed by: INTERNAL MEDICINE

## 2025-01-03 PROCEDURE — 85014 HEMATOCRIT: CPT | Performed by: INTERNAL MEDICINE

## 2025-01-03 PROCEDURE — G2211 COMPLEX E/M VISIT ADD ON: HCPCS | Performed by: INTERNAL MEDICINE

## 2025-01-03 PROCEDURE — 99215 OFFICE O/P EST HI 40 MIN: CPT | Performed by: INTERNAL MEDICINE

## 2025-01-03 PROCEDURE — 84550 ASSAY OF BLOOD/URIC ACID: CPT | Performed by: INTERNAL MEDICINE

## 2025-01-03 PROCEDURE — 250N000011 HC RX IP 250 OP 636: Performed by: INTERNAL MEDICINE

## 2025-01-03 PROCEDURE — 91320 SARSCV2 VAC 30MCG TRS-SUC IM: CPT | Performed by: INTERNAL MEDICINE

## 2025-01-03 PROCEDURE — 90480 ADMN SARSCOV2 VAC 1/ONLY CMP: CPT | Performed by: INTERNAL MEDICINE

## 2025-01-03 PROCEDURE — 85004 AUTOMATED DIFF WBC COUNT: CPT | Performed by: INTERNAL MEDICINE

## 2025-01-03 PROCEDURE — 82310 ASSAY OF CALCIUM: CPT | Performed by: INTERNAL MEDICINE

## 2025-01-03 RX ORDER — HEPARIN SODIUM (PORCINE) LOCK FLUSH IV SOLN 100 UNIT/ML 100 UNIT/ML
5 SOLUTION INTRAVENOUS EVERY 8 HOURS
Status: DISCONTINUED | OUTPATIENT
Start: 2025-01-03 | End: 2025-01-08 | Stop reason: HOSPADM

## 2025-01-03 RX ADMIN — COVID-19 VACCINE, MRNA 30 MCG: 0.04 INJECTION, SUSPENSION INTRAMUSCULAR at 15:23

## 2025-01-03 RX ADMIN — HEPARIN 3 ML: 100 SYRINGE at 14:29

## 2025-01-03 ASSESSMENT — PAIN SCALES - GENERAL: PAINLEVEL_OUTOF10: NO PAIN (0)

## 2025-01-03 NOTE — NURSING NOTE
Pt here today for covid vaccine. Pt arrives feeling well today, vital signs are stable. VIS provided and all questions answered prior to vaccine administration. Pt tolerated injections without incident. See MAR for details.     Bettie Patel RN

## 2025-01-03 NOTE — PROGRESS NOTES
[facility]      FOLLOW-UP VISIT NOTE                       Andrea 3, 2025  Subjective   REASON FOR VISIT: Follow up PMBCL    ONCOLOGIC SUMMARY:  Diagnosis:  Primary mediastinal B-cell lymphoma dx 1/2024, presenting with cervical LAD. 1/29/24 FNA indeterminate; 2/9/24 left level 3 excisional LN biopsy showed large B-cell lymphoma with rare abnormal B-cells on flow cytometry. Although CD23 IHC was negative, IHC for , MAL, and PD-L1 were positive in majority of the neoplastic cells suggesting PMBCL over DLBCL NOS. Adilr2AE gene expression profile also consistent with PMBCL. FISH with gain of BCL2 but no MYC, BCL2, or BCL6 rearrangements. PET showed bilateral cervical and mediastinal hypermetabolic lymphadenopathy (largest 4.4 cm with SUVmax 27 in a prevascular LN); no disease below diaphragm.     Intent of treatment: Curative    Treatment history:  2/22/24: Cycle 1 R-EPOCH, dose level 1  3/14/24: Cycle 2 R-EPOCH, dose level 2. PET after 2 cycles shows CR!  4/4/24: Cycle 3 R-EPOCH, dose level 3 but vincristine capped at 2 mg d/t neuropathy  4/25/24: Cycle 4 R-EPOCH, dose level 4 but held vincristine d/t neuropathy  5/16/24  Cycle 5 R-EPOCH, dose level 5 but vincristine capped at 1.2 mg d/t neuropathy. Readmitted for near syncopal episode and mouth pain  6/6/24: Cycle 6 R-EPOCH, dose level 4 with vincristine still capped at 1.2 mg. EOT PET shows ongoing CR!    INTERVAL HISTORY:  Clementine is here today with Dima for follow-up. Doing well overall, had some more fatigue last month when WBC was low but now improved. Denies any fevers, night sweats, recent infections, lumps/bumps, chest pain, SOB, or bleeding. 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    I have reviewed and updated the following:  Past Medical History:   Diagnosis Date    Anxiety     Cervical lymphadenopathy     GERD      Interstitial cystitis       No Known Allergies    Current Outpatient Medications:     cimetidine (TAGAMET) 200 MG tablet, Take 200 mg by mouth 2 times daily, Disp: , Rfl:     Levonorgestrel (KYLEENA IU), 1 Device by Intrauterine route once, Disp: , Rfl:     modafinil (PROVIGIL) 200 MG tablet, Take 200 mg by mouth daily as needed (hypersomnia), Disp: , Rfl:     Multiple Vitamin (MULTI-VITAMINS) TABS, Take by mouth., Disp: , Rfl:     pantoprazole (PROTONIX) 40 MG EC tablet, Take 1 tablet (40 mg) by mouth 2 times daily., Disp: 60 tablet, Rfl: 3    LORazepam (ATIVAN) 0.5 MG tablet, Take 1 tablet (0.5 mg) by mouth every 6 hours as needed for nausea or vomiting (Patient not taking: Reported on 1/3/2025), Disp: 30 tablet, Rfl: 0    metoclopramide (REGLAN) 10 MG tablet, Take 1 tablet (10 mg) by mouth 4 times daily as needed (constipation) (Patient not taking: Reported on 1/3/2025), Disp: 30 tablet, Rfl: 3    OLANZapine zydis (ZYPREXA) 5 MG ODT, Take 1 tablet (5 mg) by mouth nightly as needed (nausea) (Patient not taking: Reported on 1/3/2025), Disp: , Rfl:     ondansetron (ZOFRAN) 4 MG tablet, Take 1-2 tablets (4-8 mg) by mouth every 8 hours as needed for nausea or vomiting (Patient not taking: Reported on 1/3/2025), Disp: 30 tablet, Rfl: 0    polyethylene glycol (MIRALAX) 17 GM/Dose powder, Take 17 g by mouth 2 times daily (Patient not taking: Reported on 1/3/2025), Disp: , Rfl:     prochlorperazine (COMPAZINE) 10 MG tablet, Take 1 tablet (10 mg) by mouth every 6 hours as needed for nausea or vomiting (Patient not taking: Reported on 1/3/2025), Disp: 30 tablet, Rfl: 0    senna-docusate (SENOKOT-S/PERICOLACE) 8.6-50 MG tablet, Take 1 tablet by mouth 2 times daily (Patient not taking: Reported on 1/3/2025), Disp: 60 tablet, Rfl: 0    sucralfate (CARAFATE) 1 GM tablet, Take 1 tablet (1 g) by mouth 4 times daily as needed (acid reflux) (Patient not taking: Reported on 1/3/2025), Disp: 30 tablet, Rfl: 1    Current  Facility-Administered Medications:     heparin lock flush 100 unit/mL injection 5 mL, 5 mL, Intracatheter, Q8H, Vanessa Oakes MD, 3 mL at 01/03/25 1429    REVIEW OF SYSTEMS:  10-point ROS reviewed and negative other than that mentioned in HPI.     Objective   VITAL SIGNS  /68   Pulse 65   Temp 97.5  F (36.4  C)   Resp 16   Wt 62.7 kg (138 lb 3.2 oz)   SpO2 99%   BMI 23.00 kg/m      ECOG PS: 0     PHYSICAL EXAM:  General: Awake, alert, in no acute distress.   HEENT: Normocephalic, atraumatic. No scleral icterus.   Lymph: No cervical or supraclavicular appreciated.   CV: Regular rate and rhythm. No murmurs, rubs, or gallops appreciated.  Resp: Good inspiratory effort, lungs clear to auscultation bilaterally.  GI: Abdomen soft, nontender, nondistended.   Ext: No peripheral edema bilaterally.  Neuro: CN II-XII grossly intact. No focal deficits appreciated.  Skin: No rash, unusual bruising or prominent lesions.  Psych: Pleasant, normal affect.     LABS:  I reviewed the following labs:  Lab Results   Component Value Date    WBC 2.8 (L) 01/03/2025    HGB 12.9 01/03/2025    HCT 38.2 01/03/2025    MCV 91 01/03/2025     01/03/2025    INR 1.12 06/10/2024    PTT 29 04/28/2024     Lab Results   Component Value Date     01/03/2025    POTASSIUM 4.0 01/03/2025    CR 0.78 01/03/2025    BUN 19.2 01/03/2025    CHLORIDE 105 01/03/2025    DEE 9.0 01/03/2025     (H) 01/03/2025      Lab Results   Component Value Date    ALT 10 01/03/2025    AST 17 01/03/2025    ALKPHOS 76 01/03/2025    BILITOTAL 0.4 01/03/2025      Lab Results   Component Value Date     01/03/2025    URIC 2.7 01/03/2025 12/30/24 PET-CT:  IMPRESSION:   1.  Increased low-level uptake in the anterior mediastinum (Deauville  4) with stable soft tissue appearance. Given overall degree of uptake  and lack of substantial soft tissue changes, findings favor thymic  rebound/hyperplasia. Disease recurrence remains on the differential.  This  could be further characterized with MRI if clinically indicated.  Otherwise no new sites of metabolically active disease identified.  2.  Low-level FDG uptake in the esophagus and stomach, as can be seen  with esophagitis/gastritis.  3.  Incidental CT findings, as above    Assessment & Plan   Primary mediastinal B-cell lymphoma  Dx 1/2024, presenting with cervical lymphadenopathy. Initial FNA suggestive of possible Hodgkin lymphoma; however excisional biopsy showed large B-cell lymphoma with differential diagnosis of primary mediastinal large B-cell lymphoma vs DLBCL NOS. Additional IHC staining for , MAL, and PD-L1, and Ilkwz7WF gene expression profile all favor PMBCL diagnosis over DLBCL NOS. PET with cervical and mediastinal lymphadenopathy only. LDH normal.   Previously discussed PMBCL diagnosis and plan for DA-R-EPOCH x 6 cycles. In the single-arm phase 2 prospective study of R-EPOCH (without radiation) for PMBCL, 5-year EFS was 93% and OS 97% ([id number]).   Completed 6 cycles of DA-R-EPOCH from 2/22/24 to 6/10/24 as above.   PET after 2 cycles showed CR and EOT PET showed ongoing CR!   6 month PET 12/30/24 showed increased low-level uptake in anterior mediastinum (SUVmax 3.5, Deauville 4) but stable/mildly decreased soft tissue size. This seems most consistent with thymic rebound/hyperplasia given no change in size and her young age but will monitor closely with repeat PET in 2 months. If enlarging, may need to consider biopsy.  We did discuss that thymic hyperplasia may take up to 6 months or more to resolve.   Continue surveillance with H&P/labs every 3-6 mo for 2 years, then yearly for years 3-5. Imaging every 6 months for first 2 years only (next 1/2025). Would consider removing port after next negative scan.     Peripheral neuropathy, grade 2 - resolved  Secondary to chemotherapy.   Capped vincristine at 2 mg starting Cycle 3.  Held vincristine Cycle 4 to allow some recovery of neuropathy  and consider reintroducing at 50% dose for Cycle 5/6.   Neuropathy did very mildly improve in the R hand after holding Vincristine with C4-  reintroduced vincristine with C5 at capped dose 1.2 mg.  Did have slight worsening of her neuropathy but not as bad as prior to C4.  Now resolved.     Leukopenia  WBC/ALC still slightly low, ANC now back to normal. No recent infections or medication changes.  Monitor.      Hot flashes - resolved  Likely secondary to ovarian suppression from chemotherapy.   Following with Gyn.  Now resolved, also had her first period post chemo.     Fertility preservation  Established with [facility] and underwent egg retrieval on 2/18/24.      Ppx  ID ppx: continue  mg BID for 6 months post chemo.   Vaccines: up to date on flu shot, still needs COVID booster - give today.       PLAN:  - COVID shot today  - Port flush in 1 month  - PET and Dr. Paiz visit in 2 months (while I am out on maternity leave)    Total of 40 minutes on patient visit, reviewing records, interpreting test results, placing orders, and documentation on the day of service.    The longitudinal plan of care for the diagnosis(es)/condition(s) as documented were addressed during this visit. Due to the added complexity in care, I will continue to support Clementine in the subsequent management and with ongoing continuity of care.     Vanessa Oakes MD  Attending Physician, [facility]

## 2025-01-03 NOTE — NURSING NOTE
"Oncology Rooming Note    January 3, 2025 2:41 PM   Clementine Kathleen is a 29 year old female who presents for:    Chief Complaint   Patient presents with    Port Draw     Power needle. Heparin locked, vitals checked    Oncology Clinic Visit     Mediastinal large B-cell lymphoma of lymph nodes of multiple regions     Initial Vitals: /68   Pulse 65   Temp 97.5  F (36.4  C)   Resp 16   Wt 62.7 kg (138 lb 3.2 oz)   SpO2 99%   BMI 23.00 kg/m   Estimated body mass index is 23 kg/m  as calculated from the following:    Height as of 6/19/24: 1.651 m (5' 5\").    Weight as of this encounter: 62.7 kg (138 lb 3.2 oz). Body surface area is 1.7 meters squared.  No Pain (0) Comment: Data Unavailable   No LMP recorded. (Menstrual status: IUD).  Allergies reviewed: Yes  Medications reviewed: Yes    Medications: Medication refills not needed today.  Pharmacy name entered into Yoon[facility]: [id number] [address] AT [facility]    Frailty Screening:   Is the patient here for a new oncology consult visit in cancer care? 2. No      Clinical concerns: Patient states no new concerns to discuss with provider.        Ricki Lord, EMT            "

## 2025-01-03 NOTE — LETTER
1/3/2025      Clementine Kathleen  [address]  [place] [address]      Dear Colleague,    Thank you for referring your patient, Clementine Kathleen, to the [facility]. Please see a copy of my visit note below.      [facility]      FOLLOW-UP VISIT NOTE                       Andrea 3, 2025  Subjective  REASON FOR VISIT: Follow up PMBCL    ONCOLOGIC SUMMARY:  Diagnosis:  Primary mediastinal B-cell lymphoma dx 1/2024, presenting with cervical LAD. 1/29/24 FNA indeterminate; 2/9/24 left level 3 excisional LN biopsy showed large B-cell lymphoma with rare abnormal B-cells on flow cytometry. Although CD23 IHC was negative, IHC for , MAL, and PD-L1 were positive in majority of the neoplastic cells suggesting PMBCL over DLBCL NOS. Idcup9RG gene expression profile also consistent with PMBCL. FISH with gain of BCL2 but no MYC, BCL2, or BCL6 rearrangements. PET showed bilateral cervical and mediastinal hypermetabolic lymphadenopathy (largest 4.4 cm with SUVmax 27 in a prevascular LN); no disease below diaphragm.     Intent of treatment: Curative    Treatment history:  2/22/24: Cycle 1 R-EPOCH, dose level 1  3/14/24: Cycle 2 R-EPOCH, dose level 2. PET after 2 cycles shows CR!  4/4/24: Cycle 3 R-EPOCH, dose level 3 but vincristine capped at 2 mg d/t neuropathy  4/25/24: Cycle 4 R-EPOCH, dose level 4 but held vincristine d/t neuropathy  5/16/24  Cycle 5 R-EPOCH, dose level 5 but vincristine capped at 1.2 mg d/t neuropathy. Readmitted for near syncopal episode and mouth pain  6/6/24: Cycle 6 R-EPOCH, dose level 4 with vincristine still capped at 1.2 mg. EOT PET shows ongoing CR!    INTERVAL HISTORY:  Clementine is here today with Dima for follow-up. Doing well overall, had some more fatigue last month when WBC was low but now improved. Denies any fevers, night sweats, recent infections, lumps/bumps, chest pain, SOB, or bleeding. 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    I have reviewed and updated the following:  Past Medical History:   Diagnosis Date     Anxiety      Cervical lymphadenopathy      GERD      Interstitial cystitis       No Known Allergies    Current Outpatient Medications:      cimetidine (TAGAMET) 200 MG tablet, Take 200 mg by mouth 2 times daily, Disp: , Rfl:      Levonorgestrel (KYLEENA IU), 1 Device by Intrauterine route once, Disp: , Rfl:      modafinil (PROVIGIL) 200 MG tablet, Take 200 mg by mouth daily as needed (hypersomnia), Disp: , Rfl:      Multiple Vitamin (MULTI-VITAMINS) TABS, Take by mouth., Disp: , Rfl:      pantoprazole (PROTONIX) 40 MG EC tablet, Take 1 tablet (40 mg) by mouth 2 times daily., Disp: 60 tablet, Rfl: 3     LORazepam (ATIVAN) 0.5 MG tablet, Take 1 tablet (0.5 mg) by mouth every 6 hours as needed for nausea or vomiting (Patient not taking: Reported on 1/3/2025), Disp: 30 tablet, Rfl: 0     metoclopramide (REGLAN) 10 MG tablet, Take 1 tablet (10 mg) by mouth 4 times daily as needed (constipation) (Patient not taking: Reported on 1/3/2025), Disp: 30 tablet, Rfl: 3     OLANZapine zydis (ZYPREXA) 5 MG ODT, Take 1 tablet (5 mg) by mouth nightly as needed (nausea) (Patient not taking: Reported on 1/3/2025), Disp: , Rfl:      ondansetron (ZOFRAN) 4 MG tablet, Take 1-2 tablets (4-8 mg) by mouth every 8 hours as needed for nausea or vomiting (Patient not taking: Reported on 1/3/2025), Disp: 30 tablet, Rfl: 0     polyethylene glycol (MIRALAX) 17 GM/Dose powder, Take 17 g by mouth 2 times daily (Patient not taking: Reported on 1/3/2025), Disp: , Rfl:      prochlorperazine (COMPAZINE) 10 MG tablet, Take 1 tablet (10 mg) by mouth every 6 hours as needed for nausea or vomiting (Patient not taking: Reported on 1/3/2025), Disp: 30 tablet, Rfl: 0     senna-docusate (SENOKOT-S/PERICOLACE) 8.6-50 MG tablet, Take 1 tablet by mouth 2 times daily (Patient  not taking: Reported on 1/3/2025), Disp: 60 tablet, Rfl: 0     sucralfate (CARAFATE) 1 GM tablet, Take 1 tablet (1 g) by mouth 4 times daily as needed (acid reflux) (Patient not taking: Reported on 1/3/2025), Disp: 30 tablet, Rfl: 1    Current Facility-Administered Medications:      heparin lock flush 100 unit/mL injection 5 mL, 5 mL, Intracatheter, Q8H, Vanessa Oakes MD, 3 mL at 01/03/25 1429    REVIEW OF SYSTEMS:  10-point ROS reviewed and negative other than that mentioned in HPI.     Objective  VITAL SIGNS  /68   Pulse 65   Temp 97.5  F (36.4  C)   Resp 16   Wt 62.7 kg (138 lb 3.2 oz)   SpO2 99%   BMI 23.00 kg/m      ECOG PS: 0     PHYSICAL EXAM:  General: Awake, alert, in no acute distress.   HEENT: Normocephalic, atraumatic. No scleral icterus.   Lymph: No cervical or supraclavicular appreciated.   CV: Regular rate and rhythm. No murmurs, rubs, or gallops appreciated.  Resp: Good inspiratory effort, lungs clear to auscultation bilaterally.  GI: Abdomen soft, nontender, nondistended.   Ext: No peripheral edema bilaterally.  Neuro: CN II-XII grossly intact. No focal deficits appreciated.  Skin: No rash, unusual bruising or prominent lesions.  Psych: Pleasant, normal affect.     LABS:  I reviewed the following labs:  Lab Results   Component Value Date    WBC 2.8 (L) 01/03/2025    HGB 12.9 01/03/2025    HCT 38.2 01/03/2025    MCV 91 01/03/2025     01/03/2025    INR 1.12 06/10/2024    PTT 29 04/28/2024     Lab Results   Component Value Date     01/03/2025    POTASSIUM 4.0 01/03/2025    CR 0.78 01/03/2025    BUN 19.2 01/03/2025    CHLORIDE 105 01/03/2025    DEE 9.0 01/03/2025     (H) 01/03/2025      Lab Results   Component Value Date    ALT 10 01/03/2025    AST 17 01/03/2025    ALKPHOS 76 01/03/2025    BILITOTAL 0.4 01/03/2025      Lab Results   Component Value Date     01/03/2025    URIC 2.7 01/03/2025 12/30/24 PET-CT:  IMPRESSION:   1.  Increased low-level uptake in the  anterior mediastinum (Deauville  4) with stable soft tissue appearance. Given overall degree of uptake  and lack of substantial soft tissue changes, findings favor thymic  rebound/hyperplasia. Disease recurrence remains on the differential.  This could be further characterized with MRI if clinically indicated.  Otherwise no new sites of metabolically active disease identified.  2.  Low-level FDG uptake in the esophagus and stomach, as can be seen  with esophagitis/gastritis.  3.  Incidental CT findings, as above    Assessment & Plan  Primary mediastinal B-cell lymphoma  Dx 1/2024, presenting with cervical lymphadenopathy. Initial FNA suggestive of possible Hodgkin lymphoma; however excisional biopsy showed large B-cell lymphoma with differential diagnosis of primary mediastinal large B-cell lymphoma vs DLBCL NOS. Additional IHC staining for , MAL, and PD-L1, and Oappq1DM gene expression profile all favor PMBCL diagnosis over DLBCL NOS. PET with cervical and mediastinal lymphadenopathy only. LDH normal.   Previously discussed PMBCL diagnosis and plan for DA-R-EPOCH x 6 cycles. In the single-arm phase 2 prospective study of R-EPOCH (without radiation) for PMBCL, 5-year EFS was 93% and OS 97% (10.[facility]6/[id number]).   Completed 6 cycles of DA-R-EPOCH from 2/22/24 to 6/10/24 as above.   PET after 2 cycles showed CR and EOT PET showed ongoing CR!   6 month PET 12/30/24 showed increased low-level uptake in anterior mediastinum (SUVmax 3.5, Deauville 4) but stable/mildly decreased soft tissue size. This seems most consistent with thymic rebound/hyperplasia given no change in size and her young age but will monitor closely with repeat PET in 2 months. If enlarging, may need to consider biopsy.  We did discuss that thymic hyperplasia may take up to 6 months or more to resolve.   Continue surveillance with H&P/labs every 3-6 mo for 2 years, then yearly for years 3-5. Imaging every 6 months for first 2 years only (next  1/2025). Would consider removing port after next negative scan.     Peripheral neuropathy, grade 2 - resolved  Secondary to chemotherapy.   Capped vincristine at 2 mg starting Cycle 3.  Held vincristine Cycle 4 to allow some recovery of neuropathy and consider reintroducing at 50% dose for Cycle 5/6.   Neuropathy did very mildly improve in the R hand after holding Vincristine with C4-  reintroduced vincristine with C5 at capped dose 1.2 mg.  Did have slight worsening of her neuropathy but not as bad as prior to C4.  Now resolved.     Leukopenia  WBC/ALC still slightly low, ANC now back to normal. No recent infections or medication changes.  Monitor.      Hot flashes - resolved  Likely secondary to ovarian suppression from chemotherapy.   Following with Gyn.  Now resolved, also had her first period post chemo.     Fertility preservation  Established with [facility] and underwent egg retrieval on 2/18/24.      Ppx  ID ppx: continue  mg BID for 6 months post chemo.   Vaccines: up to date on flu shot, still needs COVID booster - give today.       PLAN:  - COVID shot today  - Port flush in 1 month  - PET and Dr. Paiz visit in 2 months (while I am out on maternity leave)    Total of 40 minutes on patient visit, reviewing records, interpreting test results, placing orders, and documentation on the day of service.    The longitudinal plan of care for the diagnosis(es)/condition(s) as documented were addressed during this visit. Due to the added complexity in care, I will continue to support Clementine in the subsequent management and with ongoing continuity of care.     Vanessa Oakes MD  Attending Physician, [facility]       Again, thank you for allowing me to participate in the care of your patient.        Sincerely,        Vanessa Oakes MD    Electronically signed

## 2025-01-03 NOTE — NURSING NOTE
Chief Complaint   Patient presents with    Port Draw     Power needle. Heparin locked, vitals checked     Yari Centeno RN on 1/3/2025 at 2:31 PM

## 2025-02-04 ENCOUNTER — LAB (OUTPATIENT)
Dept: LAB | Facility: CLINIC | Age: 30
End: 2025-02-04
Attending: INTERNAL MEDICINE
Payer: COMMERCIAL

## 2025-02-04 DIAGNOSIS — C85.28 MEDIASTINAL LARGE B-CELL LYMPHOMA OF LYMPH NODES OF MULTIPLE REGIONS (H): ICD-10-CM

## 2025-02-04 LAB
ALBUMIN SERPL BCG-MCNC: 4 G/DL (ref 3.5–5.2)
ALP SERPL-CCNC: 60 U/L (ref 40–150)
ALT SERPL W P-5'-P-CCNC: 12 U/L (ref 0–50)
ANION GAP SERPL CALCULATED.3IONS-SCNC: 9 MMOL/L (ref 7–15)
AST SERPL W P-5'-P-CCNC: 14 U/L (ref 0–45)
BASOPHILS # BLD AUTO: 0 10E3/UL (ref 0–0.2)
BASOPHILS NFR BLD AUTO: 1 %
BILIRUB SERPL-MCNC: 0.2 MG/DL
BUN SERPL-MCNC: 15.9 MG/DL (ref 6–20)
CALCIUM SERPL-MCNC: 8.7 MG/DL (ref 8.8–10.4)
CHLORIDE SERPL-SCNC: 108 MMOL/L (ref 98–107)
CREAT SERPL-MCNC: 0.73 MG/DL (ref 0.51–0.95)
EGFRCR SERPLBLD CKD-EPI 2021: >90 ML/MIN/1.73M2
EOSINOPHIL # BLD AUTO: 0.1 10E3/UL (ref 0–0.7)
EOSINOPHIL NFR BLD AUTO: 5 %
ERYTHROCYTE [DISTWIDTH] IN BLOOD BY AUTOMATED COUNT: 13.5 % (ref 10–15)
GLUCOSE SERPL-MCNC: 95 MG/DL (ref 70–99)
HCO3 SERPL-SCNC: 25 MMOL/L (ref 22–29)
HCT VFR BLD AUTO: 36.9 % (ref 35–47)
HGB BLD-MCNC: 12.3 G/DL (ref 11.7–15.7)
IMM GRANULOCYTES # BLD: 0 10E3/UL
IMM GRANULOCYTES NFR BLD: 0 %
LDH SERPL L TO P-CCNC: 151 U/L (ref 0–250)
LYMPHOCYTES # BLD AUTO: 0.7 10E3/UL (ref 0.8–5.3)
LYMPHOCYTES NFR BLD AUTO: 27 %
MCH RBC QN AUTO: 30.3 PG (ref 26.5–33)
MCHC RBC AUTO-ENTMCNC: 33.3 G/DL (ref 31.5–36.5)
MCV RBC AUTO: 91 FL (ref 78–100)
MONOCYTES # BLD AUTO: 0.4 10E3/UL (ref 0–1.3)
MONOCYTES NFR BLD AUTO: 15 %
NEUTROPHILS # BLD AUTO: 1.4 10E3/UL (ref 1.6–8.3)
NEUTROPHILS NFR BLD AUTO: 52 %
NRBC # BLD AUTO: 0 10E3/UL
NRBC BLD AUTO-RTO: 0 /100
PLATELET # BLD AUTO: 159 10E3/UL (ref 150–450)
POTASSIUM SERPL-SCNC: 4.1 MMOL/L (ref 3.4–5.3)
PROT SERPL-MCNC: 6.1 G/DL (ref 6.4–8.3)
RBC # BLD AUTO: 4.06 10E6/UL (ref 3.8–5.2)
SODIUM SERPL-SCNC: 142 MMOL/L (ref 135–145)
URATE SERPL-MCNC: 2.3 MG/DL (ref 2.4–5.7)
WBC # BLD AUTO: 2.6 10E3/UL (ref 4–11)

## 2025-02-04 PROCEDURE — 84155 ASSAY OF PROTEIN SERUM: CPT

## 2025-02-04 PROCEDURE — 250N000011 HC RX IP 250 OP 636: Performed by: INTERNAL MEDICINE

## 2025-02-04 PROCEDURE — 36591 DRAW BLOOD OFF VENOUS DEVICE: CPT

## 2025-02-04 PROCEDURE — 85025 COMPLETE CBC W/AUTO DIFF WBC: CPT

## 2025-02-04 PROCEDURE — 84550 ASSAY OF BLOOD/URIC ACID: CPT

## 2025-02-04 PROCEDURE — 82310 ASSAY OF CALCIUM: CPT

## 2025-02-04 PROCEDURE — 83615 LACTATE (LD) (LDH) ENZYME: CPT

## 2025-02-04 RX ORDER — HEPARIN SODIUM (PORCINE) LOCK FLUSH IV SOLN 100 UNIT/ML 100 UNIT/ML
500 SOLUTION INTRAVENOUS ONCE
Status: COMPLETED | OUTPATIENT
Start: 2025-02-04 | End: 2025-02-04

## 2025-02-04 RX ADMIN — HEPARIN 500 UNITS: 100 SYRINGE at 07:08

## 2025-02-04 NOTE — NURSING NOTE
Chief Complaint   Patient presents with    Labs Only     Labs drawn from port by RN     Port accessed with 20 gauge, 3/4 inch, flat needle by RN, labs collected, line flushed with saline and heparin. Port de-accessed.     Kira Ashraf, RN

## 2025-02-21 DIAGNOSIS — K21.00 GASTROESOPHAGEAL REFLUX DISEASE WITH ESOPHAGITIS WITHOUT HEMORRHAGE: ICD-10-CM

## 2025-02-21 NOTE — TELEPHONE ENCOUNTER
Medication:Pantoprazole  Last prescribing provider:Dr. Oakes  Last clinic visit date: 1/3/2025 Dr. Oakes  Recommendations for requested medication:reflux  Any other pertinent information:routed to covering provider Dr. Dickey as Dr. Oakes on leave.

## 2025-02-24 RX ORDER — PANTOPRAZOLE SODIUM 40 MG/1
40 TABLET, DELAYED RELEASE ORAL 2 TIMES DAILY
Qty: 60 TABLET | Refills: 3 | Status: SHIPPED | OUTPATIENT
Start: 2025-02-24

## 2025-03-03 ENCOUNTER — HOSPITAL ENCOUNTER (OUTPATIENT)
Dept: PET IMAGING | Facility: CLINIC | Age: 30
Discharge: HOME | End: 2025-03-03
Attending: INTERNAL MEDICINE | Admitting: INTERNAL MEDICINE
Payer: COMMERCIAL

## 2025-03-03 DIAGNOSIS — C85.28 MEDIASTINAL LARGE B-CELL LYMPHOMA OF LYMPH NODES OF MULTIPLE REGIONS (H): ICD-10-CM

## 2025-03-03 PROCEDURE — 78816 PET IMAGE W/CT FULL BODY: CPT | Mod: 26 | Performed by: RADIOLOGY

## 2025-03-03 PROCEDURE — 343N000001 HC RX 343 MED OP 636: Performed by: INTERNAL MEDICINE

## 2025-03-03 PROCEDURE — 71260 CT THORAX DX C+: CPT

## 2025-03-03 PROCEDURE — 78816 PET IMAGE W/CT FULL BODY: CPT | Mod: PS

## 2025-03-03 PROCEDURE — A9552 F18 FDG: HCPCS | Performed by: INTERNAL MEDICINE

## 2025-03-03 PROCEDURE — 74177 CT ABD & PELVIS W/CONTRAST: CPT | Mod: 26 | Performed by: RADIOLOGY

## 2025-03-03 PROCEDURE — 71260 CT THORAX DX C+: CPT | Mod: 26 | Performed by: RADIOLOGY

## 2025-03-03 PROCEDURE — 250N000011 HC RX IP 250 OP 636: Performed by: INTERNAL MEDICINE

## 2025-03-03 RX ORDER — IOPAMIDOL 755 MG/ML
10-135 INJECTION, SOLUTION INTRAVASCULAR ONCE
Status: COMPLETED | OUTPATIENT
Start: 2025-03-03 | End: 2025-03-03

## 2025-03-03 RX ORDER — FLUDEOXYGLUCOSE F 18 200 MCI/ML
10-18 INJECTION, SOLUTION INTRAVENOUS ONCE
Status: COMPLETED | OUTPATIENT
Start: 2025-03-03 | End: 2025-03-03

## 2025-03-03 RX ORDER — HEPARIN SODIUM (PORCINE) LOCK FLUSH IV SOLN 100 UNIT/ML 100 UNIT/ML
500 SOLUTION INTRAVENOUS ONCE
Status: COMPLETED | OUTPATIENT
Start: 2025-03-03 | End: 2025-03-03

## 2025-03-03 RX ADMIN — IOPAMIDOL 85 ML: 755 INJECTION, SOLUTION INTRAVENOUS at 08:29

## 2025-03-03 RX ADMIN — FLUDEOXYGLUCOSE F 18 10.06 MILLICURIE: 200 INJECTION, SOLUTION INTRAVENOUS at 07:32

## 2025-03-03 RX ADMIN — HEPARIN SODIUM (PORCINE) LOCK FLUSH IV SOLN 100 UNIT/ML 500 UNITS: 100 SOLUTION at 08:30

## 2025-03-06 ENCOUNTER — ONCOLOGY VISIT (OUTPATIENT)
Dept: TRANSPLANT | Facility: CLINIC | Age: 30
End: 2025-03-06
Payer: COMMERCIAL

## 2025-03-06 ENCOUNTER — APPOINTMENT (OUTPATIENT)
Dept: LAB | Facility: CLINIC | Age: 30
End: 2025-03-06
Attending: INTERNAL MEDICINE
Payer: COMMERCIAL

## 2025-03-06 VITALS
OXYGEN SATURATION: 99 % | TEMPERATURE: 97.6 F | BODY MASS INDEX: 23.08 KG/M2 | HEART RATE: 60 BPM | RESPIRATION RATE: 16 BRPM | WEIGHT: 138.7 LBS | DIASTOLIC BLOOD PRESSURE: 66 MMHG | SYSTOLIC BLOOD PRESSURE: 104 MMHG

## 2025-03-06 DIAGNOSIS — C85.28 MEDIASTINAL LARGE B-CELL LYMPHOMA OF LYMPH NODES OF MULTIPLE REGIONS (H): ICD-10-CM

## 2025-03-06 LAB
ALBUMIN SERPL BCG-MCNC: 4.2 G/DL (ref 3.5–5.2)
ALP SERPL-CCNC: 65 U/L (ref 40–150)
ALT SERPL W P-5'-P-CCNC: 11 U/L (ref 0–50)
ANION GAP SERPL CALCULATED.3IONS-SCNC: 8 MMOL/L (ref 7–15)
AST SERPL W P-5'-P-CCNC: 18 U/L (ref 0–45)
BASOPHILS # BLD AUTO: 0 10E3/UL (ref 0–0.2)
BASOPHILS NFR BLD AUTO: 2 %
BILIRUB SERPL-MCNC: 0.5 MG/DL
BUN SERPL-MCNC: 14.1 MG/DL (ref 6–20)
CALCIUM SERPL-MCNC: 8.6 MG/DL (ref 8.8–10.4)
CHLORIDE SERPL-SCNC: 107 MMOL/L (ref 98–107)
CREAT SERPL-MCNC: 0.75 MG/DL (ref 0.51–0.95)
EGFRCR SERPLBLD CKD-EPI 2021: >90 ML/MIN/1.73M2
EOSINOPHIL # BLD AUTO: 0.1 10E3/UL (ref 0–0.7)
EOSINOPHIL NFR BLD AUTO: 3 %
ERYTHROCYTE [DISTWIDTH] IN BLOOD BY AUTOMATED COUNT: 14.1 % (ref 10–15)
GLUCOSE SERPL-MCNC: 88 MG/DL (ref 70–99)
HCO3 SERPL-SCNC: 25 MMOL/L (ref 22–29)
HCT VFR BLD AUTO: 38.7 % (ref 35–47)
HGB BLD-MCNC: 13 G/DL (ref 11.7–15.7)
IMM GRANULOCYTES # BLD: 0 10E3/UL
IMM GRANULOCYTES NFR BLD: 0 %
LDH SERPL L TO P-CCNC: 151 U/L (ref 0–250)
LYMPHOCYTES # BLD AUTO: 0.6 10E3/UL (ref 0.8–5.3)
LYMPHOCYTES NFR BLD AUTO: 28 %
MCH RBC QN AUTO: 30.9 PG (ref 26.5–33)
MCHC RBC AUTO-ENTMCNC: 33.6 G/DL (ref 31.5–36.5)
MCV RBC AUTO: 92 FL (ref 78–100)
MONOCYTES # BLD AUTO: 0.3 10E3/UL (ref 0–1.3)
MONOCYTES NFR BLD AUTO: 13 %
NEUTROPHILS # BLD AUTO: 1.2 10E3/UL (ref 1.6–8.3)
NEUTROPHILS NFR BLD AUTO: 54 %
NRBC # BLD AUTO: 0 10E3/UL
NRBC BLD AUTO-RTO: 0 /100
PLATELET # BLD AUTO: 171 10E3/UL (ref 150–450)
POTASSIUM SERPL-SCNC: 4.2 MMOL/L (ref 3.4–5.3)
PROT SERPL-MCNC: 6.5 G/DL (ref 6.4–8.3)
RBC # BLD AUTO: 4.21 10E6/UL (ref 3.8–5.2)
SODIUM SERPL-SCNC: 140 MMOL/L (ref 135–145)
URATE SERPL-MCNC: 2.5 MG/DL (ref 2.4–5.7)
WBC # BLD AUTO: 2.2 10E3/UL (ref 4–11)

## 2025-03-06 PROCEDURE — 99214 OFFICE O/P EST MOD 30 MIN: CPT

## 2025-03-06 PROCEDURE — G2211 COMPLEX E/M VISIT ADD ON: HCPCS

## 2025-03-06 PROCEDURE — 36591 DRAW BLOOD OFF VENOUS DEVICE: CPT

## 2025-03-06 PROCEDURE — 99213 OFFICE O/P EST LOW 20 MIN: CPT

## 2025-03-06 PROCEDURE — 84550 ASSAY OF BLOOD/URIC ACID: CPT

## 2025-03-06 PROCEDURE — 250N000011 HC RX IP 250 OP 636

## 2025-03-06 PROCEDURE — 83615 LACTATE (LD) (LDH) ENZYME: CPT

## 2025-03-06 PROCEDURE — 80053 COMPREHEN METABOLIC PANEL: CPT

## 2025-03-06 PROCEDURE — 84295 ASSAY OF SERUM SODIUM: CPT

## 2025-03-06 PROCEDURE — 85025 COMPLETE CBC W/AUTO DIFF WBC: CPT

## 2025-03-06 PROCEDURE — 85014 HEMATOCRIT: CPT

## 2025-03-06 RX ORDER — HEPARIN SODIUM (PORCINE) LOCK FLUSH IV SOLN 100 UNIT/ML 100 UNIT/ML
5 SOLUTION INTRAVENOUS
Status: COMPLETED | OUTPATIENT
Start: 2025-03-06 | End: 2025-03-06

## 2025-03-06 RX ADMIN — HEPARIN 3 ML: 100 SYRINGE at 08:35

## 2025-03-06 ASSESSMENT — PAIN SCALES - GENERAL: PAINLEVEL_OUTOF10: NO PAIN (0)

## 2025-03-06 NOTE — LETTER
3/6/2025      Clementine Kathleen  [address]  [facility] [address]      Dear Colleague,    Thank you for referring your patient, Clementine Kathleen, to the [facility] [place]. Please see a copy of my visit note below.     [facility]      FOLLOW-UP VISIT NOTE                       Mar 6, 2025  Subjective  REASON FOR VISIT: Follow up PMBCL    ONCOLOGIC SUMMARY:  Diagnosis:  Primary mediastinal B-cell lymphoma dx 1/2024, presenting with cervical LAD. 1/29/24 FNA indeterminate; 2/9/24 left level 3 excisional LN biopsy showed large B-cell lymphoma with rare abnormal B-cells on flow cytometry. Although CD23 IHC was negative, IHC for , MAL, and PD-L1 were positive in majority of the neoplastic cells suggesting PMBCL over DLBCL NOS. [id number] gene expression profile also consistent with PMBCL. FISH with gain of BCL2 but no MYC, BCL2, or BCL6 rearrangements. PET showed bilateral cervical and mediastinal hypermetabolic lymphadenopathy (largest 4.4 cm with SUVmax 27 in a prevascular LN); no disease below diaphragm.     Intent of treatment: Curative    Treatment history:  2/22/24: Cycle 1 R-EPOCH, dose level 1  3/14/24: Cycle 2 R-EPOCH, dose level 2. PET after 2 cycles shows CR!  4/4/24: Cycle 3 R-EPOCH, dose level 3 but vincristine capped at 2 mg d/t neuropathy  4/25/24: Cycle 4 R-EPOCH, dose level 4 but held vincristine d/t neuropathy  5/16/24  Cycle 5 R-EPOCH, dose level 5 but vincristine capped at 1.2 mg d/t neuropathy    INTERVAL HISTORY:  Clementine is here for follow-up. She is now in observation after completing 6 cycles of R-EPOCH. Feeling great, no new complains, no more neuropathy.     ROS: no fevers, chest pain, SOB, vomiting, abd pain, diarrhea, or bleeding.      I have reviewed and updated the following:  Past Medical History:   Diagnosis Date     Anxiety      Cervical lymphadenopathy      GERD      Interstitial cystitis       No Known  Allergies    Current Outpatient Medications:      cimetidine (TAGAMET) 200 MG tablet, Take 200 mg by mouth 2 times daily, Disp: , Rfl:      Levonorgestrel (KYLEENA IU), 1 Device by Intrauterine route once, Disp: , Rfl:      modafinil (PROVIGIL) 200 MG tablet, Take 200 mg by mouth daily as needed (hypersomnia), Disp: , Rfl:      pantoprazole (PROTONIX) 40 MG EC tablet, Take 1 tablet (40 mg) by mouth 2 times daily., Disp: 60 tablet, Rfl: 3     LORazepam (ATIVAN) 0.5 MG tablet, Take 1 tablet (0.5 mg) by mouth every 6 hours as needed for nausea or vomiting (Patient not taking: Reported on 10/10/2024), Disp: 30 tablet, Rfl: 0     metoclopramide (REGLAN) 10 MG tablet, Take 1 tablet (10 mg) by mouth 4 times daily as needed (constipation) (Patient not taking: Reported on 10/10/2024), Disp: 30 tablet, Rfl: 3     Multiple Vitamin (MULTI-VITAMINS) TABS, Take by mouth. (Patient not taking: Reported on 3/6/2025), Disp: , Rfl:      OLANZapine zydis (ZYPREXA) 5 MG ODT, Take 1 tablet (5 mg) by mouth nightly as needed (nausea) (Patient not taking: Reported on 10/10/2024), Disp: , Rfl:      ondansetron (ZOFRAN) 4 MG tablet, Take 1-2 tablets (4-8 mg) by mouth every 8 hours as needed for nausea or vomiting (Patient not taking: Reported on 10/10/2024), Disp: 30 tablet, Rfl: 0     polyethylene glycol (MIRALAX) 17 GM/Dose powder, Take 17 g by mouth 2 times daily (Patient not taking: Reported on 10/10/2024), Disp: , Rfl:      prochlorperazine (COMPAZINE) 10 MG tablet, Take 1 tablet (10 mg) by mouth every 6 hours as needed for nausea or vomiting (Patient not taking: Reported on 10/10/2024), Disp: 30 tablet, Rfl: 0     senna-docusate (SENOKOT-S/PERICOLACE) 8.6-50 MG tablet, Take 1 tablet by mouth 2 times daily (Patient not taking: Reported on 10/10/2024), Disp: 60 tablet, Rfl: 0     sucralfate (CARAFATE) 1 GM tablet, Take 1 tablet (1 g) by mouth 4 times daily as needed (acid reflux) (Patient not taking: Reported on 10/10/2024), Disp: 30  tablet, Rfl: 1  No current facility-administered medications for this visit.    REVIEW OF SYSTEMS:  10-point ROS reviewed and negative other than that mentioned in HPI.     Objective  VITAL SIGNS  /66 (BP Location: Right arm, Patient Position: Sitting, Cuff Size: Adult Regular)   Pulse 60   Temp 97.6  F (36.4  C) (Oral)   Resp 16   Wt 62.9 kg (138 lb 11.2 oz)   SpO2 99%   BMI 23.08 kg/m      ECOG PS: 0     PHYSICAL EXAM:  General: Awake, alert, in no acute distress. Oriented x 3.    HEENT: Chemo induced alopecia. Normocephalic, atraumatic. No scleral icterus. Oral mucosa pink and moist with no signs of thrush or  mucositis  CV: Regular rate and  regular rhythm. No murmurs, rubs, or gallops appreciated.  Resp: Good inspiratory effort, lungs clear to auscultation bilaterally.  Abdomen: Soft, NT, ND.   Extremities: No peripheral edema bilaterally.   Neuro: CN II-XII grossly intact. No focal deficits.   Skin: No rash, unusual bruising or prominent lesions.  Psych: Pleasant, normal affect.    LABS:  I reviewed the following labs:  Lab Results   Component Value Date    WBC 2.2 (L) 03/06/2025    HGB 13.0 03/06/2025    HCT 38.7 03/06/2025    MCV 92 03/06/2025     03/06/2025    INR 1.12 06/10/2024    PTT 29 04/28/2024     Lab Results   Component Value Date     02/04/2025    POTASSIUM 4.1 02/04/2025    CR 0.73 02/04/2025    BUN 15.9 02/04/2025    CHLORIDE 108 (H) 02/04/2025    DEE 8.7 (L) 02/04/2025    GLC 95 02/04/2025      Lab Results   Component Value Date    ALT 12 02/04/2025    AST 14 02/04/2025    ALKPHOS 60 02/04/2025    BILITOTAL 0.2 02/04/2025      Lab Results   Component Value Date     02/04/2025    URIC 2.3 (L) 02/04/2025    PET/CT:  3/6/2025  MPRESSION:   In this patient with history of large B-cell lymphoma, complete  response by Lugano criteria.   1. Thymic rebound.   2. No new sites of metabolically active disease.    Assessment & Plan  Primary mediastinal B-cell lymphoma  Dx  1/2024, presenting cervical lymphadenopathy. Initial FNA suggestive of possible Hodgkin lymphoma; however excisional biopsy showed large B-cell lymphoma with differential diagnosis of primary mediastinal large B-cell lymphoma vs DLBCL NOS. Additional IHC staining for , MAL, and PD-L1, and [id number] gene expression profile all favor PMBCL diagnosis over DLBCL NOS. PET with cervical and mediastinal lymphadenopathy only. LDH normal.   Previously discussed PMBCL diagnosis and plan for DA-R-EPOCH x 6 cycles. In the single-arm phase 2 prospective study of R-EPOCH (without radiation) for PMBCL, 5-year EFS was 93% and OS 97% (10.1056/[id number]).   Cycle 1 R-EPOCH 2/22/24, tolerating well so far other than constipation.  Cycle 2 R-EPOCH 3/14/24. ANC dread >0.5 last cycle so qualifies for 20% increase in doxorubicin, etoposide, and cyclophosphamide (dose level 2).   PET after 2 cycles showed complete response!   Cycle 3 R-EPOCH 4/4/24- qualified for 20% increase in doxorubicin, etoposide, and cyclophosphamide again (dose level 3); however capped vincristine at 2 mg due to neuropathy.   Cycle 4 R-EPOCH- Qualified for 20% dose increase again (dose level 4) but omitted vincristine to allow some recovery of neuropathy.  Cycle 5 R-EPOCH- dose level 5 but dose cap vincristine 1.2 mg.  Added mesna given higher dose of Cyclophosphamide. Had more fatigue and mouth sores  Proceed with Cycle 6 R-EPOCH tomorrow, decrease back to dose level 4 due to plt dread of 24 and ANC <0.5 on 3 measurements with last cycle. Keep vincristine 1.2 mg.   Next PET at [facility] - 1 month after Cycle 6.     Mucositis  Patient endorsed previous oral ulceration that were painful but resolved after previous chemotherapy cycles. However, she has had increase of oral sores with C5.   MMW & biotene PRN, encourage frequent oral moisturization with saline rinses.    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 Initially affecting fingertips only.  "  Capped vincristine at 2 mg starting Cycle 3.  S/p C3 and neuropathy in fingertips worsened with some difficulty using her phone or buttoning shirts and now neuropathy in bilateral feet that did not impact balance of walking.   Held vincristine Cycle 4 to allow some recovery of neuropathy and consider reintroducing at 50% dose for Cycle 5/6.   Neuropathy did very mildly improve in the R hand after holding Vincristine with C4-  reintroduced vincristine with C5 at capped dose 1.2 mg.  Did have slight worsening of her neuropathy but not as bad as prior to C4.    Palpitations/tachycardia, resolved  SOB, resolved  3/25/24 NT proBNP normal at <36.   4/15/24 CTA chest negative for PE. Ziopatch requested but did not hear from Cardiology   Ziopatch completed 5/16- no episodes of SOB after C5 and a couple episodes of \"heart fluttering\"    Constipation- resolved  Scheduled Miralax and Senna-S.  PRN Reglan.     Fertility preservation  Established with [facility] and underwent egg retrieval on 2/18/24.      Ppx  TLS ppx: now done with allopurinol.  ID ppx: continue  mg BID. Levo and fluc when ANC <1.0. Monthly pentamidine for PJP ppx (last given 6/3/24).   Vaccines: up to date on COVID and flu shots.       PLAN:      Total of 30 minutes on patient visit, reviewing records, interpreting test results, placing orders, and documentation on the day of service.    The longitudinal plan of care for the diagnosis(es)/condition(s) as documented were addressed during this visit. Due to the added complexity in care, I will continue to support Clementine in the subsequent management and with ongoing continuity of care.     Vanessa Oakes MD  Attending Physician, [facility]       Again, thank you for allowing me to participate in the care of your patient.        Sincerely,        Sharon Paiz MD    Electronically signed"

## 2025-03-06 NOTE — NURSING NOTE
"Oncology Rooming Note    March 6, 2025 8:50 AM   Clementine Kathleen is a 29 year old female who presents for:    Chief Complaint   Patient presents with    Oncology Clinic Visit     Mediastinal large B-cell lymphoma of lymph nodes    Port Draw     Labs drawn from port by rn.  VS taken.     Initial Vitals: /66 (BP Location: Right arm, Patient Position: Sitting, Cuff Size: Adult Regular)   Pulse 60   Temp 97.6  F (36.4  C) (Oral)   Resp 16   Wt 62.9 kg (138 lb 11.2 oz)   SpO2 99%   BMI 23.08 kg/m   Estimated body mass index is 23.08 kg/m  as calculated from the following:    Height as of 6/19/24: 1.651 m (5' 5\").    Weight as of this encounter: 62.9 kg (138 lb 11.2 oz). Body surface area is 1.7 meters squared.  No Pain (0) Comment: Data Unavailable   No LMP recorded. (Menstrual status: IUD).  Allergies reviewed: Yes  Medications reviewed: Yes    Medications: Medication refills not needed today.  Pharmacy name entered into [employer]: [facility] #[id number] [facility] [address] AT [facility]    Frailty Screening:   Is the patient here for a new oncology consult visit in cancer care? 2. No      Clinical concerns: Pt reports no new concerns today.        Faith Reyes, EMT     "

## 2025-03-06 NOTE — NURSING NOTE
"Chief Complaint   Patient presents with    Oncology Clinic Visit     Mediastinal large B-cell lymphoma of lymph nodes    Port Draw     Labs drawn from port by rn.  VS taken.     Port accessed with 20 gauge 3/4\" Power needle and labs drawn by rn.  Port flushed with NS and heparin then de-accessed.  Pt tolerated well.  VS taken.  Pt checked in for next appt.    Africa Sandhu RN      "

## 2025-03-06 NOTE — PROGRESS NOTES
[facility]      FOLLOW-UP VISIT NOTE                       Mar 6, 2025  Subjective   REASON FOR VISIT: Follow up PMBCL    ONCOLOGIC SUMMARY:  Diagnosis:  Primary mediastinal B-cell lymphoma dx 1/2024, presenting with cervical LAD. 1/29/24 FNA indeterminate; 2/9/24 left level 3 excisional LN biopsy showed large B-cell lymphoma with rare abnormal B-cells on flow cytometry. Although CD23 IHC was negative, IHC for , MAL, and PD-L1 were positive in majority of the neoplastic cells suggesting PMBCL over DLBCL NOS. Dudyw1CQ gene expression profile also consistent with PMBCL. FISH with gain of BCL2 but no MYC, BCL2, or BCL6 rearrangements. PET showed bilateral cervical and mediastinal hypermetabolic lymphadenopathy (largest 4.4 cm with SUVmax 27 in a prevascular LN); no disease below diaphragm.     Intent of treatment: Curative    Treatment history:  2/22/24: Cycle 1 R-EPOCH, dose level 1  3/14/24: Cycle 2 R-EPOCH, dose level 2. PET after 2 cycles shows CR!  4/4/24: Cycle 3 R-EPOCH, dose level 3 but vincristine capped at 2 mg d/t neuropathy  4/25/24: Cycle 4 R-EPOCH, dose level 4 but held vincristine d/t neuropathy  5/16/24  Cycle 5 R-EPOCH, dose level 5 but vincristine capped at 1.2 mg d/t neuropathy    INTERVAL HISTORY:  Clementine is here with Dima for follow-up before Cycle 6 R-EPOCH. Cycle 5 was the toughest so far with brief hospital stay for syncope and a week of severe throat pain. Never actually saw mouth sores but now it is much improved over the last week. Neuropathy is stable, still only in fingertips and not feet. Otherwise no fevers, chest pain, SOB, vomiting, abd pain, diarrhea, or bleeding. Trying to walk daily still.     I have reviewed and updated the following:  Past Medical History:   Diagnosis Date    Anxiety     Cervical lymphadenopathy     GERD     Interstitial cystitis       No Known Allergies    Current Outpatient Medications:     cimetidine (TAGAMET) 200 MG tablet, Take 200 mg by mouth  2 times daily, Disp: , Rfl:     Levonorgestrel (KYLEENA IU), 1 Device by Intrauterine route once, Disp: , Rfl:     modafinil (PROVIGIL) 200 MG tablet, Take 200 mg by mouth daily as needed (hypersomnia), Disp: , Rfl:     pantoprazole (PROTONIX) 40 MG EC tablet, Take 1 tablet (40 mg) by mouth 2 times daily., Disp: 60 tablet, Rfl: 3    LORazepam (ATIVAN) 0.5 MG tablet, Take 1 tablet (0.5 mg) by mouth every 6 hours as needed for nausea or vomiting (Patient not taking: Reported on 10/10/2024), Disp: 30 tablet, Rfl: 0    metoclopramide (REGLAN) 10 MG tablet, Take 1 tablet (10 mg) by mouth 4 times daily as needed (constipation) (Patient not taking: Reported on 10/10/2024), Disp: 30 tablet, Rfl: 3    Multiple Vitamin (MULTI-VITAMINS) TABS, Take by mouth. (Patient not taking: Reported on 3/6/2025), Disp: , Rfl:     OLANZapine zydis (ZYPREXA) 5 MG ODT, Take 1 tablet (5 mg) by mouth nightly as needed (nausea) (Patient not taking: Reported on 10/10/2024), Disp: , Rfl:     ondansetron (ZOFRAN) 4 MG tablet, Take 1-2 tablets (4-8 mg) by mouth every 8 hours as needed for nausea or vomiting (Patient not taking: Reported on 10/10/2024), Disp: 30 tablet, Rfl: 0    polyethylene glycol (MIRALAX) 17 GM/Dose powder, Take 17 g by mouth 2 times daily (Patient not taking: Reported on 10/10/2024), Disp: , Rfl:     prochlorperazine (COMPAZINE) 10 MG tablet, Take 1 tablet (10 mg) by mouth every 6 hours as needed for nausea or vomiting (Patient not taking: Reported on 10/10/2024), Disp: 30 tablet, Rfl: 0    senna-docusate (SENOKOT-S/PERICOLACE) 8.6-50 MG tablet, Take 1 tablet by mouth 2 times daily (Patient not taking: Reported on 10/10/2024), Disp: 60 tablet, Rfl: 0    sucralfate (CARAFATE) 1 GM tablet, Take 1 tablet (1 g) by mouth 4 times daily as needed (acid reflux) (Patient not taking: Reported on 10/10/2024), Disp: 30 tablet, Rfl: 1  No current facility-administered medications for this visit.    REVIEW OF SYSTEMS:  10-point ROS reviewed  and negative other than that mentioned in HPI.     Objective   VITAL SIGNS  /66 (BP Location: Right arm, Patient Position: Sitting, Cuff Size: Adult Regular)   Pulse 60   Temp 97.6  F (36.4  C) (Oral)   Resp 16   Wt 62.9 kg (138 lb 11.2 oz)   SpO2 99%   BMI 23.08 kg/m      ECOG PS: 0     PHYSICAL EXAM:  General: Awake, alert, in no acute distress. Oriented x 3.    HEENT: Chemo induced alopecia. Normocephalic, atraumatic. No scleral icterus. Oral mucosa pink and moist with no signs of thrush or  mucositis  CV: Regular rate and  regular rhythm. No murmurs, rubs, or gallops appreciated.  Resp: Good inspiratory effort, lungs clear to auscultation bilaterally.  Abdomen: Soft, NT, ND.   Extremities: No peripheral edema bilaterally.   Neuro: CN II-XII grossly intact. No focal deficits.   Skin: No rash, unusual bruising or prominent lesions.  Psych: Pleasant, normal affect.    LABS:  I reviewed the following labs:  Lab Results   Component Value Date    WBC 2.2 (L) 03/06/2025    HGB 13.0 03/06/2025    HCT 38.7 03/06/2025    MCV 92 03/06/2025     03/06/2025    INR 1.12 06/10/2024    PTT 29 04/28/2024     Lab Results   Component Value Date     02/04/2025    POTASSIUM 4.1 02/04/2025    CR 0.73 02/04/2025    BUN 15.9 02/04/2025    CHLORIDE 108 (H) 02/04/2025    DEE 8.7 (L) 02/04/2025    GLC 95 02/04/2025      Lab Results   Component Value Date    ALT 12 02/04/2025    AST 14 02/04/2025    ALKPHOS 60 02/04/2025    BILITOTAL 0.2 02/04/2025      Lab Results   Component Value Date     02/04/2025    URIC 2.3 (L) 02/04/2025        Assessment & Plan   Primary mediastinal B-cell lymphoma  Dx 1/2024, presenting cervical lymphadenopathy. Initial FNA suggestive of possible Hodgkin lymphoma; however excisional biopsy showed large B-cell lymphoma with differential diagnosis of primary mediastinal large B-cell lymphoma vs DLBCL NOS. Additional IHC staining for , MAL, and PD-L1, and [id number] gene expression  profile all favor PMBCL diagnosis over DLBCL NOS. PET with cervical and mediastinal lymphadenopathy only. LDH normal.   Previously discussed PMBCL diagnosis and plan for DA-R-EPOCH x 6 cycles. In the single-arm phase 2 prospective study of R-EPOCH (without radiation) for PMBCL, 5-year EFS was 93% and OS 97% (10.1056/[id number]).   Cycle 1 R-EPOCH 2/22/24, tolerating well so far other than constipation.  Cycle 2 R-EPOCH 3/14/24. ANC dread >0.5 last cycle so qualifies for 20% increase in doxorubicin, etoposide, and cyclophosphamide (dose level 2).   PET after 2 cycles showed complete response!   Cycle 3 R-EPOCH 4/4/24- qualified for 20% increase in doxorubicin, etoposide, and cyclophosphamide again (dose level 3); however capped vincristine at 2 mg due to neuropathy.   Cycle 4 R-EPOCH- Qualified for 20% dose increase again (dose level 4) but omitted vincristine to allow some recovery of neuropathy.  Cycle 5 R-EPOCH- dose level 5 but dose cap vincristine 1.2 mg.  Added mesna given higher dose of Cyclophosphamide. Had more fatigue and mouth sores  Proceed with Cycle 6 R-EPOCH tomorrow, decrease back to dose level 4 due to plt dread of 24 and ANC <0.5 on 3 measurements with last cycle. Keep vincristine 1.2 mg.   Next PET at [facility] - 1 month after Cycle 6.     Mucositis  Patient endorsed previous oral ulceration that were painful but resolved after previous chemotherapy cycles. However, she has had increase of oral sores with C5.   MMW & biotene PRN, encourage frequent oral moisturization with saline rinses.    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 Initially affecting fingertips only.   Capped vincristine at 2 mg starting Cycle 3.  S/p C3 and neuropathy in fingertips worsened with some difficulty using her phone or buttoning shirts and now neuropathy in bilateral feet that did not impact balance of walking.   Held vincristine Cycle 4 to allow some recovery of neuropathy and consider reintroducing at  "50% dose for Cycle 5/6.   Neuropathy did very mildly improve in the R hand after holding Vincristine with C4-  reintroduced vincristine with C5 at capped dose 1.2 mg.  Did have slight worsening of her neuropathy but not as bad as prior to C4.    Palpitations/tachycardia, resolved  SOB, resolved  3/25/24 NT proBNP normal at <36.   4/15/24 CTA chest negative for PE. Ziopatch requested but did not hear from Cardiology   Ziopatch completed 5/16- no episodes of SOB after C5 and a couple episodes of \"heart fluttering\"    Constipation- resolved  Scheduled Miralax and Senna-S.  PRN Reglan.     Fertility preservation  Established with [facility] and underwent egg retrieval on 2/18/24.      Ppx  TLS ppx: now done with allopurinol.  ID ppx: continue  mg BID. Levo and fluc when ANC <1.0. Monthly pentamidine for PJP ppx (last given 6/3/24).   Vaccines: up to date on COVID and flu shots.       PLAN:      Total of 30 minutes on patient visit, reviewing records, interpreting test results, placing orders, and documentation on the day of service.    The longitudinal plan of care for the diagnosis(es)/condition(s) as documented were addressed during this visit. Due to the added complexity in care, I will continue to support Clementine in the subsequent management and with ongoing continuity of care.     Vanessa Oakes MD  Attending Physician, [facility]   "

## 2025-03-16 ENCOUNTER — HEALTH MAINTENANCE LETTER (OUTPATIENT)
Age: 30
End: 2025-03-16

## 2025-04-02 ENCOUNTER — OFFICE VISIT (OUTPATIENT)
Dept: FAMILY MEDICINE | Facility: CLINIC | Age: 30
End: 2025-04-02
Payer: COMMERCIAL

## 2025-04-02 VITALS
SYSTOLIC BLOOD PRESSURE: 110 MMHG | RESPIRATION RATE: 14 BRPM | DIASTOLIC BLOOD PRESSURE: 75 MMHG | WEIGHT: 138.4 LBS | HEART RATE: 78 BPM | BODY MASS INDEX: 22.24 KG/M2 | TEMPERATURE: 97.7 F | HEIGHT: 66 IN | OXYGEN SATURATION: 100 %

## 2025-04-02 DIAGNOSIS — Z76.89 ENCOUNTER TO ESTABLISH CARE: ICD-10-CM

## 2025-04-02 DIAGNOSIS — G54.2 CERVICAL NEUROPATHY: Primary | ICD-10-CM

## 2025-04-02 PROBLEM — K21.00 GASTROESOPHAGEAL REFLUX DISEASE WITH ESOPHAGITIS: Status: ACTIVE | Noted: 2025-04-02

## 2025-04-02 PROBLEM — N30.10 INTERSTITIAL CYSTITIS: Status: ACTIVE | Noted: 2025-04-02

## 2025-04-02 PROBLEM — K21.9 GASTROESOPHAGEAL REFLUX DISEASE WITHOUT ESOPHAGITIS: Status: ACTIVE | Noted: 2025-04-02

## 2025-04-02 PROBLEM — K21.00 GASTROESOPHAGEAL REFLUX DISEASE WITH ESOPHAGITIS: Status: RESOLVED | Noted: 2025-04-02 | Resolved: 2025-04-02

## 2025-04-02 RX ORDER — GABAPENTIN 100 MG/1
100-300 CAPSULE ORAL 3 TIMES DAILY
Qty: 100 CAPSULE | Refills: 1 | Status: SHIPPED | OUTPATIENT
Start: 2025-04-02

## 2025-04-02 NOTE — PATIENT INSTRUCTIONS
Patient Education   Here is the plan from today's visit    1. Cervical neuropathy (Primary)    - Physical Therapy  Referral; Future  - MR Cervical Spine w/o & w Contrast; Future  - gabapentin (NEURONTIN) 100 MG capsule; Take 1-3 capsules (100-300 mg) by mouth 3 times daily.  Dispense: 100 capsule; Refill: 1    2. Encounter to establish care  Call if you need more or a form completed          Please call or return to clinic if your symptoms don't go away.    Follow up plan  Return if symptoms worsen or fail to improve.    Thank you for coming to [facility] today.  Lab Testing:  **If you had lab testing today and your results are reassuring or normal they will be mailed to you or sent through [employer] within 7 days.   **If the lab tests need quick action we will call you with the results.  **If you are having labs done on a different day, please call [phone] to schedule at [facility] or [phone] for other [facility] locations. Labs do not offer walk-in appointments.  The phone number we will call with results is # [phone] (home) . If this is not the best number please call our clinic and change the number.  Medication Refills:  If you need any refills please call your pharmacy and they will contact us.   If you need to  your refill at a new pharmacy, please contact the new pharmacy directly. The new pharmacy will help you get your medications transferred faster.   Scheduling:  If you have any concerns about today's visit or wish to schedule another appointment please call our office during normal business hours [phone] (8-5:00 M-F). If you can no longer make a scheduled visit, please cancel via [employer] or call us to cancel.   If a referral was made to an [facility] specialty provider and you do not get a call from central scheduling, please refer to directions on your visit summary or call our office during normal business hours for assistance.   If a  Mammogram was ordered for you at the [facility] call [phone] to schedule or change your appointment.  If you had an XRay/CT/Ultrasound/MRI ordered the number is [phone] to schedule or change your radiology appointment.   [facility] has limited ultrasound appointments available on Wednesdays, if you would like your ultrasound at [facility], please call [phone] to schedule.   Medical Concerns:  If you have urgent medical concerns please call [phone] at any time of the day.    Wing Zafar MD

## 2025-04-02 NOTE — PROGRESS NOTES
Assessment & Plan     Cervical neuropathy  Work related   After doing lots of endoscopic procedures at work 3 weeks ago, the left side of Clementine's neck and back have been increasingly painful. She has developed new numbness and tingling in her hand and tenderness and tingling along her forearm. On exam, strength and coordination are intact. This likely represents a C6/C7 cervical neuropathy. As her function is limited at work due to excruciating pain, and because of the worsening of her symptoms, we will proceed with MRI for diagnosis and consideration of injections.  - Physical Therapy  Referral  - MR Cervical Spine w/o & w Contrast  - gabapentin (NEURONTIN) 100 MG capsule  Dispense: 100 capsule; Refill: 1    Encounter to establish care  Reviewed medical, surgical, family, and social history in histories tab; updated medication list.    Return if symptoms worsen or fail to improve.    Subjective   Clementine is a 29 year old, presenting for the following health issues:  New Patient (Re-establishing care with PCP) and Pain (Left sided neck pain radiating down left arm )        4/2/2025    10:33 AM   Additional Questions   Roomed by Jimbo         4/2/2025    Information    services provided? No     HPI      Care coordination  - OB/gyn: with [facility]  - oncology: in remission, sees oncologist q3m and imaging q6m    Back pain:  - 3.5 weeks ago, pain in neck and shoulder  - Lots of endoscopic procedures, holding hand up  - Now is more severe, numbness and tingling in hand. Second and third digits, thumb, palm. Tender and zingy along forearm.   - Couldn't raise arm yesterday  - Can't lay flat, pain on shoulder  - Never had this before  - lyphoma was more mediastinal. Had anterior LN resection  - More concerned now with neuro symptoms  - Coresident had herniated disc that presented similarly    Therapies tried:  - heating pad - bran helpful  - ibuprofen, tylenol, icy hot - doesn't help at all  -  "theragun and massage made worse      Objective    /75   Pulse 78   Temp 97.7  F (36.5  C) (Temporal)   Resp 14   Ht 1.664 m (5' 5.5\")   Wt 62.8 kg (138 lb 6.4 oz)   SpO2 100%     Physical Exam  Vitals reviewed.   Constitutional:       General: She is not in acute distress.     Appearance: Normal appearance. She is not ill-appearing.   HENT:      Head: Normocephalic and atraumatic.   Eyes:      Conjunctiva/sclera: Conjunctivae normal.   Cardiovascular:      Rate and Rhythm: Normal rate.   Pulmonary:      Effort: Pulmonary effort is normal. No respiratory distress.   Musculoskeletal:        Back:       Comments: Tender between left scapula and spine, and superiorly as in diagram   Neurological:      General: No focal deficit present.      Mental Status: She is alert.      Sensory: Sensation is intact.      Motor: Motor function is intact. No weakness.      Coordination: Coordination is intact.      Comments: Negative Spirling's, though bending head to that side reproduced pain   Psychiatric:         Mood and Affect: Mood normal.      Arianne Wallace, MS3  [employer]    I was present with the medical student who participated in the service and in the documentation of this note. I have verified the history and personally performed the physical exam and medical decision making, and have verified the content of the note, which accurately reflects my assessment of the patient and the plan of care.  The longitudinal plan of care for the diagnosis(es)/condition(s) as documented were addressed during this visit. Due to the added complexity in care, I will continue to support Clementine in the subsequent management and with ongoing continuity of care.  I spent a total of 45 minutes on the day of the visit.   Time spent by me today doing chart review, history and exam, documentation and further activities per the note   Wing Zafar MD      Signed Electronically by: Wing Zafar MD    "

## 2025-04-08 ENCOUNTER — THERAPY VISIT (OUTPATIENT)
Age: 30
End: 2025-04-08
Payer: OTHER MISCELLANEOUS

## 2025-04-08 DIAGNOSIS — G54.2 CERVICAL NEUROPATHY: Primary | ICD-10-CM

## 2025-04-08 DIAGNOSIS — M79.602 PAIN OF LEFT UPPER EXTREMITY: ICD-10-CM

## 2025-04-08 PROCEDURE — 97110 THERAPEUTIC EXERCISES: CPT | Mod: GP

## 2025-04-08 PROCEDURE — 97161 PT EVAL LOW COMPLEX 20 MIN: CPT | Mod: GP

## 2025-04-08 NOTE — PROGRESS NOTES
PHYSICAL THERAPY EVALUATION  Type of Visit: Evaluation              Subjective     Pt presents to PT with primary complaint of cervical pain and left arm pain. Started a few weeks ago, progressively worsening. Localizes pain to left neck, scapular & upper back region, which progressed to left hand finger numbness & tingling. 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 Not sure if related or not since she was doing these procedures regularly for a long time before symptom onset. She is RHD. No recent imaging but has cervical MRI scheduled next week.  Numbness & tingling localized to fingers 1-3, palmar hand. She has not noticed weakness or loss of dexterity. Can be painful to bend the finger. Occasional intermittent shooting pain down the arm.   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 Overall feeling well now/no current active treatment. Did not have these left arm symptoms during chemotherapy.         Presenting condition or subjective complaint: neck/shoulder pain with hand numbness  Date of onset: 03/25/25    Relevant medical history: Cancer; History of fractures; Implanted device mediastinal B-cell lymphoma   Dates & types of surgery: 2016 ankle surgery, 2024 excisional lymph node biopsy and port implantation    Prior diagnostic imaging/testing results:    none; MRI upcoming.   Prior therapy history for the same diagnosis, illness or injury: No      Prior Level of Function  Transfers: Independent  Ambulation: Independent  ADL: Independent  IADL: Driving, Housekeeping, Laundry, School, Work    Living Environment  Social support: With a significant other or spouse   Type of home: Apartment/condo   Stairs to enter the home: No       Ramp: No   Stairs inside the home: No       Help at home: None  Equipment owned:       Employment: Yes ENT  resident  Hobbies/Interests: triathlons, weight lifting  -signed up for 1/2 Iron[employer] in June but might not due to recent labs   -3x/week weight lifting, has had to modify due to these symptoms    Patient goals for therapy: operate at work normally    Pain assessment: Pain denied  See objective evaluation for additional pain details  Constant dull numbness in L hand/first 3 fingers      Objective   CERVICAL SPINE EVALUATION  PAIN: Pain Level at Rest: 0/10  Pain Level with Use: 8/10  Pain Location: left c spine to upper trap & scapulae; down the arm to fingers 1-3   Pain Quality: numbness   Pain Frequency: intermittent  Pain is Worst: daytime  Pain is Exacerbated By: left arm use (scope at work, arms up); sleeping/pillow  Pain is Relieved By: NSAIDs, otc medications, and position changes; ice & heat. Prescribed gabapentin, unsure if helpful or not  Pain Progression: Worsened    POSTURE: Sitting Posture: Rounded shoulders, depressed / downward sloping scapulae; slight decreased cervical lordosis    ROM:   (Degrees) Left AROM Right AROM    Cervical Flexion Mod loss +pain on L    Cervical Extension Min loss NE    Cervical Side bend Min loss, NE WNL, NE    Cervical Rotation 25% loss, +periscap pain- on L WNL, NE    Cervical Protrusion WNL NE    Cervical Retraction Min loss ++pain L c spine    Thoracic Flexion     Thoracic Extension     Thoracic Rotation      Shoulder AROM: All planes WNL with some L periscap pain at end-range abd and flex      MYOTOMES:    Left Right   C1-2 (Neck Flexion)     C3 (Neck Side Bend)      C4 (Shrug) 5 5   C5 (Deltoid) 5 5   C6 (Biceps) 5 5   C7 (Triceps) 5 5   C8 (Thumb Ext) 4 5   T1 (Intrinsics) 4 4     DERMATOMES: WNL    NEURAL TENSION:  ++median nerve. +ulnar nerve (less sensitive than median)    SPECIAL TESTS:  +spurling's for Left scapular pain  (-) phalen's   (-) carpal compression     Assessment & Plan   CLINICAL IMPRESSIONS  Medical Diagnosis: Cervical neuropathy    Treatment Diagnosis:  neck pain with radiating pain   Impression/Assessment: Patient is a 29 year old female with cervical and left arm complaints.  The following significant findings have been identified: Pain, Decreased ROM/flexibility, Decreased joint mobility, Inflammation, Impaired muscle performance, and Decreased activity tolerance. These impairments interfere with their ability to perform self care tasks, work tasks, recreational activities, and sleep  as compared to previous level of function.   KEY PT FINDINGS:  1) Decreased cervical AROM into left sidebend, left rotation, and extension  2) Symptoms reproduced with median nerve neural tension testing; negative phalen's & negative carpal compression  3) No discrete myotomal weakness or dermatomal sensation loss  4) Moderate to high irritability with symptoms easily provoked      Clinical Decision Making (Complexity):  Clinical Presentation: Stable/Uncomplicated  Clinical Presentation Rationale: based on medical and personal factors listed in PT evaluation  Clinical Decision Making (Complexity): Low complexity    PLAN OF CARE  Treatment Interventions:  Modalities: Cupping, Dry Needling, Hot Pack, Mechanical Traction  Interventions: Manual Therapy, Neuromuscular Re-education, Therapeutic Activity, Therapeutic Exercise, Self-Care/Home Management, Community/Work Reintegration    Long Term Goals     PT Goal 1  Goal Identifier: Function  Goal Description: pt will report increased function and decreased disability as demonstrated by NDI score of 12 or less  Rationale: to maximize safety and independence with performance of ADLs and functional tasks  Target Date: 05/20/25  PT Goal 2  Goal Identifier: work  Goal Description: pt will report ability to fully participate in work activities (perform surgery, procedures) not limited by L UE pain  Rationale: to maximize safety and independence with performance of ADLs and functional tasks  Target Date: 06/03/25      Frequency of Treatment:  1x/week tapering to every other week  Duration of Treatment: 2 months    Recommended Referrals to Other Professionals: NA  Education Assessment:   Learner/Method: Patient    Risks and benefits of evaluation/treatment have been explained.   Patient/Family/caregiver agrees with Plan of Care.     Evaluation Time:     PT Eval, Low Complexity Minutes (30949): 25   Signing Clinician: Ramona Guthrie PT

## 2025-04-14 ENCOUNTER — THERAPY VISIT (OUTPATIENT)
Age: 30
End: 2025-04-14
Payer: OTHER MISCELLANEOUS

## 2025-04-14 DIAGNOSIS — M79.602 PAIN OF LEFT UPPER EXTREMITY: ICD-10-CM

## 2025-04-14 DIAGNOSIS — G54.2 CERVICAL NEUROPATHY: Primary | ICD-10-CM

## 2025-04-14 PROCEDURE — 97110 THERAPEUTIC EXERCISES: CPT | Mod: GP | Performed by: PHYSICAL THERAPIST

## 2025-04-14 PROCEDURE — 97112 NEUROMUSCULAR REEDUCATION: CPT | Mod: GP | Performed by: PHYSICAL THERAPIST

## 2025-04-15 ENCOUNTER — HOSPITAL ENCOUNTER (OUTPATIENT)
Dept: MRI IMAGING | Facility: CLINIC | Age: 30
Discharge: HOME | End: 2025-04-15
Attending: FAMILY MEDICINE | Admitting: FAMILY MEDICINE
Payer: OTHER MISCELLANEOUS

## 2025-04-15 DIAGNOSIS — G54.2 CERVICAL NEUROPATHY: ICD-10-CM

## 2025-04-15 PROCEDURE — 72156 MRI NECK SPINE W/O & W/DYE: CPT | Mod: 26 | Performed by: RADIOLOGY

## 2025-04-15 PROCEDURE — A9585 GADOBUTROL INJECTION: HCPCS | Performed by: FAMILY MEDICINE

## 2025-04-15 PROCEDURE — 72156 MRI NECK SPINE W/O & W/DYE: CPT

## 2025-04-15 PROCEDURE — 255N000002 HC RX 255 OP 636: Performed by: FAMILY MEDICINE

## 2025-04-15 RX ORDER — GADOBUTROL 604.72 MG/ML
7.5 INJECTION INTRAVENOUS ONCE
Status: COMPLETED | OUTPATIENT
Start: 2025-04-15 | End: 2025-04-15

## 2025-04-15 RX ADMIN — GADOBUTROL 6 ML: 604.72 INJECTION INTRAVENOUS at 13:34

## 2025-04-21 ENCOUNTER — THERAPY VISIT (OUTPATIENT)
Age: 30
End: 2025-04-21
Payer: OTHER MISCELLANEOUS

## 2025-04-21 DIAGNOSIS — M79.602 PAIN OF LEFT UPPER EXTREMITY: Primary | ICD-10-CM

## 2025-04-21 DIAGNOSIS — G54.2 CERVICAL NEUROPATHY: ICD-10-CM

## 2025-04-21 PROCEDURE — 97112 NEUROMUSCULAR REEDUCATION: CPT | Mod: GP

## 2025-04-21 PROCEDURE — 97110 THERAPEUTIC EXERCISES: CPT | Mod: GP

## 2025-04-29 ENCOUNTER — THERAPY VISIT (OUTPATIENT)
Age: 30
End: 2025-04-29
Payer: OTHER MISCELLANEOUS

## 2025-04-29 DIAGNOSIS — M79.602 PAIN OF LEFT UPPER EXTREMITY: ICD-10-CM

## 2025-04-29 DIAGNOSIS — G54.2 CERVICAL NEUROPATHY: Primary | ICD-10-CM

## 2025-04-29 PROCEDURE — 97112 NEUROMUSCULAR REEDUCATION: CPT | Mod: GP | Performed by: PHYSICAL THERAPIST

## 2025-04-29 PROCEDURE — 97140 MANUAL THERAPY 1/> REGIONS: CPT | Mod: GP | Performed by: PHYSICAL THERAPIST

## 2025-04-29 PROCEDURE — 97110 THERAPEUTIC EXERCISES: CPT | Mod: GP | Performed by: PHYSICAL THERAPIST

## 2025-05-05 NOTE — TELEPHONE ENCOUNTER
SPINE PATIENTS - NEW PROTOCOL PREVISIT    RECORDS RECEIVED FROM: internal   REASON FOR VISIT: Spine, Cervical neuropathy    PROVIDER: Dr. Alexandre   DATE OF APPT: 5/9/25   NOTES (FOR ALL VISITS) STATUS DETAILS   OFFICE NOTE from referring provider Internal Dr Jak Zafar @ [facility] [place]:  5/1/25 mychart encounter  4/2/25   MEDICATION LIST Internal    IMAGING  (FOR ALL VISITS)     MRI (HEAD, NECK, SPINE) Internal [facility]:  MRI Cervical Spine 4/15/25

## 2025-05-06 ENCOUNTER — THERAPY VISIT (OUTPATIENT)
Age: 30
End: 2025-05-06
Payer: OTHER MISCELLANEOUS

## 2025-05-06 DIAGNOSIS — G54.2 CERVICAL NEUROPATHY: Primary | ICD-10-CM

## 2025-05-06 DIAGNOSIS — M79.602 PAIN OF LEFT UPPER EXTREMITY: ICD-10-CM

## 2025-05-06 PROCEDURE — 97140 MANUAL THERAPY 1/> REGIONS: CPT | Mod: GP | Performed by: PHYSICAL THERAPIST

## 2025-05-06 PROCEDURE — 97112 NEUROMUSCULAR REEDUCATION: CPT | Mod: GP | Performed by: PHYSICAL THERAPIST

## 2025-05-06 PROCEDURE — 97110 THERAPEUTIC EXERCISES: CPT | Mod: GP | Performed by: PHYSICAL THERAPIST

## 2025-05-07 DIAGNOSIS — R07.0 THROAT PAIN: Primary | ICD-10-CM

## 2025-05-07 LAB — S PYO DNA THROAT QL NAA+PROBE: NOT DETECTED

## 2025-05-07 PROCEDURE — 87651 STREP A DNA AMP PROBE: CPT | Performed by: NURSE PRACTITIONER

## 2025-05-09 ENCOUNTER — PRE VISIT (OUTPATIENT)
Dept: NEUROSURGERY | Facility: CLINIC | Age: 30
End: 2025-05-09
Payer: COMMERCIAL

## 2025-05-13 ENCOUNTER — THERAPY VISIT (OUTPATIENT)
Age: 30
End: 2025-05-13
Payer: OTHER MISCELLANEOUS

## 2025-05-13 DIAGNOSIS — G54.2 CERVICAL NEUROPATHY: Primary | ICD-10-CM

## 2025-05-13 DIAGNOSIS — M79.602 PAIN OF LEFT UPPER EXTREMITY: ICD-10-CM

## 2025-05-13 PROCEDURE — 97140 MANUAL THERAPY 1/> REGIONS: CPT | Mod: GP | Performed by: PHYSICAL THERAPIST

## 2025-05-13 PROCEDURE — 97112 NEUROMUSCULAR REEDUCATION: CPT | Mod: GP | Performed by: PHYSICAL THERAPIST

## 2025-05-13 PROCEDURE — 97110 THERAPEUTIC EXERCISES: CPT | Mod: GP | Performed by: PHYSICAL THERAPIST

## 2025-05-20 ENCOUNTER — THERAPY VISIT (OUTPATIENT)
Age: 30
End: 2025-05-20
Payer: OTHER MISCELLANEOUS

## 2025-05-20 DIAGNOSIS — G54.2 CERVICAL NEUROPATHY: Primary | ICD-10-CM

## 2025-05-20 DIAGNOSIS — M79.602 PAIN OF LEFT UPPER EXTREMITY: ICD-10-CM

## 2025-05-20 PROCEDURE — 97112 NEUROMUSCULAR REEDUCATION: CPT | Mod: GP | Performed by: PHYSICAL THERAPIST

## 2025-05-20 PROCEDURE — 97140 MANUAL THERAPY 1/> REGIONS: CPT | Mod: GP | Performed by: PHYSICAL THERAPIST

## 2025-05-20 PROCEDURE — 97110 THERAPEUTIC EXERCISES: CPT | Mod: GP | Performed by: PHYSICAL THERAPIST

## 2025-05-27 ENCOUNTER — LAB (OUTPATIENT)
Dept: LAB | Facility: CLINIC | Age: 30
End: 2025-05-27
Attending: INTERNAL MEDICINE
Payer: COMMERCIAL

## 2025-05-27 PROCEDURE — 250N000011 HC RX IP 250 OP 636: Performed by: INTERNAL MEDICINE

## 2025-05-27 RX ORDER — HEPARIN SODIUM (PORCINE) LOCK FLUSH IV SOLN 100 UNIT/ML 100 UNIT/ML
5 SOLUTION INTRAVENOUS DAILY PRN
Status: DISCONTINUED | OUTPATIENT
Start: 2025-05-27 | End: 2025-06-02 | Stop reason: HOSPADM

## 2025-05-27 RX ADMIN — Medication 5 ML: at 06:56

## 2025-05-27 NOTE — NURSING NOTE
Chief Complaint   Patient presents with    Port Flush     Port flushed by RN in lab.      Port accessed using 20g flat needle. Line flushed and Heparin locked.     Gita Olvera RN

## 2025-06-10 ENCOUNTER — THERAPY VISIT (OUTPATIENT)
Age: 30
End: 2025-06-10
Payer: OTHER MISCELLANEOUS

## 2025-06-10 DIAGNOSIS — G54.2 CERVICAL NEUROPATHY: Primary | ICD-10-CM

## 2025-06-10 DIAGNOSIS — M79.602 PAIN OF LEFT UPPER EXTREMITY: ICD-10-CM

## 2025-06-10 PROCEDURE — 97110 THERAPEUTIC EXERCISES: CPT | Mod: GP | Performed by: PHYSICAL THERAPIST

## 2025-06-10 PROCEDURE — 97140 MANUAL THERAPY 1/> REGIONS: CPT | Mod: GP | Performed by: PHYSICAL THERAPIST

## 2025-06-17 ENCOUNTER — THERAPY VISIT (OUTPATIENT)
Age: 30
End: 2025-06-17
Payer: OTHER MISCELLANEOUS

## 2025-06-17 DIAGNOSIS — M79.602 PAIN OF LEFT UPPER EXTREMITY: ICD-10-CM

## 2025-06-17 DIAGNOSIS — G54.2 CERVICAL NEUROPATHY: Primary | ICD-10-CM

## 2025-06-17 PROCEDURE — 97112 NEUROMUSCULAR REEDUCATION: CPT | Mod: GP | Performed by: PHYSICAL THERAPIST

## 2025-06-17 PROCEDURE — 97110 THERAPEUTIC EXERCISES: CPT | Mod: GP | Performed by: PHYSICAL THERAPIST

## 2025-06-24 ENCOUNTER — THERAPY VISIT (OUTPATIENT)
Age: 30
End: 2025-06-24
Payer: OTHER MISCELLANEOUS

## 2025-06-24 DIAGNOSIS — G54.2 CERVICAL NEUROPATHY: Primary | ICD-10-CM

## 2025-06-24 DIAGNOSIS — M79.602 PAIN OF LEFT UPPER EXTREMITY: ICD-10-CM

## 2025-06-24 PROCEDURE — 97140 MANUAL THERAPY 1/> REGIONS: CPT | Mod: GP | Performed by: PHYSICAL THERAPIST

## 2025-06-24 PROCEDURE — 97110 THERAPEUTIC EXERCISES: CPT | Mod: GP | Performed by: PHYSICAL THERAPIST

## 2025-06-26 DIAGNOSIS — C85.28 MEDIASTINAL LARGE B-CELL LYMPHOMA OF LYMPH NODES OF MULTIPLE REGIONS (H): Primary | ICD-10-CM

## 2025-06-27 ENCOUNTER — APPOINTMENT (OUTPATIENT)
Dept: LAB | Facility: CLINIC | Age: 30
End: 2025-06-27
Attending: INTERNAL MEDICINE
Payer: COMMERCIAL

## 2025-06-27 ENCOUNTER — ONCOLOGY VISIT (OUTPATIENT)
Dept: ONCOLOGY | Facility: CLINIC | Age: 30
End: 2025-06-27
Attending: INTERNAL MEDICINE
Payer: COMMERCIAL

## 2025-06-27 VITALS
WEIGHT: 135.3 LBS | DIASTOLIC BLOOD PRESSURE: 76 MMHG | HEART RATE: 89 BPM | BODY MASS INDEX: 21.84 KG/M2 | OXYGEN SATURATION: 95 % | SYSTOLIC BLOOD PRESSURE: 121 MMHG | RESPIRATION RATE: 16 BRPM | TEMPERATURE: 97.7 F

## 2025-06-27 DIAGNOSIS — C85.28 MEDIASTINAL LARGE B-CELL LYMPHOMA OF LYMPH NODES OF MULTIPLE REGIONS (H): Primary | ICD-10-CM

## 2025-06-27 LAB
ALBUMIN SERPL BCG-MCNC: 4.4 G/DL (ref 3.5–5.2)
ALP SERPL-CCNC: 74 U/L (ref 40–150)
ALT SERPL W P-5'-P-CCNC: 11 U/L (ref 0–50)
ANION GAP SERPL CALCULATED.3IONS-SCNC: 11 MMOL/L (ref 7–15)
AST SERPL W P-5'-P-CCNC: 17 U/L (ref 0–45)
BASOPHILS # BLD AUTO: 0 10E3/UL (ref 0–0.2)
BASOPHILS NFR BLD AUTO: 1 %
BILIRUB SERPL-MCNC: 0.5 MG/DL
BUN SERPL-MCNC: 14.2 MG/DL (ref 6–20)
CALCIUM SERPL-MCNC: 9.3 MG/DL (ref 8.8–10.4)
CHLORIDE SERPL-SCNC: 105 MMOL/L (ref 98–107)
CREAT SERPL-MCNC: 0.76 MG/DL (ref 0.51–0.95)
EGFRCR SERPLBLD CKD-EPI 2021: >90 ML/MIN/1.73M2
EOSINOPHIL # BLD AUTO: 0.1 10E3/UL (ref 0–0.7)
EOSINOPHIL NFR BLD AUTO: 2 %
ERYTHROCYTE [DISTWIDTH] IN BLOOD BY AUTOMATED COUNT: 13.4 % (ref 10–15)
GLUCOSE SERPL-MCNC: 121 MG/DL (ref 70–99)
HCO3 SERPL-SCNC: 23 MMOL/L (ref 22–29)
HCT VFR BLD AUTO: 38.5 % (ref 35–47)
HGB BLD-MCNC: 13.1 G/DL (ref 11.7–15.7)
IMM GRANULOCYTES # BLD: 0 10E3/UL
IMM GRANULOCYTES NFR BLD: 0 %
LDH SERPL L TO P-CCNC: 155 U/L (ref 0–250)
LYMPHOCYTES # BLD AUTO: 1 10E3/UL (ref 0.8–5.3)
LYMPHOCYTES NFR BLD AUTO: 26 %
MCH RBC QN AUTO: 31.3 PG (ref 26.5–33)
MCHC RBC AUTO-ENTMCNC: 34 G/DL (ref 31.5–36.5)
MCV RBC AUTO: 92 FL (ref 78–100)
MONOCYTES # BLD AUTO: 0.4 10E3/UL (ref 0–1.3)
MONOCYTES NFR BLD AUTO: 11 %
NEUTROPHILS # BLD AUTO: 2.3 10E3/UL (ref 1.6–8.3)
NEUTROPHILS NFR BLD AUTO: 60 %
NRBC # BLD AUTO: 0 10E3/UL
NRBC BLD AUTO-RTO: 0 /100
PLATELET # BLD AUTO: 161 10E3/UL (ref 150–450)
POTASSIUM SERPL-SCNC: 4.2 MMOL/L (ref 3.4–5.3)
PROT SERPL-MCNC: 6.5 G/DL (ref 6.4–8.3)
RBC # BLD AUTO: 4.18 10E6/UL (ref 3.8–5.2)
SODIUM SERPL-SCNC: 139 MMOL/L (ref 135–145)
WBC # BLD AUTO: 3.8 10E3/UL (ref 4–11)

## 2025-06-27 PROCEDURE — 250N000011 HC RX IP 250 OP 636: Performed by: INTERNAL MEDICINE

## 2025-06-27 PROCEDURE — 36591 DRAW BLOOD OFF VENOUS DEVICE: CPT | Performed by: INTERNAL MEDICINE

## 2025-06-27 PROCEDURE — 85004 AUTOMATED DIFF WBC COUNT: CPT | Performed by: INTERNAL MEDICINE

## 2025-06-27 PROCEDURE — 84155 ASSAY OF PROTEIN SERUM: CPT | Performed by: INTERNAL MEDICINE

## 2025-06-27 PROCEDURE — 83615 LACTATE (LD) (LDH) ENZYME: CPT | Performed by: INTERNAL MEDICINE

## 2025-06-27 PROCEDURE — 99213 OFFICE O/P EST LOW 20 MIN: CPT | Performed by: INTERNAL MEDICINE

## 2025-06-27 RX ORDER — HEPARIN SODIUM (PORCINE) LOCK FLUSH IV SOLN 100 UNIT/ML 100 UNIT/ML
5 SOLUTION INTRAVENOUS
Status: COMPLETED | OUTPATIENT
Start: 2025-06-27 | End: 2025-06-27

## 2025-06-27 RX ADMIN — Medication 5 ML: at 15:17

## 2025-06-27 ASSESSMENT — PAIN SCALES - GENERAL: PAINLEVEL_OUTOF10: NO PAIN (0)

## 2025-06-27 NOTE — NURSING NOTE
"Chief Complaint   Patient presents with    Lymphoma    Port Draw     Labs drawn from port by rn.  VS taken.     Port accessed with 20 gauge 3/4\" Power needle and labs drawn by rn.  Port flushed with NS and heparin then de-accessed.  Pt tolerated well.  VS taken.  Pt checked in for next appt.    Africa Sandhu RN    "

## 2025-06-27 NOTE — LETTER
6/27/2025      Clementine Kathleen  [address] [place] [address]      Dear Colleague,    Thank you for referring your patient, Clementine Kathleen, to the [facility]. Please see a copy of my visit note below.      [facility]      FOLLOW-UP VISIT NOTE                       Jun 27, 2025  Subjective  REASON FOR VISIT: Follow up PMBCL    ONCOLOGIC SUMMARY:  Diagnosis:  Primary mediastinal B-cell lymphoma dx 1/2024, presenting with cervical LAD. 1/29/24 FNA indeterminate; 2/9/24 left level 3 excisional LN biopsy showed large B-cell lymphoma with rare abnormal B-cells on flow cytometry. Although CD23 IHC was negative, IHC for , MAL, and PD-L1 were positive in majority of the neoplastic cells suggesting PMBCL over DLBCL NOS. [id number] gene expression profile also consistent with PMBCL. FISH with gain of BCL2 but no MYC, BCL2, or BCL6 rearrangements. PET showed bilateral cervical and mediastinal hypermetabolic lymphadenopathy (largest 4.4 cm with SUVmax 27 in a prevascular LN); no disease below diaphragm.     Intent of treatment: Curative    Treatment history:  2/22/24: Cycle 1 R-EPOCH, dose level 1  3/14/24: Cycle 2 R-EPOCH, dose level 2. PET after 2 cycles shows CR!  4/4/24: Cycle 3 R-EPOCH, dose level 3 but vincristine capped at 2 mg d/t neuropathy  4/25/24: Cycle 4 R-EPOCH, dose level 4 but held vincristine d/t neuropathy  5/16/24  Cycle 5 R-EPOCH, dose level 5 but vincristine capped at 1.2 mg d/t neuropathy. Readmitted for near syncopal episode and mouth pain  6/6/24: Cycle 6 R-EPOCH, dose level 4 with vincristine still capped at 1.2 mg. EOT PET shows ongoing CR!    INTERVAL HISTORY:  Clementine is here today by herself for follow-up. She had a C6-7 herniated disc in April likely related to work, which has been improving with aggressive PT. Otherwise she has been feeling quite well and denies any fevers, night sweats, new lumps/bumps, chest pain, SOB, bleeding. She stopped  her cimetidine a few months ago and has not noticed any change in her urinary symptoms.    I have reviewed and updated the following:  Past Medical History:   Diagnosis Date     Anxiety      Cervical lymphadenopathy      DLBCL (diffuse large B cell lymphoma) (H)      GERD      Interstitial cystitis       No Known Allergies    Current Outpatient Medications:      cimetidine (TAGAMET) 200 MG tablet, Take 200 mg by mouth 2 times daily, Disp: , Rfl:      gabapentin (NEURONTIN) 300 MG capsule, TAKE ONE CAPSULE BY MOUTH THREE TIMES DAILY, Disp: 90 capsule, Rfl: 1     Levonorgestrel (KYLEENA IU), 1 Device by Intrauterine route once, Disp: , Rfl:      pantoprazole (PROTONIX) 40 MG EC tablet, Take 1 tablet (40 mg) by mouth 2 times daily., Disp: 60 tablet, Rfl: 3     dexAMETHasone (DECADRON) 2 MG tablet, Take 3 tabs by mouth daily x 3 days, then 2 tabs daily x 3 days, then 1 tab daily x 3 days, then 1/2 tab daily x 3 days. (Patient not taking: Reported on 6/27/2025), Disp: 20 tablet, Rfl: 0  No current facility-administered medications for this visit.    REVIEW OF SYSTEMS:  10-point ROS reviewed and negative other than that mentioned in HPI.     Objective  VITAL SIGNS  /76   Pulse 89   Temp 97.7  F (36.5  C) (Oral)   Resp 16   Wt 61.4 kg (135 lb 4.8 oz)   SpO2 95%   BMI 21.84 kg/m      ECOG PS: 0     PHYSICAL EXAM:  General: Awake, alert, in no acute distress.   HEENT: Normocephalic, atraumatic. No scleral icterus.   Lymph: No cervical, supraclavicular, or axillary LAD appreciated.   CV: Regular rate and rhythm. No murmurs, rubs, or gallops appreciated.  Resp: Good inspiratory effort, lungs clear to auscultation bilaterally.  GI: Abdomen soft, nontender, nondistended.   Ext: No peripheral edema bilaterally.  Neuro: CN II-XII grossly intact. No focal deficits appreciated.  Skin: No rash, unusual bruising or prominent lesions.  Psych: Pleasant, normal affect.     LABS:  I reviewed the following labs:  Lab Results    Component Value Date    WBC 3.8 (L) 06/27/2025    HGB 13.1 06/27/2025    HCT 38.5 06/27/2025    MCV 92 06/27/2025     06/27/2025    INR 1.12 06/10/2024    PTT 29 04/28/2024     Lab Results   Component Value Date     03/06/2025    POTASSIUM 4.2 03/06/2025    CR 0.75 03/06/2025    BUN 14.1 03/06/2025    CHLORIDE 107 03/06/2025    DEE 8.6 (L) 03/06/2025    GLC 88 03/06/2025      Lab Results   Component Value Date    ALT 11 03/06/2025    AST 18 03/06/2025    ALKPHOS 65 03/06/2025    BILITOTAL 0.5 03/06/2025      Lab Results   Component Value Date     03/06/2025    URIC 2.5 03/06/2025      Assessment & Plan  Primary mediastinal B-cell lymphoma  Dx 1/2024, presenting with cervical lymphadenopathy. Initial FNA suggestive of possible Hodgkin lymphoma; however excisional biopsy showed large B-cell lymphoma with differential diagnosis of primary mediastinal large B-cell lymphoma vs DLBCL NOS. Additional IHC staining for , MAL, and PD-L1, and [id number] gene expression profile all favor PMBCL diagnosis over DLBCL NOS. PET with cervical and mediastinal lymphadenopathy only. LDH normal.   Previously discussed PMBCL diagnosis and plan for DA-R-EPOCH x 6 cycles. In the single-arm phase 2 prospective study of R-EPOCH (without radiation) for PMBCL, 5-year EFS was 93% and OS 97% ([email or web address].[facility]/[id number]).   Completed 6 cycles of DA-R-EPOCH from 2/22/24 to 6/10/24 as above.   PET after 2 cycles showed CR and EOT PET showed ongoing CR!   6 month PET 12/30/24 showed increased low-level uptake in anterior mediastinum (SUVmax 3.5, Deauville 4) but stable/mildly decreased soft tissue size. This seems most consistent with thymic rebound/hyperplasia given no change in size and her young age but will monitor closely with repeat PET in 2 months. We did discuss that thymic hyperplasia may take up to 6 months or more to resolve.   3/3/25 close interval follow-up PET stable, more consistent with thymic rebound. Clinically  doing well, continue surveillance back on usual schedule - next due in 9/2025 and will switch back to CT. Okay for port removal also.   Continue surveillance with H&P/labs every 3-6 mo for 2 years, then yearly for years 3-5. Imaging every 6 months for first 2 years only.    Peripheral neuropathy, grade 2 - resolved  Secondary to chemotherapy.   Capped vincristine at 2 mg starting Cycle 3.  Held vincristine Cycle 4 to allow some recovery of neuropathy and consider reintroducing at 50% dose for Cycle 5/6.   Neuropathy did very mildly improve in the R hand after holding Vincristine with C4-  reintroduced vincristine with C5 at capped dose 1.2 mg.  Did have slight worsening of her neuropathy but not as bad as prior to C4.  Now resolved.     Leukopenia/intermittent neutropenia  Now resolved, possibly after stopping cimetidine vs just further time away from chemotherapy.      Hot flashes - resolved  Likely secondary to ovarian suppression from chemotherapy.   Following with Gyn.  Now resolved, also had her first period post chemo.     Fertility preservation  Established with [facility] and underwent egg retrieval on 2/18/24.      Ppx  Now done with [facility].   Vaccines: up to date on flu and COVID shots.       PLAN:  IR port removal  RTC in 3 months with CT    Total of 25 minutes on patient visit, reviewing records, interpreting test results, placing orders, and documentation on the day of service.    Vanessa Oakes MD  Attending Physician, [facility] [place]     Again, thank you for allowing me to participate in the care of your patient.        Sincerely,        Vanessa Oakes MD    Electronically signed

## 2025-06-27 NOTE — NURSING NOTE
"Oncology Rooming Note    June 27, 2025 3:31 PM   Clementine Kathleen is a 29 year old female who presents for:    Chief Complaint   Patient presents with    Lymphoma    Port Draw     Labs drawn from port by rn.  VS taken.     Initial Vitals: /76   Pulse 89   Temp 97.7  F (36.5  C) (Oral)   Resp 16   Wt 61.4 kg (135 lb 4.8 oz)   SpO2 95%   BMI 21.84 kg/m   Estimated body mass index is 21.84 kg/m  as calculated from the following:    Height as of 5/9/25: 1.676 m (5' 6\").    Weight as of this encounter: 61.4 kg (135 lb 4.8 oz). Body surface area is 1.69 meters squared.  No Pain (0) Comment: Data Unavailable   No LMP recorded. (Menstrual status: IUD).  Allergies reviewed: Yes  Medications reviewed: Yes    Medications: Medication refills not needed today.  Pharmacy name entered into Fac[facility]: [id number] - [place], [place] - [address] AT [address]    Frailty Screening:   Is the patient here for a new oncology consult visit in cancer care? 2. No    PHQ9:  Did this patient require a PHQ9?: No      Clinical concerns: Patient states there are no new concerns to discuss with provider.  Dr Oakes was not notified.         Jannette Ji CMA              "

## 2025-06-27 NOTE — PROGRESS NOTES
[facility]      FOLLOW-UP VISIT NOTE                       Jun 27, 2025  Subjective   REASON FOR VISIT: Follow up PMBCL    ONCOLOGIC SUMMARY:  Diagnosis:  Primary mediastinal B-cell lymphoma dx 1/2024, presenting with cervical LAD. 1/29/24 FNA indeterminate; 2/9/24 left level 3 excisional LN biopsy showed large B-cell lymphoma with rare abnormal B-cells on flow cytometry. Although CD23 IHC was negative, IHC for , MAL, and PD-L1 were positive in majority of the neoplastic cells suggesting PMBCL over DLBCL NOS. Fohjn1IP gene expression profile also consistent with PMBCL. FISH with gain of BCL2 but no MYC, BCL2, or BCL6 rearrangements. PET showed bilateral cervical and mediastinal hypermetabolic lymphadenopathy (largest 4.4 cm with SUVmax 27 in a prevascular LN); no disease below diaphragm.     Intent of treatment: Curative    Treatment history:  2/22/24: Cycle 1 R-EPOCH, dose level 1  3/14/24: Cycle 2 R-EPOCH, dose level 2. PET after 2 cycles shows CR!  4/4/24: Cycle 3 R-EPOCH, dose level 3 but vincristine capped at 2 mg d/t neuropathy  4/25/24: Cycle 4 R-EPOCH, dose level 4 but held vincristine d/t neuropathy  5/16/24  Cycle 5 R-EPOCH, dose level 5 but vincristine capped at 1.2 mg d/t neuropathy. Readmitted for near syncopal episode and mouth pain  6/6/24: Cycle 6 R-EPOCH, dose level 4 with vincristine still capped at 1.2 mg. EOT PET shows ongoing CR!    INTERVAL HISTORY:  Clementine is here today by herself for follow-up. She had a C6-7 herniated disc in April likely related to work, which has been improving with aggressive PT. Otherwise she has been feeling quite well and denies any fevers, night sweats, new lumps/bumps, chest pain, SOB, bleeding. She stopped her cimetidine a few months ago and has not noticed any change in her urinary symptoms.    I have reviewed and updated the following:  Past Medical History:   Diagnosis Date    Anxiety     Cervical lymphadenopathy     DLBCL (diffuse large B cell  lymphoma) (H)     GERD     Interstitial cystitis       No Known Allergies    Current Outpatient Medications:     cimetidine (TAGAMET) 200 MG tablet, Take 200 mg by mouth 2 times daily, Disp: , Rfl:     gabapentin (NEURONTIN) 300 MG capsule, TAKE ONE CAPSULE BY MOUTH THREE TIMES DAILY, Disp: 90 capsule, Rfl: 1    Levonorgestrel (KYLEENA IU), 1 Device by Intrauterine route once, Disp: , Rfl:     pantoprazole (PROTONIX) 40 MG EC tablet, Take 1 tablet (40 mg) by mouth 2 times daily., Disp: 60 tablet, Rfl: 3    dexAMETHasone (DECADRON) 2 MG tablet, Take 3 tabs by mouth daily x 3 days, then 2 tabs daily x 3 days, then 1 tab daily x 3 days, then 1/2 tab daily x 3 days. (Patient not taking: Reported on 6/27/2025), Disp: 20 tablet, Rfl: 0  No current facility-administered medications for this visit.    REVIEW OF SYSTEMS:  10-point ROS reviewed and negative other than that mentioned in HPI.     Objective   VITAL SIGNS  /76   Pulse 89   Temp 97.7  F (36.5  C) (Oral)   Resp 16   Wt 61.4 kg (135 lb 4.8 oz)   SpO2 95%   BMI 21.84 kg/m      ECOG PS: 0     PHYSICAL EXAM:  General: Awake, alert, in no acute distress.   HEENT: Normocephalic, atraumatic. No scleral icterus.   Lymph: No cervical, supraclavicular, or axillary LAD appreciated.   CV: Regular rate and rhythm. No murmurs, rubs, or gallops appreciated.  Resp: Good inspiratory effort, lungs clear to auscultation bilaterally.  GI: Abdomen soft, nontender, nondistended.   Ext: No peripheral edema bilaterally.  Neuro: CN II-XII grossly intact. No focal deficits appreciated.  Skin: No rash, unusual bruising or prominent lesions.  Psych: Pleasant, normal affect.     LABS:  I reviewed the following labs:  Lab Results   Component Value Date    WBC 3.8 (L) 06/27/2025    HGB 13.1 06/27/2025    HCT 38.5 06/27/2025    MCV 92 06/27/2025     06/27/2025    INR 1.12 06/10/2024    PTT 29 04/28/2024     Lab Results   Component Value Date     03/06/2025    POTASSIUM  4.2 03/06/2025    CR 0.75 03/06/2025    BUN 14.1 03/06/2025    CHLORIDE 107 03/06/2025    DEE 8.6 (L) 03/06/2025    GLC 88 03/06/2025      Lab Results   Component Value Date    ALT 11 03/06/2025    AST 18 03/06/2025    ALKPHOS 65 03/06/2025    BILITOTAL 0.5 03/06/2025      Lab Results   Component Value Date     03/06/2025    URIC 2.5 03/06/2025      Assessment & Plan   Primary mediastinal B-cell lymphoma  Dx 1/2024, presenting with cervical lymphadenopathy. Initial FNA suggestive of possible Hodgkin lymphoma; however excisional biopsy showed large B-cell lymphoma with differential diagnosis of primary mediastinal large B-cell lymphoma vs DLBCL NOS. Additional IHC staining for , MAL, and PD-L1, and Eicol5EX gene expression profile all favor PMBCL diagnosis over DLBCL NOS. PET with cervical and mediastinal lymphadenopathy only. LDH normal.   Previously discussed PMBCL diagnosis and plan for DA-R-EPOCH x 6 cycles. In the single-arm phase 2 prospective study of R-EPOCH (without radiation) for PMBCL, 5-year EFS was 93% and OS 97% ([id number]).   Completed 6 cycles of DA-R-EPOCH from 2/22/24 to 6/10/24 as above.   PET after 2 cycles showed CR and EOT PET showed ongoing CR!   6 month PET 12/30/24 showed increased low-level uptake in anterior mediastinum (SUVmax 3.5, Deauville 4) but stable/mildly decreased soft tissue size. This seems most consistent with thymic rebound/hyperplasia given no change in size and her young age but will monitor closely with repeat PET in 2 months. We did discuss that thymic hyperplasia may take up to 6 months or more to resolve.   3/3/25 close interval follow-up PET stable, more consistent with thymic rebound. Clinically doing well, continue surveillance back on usual schedule - next due in 9/2025 and will switch back to CT. Okay for port removal also.   Continue surveillance with H&P/labs every 3-6 mo for 2 years, then yearly for years 3-5. Imaging every 6 months for  first 2 years only.    Peripheral neuropathy, grade 2 - resolved  Secondary to chemotherapy.   Capped vincristine at 2 mg starting Cycle 3.  Held vincristine Cycle 4 to allow some recovery of neuropathy and consider reintroducing at 50% dose for Cycle 5/6.   Neuropathy did very mildly improve in the R hand after holding Vincristine with C4-  reintroduced vincristine with C5 at capped dose 1.2 mg.  Did have slight worsening of her neuropathy but not as bad as prior to C4.  Now resolved.     Leukopenia/intermittent neutropenia  Now resolved, possibly after stopping cimetidine vs just further time away from chemotherapy.      Hot flashes - resolved  Likely secondary to ovarian suppression from chemotherapy.   Following with Gyn.  Now resolved, also had her first period post chemo.     Fertility preservation  Established with [facility] and underwent egg retrieval on 2/18/24.      Ppx  Now done with [facility].   Vaccines: up to date on flu and COVID shots.       PLAN:  IR port removal  RTC in 3 months with CT    Total of 25 minutes on patient visit, reviewing records, interpreting test results, placing orders, and documentation on the day of service.    Vanessa Oakes MD  Attending Physician, [facility]

## 2025-06-29 ENCOUNTER — HEALTH MAINTENANCE LETTER (OUTPATIENT)
Age: 30
End: 2025-06-29

## 2025-07-01 ENCOUNTER — THERAPY VISIT (OUTPATIENT)
Age: 30
End: 2025-07-01
Payer: OTHER MISCELLANEOUS

## 2025-07-01 DIAGNOSIS — M79.602 PAIN OF LEFT UPPER EXTREMITY: ICD-10-CM

## 2025-07-01 DIAGNOSIS — G54.2 CERVICAL NEUROPATHY: Primary | ICD-10-CM

## 2025-07-01 PROCEDURE — 97140 MANUAL THERAPY 1/> REGIONS: CPT | Mod: GP | Performed by: PHYSICAL THERAPIST

## 2025-07-01 PROCEDURE — 97110 THERAPEUTIC EXERCISES: CPT | Mod: GP | Performed by: PHYSICAL THERAPIST

## 2025-07-08 ENCOUNTER — THERAPY VISIT (OUTPATIENT)
Age: 30
End: 2025-07-08
Payer: OTHER MISCELLANEOUS

## 2025-07-08 DIAGNOSIS — G54.2 CERVICAL NEUROPATHY: Primary | ICD-10-CM

## 2025-07-08 DIAGNOSIS — M79.602 PAIN OF LEFT UPPER EXTREMITY: ICD-10-CM

## 2025-07-08 PROCEDURE — 97110 THERAPEUTIC EXERCISES: CPT | Mod: GP | Performed by: PHYSICAL THERAPIST

## 2025-07-08 PROCEDURE — 97140 MANUAL THERAPY 1/> REGIONS: CPT | Mod: GP | Performed by: PHYSICAL THERAPIST

## 2025-07-15 ENCOUNTER — THERAPY VISIT (OUTPATIENT)
Age: 30
End: 2025-07-15
Payer: OTHER MISCELLANEOUS

## 2025-07-15 DIAGNOSIS — G54.2 CERVICAL NEUROPATHY: Primary | ICD-10-CM

## 2025-07-15 DIAGNOSIS — M79.602 PAIN OF LEFT UPPER EXTREMITY: ICD-10-CM

## 2025-07-15 PROCEDURE — 97110 THERAPEUTIC EXERCISES: CPT | Mod: GP | Performed by: PHYSICAL THERAPIST

## 2025-07-15 PROCEDURE — 97140 MANUAL THERAPY 1/> REGIONS: CPT | Mod: GP | Performed by: PHYSICAL THERAPIST

## 2025-07-16 ENCOUNTER — HOSPITAL ENCOUNTER (OUTPATIENT)
Facility: AMBULATORY SURGERY CENTER | Age: 30
Discharge: HOME | End: 2025-07-16
Attending: RADIOLOGY | Admitting: RADIOLOGY
Payer: COMMERCIAL

## 2025-07-16 ENCOUNTER — ANCILLARY PROCEDURE (OUTPATIENT)
Dept: RADIOLOGY | Facility: AMBULATORY SURGERY CENTER | Age: 30
End: 2025-07-16
Attending: INTERNAL MEDICINE
Payer: COMMERCIAL

## 2025-07-16 VITALS
HEIGHT: 66 IN | OXYGEN SATURATION: 98 % | WEIGHT: 135 LBS | BODY MASS INDEX: 21.69 KG/M2 | HEART RATE: 57 BPM | TEMPERATURE: 98.1 F | SYSTOLIC BLOOD PRESSURE: 97 MMHG | DIASTOLIC BLOOD PRESSURE: 57 MMHG | RESPIRATION RATE: 14 BRPM

## 2025-07-16 DIAGNOSIS — C85.28 MEDIASTINAL LARGE B-CELL LYMPHOMA OF LYMPH NODES OF MULTIPLE REGIONS (H): ICD-10-CM

## 2025-07-16 LAB
HCG UR QL: NEGATIVE
INR PPP: 1.01 (ref 0.85–1.15)
INTERNAL QC OK POCT: NORMAL
POCT KIT EXPIRATION DATE: NORMAL
POCT KIT LOT NUMBER: NORMAL
PROTHROMBIN TIME: 13.5 SECONDS (ref 11.8–14.8)

## 2025-07-16 PROCEDURE — 36590 REMOVAL TUNNELED CV CATH: CPT

## 2025-07-16 PROCEDURE — 36590 REMOVAL TUNNELED CV CATH: CPT | Performed by: RADIOLOGY

## 2025-07-16 PROCEDURE — 99152 MOD SED SAME PHYS/QHP 5/>YRS: CPT | Performed by: RADIOLOGY

## 2025-07-16 RX ORDER — ACETAMINOPHEN 325 MG/1
650 TABLET ORAL
Status: DISCONTINUED | OUTPATIENT
Start: 2025-07-16 | End: 2025-07-17 | Stop reason: HOSPADM

## 2025-07-16 RX ORDER — SODIUM CHLORIDE 9 MG/ML
INJECTION, SOLUTION INTRAVENOUS CONTINUOUS
Status: DISCONTINUED | OUTPATIENT
Start: 2025-07-16 | End: 2025-07-17 | Stop reason: HOSPADM

## 2025-07-16 RX ORDER — FENTANYL CITRATE 50 UG/ML
INJECTION, SOLUTION INTRAMUSCULAR; INTRAVENOUS PRN
Status: DISCONTINUED | OUTPATIENT
Start: 2025-07-16 | End: 2025-07-16 | Stop reason: HOSPADM

## 2025-07-16 RX ORDER — LIDOCAINE 40 MG/G
CREAM TOPICAL
Status: DISCONTINUED | OUTPATIENT
Start: 2025-07-16 | End: 2025-07-17 | Stop reason: HOSPADM

## 2025-07-16 NOTE — SEDATION DOCUMENTATION
Interventional Radiology Pre-Procedure Sedation Assessment   Time of Assessment: 07/16/25 8:05 AM    Expected Level: Moderate Sedation    Diagnosis: Mediastinal large B-cell lymphoma of lymph nodes of multiple regions (H)     Indication: Sedation is required for the following type of Procedure: Right Chest Port Removal     Sedation and procedural consent: Risks, benefits and alternatives were discussed with Patient    PO Intake: Appropriately NPO for procedure    ASA Class: Class 2 - MILD SYSTEMIC DISEASE, NO ACUTE PROBLEMS, NO FUNCTIONAL LIMITATIONS.    Mallampati: Grade 1:  Soft palate, uvula, tonsillar pillars, and posterior pharyngeal wall visible    Lungs: Lungs Clear with good breath sounds bilaterally    Heart: Normal heart sounds and rate    History and physical reviewed and no updates needed. I have reviewed the lab findings, diagnostic data, medications, and the plan for sedation. I have determined this patient to be an appropriate candidate for the planned sedation and procedure and have reassessed the patient IMMEDIATELY PRIOR to sedation and procedure.    Essie Lucas PA-C  Interventional Radiology

## 2025-07-16 NOTE — DISCHARGE INSTRUCTIONS
A collaboration between [facility] and [facility]  Experts in minimally invasive, targeted treatments performed using imaging guidance    Venous Access Device, Port Catheter or Tunneled Central Line Removal    Today you had your existing venous access device removed, either because it was no longer needed or because there was malfunction or infection issues.    After you go home:  Drink plenty of fluids.  Generally 6-8 (8 ounce) glasses a day is recommended.  Resume your regular diet unless otherwise ordered by a medical provider.  Keep any applied tape/gauze dressings clean and dry.  Change tape/gauze dressings if they get wet or soiled.  You may shower the following day after procedure, however cover and protect from moisture any tape/gauze dressings.  You may let water hit and run over dried skin glue, but do not scrub.  Pat the area dry after showering.  Port removal incisions are closed with absorbable suture, meaning they do not need to be removed at a later date, and a topical skin adhesive (skin glue).  This glue will wear off in 7-14 days.  Do not remove before this time.  If 14 days have passed and residual glue is present, you may gently remove it.  You may remove tape/gauze dressings after 5 days if the site looks closed and in the process of healing.  Do not apply gels, lotions, or ointments to the glue site for the first 10 days as this may cause the glue to prematurely soften and fail.  Do not perform strenuous activities or lift greater than 10 pounds for the next three days.  If there is bleeding or oozing from the procedure site, apply firm pressure to the area for 5-10 minutes.  If the bleeding continues seek medical advice at the numbers below.  Mild procedure site discomfort can be treated with an ice pack and over-the-counter pain relievers.              For 24 hours after any sedation used:  Relax and take it easy.  No strenuous  activities.  Do not drive or operate machines at home or at work.  No alcohol consumption.  Do not make any important or legal decisions.    Call our Interventional Radiology (IR) service if:  If you start bleeding from the procedure site.  If you do start to bleed from the site, lie down and hold some pressure on the site.  Our radiology provider can help you decide if you need to return to the hospital.  If you have new or worsening pain related to the procedure.  If you have concerning swelling at the procedure site.  If you develop persistent nausea or vomiting.  If you develop hives or a rash or any unexplained itching.  If you have a fever of greater than 100.5  F and chills in the first 5 days after procedure.  Any other concerns related to your procedure.      [facility]  Interventional Radiology (IR)  [address] [facility] [place] [address]    Contact Number:  [phone]  (IR Nurse Triage)  Monday - Friday 7am - 4pm    After hours for urgent concerns:  [phone]  After 4pm Monday - Friday, Weekends and Holidays.   Ask for Interventional Radiology on-call.  Someone is available 24 hours a day.  [facility] toll free number:  [phone]

## 2025-07-16 NOTE — BRIEF OP NOTE
[facility]    Brief Operative Note    Pre-operative diagnosis: Mediastinal large B-cell lymphoma of lymph nodes of multiple regions (H) [C85.28]  Post-operative diagnosis Same as pre-operative diagnosis    Procedure: Remove port vascular access, Right - Chest    IR physician:  Dr. Dash Rawls MD  Anesthesia: Moderate Sedation   Estimated Blood Loss: Less than 10 ml    Drains: None  Specimens: * No specimens in log *  Findings:  Completed removal of right side chest port in its entirety.     Please see dictated note under imaging for more detailed information.      Complications: None.  Implants:   Implant Name Type Inv. Item Serial No.  Lot No. LRB No. Used Action   IMP SMART PORT LOW PROFILE 300 PSI [id number] - [id number] Port IMP SMART PORT LOW PROFILE 300 PSI [id number]  [employer] [id number] Right 1 Explanted

## 2025-07-29 ENCOUNTER — THERAPY VISIT (OUTPATIENT)
Age: 30
End: 2025-07-29
Payer: OTHER MISCELLANEOUS

## 2025-07-29 DIAGNOSIS — G54.2 CERVICAL NEUROPATHY: Primary | ICD-10-CM

## 2025-07-29 DIAGNOSIS — M79.602 PAIN OF LEFT UPPER EXTREMITY: ICD-10-CM

## 2025-07-29 PROCEDURE — 97140 MANUAL THERAPY 1/> REGIONS: CPT | Mod: GP | Performed by: PHYSICAL THERAPIST

## 2025-07-29 PROCEDURE — 97110 THERAPEUTIC EXERCISES: CPT | Mod: GP | Performed by: PHYSICAL THERAPIST

## 2025-08-13 ENCOUNTER — THERAPY VISIT (OUTPATIENT)
Age: 30
End: 2025-08-13
Payer: OTHER MISCELLANEOUS

## 2025-08-13 DIAGNOSIS — M79.602 PAIN OF LEFT UPPER EXTREMITY: ICD-10-CM

## 2025-08-13 DIAGNOSIS — G54.2 CERVICAL NEUROPATHY: Primary | ICD-10-CM

## 2025-08-13 PROCEDURE — 97110 THERAPEUTIC EXERCISES: CPT | Mod: GP | Performed by: PHYSICAL THERAPIST

## 2025-08-13 PROCEDURE — 97140 MANUAL THERAPY 1/> REGIONS: CPT | Mod: GP | Performed by: PHYSICAL THERAPIST

## 2025-08-19 ENCOUNTER — OFFICE VISIT (OUTPATIENT)
Dept: FAMILY MEDICINE | Facility: CLINIC | Age: 30
End: 2025-08-19
Payer: OTHER MISCELLANEOUS

## 2025-08-19 VITALS
BODY MASS INDEX: 21.89 KG/M2 | SYSTOLIC BLOOD PRESSURE: 112 MMHG | DIASTOLIC BLOOD PRESSURE: 79 MMHG | TEMPERATURE: 98 F | OXYGEN SATURATION: 96 % | HEART RATE: 85 BPM | HEIGHT: 66 IN | RESPIRATION RATE: 14 BRPM | WEIGHT: 136.2 LBS

## 2025-08-19 DIAGNOSIS — G54.2 CERVICAL NEUROPATHY: Primary | ICD-10-CM

## 2025-08-26 ENCOUNTER — OFFICE VISIT (OUTPATIENT)
Dept: FAMILY MEDICINE | Facility: CLINIC | Age: 30
End: 2025-08-26
Payer: COMMERCIAL

## 2025-08-26 DIAGNOSIS — R42 VERTIGO: Primary | ICD-10-CM

## 2025-08-26 DIAGNOSIS — H81.20 VESTIBULAR NEURONITIS, UNSPECIFIED LATERALITY: Primary | ICD-10-CM

## 2025-08-26 RX ORDER — PREDNISONE 20 MG/1
60 TABLET ORAL DAILY
Qty: 21 TABLET | Refills: 0 | Status: SHIPPED | OUTPATIENT
Start: 2025-08-26 | End: 2025-09-02

## 2025-08-26 RX ORDER — PREDNISONE 10 MG/1
TABLET ORAL
Qty: 15 TABLET | Refills: 0 | Status: SHIPPED | OUTPATIENT
Start: 2025-08-26 | End: 2025-08-31

## 2025-08-26 ASSESSMENT — PAIN SCALES - GENERAL: PAINLEVEL_OUTOF10: NO PAIN (0)

## 2025-08-27 ENCOUNTER — THERAPY VISIT (OUTPATIENT)
Age: 30
End: 2025-08-27
Payer: OTHER MISCELLANEOUS

## 2025-08-27 DIAGNOSIS — M79.602 PAIN OF LEFT UPPER EXTREMITY: ICD-10-CM

## 2025-08-27 DIAGNOSIS — G54.2 CERVICAL NEUROPATHY: Primary | ICD-10-CM

## 2025-08-27 PROCEDURE — 97140 MANUAL THERAPY 1/> REGIONS: CPT | Mod: GP | Performed by: PHYSICAL THERAPIST

## 2025-08-27 PROCEDURE — 97110 THERAPEUTIC EXERCISES: CPT | Mod: GP | Performed by: PHYSICAL THERAPIST

## (undated) DEVICE — PAD CHUX UNDERPAD 23X24" 7136

## (undated) DEVICE — GOWN XLG DISP 9545

## (undated) DEVICE — SU VICRYL 3-0 SH 8X18" UND J864D

## (undated) DEVICE — SU SILK 4-0 TIE 12X30" A303H

## (undated) DEVICE — SPONGE LAP 18X18" X8435

## (undated) DEVICE — LINEN GOWN XLG 5407

## (undated) DEVICE — SPONGE KITTNER 30-101

## (undated) DEVICE — SU SILK 3-0 TIE 12X30" A304H

## (undated) DEVICE — CLIP HORIZON SM RED WIDE SLOT 001201

## (undated) DEVICE — LINEN TOWEL PACK X5 5464

## (undated) DEVICE — DRAPE SHEET REV FOLD 3/4 9349

## (undated) DEVICE — CLIP HORIZON MED BLUE 002200

## (undated) DEVICE — GLOVE BIOGEL PI MICRO SZ 8.0 48580

## (undated) DEVICE — SUCTION SLEEVE NEPTUNE 2 125MM 0703-005-125

## (undated) DEVICE — PACK CENTRAL LINE INSERTION SAN32CLFC7

## (undated) DEVICE — SU MONOCRYL 4-0 PS-2 27" UND Y426H

## (undated) DEVICE — GLOVE BIOGEL PI MICRO SZ 7.5 48575

## (undated) DEVICE — PREP POVIDONE-IODINE 7.5% SCRUB 4OZ BOTTLE MDS093945

## (undated) DEVICE — PREP SKIN SCRUB TRAY 4461A

## (undated) DEVICE — BLADE KNIFE SURG 15 371115

## (undated) DEVICE — BAG DECANTER STERILE WHITE DYNJDEC09

## (undated) DEVICE — SU VICRYL 3-0 SH 27" UND J416H

## (undated) DEVICE — WIPE PREMOIST CLEANSING WASHCLOTHS 7988

## (undated) DEVICE — STRAP UNIVERSAL POSITIONING 2-PIECE 4X47X76" 91-287

## (undated) DEVICE — SU MONOCRYL 4-0 P-3 18" UND Y494G

## (undated) DEVICE — Device

## (undated) DEVICE — PREP POVIDONE-IODINE 10% SOLUTION 4OZ BOTTLE MDS093944

## (undated) DEVICE — PACK CENTRAL LINE INSERTION SAN32CLFCG

## (undated) DEVICE — PACK NEURO MINOR UMMC SNE32MNMU4

## (undated) DEVICE — CONNECTOR MALE TO MALE LL

## (undated) DEVICE — SU DERMABOND ADVANCED .7ML DNX12

## (undated) DEVICE — SU SILK 2-0 SH CR 8X18" C012D

## (undated) DEVICE — SU SILK 2-0 TIE 12X30" A305H

## (undated) DEVICE — SOL NACL 0.9% INJ 250ML BAG 2B1322Q

## (undated) DEVICE — KNIFE HANDLE W/15 BLADE 371615

## (undated) DEVICE — SOL WATER IRRIG 1000ML BOTTLE 2F7114

## (undated) DEVICE — DRSG PRIMAPORE 02X3" 7133

## (undated) DEVICE — RETR ELASTIC STAYS LONE STAR BLUNT DUAL LEAD 3550-1G

## (undated) DEVICE — DECANTER BAG 2002S

## (undated) DEVICE — SU VICRYL 4-0 P-3 18" UND  J494H

## (undated) DEVICE — KIT INTRODUCER FLUENT MICRO 5FRX10CM ECHO TIP KIT-038-04

## (undated) DEVICE — GLOVE PROTEXIS MICRO 8.0 LT BLUE 2D73PM80

## (undated) DEVICE — PROBE COVER INTRAOPERATIVE 5"X96" PC1308

## (undated) DEVICE — SUCTION MANIFOLD NEPTUNE 2 SYS 4 PORT 0702-020-000

## (undated) RX ORDER — ACETAMINOPHEN 325 MG/1
TABLET ORAL
Status: DISPENSED
Start: 2024-03-01

## (undated) RX ORDER — LIDOCAINE HYDROCHLORIDE AND EPINEPHRINE 10; 10 MG/ML; UG/ML
INJECTION, SOLUTION INFILTRATION; PERINEURAL
Status: DISPENSED
Start: 2024-02-09

## (undated) RX ORDER — PENTAMIDINE ISETHIONATE 300 MG/300MG
INHALANT RESPIRATORY (INHALATION)
Status: DISPENSED
Start: 2024-03-01

## (undated) RX ORDER — HEPARIN SODIUM,PORCINE 10 UNIT/ML
VIAL (ML) INTRAVENOUS
Status: DISPENSED
Start: 2024-02-22

## (undated) RX ORDER — FENTANYL CITRATE 50 UG/ML
INJECTION, SOLUTION INTRAMUSCULAR; INTRAVENOUS
Status: DISPENSED
Start: 2025-07-16

## (undated) RX ORDER — PENTAMIDINE ISETHIONATE 300 MG/300MG
INHALANT RESPIRATORY (INHALATION)
Status: DISPENSED
Start: 2024-07-25

## (undated) RX ORDER — CEFAZOLIN SODIUM 2 G/50ML
SOLUTION INTRAVENOUS
Status: DISPENSED
Start: 2024-03-01

## (undated) RX ORDER — PENTAMIDINE ISETHIONATE 300 MG/300MG
INHALANT RESPIRATORY (INHALATION)
Status: DISPENSED
Start: 2024-05-03

## (undated) RX ORDER — PENTAMIDINE ISETHIONATE 300 MG/300MG
INHALANT RESPIRATORY (INHALATION)
Status: DISPENSED
Start: 2024-04-01

## (undated) RX ORDER — PENTAMIDINE ISETHIONATE 300 MG/300MG
INHALANT RESPIRATORY (INHALATION)
Status: DISPENSED
Start: 2024-06-03

## (undated) RX ORDER — HEPARIN SODIUM (PORCINE) LOCK FLUSH IV SOLN 100 UNIT/ML 100 UNIT/ML
SOLUTION INTRAVENOUS
Status: DISPENSED
Start: 2025-04-15

## (undated) RX ORDER — FENTANYL CITRATE 50 UG/ML
INJECTION, SOLUTION INTRAMUSCULAR; INTRAVENOUS
Status: DISPENSED
Start: 2024-02-09

## (undated) RX ORDER — ALBUTEROL SULFATE 0.83 MG/ML
SOLUTION RESPIRATORY (INHALATION)
Status: DISPENSED
Start: 2024-07-25

## (undated) RX ORDER — HYDROMORPHONE HYDROCHLORIDE 1 MG/ML
INJECTION, SOLUTION INTRAMUSCULAR; INTRAVENOUS; SUBCUTANEOUS
Status: DISPENSED
Start: 2024-02-09